# Patient Record
Sex: FEMALE | Race: WHITE | NOT HISPANIC OR LATINO | Employment: OTHER | URBAN - METROPOLITAN AREA
[De-identification: names, ages, dates, MRNs, and addresses within clinical notes are randomized per-mention and may not be internally consistent; named-entity substitution may affect disease eponyms.]

---

## 2017-01-09 ENCOUNTER — ALLSCRIPTS OFFICE VISIT (OUTPATIENT)
Dept: OTHER | Facility: OTHER | Age: 62
End: 2017-01-09

## 2017-04-13 ENCOUNTER — ALLSCRIPTS OFFICE VISIT (OUTPATIENT)
Dept: OTHER | Facility: OTHER | Age: 62
End: 2017-04-13

## 2017-05-31 ENCOUNTER — ALLSCRIPTS OFFICE VISIT (OUTPATIENT)
Dept: OTHER | Facility: OTHER | Age: 62
End: 2017-05-31

## 2017-07-31 ENCOUNTER — ALLSCRIPTS OFFICE VISIT (OUTPATIENT)
Dept: OTHER | Facility: OTHER | Age: 62
End: 2017-07-31

## 2017-07-31 ENCOUNTER — TRANSCRIBE ORDERS (OUTPATIENT)
Dept: ADMINISTRATIVE | Facility: HOSPITAL | Age: 62
End: 2017-07-31

## 2017-07-31 DIAGNOSIS — Z12.31 VISIT FOR SCREENING MAMMOGRAM: Primary | ICD-10-CM

## 2017-12-05 ENCOUNTER — HOSPITAL ENCOUNTER (OUTPATIENT)
Dept: MAMMOGRAPHY | Facility: CLINIC | Age: 62
Discharge: HOME | End: 2017-12-05
Payer: COMMERCIAL

## 2017-12-05 DIAGNOSIS — M81.0 AGE-RELATED OSTEOPOROSIS WITHOUT CURRENT PATHOLOGICAL FRACTURE: ICD-10-CM

## 2017-12-05 DIAGNOSIS — Z12.31 ENCOUNTER FOR SCREENING MAMMOGRAM FOR MALIGNANT NEOPLASM OF BREAST: ICD-10-CM

## 2017-12-05 DIAGNOSIS — C56.9 MALIGNANT NEOPLASM OF OVARY (HCC): ICD-10-CM

## 2017-12-05 PROCEDURE — G0202 SCR MAMMO BI INCL CAD: HCPCS

## 2017-12-05 PROCEDURE — 77063 BREAST TOMOSYNTHESIS BI: CPT

## 2017-12-07 ENCOUNTER — ALLSCRIPTS OFFICE VISIT (OUTPATIENT)
Dept: OTHER | Facility: OTHER | Age: 62
End: 2017-12-07

## 2017-12-14 ENCOUNTER — GENERIC CONVERSION - ENCOUNTER (OUTPATIENT)
Dept: OTHER | Facility: OTHER | Age: 62
End: 2017-12-14

## 2017-12-14 ENCOUNTER — ALLSCRIPTS OFFICE VISIT (OUTPATIENT)
Dept: OTHER | Facility: OTHER | Age: 62
End: 2017-12-14

## 2017-12-15 LAB
A/G RATIO (HISTORICAL): 1.3 (ref 1.2–2.2)
ALBUMIN SERPL BCP-MCNC: 4.1 G/DL (ref 3.6–4.8)
ALP SERPL-CCNC: 57 IU/L (ref 39–117)
ALT SERPL W P-5'-P-CCNC: 23 IU/L (ref 0–32)
AST SERPL W P-5'-P-CCNC: 27 IU/L (ref 0–40)
BILIRUB SERPL-MCNC: 0.4 MG/DL (ref 0–1.2)
BUN SERPL-MCNC: 21 MG/DL (ref 8–27)
BUN/CREA RATIO (HISTORICAL): 28 (ref 12–28)
CALCIUM SERPL-MCNC: 9.2 MG/DL (ref 8.7–10.3)
CHLORIDE SERPL-SCNC: 99 MMOL/L (ref 96–106)
CHOLEST SERPL-MCNC: 201 MG/DL (ref 100–199)
CHOLEST/HDLC SERPL: 2 RATIO UNITS (ref 0–4.4)
CO2 SERPL-SCNC: 26 MMOL/L (ref 18–29)
CREAT SERPL-MCNC: 0.76 MG/DL (ref 0.57–1)
EGFR AFRICAN AMERICAN (HISTORICAL): 99 ML/MIN/1.73
EGFR-AMERICAN CALC (HISTORICAL): 86 ML/MIN/1.73
GLUCOSE SERPL-MCNC: 90 MG/DL (ref 65–99)
HDLC SERPL-MCNC: 101 MG/DL
HEPATITIS C ANTIBODY (HISTORICAL): 0.2 S/CO RATIO (ref 0–0.9)
LDLC SERPL CALC-MCNC: 88 MG/DL (ref 0–99)
POTASSIUM SERPL-SCNC: 4.4 MMOL/L (ref 3.5–5.2)
SODIUM SERPL-SCNC: 141 MMOL/L (ref 134–144)
TOT. GLOBULIN, SERUM (HISTORICAL): 3.2 G/DL (ref 1.5–4.5)
TOTAL PROTEIN (HISTORICAL): 7.3 G/DL (ref 6–8.5)
TRIGL SERPL-MCNC: 59 MG/DL (ref 0–149)
TSH SERPL DL<=0.05 MIU/L-ACNC: 2.37 UIU/ML (ref 0.45–4.5)
VLDLC SERPL CALC-MCNC: 12 MG/DL (ref 5–40)

## 2017-12-18 ENCOUNTER — GENERIC CONVERSION - ENCOUNTER (OUTPATIENT)
Dept: OTHER | Facility: OTHER | Age: 62
End: 2017-12-18

## 2017-12-18 LAB
BASOPHILS # BLD AUTO: NORMAL 10*3/UL
EOSINOPHIL # BLD AUTO: NORMAL 10*3/UL
EOSINOPHIL # BLD AUTO: NORMAL 10*3/UL
HCT VFR BLD AUTO: NORMAL %
HGB BLD-MCNC: NORMAL G/DL
LYMPHOCYTES # BLD AUTO: NORMAL 10*3/UL
LYMPHOCYTES # BLD AUTO: NORMAL 10*3/UL
MONOCYTES (HISTORICAL): NORMAL
NEUTROPHILS # BLD AUTO: NORMAL 10*3/UL
PLATELET # BLD AUTO: NORMAL 10*3/UL
QUESTION/PROBLEM (HISTORICAL): NORMAL
RBC (HISTORICAL): NORMAL
WBC # BLD AUTO: NORMAL X10E3/UL

## 2017-12-21 ENCOUNTER — GENERIC CONVERSION - ENCOUNTER (OUTPATIENT)
Dept: OTHER | Facility: OTHER | Age: 62
End: 2017-12-21

## 2017-12-22 ENCOUNTER — GENERIC CONVERSION - ENCOUNTER (OUTPATIENT)
Dept: OTHER | Facility: OTHER | Age: 62
End: 2017-12-22

## 2017-12-22 LAB
BASOPHILS # BLD AUTO: 0 X10E3/UL (ref 0–0.2)
BASOPHILS # BLD AUTO: 1 %
DEPRECATED RDW RBC AUTO: 13.4 % (ref 12.3–15.4)
EOSINOPHIL # BLD AUTO: 0.1 X10E3/UL (ref 0–0.4)
EOSINOPHIL # BLD AUTO: 3 %
HCT VFR BLD AUTO: 41 % (ref 34–46.6)
HGB BLD-MCNC: 13.8 G/DL (ref 11.1–15.9)
IMM.GRANULOCYTES (CD4/8) (HISTORICAL): 0.1 X10E3/UL (ref 0–0.1)
IMM.GRANULOCYTES (CD4/8) (HISTORICAL): 2 %
LYMPHOCYTES # BLD AUTO: 1.9 X10E3/UL (ref 0.7–3.1)
LYMPHOCYTES # BLD AUTO: 40 %
MCH RBC QN AUTO: 28.4 PG (ref 26.6–33)
MCHC RBC AUTO-ENTMCNC: 33.7 G/DL (ref 31.5–35.7)
MCV RBC AUTO: 84 FL (ref 79–97)
MONOCYTES # BLD AUTO: 0.5 X10E3/UL (ref 0.1–0.9)
MONOCYTES (HISTORICAL): 11 %
NEUTROPHILS # BLD AUTO: 2.2 X10E3/UL (ref 1.4–7)
NEUTROPHILS # BLD AUTO: 43 %
PLATELET # BLD AUTO: 322 X10E3/UL (ref 150–379)
RBC (HISTORICAL): 4.86 X10E6/UL (ref 3.77–5.28)
WBC # BLD AUTO: 4.9 X10E3/UL (ref 3.4–10.8)

## 2017-12-26 ENCOUNTER — GENERIC CONVERSION - ENCOUNTER (OUTPATIENT)
Dept: FAMILY MEDICINE CLINIC | Facility: CLINIC | Age: 62
End: 2017-12-26

## 2018-01-12 VITALS
HEIGHT: 62 IN | SYSTOLIC BLOOD PRESSURE: 126 MMHG | WEIGHT: 124.31 LBS | BODY MASS INDEX: 22.88 KG/M2 | DIASTOLIC BLOOD PRESSURE: 80 MMHG | HEART RATE: 76 BPM | TEMPERATURE: 97.6 F | RESPIRATION RATE: 14 BRPM

## 2018-01-13 VITALS
WEIGHT: 122.31 LBS | RESPIRATION RATE: 16 BRPM | SYSTOLIC BLOOD PRESSURE: 128 MMHG | HEART RATE: 76 BPM | BODY MASS INDEX: 22.51 KG/M2 | DIASTOLIC BLOOD PRESSURE: 72 MMHG | TEMPERATURE: 97.8 F | HEIGHT: 62 IN

## 2018-01-13 VITALS
HEART RATE: 90 BPM | SYSTOLIC BLOOD PRESSURE: 128 MMHG | TEMPERATURE: 99.7 F | OXYGEN SATURATION: 98 % | DIASTOLIC BLOOD PRESSURE: 82 MMHG | WEIGHT: 123 LBS | BODY MASS INDEX: 22.63 KG/M2 | HEIGHT: 62 IN

## 2018-01-13 NOTE — PROGRESS NOTES
Chief Complaint  PATIENT WAS IN FOR A PREVNAR 13 VACCINE  ALL INFORMATION WAS DOCUMENTED  PATIENT TOLERATED WELL  Active Problems    1  Benign essential hypertension (401 1) (I10)   2  Colon, diverticulosis (562 10) (K57 30)   3  Crohn's disease (555 9) (K50 90)   4  Dense breasts (793 82) (R92 2)   5  Dermatitis (692 9) (L30 9)   6  Erythema chronica migrans, secondary (088 81) (A69 20)   7  Headache (784 0) (R51)   8  History of colon polyps (V12 72) (Z86 010)   9  Hyperlipidemia (272 4) (E78 5)   10  Lymphedema (457 1) (I89 0)   11  Murmur (785 2) (R01 1)   12  Need for influenza vaccination (V04 81) (Z23)   13  Need for pneumococcal vaccination (V03 82) (Z23)   14  Need for Tdap vaccination (V06 1) (Z23)   15  Osteopenia (733 90) (M85 80)   16  Osteoporosis (733 00) (M81 0)   17  Personal history of ovarian cancer (V10 43) (Z85 43)   18  Reactive airway disease (493 90) (J45 909)    Current Meds   1  AmLODIPine Besylate 5 MG Oral Tablet; Take 1 tablet daily; Therapy: 83XBW2638 to (Lindsay Marshh)  Requested for: 85IYY7269; Last   Rx:04Nov2016 Ordered   2  Atorvastatin Calcium 40 MG Oral Tablet; take 1 tablet by mouth daily; Therapy: 95XON0139 to (Lindsay Marshh)  Requested for: 11IBQ2912; Last   Rx:04Nov2016 Ordered   3  Caltrate 600 TABS; 1 TAB TWICE A DAY; Therapy: (Recorded:61Gpc7241) to Recorded   4  HydroCHLOROthiazide 12 5 MG Oral Capsule; TAKE 1 CAPSULE DAILY; Therapy: 41VQS2849 to (Evaluate:60Okm1601)  Requested for: 50RRE5533; Last   Rx:03Nov2016 Ordered   5  Ibandronate Sodium 150 MG Oral Tablet; TAKE 1 TABLET ONCE MONTHLY WITH 8 TO   10 OUNCES OF WATER   STAY UPRIGHT AND DO NOT EAT OR DRINK FOR 1 HOUR;   Therapy: 21AMB6192 to (Evaluate:13Oct2017)  Requested for: 42CWW2987; Last   Rx:18Oct2016 Ordered   6  Medical Compression Stockings Miscellaneous; USE AS DIRECTED; Therapy: 15CUU4268 to (Last Rx:30Dec2013) Ordered   7   Mupirocin 2 % External Ointment; APPLY 1 INCH Twice daily; Therapy: 85DQF8598 to (Evaluate:17Nov2016)  Requested for: 34FCM1624; Last   Rx:18Oct2016 Ordered   8  ProAir  (90 Base) MCG/ACT Inhalation Aerosol Solution; INHALE 2 PUFFS   EVERY 4 HOURS AS NEEDED; Therapy: 62CWW5266 to (Last Rx:18Oct2016)  Requested for: 99JLE1004 Ordered   9  Remicade 100 MG Intravenous Solution Reconstituted; every 8 weeks; Therapy: 74Zmv8429 to (Last Rx:24Jan2013)  Requested for: 19NXJ9275 Ordered   10  Triamcinolone Acetonide 0 5 % External Cream; APPLY TO AFFECTED AREA TWICE A    DAY; Therapy: 69FTI4403 to (Last Rx:74Sgv1325)  Requested for: 64Xic6040 Ordered    Allergies    1  No Known Drug Allergies    Plan  Need for pneumococcal vaccination    · Prevnar 13 Intramuscular Suspension  Need for Tdap vaccination    · Adacel 5-2-15 5 LF-MCG/0 5 Intramuscular Suspension    Discussion/Summary    Prevnar 13 given today        Future Appointments    Date/Time Provider Specialty Site   01/09/2017 08:00 AM Cherry Pérez MD Surgical Oncology [facility]   04/13/2017 09:00 AM Belen Gramajo, [address]     Signatures   Electronically signed by : Karol Shrestha DO; Nov 17 2016  1:28PM EST                       (Author)

## 2018-01-13 NOTE — PROGRESS NOTES
Assessment    1  Osteoporosis (733 00) (M81 0)   2  Allergic contact dermatitis due to other agents (692 89) (L23 89)   3  Former smoker (V15 82) (J35 431)   4  Benign essential hypertension (401 1) (I10)   5  Lymphedema (457 1) (I89 0)   6  Constipation (564 00) (K59 00)    Plan  Colon, diverticulosis, Crohn's disease, History of colon polyps    · COLONOSCOPY; Status:Active; Requested UNK:86ZMQ9612;   PMH: Encounter for screening for malignant neoplasm of colon    · COLONOSCOPY ; every 3 years; Last 16SNN5698; Status:Discontinued    Discussion/Summary    1) appt with GI this week for follow-up, ask about oral medication for osteoporosis and about Tdap update  2) osteoporosis: will fax dexa scan to Dr Tuyet Rolon about possible treatment options, continue with calcium twice a day and weightbearing activity  3) constipation: miralax as needed, stool softeners, apple juice, plum smart, plums, peaches  4) skin rash: zyrtec or claritin, dye free and perfume free soap- dove, less irritation to skin   5) blood work due after aug 10 and physical after september 2    6) hypertension: Controlled, continue amlodipine, HCTZ  Possible side effects of new medications were reviewed with the patient/guardian today  The treatment plan was reviewed with the patient/guardian  The patient/guardian understands and agrees with the treatment plan      Chief Complaint  PT IS HERE TO DISCUSS DEXA RESULTS AND RX FOR COMPRESSION STOCKINGS  PT IS HAVING LEFT LEG BURNING SENSATION X FEW MONTHS  SHE ALSO HAS ITCHY, PIMPLE LIKE BUMPS ALL OVER THAT COME AND GO  History of Present Illness  HPI: Patient here to discuss results of recent DEXA scan  The one prior was 2013 and showed a decline to osteoporosis in her lumbar spine and femoral neck area  She has been taking calcium supplement twice a day and she is very active during the day  She complains of an intermittent rash on her arms that is itchy but then goes away in less than 24 hours  Uses Dial soap  She has been using this for many years  She does moisturize her skin  She denies any new soaps lotions or detergents  She complains of an area on her left upper leg that is tingly, numb, painful intermittently  She denies any injury to the area  There is no rash over that area  It is been present for a few years  Complains of frequent issues with constipation  Patient has bilateral lymphedema from which she needs compression stockings during the day and a solaris unit  Review of Systems    Constitutional: No fever, no chills, feels well, no tiredness, no recent weight gain or loss  ENT: no ear ache, no loss of hearing, no nosebleeds or nasal discharge, no sore throat or hoarseness  Cardiovascular: no complaints of slow or fast heart rate, no chest pain, no palpitations, no leg claudication or lower extremity edema  Respiratory: no complaints of shortness of breath, no wheezing, no dyspnea on exertion, no orthopnea or PND  Breasts: no complaints of breast pain, breast lump or nipple discharge  Gastrointestinal: constipation, but as noted in HPI, no abdominal pain, no nausea, no vomiting, no diarrhea and no blood in stools  Genitourinary: no complaints of dysuria, no incontinence, no pelvic pain, no dysmenorrhea, no vaginal discharge or abnormal vaginal bleeding  Musculoskeletal: no complaints of arthralgia, no myalgia, no joint swelling or stiffness, no limb pain or swelling  Integumentary: rash, but as noted in HPI  Neurological: no complaints of headache, no confusion, no numbness or tingling, no dizziness or fainting  Other Symptoms: Lymphedema bilateral lower extremities left greater than right  Active Problems    1  Benign essential hypertension (401 1) (I10)   2  Colon, diverticulosis (562 10) (K57 30)   3  Constipation (564 00) (K59 00)   4  Crohn's disease (555 9) (K50 90)   5  Dermatitis (692 9) (L30 9)   6   Erythema chronica migrans, secondary (088 81) (A69 20)   7  Headache (784 0) (R51)   8  History of colon polyps (V12 72) (Z86 010)   9  Hyperlipidemia (272 4) (E78 5)   10  Lymphedema (457 1) (I89 0)   11  Murmur (785 2) (R01 1)   12  Osteopenia (733 90) (M85 80)   13  Personal history of ovarian cancer (V10 43) (Z85 43)    Past Medical History    1  History of Abnormal findings on diagnostic imaging of breast (793 89) (R92 8)   2  History of Accelerated essential hypertension (401 0) (I10)   3  History of Age At First Period 17 Years Old (Menarche)   4  History of Benign Polyps Of The Large Intestine (V12 72)   5  History of Cellulitis (682 9) (L03 90)   6  History of Cellulitis (682 9) (L03 90)   7  History of Encounter for screening for malignant neoplasm of colon (V76 51) (Z12 11)   8  History of abdominal pain (V13 89) (Z87 898)   9  History of acute conjunctivitis (V12 49) (Z86 69)   10  History of conjunctivitis (V12 49) (Z86 69)   11  History of intestinal obstruction (V12 79) (Z87 19)   12  History of malignant neoplasm of female genital organ (V10 40) (Z85 40)   13  History of ovarian cancer (V10 43) (Z85 43)   14  History of Need for pneumococcal vaccination (V03 82) (Z23)   15  Personal history of ovarian cancer (V10 43) (Z85 43)   16  History of Renal Insufficiency (593 9)   17  History of Screening for diabetes mellitus (V77 1) (Z13 1)   18  History of Screening for osteoporosis (V82 81) (Z13 820)   19  History of Summary Of Previous Pregnancies  0  (Total No )   20  History of Tick bite (919 4,E906 4) (W57 XXXA)   21  History of Visit for screening mammogram (V76 12) (Z12 31)   22  History of X-Ray UGI Small Bowel Obstruction  Active Problems And Past Medical History Reviewed: The active problems and past medical history were reviewed and updated today  Family History  Mother    1  Family history of Heart Disease (V17 49)   2  Family history of Hypertension (V17 49)   3  Family history of Hypothyroidism   4   Family history of Osteoporosis (V17 81)  Father    5  Family history of Arthritis (V17 7)   6  Family history of Diabetes Mellitus (V18 0)   7  Family history of Hypertension (V17 49)   8  Family history of Prostate Cancer (V16 42)  Sister    5  Family history of Ovarian Cancer (V16 41)  Maternal Grandmother    10  Family history of Heart Disease (V17 49)   11  Family history of Hypertension (V17 49)  Paternal Grandmother    15  Family history of Breast Cancer (V16 3)  Maternal Grandfather    15  Family history of Acute Myocardial Infarction (V17 3)  Family History Reviewed: The family history was reviewed and updated today  Social History    · Being A Social Drinker   · Former smoker (F27 93) (V55 479)  The social history was reviewed and updated today  The social history was reviewed and is unchanged  Surgical History    1  History of Insertion Of Intraperitoneal Catheter With Subcutaneous [facility]   2  History of Removal Of Intraperitoneal Catheter (V56 1)   3  History of Resection Of Omentum   4  History of Staging Lymphadenectomy   5  History of Total Abdominal Hysterectomy With Removal Of Both Ovaries  Surgical History Reviewed: The surgical history was reviewed and updated today  Current Meds   1  AmLODIPine Besylate 5 MG Oral Tablet; Take 1 tablet daily; Therapy: 61MGY8572 to (Evaluate:07Sep2016)  Requested for: 69QBU6749; Last   Rx:11Mar2016 Ordered   2  Atorvastatin Calcium 40 MG Oral Tablet; take 1 tablet by mouth daily; Therapy: 85VAB7761 to (Evaluate:07Sep2016)  Requested for: 13ASD1208; Last   Rx:11Mar2016 Ordered   3  Caltrate 600 TABS; 1 TAB TWICE A DAY; Therapy: (Recorded:78Ffb8854) to Recorded   4  Hydrochlorothiazide 12 5 MG Oral Capsule; TAKE 1 CAPSULE DAILY; Therapy: 82YRY1469 to (Evaluate:81Jvc8581)  Requested for: 17JCX3760; Last   Rx:37Fjl2165 Ordered   5  Medical Compression Stockings Miscellaneous; USE AS DIRECTED; Therapy: 58XYY8184 to (Last Rx:77Flo9177) Ordered   6  Remicade 100 MG Intravenous Solution Reconstituted; every 8 weeks; Therapy: 29Zvj9680 to (Last Rx:24Jan2013)  Requested for: 85AQS0397 Ordered   7  Triamcinolone Acetonide 0 5 % External Cream; APPLY TO AFFECTED AREA TWICE A   DAY; Therapy: 70KEB7066 to (Last Rx:31Hbu1681)  Requested for: 79Rhu7052 Ordered    The medication list was reviewed and updated today  Allergies    1  No Known Drug Allergies    Vitals   Recorded: 97BQQ2914 08:15AM   Temperature 98 9 F   Heart Rate 67   Systolic 124   Diastolic 84   Height 5 ft 1 5 in   Weight 125 lb    BMI Calculated 23 24   BSA Calculated 1 55   O2 Saturation 98     Physical Exam    Constitutional   General appearance: No acute distress, well appearing and well nourished  Eyes   Conjunctiva and lids: No swelling, erythema or discharge  Pupils and irises: Equal, round and reactive to light  Pulmonary   Respiratory effort: No increased work of breathing or signs of respiratory distress  Auscultation of lungs: Clear to auscultation  Cardiovascular   Auscultation of heart: Normal rate and rhythm, normal S1 and S2, without murmurs  Examination of extremities for edema and/or varicosities: Normal     Abdomen   Abdomen: Non-tender, no masses  Liver and spleen: No hepatomegaly or splenomegaly  Musculoskeletal   Gait and station: Normal     Digits and nails: Normal without clubbing or cyanosis  Inspection/palpation of joints, bones, and muscles: Abnormal   Lymphedema bilateral lower extremity left greater than right  Skin No rash currently  Psychiatric   Orientation to person, place, and time: Normal     Mood and affect: Normal          Results/Data  PHQ-2 Adult Depression Screening 29Jun2016 08:29AM User, s     Test Name Result Flag Reference   PHQ-2 Adult Depression Score 0     Over the last two weeks, how often have you been bothered by any of the following problems?   Little interest or pleasure in doing things: Not at all - 0  Feeling down, depressed, or hopeless: Not at all - 0   PHQ-2 Adult Depression Screening Negative       COLONOSCOPY 27Nov2012 12:00AM Jaylyn Morel     Test Name Result Flag Reference   Colonoscopy      CROHN'S, BENIGN NEOPLASM LG BOWEL, [facility]     Summary / No summary entered :      No summary entered  Documents attached :      sGastroenterology - Jaylyn Morel; [place]: [id number] - Image Encounter - Jaylyn Morel -      (Family Medicine) (Result Document)  Summary / No summary entered :      No summary entered  Documents attached :      sGastroenterology - Jaylyn Morel; [place]: [id number] - Image Encounter - Jaylyn Morel -      (Family Medicine) (Result Document)  * Dexa Scan Axial Skel (Hip, Pelvis, Spine) 82VRG3434 08:48AM Jaylyn Morel     Test Name Result Flag Reference   Dexa Scan (Report)     [facility];[address] [place] [address] Street;[place];[address]   12/10/2015 3415   12/10/2015 0905   NONE     CENTRAL DXA SCAN     CLINICAL HISTORY-  64year old post-menopausal  female  Family history of osteoporosis  TECHNIQUE- Bone densitometry was performed using a [employer] Harrell C   bone densitometer  Regions of interest appear properly placed  There   are no obvious fractures or other confounding variables which could   limit the study  COMPARISON- 4/29/2013     RESULTS-    LUMBAR SPINE- L1-L4-   BMD 0 762 gm/cm2   T-score -2 6   Z-score -1 3     LEFT TOTAL HIP-   BMD 0 672 gm/cm2   T-score -2 2   Z-score -1 4     LEFT FEMORAL NECK-   BMD 0 568 gm/cm2   T-score -2 5   Z-score -1 3         ASSESSMENT-   1  Based on the WHO classification, the T-score of -2 6 is consistent   with osteoporosis  2  Since the prior study, there has been 3 5% decrease in the BMD of   the hip, and no statistically significant change in the BMD of the   spine     3  According to the [facility], prescription   therapy is recommended with a T-score of -2 5 or less in the spine or   hip after appropriate evaluation to exclude secondary causes  4  A daily intake of at least 1200 mg Calcium and 800 ? 1000 IU of   Vitamin D, as well as weight bearing and muscle strengthening exercise,   fall prevention and avoidance of tobacco and excessive alcohol intake   as basic preventive measures are suggested  5  Repeat DXA scan in 18-24 months as clinically indicated           WHO CLASSIFICATION-   Normal (a T-score of -1 0 or higher)   Low bone mineral density (a T-score of less than -1 0 but higher than   -2 5)   Osteoporosis (a T-score of -2 5 or less)   Severe osteoporosis (a T-score of -2 5 or less with a fragility   fracture)               Transcribed on- [id number]     - SUGEY Prabhakar MD   Reading Radiologist- SUGEY Prabhakar MD   Electronically Signed- USGEY Prabhakar MD   Released Date Time- 12/10/15 1138   ------------------------------------------------------------------------------   [address]^LUCINDA HUERTA [address] [place] [address] Appointments    Date/Time Provider Specialty Site   07/06/2016 02:45 PM Kenny So MD Surgical [facility]   04/13/2017 09:00 AM Janey Kirby [facility] Gy[facility]     Signatures   Electronically signed by : Ignacio Ballesteros DO; Jun 29 2016  1:04PM EST                       (Author)

## 2018-01-14 VITALS
DIASTOLIC BLOOD PRESSURE: 80 MMHG | SYSTOLIC BLOOD PRESSURE: 130 MMHG | BODY MASS INDEX: 22.26 KG/M2 | HEART RATE: 78 BPM | WEIGHT: 121 LBS | RESPIRATION RATE: 16 BRPM | HEIGHT: 62 IN | TEMPERATURE: 98.1 F

## 2018-01-15 NOTE — PROGRESS NOTES
Assessment    1  Encounter for preventive health examination (V70 0) (Z00 00)   2  Former smoker (V15 82) (B68 987)   3  Benign essential hypertension (401 1) (I10)   4  Hyperlipidemia (272 4) (E78 5)   5  Dermatitis (692 9) (L30 9)   6  Reactive airway disease (493 90) (J45 909)   7  Lymphedema (457 1) (I89 0)   8  Need for influenza vaccination (V04 81) (Z23)    Plan  Health Maintenance    · Brush your teeth freq1 and floss at least once a day ; Status:Complete;   Done:  22RQG7457   · Drink plenty of fluids ; Status:Complete;   Done: 22VFZ7702   · We encourage all of our patients to exercise regularly  30 minutes of exercise or physical  activity five or more days a week is recommended for children and adults ;  Status:Complete;   Done: 44JZN8018   · We encourage you to begin to make lifestyle changes to help control your blood  pressure  These may include losing weight, increasing your activity level, limiting salt in  your diet, decreasing alcohol intake, and eating a diet low in fat and rich in fruits  and vegetables ; Status:Complete;   Done: 55TWV8162   · We recommend routine visits to a dentist ; Status:Complete;   Done: 77BSD4745   · We recommend that you follow the "Mediterranean diet "; Status:Complete;   Done:  04IVI0001   · We recommend that you follow these steps to lower your risk of osteoporosis  ;  Status:Complete;   Done: 23VNY9546   · Call [phone] if: You find a new or different kind of lump in your breast ;  Status:Complete;   Done: 62DYK6397   · Call [phone] if: You have any warning signs of skin cancer ; Status:Complete;    Done: 67VCW5484   · Call 911 if: You experience a new kind of chest pain (angina) or pressure ;  Status:Complete;   Done: 62IAJ4901  Health Maintenance, PMH: Need for prophylactic vaccination and inoculation against  influenza    · Flulaval INJ; INJECT 0 5  ML Intramuscular;  To Be Done: 51WPU6408  Need for influenza vaccination    · Fluzone Quadrivalent Intramuscular Suspension  Osteoporosis    · Ibandronate Sodium 150 MG Oral Tablet (Boniva); TAKE 1 TABLET ONCE  MONTHLY WITH 8 TO 10 OUNCES OF WATER   STAY UPRIGHT AND DO NOT EAT  OR DRINK FOR 1 HOUR  PMH: Cellulitis    · Mupirocin 2 % External Ointment (Bactroban); APPLY 1 INCH Twice daily  Reactive airway disease    · ProAir  (90 Base) MCG/ACT Inhalation Aerosol Solution; INHALE 2 PUFFS  EVERY 4 HOURS AS NEEDED    Discussion/Summary  health maintenance visit healthy adult female Currently, she eats a healthy diet and has an adequate exercise regimen  cervical cancer screening is not indicated Breast cancer screening: mammogram is current  Colorectal cancer screening: colorectal cancer screening is current  Osteoporosis screening: bone mineral density testing is current  The immunizations are needed and immunizations will be given as outlined in the orders  Patient discussion: discussed with the patient  1) screening for breast cancer: keep mammogram scheduled in November   2) routine immunizations- INFLUENZA IMMUNIZATION TODAY   3) Dr Jese Wu 12/2015  4) reactive airway: PROAIR HFA 2 PUFFS 15 MIN PRIOR TO ACTIVITY UP TO EVERY 4 HOURS, will need pulmonary function test  5) RETURN FOR PREVNAR AND Tdap   6) DO NOT GET SHINGLES VACCINATION -live virus, patient on Remicade  7) reviewed blood work with patient in office today, patient given copy  8) hypertension: Controlled, continue current medication  9) hyperlipidemia: Controlled, continue atorvastatin  10) osteoporosis: On last DEXA scan December 2015, start Boniva 1 tablet monthly, repeat DEXA December 2017, continue with calcium supplement twice a day  Chief Complaint  PT IS HERE FOR ANNUAL PHYSICAL  HER MAMMOGRAM IS SCHEDULED IN NOVEMBER  History of Present Illness  , Adult Female: The patient is being seen for a health maintenance evaluation  The last health maintenance visit was 1 year(s) ago  General Health:  The patient's health since the last visit is described as good  She has regular dental visits  She denies vision problems  She denies hearing loss  Immunizations status: not up to date The patient needs the following immunization(s): tetanus vaccine, influenza vaccine and pneumococcal vaccine  Lifestyle:  She consumes a diverse and healthy diet  She does not have any weight concerns  She exercises regularly  She does not use tobacco  She consumes alcohol  She denies drug use  Reproductive health: the patient is postmenopausal    Screening: cancer screening reviewed and updated  metabolic screening reviewed and updated  risk screening reviewed and updated  HPI: Patient is here for a physical today  She denies complaints today  She states her breathing was more of a struggle over the summer  She had smoked for 30 years  The humidity increases her shortness of breath  She is a  by trade  She gets out of breath after walking too fast  Feels like she is limited by her breathing  She has lymphedema of her left leg as a residual of ovarian cancer surgery- stable   Her bowel movements are regular  She had her last colonoscopy with Dr Maureen Kayser in [place] in December 2015   her most recent DEXA scan in December 2015 shows osteoporosis in her lumbar spine and femoral neck  She'll start on Boniva 1 tablet monthly  She has been taking calcium twice a day  She is requesting a renewal on the mupirocin ointment for nasal inflammation  Review of Systems    Constitutional: recent 3 lb weight loss, but No fever, no chills, feels well, no tiredness, no recent weight gain or weight loss  Eyes: No complaints of eye pain, no red eyes, no eyesight problems, no discharge, no dry eyes, no itching of eyes  ENT: no complaints of earache, no loss of hearing, no nose bleeds, no nasal discharge, no sore throat, no hoarseness     Cardiovascular: No complaints of slow heart rate, no fast heart rate, no chest pain, no palpitations, no leg claudication, no lower extremity edema  Respiratory: shortness of breath, but as noted in HPI  Gastrointestinal: No complaints of abdominal pain, no constipation, no nausea or vomiting, no diarrhea, no bloody stools  Genitourinary: History of ovarian cancer, but as noted in HPI  Musculoskeletal: limb swelling and Lymphedema left leg, but as noted in HPI  Integumentary: No complaints of skin rash or lesions, no itching, no skin wounds, no breast pain or lump  Neurological: No complaints of headache, no confusion, no convulsions, no numbness, no dizziness or fainting, no tingling, no limb weakness, no difficulty walking  Psychiatric: Not suicidal, no sleep disturbance, no anxiety or depression, no change in personality, no emotional problems  Endocrine: No complaints of proptosis, no hot flashes, no muscle weakness, no deepening of the voice, no feelings of weakness  Hematologic/Lymphatic: No complaints of swollen glands, no swollen glands in the neck, does not bleed easily, does not bruise easily  Active Problems    1  Benign essential hypertension (401 1) (I10)   2  Colon, diverticulosis (562 10) (K57 30)   3  Crohn's disease (555 9) (K50 90)   4  Dense breasts (793 82) (R92 2)   5  Dermatitis (692 9) (L30 9)   6  Erythema chronica migrans, secondary (088 81) (A69 20)   7  Headache (784 0) (R51)   8  History of colon polyps (V12 72) (Z86 010)   9  Hyperlipidemia (272 4) (E78 5)   10  Lymphedema (457 1) (I89 0)   11  Murmur (785 2) (R01 1)   12  Osteopenia (733 90) (M85 80)   13  Osteoporosis (733 00) (M81 0)   14   Personal history of ovarian cancer (V10 43) (Z85 43)    Past Medical History    · History of Abnormal findings on diagnostic imaging of breast (793 89) (R92 8)   · History of Accelerated essential hypertension (401 0) (I10)   · History of Age At First Period 17 Years Old (Menarche)   · History of Allergic contact dermatitis due to other agents (692 89) (L23 89)   · History of Benign Polyps Of The Large Intestine (V12 72)   · History of Cellulitis (682 9) (L03 90)   · History of Cellulitis (682 9) (L03 90)   · History of Encounter for screening for malignant neoplasm of colon (V76 51) (Z12 11)   · History of abdominal pain (V13 89) (B45 998)   · History of acute conjunctivitis (V12 49) (Z86 69)   · History of conjunctivitis (V12 49) (Z86 69)   · History of intestinal obstruction (V12 79) (Z87 19)   · History of malignant neoplasm of female genital organ (V10 40) (Z85 40)   · History of ovarian cancer (V10 43) (Z85 43)   · History of Need for pneumococcal vaccination (V03 82) (Z23)   · Personal history of ovarian cancer (V10 43) (Z85 43)   · History of Renal Insufficiency (593 9)   · History of Screening for diabetes mellitus (V77 1) (Z13 1)   · History of Screening for osteoporosis (V82 81) (Z13 820)   · History of Summary Of Previous Pregnancies  0  (Total No )   · History of Tick bite (919 4,E906 4) (W57 XXXA)   · History of Visit for screening mammogram (V76 12) (Z12 31)   · History of X-Ray UGI Small Bowel Obstruction    Surgical History    · History of Insertion Of Intraperitoneal Catheter With Subcutaneous [place]   · History of Removal Of Intraperitoneal Catheter (V56 1)   · History of Resection Of Omentum   · History of Staging Lymphadenectomy   · History of Total Abdominal Hysterectomy With Removal Of Both Ovaries    Family History  Mother    · Family history of Heart Disease (V17 49)   · Family history of Hypertension (V17 49)   · Family history of Hypothyroidism   · Family history of Osteoporosis (V17 81)  Father    · Family history of Arthritis (V17 7)   · Family history of Diabetes Mellitus (V18 0)   · Family history of Hypertension (V17 49)   · Family history of Prostate Cancer (V16 39)  Sister    · Family history of Ovarian Cancer (V16 41)  Maternal Grandmother    · Family history of Heart Disease (V17 49)   · Family history of Hypertension (V17 49)  Paternal Grandmother    · Family history of Breast Cancer (V16 3)  Maternal Grandfather    · Family history of Acute Myocardial Infarction (V17 3)    Social History    · Being A Social Drinker   · Former smoker (N61 36) (B23 179)    Current Meds   1  AmLODIPine Besylate 5 MG Oral Tablet; Take 1 tablet daily; Therapy: 65PFU4597 to (Evaluate:28Oct2016)  Requested for: 91BNP1375; Last   Rx:94Nwa4604 Ordered   2  Atorvastatin Calcium 40 MG Oral Tablet; take 1 tablet by mouth daily; Therapy: 12SCI9794 to (Evaluate:28Oct2016)  Requested for: 03MRI3660; Last   Rx:28Sep2016 Ordered   3  Caltrate 600 TABS; 1 TAB TWICE A DAY; Therapy: (Recorded:02Sep2015) to Recorded   4  HydroCHLOROthiazide 12 5 MG Oral Capsule; TAKE 1 CAPSULE DAILY; Therapy: 03FXW2034 to (Evaluate:28Oct2016)  Requested for: 38VEX3016; Last   Rx:41Qtl8574 Ordered   5  Medical Compression Stockings Miscellaneous; USE AS DIRECTED; Therapy: 50XTD7393 to (Last Rx:46Jnp5039) Ordered   6  Remicade 100 MG Intravenous Solution Reconstituted; every 8 weeks; Therapy: 37Lxg8105 to (Last Rx:24Jan2013)  Requested for: 26MHR6710 Ordered   7  Triamcinolone Acetonide 0 5 % External Cream; APPLY TO AFFECTED AREA TWICE A   DAY; Therapy: 46SSY0401 to (Last Rx:31Hwf2949)  Requested for: 19Thd7308 Ordered    Allergies    1  No Known Drug Allergies    Vitals   Recorded: 33IIN7836 01:11GS   Systolic 539   Diastolic 78   Heart Rate 79   Temperature 99 1 F   O2 Saturation 98   Height 5 ft 1 5 in   Weight 120 lb    BMI Calculated 22 31   BSA Calculated 1 53     Physical Exam    Constitutional   General appearance: No acute distress, well appearing and well nourished  Head and Face   Head and face: Normal     Palpation of the face and sinuses: No sinus tenderness  Eyes   Conjunctiva and lids: No swelling, erythema or discharge  Pupils and irises: Equal, round, reactive to light      Ears, Nose, Mouth, and Throat   External inspection of ears and nose: Normal     Otoscopic examination: Tympanic membranes translucent with normal light reflex  Canals patent without erythema  Hearing: Normal     Nasal mucosa, septum, and turbinates: Normal without edema or erythema  Lips, teeth, and gums: Normal, good dentition  Oropharynx: Normal with no erythema, edema, exudate or lesions  Neck   Neck: Supple, symmetric, trachea midline, no masses  Thyroid: Normal, no thyromegaly  Pulmonary   Respiratory effort: No increased work of breathing or signs of respiratory distress  Auscultation of lungs: Clear to auscultation  Cardiovascular   Auscultation of heart: Normal rate and rhythm, normal S1 and S2, no murmurs  Examination of extremities for edema and/or varicosities: Normal     Abdomen   Abdomen: Non-tender, no masses  Liver and spleen: No hepatomegaly or splenomegaly  Musculoskeletal   Gait and station: Normal     Digits and nails: Normal without clubbing or cyanosis  Joints, bones, and muscles: Abnormal   Lymphedema left leg  Range of motion: Normal     Stability: Normal     Muscle strength/tone: Normal     Skin   Skin and subcutaneous tissue: Normal without rashes or lesions  Neurologic   Cranial nerves: Cranial nerves II-XII intact  Cortical function: Normal mental status  Coordination: Normal finger to nose and heel to shin  Psychiatric   Judgment and insight: Normal     Orientation to person, place, and time: Normal     Recent and remote memory: Intact      Mood and affect: Normal        Future Appointments    Date/Time Provider Specialty Site   11/16/2016 08:30 AM Nurse Melvin Martin  [facility]   01/09/2017 08:00 AM Christopher Arguello MD Surgical Oncology CANCER CARE [facility] SURGICAL ONCOLOGY   04/13/2017 09:00 AM [place], [address]     Signatures   Electronically signed by : J Luis Johnson DO; Oct 18 2016 12:23PM EST                       (Author)

## 2018-01-16 NOTE — RESULT NOTES
Verified Results  (1) CBC/PLT/DIFF 06Oct2016 12:00AM [employer]     Test Name Result Flag Reference   WBC 5 8 x10E3/uL  3 4-10 8   RBC 4 92 x10E6/uL  3 77-5 28   Hemoglobin 13 9 g/dL  11 1-15 9   Hematocrit 43 4 %  34 0-46  6   MCV 88 fL  79-97   MCH 28 3 pg  26 6-33 0   MCHC 32 0 g/dL  31 5-35 7   RDW 13 4 %  12 3-15 4   Platelets 816 S37K8/AM  150-379   Neutrophils 57 %     Lymphs 31 %     Monocytes 8 %     Eos 3 %     Basos 1 %     Neutrophils (Absolute) 3 3 x10E3/uL  1 4-7 0   Lymphs (Absolute) 1 8 x10E3/uL  0 7-3 1   Monocytes(Absolute) 0 5 x10E3/uL  0 1-0 9   Eos (Absolute) 0 2 x10E3/uL  0 0-0 4   Baso (Absolute) 0 0 x10E3/uL  0 0-0 2   Immature Granulocytes 0 %     Immature Grans (Abs) 0 0 x10E3/uL  0 0-0 1     (1) COMPREHENSIVE METABOLIC PANEL 67EZT5204 99:56YP [employer]     Test Name Result Flag Reference   Glucose, Serum 94 mg/dL  65-99   BUN 16 mg/dL  6-24   Creatinine, Serum 0 72 mg/dL  0 57-1 00   eGFR If NonAfricn Am 92 mL/min/1 73  >59   eGFR If Africn Am 106 mL/min/1 73  >59   BUN/Creatinine Ratio 22  9-23   Sodium, Serum 140 mmol/L  134-144   **Effective October 17, 2016 the reference interval**                   for Sodium, Serum will be changing to:                                                             136 - 144   Potassium, Serum 3 6 mmol/L  3 5-5 2   **Effective October 17, 2016 the reference interval**                   for Potassium, Serum will be changing to:                                          0 -  7 days        3 7 - 5 2                                          8 - 30 days        3 7 - 6 4                                          1 -  6 months      3 8 - 6 0                                   7 months -  1 year        3 8 - 5 3                                              >1 year        3 5 - 5 2   Chloride, Serum 98 mmol/L     **Effective October 17, 2016 the reference interval**                   for Chloride, Serum will be changing to: 97 - 106   Carbon Dioxide, Total 28 mmol/L  18-29   Calcium, Serum 9 9 mg/dL  8 7-10 2   Protein, Total, Serum 7 5 g/dL  6 0-8 5   Albumin, Serum 4 3 g/dL  3 5-5 5   Globulin, Total 3 2 g/dL  1 5-4 5   A/G Ratio 1 3  1 1-2 5   Bilirubin, Total 0 7 mg/dL  0 0-1 2   Alkaline Phosphatase, S 113 IU/L     AST (SGOT) 24 IU/L  0-40   ALT (SGPT) 19 IU/L  0-32     (1) LIPID PANEL, FASTING 06Oct2016 12:00AM [employer]     Test Name Result Flag Reference   Cholesterol, Total 217 mg/dL H 100-199   Triglycerides 78 mg/dL  0-149   HDL Cholesterol 104 mg/dL  >39   According to ATP-III Guidelines, HDL-C >59 mg/dL is considered a  negative risk factor for CHD  VLDL Cholesterol Hussein 16 mg/dL  5-40   LDL Cholesterol Calc 97 mg/dL  0-99   T  Chol/HDL Ratio 2 1 ratio units  0 0-4 4   T  Chol/HDL Ratio                                                             Men  Women                                               1/2 Avg  Risk  3 4    3 3                                                   Avg Risk  5 0    4 4                                                2X Avg  Risk  9 6    7 1                                                3X Avg  Risk 23 4   11 0     (1) TSH 06Oct2016 12:00AM [employer]     Test Name Result Flag Reference   TSH 2 720 uIU/mL  0 450-4 500

## 2018-01-22 VITALS
SYSTOLIC BLOOD PRESSURE: 122 MMHG | HEART RATE: 76 BPM | BODY MASS INDEX: 21.81 KG/M2 | OXYGEN SATURATION: 97 % | HEIGHT: 62 IN | TEMPERATURE: 99 F | WEIGHT: 118.5 LBS | DIASTOLIC BLOOD PRESSURE: 58 MMHG

## 2018-01-23 NOTE — RESULT NOTES
Verified Results  (1) CBC/PLT/DIFF 71Fzt0237 12:00AM Lilly [facility]     Test Name Result Flag Reference   WBC 4 9 x10E3/uL  3 4-10 8   RBC 4 86 x10E6/uL  3 77-5 28   Hemoglobin 13 8 g/dL  11 1-15 9   Hematocrit 41 0 %  34 0-46  6   MCV 84 fL  79-97   MCH 28 4 pg  26 6-33 0   MCHC 33 7 g/dL  31 5-35 7   RDW 13 4 %  12 3-15 4   Platelets 334 W16X6/OT  150-379   Neutrophils 43 %  Not Estab  Lymphs 40 %  Not Estab  Monocytes 11 %  Not Estab  Eos 3 %  Not Estab  Basos 1 %  Not Estab  Neutrophils (Absolute) 2 2 x10E3/uL  1 4-7 0   Lymphs (Absolute) 1 9 x10E3/uL  0 7-3 1   Monocytes(Absolute) 0 5 x10E3/uL  0 1-0 9   Eos (Absolute) 0 1 x10E3/uL  0 0-0 4   Baso (Absolute) 0 0 x10E3/uL  0 0-0 2   Immature Granulocytes 2 %  Not Estab     Immature Grans (Abs) 0 1 x10E3/uL  0 0-0 1

## 2018-01-23 NOTE — PROGRESS NOTES
Chief Complaint  Pt is here for spirometry  She has been experiencing SOB with inclines  Active Problems    1  Benign essential hypertension (401 1) (I10)   2  BRCA1 gene mutation negative (V82 71) (Z13 71)   3  BRCA2 gene mutation negative (V82 71) (Z13 71)   4  Colon, diverticulosis (562 10) (K57 30)   5  Crohn's disease (555 9) (K50 90)   6  Dense breasts (793 82) (R92 2)   7  History of colon polyps (V12 72) (Z86 010)   8  Hyperlipidemia (272 4) (E78 5)   9  Lymphedema (457 1) (I89 0)   10  Murmur (785 2) (R01 1)   11  Need for hepatitis C screening test (V73 89) (Z11 59)   12  Need for influenza vaccination (V04 81) (Z23)   13  Osteopenia (733 90) (M85 80)   14  Osteoporosis (733 00) (M81 0)   15  Ovarian cancer (183 0) (C56 9)   16  Personal history of ovarian cancer (V10 43) (Z85 43)   17  Reactive airway disease (493 90) (J45 909)   18  Screening for diabetes mellitus (V77 1) (Z13 1)   19  Screening for iron deficiency anemia (V78 0) (Z13 0)   20  Shortness of breath on exertion (786 05) (R06 02)    Current Meds   1  Atorvastatin Calcium 40 MG Oral Tablet; take 1 tablet by mouth daily; Therapy: 53AZA5195 to (Evaluate:2017)  Requested for: 70WHC5971; Last   Rx:58Xjo0698 Ordered   2  Caltrate 600 TABS; 1 TAB TWICE A DAY; Therapy: (Recorded:86Oqo4655) to Recorded   3  HydroCHLOROthiazide 12 5 MG Oral Capsule; take 1 capsule daily; Therapy: 41XTT1700 to (Evaluate:59Sdo0730)  Requested for: 65HVD2474; Last   Rx:2017 Ordered   4  Ibandronate Sodium 150 MG Oral Tablet; 1 TABLET ONCE MONTHLY WITH 8 TO 10   OUNCES OF WATER  STAY UPRIGHT AND DO NOT EAT OR DRINK FOR 1 HOUR;   Therapy: 92JJZ6262 to (Evaluate:37Zqz2148)  Requested for: 2017; Last   Rx:2017 Ordered   5  Medical Compression Stockings Miscellaneous; USE AS DIRECTED #1  right leg   15-20mmhg, small, #2 Left le-30mm hg, medium  BEIGE;   Therapy: 24KHN6519 to (Last Rx:95Wln1664) Ordered   6   Mupirocin 2 % External Ointment; APPLY 1 INCH Twice daily; Therapy: 28AJG9415 to (Evaluate:06Jan2018)  Requested for: 57XPC1730; Last   Rx:77Ibz0140 Ordered   7  ProAir  (90 Base) MCG/ACT Inhalation Aerosol Solution; INHALE 2 PUFFS   EVERY 4 HOURS AS NEEDED; Therapy: 55LWD9602 to (Last Rx:46Cnk5535)  Requested for: 20Oxp2570 Ordered   8  Remicade 100 MG Intravenous Solution Reconstituted; every 8 weeks; Therapy: 62Dei3337 to (Last Rx:24Jan2013)  Requested for: 87NBW4196 Ordered   9  Triamcinolone Acetonide 0 5 % External Cream; APPLY TO AFFECTED AREA TWICE A   DAY; Therapy: 27QIC4835 to (Last Rx:24Jer6499)  Requested for: 33Mjg3158 Ordered    Allergies    1  No Known Drug Allergies    Results/Data  SPIROMETRY W/O BRONCHO- POC 41SUA3447 10:28AM Royce Benavides     Test Name Result Flag Reference   Spirometry (Summary)                   Summary / No summary entered :      No summary entered  Documents attached :      AUREA Bergman; Enc: [id number] - Appointment - Royce Benavides -      (Family Medicine) (Result Document)    Plan  Benign essential hypertension, Hyperlipidemia    · (1) TSH; Status: In Progress - Specimen/Data Collected;   Done: 15IEK5573  Benign essential hypertension, Hyperlipidemia, PPD screening test, Reactive airway  disease, Screening for diabetes mellitus, Screening for iron deficiency anemia    · Routine Venipuncture - POC; Status:Complete;   Done: 85HGB8130  Hyperlipidemia    · (1) LIPID PANEL, FASTING; Status: In Progress - Specimen/Data Collected;   Done:  26SEG5042  Reactive airway disease, Shortness of breath on exertion    · SPIROMETRY W/O BRONCHO- POC; Status:Resulted - Requires  Verification,Retrospective By Protocol Authorization;   Done: 10QGF8412 10:28AM  Screening for diabetes mellitus    · (1) COMPREHENSIVE METABOLIC PANEL; Status: In Progress - Specimen/Data  Collected;   Done: 51JRA8348  Screening for iron deficiency anemia    · (1) CBC/PLT/DIFF; Status: In Progress - Specimen/Data Collected;   Done: 34GBG0360    Discussion/Summary    Spirometry done        Future Appointments    Date/Time Provider Specialty Site   01/31/2018 09:30 AM Yessenia Garg, [facility] Surgical Oncology [facility] SURGICAL ONCOLOGY   04/12/2018 09:00 AM Nemo Garcia, [address]     Signatures   Electronically signed by : Ivana Ramirez DO; Dec 16 2017  8:39AM EST                       (Author)

## 2018-01-23 NOTE — PROGRESS NOTES
Assessment   1  Encounter for preventive health examination (V70 0) (Z00 00)  2  Need for influenza vaccination (V04 81) (Z23)  3  Former smoker (V15 82) (L28 093)  4  Benign essential hypertension (401 1) (I10)  5  Lymphedema (457 1) (I89 0)1   6  Hyperlipidemia (272 4) (E78 5)1   7  Shortness of breath on exertion (786 05) (R06 02)1      1 Amended By: Royce Benavides; Dec 07 2017 10:25 PM EST    Plan  Health Maintenance    · Decreasing the stress in your life may help your condition improve ; Status:Complete;    Done: 61IJL7708   · Drink plenty of fluids ; Status:Complete;   Done: 10LID0653   · Regular aerobic exercise can help reduce stress ; Status:Complete;   Done: 93HHQ2744   · We encourage you to begin to make lifestyle changes to help control your blood  pressure  These may include losing weight, increasing your activity level, limiting salt in  your diet, decreasing alcohol intake, and eating a diet low in fat and rich in fruits  and vegetables ; Status:Complete;   Done: 17PGL9558   · We recommend that you follow the "Mediterranean diet "; Status:Complete;   Done:  23KXL9133   · Call [phone] if: You find a new or different kind of lump in your breast ;  Status:Complete;   Done: 55OXY2222   · Call [phone] if: You have any bleeding from the vagina ; Status:Complete;    Done: 40IJY2990   · Call [phone] if: You have any warning signs of skin cancer ; Status:Complete;    Done: 40CJH3857  Lymphedema    · Changed: From  Medical Compression Stockings Miscellaneous USE AS DIRECTED To  Medical Compression Stockings Miscellaneous USE AS DIRECTED #1  right leg  15-20mmhg, small, #2 Left le-30mm hg, medium   BEIGE  Need for influenza vaccination    · Administered: Fluzone Quadrivalent Intramuscular Suspension  Osteoporosis, Ovarian cancer    · * DXA BONE DENSITY SPINE HIP AND PELVIS; Status:Complete;   Done: 46AUM9173  09:56AM  PMH: Cellulitis    · Renew: Mupirocin 2 % External Ointment (Bactroban); APPLY 1 INCH Twice daily  Reactive airway disease    · Renew: ProAir  (90 Base) MCG/ACT Inhalation Aerosol Solution; INHALE 2  PUFFS EVERY 4 HOURS AS NEEDED    Discussion/Summary  health maintenance visit healthy adult female Currently, she eats a healthy diet and has an adequate exercise regimen  cervical cancer screening is managed by gyn Breast cancer screening: mammogram is current  Colorectal cancer screening: colorectal cancer screening is current  Osteoporosis screening: bone mineral density testing has been ordered  The risks and benefits of immunizations were discussed  Patient discussion: discussed with the patient  Hepatitis C Screening: the patient was counseled on Hepatitis C screening  The patient agrees to Hepatitis C screening  1) dyspnea with exertion: 1  schedule pulmonary function test  2) schedule fasting blood work   3) INFLUENZA IMMUNIZATION GIVEN TODAY   4) EYE EXAM: ROUTINE SCREEN  5) hypertension: Controlled, continue current medication  6) hyperlipidemia: Continue atorvastatin daily1        1 Amended By: Alphonsus Merlin; Dec 07 2017 10:24 PM EST    Chief Complaint  PT IS HERE FOR HER YEARLY PHYSICAL  PT ADMINISTERED FLU VACCINE TODAY IN OFFICE  PT IS NOT FASTING TODAY FOR FBW  RX REFILLS SENT TO YOU  MAMMO WAS COMPLETED YESTERDAY  PT WILL TAKE DEXA ORDERS TODAY TO SCHEDULE  History of Present Illness  HM, Adult Female: The patient is being seen for a health maintenance evaluation  The last health maintenance visit was 1 year(s) ago  General Health: The patient's health since the last visit is described as good  She has regular dental visits  She denies vision problems  She denies hearing loss  Immunizations status: not up to date The patient needs the following immunization(s): influenza vaccine  Lifestyle:  She consumes a diverse and healthy diet  She does not have any weight concerns  She exercises regularly   She does not use tobacco  She denies alcohol use  She denies drug use  Reproductive health: the patient is postmenopausal    Screening: cancer screening reviewed and updated  metabolic screening reviewed and updated  risk screening reviewed and updated  HPI: Patient is here for physical today  She denies complaints today  She denies recent illnesses  She needs a renewal on her compression stockings for her lower extremities  She denies any chest pains or shortness of breath  Her bowel movements are regular  She just had and Dermatology evaluation by Kandace Bal  Patient states she occasionally forgets Lipitor, her recent blood work was good1        1 Amended By: Dain Matt; Dec 07 2017 10:26 PM EST    Review of Systems    Constitutional: No fever, no chills, feels well, no tiredness, no recent weight gain or weight loss  Eyes: No complaints of eye pain, no red eyes, no eyesight problems, no discharge, no dry eyes, no itching of eyes  ENT: no complaints of earache, no loss of hearing, no nose bleeds, no nasal discharge, no sore throat, no hoarseness  Cardiovascular: No complaints of slow heart rate, no fast heart rate, no chest pain, no palpitations, no leg claudication, no lower extremity edema  Respiratory: No complaints of shortness of breath, no wheezing, no cough, no SOB on exertion, no orthopnea, no PND  Gastrointestinal: No complaints of abdominal pain, no constipation, no nausea or vomiting, no diarrhea, no bloody stools  Genitourinary: No complaints of dysuria, no incontinence, no pelvic pain, no dysmenorrhea, no vaginal discharge or bleeding  Musculoskeletal: No complaints of arthralgias, no myalgias, no joint swelling or stiffness, no limb pain or swelling  Integumentary: No complaints of skin rash or lesions, no itching, no skin wounds, no breast pain or lump  Psychiatric: Not suicidal, no sleep disturbance, no anxiety or depression, no change in personality, no emotional problems     Endocrine: No complaints of proptosis, no hot flashes, no muscle weakness, no deepening of the voice, no feelings of weakness  Hematologic/Lymphatic: The of edema bilateral lower extremities, but as noted in HPI  Active Problems   1  Benign essential hypertension (401 1) (I10)  2  BRCA1 gene mutation negative (V82 71) (Z13 71)  3  BRCA2 gene mutation negative (V82 71) (Z13 71)  4  Colon, diverticulosis (562 10) (K57 30)  5  Crohn's disease (555 9) (K50 90)  6  Dense breasts (793 82) (R92 2)  7  History of colon polyps (V12 72) (Z86 010)  8  Hyperlipidemia (272 4) (E78 5)  9  Lymphedema (457 1) (I89 0)  10  Murmur (785 2) (R01 1)  11  Need for influenza vaccination (V04 81) (Z23)  12  Osteopenia (733 90) (M85 80)  13  Osteoporosis (733 00) (M81 0)  14  Ovarian cancer (183 0) (C56 9)  15  Personal history of ovarian cancer (V10 43) (Z85 43)  16   Reactive airway disease (493 90) (J45 909)    Past Medical History    · History of Abnormal findings on diagnostic imaging of breast (793 89) (R92 8)   · History of Accelerated essential hypertension (401 0) (I10)   · History of Age At First Period 17 Years Old (Menarche)   · History of Allergic contact dermatitis due to other agents (692 89) (L23 89)   · History of Benign Polyps Of The Large Intestine (V12 72)   · History of Cellulitis (682 9) (L03 90)   · History of Cellulitis (682 9) (L03 90)   · History of Encounter for screening for malignant neoplasm of colon (V76 51) (Z12 11)   · History of Encounter for screening mammogram for malignant neoplasm of breast  (V76 12) (Z12 31)   · History of Erythema chronica migrans, secondary (088 81) (A69 20)   · History of abdominal pain (V13 89) (U78 688)   · History of acute conjunctivitis (V12 49) (Z86 69)   · History of conjunctivitis (V12 49) (Z86 69)   · History of headache (V13 89) (W71 417)   · History of intestinal obstruction (V12 79) (Z87 19)   · History of malignant neoplasm of female genital organ (V10 40) (Z85 40)   · History of ovarian cancer (V10 43) (Z85 43)   · History of Need for influenza vaccination (V04 81) (Z23)   · History of Need for pneumococcal vaccination (V03 82) (Z23)   · History of Need for Tdap vaccination (V06 1) (Z23)   · Personal history of ovarian cancer (V10 43) (Z85 43)   · History of Renal Insufficiency (593 9)   · History of Screening for diabetes mellitus (V77 1) (Z13 1)   · History of Screening for osteoporosis (V82 81) (Z13 820)   · History of Summary Of Previous Pregnancies  0  (Total No )   · History of Tick bite (919 4,E906 4) (W57 XXXA)   · History of Visit for screening mammogram (V76 12) (Z12 31)   · History of X-Ray UGI Small Bowel Obstruction    Surgical History    · History of Insertion Of Intraperitoneal Catheter With Subcutaneous Anegam   · History of Removal Of Intraperitoneal Catheter (V56 1)   · History of Resection Of Omentum   · History of Staging Lymphadenectomy   · History of Total Abdominal Hysterectomy With Removal Of Both Ovaries    Family History  Mother    · Family history of Heart Disease (V17 49)   · Family history of Hypertension (V17 49)   · Family history of Hypothyroidism   · Family history of Osteoporosis (V17 81)  Father    · Family history of Arthritis (V17 7)   · Family history of Diabetes Mellitus (V18 0)   · Family history of Hypertension (V17 49)   · Family history of Prostate Cancer (V16 42)  Sister    · Family history of Ovarian Cancer (V16 41)  Maternal Grandmother    · Family history of Heart Disease (V17 49)   · Family history of Hypertension (V17 49)  Paternal Grandmother    · Family history of Breast Cancer (V16 3)  Maternal Grandfather    · Family history of Acute Myocardial Infarction (V17 3)    Social History    · Being A Social Drinker   · Former smoker (S62 64) (A85 439)    Current Meds  1  AmLODIPine Besylate 5 MG Oral Tablet; Take 1 tablet daily; Therapy: 09OXY3798 to (Girish Thompson)  Requested for: 77JEJ2065; Last   Rx:2017 Ordered  2   Atorvastatin Calcium 40 MG Oral Tablet; take 1 tablet by mouth daily; Therapy: 99TZX8224 to (Evaluate:06Jun2017)  Requested for: 13WUW8034; Last   Rx:08Atm4014 Ordered  3  Caltrate 600 TABS; 1 TAB TWICE A DAY; Therapy: (Recorded:09Vbt1097) to Recorded  4  HydroCHLOROthiazide 12 5 MG Oral Capsule; take 1 capsule daily; Therapy: 39OQH5829 to (Evaluate:95Hoa9253)  Requested for: 67EGL1729; Last   Rx:24Nov2017 Ordered  5  Ibandronate Sodium 150 MG Oral Tablet; 1 TABLET ONCE MONTHLY WITH 8 TO 10   OUNCES OF WATER  STAY UPRIGHT AND DO NOT EAT OR DRINK FOR 1 HOUR;   Therapy: 89XVT2023 to (Evaluate:35Gcg4491)  Requested for: 26Oct2017; Last   Rx:26Oct2017 Ordered  6  Medical Compression Stockings Miscellaneous; USE AS DIRECTED; Therapy: 18FQO3751 to (Last Rx:84Mxu9782) Ordered  7  ProAir  (90 Base) MCG/ACT Inhalation Aerosol Solution; INHALE 2 PUFFS   EVERY 4 HOURS AS NEEDED; Therapy: 82GWK1980 to (Last Rx:18Oct2016)  Requested for: 90LUJ9529 Ordered  8  Remicade 100 MG Intravenous Solution Reconstituted; every 8 weeks; Therapy: 08Lsn0929 to (Last Rx:24Jan2013)  Requested for: 28WFV5308 Ordered  9  Triamcinolone Acetonide 0 5 % External Cream; APPLY TO AFFECTED AREA TWICE A   DAY; Therapy: 02UWK1476 to (Last Rx:45Ygz5853)  Requested for: 44Tun9080 Ordered    Allergies   1  No Known Drug Allergies    Vitals   Recorded: 26IPG7440 09:38AM   Temperature 99 F   Heart Rate 76   Systolic 329   Diastolic 58   Height 5 ft 2 in   Weight 118 lb 8 oz   BMI Calculated 21 67   BSA Calculated 1 53   O2 Saturation 97     Physical Exam    Constitutional   General appearance: No acute distress, well appearing and well nourished  Head and Face   Head and face: Normal     Palpation of the face and sinuses: No sinus tenderness  Eyes   Conjunctiva and lids: No swelling, erythema or discharge  Pupils and irises: Equal, round, reactive to light      Ears, Nose, Mouth, and Throat   External inspection of ears and nose: Normal  Otoscopic examination: Tympanic membranes translucent with normal light reflex  Canals patent without erythema  Hearing: Normal     Nasal mucosa, septum, and turbinates: Normal without edema or erythema  Lips, teeth, and gums: Normal, good dentition  Oropharynx: Normal with no erythema, edema, exudate or lesions  Neck   Neck: Supple, symmetric, trachea midline, no masses  Cardiovascular   Auscultation of heart: Normal rate and rhythm, normal S1 and S2, no murmurs  Examination of extremities for edema and/or varicosities: Abnormal   Lymphedema left lower extremity  Abdomen   Abdomen: Non-tender, no masses  Liver and spleen: No hepatomegaly or splenomegaly  Lymphatic   Palpation of lymph nodes in neck: No lymphadenopathy  Musculoskeletal   Gait and station: Normal     Digits and nails: Normal without clubbing or cyanosis  Joints, bones, and muscles: Normal     Range of motion: Normal     Stability: Normal     Muscle strength/tone: Normal     Skin   Skin and subcutaneous tissue: Normal without rashes or lesions  Neurologic   Cortical function: Normal mental status  Psychiatric   Judgment and insight: Normal     Orientation to person, place, and time: Normal     Recent and remote memory: Intact      Mood and affect: Normal        Future Appointments    Date/Time Provider Specialty Site   01/31/2018 09:30 AM Wil Garg, [address]  [facility]   04/12/2018 09:00 AM [facility] [address] [facility]     Signatures   Electronically signed by : Amaury Kirkland DO; Dec  7 2017 10:28PM EST                       (Author)

## 2018-01-23 NOTE — RESULT NOTES
Verified Results  SPIROMETRY W/O BRONCHO- POC 14AAX6074 10:28AM Pedrito Crook     Test Name Result Flag Reference   Spirometry (Summary)                    Summary / No summary entered :      No summary entered   Documents attached :      [facility] - AUREA Rain; Enc: [id number] - Appointment      - Pedrito Crook - (Family Medicine) (Result Document)  (1) TSH 52UAA3413 12:00AM Pedrito Crook     Test Name Result Flag Reference   TSH 2 370 uIU/mL  0 450-4 500     (1) COMPREHENSIVE METABOLIC PANEL 82HGG7617 63:97GA Pedrito Crook     Test Name Result Flag Reference   Glucose, Serum 90 mg/dL  65-99   BUN 21 mg/dL  8-27   Creatinine, Serum 0 76 mg/dL  0 57-1 00   BUN/Creatinine Ratio 28  12-28   Sodium, Serum 141 mmol/L  134-144   Potassium, Serum 4 4 mmol/L  3 5-5 2   Chloride, Serum 99 mmol/L     Carbon Dioxide, Total 26 mmol/L  18-29   Calcium, Serum 9 2 mg/dL  8 7-10 3   Protein, Total, Serum 7 3 g/dL  6 0-8 5   Albumin, Serum 4 1 g/dL  3 6-4 8   Globulin, Total 3 2 g/dL  1 5-4 5   A/G Ratio 1 3  1 2-2 2   Bilirubin, Total 0 4 mg/dL  0 0-1 2   Alkaline Phosphatase, S 57 IU/L     AST (SGOT) 27 IU/L  0-40   ALT (SGPT) 23 IU/L  0-32   eGFR If NonAfricn Am 86 mL/min/1 73  >59   eGFR If Africn Am 99 mL/min/1 73  >59     (LC) HCV Antibody [id number] 12:00AM Pedrito Crook     Test Name Result Flag Reference   Hep C Virus Ab 0 2 s/co ratio  0 0-0 9   Negative:     < 0 8                                              Indeterminate: 0 8 - 0 9                                                   Positive:     > 0 9                  The CDC recommends that a positive HCV antibody result                  be followed up with a HCV Nucleic Acid Amplification                  test (645908)       (1) LIPID PANEL, FASTING 65GKJ4945 12:00AM Pedrito Crook     Test Name Result Flag Reference   Cholesterol, Total 201 mg/dL H 100-199   Triglycerides 59 mg/dL  0-149   HDL Cholesterol 101 mg/dL  >39   VLDL Cholesterol Hussein 12 mg/dL 5-40   LDL Cholesterol Calc 88 mg/dL  0-99   T  Chol/HDL Ratio 2 0 ratio units  0 0-4 4   T  Chol/HDL Ratio                                                             Men  Women                                               1/2 Avg  Risk  3 4    3 3                                                   Avg Risk  5 0    4 4                                                2X Avg  Risk  9 6    7 1                                                3X Avg  Risk 23 4   11 0       Plan  Benign essential hypertension, Hyperlipidemia    · (1) TSH; Status: In Progress - Specimen/Data Collected;   Done: 45DVX3543  Benign essential hypertension, Hyperlipidemia, PPD screening test, Reactive airway  disease, Screening for diabetes mellitus, Screening for iron deficiency anemia    · Routine Venipuncture - POC; Status:Complete;   Done: 63LLN2558  Hyperlipidemia    · (1) LIPID PANEL, FASTING; Status: In Progress - Specimen/Data Collected;   Done:  23PHF1041  Reactive airway disease, Shortness of breath on exertion    · SPIROMETRY W/O BRONCHO- POC; Status:Complete;   Done: 82VAC8048 10:28AM  Screening for diabetes mellitus    · (1) COMPREHENSIVE METABOLIC PANEL; Status: In Progress - Specimen/Data  Collected;   Done: 63UEX6667  Screening for iron deficiency anemia    · (1) CBC/PLT/DIFF; Status: In Progress - Specimen/Data Collected;   Done: 52TRZ4048

## 2018-01-23 NOTE — RESULT NOTES
Verified Results  [facility]) Request Problem [id number] 12:00AM Shannon Benders     Test Name Result Flag Reference   Request Problem FCLOT     Unable to perform Complete Blood Count (CBC)  Fibrin clots in tube  Clots may be the result of delayed or insufficient mixing of specimen  Clot formation may also be temperature related          TEST:  691463  CBC With Differential/Platelet

## 2018-01-26 PROBLEM — C56.9 OVARIAN CANCER (HCC): Status: ACTIVE | Noted: 2017-04-13

## 2018-01-26 PROBLEM — R06.02 SHORTNESS OF BREATH ON EXERTION: Status: ACTIVE | Noted: 2017-12-07

## 2018-02-01 ENCOUNTER — TELEPHONE (OUTPATIENT)
Dept: SURGICAL ONCOLOGY | Facility: CLINIC | Age: 63
End: 2018-02-01

## 2018-02-01 RX ORDER — INFLIXIMAB 100 MG/10ML
INJECTION, POWDER, LYOPHILIZED, FOR SOLUTION INTRAVENOUS
COMMUNITY
Start: 2011-08-12

## 2018-02-01 RX ORDER — IBANDRONATE SODIUM 150 MG/1
TABLET, FILM COATED ORAL
COMMUNITY
Start: 2016-10-18 | End: 2018-08-23

## 2018-02-01 RX ORDER — HYDROCHLOROTHIAZIDE 12.5 MG/1
CAPSULE, GELATIN COATED ORAL
Refills: 2 | COMMUNITY
Start: 2018-01-20 | End: 2018-02-19 | Stop reason: SDUPTHER

## 2018-02-01 RX ORDER — ATORVASTATIN CALCIUM 40 MG/1
40 TABLET, FILM COATED ORAL
Refills: 5 | COMMUNITY
Start: 2017-12-26 | End: 2019-01-04 | Stop reason: SDUPTHER

## 2018-02-01 RX ORDER — AMLODIPINE BESYLATE 5 MG/1
TABLET ORAL
Refills: 2 | COMMUNITY
Start: 2018-01-20 | End: 2018-02-19 | Stop reason: SDUPTHER

## 2018-02-02 ENCOUNTER — OFFICE VISIT (OUTPATIENT)
Dept: SURGICAL ONCOLOGY | Facility: CLINIC | Age: 63
End: 2018-02-02
Payer: COMMERCIAL

## 2018-02-02 VITALS
DIASTOLIC BLOOD PRESSURE: 80 MMHG | BODY MASS INDEX: 22.08 KG/M2 | TEMPERATURE: 97.7 F | WEIGHT: 120 LBS | HEIGHT: 62 IN | HEART RATE: 70 BPM | RESPIRATION RATE: 16 BRPM | SYSTOLIC BLOOD PRESSURE: 120 MMHG

## 2018-02-02 DIAGNOSIS — Z91.89 INCREASED RISK OF BREAST CANCER: ICD-10-CM

## 2018-02-02 DIAGNOSIS — R92.2 DENSE BREASTS: Primary | ICD-10-CM

## 2018-02-02 PROCEDURE — 99213 OFFICE O/P EST LOW 20 MIN: CPT | Performed by: NURSE PRACTITIONER

## 2018-02-02 NOTE — PROGRESS NOTES
Surgical Oncology Follow Up       [address]  [facility] [place]  [address]  1955  [id number]  [address]  [facility] [place]  [address] [facility] [address]    Chief Complaint   Patient presents with    Follow-up     Pt has no new complains today       Assessment/Plan:  1  Dense breasts  - 6 month follow up visit       Discussion/Summary: Patient presents today for a 6 month follow-up visit for increased risk of breast cancer secondary to personal history of ovarian cancer, diagnosed in 2009 (BRCA negative) and dense breast tissue  She has no breast complaints today  She had a bilateral 3D screening mammogram performed on December 5, 2017 which was BI-RADS 1  She has no new complaints today  She denies headaches, back pain, bone pain, cough, shortness of breath, abdominal pain  She notices no changes to her breasts  No changes in family history of cancers  The patient does not receive clinical breast exams from any other providers, therefore, we will see the patient back in 6 months for a clinical exam   She was instructed to call with any new concerns or symptoms  All of her questions were answered  History of Present Illness:       -Interval History: Patient presents today for six-month follow-up for dense breast tissue in an increased risk of breast cancer  She had a bilateral 3D screening mammogram performed on December 5, 2017 which was BI-RADS 1  She has no new complaints today  Review of Systems:  Review of Systems   Constitutional: Negative for activity change, appetite change, chills, fatigue, fever and unexpected weight change  HENT: Negative for trouble swallowing  Eyes: Negative for pain, redness and visual disturbance  Respiratory: Negative for cough, shortness of breath and wheezing      Cardiovascular: Negative for chest pain, palpitations and leg swelling  Gastrointestinal: Negative for abdominal pain, constipation, diarrhea, nausea and vomiting  Endocrine: Negative for cold intolerance and heat intolerance  Musculoskeletal: Negative for arthralgias, back pain, gait problem and myalgias  Skin: Negative for color change and rash  Neurological: Negative for dizziness, syncope, light-headedness, numbness and headaches  Hematological: Negative for adenopathy  Psychiatric/Behavioral: Negative for agitation and confusion  All other systems reviewed and are negative  Patient Active Problem List   Diagnosis    Benign essential hypertension    Colon, diverticulosis    [facility]    Dense breasts    Dermatitis    Erythema chronica migrans, secondary    Headache    Hyperlipidemia    Lymphedema    Murmur    Osteopenia    Osteoporosis    [facility]    Reactive airway disease    Shortness of breath on exertion     History reviewed  No pertinent past medical history  Past Surgical History:   Procedure Laterality Date    OMENTECTOMY      TOTAL ABDOMINAL HYSTERECTOMY W/ BILATERAL SALPINGOOPHORECTOMY       Family History   Problem Relation Age of Onset    Prostate cancer Father     Ovarian cancer Sister     Breast cancer Paternal Grandmother      Social History     Social History    Marital status: Single     Spouse name: N/A    Number of children: N/A    Years of education: N/A     Occupational History    Not on file       Social History Main Topics    Smoking status: Not on file    Smokeless tobacco: Not on file    Alcohol use Not on file    Drug use: Unknown    Sexual activity: Not on file     Other Topics Concern    Not on file     Social History Narrative    No narrative on file       Current Outpatient Prescriptions:     amLODIPine (NORVASC) 5 mg tablet, , Disp: , Rfl: 2    atorvastatin (LIPITOR) 40 mg tablet, , Disp: , Rfl: 5    Calcium Carbonate (CALTRATE 600) 1500 (600 Ca) MG TABS, Take 1 tablet by mouth 2 (two) times a day, Disp: , Rfl:     hydrochlorothiazide (MICROZIDE) 12 5 mg capsule, , Disp: , Rfl: 2    ibandronate (BONIVA) 150 MG tablet, Take by mouth, Disp: , Rfl:     inFLIXimab (REMICADE) 100 mg, Infuse into a venous catheter, Disp: , Rfl:   No Known Allergies  Vitals:    02/02/18 0952   BP: 120/80   Pulse: 70   Resp: 16   Temp: 97 7 °F (36 5 °C)       Physical Exam   Constitutional: She appears well-developed and well-nourished  No distress  HENT:   Head: Normocephalic and atraumatic  Mouth/Throat: Oropharynx is clear and moist    Eyes: Pupils are equal, round, and reactive to light  Neck: Normal range of motion  No edema and no erythema present  Cardiovascular: Normal rate, regular rhythm and normal heart sounds  Pulmonary/Chest: Effort normal and breath sounds normal    Bilateral breasts were examined in the sitting and supine position  There are no masses, skin nodules, nipple changes or nipple discharge  There is no bilateral supraclavicular or axillary lymphadenopathy noted  Abdominal: Soft  Normal appearance  She exhibits no mass  There is no hepatosplenomegaly  Musculoskeletal: Normal range of motion  Lymphadenopathy:     She has no cervical adenopathy  She has no axillary adenopathy  Neurological: She is alert  Skin: Skin is warm, dry and intact  No rash noted  She is not diaphoretic  Psychiatric: She has a normal mood and affect  Her speech is normal    Nursing note and vitals reviewed

## 2018-02-19 ENCOUNTER — TRANSCRIBE ORDERS (OUTPATIENT)
Dept: FAMILY MEDICINE CLINIC | Facility: CLINIC | Age: 63
End: 2018-02-19

## 2018-02-19 DIAGNOSIS — I10 ESSENTIAL HYPERTENSION: Primary | ICD-10-CM

## 2018-02-19 RX ORDER — AMLODIPINE BESYLATE 5 MG/1
5 TABLET ORAL DAILY
Qty: 30 TABLET | Refills: 5 | Status: SHIPPED | OUTPATIENT
Start: 2018-02-19 | End: 2018-10-18 | Stop reason: SDUPTHER

## 2018-02-19 RX ORDER — HYDROCHLOROTHIAZIDE 12.5 MG/1
12.5 CAPSULE, GELATIN COATED ORAL EVERY MORNING
Qty: 30 CAPSULE | Refills: 5 | Status: SHIPPED | OUTPATIENT
Start: 2018-02-19 | End: 2018-10-18 | Stop reason: SDUPTHER

## 2018-04-19 ENCOUNTER — OFFICE VISIT (OUTPATIENT)
Dept: GYNECOLOGIC ONCOLOGY | Facility: CLINIC | Age: 63
End: 2018-04-19
Payer: COMMERCIAL

## 2018-04-19 VITALS
SYSTOLIC BLOOD PRESSURE: 152 MMHG | RESPIRATION RATE: 16 BRPM | DIASTOLIC BLOOD PRESSURE: 78 MMHG | BODY MASS INDEX: 20.86 KG/M2 | WEIGHT: 110.5 LBS | HEART RATE: 80 BPM | HEIGHT: 61 IN

## 2018-04-19 DIAGNOSIS — C56.9 MALIGNANT NEOPLASM OF OVARY, UNSPECIFIED LATERALITY (HCC): Primary | ICD-10-CM

## 2018-04-19 DIAGNOSIS — I89.0 LYMPHEDEMA OF LEFT LOWER EXTREMITY: ICD-10-CM

## 2018-04-19 PROCEDURE — 99214 OFFICE O/P EST MOD 30 MIN: CPT | Performed by: PHYSICIAN ASSISTANT

## 2018-04-19 NOTE — ASSESSMENT & PLAN NOTE
Clinically without evidence of disease recurrence  Return to the office in 1 year for continued surveillance  Previously testing negative for BRCA 1/2 mutation (approximately 9 years ago)  The patient's sister also has a history of ovarian cancer  I discussed consultation with our genetic counselor to further discuss additional testing given concerning family history

## 2018-04-19 NOTE — PROGRESS NOTES
Assessment/Plan:    Problem List Items Addressed This Visit        Genitourinary    Ovarian cancer ([facility] [address] ) - Primary     Clinically without evidence of disease recurrence  Return to the office in 1 year for continued surveillance  Previously testing negative for BRCA 1/2 mutation (approximately 9 years ago)  The patient's sister also has a history of ovarian cancer  I discussed consultation with our genetic counselor to further discuss additional testing given concerning family history  Relevant Orders    Ambulatory referral to Genetics       Other    Lymphedema of left lower extremity     Stable  Continue to uses mechanical compression and ALEE hose daily  CHIEF COMPLAINT:   Ovarian cancer surveillance    Problem:  Ovarian cancer ([facility] [address] ), Stage IA grade 3    Staging form: Ovary, AJCC 7th Edition    - Clinical: FIGO Stage IA (T1a, N0, M0) - Signed by Keturah Villar PA-C on 4/19/2018      Previous therapy:     Ovarian cancer [facility])     Initial Diagnosis     [facility] ([facility] )         4/21/2009 Surgery     Exploratory laparotomy, total abdominal hysterectomy, bilateral salpingo-oophrectomy, lymph node dissection, omentectomy with staging          - 7/8/2009 Chemotherapy     Intraperitoneal along with intravenous chemotherapy on a early ovarian cancer protocol  Patient ID: Annalise Marie is a 63 y o  female  who has no new complaints today  No vaginal bleeding, abdominal/pelvic pain  Normal bowel and bladder function  No interval change in medical history since last visit  Quality of life is good  The following portions of the patient's history were reviewed and updated as appropriate: allergies, current medications, past family history, past medical history and problem list     Review of Systems   Constitutional: Negative  HENT: Negative  Eyes: Negative  Respiratory: Negative      Cardiovascular: Positive for leg swelling (chronic lymphedema, left lower extremity)  Gastrointestinal: Negative  Genitourinary: Negative  Musculoskeletal: Negative  Skin: Negative  Neurological: Negative  Psychiatric/Behavioral: Negative  Current Outpatient Prescriptions   Medication Sig Dispense Refill    amLODIPine (NORVASC) 5 mg tablet Take 1 tablet (5 mg total) by mouth daily 30 tablet 5    atorvastatin (LIPITOR) 40 mg tablet   5    Calcium Carbonate (CALTRATE 600) 1500 (600 Ca) MG TABS Take 1 tablet by mouth 2 (two) times a day      hydrochlorothiazide (MICROZIDE) 12 5 mg capsule Take 1 capsule (12 5 mg total) by mouth every morning 30 capsule 5    ibandronate (BONIVA) 150 MG tablet Take by mouth      inFLIXimab (REMICADE) 100 mg Infuse into a venous catheter       No current facility-administered medications for this visit  Objective:    Blood pressure 152/78, pulse 80, resp  rate 16, height 5' 1" (1 549 m), weight 50 1 kg (110 lb 8 oz)  Body mass index is 20 88 kg/m²  Body surface area is 1 47 meters squared  Physical Exam   Constitutional: She is oriented to person, place, and time  She appears well-developed and well-nourished  HENT:   Head: Normocephalic and atraumatic  Neck: Normal range of motion  Neck supple  No thyromegaly present  Pulmonary/Chest: Effort normal    Abdominal: Soft  She exhibits no distension and no mass  There is no rebound  Genitourinary:   Genitourinary Comments: The external female genitalia is normal  The bartholin's, uretheral and skenes glands are normal  The urethral meatus is normal  Speculum exam reveals a grossly normal vagina  No masses, lesions or bleeding  Manual exam notes a surgical absent cervix, uterus and adnexal structures  No masses or fullness  Musculoskeletal: Normal range of motion  She exhibits edema (left lower extremity, ALEE hose present)  Lymphadenopathy:     She has no cervical adenopathy  Neurological: She is alert and oriented to person, place, and time     Skin: Skin is warm and dry  No rash noted  Psychiatric: She has a normal mood and affect  Her behavior is normal    Vitals reviewed

## 2018-05-10 ENCOUNTER — OFFICE VISIT (OUTPATIENT)
Dept: GYNECOLOGIC ONCOLOGY | Facility: CLINIC | Age: 63
End: 2018-05-10

## 2018-05-10 DIAGNOSIS — C56.9 MALIGNANT NEOPLASM OF OVARY, UNSPECIFIED LATERALITY (HCC): ICD-10-CM

## 2018-05-10 NOTE — PROGRESS NOTES
Status:  Genetic testing pending, estimated turn around time: 3 weeks    Summary:    The patient returned to the office to discuss additional genetic testing, relative to her personal and family history of ovarian cancer  Family information was reviewed and a three generation pedigree drawn  The patient has a personal history of ovarian cancer diagnosed at the age of 48  The  family history is significant for her sister who was also diagnosed with ovarian cancer at the age of 46, her father who was diagnosed with prostate cancer at the age of [de-identified], her paternal grandmother who had breast cancer at 39, her maternal grandmother who had colon cancer in her 76s, and maternal aunt who had breast and lung cancers in her 76s  The patient had undergone BRCA 1/2 sequencing and 5 site rearrangement testing in 2011 which was negative for mutations  Please see pedigree for additional family history details  We discussed recent updates in genetic testing including BRCA comprehensive rearrangement testing and multi-gene panel testing  Genetic Testing: We reviewed the genetic testing process, insurance concerns, and possible results  The patient elected to pursue additional genetic testing for genes associated with hereditary ovarian cancer  Informed consent was obtained and a DNA sample was collected  The sample was sent to [facility] for the breast and ovarian cancer panel  Test results should be available in approximately 3 weeks  The patient is schedule for a follow up appointment on 5/31 to discuss results

## 2018-05-31 ENCOUNTER — OFFICE VISIT (OUTPATIENT)
Dept: GYNECOLOGIC ONCOLOGY | Facility: CLINIC | Age: 63
End: 2018-05-31

## 2018-05-31 DIAGNOSIS — Z15.01 GENETIC PREDISPOSITION TO BREAST CANCER: Primary | ICD-10-CM

## 2018-05-31 NOTE — PROGRESS NOTES
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    No mutations were identified in any of the additional genes tested: BARD1, BRCA1, BRCA2, BRIP1, CDH1, CHEK2, EPCAM, FANCC, MLH1, MRE11A, MSH2, MSH6, MUTYH, NBN, NF1, PALB2, PMS2, POLD, PTEN, RAD51D, RECQL, TP53 ([facility] Breast[facility] panel)        Discussion:   The patient returned to the office to discuss the results of her recent genetic testing  Genetic testing results were disclosed to the patient  A pathogenic variant was identified in the YOLANDA gene and a variant of uncertain significance was found in RAD51C  YOLANDA  c 5290delC   Individuals who carry a single mutation in the YOLANDA gene can are at elevated risk to develop certain cancers  Female carriers have a 2-4 fold increased risk for breast cancer  Recent studies suggest that pancreatic cancer and colon cancer risk may also be increased, however these potential risk are not yet well classified  We discussed breast cancer screening measuresfor women who carry a single OYLANDA mutation  The NCCN recommended screening for YOLANDA mutation carriers includes mammography and MRI performed yearly, starting at the age of 36  We also discussed that the patients relatives are at risk to have inherited the same mutation  All first degree relatives, which include parents, siblings and children, have a 50% chance to have inherited the same mutation  Siblings, children, and maternal relatives may want to consider genetic testing to determine if they are at increased risk to develop cancer  Children are eligible for genetic testing at the age of 25      The YOLANDA gene is also associated with an autosomal recessive condition (caused by two mutations) called Ataxia Telangiectasia which is characterized by progressive cerebellar ataxia with onset between ages 3 and 4, telangiectases of the conjunctivae, oculomotor apraxia, immune defects, and a predisposition to leukemia and lymphoma  RAD51C c 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>T    At this time there is not enough information available about this particular variant to determine if it is associated with an increased risk for developing cancer  Women who are carriers of a single pathogenic RAD51C variant have an increased risk of ovarian cancer, although exact risk figures have yet to be determined  An elevated risk for other cancers has been considered, but available evidence is insufficient to make a determination at this time  The laboratory will continue to gather more data on further observations of this variant in the hope of being able to reclassify it as a benign gene change or harmful mutation which would be associated with an increased risk for cancer  It is important to keep in mind that reclassification may take years  If the variant is reclassified, an updated report will be sent to the ordering provider  Until the variant is reclassified clinical management should be based on the patients personal and family history  Increased surveillance may be indicated, however prophylactic surgery is not generally recommend based on a VUS  The patients questions      The patients questions were answered and she was encouraged to call with any future questions or concerns

## 2018-06-11 ENCOUNTER — TELEPHONE (OUTPATIENT)
Dept: SURGICAL ONCOLOGY | Facility: CLINIC | Age: 63
End: 2018-06-11

## 2018-06-11 DIAGNOSIS — Z15.01 GENETIC PREDISPOSITION TO BREAST CANCER: Primary | ICD-10-CM

## 2018-06-11 DIAGNOSIS — Z91.89 INCREASED RISK OF BREAST CANCER: ICD-10-CM

## 2018-07-06 ENCOUNTER — APPOINTMENT (OUTPATIENT)
Dept: LAB | Facility: HOSPITAL | Age: 63
End: 2018-07-06
Payer: COMMERCIAL

## 2018-07-06 DIAGNOSIS — Z15.01 GENETIC PREDISPOSITION TO BREAST CANCER: ICD-10-CM

## 2018-07-06 LAB
BUN SERPL-MCNC: 22 MG/DL (ref 5–25)
CREAT SERPL-MCNC: 0.87 MG/DL (ref 0.6–1.3)
GFR SERPL CREATININE-BSD FRML MDRD: 73 ML/MIN/1.73SQ M

## 2018-07-06 PROCEDURE — 36415 COLL VENOUS BLD VENIPUNCTURE: CPT

## 2018-07-06 PROCEDURE — 82565 ASSAY OF CREATININE: CPT

## 2018-07-06 PROCEDURE — 84520 ASSAY OF UREA NITROGEN: CPT

## 2018-07-10 ENCOUNTER — HOSPITAL ENCOUNTER (OUTPATIENT)
Dept: RADIOLOGY | Facility: HOSPITAL | Age: 63
Discharge: HOME | End: 2018-07-10
Payer: COMMERCIAL

## 2018-07-10 DIAGNOSIS — Z15.01 GENETIC PREDISPOSITION TO BREAST CANCER: ICD-10-CM

## 2018-07-10 PROCEDURE — 77059 HB MRI W/O FOL W/CONT, BREAST,: CPT

## 2018-07-10 PROCEDURE — C8908 MRI W/O FOL W/CONT, BREAST,: HCPCS

## 2018-07-12 ENCOUNTER — TELEPHONE (OUTPATIENT)
Dept: SURGICAL ONCOLOGY | Facility: CLINIC | Age: 63
End: 2018-07-12

## 2018-07-12 DIAGNOSIS — R92.8 ABNORMAL MRI, BREAST: Primary | ICD-10-CM

## 2018-07-12 NOTE — TELEPHONE ENCOUNTER
Spoke with patient and reviewed MRI findings  Will order right breast US and biopsy  All of patient's questions answered  Will await results to make further recommendations

## 2018-08-01 ENCOUNTER — HOSPITAL ENCOUNTER (OUTPATIENT)
Dept: ULTRASOUND IMAGING | Facility: CLINIC | Age: 63
Discharge: HOME | End: 2018-08-01
Payer: COMMERCIAL

## 2018-08-01 ENCOUNTER — HOSPITAL ENCOUNTER (OUTPATIENT)
Dept: ULTRASOUND IMAGING | Facility: CLINIC | Age: 63
Discharge: HOME | End: 2018-08-01
Admitting: RADIOLOGY
Payer: COMMERCIAL

## 2018-08-01 ENCOUNTER — HOSPITAL ENCOUNTER (OUTPATIENT)
Dept: MAMMOGRAPHY | Facility: CLINIC | Age: 63
Discharge: HOME | End: 2018-08-01

## 2018-08-01 VITALS — DIASTOLIC BLOOD PRESSURE: 68 MMHG | SYSTOLIC BLOOD PRESSURE: 112 MMHG | HEART RATE: 64 BPM

## 2018-08-01 DIAGNOSIS — R92.8 ABNORMAL MRI, BREAST: ICD-10-CM

## 2018-08-01 PROCEDURE — 88305 TISSUE EXAM BY PATHOLOGIST: CPT | Performed by: PATHOLOGY

## 2018-08-01 PROCEDURE — 88361 TUMOR IMMUNOHISTOCHEM/COMPUT: CPT | Performed by: PATHOLOGY

## 2018-08-01 PROCEDURE — 88342 IMHCHEM/IMCYTCHM 1ST ANTB: CPT | Performed by: PATHOLOGY

## 2018-08-01 PROCEDURE — 88341 IMHCHEM/IMCYTCHM EA ADD ANTB: CPT | Performed by: PATHOLOGY

## 2018-08-01 PROCEDURE — 19083 BX BREAST 1ST LESION US IMAG: CPT

## 2018-08-01 PROCEDURE — 76642 ULTRASOUND BREAST LIMITED: CPT

## 2018-08-01 RX ORDER — LIDOCAINE HYDROCHLORIDE 10 MG/ML
5 INJECTION, SOLUTION INFILTRATION; PERINEURAL ONCE
Status: COMPLETED | OUTPATIENT
Start: 2018-08-01 | End: 2018-08-01

## 2018-08-01 RX ADMIN — LIDOCAINE HYDROCHLORIDE 5 ML: 10 INJECTION, SOLUTION INFILTRATION; PERINEURAL at 10:57

## 2018-08-01 NOTE — PROGRESS NOTES
Procedure type:    __x___ultrasound guided _____stereotactic    Breast:    _____Left __x___Right    Location: 11:00 5cm from nipple    Needle: 12g Erin    # of passes: 3    Clip: Hydromark-butterfly    Performed by: Dr Jacky Land held for 5 minutes by: Alejandra Goodrich Strips:    __x___yes _____no    Band aid:    __x___yes_____no    Tape and guaze:    __x___yes _____no    Tolerated procedure:    ___x__yes _____no

## 2018-08-01 NOTE — DISCHARGE INSTR - OTHER ORDERS
POST LARGE CORE BREAST BIOPSY PATIENT INFORMATION      1  Place an ice pack inside your bra over the top of the dressing every hour for 20 minutes (20 minutes on, 60 minutes off)  Do this until bedtime  2  Do not shower or bathe until the following morning  3  You may bathe your breast carefully with the steri-strips in place  Be careful    Not to loosen them  The steri-strips will fall off in 3-5 days  4  You may have mild discomfort, and you may have some bruising where the   Needle entered the skin  This should clear within 5-7 days  5  If you need medicine for discomfort, take acetaminophen products such as   Tylenol  You may also take Advil or Motrin products  6  Do not participate in strenuous activities such as-tennis, aerobics, skiing,  Weight lifting, etc  for 24 hours  Refrain from swimming/soaking for 72 hours  7  Wearing a bra for sleeping may be more comfortable for the first 24-48 hours  8  Watch for continued bleeding, pain or fever over 101; please call with any questions or concerns  For procedures done at the [facility]  [facility] "[address] call:  Edy Plata RN at [phone]  Gordon Tracy RN at [phone]                    *After 4 PM call the Interventional Radiology Department                    [phone] and ask to speak with the nurse on call  For procedures done at the [address] [place] [address] call:         Clement Black RN at   *After 4 PM call the Interventional Radiology Department   [phone] and ask to speak with the nurse on call  For procedures done at [address] [place] [address] call: The Radiology Nurse at [phone]  *After 4 PM call your physician, or go to the Emergency Department  9          The final results of your biopsy are usually available within one week

## 2018-08-01 NOTE — PROGRESS NOTES
Patient arrived via:    __x___ambulatory    _____wheelchair    _____stretcher      Domestic violence screen    ___x___negative______positive    Breast Implants:    _______yes _____x___no

## 2018-08-01 NOTE — PROGRESS NOTES
Ice pack given:    ___x__yes _____no    Discharge instructions signed by patient:    __x___yes _____no    Discharge instructions given to patient:    __x___yes _____no    Discharged via:    __x___amulatory    _____wheelchair    _____stretcher    Stable on discharge:    ___x__yes ____no

## 2018-08-02 NOTE — PROGRESS NOTES
Post procedure call completed on 8/2/18 at 0956    Bleeding: _____yes __x___no    Pain: _____yes ___x___no    Redness/Swelling: ______yes ___x___no    Band aid removed: _____yes __x___no    Steri-Strips intact: ___x___yes _____no  Pt states the right breast biopsy site is a little sore

## 2018-08-08 ENCOUNTER — TELEPHONE (OUTPATIENT)
Dept: SURGICAL ONCOLOGY | Facility: CLINIC | Age: 63
End: 2018-08-08

## 2018-08-08 DIAGNOSIS — D05.11 DUCTAL CARCINOMA IN SITU (DCIS) OF RIGHT BREAST: Primary | ICD-10-CM

## 2018-08-08 NOTE — TELEPHONE ENCOUNTER
Spoke with patient and reviewed results of recent breast biopsy- DCIS  I have recommended a bilateral breast mammogram and a right axillary u/s to f/u on prominent lymph node seen on MRI  She is going to schedule this testing and then call our office back so she can be scheduled for an appt with Dr Anabella Washington  All of her questions were answered

## 2018-08-10 ENCOUNTER — HOSPITAL ENCOUNTER (OUTPATIENT)
Dept: MAMMOGRAPHY | Facility: CLINIC | Age: 63
Discharge: HOME | End: 2018-08-10
Payer: COMMERCIAL

## 2018-08-10 ENCOUNTER — HOSPITAL ENCOUNTER (OUTPATIENT)
Dept: ULTRASOUND IMAGING | Facility: CLINIC | Age: 63
Discharge: HOME | End: 2018-08-10
Payer: COMMERCIAL

## 2018-08-10 DIAGNOSIS — D05.11 DUCTAL CARCINOMA IN SITU (DCIS) OF RIGHT BREAST: ICD-10-CM

## 2018-08-10 PROCEDURE — G0279 TOMOSYNTHESIS, MAMMO: HCPCS

## 2018-08-10 PROCEDURE — 76642 ULTRASOUND BREAST LIMITED: CPT

## 2018-08-10 PROCEDURE — 77066 DX MAMMO INCL CAD BI: CPT

## 2018-08-16 ENCOUNTER — OFFICE VISIT (OUTPATIENT)
Dept: SURGICAL ONCOLOGY | Facility: CLINIC | Age: 63
End: 2018-08-16
Payer: COMMERCIAL

## 2018-08-16 VITALS
RESPIRATION RATE: 16 BRPM | DIASTOLIC BLOOD PRESSURE: 72 MMHG | BODY MASS INDEX: 20.77 KG/M2 | SYSTOLIC BLOOD PRESSURE: 142 MMHG | WEIGHT: 110 LBS | HEIGHT: 61 IN | TEMPERATURE: 98.1 F | HEART RATE: 68 BPM

## 2018-08-16 DIAGNOSIS — D05.11 DUCTAL CARCINOMA IN SITU (DCIS) OF RIGHT BREAST: Primary | ICD-10-CM

## 2018-08-16 PROCEDURE — 99215 OFFICE O/P EST HI 40 MIN: CPT | Performed by: SURGERY

## 2018-08-16 RX ORDER — OXYCODONE HYDROCHLORIDE AND ACETAMINOPHEN 5; 325 MG/1; MG/1
1 TABLET ORAL EVERY 6 HOURS PRN
Qty: 6 TABLET | Refills: 0 | Status: SHIPPED | OUTPATIENT
Start: 2018-08-16 | End: 2018-10-02 | Stop reason: ALTCHOICE

## 2018-08-16 NOTE — LETTER
August 16, 2018     [address] [facility] [address] [id number]    Patient: Claudette Alejandra   YOB: 1955   Date of Visit: 8/16/2018       Dear Dr Dianna Osler: Thank you for referring Stephanie Salgado to me for evaluation  Below are my notes for this consultation  If you have questions, please do not hesitate to call me  I look forward to following your patient along with you  Sincerely,        Delfina Saenz MD        CC: No Recipients  Delfina Saenz MD  8/16/2018  2:00 PM  Sign at close encounter     Surgical Oncology Follow Up       [address] [facility]  1955  [id number]  [address] [facility] SURGICAL ONCOLOGY [address] [address]    Chief Complaint   Patient presents with    Follow-up     New diagnosis of DCIS    Breast Cancer          Assessment & Plan:     Patient has a new diagnosis of right breast ductal carcinoma in situ  The patient has a personal history of ovarian cancer  She was BRCA negative however she was recently retested and was found to have 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  She subsequently had an MRI which demonstrated a area of enhancement in the retroareolar region approximately 3-5 cm deep  This was ultimately identified under ultrasound and biopsied and shown to be grade 2 ductal carcinoma in situ ER negative [place] negative  We talked about mastectomy versus breast conservation therapy  The patient prefers breast conservation therapy at this point in time  We subsequent to the process of informed consent for right breast needle localization lumpectomy  All questions were answered the patient's satisfaction  Patient understands that we could potentially identified invasive cancer and would return to the operating room for a sentinel lymph node biopsy    We talked about radiation therapy, links and durations  Cancer History:        Ovarian cancer [facility])     Initial Diagnosis     Ovarian cancer ([facility] [address] )         4/21/2009 Surgery     Exploratory laparotomy, total abdominal hysterectomy, bilateral salpingo-oophrectomy, lymph node dissection, omentectomy with staging          - 7/8/2009 Chemotherapy     Intraperitoneal along with intravenous chemotherapy on a early ovarian cancer protocol  5/31/2018 Genetic Testing     Genetic testing results are 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      Genetic testing indicated that the patient carries a Variant of Uncertain Significance (VUS) : Rad51C c 252G>T           Ductal carcinoma in situ (DCIS) of right breast    5/31/2018 Genetic Testing     Genetic testing results are 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      Genetic testing indicated that the patient carries a Variant of Uncertain Significance (VUS) : Rad51C c 252G>T         8/1/2018 Biopsy     Right breast biopsy  11 o'clock position 5 cmfn  DCIS  Grade 2  Confirmed by Rima Tena MD  ER 0  NC 0              Interval History:   See above    Review of Systems:   Review of Systems   All other systems reviewed and are negative        Past Medical History     Patient Active Problem List   Diagnosis    Benign essential hypertension    Colon, diverticulosis    Crohn's disease ([address] )    Dense breasts    Dermatitis    Erythema chronica migrans, secondary    Headache    Hyperlipidemia    Lymphedema    Murmur    Osteopenia    Osteoporosis    Ovarian cancer ([facility] [address] )    Reactive airway disease    Shortness of breath on exertion    Lymphedema of left lower extremity    Genetic predisposition to breast cancer    Abnormal MRI, breast    Ductal carcinoma in situ (DCIS) of right breast     Past Medical History:   Diagnosis Date    Dense breast     Ovarian cancer ([facility] [address] )      Past Surgical History:   Procedure Laterality Date    EXPLORATORY LAPAROTOMY      HYSTERECTOMY      OMENTECTOMY      TOTAL ABDOMINAL HYSTERECTOMY W/ BILATERAL SALPINGOOPHORECTOMY       Family History   Problem Relation Age of Onset    Prostate cancer Father     Ovarian cancer Sister     Breast cancer Paternal Grandmother      Social History     Social History    Marital status: Single     Spouse name: N/A    Number of children: N/A    Years of education: N/A     Occupational History    Not on file  Social History Main Topics    Smoking status: Former Smoker     Quit date: 4/1/2009    Smokeless tobacco: Never Used    Alcohol use No    Drug use: No    Sexual activity: Not on file     Other Topics Concern    Not on file     Social History Narrative    No narrative on file       Current Outpatient Prescriptions:     amLODIPine (NORVASC) 5 mg tablet, Take 1 tablet (5 mg total) by mouth daily, Disp: 30 tablet, Rfl: 5    atorvastatin (LIPITOR) 40 mg tablet, , Disp: , Rfl: 5    Calcium Carbonate (CALTRATE 600) 1500 (600 Ca) MG TABS, Take 1 tablet by mouth 2 (two) times a day, Disp: , Rfl:     hydrochlorothiazide (MICROZIDE) 12 5 mg capsule, Take 1 capsule (12 5 mg total) by mouth every morning, Disp: 30 capsule, Rfl: 5    inFLIXimab (REMICADE) 100 mg, Infuse into a venous catheter, Disp: , Rfl:     ibandronate (BONIVA) 150 MG tablet, Take by mouth, Disp: , Rfl:   No Known Allergies    Physical Exam:     Vitals:    08/16/18 1241   BP: 142/72   Pulse: 68   Resp: 16   Temp: 98 1 °F (36 7 °C)     Physical Exam   Constitutional: She is oriented to person, place, and time  She appears well-developed and well-nourished  HENT:   Head: Normocephalic and atraumatic  Mouth/Throat: Oropharynx is clear and moist    Eyes: EOM are normal  Pupils are equal, round, and reactive to light  Neck: Normal range of motion  Neck supple  No JVD present  No tracheal deviation present  No thyromegaly present     Cardiovascular: Normal rate, regular rhythm, normal heart sounds and intact distal pulses  Exam reveals no gallop and no friction rub  No murmur heard  Pulmonary/Chest: Effort normal and breath sounds normal  No respiratory distress  She has no wheezes  She has no rales  Examination the right breast demonstrates minimal ecchymosis there is a lateral biopsy incision  I cannot appreciate a dominant mass in the location of the tumor  There is no axillary adenopathy  There are no worrisome skin findings and no nipple discharge  Examination of the left breast in both the sitting and supine position demonstrate no skin changes nipple discharge dominant masses or axillary adenopathy  Abdominal: Soft  She exhibits no distension and no mass  There is no hepatomegaly  There is no tenderness  There is no rebound and no guarding  Musculoskeletal: Normal range of motion  She exhibits no edema or tenderness  Lymphadenopathy:     She has no cervical adenopathy  Neurological: She is alert and oriented to person, place, and time  No cranial nerve deficit  Skin: Skin is warm and dry  No rash noted  No erythema  Psychiatric: She has a normal mood and affect  Her behavior is normal    Vitals reviewed  Results:   I reviewed her MRI as well as her ultrasounds and mammograms with Dr katlin lyons   There was some discrepancy between the location on the report from the MRI stating approximately 1 5 cm deep where is the actual enhancement is mid breast and this is consistent with her ultrasound findings and clip placement and pathology  Advance Care Planning/Advance Directives:  I discussed the disease status, treatment plans and follow-up with the patient

## 2018-08-16 NOTE — PROGRESS NOTES
Surgical Oncology Follow Up       [address]  [facility] [address]  1955  [id number]  [address] 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  She subsequently had an MRI which demonstrated a area of enhancement in the retroareolar region approximately 3-5 cm deep  This was ultimately identified under ultrasound and biopsied and shown to be grade 2 ductal carcinoma in situ ER negative SD negative  We talked about mastectomy versus breast conservation therapy  The patient prefers breast conservation therapy at this point in time  We subsequent to the process of informed consent for right breast needle localization lumpectomy  All questions were answered the patient's satisfaction  Patient understands that we could potentially identified invasive cancer and would return to the operating room for a sentinel lymph node biopsy  We talked about radiation therapy, links and durations  Cancer History:        Ovarian cancer [facility])     Initial Diagnosis     Ovarian cancer ([facility] [address] )         4/21/2009 Surgery     Exploratory laparotomy, total abdominal hysterectomy, bilateral salpingo-oophrectomy, lymph node dissection, omentectomy with staging          - 7/8/2009 Chemotherapy     Intraperitoneal along with intravenous chemotherapy on a early ovarian cancer protocol           5/31/2018 Genetic Testing     Genetic testing results are POSITIVE for a pathogenic variant: YOLANAD c 5290delC    Genetic testing indicated that the patient carries a Variant of Uncertain Significance (VUS) : Rad51C c 252G>T           Ductal carcinoma in situ (DCIS) of right breast    5/31/2018 Genetic Testing     Genetic testing results are 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      Genetic testing indicated that the patient carries a Variant of Uncertain Significance (VUS) : Rad51C c 252G>T         8/1/2018 Biopsy     Right breast biopsy  11 o'clock position 5 cmfn  DCIS  Grade 2  Confirmed by Debbie Miranda MD  ER 0  OK 0              Interval History:   See above    Review of Systems:   Review of Systems   All other systems reviewed and are negative  Past Medical History     Patient Active Problem List   Diagnosis    Benign essential hypertension    Colon, diverticulosis    Crohn's disease ([facility] [address] )    Dense breasts    Dermatitis    Erythema chronica migrans, secondary    Headache    Hyperlipidemia    Lymphedema    Murmur    Osteopenia    Osteoporosis    Ovarian cancer ([facility] [address] )    Reactive airway disease    Shortness of breath on exertion    Lymphedema of left lower extremity    Genetic predisposition to breast cancer    Abnormal MRI, breast    Ductal carcinoma in situ (DCIS) of right breast     Past Medical History:   Diagnosis Date    Dense breast     Ovarian cancer (HCC)      Past Surgical History:   Procedure Laterality Date    EXPLORATORY LAPAROTOMY      HYSTERECTOMY      OMENTECTOMY      TOTAL ABDOMINAL HYSTERECTOMY W/ BILATERAL SALPINGOOPHORECTOMY       Family History   Problem Relation Age of Onset    Prostate cancer Father     Ovarian cancer Sister     Breast cancer Paternal Grandmother      Social History     Social History    Marital status: Single     Spouse name: N/A    Number of children: N/A    Years of education: N/A     Occupational History    Not on file       Social History Main Topics    Smoking status: Former Smoker     Quit date: 4/1/2009    Smokeless tobacco: Never Used    Alcohol use No    Drug use: No    Sexual activity: Not on file     Other Topics Concern    Not on file     Social History Narrative    No narrative on file       Current Outpatient Prescriptions:     amLODIPine (NORVASC) 5 mg tablet, Take 1 tablet (5 mg total) by mouth daily, Disp: 30 tablet, Rfl: 5    atorvastatin (LIPITOR) 40 mg tablet, , Disp: , Rfl: 5    Calcium Carbonate (CALTRATE 600) 1500 (600 Ca) MG TABS, Take 1 tablet by mouth 2 (two) times a day, Disp: , Rfl:     hydrochlorothiazide (MICROZIDE) 12 5 mg capsule, Take 1 capsule (12 5 mg total) by mouth every morning, Disp: 30 capsule, Rfl: 5    inFLIXimab (REMICADE) 100 mg, Infuse into a venous catheter, Disp: , Rfl:     ibandronate (BONIVA) 150 MG tablet, Take by mouth, Disp: , Rfl:   No Known Allergies    Physical Exam:     Vitals:    08/16/18 1241   BP: 142/72   Pulse: 68   Resp: 16   Temp: 98 1 °F (36 7 °C)     Physical Exam   Constitutional: She is oriented to person, place, and time  She appears well-developed and well-nourished  HENT:   Head: Normocephalic and atraumatic  Mouth/Throat: Oropharynx is clear and moist    Eyes: EOM are normal  Pupils are equal, round, and reactive to light  Neck: Normal range of motion  Neck supple  No JVD present  No tracheal deviation present  No thyromegaly present  Cardiovascular: Normal rate, regular rhythm, normal heart sounds and intact distal pulses  Exam reveals no gallop and no friction rub  No murmur heard  Pulmonary/Chest: Effort normal and breath sounds normal  No respiratory distress  She has no wheezes  She has no rales  Examination the right breast demonstrates minimal ecchymosis there is a lateral biopsy incision  I cannot appreciate a dominant mass in the location of the tumor  There is no axillary adenopathy  There are no worrisome skin findings and no nipple discharge      Examination of the left breast in both the sitting and supine position demonstrate no skin changes nipple discharge dominant masses or axillary adenopathy  Abdominal: Soft  She exhibits no distension and no mass  There is no hepatomegaly  There is no tenderness  There is no rebound and no guarding  Musculoskeletal: Normal range of motion  She exhibits no edema or tenderness  Lymphadenopathy:     She has no cervical adenopathy  Neurological: She is alert and oriented to person, place, and time  No cranial nerve deficit  Skin: Skin is warm and dry  No rash noted  No erythema  Psychiatric: She has a normal mood and affect  Her behavior is normal    Vitals reviewed  Results:   I reviewed her MRI as well as her ultrasounds and mammograms with Dr dalal dual   There was some discrepancy between the location on the report from the MRI stating approximately 1 5 cm deep where is the actual enhancement is mid breast and this is consistent with her ultrasound findings and clip placement and pathology  Advance Care Planning/Advance Directives:  I discussed the disease status, treatment plans and follow-up with the patient

## 2018-08-16 NOTE — PATIENT INSTRUCTIONS
Pre-Surgery Instructions:   Medication Instructions    amLODIPine (NORVASC) 5 mg tablet Take morning of surgery    atorvastatin (LIPITOR) 40 mg tablet Stop taking 1 days prior to surgery    Calcium Carbonate (CALTRATE 600) 1500 (600 Ca) MG TABS Stop taking 1 days prior to surgery    hydrochlorothiazide (MICROZIDE) 12 5 mg capsule Take morning of surgery    inFLIXimab (REMICADE) 100 mg per anesthesia guidelines          Breast Lumpectomy   AMBULATORY CARE:   What you need to know about a lumpectomy:  A lumpectomy is surgery to remove a mass in your breast  Breast tissue that surrounds the mass may also be taken  A lumpectomy is also known as breast-conserving surgery, a partial mastectomy, or a segmental mastectomy  How to prepare for a lumpectomy:   · You may need a mammogram or ultrasound before surgery  These tests may be done the same day as your surgery or at an earlier time  Your healthcare provider may use pictures from these tests to benoit the location of the mass  The marker will show him where to make your incision  · Your healthcare provider will talk to you about how to prepare for surgery  He may tell you not to eat or drink anything after midnight on the day of your surgery  He will tell you what medicines to take or not take on the day of your surgery  You may need to stop taking blood thinners or aspirin several days before your surgery  Arrange for someone to drive you home and stay with you for 24 hours after surgery  This person can help care for you, and monitor for any problems  What will happen during a lumpectomy:  You will be given general anesthesia to keep you asleep and free from pain during surgery  You may be given an antibiotic through your IV to help prevent a bacterial infection  Your healthcare provider will make an incision in your breast and remove the mass  He may also remove breast tissue or lymph nodes that are close to the mass   A drain may be inserted near your incision to remove extra fluid  This will decrease swelling and help your incision heal  Your healthcare provider will close your incision with stitches or strips of medical tape and cover it with a bandage  He may also wrap a tight-fitting bandage around both of your breasts  This may decrease swelling, bleeding, and pain  What will happen after a lumpectomy:  Healthcare providers will monitor you until you are awake  You may able to go home when you are awake and your pain is controlled  Instead you may need to spend the night in the hospital    Risks of a lumpectomy:  You may bleed more than expected or get an infection  Nerves, blood vessels, and muscles may be damaged during your surgery  You may have swelling in your arm closest to the lumpectomy or where lymph nodes were removed  This swelling is called lymphedema  Lymphedema may cause tingling, numbness, stiffness, and weakness in your arm  This may be permanent  You may get a blood clot in your arm or leg  The blood clot may travel to your heart lungs, or brain  This may become life-threatening  Call 911 for any of the following:   · You feel lightheaded, short of breath, and have chest pain  · You cough up blood  · You have trouble breathing  Seek care immediately if:   · Blood soaks through your bandage  · Your stitches come apart  · Your bruise suddenly gets bigger  · Your leg or arm is larger than normal and painful  Contact your healthcare provider if:   · You have a fever or chills  · Your wound is red, swollen, or draining pus  · You have nausea or are vomiting  · Your skin is itchy, swollen, or you have a rash  · Your pain does not get better after you take pain medicine  · Your drain falls out or stops draining fluid  · Your drain has pus or foul-smelling fluid coming out of it  · You have questions or concerns about your condition or care  Medicines:   You may need any of the following:  · Antibiotics  help prevent a bacterial infection  · Prescription pain medicine  may be given  Ask your healthcare provider how to take this medicine safely  Some prescription pain medicines contain acetaminophen  Do not take other medicines that contain acetaminophen without talking to your healthcare provider  Too much acetaminophen may cause liver damage  Prescription pain medicine may cause constipation  Ask your healthcare provider how to prevent or treat constipation  · NSAIDs , such as ibuprofen, help decrease swelling, pain, and fever  NSAIDs can cause stomach bleeding or kidney problems in certain people  If you take blood thinner medicine, always ask your healthcare provider if NSAIDs are safe for you  Always read the medicine label and follow directions  · Take your medicine as directed  Contact your healthcare provider if you think your medicine is not helping or if you have side effects  Tell him or her if you are allergic to any medicine  Keep a list of the medicines, vitamins, and herbs you take  Include the amounts, and when and why you take them  Bring the list or the pill bottles to follow-up visits  Carry your medicine list with you in case of an emergency  Care for your wound as directed: If you have a tight-fitting bandage, you can remove it in 24 to 48 hours, or as directed  Ask your healthcare provider when your incision can get wet  You may need to take a sponge bath until your drain is removed  Carefully wash around the incision with soap and water  It is okay to allow the soap and water to gently run over your incision  Gently pat dry the area and put on new, clean bandages as directed  Change your bandages when they get wet or dirty  Check your incision every day for redness, pus, or swelling  Self-care:   · Apply ice  on your breast for 15 to 20 minutes every hour or as directed  Use an ice pack, or put crushed ice in a plastic bag  Cover it with a towel   Ice helps prevent tissue damage and decreases swelling and pain  · Rest  as directed  Do not lift anything heavy  Do not push or pull with your arms  Take short walks around the house  Gradually walk further as you feel better  Ask your healthcare provider when you can return to your normal activities  · Empty your drain  as directed  You may need to write down how much you empty from your drain each day  Ask your healthcare provider for more information about how to empty your drain  · Wear a supportive bra  as directed  Wait until you remove the tight-fitting bandage to wear a bra  You may be given a surgical bra or told to wear a sports bra  A supportive bra may help hold your bandages in place  It may also help with swelling and pain  Do not  wear bras with lace or underwire  They may rub against your incision and cause discomfort  Arm stretches: Your healthcare provider may show you how to do arm stretches  Arm stretches may prevent stiff arms or shoulders  You may need to wait until after your drains are removed to begin stretching  Do not do arm stretches until your healthcare provider says it is okay  Ask your healthcare provider for more information about arm stretches  Follow up with your healthcare provider as directed:  Write down your questions so you remember to ask them during your visits  © 2017 [facility] Information is for End User's use only and may not be sold, redistributed or otherwise used for commercial purposes  All illustrations and images included in CareNotes® are the copyrighted property of [employer]  or [employer]  The above information is an  only  It is not intended as medical advice for individual conditions or treatments  Talk to your doctor, nurse or pharmacist before following any medical regimen to see if it is safe and effective for you

## 2018-08-23 RX ORDER — DIPHENOXYLATE HYDROCHLORIDE AND ATROPINE SULFATE 2.5; .025 MG/1; MG/1
1 TABLET ORAL DAILY
COMMUNITY
End: 2018-10-16

## 2018-08-23 RX ORDER — ALBUTEROL SULFATE 90 UG/1
2 AEROSOL, METERED RESPIRATORY (INHALATION) AS NEEDED
COMMUNITY
End: 2018-12-24 | Stop reason: SDUPTHER

## 2018-08-23 NOTE — PRE-PROCEDURE INSTRUCTIONS
Pre-Surgery Instructions:   Medication Instructions    albuterol (PROVENTIL HFA,VENTOLIN HFA) 90 mcg/act inhaler Instructed patient per Anesthesia Guidelines   amLODIPine (NORVASC) 5 mg tablet Instructed patient per Anesthesia Guidelines   atorvastatin (LIPITOR) 40 mg tablet Instructed patient per Anesthesia Guidelines   Calcium Carbonate (CALTRATE 600) 1500 (600 Ca) MG TABS Patient was instructed by Physician and understands   hydrochlorothiazide (MICROZIDE) 12 5 mg capsule Instructed patient per Anesthesia Guidelines   inFLIXimab (REMICADE) 100 mg Instructed patient per Anesthesia Guidelines   multivitamin [facility]) TABS Patient was instructed by Physician and understands  Pre op and bathing instructions reviewed  Pt will get hibiclens

## 2018-08-28 ENCOUNTER — OFFICE VISIT (OUTPATIENT)
Dept: LAB | Facility: CLINIC | Age: 63
End: 2018-08-28
Payer: COMMERCIAL

## 2018-08-28 ENCOUNTER — APPOINTMENT (OUTPATIENT)
Dept: LAB | Facility: CLINIC | Age: 63
End: 2018-08-28
Payer: COMMERCIAL

## 2018-08-28 ENCOUNTER — APPOINTMENT (OUTPATIENT)
Dept: RADIOLOGY | Facility: CLINIC | Age: 63
End: 2018-08-28
Payer: COMMERCIAL

## 2018-08-28 ENCOUNTER — TRANSCRIBE ORDERS (OUTPATIENT)
Dept: LAB | Facility: CLINIC | Age: 63
End: 2018-08-28

## 2018-08-28 DIAGNOSIS — D05.11 DUCTAL CARCINOMA IN SITU (DCIS) OF RIGHT BREAST: ICD-10-CM

## 2018-08-28 DIAGNOSIS — C50.911 MALIGNANT NEOPLASM OF RIGHT FEMALE BREAST, UNSPECIFIED ESTROGEN RECEPTOR STATUS, UNSPECIFIED SITE OF BREAST (HCC): ICD-10-CM

## 2018-08-28 DIAGNOSIS — Z86.010 PERSONAL HISTORY OF COLONIC POLYPS: ICD-10-CM

## 2018-08-28 DIAGNOSIS — K50.819 CROHN'S DISEASE OF SMALL AND LARGE INTESTINES WITH COMPLICATION (HCC): ICD-10-CM

## 2018-08-28 DIAGNOSIS — K50.819 CROHN'S DISEASE OF SMALL AND LARGE INTESTINES WITH COMPLICATION (HCC): Primary | ICD-10-CM

## 2018-08-28 DIAGNOSIS — C57.8 MALIGNANT NEOPLASM OF TUBO-OVARIAN (HCC): ICD-10-CM

## 2018-08-28 LAB
ALBUMIN SERPL BCP-MCNC: 3.5 G/DL (ref 3.5–5)
ALP SERPL-CCNC: 77 U/L (ref 46–116)
ALT SERPL W P-5'-P-CCNC: 25 U/L (ref 12–78)
ANION GAP SERPL CALCULATED.3IONS-SCNC: 9 MMOL/L (ref 4–13)
AST SERPL W P-5'-P-CCNC: 23 U/L (ref 5–45)
BASOPHILS # BLD AUTO: 0.04 THOUSANDS/ΜL (ref 0–0.1)
BASOPHILS NFR BLD AUTO: 1 % (ref 0–1)
BILIRUB DIRECT SERPL-MCNC: 0.08 MG/DL (ref 0–0.2)
BILIRUB SERPL-MCNC: 0.4 MG/DL (ref 0.2–1)
BUN SERPL-MCNC: 23 MG/DL (ref 5–25)
CALCIUM SERPL-MCNC: 9.4 MG/DL (ref 8.3–10.1)
CHLORIDE SERPL-SCNC: 101 MMOL/L (ref 100–108)
CO2 SERPL-SCNC: 31 MMOL/L (ref 21–32)
CREAT SERPL-MCNC: 0.92 MG/DL (ref 0.6–1.3)
CRP SERPL QL: <3 MG/L
EOSINOPHIL # BLD AUTO: 0.13 THOUSAND/ΜL (ref 0–0.61)
EOSINOPHIL NFR BLD AUTO: 2 % (ref 0–6)
ERYTHROCYTE [DISTWIDTH] IN BLOOD BY AUTOMATED COUNT: 12.6 % (ref 11.6–15.1)
ERYTHROCYTE [SEDIMENTATION RATE] IN BLOOD: 30 MM/HOUR (ref 0–20)
GFR SERPL CREATININE-BSD FRML MDRD: 68 ML/MIN/1.73SQ M
GLUCOSE P FAST SERPL-MCNC: 92 MG/DL (ref 65–99)
HCT VFR BLD AUTO: 42.7 % (ref 34.8–46.1)
HGB BLD-MCNC: 13.8 G/DL (ref 11.5–15.4)
IMM GRANULOCYTES # BLD AUTO: 0.01 THOUSAND/UL (ref 0–0.2)
IMM GRANULOCYTES NFR BLD AUTO: 0 % (ref 0–2)
IRON SERPL-MCNC: 65 UG/DL (ref 50–170)
LYMPHOCYTES # BLD AUTO: 1.86 THOUSANDS/ΜL (ref 0.6–4.47)
LYMPHOCYTES NFR BLD AUTO: 30 % (ref 14–44)
MCH RBC QN AUTO: 28.2 PG (ref 26.8–34.3)
MCHC RBC AUTO-ENTMCNC: 32.3 G/DL (ref 31.4–37.4)
MCV RBC AUTO: 87 FL (ref 82–98)
MONOCYTES # BLD AUTO: 0.42 THOUSAND/ΜL (ref 0.17–1.22)
MONOCYTES NFR BLD AUTO: 7 % (ref 4–12)
NEUTROPHILS # BLD AUTO: 3.74 THOUSANDS/ΜL (ref 1.85–7.62)
NEUTS SEG NFR BLD AUTO: 60 % (ref 43–75)
NRBC BLD AUTO-RTO: 0 /100 WBCS
PLATELET # BLD AUTO: 295 THOUSANDS/UL (ref 149–390)
PMV BLD AUTO: 9.6 FL (ref 8.9–12.7)
POTASSIUM SERPL-SCNC: 3.6 MMOL/L (ref 3.5–5.3)
PROT SERPL-MCNC: 8.2 G/DL (ref 6.4–8.2)
RBC # BLD AUTO: 4.89 MILLION/UL (ref 3.81–5.12)
SODIUM SERPL-SCNC: 141 MMOL/L (ref 136–145)
TIBC SERPL-MCNC: 336 UG/DL (ref 250–450)
VIT B12 SERPL-MCNC: 335 PG/ML (ref 100–900)
WBC # BLD AUTO: 6.2 THOUSAND/UL (ref 4.31–10.16)

## 2018-08-28 PROCEDURE — 36415 COLL VENOUS BLD VENIPUNCTURE: CPT

## 2018-08-28 PROCEDURE — 86140 C-REACTIVE PROTEIN: CPT

## 2018-08-28 PROCEDURE — 71046 X-RAY EXAM CHEST 2 VIEWS: CPT

## 2018-08-28 PROCEDURE — 93005 ELECTROCARDIOGRAM TRACING: CPT

## 2018-08-28 PROCEDURE — 82248 BILIRUBIN DIRECT: CPT

## 2018-08-28 PROCEDURE — 82607 VITAMIN B-12: CPT

## 2018-08-28 PROCEDURE — 85025 COMPLETE CBC W/AUTO DIFF WBC: CPT

## 2018-08-28 PROCEDURE — 86038 ANTINUCLEAR ANTIBODIES: CPT

## 2018-08-28 PROCEDURE — 83540 ASSAY OF IRON: CPT

## 2018-08-28 PROCEDURE — 83550 IRON BINDING TEST: CPT

## 2018-08-28 PROCEDURE — 80053 COMPREHEN METABOLIC PANEL: CPT

## 2018-08-28 PROCEDURE — 85652 RBC SED RATE AUTOMATED: CPT

## 2018-08-29 LAB
ATRIAL RATE: 56 BPM
P AXIS: 64 DEGREES
PR INTERVAL: 166 MS
QRS AXIS: 42 DEGREES
QRSD INTERVAL: 80 MS
QT INTERVAL: 442 MS
QTC INTERVAL: 426 MS
RYE IGE QN: NEGATIVE
T WAVE AXIS: 39 DEGREES
VENTRICULAR RATE: 56 BPM

## 2018-08-29 PROCEDURE — 93010 ELECTROCARDIOGRAM REPORT: CPT | Performed by: INTERNAL MEDICINE

## 2018-09-10 ENCOUNTER — ANESTHESIA EVENT (OUTPATIENT)
Dept: PERIOP | Facility: HOSPITAL | Age: 63
End: 2018-09-10
Payer: COMMERCIAL

## 2018-09-10 RX ORDER — SODIUM CHLORIDE, SODIUM LACTATE, POTASSIUM CHLORIDE, CALCIUM CHLORIDE 600; 310; 30; 20 MG/100ML; MG/100ML; MG/100ML; MG/100ML
125 INJECTION, SOLUTION INTRAVENOUS CONTINUOUS
Status: CANCELLED | OUTPATIENT
Start: 2018-09-10

## 2018-09-11 ENCOUNTER — CONVERSION ENCOUNTER (OUTPATIENT)
Dept: MAMMOGRAPHY | Facility: HOSPITAL | Age: 63
End: 2018-09-11

## 2018-09-11 ENCOUNTER — ANESTHESIA (OUTPATIENT)
Dept: PERIOP | Facility: HOSPITAL | Age: 63
End: 2018-09-11
Payer: COMMERCIAL

## 2018-09-11 ENCOUNTER — HOSPITAL ENCOUNTER (OUTPATIENT)
Facility: HOSPITAL | Age: 63
Setting detail: OUTPATIENT SURGERY
Discharge: HOME | End: 2018-09-11
Attending: SURGERY | Admitting: SURGERY
Payer: COMMERCIAL

## 2018-09-11 ENCOUNTER — APPOINTMENT (OUTPATIENT)
Dept: MAMMOGRAPHY | Facility: HOSPITAL | Age: 63
End: 2018-09-11
Payer: COMMERCIAL

## 2018-09-11 ENCOUNTER — HOSPITAL ENCOUNTER (OUTPATIENT)
Dept: MAMMOGRAPHY | Facility: HOSPITAL | Age: 63
Discharge: HOME | End: 2018-09-11
Attending: SURGERY
Payer: COMMERCIAL

## 2018-09-11 VITALS
HEIGHT: 61 IN | WEIGHT: 110 LBS | TEMPERATURE: 97.4 F | BODY MASS INDEX: 20.77 KG/M2 | OXYGEN SATURATION: 98 % | HEART RATE: 65 BPM | SYSTOLIC BLOOD PRESSURE: 155 MMHG | DIASTOLIC BLOOD PRESSURE: 76 MMHG | RESPIRATION RATE: 17 BRPM

## 2018-09-11 DIAGNOSIS — D05.11 DUCTAL CARCINOMA IN SITU (DCIS) OF RIGHT BREAST: ICD-10-CM

## 2018-09-11 DIAGNOSIS — D05.11 INTRADUCTAL CARCINOMA IN SITU OF RIGHT BREAST: ICD-10-CM

## 2018-09-11 PROCEDURE — 88307 TISSUE EXAM BY PATHOLOGIST: CPT | Performed by: PATHOLOGY

## 2018-09-11 PROCEDURE — 88342 IMHCHEM/IMCYTCHM 1ST ANTB: CPT | Performed by: PATHOLOGY

## 2018-09-11 PROCEDURE — 19301 PARTIAL MASTECTOMY: CPT | Performed by: SURGERY

## 2018-09-11 PROCEDURE — 88341 IMHCHEM/IMCYTCHM EA ADD ANTB: CPT | Performed by: PATHOLOGY

## 2018-09-11 PROCEDURE — 19281 PERQ DEVICE BREAST 1ST IMAG: CPT

## 2018-09-11 RX ORDER — OFLOXACIN 3 MG/ML
2 SOLUTION/ DROPS OPHTHALMIC 4 TIMES DAILY
Status: DISCONTINUED | OUTPATIENT
Start: 2018-09-11 | End: 2018-09-11 | Stop reason: HOSPADM

## 2018-09-11 RX ORDER — SODIUM CHLORIDE 9 MG/ML
INJECTION, SOLUTION INTRAVENOUS CONTINUOUS PRN
Status: DISCONTINUED | OUTPATIENT
Start: 2018-09-11 | End: 2018-09-11 | Stop reason: SURG

## 2018-09-11 RX ORDER — BUPIVACAINE HYDROCHLORIDE AND EPINEPHRINE 2.5; 5 MG/ML; UG/ML
INJECTION, SOLUTION INFILTRATION; PERINEURAL AS NEEDED
Status: DISCONTINUED | OUTPATIENT
Start: 2018-09-11 | End: 2018-09-11 | Stop reason: HOSPADM

## 2018-09-11 RX ORDER — OXYCODONE HYDROCHLORIDE AND ACETAMINOPHEN 5; 325 MG/1; MG/1
1 TABLET ORAL EVERY 4 HOURS PRN
Status: DISCONTINUED | OUTPATIENT
Start: 2018-09-11 | End: 2018-09-11 | Stop reason: HOSPADM

## 2018-09-11 RX ORDER — LIDOCAINE WITH 8.4% SOD BICARB 0.9%(10ML)
5 SYRINGE (ML) INJECTION ONCE
Status: COMPLETED | OUTPATIENT
Start: 2018-09-11 | End: 2018-09-11

## 2018-09-11 RX ORDER — CEFAZOLIN SODIUM 1 G/3ML
INJECTION, POWDER, FOR SOLUTION INTRAMUSCULAR; INTRAVENOUS AS NEEDED
Status: DISCONTINUED | OUTPATIENT
Start: 2018-09-11 | End: 2018-09-11 | Stop reason: SURG

## 2018-09-11 RX ORDER — PROPOFOL 10 MG/ML
INJECTION, EMULSION INTRAVENOUS AS NEEDED
Status: DISCONTINUED | OUTPATIENT
Start: 2018-09-11 | End: 2018-09-11 | Stop reason: SURG

## 2018-09-11 RX ORDER — DIPHENHYDRAMINE HYDROCHLORIDE 50 MG/ML
12.5 INJECTION INTRAMUSCULAR; INTRAVENOUS ONCE AS NEEDED
Status: DISCONTINUED | OUTPATIENT
Start: 2018-09-11 | End: 2018-09-11 | Stop reason: HOSPADM

## 2018-09-11 RX ORDER — ONDANSETRON 2 MG/ML
4 INJECTION INTRAMUSCULAR; INTRAVENOUS ONCE AS NEEDED
Status: DISCONTINUED | OUTPATIENT
Start: 2018-09-11 | End: 2018-09-11 | Stop reason: HOSPADM

## 2018-09-11 RX ORDER — FENTANYL CITRATE/PF 50 MCG/ML
50 SYRINGE (ML) INJECTION
Status: DISCONTINUED | OUTPATIENT
Start: 2018-09-11 | End: 2018-09-11 | Stop reason: HOSPADM

## 2018-09-11 RX ORDER — ONDANSETRON 2 MG/ML
INJECTION INTRAMUSCULAR; INTRAVENOUS AS NEEDED
Status: DISCONTINUED | OUTPATIENT
Start: 2018-09-11 | End: 2018-09-11 | Stop reason: SURG

## 2018-09-11 RX ORDER — KETOROLAC TROMETHAMINE 30 MG/ML
INJECTION, SOLUTION INTRAMUSCULAR; INTRAVENOUS AS NEEDED
Status: DISCONTINUED | OUTPATIENT
Start: 2018-09-11 | End: 2018-09-11 | Stop reason: SURG

## 2018-09-11 RX ORDER — MIDAZOLAM HYDROCHLORIDE 1 MG/ML
INJECTION INTRAMUSCULAR; INTRAVENOUS AS NEEDED
Status: DISCONTINUED | OUTPATIENT
Start: 2018-09-11 | End: 2018-09-11 | Stop reason: SURG

## 2018-09-11 RX ORDER — FENTANYL CITRATE 50 UG/ML
INJECTION, SOLUTION INTRAMUSCULAR; INTRAVENOUS AS NEEDED
Status: DISCONTINUED | OUTPATIENT
Start: 2018-09-11 | End: 2018-09-11 | Stop reason: SURG

## 2018-09-11 RX ORDER — SODIUM CHLORIDE 9 MG/ML
75 INJECTION, SOLUTION INTRAVENOUS CONTINUOUS
Status: DISCONTINUED | OUTPATIENT
Start: 2018-09-11 | End: 2018-09-11 | Stop reason: HOSPADM

## 2018-09-11 RX ORDER — DEXMEDETOMIDINE HYDROCHLORIDE 100 UG/ML
INJECTION, SOLUTION INTRAVENOUS AS NEEDED
Status: DISCONTINUED | OUTPATIENT
Start: 2018-09-11 | End: 2018-09-11 | Stop reason: SURG

## 2018-09-11 RX ORDER — MAGNESIUM HYDROXIDE 1200 MG/15ML
LIQUID ORAL AS NEEDED
Status: DISCONTINUED | OUTPATIENT
Start: 2018-09-11 | End: 2018-09-11 | Stop reason: HOSPADM

## 2018-09-11 RX ADMIN — FENTANYL CITRATE 25 MCG: 50 INJECTION INTRAMUSCULAR; INTRAVENOUS at 13:07

## 2018-09-11 RX ADMIN — MIDAZOLAM HYDROCHLORIDE 2 MG: 1 INJECTION, SOLUTION INTRAMUSCULAR; INTRAVENOUS at 12:54

## 2018-09-11 RX ADMIN — SODIUM CHLORIDE: 0.9 INJECTION, SOLUTION INTRAVENOUS at 13:45

## 2018-09-11 RX ADMIN — CEFAZOLIN SODIUM 1000 MG: 1 INJECTION, POWDER, FOR SOLUTION INTRAMUSCULAR; INTRAVENOUS at 13:04

## 2018-09-11 RX ADMIN — DEXMEDETOMIDINE HYDROCHLORIDE 8 MCG: 100 INJECTION, SOLUTION INTRAVENOUS at 13:03

## 2018-09-11 RX ADMIN — PROPOFOL 40 MG: 10 INJECTION, EMULSION INTRAVENOUS at 13:01

## 2018-09-11 RX ADMIN — SODIUM CHLORIDE: 0.9 INJECTION, SOLUTION INTRAVENOUS at 11:30

## 2018-09-11 RX ADMIN — PROPOFOL 130 MG: 10 INJECTION, EMULSION INTRAVENOUS at 12:59

## 2018-09-11 RX ADMIN — OFLOXACIN 2 DROP: 3 SOLUTION/ DROPS OPHTHALMIC at 15:17

## 2018-09-11 RX ADMIN — KETOROLAC TROMETHAMINE 30 MG: 30 INJECTION, SOLUTION INTRAMUSCULAR at 13:36

## 2018-09-11 RX ADMIN — DEXAMETHASONE SODIUM PHOSPHATE 5 MG: 10 INJECTION INTRAMUSCULAR; INTRAVENOUS at 13:01

## 2018-09-11 RX ADMIN — LIDOCAINE HYDROCHLORIDE 80 MG: 20 INJECTION, SOLUTION INTRAVENOUS at 12:59

## 2018-09-11 RX ADMIN — LIDOCAINE HYDROCHLORIDE 2 ML: 10 INJECTION, SOLUTION INFILTRATION; PERINEURAL at 12:18

## 2018-09-11 RX ADMIN — FENTANYL CITRATE 50 MCG: 50 INJECTION INTRAMUSCULAR; INTRAVENOUS at 13:11

## 2018-09-11 RX ADMIN — FENTANYL CITRATE 25 MCG: 50 INJECTION INTRAMUSCULAR; INTRAVENOUS at 13:44

## 2018-09-11 RX ADMIN — ONDANSETRON 4 MG: 2 INJECTION INTRAMUSCULAR; INTRAVENOUS at 13:36

## 2018-09-11 NOTE — DISCHARGE INSTRUCTIONS
POST-OPERATIVE BREAST CARE INSTRUCTIONS    Wound Closure/Dressing: Your wound is closed with:   Steri strips-white pieces of tape that hold your incision together along with surgical glue           Dissolvable sutures-underneath the skin    Wound care:     If you have gauze over your wound you may remove it the day after surgery  Leave the Steri-strips on, they will fall off on their own in 1-2 weeks   You may shower using soap and water to clean your wound  Gently pat dry  Drain Care: If you do not have a drain, disregard this section   Visiting Nursing should have been arranged upon discharge from hospital   A nurse will call your home to schedule an initial visit  Please call the number below if you have not received a call within 48 hours of hospital discharge   You may shower with the MAURY drains  Wash area around the drains with soap and water and pat dry   Record drain outputs on chart given to you at pre op visit and bring that chart to office at post op appt   MAURY drains can be removed in the office by a nurse either at post op visit OR when drain output is 30 ccs or less in a 24 hour period for three days in a row   If you had plastic surgery or reconstructive surgery, the plastic surgeon will manage the drains  Other:       You can begin wearing your own bra when it feels comfortable   You may resume using deodorant the day after surgery                 Post-op visit:  your post-op visit is scheduled for:  2018 @ 8:30 AM    Call our office at [phone] if you have any  of the followin  Redness, swelling, heat, unusual drainage or heavy bleeding from wound  2  Fever greater than 101 °  3  Pain not relieved by medication

## 2018-09-11 NOTE — OP NOTE
OPERATIVE REPORT  PATIENT NAME: Sanchez Al    :  1955  MRN: [id number]  Pt Location: AN OR ROOM 02    SURGERY DATE: 2018    Surgeon(s) and Role:     * Stef Geiger MD - Primary     * William Najera MD - Assisting    Preop Diagnosis:  Ductal carcinoma in situ (DCIS) of right breast [D05 11]    Post-Op Diagnosis Codes:     * Ductal carcinoma in situ (DCIS) of right breast [D05 11]    Procedure(s) (LRB):  BREAST LUMPECTOMY; BREAST NEEDLE LOCALIZATION (NEEDLE LOC AT 1200) (Right)    Specimen(s):  ID Type Source Tests Collected by Time Destination   1 : Right breast lumpectomy Tissue Breast, Right TISSUE EXAM Stef Geiger MD 2018 1312    2 : Right breast lumpectomy-anterior margin-green Tissue Breast, Right TISSUE EXAM Stef Geiger MD 2018 1324    3 : Right breast lumpectomy-inferior margin-blue Tissue Breast, Right TISSUE EXAM Stef Geiger MD 2018 1328    4 : Right breast lumpectomy-lateral margin-red Tissue Breast, Right TISSUE EXAM Stef Geiger MD 2018 1328    5 : Right breast lumpectomy-medial margin-yellow Tissue Breast, Right TISSUE EXAM Stef Geiger MD 2018 1328    6 : Right breast lumpectomy-posterior margin-black Tissue Breast, Right TISSUE EXAM Stef Geiger MD 2018 1328    7 : Right breast lumpectomy-superior margin-orange Tissue Breast, Right TISSUE EXAM Stef Geiger MD 2018 1328        Estimated Blood Loss:   Minimal    Drains:       Anesthesia Type:   General    Operative Indications:  Ductal carcinoma in situ (DCIS) of right breast [D05 11]      Operative Findings:  Good specimen radiograph    Complications:   None    Procedure and Technique:  I previously reviewed the needle localization images  Lumpectomy  The patient was brought to the operation room and placed under general anesthesia  Attention was paid to ensure appropriate padding and correct positioning  The right breast was prepped and draped in a sterile fashion    I initiated a time-out, identifying the patient, the correct side and the above procedure  All parties agreed with the time out  The planned incision was then injected with 0 25% Marcaine and epinephrine for local anesthesia  The incision was sharply incised  The localizing wire was used to guide the dissection  Superior, inferior, medial and lateral planes were developed around the localizing wire and the specimen truncated posteriorly with electrocautery just beyond the tip of the wire  The specimen was then inked for orientation purposes  A specimen radiograph was taken in two dimensions and six additional margins were removed to optimize complete removal of the tumor  The additional margins were inked on the most distal area  All specimens were sent to pathology for permanent analysis  Final posterior margin is muscle  Superficial bleeding controlled with electrocautery and the wound was extensively irrigated  The wound was closed with two layers, an inner layer of 3-0 vicryl and a subcuticular layer of 4-0 monocryl  Histocryl and steri-strips were applied       I was present for the entire procedure    Patient Disposition:  PACU     SIGNATURE: Kim Ashraf MD  DATE: September 11, 2018  TIME: 1:34 PM

## 2018-09-11 NOTE — ANESTHESIA PREPROCEDURE EVALUATION
Review of Systems/Medical History          Cardiovascular  Hyperlipidemia, Hypertension ,    Pulmonary  Asthma , Shortness of breath,        GI/Hepatic            Endo/Other     GYN       Hematology   Musculoskeletal       Neurology    Headaches,    Psychology           Physical Exam    Airway    Mallampati score: II         Dental   No notable dental hx     Cardiovascular      Pulmonary      Other Findings        Anesthesia Plan  ASA Score- 3     Anesthesia Type- general with ASA Monitors  Additional Monitors:   Airway Plan:     Comment: I, Dr Josue Desouza, the attending physician, have personally seen and evaluated the patient prior to anesthetic care  I have reviewed the pre-anesthetic record, and other medical records if appropriate to the anesthetic care  If a CRNA is involved in the case, I have reviewed the CRNA assessment, if present, and agree  The patient is in a suitable condition to proceed with my formulated anesthetic plan        Plan Factors-    Induction- intravenous  Postoperative Plan-     Informed Consent- Anesthetic plan and risks discussed with patient  I personally reviewed this patient with the CRNA  Discussed and agreed on the Anesthesia Plan with the CRNA  Christi Nichole

## 2018-09-11 NOTE — ANESTHESIA POSTPROCEDURE EVALUATION
Post-Op Assessment Note      CV Status:  Stable    Mental Status:  Alert and awake    Hydration Status:  Euvolemic    PONV Controlled:  Controlled    Airway Patency:  Patent    Post Op Vitals Reviewed: Yes          Staff: CRNA           BP  99/54   Temp   97   Pulse  56   Resp   15   SpO2   100

## 2018-09-11 NOTE — H&P (VIEW-ONLY)
Surgical Oncology Follow Up       [address]  [facility] [address]  1955  [id number]  [address] [facility]  [facility] SURGICAL ONCOL[facility] [address] [address]    Chief Complaint   Patient presents with    Follow-up     New diagnosis of DCIS    Breast Cancer          Assessment & Plan:     Patient has a new diagnosis of right breast ductal carcinoma in situ  The patient has a personal history of ovarian cancer  She was BRCA negative however she was recently retested and was found to have 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  She subsequently had an MRI which demonstrated a area of enhancement in the retroareolar region approximately 3-5 cm deep  This was ultimately identified under ultrasound and biopsied and shown to be grade 2 ductal carcinoma in situ ER negative SC negative  We talked about mastectomy versus breast conservation therapy  The patient prefers breast conservation therapy at this point in time  We subsequent to the process of informed consent for right breast needle localization lumpectomy  All questions were answered the patient's satisfaction  Patient understands that we could potentially identified invasive cancer and would return to the operating room for a sentinel lymph node biopsy  We talked about radiation therapy, links and durations  Cancer History:        Ovarian cancer [facility])     Initial Diagnosis     Ovarian cancer ([facility] [address] )         4/21/2009 Surgery     Exploratory laparotomy, total abdominal hysterectomy, bilateral salpingo-oophrectomy, lymph node dissection, omentectomy with staging          - 7/8/2009 Chemotherapy     Intraperitoneal along with intravenous chemotherapy on a early ovarian cancer protocol           5/31/2018 Genetic Testing     Genetic testing results are 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    Genetic testing indicated that the patient carries a Variant of Uncertain Significance (VUS) : Rad51C c 252G>T           Ductal carcinoma in situ (DCIS) of right breast    5/31/2018 Genetic Testing     Genetic testing results are 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      Genetic testing indicated that the patient carries a Variant of Uncertain Significance (VUS) : Rad51C c 252G>T         8/1/2018 Biopsy     Right breast biopsy  11 o'clock position 5 cmfn  DCIS  Grade 2  Confirmed by Marilu Eddy MD  ER 0  IN 0              Interval History:   See above    Review of Systems:   Review of Systems   All other systems reviewed and are negative  Past Medical History     Patient Active Problem List   Diagnosis    Benign essential hypertension    Colon, diverticulosis    Crohn's disease ([address] )    Dense breasts    Dermatitis    Erythema chronica migrans, secondary    Headache    Hyperlipidemia    Lymphedema    Murmur    Osteopenia    Osteoporosis    Ovarian cancer ([facility] [address] )    Reactive airway disease    Shortness of breath on exertion    Lymphedema of left lower extremity    Genetic predisposition to breast cancer    Abnormal MRI, breast    Ductal carcinoma in situ (DCIS) of right breast     Past Medical History:   Diagnosis Date    Dense breast     Ovarian cancer (HCC)      Past Surgical History:   Procedure Laterality Date    EXPLORATORY LAPAROTOMY      HYSTERECTOMY      OMENTECTOMY      TOTAL ABDOMINAL HYSTERECTOMY W/ BILATERAL SALPINGOOPHORECTOMY       Family History   Problem Relation Age of Onset    Prostate cancer Father     Ovarian cancer Sister     Breast cancer Paternal Grandmother      Social History     Social History    Marital status: Single     Spouse name: N/A    Number of children: N/A    Years of education: N/A     Occupational History    Not on file       Social History Main Topics    Smoking status: Former Smoker     Quit date: 4/1/2009    Smokeless tobacco: Never Used    Alcohol use No    Drug use: No    Sexual activity: Not on file     Other Topics Concern    Not on file     Social History Narrative    No narrative on file       Current Outpatient Prescriptions:     amLODIPine (NORVASC) 5 mg tablet, Take 1 tablet (5 mg total) by mouth daily, Disp: 30 tablet, Rfl: 5    atorvastatin (LIPITOR) 40 mg tablet, , Disp: , Rfl: 5    Calcium Carbonate (CALTRATE 600) 1500 (600 Ca) MG TABS, Take 1 tablet by mouth 2 (two) times a day, Disp: , Rfl:     hydrochlorothiazide (MICROZIDE) 12 5 mg capsule, Take 1 capsule (12 5 mg total) by mouth every morning, Disp: 30 capsule, Rfl: 5    inFLIXimab (REMICADE) 100 mg, Infuse into a venous catheter, Disp: , Rfl:     ibandronate (BONIVA) 150 MG tablet, Take by mouth, Disp: , Rfl:   No Known Allergies    Physical Exam:     Vitals:    08/16/18 1241   BP: 142/72   Pulse: 68   Resp: 16   Temp: 98 1 °F (36 7 °C)     Physical Exam   Constitutional: She is oriented to person, place, and time  She appears well-developed and well-nourished  HENT:   Head: Normocephalic and atraumatic  Mouth/Throat: Oropharynx is clear and moist    Eyes: EOM are normal  Pupils are equal, round, and reactive to light  Neck: Normal range of motion  Neck supple  No JVD present  No tracheal deviation present  No thyromegaly present  Cardiovascular: Normal rate, regular rhythm, normal heart sounds and intact distal pulses  Exam reveals no gallop and no friction rub  No murmur heard  Pulmonary/Chest: Effort normal and breath sounds normal  No respiratory distress  She has no wheezes  She has no rales  Examination the right breast demonstrates minimal ecchymosis there is a lateral biopsy incision  I cannot appreciate a dominant mass in the location of the tumor  There is no axillary adenopathy  There are no worrisome skin findings and no nipple discharge      Examination of the left breast in both the sitting and supine position demonstrate no skin changes nipple discharge dominant masses or axillary adenopathy  Abdominal: Soft  She exhibits no distension and no mass  There is no hepatomegaly  There is no tenderness  There is no rebound and no guarding  Musculoskeletal: Normal range of motion  She exhibits no edema or tenderness  Lymphadenopathy:     She has no cervical adenopathy  Neurological: She is alert and oriented to person, place, and time  No cranial nerve deficit  Skin: Skin is warm and dry  No rash noted  No erythema  Psychiatric: She has a normal mood and affect  Her behavior is normal    Vitals reviewed  Results:   I reviewed her MRI as well as her ultrasounds and mammograms with Dr dalal dual   There was some discrepancy between the location on the report from the MRI stating approximately 1 5 cm deep where is the actual enhancement is mid breast and this is consistent with her ultrasound findings and clip placement and pathology  Advance Care Planning/Advance Directives:  I discussed the disease status, treatment plans and follow-up with the patient

## 2018-09-11 NOTE — PROGRESS NOTES
Patient complaining of feeling as if something is "in my left eye "  Dr Shari Mcdermott notified  Flourosine and black light obtained

## 2018-09-13 ENCOUNTER — TELEPHONE (OUTPATIENT)
Dept: SURGICAL ONCOLOGY | Facility: CLINIC | Age: 63
End: 2018-09-13

## 2018-09-21 ENCOUNTER — TELEPHONE (OUTPATIENT)
Dept: FAMILY MEDICINE CLINIC | Facility: CLINIC | Age: 63
End: 2018-09-21

## 2018-09-21 NOTE — TELEPHONE ENCOUNTER
Please write a rx for Compression stockings, 3 pair, thigh high, 22/30 compression        Fax to [facility] [address]    The phone # [phone]

## 2018-09-24 DIAGNOSIS — C56.9 MALIGNANT NEOPLASM OF OVARY, UNSPECIFIED LATERALITY (HCC): ICD-10-CM

## 2018-09-24 DIAGNOSIS — I89.0 LYMPHEDEMA OF LEFT LOWER EXTREMITY: Primary | ICD-10-CM

## 2018-09-26 ENCOUNTER — OFFICE VISIT (OUTPATIENT)
Dept: SURGICAL ONCOLOGY | Facility: CLINIC | Age: 63
End: 2018-09-26

## 2018-09-26 VITALS
HEART RATE: 62 BPM | RESPIRATION RATE: 14 BRPM | WEIGHT: 107 LBS | SYSTOLIC BLOOD PRESSURE: 132 MMHG | DIASTOLIC BLOOD PRESSURE: 74 MMHG | TEMPERATURE: 97.8 F | HEIGHT: 60 IN | BODY MASS INDEX: 21.01 KG/M2

## 2018-09-26 DIAGNOSIS — D05.11 DUCTAL CARCINOMA IN SITU (DCIS) OF RIGHT BREAST: Primary | ICD-10-CM

## 2018-09-26 PROCEDURE — 99024 POSTOP FOLLOW-UP VISIT: CPT | Performed by: SURGERY

## 2018-09-26 NOTE — LETTER
September 26, 2018     [address] [place] [address]    Patient: Jose Morris   YOB: 1955   Date of Visit: 9/26/2018       Dear Dr Singh Flair: Thank you for referring Disha Frias to me for evaluation  Below are my notes for this consultation  If you have questions, please do not hesitate to call me  I look forward to following your patient along with you  Sincerely,        Deb Colon MD        CC: No Recipients  eDb Colon MD  9/26/2018  9:56 AM  Sign at close encounter     Surgical Oncology Breast Post-Op       [address] [facility]  1955  [id number]  [address]  [facility] [address] [address]    Chief Complaint:   Landy Nina is seen for a post-operative visit of her recent right breast surgery  Oncology History:        Ovarian cancer [facility])     Initial Diagnosis     Ovarian cancer ([facility] [address] )         4/21/2009 Surgery     Exploratory laparotomy, total abdominal hysterectomy, bilateral salpingo-oophrectomy, lymph node dissection, omentectomy with staging          - 7/8/2009 Chemotherapy     Intraperitoneal along with intravenous chemotherapy on a early ovarian cancer protocol           5/31/2018 Genetic Testing     Genetic testing results are 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      Genetic testing indicated that the patient carries a Variant of Uncertain Significance (VUS) : Rad51C c 252G>T           Ductal carcinoma in situ (DCIS) of right breast    5/31/2018 Genetic Testing     Genetic testing results are 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      Genetic testing indicated that the patient carries a Variant of Uncertain Significance (VUS) : Rad51C c 252G>T         8/1/2018 Biopsy     Right breast biopsy  11 o'clock position 5 cmfn  DCIS  Grade 2  Confirmed by Amadeo Baca MD  ER 0  [place] 0         9/11/2018 Surgery     Right lumpectomy  DCIS  Grade 2  1 4 cm  Margins negative  Stage 0            Assessment & Plan:   Assessment/Plan    Patient presents for postoperative visit  She has no complaints referable to her surgery  Her pathology demonstrated ductal carcinoma in situ only  Her margins are widely negative  She is interested in radiation therapy as well as at least a conversation with Medical Oncology regarding anti hormonal therapy even though her tumor was ER [place] negative  History of Present Illness:   See above    Interval History:   See above    Review of Systems:   Review of Systems    Past Medical History:     Patient Active Problem List   Diagnosis    Benign essential hypertension    Colon, diverticulosis    Crohn's disease ([facility] [address] )    Dense breasts    Dermatitis    Erythema chronica migrans, secondary    Headache    Hyperlipidemia    Lymphedema    Murmur    Osteopenia    Osteoporosis    Ovarian cancer ([facility] [address] )    Reactive airway disease    Shortness of breath on exertion    Lymphedema of left lower extremity    Genetic predisposition to breast cancer    Abnormal MRI, breast    Ductal carcinoma in situ (DCIS) of right breast     Past Medical History:   Diagnosis Date    Crohn disease ([facility] [address] )     Dense breast     History of transfusion 2009    Hyperlipidemia     Hypertension     Ovarian cancer [facility])      Past Surgical History:   Procedure Laterality Date    COLONOSCOPY      EXPLORATORY LAPAROTOMY      HYSTERECTOMY      MAMMO NEEDLE LOCALIZATION RIGHT (ALL INC) Right 9/11/2018    OMENTECTOMY      [place] PERQ DEVICE PLACEMT BREAST LOC 1ST LES W GUIDNCE Right 9/11/2018    Procedure: BREAST LUMPECTOMY; BREAST NEEDLE LOCALIZATION (NEEDLE LOC AT 1200);   Surgeon: Carol Ferreira MD;  [facility];  Service: Surgical Oncology    TOTAL ABDOMINAL HYSTERECTOMY W/ BILATERAL SALPINGOOPHORECTOMY      WISDOM TOOTH EXTRACTION Family History   Problem Relation Age of Onset    Prostate cancer Father     Ovarian cancer Sister     Breast cancer Paternal Grandmother      Social History     Social History    Marital status: Single     Spouse name: N/A    Number of children: N/A    Years of education: N/A     Occupational History    Not on file       Social History Main Topics    Smoking status: Former Smoker     Quit date: 4/1/2009    Smokeless tobacco: Never Used    Alcohol use Yes      Comment: rarely    Drug use: No    Sexual activity: Not on file     Other Topics Concern    Not on file     Social History Narrative    No narrative on file       Current Outpatient Prescriptions:     albuterol (PROVENTIL HFA,VENTOLIN HFA) 90 mcg/act inhaler, Inhale 2 puffs as needed for wheezing, Disp: , Rfl:     amLODIPine (NORVASC) 5 mg tablet, Take 1 tablet (5 mg total) by mouth daily, Disp: 30 tablet, Rfl: 5    Calcium Carbonate (CALTRATE 600) 1500 (600 Ca) MG TABS, Take 1 tablet by mouth 2 (two) times a day, Disp: , Rfl:     Elastic Bandages & Supports (MEDICAL COMPRESSION STOCKINGS) MISC, Use daily as directed, 3 pair, Disp: 3 each, Rfl: 0    hydrochlorothiazide (MICROZIDE) 12 5 mg capsule, Take 1 capsule (12 5 mg total) by mouth every morning, Disp: 30 capsule, Rfl: 5    inFLIXimab (REMICADE) 100 mg, Infuse into a venous catheter  , Disp: , Rfl:     multivitamin (THERAGRAN) TABS, Take 1 tablet by mouth daily, Disp: , Rfl:     atorvastatin (LIPITOR) 40 mg tablet, 40 mg daily at bedtime  , Disp: , Rfl: 5    oxyCODONE-acetaminophen (PERCOCET) 5-325 mg per tablet, Take 1 tablet by mouth every 6 (six) hours as needed for moderate pain Max Daily Amount: 4 tablets (Patient not taking: Reported on 9/26/2018 ), Disp: 6 tablet, Rfl: 0  No Known Allergies    Physical Exams:     Vitals:    09/26/18 0903   BP: 132/74   Pulse: 62   Resp: 14   Temp: 97 8 °F (36 6 °C)     Physical Exam   Pulmonary/Chest:   Examination of the right breast incision demonstrates a well-healed incision  Her Steri-Strips were removed  There is no evidence of infection  Results:       Labs:       Discussion/Summary:   The patient is doing well  She is well educated regarding her ductal carcinoma in situ  We will see her back in 3 months  We will coordinate her Medical Oncology and Radiation Oncology visits  All questions were answered the patient's satisfaction  I provided her a copy of her pathology report

## 2018-09-26 NOTE — PROGRESS NOTES
Surgical Oncology Breast Post-Op       [address] [facility]  [facility] SURGICAL ONCOLOGY [address]  1955  [id number]  [address] [facility],[address] [place] [address]  [facility]ANCER [facility] SURGICAL ONCOLOGY [place] [id number]    Chief Complaint:   Luis Zamudio is seen for a post-operative visit of her recent right breast surgery  Oncology History:        Ovarian cancer [facility])     Initial Diagnosis     Ovarian cancer ([facility] [address] )         4/21/2009 Surgery     Exploratory laparotomy, total abdominal hysterectomy, bilateral salpingo-oophrectomy, lymph node dissection, omentectomy with staging          - 7/8/2009 Chemotherapy     Intraperitoneal along with intravenous chemotherapy on a early ovarian cancer protocol  5/31/2018 Genetic Testing     Genetic testing results are 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      Genetic testing indicated that the patient carries a Variant of Uncertain Significance (VUS) : Rad51C c 252G>T           Ductal carcinoma in situ (DCIS) of right breast    5/31/2018 Genetic Testing     Genetic testing results are 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      Genetic testing indicated that the patient carries a Variant of Uncertain Significance (VUS) : Rad51C c 252G>T         8/1/2018 Biopsy     Right breast biopsy  11 o'clock position 5 cmfn  DCIS  Grade 2  Confirmed by Kinsey Celis MD  ER 0  ME 0         9/11/2018 Surgery     Right lumpectomy  DCIS  Grade 2  1 4 cm  Margins negative  Stage 0            Assessment & Plan:   Assessment/Plan    Patient presents for postoperative visit  She has no complaints referable to her surgery  Her pathology demonstrated ductal carcinoma in situ only  Her margins are widely negative   She is interested in radiation therapy as well as at least a conversation with Medical Oncology regarding anti hormonal therapy even though her tumor was ER [place] negative  History of Present Illness:   See above    Interval History:   See above    Review of Systems:   Review of Systems    Past Medical History:     Patient Active Problem List   Diagnosis    Benign essential hypertension    Colon, diverticulosis    [facility]    Dense breasts    Dermatitis    Erythema chronica migrans, secondary    Headache    Hyperlipidemia    Lymphedema    Murmur    Osteopenia    Osteoporosis    [facility]    Reactive airway disease    Shortness of breath on exertion    Lymphedema of left lower extremity    Genetic predisposition to breast cancer    Abnormal MRI, breast    Ductal carcinoma in situ (DCIS) of right breast     Past Medical History:   Diagnosis Date    [facility]     Dense breast     History of transfusion 2009    Hyperlipidemia     Hypertension     Ovarian [facility])      Past Surgical History:   Procedure Laterality Date    COLONOSCOPY      EXPLORATORY LAPAROTOMY      HYSTERECTOMY      MAMMO NEEDLE LOCALIZATION RIGHT (ALL INC) Right 9/11/2018    OMENTECTOMY      [place] PERQ DEVICE PLACEMT BREAST LOC 1ST LES W GUIDNCE Right 9/11/2018    Procedure: BREAST LUMPECTOMY; BREAST NEEDLE LOCALIZATION (NEEDLE LOC AT 1200); Surgeon: Benson Capone MD;  [facility];  Service: Surgical Oncology    TOTAL ABDOMINAL HYSTERECTOMY W/ BILATERAL SALPINGOOPHORECTOMY      WISDOM TOOTH EXTRACTION       Family History   Problem Relation Age of Onset    Prostate cancer Father     Ovarian cancer Sister     Breast cancer Paternal Grandmother      Social History     Social History    Marital status: Single     Spouse name: N/A    Number of children: N/A    Years of education: N/A     Occupational History    Not on file       Social History Main Topics    Smoking status: Former Smoker     Quit date: 4/1/2009    Smokeless tobacco: Never Used    Alcohol use Yes      Comment: rarely    Drug use: No    Sexual activity: Not on file Other Topics Concern    Not on file     Social History Narrative    No narrative on file       Current Outpatient Prescriptions:     albuterol (PROVENTIL HFA,VENTOLIN HFA) 90 mcg/act inhaler, Inhale 2 puffs as needed for wheezing, Disp: , Rfl:     amLODIPine (NORVASC) 5 mg tablet, Take 1 tablet (5 mg total) by mouth daily, Disp: 30 tablet, Rfl: 5    Calcium Carbonate (CALTRATE 600) 1500 (600 Ca) MG TABS, Take 1 tablet by mouth 2 (two) times a day, Disp: , Rfl:     Elastic Bandages & Supports (MEDICAL COMPRESSION STOCKINGS) MISC, Use daily as directed, 3 pair, Disp: 3 each, Rfl: 0    hydrochlorothiazide (MICROZIDE) 12 5 mg capsule, Take 1 capsule (12 5 mg total) by mouth every morning, Disp: 30 capsule, Rfl: 5    inFLIXimab (REMICADE) 100 mg, Infuse into a venous catheter  , Disp: , Rfl:     multivitamin (THERAGRAN) TABS, Take 1 tablet by mouth daily, Disp: , Rfl:     atorvastatin (LIPITOR) 40 mg tablet, 40 mg daily at bedtime  , Disp: , Rfl: 5    oxyCODONE-acetaminophen (PERCOCET) 5-325 mg per tablet, Take 1 tablet by mouth every 6 (six) hours as needed for moderate pain Max Daily Amount: 4 tablets (Patient not taking: Reported on 9/26/2018 ), Disp: 6 tablet, Rfl: 0  No Known Allergies    Physical Exams:     Vitals:    09/26/18 0903   BP: 132/74   Pulse: 62   Resp: 14   Temp: 97 8 °F (36 6 °C)     Physical Exam   Pulmonary/Chest:   Examination of the right breast incision demonstrates a well-healed incision  Her Steri-Strips were removed  There is no evidence of infection  Results:       Labs:       Discussion/Summary:   The patient is doing well  She is well educated regarding her ductal carcinoma in situ  We will see her back in 3 months  We will coordinate her Medical Oncology and Radiation Oncology visits  All questions were answered the patient's satisfaction  I provided her a copy of her pathology report

## 2018-10-02 ENCOUNTER — RADIATION ONCOLOGY CONSULT (OUTPATIENT)
Dept: RADIATION ONCOLOGY | Facility: HOSPITAL | Age: 63
End: 2018-10-02
Attending: RADIOLOGY
Payer: COMMERCIAL

## 2018-10-02 ENCOUNTER — CLINICAL SUPPORT (OUTPATIENT)
Dept: RADIATION ONCOLOGY | Facility: HOSPITAL | Age: 63
End: 2018-10-02
Payer: COMMERCIAL

## 2018-10-02 VITALS
WEIGHT: 112.2 LBS | BODY MASS INDEX: 21.18 KG/M2 | OXYGEN SATURATION: 98 % | RESPIRATION RATE: 16 BRPM | SYSTOLIC BLOOD PRESSURE: 134 MMHG | DIASTOLIC BLOOD PRESSURE: 60 MMHG | HEIGHT: 61 IN | TEMPERATURE: 98.3 F | HEART RATE: 76 BPM

## 2018-10-02 DIAGNOSIS — D05.11 DUCTAL CARCINOMA IN SITU (DCIS) OF RIGHT BREAST: Primary | ICD-10-CM

## 2018-10-02 DIAGNOSIS — C56.9 MALIGNANT NEOPLASM OF OVARY, UNSPECIFIED LATERALITY (HCC): ICD-10-CM

## 2018-10-02 PROCEDURE — 99215 OFFICE O/P EST HI 40 MIN: CPT | Performed by: RADIOLOGY

## 2018-10-02 NOTE — PROGRESS NOTES
Kimber Meek  1955  Ms Eliza Hernandez is a 66 y o  female     7/10/18 bilateral breast MRI was done due to strong family history of breast cancer and history of ovarian cancer  MRI revealed Suspicious microlobulated mass at the 12:00 location of the right breast     8/1/18 underwent US guided right breast biopsy with pathology positive for DCIS Grade 2      9/11/18 Underwent right lumpectomy with pathology positive for DCIS ER/MO-    9/26/18 Post-op with Dr Tinajero Records:   pathology demonstrated ductal carcinoma in situ only  Her margins are widely negative  She is interested in radiation therapy as well as at least a conversation with Medical Oncology regarding anti hormonal therapy even though her tumor was ER MO negative  10/16/18 Consult with Dr Vinod Stark  Chief Complaint   Patient presents with    Consult       Cancer Staging  Ductal carcinoma in situ (DCIS) of right breast  Staging form: Breast, AJCC 8th Edition  - Pathologic: Stage 0 (pTis (DCIS), pN0, cM0, ER: Negative, MO: Negative) - Unsigned    Ovarian cancer ([facility] )  Staging form: Ovary, AJCC 7th Edition  - Clinical: FIGO Stage IA (T1a, N0, M0) - Signed by Jj Shaver PA-C on 4/19/2018      Oncology History    7/10/18 bilateral breast MRI was done due to strong family history of breast cancer and history of ovarian cancer  MRI revealed Suspicious microlobulated mass at the 12:00 location of the right breast     8/1/18 underwent US guided right breast biopsy with pathology positive for DCIS Grade 2      9/11/18 Underwent right lumpectomy with pathology positive for DCIS ER/MO-    9/26/18 Post-op with Dr Tinajero Records:   pathology demonstrated ductal carcinoma in situ only  Her margins are widely negative  She is interested in radiation therapy as well as at least a conversation with Medical Oncology regarding anti hormonal therapy even though her tumor was ER MO negative  10/16/18 Consult with Dr Vinod Stark           Ovarian cancer ([facility] ) Initial Diagnosis     Ovarian cancer ([facility] )         4/21/2009 Surgery     Exploratory laparotomy, total abdominal hysterectomy, bilateral salpingo-oophrectomy, lymph node dissection, omentectomy with staging          - 7/8/2009 Chemotherapy     Intraperitoneal along with intravenous chemotherapy on a early ovarian cancer protocol  5/31/2018 Genetic Testing     Genetic testing results are 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      Genetic testing indicated that the patient carries a Variant of Uncertain Significance (VUS) : Rad51C c 252G>T           Ductal carcinoma in situ (DCIS) of right breast    5/31/2018 Genetic Testing     Genetic testing results are 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      Genetic testing indicated that the patient carries a Variant of Uncertain Significance (VUS) : Rad51C c 252G>T         8/1/2018 Biopsy     Right breast biopsy  11 o'clock position 5 cmfn  DCIS  Grade 2  Confirmed by Paul Roth MD  ER 0  MO 0         9/11/2018 Surgery     Right lumpectomy  DCIS  Grade 2  1 4 cm  Margins negative  Stage 0            Clinical Trial: no    Screening  Tobacco  Current tobacco user: no  If yes, brief counseling provided: NA    Hypertension  Hypertension screening performed: yes  Normotensive:  no  If no, referred to PCP: yes    Depression Screening  Screened for depression using PHQ-2: yes    Screened for depression using PHQ-9:  no  Screening positive or negative:  negative  If score >4, was any of the following actions taken? Additional evaluation for depression, suicide risk assesment, referral to PCP or psychiatry, medication started:  n/a    Advanced Care Planning for Patients >65 years  Advanced Care Planning Discussed:  n/a  Patient named surrogate decision maker or care plan in chart: n/a    OB/GYN History:  The patient underwent menarche at 17 years  Menopause Status Pre, Maribell, Unknown and No Answer  No LMP recorded   Patient has had a hysterectomy  Menopause at 53} years  Menopause Reason Hysterectomy  Hormone replacement therapy: no   Years used   0   Para 0   Age at first delivery being   Nursing:   Birth control pills: no   Years used  Gyncological comment: Ovarian cancer, Hysterectomy   Health Maintenance   Topic Date Due    Depression Screening PHQ  1955    PAP SMEAR  1978    Pneumococcal PPSV23 Highest Risk Adult (2 of 3 - PPSV23) 2017    INFLUENZA VACCINE  2018    MAMMOGRAM  08/10/2019    CRC Screening: Colonoscopy  2023    DTaP,Tdap,and Td Vaccines (3 - Td) 2026       Patient Active Problem List   Diagnosis    Benign essential hypertension    Colon, diverticulosis    Cro[facility]'s disease ([facility]    Dense breasts    Dermatitis    Erythema chronica migrans, secondary    Headache    Hyperlipidemia    Lymphedema    Murmur    Osteopenia    Osteoporosis    Ovarian cancer ([facility]    Reactive airway disease    Shortness of breath on exertion    Lymphedema of left lower extremity    Genetic predisposition to breast cancer    Abnormal MRI, breast    Ductal carcinoma in situ (DCIS) of right breast     Past Medical History:   Diagnosis Date    Breast cancer ([facility]     Crohn disease ([facility]     Dense breast     History of chemotherapy     History of transfusion     Hyperlipidemia     Hypertension     Ovarian cancer [facility])      Past Surgical History:   Procedure Laterality Date    BREAST LUMPECTOMY Right 2018    COLONOSCOPY      EXPLORATORY LAPAROTOMY      HYSTERECTOMY      MAMMO NEEDLE LOCALIZATION RIGHT ([employer]) Right 2018    OMENTECTOMY      RI PERQ DEVICE PLACEMT BREAST LOC 1ST LES W GUIDNCE Right 2018    Procedure: BREAST LUMPECTOMY; BREAST NEEDLE LOCALIZATION (NEEDLE LOC AT 1200);   Surgeon: Nereida Torres MD;  [facility];  Service: Surgical Oncology    TOTAL ABDOMINAL HYSTERECTOMY W/ BILATERAL SALPINGOOPHORECTOMY      WISDOM TOOTH EXTRACTION       Family History   Problem Relation Age of Onset    Prostate cancer Father     Ovarian cancer Sister     Breast cancer Paternal Grandmother      Social History     Social History    Marital status: Single     Spouse name: N/A    Number of children: N/A    Years of education: N/A     Occupational History    Not on file  Social History Main Topics    Smoking status: Former Smoker     Quit date: 4/1/2009    Smokeless tobacco: Never Used    Alcohol use Yes      Comment: rarely    Drug use: No    Sexual activity: Not on file     Other Topics Concern    Not on file     Social History Narrative    No narrative on file       Current Outpatient Prescriptions:     albuterol (PROVENTIL HFA,VENTOLIN HFA) 90 mcg/act inhaler, Inhale 2 puffs as needed for wheezing, Disp: , Rfl:     amLODIPine (NORVASC) 5 mg tablet, Take 1 tablet (5 mg total) by mouth daily, Disp: 30 tablet, Rfl: 5    atorvastatin (LIPITOR) 40 mg tablet, 40 mg daily at bedtime  , Disp: , Rfl: 5    Calcium Carbonate (CALTRATE 600) 1500 (600 Ca) MG TABS, Take 1 tablet by mouth 2 (two) times a day, Disp: , Rfl:     Elastic Bandages & Supports (MEDICAL COMPRESSION STOCKINGS) MISC, Use daily as directed, 3 pair, Disp: 3 each, Rfl: 0    hydrochlorothiazide (MICROZIDE) 12 5 mg capsule, Take 1 capsule (12 5 mg total) by mouth every morning, Disp: 30 capsule, Rfl: 5    inFLIXimab (REMICADE) 100 mg, Infuse into a venous catheter  , Disp: , Rfl:     multivitamin (THERAGRAN) TABS, Take 1 tablet by mouth daily  , Disp: , Rfl:     No Known Allergies    Review of Systems:  Review of Systems   Constitutional: Positive for fatigue (5/10 )  HENT: Negative for mouth sores  Sores in her nose and on her scalp  Eyes: Positive for pain (Dry eyes  )  Respiratory: Negative  Cardiovascular: Positive for leg swelling (left leg lymphedema  Wears compression stocking    At night she wears a solaris  )     Gastrointestinal: Positive for constipation and diarrhea  Endocrine: Negative  Genitourinary:        Nocturia 1-2 times  Musculoskeletal:        Joint pain  Skin: Negative  Allergic/Immunologic: Negative  Neurological: Positive for headaches (occasional )  Hematological: Bruises/bleeds easily  Psychiatric/Behavioral: Negative  Vitals:    10/02/18 1341   BP: 134/60   BP Location: Left arm   Patient Position: Sitting   Cuff Size: Standard   Pulse: 76   Resp: 16   Temp: 98 3 °F (36 8 °C)   TempSrc: Temporal   SpO2: 98%   Weight: 50 9 kg (112 lb 3 2 oz)   Height: 5' 1" (1 549 m)       Pain Score: 0-No pain    Imaging:Mammo Needle Localization Right ([employer])    Result Date: 9/11/2018  Narrative: Mammo Needle Localization Right ([employer]): September 11, 2018 - Check In #: [de-identified] CC and ML view(s) were taken of the right breast  Technologist: JOSE ANTONIO Atkins (R)(M) Patient was prepped in standard sterile fashion  Under mammographic guidance, a 5 cm localization needle was advanced via a right lateral approach  Appropriate positioning was confirmed via mammography  Localization wire was subsequently deployed  Patient was sent to the operating room in good condition  Subsequent specimen radiographs demonstrate presence of target clip  Transcription Location: [address] [place] [address] [place]: [id number]      Teaching: NCI support packet, Skin, Fatigue, Eating habits

## 2018-10-02 NOTE — PROGRESS NOTES
Consultation - Radiation Oncology     GAP:[id number] : 1955  Encounter: [id number]  Patient Information: [address]  Chief Complaint   Patient presents with    Consult     Cancer Staging  Ductal carcinoma in situ (DCIS) of right breast  Staging form: Breast, AJCC 8th Edition  - Pathologic: Stage 0 (pTis (DCIS), pN0, cM0, ER: Negative, VT: Negative) - Unsigned    Ovarian cancer ([facility] )  Staging form: Ovary, AJCC 7th Edition  - Clinical: FIGO Stage IA (T1a, N0, M0) - Signed by Tamanna Ching PA-C on 2018           History of Present Illness   Ghada Ramey is a 66y o  year old female who presents with a right breast ductal carcinoma in situ  She has been going for annual mammograms that have all been negative  Her most recent negative mammogram was 2017      7/10/18 bilateral breast MRI was done due to strong family history of breast cancer and history of ovarian cancer  MRI revealed Suspicious microlobulated mass at the 12:00 location of the right breast      18 underwent US guided right breast biopsy with pathology positive for DCIS Grade 2       18 Underwent right lumpectomy with pathology positive for DCIS ER/VT-     18 Post-op with Dr Sb Marinelli:   pathology demonstrated ductal carcinoma in situ only   Her margins are widely negative  She is interested in radiation therapy as well as at least a conversation with Medical Oncology regarding anti hormonal therapy even though her tumor was ER VT negative  She has a history of a stage IA ovarian carcinoma treated in  with surgery with Dr Juan Leavitt followed by chemotherapy  She developed some chronic left lower extremity lymphedema in  and wears a compressive sleeve and also has a lymphedema pump at home  Her sister was diagnosed with ovarian carcinoma in  and developed recurrent disease in 2018 be treated with chemotherapy      She has a history of Crohn's disease since  and has been getting IV Remicade infusions every 8 weeks over the last 16 years to control her disease  She gets home infusions with a visiting nurse  Her mother  in 2018 of heart disease at the age of 80  She had no history of any breast cancer  Her paternal grandmother had breast carcinoma  Her father is alive at the age of 80 and has dementia as well as prostate carcinoma that is being observed  He lives with the patient  Patient has a younger sister with Jonetta Both syndrome and her sister as well as her father came to live with the patient in 2018  She is the oldest sibling and has 5 sisters  One sister has ovarian cancer as outlined above in the other sister has a history of a melanoma  Her other sisters are healthy and without cancer  She also has 1 brother who is healthy  She is self-employed and has her own  service and Pet/dog walking service  She is retired from previously working as an  and is now self-employed  She is single and has no children  10/16/18 Consult with Dr Salome Cruz  Historical Information      Ovarian cancer [facility])     Initial Diagnosis     Ovarian cancer ([facility] [address] )         2009 Surgery     Exploratory laparotomy, total abdominal hysterectomy, bilateral salpingo-oophrectomy, lymph node dissection, omentectomy with staging          - 2009 Chemotherapy     Intraperitoneal along with intravenous chemotherapy on a early ovarian cancer protocol           2018 Genetic Testing     Genetic testing results are 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      Genetic testing indicated that the patient carries a Variant of Uncertain Significance (VUS) : Rad51C c 252G>T           Ductal carcinoma in situ (DCIS) of right breast    2018 Genetic Testing     Genetic testing results are 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      Genetic testing indicated that the patient carries a Variant of Uncertain Significance (VUS) : Rad51C c 252G>T         2018 Biopsy     Right breast biopsy  11 o'clock position 5 cmfn  DCIS  Grade 2  Confirmed by Sekou Gardiner MD  ER 0  MA 0         2018 Surgery     Right lumpectomy  DCIS  Grade 2  1 4 cm  Margins negative  Stage 0          Clinical Trial: no     Screening  Tobacco  Current tobacco user: no  If yes, brief counseling provided: NA     Hypertension  Hypertension screening performed: yes  Normotensive:  no  If no, referred to PCP: yes     Depression Screening  Screened for depression using PHQ-2: yes     Screened for depression using PHQ-9:  no  Screening positive or negative:  negative  If score >4, was any of the following actions taken? Additional evaluation for depression, suicide risk assesment, referral to PCP or psychiatry, medication started:  n/a     Advanced Care Planning for Patients >65 years  Advanced Care Planning Discussed:  n/a  Patient named surrogate decision maker or care plan in chart: n/a     OB/GYN History:  The patient underwent menarche at 17 years  Menopause Status Pre, Maribell, Unknown and No Answer  No LMP recorded  Patient has had a hysterectomy  Menopause at 53} years  Menopause Reason Hysterectomy  Hormone replacement therapy: no   Years used   0   Para 0   Age at first delivery being   Nursing:   Birth control pills: no   Years used  Gyncological comment: Ovarian cancer, Hysterectomy       Past Medical History:   Diagnosis Date    Breast cancer [facility] )     Crohn disease ([facility] )     Dense breast     History of chemotherapy     History of transfusion     Hyperlipidemia     Hypertension     Ovarian cancer [facility])      Past Surgical History:   Procedure Laterality Date    BREAST LUMPECTOMY Right 2018    COLONOSCOPY      EXPLORATORY LAPAROTOMY      HYSTERECTOMY      MAMMO NEEDLE LOCALIZATION RIGHT (ALL INC) Right 2018    OMENTECTOMY      MA PERQ DEVICE PLACEMT BREAST LOC 1ST LES W GUIDNCE Right 2018    Procedure: BREAST LUMPECTOMY; BREAST NEEDLE LOCALIZATION (NEEDLE LOC AT 1200); Surgeon: Conner Givens MD;  [facility];  Service: Surgical Oncology    TOTAL ABDOMINAL HYSTERECTOMY W/ BILATERAL SALPINGOOPHORECTOMY      WISDOM TOOTH EXTRACTION         Family History   Problem Relation Age of Onset    Prostate cancer Father     Ovarian cancer Sister     Breast cancer Paternal Grandmother        Social History   History   Alcohol Use    Yes     Comment: rarely     History   Drug Use No     History   Smoking Status    Former Smoker    Quit date: 4/1/2009   Smokeless Tobacco    Never Used     Meds/Allergies     Current Outpatient Prescriptions:     albuterol (PROVENTIL HFA,VENTOLIN HFA) 90 mcg/act inhaler, Inhale 2 puffs as needed for wheezing, Disp: , Rfl:     amLODIPine (NORVASC) 5 mg tablet, Take 1 tablet (5 mg total) by mouth daily, Disp: 30 tablet, Rfl: 5    atorvastatin (LIPITOR) 40 mg tablet, 40 mg daily at bedtime  , Disp: , Rfl: 5    Calcium Carbonate (CALTRATE 600) 1500 (600 Ca) MG TABS, Take 1 tablet by mouth 2 (two) times a day, Disp: , Rfl:     Elastic Bandages & Supports (MEDICAL COMPRESSION STOCKINGS) MISC, Use daily as directed, 3 pair, Disp: 3 each, Rfl: 0    hydrochlorothiazide (MICROZIDE) 12 5 mg capsule, Take 1 capsule (12 5 mg total) by mouth every morning, Disp: 30 capsule, Rfl: 5    inFLIXimab (REMICADE) 100 mg, Infuse into a venous catheter  , Disp: , Rfl:     multivitamin (THERAGRAN) TABS, Take 1 tablet by mouth daily  , Disp: , Rfl:   No Known Allergies    Review of Systems  Constitutional: Positive for fatigue (5/10 )  HENT: Negative for mouth sores  Sores in her nose and on her scalp  Eyes: Positive for pain (Dry eyes  )  Respiratory: Negative  Cardiovascular: Positive for leg swelling (left leg lymphedema  Wears compression stocking    At night she wears a solaris  )  Gastrointestinal: Positive for constipation and diarrhea  Endocrine: Negative  Genitourinary:        Nocturia 1-2 times  Musculoskeletal:        Joint pain  Skin: Negative  Allergic/Immunologic: Negative  Neurological: Positive for headaches (occasional )  Hematological: Bruises/bleeds easily  Psychiatric/Behavioral: Negative  OBJECTIVE:   /60 (BP Location: Left arm, Patient Position: Sitting, Cuff Size: Standard)   Pulse 76   Temp 98 3 °F (36 8 °C) (Temporal)   Resp 16   Ht 5' 1" (1 549 m)   Wt 50 9 kg (112 lb 3 2 oz)   SpO2 98%   BMI 21 20 kg/m²   Pain Assessment:  0  Performance Status: ECOG/Zubrod/WHO: 1 - Symptomatic but completely ambulatory    Physical Exam   Constitutional: She is oriented to person, place, and time  She appears well-developed and well-nourished  No distress  HENT:   Head: Normocephalic and atraumatic  Mouth/Throat: No oropharyngeal exudate  Eyes: Pupils are equal, round, and reactive to light  Conjunctivae and EOM are normal  No scleral icterus  Neck: Normal range of motion  Neck supple  No tracheal deviation present  No thyromegaly present  Cardiovascular: Normal rate, regular rhythm and normal heart sounds  Pulmonary/Chest: Effort normal and breath sounds normal  No respiratory distress  She has no wheezes  She has no rales  She exhibits no tenderness  Right breast exhibits no inverted nipple, no mass, no nipple discharge, no skin change ( There is a well-healed left breast lumpectomy incision without any underlying masses ) and no tenderness  Left breast exhibits no inverted nipple, no mass, no nipple discharge, no skin change and no tenderness  Abdominal: Soft  Bowel sounds are normal  She exhibits no distension and no mass  There is no tenderness  Musculoskeletal: Normal range of motion  She exhibits no edema or tenderness  Left lower extremity lymphedema with her wearing a compressive sleeve  Lymphadenopathy:     She has no cervical adenopathy  She has no axillary adenopathy          Right: No supraclavicular adenopathy present  Left: No supraclavicular adenopathy present  Neurological: She is alert and oriented to person, place, and time  No cranial nerve deficit  Coordination normal    Skin: Skin is warm and dry  No rash noted  She is not diaphoretic  No erythema  No pallor  Psychiatric: She has a normal mood and affect  Her behavior is normal  Judgment and thought content normal    Nursing note and vitals reviewed  RESULTS  Lab Results    Chemistry        Component Value Date/Time     08/28/2018 1005     12/14/2017 0000    K 3 6 08/28/2018 1005    K 4 4 12/14/2017 0000     08/28/2018 1005    CL 99 12/14/2017 0000    CO2 31 08/28/2018 1005    CO2 26 12/14/2017 0000    BUN 23 08/28/2018 1005    BUN 21 12/14/2017 0000    CREATININE 0 92 08/28/2018 1005    CREATININE 0 76 12/14/2017 0000        Component Value Date/Time    CALCIUM 9 4 08/28/2018 1005    CALCIUM 9 2 12/14/2017 0000    ALKPHOS 77 08/28/2018 1005    ALKPHOS 57 12/14/2017 0000    AST 23 08/28/2018 1005    AST 27 12/14/2017 0000    ALT 25 08/28/2018 1005    ALT 23 12/14/2017 0000    BILITOT 0 4 12/14/2017 0000            Lab Results   Component Value Date    WBC 6 20 08/28/2018    HGB 13 8 08/28/2018    HCT 42 7 08/28/2018    MCV 87 08/28/2018     08/28/2018     Imaging Studies  Mammo Needle Localization Right ([employer])    Result Date: 9/11/2018  Narrative: Mammo Needle Localization Right [employer]): September 11, 2018 - Check In #: [de-identified] CC and ML view(s) were taken of the right breast  Technologist: JOSE ANTONIO Hawkins (JOSE ANTONIO)(M) Patient was prepped in standard sterile fashion  Under mammographic guidance, a 5 cm localization needle was advanced via a right lateral approach  Appropriate positioning was confirmed via mammography  Localization wire was subsequently deployed  Patient was sent to the operating room in good condition  Subsequent specimen radiographs demonstrate presence of target clip  Transcription Location: [address] [place] [address]: [id number]    Pathology: See Above    ASSESSMENT  1  Ductal carcinoma in situ (DCIS) of right breast  Radiation Simulation Treatment   2  Malignant neoplasm of ovary, unspecified laterality ([facility] [address] )       Cancer Staging  Ductal carcinoma in situ (DCIS) of right breast  Staging form: Breast, AJCC 8th Edition  - Pathologic: Stage 0 (pTis (DCIS), pN0, cM0, ER: Negative, MI: Negative) - Unsigned    Ovarian cancer ([facility] [address] [address] )  Staging form: Ovary, AJCC 7th Edition  - Clinical: FIGO Stage IA (T1a, N0, M0) - Signed by Flakito Mendes PA-C on 4/19/2018        PLAN/DISCUSSION  Orders Placed This Encounter   Procedures   [address] [place] [address] Gisell is a 66y o  year old female with a stage 0 right breast ductal carcinoma in situ status post lumpectomy with negative margins with her tumor measuring 1 4 cm, grade 2 and estrogen and progesterone receptors were both negative  She has a history of a stage I ovarian carcinoma treated in 2009 and remains EDER  Her genetic testing was positive for pathologic variant of uncertain significance  We discussed that she is at increased risk for locally recurrent disease which is lumpectomy alone  Postoperative radiation therapy will reduce her risk of recurrence which could be an invasive recurrence  Due to her prior history of ovarian carcinoma, her genetic testing of uncertain significance, as well as her being in good health for her age, she wants to pursue aggressive treatment to give her the best chance of preventing any recurrence  We discussed radiation therapy to the right breast using a conventional 6 week course of radiation therapy as well as a hypo- fractionated course of treatment over 4 weeks  We discussed the acute side effects and the potential chronic complications of treatment  She is agreeable to proceeding with the treatment    She also has an appointment with Dr Ross Bernheim on October 16, 2018 to discuss anti hormonal therapy even though her tumor is hormone receptor negative  She will be scheduled for simulation and subsequent treatment  Aicha John MD  10/2/2018,3:18 PM      Portions of the record may have been created with voice recognition software   Occasional wrong word or "sound a like" substitutions may have occurred due to the inherent limitations of voice recognition software   Read the chart carefully and recognize, using context, where substitutions have occurred

## 2018-10-09 ENCOUNTER — APPOINTMENT (OUTPATIENT)
Dept: RADIATION ONCOLOGY | Facility: HOSPITAL | Age: 63
End: 2018-10-09
Attending: RADIOLOGY
Payer: COMMERCIAL

## 2018-10-09 PROCEDURE — 77332 RADIATION TREATMENT AID(S): CPT | Performed by: RADIOLOGY

## 2018-10-09 PROCEDURE — 77290 THER RAD SIMULAJ FIELD CPLX: CPT | Performed by: RADIOLOGY

## 2018-10-16 ENCOUNTER — DOCUMENTATION (OUTPATIENT)
Dept: RADIATION ONCOLOGY | Facility: HOSPITAL | Age: 63
End: 2018-10-16

## 2018-10-16 ENCOUNTER — OFFICE VISIT (OUTPATIENT)
Dept: HEMATOLOGY ONCOLOGY | Facility: CLINIC | Age: 63
End: 2018-10-16
Payer: COMMERCIAL

## 2018-10-16 VITALS
OXYGEN SATURATION: 92 % | HEART RATE: 78 BPM | TEMPERATURE: 96.6 F | SYSTOLIC BLOOD PRESSURE: 138 MMHG | DIASTOLIC BLOOD PRESSURE: 94 MMHG | HEIGHT: 61 IN | WEIGHT: 113 LBS | BODY MASS INDEX: 21.34 KG/M2 | RESPIRATION RATE: 16 BRPM

## 2018-10-16 DIAGNOSIS — D05.11 DUCTAL CARCINOMA IN SITU (DCIS) OF RIGHT BREAST: ICD-10-CM

## 2018-10-16 PROCEDURE — 99244 OFF/OP CNSLTJ NEW/EST MOD 40: CPT | Performed by: INTERNAL MEDICINE

## 2018-10-16 NOTE — PROGRESS NOTES
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

## 2018-10-16 NOTE — PROGRESS NOTES
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   She underwent lumpectomy, resulting in EDER  She presents today to discuss possible adjuvant treatment  We had extensive discussion regarding the diagnosis, non invasive nature of disease, non life-threatening disease, excellent prognosis and treatment options  Since she has DCIS with ER negative phenotype, adjuvant hormonal therapy is not indicated  I agree with adjuvant radiation therapy which she is going to proceed  She is in agreement with my recommendation  She is going to be followed by Dr Tulio Carlisle with at least annual mammography  She may see me p r n  basis  She is in agreement  Subjective:     HPI:  A 70-year-old postmenopausal woman who has history of stage I ovarian cancer, diagnosed in 2009  She underwent total abdominal hysterectomy and bilateral salpingo-oophorectomy, resulting in EDER followed by 6 cycle of adjuvant chemotherapy with carboplatin and paclitaxel, resulting in cure  She was tested for BRCA gene mutation in 2009 which was negative for deleterious mutation  Her sister was diagnosed with ovarian cancer at the age of 46  Therefore, she was retested for extended panel of genetic mutation which showed heterozygote YOLANDA mutation  Subsequently, she underwent MRI of the breast which showed abnormality in her right breast    Biopsy showed DCIS, ER negative, CO negative    Therefore, she underwent lumpectomy by Dr Tulio Carlisle in September 11, 2018  She had 1 4 x 1 3 x 1 2 cm of ductal carcinoma in situ, grade 2  There was no evidence of invasive carcinoma  She presents today to discuss possible adjuvant therapy  She is going to start adjuvant radiation therapy soon  She feels well  She has no complaint of pain  She has Crohn disease for which she is on Remicade  Her weight has been stable  She has normal performance status  Interval History:          Objective:     Primary Diagnosis:    1  History of stage I ovarian cancer, diagnosed in 2009  2    Heterozygote YOLANDA mutation  3    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 breast, grade 2, ERPR negative disease  Diagnosed in September 2018  Cancer Staging:  Cancer Staging  Ductal carcinoma in situ (DCIS) of right breast  Staging form: Breast, AJCC 8th Edition  - Pathologic: Stage 0 (pTis (DCIS), pN0, cM0, ER: Negative, CT: Negative) - Unsigned    Ovarian cancer ([facility] [address] )  Staging form: Ovary, AJCC 7th Edition  - Clinical: FIGO Stage IA (T1a, N0, M0) - Signed by Christine Shrestha PA-C on 4/19/2018        Previous Hematologic/ Oncologic Treatment:         Current Hematologic/ Oncologic Treatment:      Observation  Disease Status:     EDER status post lumpectomy  Test Results:    Pathology:    1 4 x 1 2 x 1 3 cm of ductal carcinoma in situ, grade 2  ER negative, CT negative disease  Radiology:    Chest x-ray was negative for pulmonary disease  Laboratory:    CBC and CMP are within normal limits  Physical Exam:      General Appearance:    Alert, oriented        Eyes:    PERRL   Ears:    Normal external ear canals, both ears   Nose:   Nares normal, septum midline   Throat:   Mucosa moist  Pharynx without injection      Neck:   Supple       Lungs:     Clear to auscultation bilaterally   Chest Wall:    No tenderness or deformity    Heart:    Regular rate and rhythm       Abdomen:     Soft, non-tender, bowel sounds +, no organomegaly           Extremities:   Extremities no cyanosis, left lower extremity lymphedema  Skin:   no rash or icterus  Lymph nodes:   Cervical, supraclavicular, and axillary nodes normal   Neurologic:   CNII-XII intact, normal strength, sensation and reflexes     Throughout          Breast exam:   Lumpectomy scar at outer upper quadrant of the right breast with no palpable abnormality  Left breast exam is negative  ROS: Review of Systems   All other systems reviewed and are negative  Imaging: No results found  Labs:   Lab Results   Component Value Date    WBC 6 20 08/28/2018    HGB 13 8 08/28/2018    HCT 42 7 08/28/2018    MCV 87 08/28/2018     08/28/2018     Lab Results   Component Value Date     08/28/2018    K 3 6 08/28/2018     08/28/2018    CO2 31 08/28/2018    BUN 23 08/28/2018    CREATININE 0 92 08/28/2018    GLUCOSE 90 12/14/2017    GLUF 92 08/28/2018    CALCIUM 9 4 08/28/2018    AST 23 08/28/2018    ALT 25 08/28/2018    ALKPHOS 77 08/28/2018    PROT 7 3 12/14/2017    BILITOT 0 4 12/14/2017    EGFR 68 08/28/2018         Lab Results   Component Value Date    IRON 65 08/28/2018    TIBC 336 08/28/2018       Lab Results   Component Value Date    [id number] 335 08/28/2018         Vital Sign:    Body surface area is 1 48 meters squared      Wt Readings from Last 3 Encounters:   10/16/18 51 3 kg (113 lb)   10/02/18 50 9 kg (112 lb 3 2 oz)   09/26/18 48 5 kg (107 lb)        Temp Readings from Last 3 Encounters:   10/16/18 (!) 96 6 °F (35 9 °C) (Tympanic)   10/02/18 98 3 °F (36 8 °C) (Temporal)   09/26/18 97 8 °F (36 6 °C)        BP Readings from Last 3 Encounters:   10/16/18 138/94   10/02/18 134/60   09/26/18 132/74         Pulse Readings from Last 3 Encounters:   10/16/18 78   10/02/18 76   09/26/18 62     @LASTSAO2(3)@    Active Problems:   Patient Active Problem List   Diagnosis    Benign essential hypertension    Colon, diverticulosis    Crohn's disease ([address] )    Dense breasts    Dermatitis    Erythema chronica migrans, secondary    Headache    Hyperlipidemia    Lymphedema    Murmur    Osteopenia    Osteoporosis    Ovarian cancer ([facility] [address] )    Reactive airway disease    Shortness of breath on exertion    Lymphedema of left lower extremity    Genetic predisposition to breast cancer    Abnormal MRI, breast    Ductal carcinoma in situ (DCIS) of right breast       Past Medical History:   Past Medical History:   Diagnosis Date    Breast cancer ([facility] [address] )     Crohn disease ([facility] [address] )     Dense breast     History of chemotherapy     History of transfusion 2009    Hyperlipidemia     Hypertension     Ovarian cancer [facility])        Surgical History:   Past Surgical History:   Procedure Laterality Date    BREAST LUMPECTOMY Right 09/11/2018    COLONOSCOPY      EXPLORATORY LAPAROTOMY      HYSTERECTOMY      MAMMO NEEDLE LOCALIZATION RIGHT (ALL INC) Right 9/11/2018    OMENTECTOMY      [place] PERQ DEVICE PLACEMT BREAST LOC 1ST LES W GUIDNCE Right 9/11/2018    Procedure: BREAST LUMPECTOMY; BREAST NEEDLE LOCALIZATION (NEEDLE LOC AT 1200); Surgeon: Brian Dia MD;  [facility];  Service: Surgical Oncology    TOTAL ABDOMINAL HYSTERECTOMY W/ BILATERAL SALPINGOOPHORECTOMY      WISDOM TOOTH EXTRACTION         Family History:    Family History   Problem Relation Age of Onset    Prostate cancer Father     Ovarian cancer Sister     Breast cancer Paternal Grandmother        Cancer-related family history includes Breast cancer in her paternal grandmother; Ovarian cancer in her sister; Prostate cancer in her father  Social History:   Social History     Social History    Marital status: Single     Spouse name: N/A    Number of children: N/A    Years of education: N/A     Occupational History    Not on file       Social History Main Topics    Smoking status: Former Smoker     Quit date: 4/1/2009    Smokeless tobacco: Never Used    Alcohol use Yes      Comment: rarely    Drug use: No    Sexual activity: Not on file Other Topics Concern    Not on file     Social History Narrative    No narrative on file       Current Medications:   Current Outpatient Prescriptions   Medication Sig Dispense Refill    albuterol (PROVENTIL HFA,VENTOLIN HFA) 90 mcg/act inhaler Inhale 2 puffs as needed for wheezing      amLODIPine (NORVASC) 5 mg tablet Take 1 tablet (5 mg total) by mouth daily 30 tablet 5    atorvastatin (LIPITOR) 40 mg tablet 40 mg daily at bedtime    5    Calcium Carbonate (CALTRATE 600) 1500 (600 Ca) MG TABS Take 1 tablet by mouth 2 (two) times a day      Elastic Bandages & Supports (MEDICAL COMPRESSION STOCKINGS) MISC Use daily as directed, 3 pair 3 each 0    hydrochlorothiazide (MICROZIDE) 12 5 mg capsule Take 1 capsule (12 5 mg total) by mouth every morning 30 capsule 5    inFLIXimab (REMICADE) 100 mg Infuse into a venous catheter         No current facility-administered medications for this visit          Allergies: No Known Allergies

## 2018-10-16 NOTE — LETTER
October 16, 2018     Augie Zamudio MD  [id number] [place] [id number]    Patient: Verónica Ray   YOB: 1955   Date of Visit: 10/16/2018       Dear Dr Александр Sparks:    Thank you for referring Meg Cabrera to me for evaluation  Below are my notes for this consultation  If you have questions, please do not hesitate to call me  I look forward to following your patient along with you  Sincerely,        Jeffy Garcia MD        CC: DO Radha Salinas MD Hoy Po, MD  10/16/2018 11:38 AM  Sign at close encounter  Hematology / Oncology Outpatient Consult Note    Verónica Ray 66 y o  female [id number]         Date:  10/16/2018    Assessment / Plan:  A 26-year-old postmenopausal woman who has history of stage I ovarian cancer in 2009, status post hysterectomy and bilateral salpingo-oophorectomy resulting in DEER followed by adjuvant chemotherapy with carboplatin and paclitaxel x6  She has no evidence recurrence of ovarian cancer to date  She has newly diagnosed ductal carcinoma in situ, grade 2, 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   She underwent lumpectomy, resulting in EDER  She presents today to discuss possible adjuvant treatment  We had extensive discussion regarding the diagnosis, non invasive nature of disease, non life-threatening disease, excellent prognosis and treatment options  Since she has DCIS with ER negative phenotype, adjuvant hormonal therapy is not indicated  I agree with adjuvant radiation therapy which she is going to proceed  She is in agreement with my recommendation  She is going to be followed by Dr Александр Sparks with at least annual mammography  She may see me p r n  basis  She is in agreement  Subjective:     HPI:  A 26-year-old postmenopausal woman who has history of stage I ovarian cancer, diagnosed in 2009    She underwent total abdominal hysterectomy and bilateral salpingo-oophorectomy, resulting in EDER followed by 6 cycle of adjuvant chemotherapy with carboplatin and paclitaxel, resulting in cure  She was tested for BRCA gene mutation in 2009 which was negative for deleterious mutation  Her sister was diagnosed with ovarian cancer at the age of 46  Therefore, she was retested for extended panel of genetic mutation which showed heterozygote YOLANDA mutation  Subsequently, she underwent MRI of the breast which showed abnormality in her right breast    Biopsy showed DCIS, ER negative, NC negative  Therefore, she underwent lumpectomy by Dr [place] in September 11, 2018  She had 1 4 x 1 3 x 1 2 cm of ductal carcinoma in situ, grade 2  There was no evidence of invasive carcinoma  She presents today to discuss possible adjuvant therapy  She is going to start adjuvant radiation therapy soon  She feels well  She has no complaint of pain  She has Crohn disease for which she is on Remicade  Her weight has been stable  She has normal performance status  Interval History:          Objective:     Primary Diagnosis:    1  History of stage I ovarian cancer, diagnosed in 2009  2    Heterozygote YOLANDA mutation  3    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 breast, grade 2, ERPR negative disease  Diagnosed in September 2018  Cancer Staging:  Cancer Staging  Ductal carcinoma in situ (DCIS) of right breast  Staging form: Breast, AJCC 8th Edition  - Pathologic: Stage 0 (pTis (DCIS), pN0, cM0, ER: Negative, NC: Negative) - Unsigned    Ovarian cancer ([facility] [address] )  Staging form: Ovary, AJCC 7th Edition  - Clinical: FIGO Stage IA (T1a, N0, M0) - Signed by Jw Luz PA-C on 4/19/2018        Previous Hematologic/ Oncologic Treatment:         Current Hematologic/ Oncologic Treatment:      Observation  Disease Status:     EDER status post lumpectomy  Test Results:    Pathology:    1 4 x 1 2 x 1 3 cm of ductal carcinoma in situ, grade 2  ER negative, NC negative disease      Radiology:    Chest x-ray was negative for pulmonary disease  Laboratory:    CBC and CMP are within normal limits  Physical Exam:      General Appearance:    Alert, oriented        Eyes:    PERRL   Ears:    Normal external ear canals, both ears   Nose:   Nares normal, septum midline   Throat:   Mucosa moist  Pharynx without injection  Neck:   Supple       Lungs:     Clear to auscultation bilaterally   Chest Wall:    No tenderness or deformity    Heart:    Regular rate and rhythm       Abdomen:     Soft, non-tender, bowel sounds +, no organomegaly           Extremities:   Extremities no cyanosis, left lower extremity lymphedema  Skin:   no rash or icterus  Lymph nodes:   Cervical, supraclavicular, and axillary nodes normal   Neurologic:   CNII-XII intact, normal strength, sensation and reflexes     Throughout          Breast exam:   Lumpectomy scar at outer upper quadrant of the right breast with no palpable abnormality  Left breast exam is negative  ROS: Review of Systems   All other systems reviewed and are negative  Imaging: No results found  Labs:   Lab Results   Component Value Date    WBC 6 20 08/28/2018    HGB 13 8 08/28/2018    HCT 42 7 08/28/2018    MCV 87 08/28/2018     08/28/2018     Lab Results   Component Value Date     08/28/2018    K 3 6 08/28/2018     08/28/2018    CO2 31 08/28/2018    BUN 23 08/28/2018    CREATININE 0 92 08/28/2018    GLUCOSE 90 12/14/2017    GLUF 92 08/28/2018    CALCIUM 9 4 08/28/2018    AST 23 08/28/2018    ALT 25 08/28/2018    ALKPHOS 77 08/28/2018    PROT 7 3 12/14/2017    BILITOT 0 4 12/14/2017    EGFR 68 08/28/2018         Lab Results   Component Value Date    IRON 65 08/28/2018    TIBC 336 08/28/2018       Lab Results   Component Value Date    [id number] 335 08/28/2018         Vital Sign:    Body surface area is 1 48 meters squared      Wt Readings from Last 3 Encounters:   10/16/18 51 3 kg (113 lb)   10/02/18 50 9 kg (112 lb 3 2 oz)   09/26/18 48 5 kg (107 lb)        Temp Readings from Last 3 Encounters:   10/16/18 (!) 96 6 °F (35 9 °C) (Tympanic)   10/02/18 98 3 °F (36 8 °C) (Temporal)   09/26/18 97 8 °F (36 6 °C)        BP Readings from Last 3 Encounters:   10/16/18 138/94   10/02/18 134/60   09/26/18 132/74         Pulse Readings from Last 3 Encounters:   10/16/18 78   10/02/18 76   09/26/18 62     @LASTSAO2(3)@    Active Problems:   Patient Active Problem List   Diagnosis    Benign essential hypertension    Colon, diverticulosis    Crohn's disease ([facility] [address] )    Dense breasts    Dermatitis    Erythema chronica migrans, secondary    Headache    Hyperlipidemia    Lymphedema    Murmur    Osteopenia    Osteoporosis    Ovarian cancer ([facility] [address] )    Reactive airway disease    Shortness of breath on exertion    Lymphedema of left lower extremity    Genetic predisposition to breast cancer    Abnormal MRI, breast    Ductal carcinoma in situ (DCIS) of right breast       Past Medical History:   Past Medical History:   Diagnosis Date    Breast cancer ([facility] [address] )     Crohn disease ([facility] [address] )     Dense breast     History of chemotherapy     History of transfusion 2009    Hyperlipidemia     Hypertension     Ovarian cancer [facility])        Surgical History:   Past Surgical History:   Procedure Laterality Date    BREAST LUMPECTOMY Right 09/11/2018    COLONOSCOPY      EXPLORATORY LAPAROTOMY      HYSTERECTOMY      MAMMO NEEDLE LOCALIZATION RIGHT (ALL INC) Right 9/11/2018    OMENTECTOMY      [place] PERQ DEVICE PLACEMT BREAST LOC 1ST LES W GUIDNCE Right 9/11/2018    Procedure: BREAST LUMPECTOMY; BREAST NEEDLE LOCALIZATION (NEEDLE LOC AT 1200);   Surgeon: Linn Kimbrough MD;  [facility];  Service: Surgical Oncology    TOTAL ABDOMINAL HYSTERECTOMY W/ BILATERAL SALPINGOOPHORECTOMY      WISDOM TOOTH EXTRACTION         Family History:    Family History   Problem Relation Age of Onset    Prostate cancer Father     Ovarian cancer Sister     Breast cancer Paternal Grandmother Cancer-related family history includes Breast cancer in her paternal grandmother; Ovarian cancer in her sister; Prostate cancer in her father  Social History:   Social History     Social History    Marital status: Single     Spouse name: N/A    Number of children: N/A    Years of education: N/A     Occupational History    Not on file  Social History Main Topics    Smoking status: Former Smoker     Quit date: 4/1/2009    Smokeless tobacco: Never Used    Alcohol use Yes      Comment: rarely    Drug use: No    Sexual activity: Not on file     Other Topics Concern    Not on file     Social History Narrative    No narrative on file       Current Medications:   Current Outpatient Prescriptions   Medication Sig Dispense Refill    albuterol (PROVENTIL HFA,VENTOLIN HFA) 90 mcg/act inhaler Inhale 2 puffs as needed for wheezing      amLODIPine (NORVASC) 5 mg tablet Take 1 tablet (5 mg total) by mouth daily 30 tablet 5    atorvastatin (LIPITOR) 40 mg tablet 40 mg daily at bedtime    5    Calcium Carbonate (CALTRATE 600) 1500 (600 Ca) MG TABS Take 1 tablet by mouth 2 (two) times a day      Elastic Bandages & Supports (MEDICAL COMPRESSION STOCKINGS) MISC Use daily as directed, 3 pair 3 each 0    hydrochlorothiazide (MICROZIDE) 12 5 mg capsule Take 1 capsule (12 5 mg total) by mouth every morning 30 capsule 5    inFLIXimab (REMICADE) 100 mg Infuse into a venous catheter         No current facility-administered medications for this visit          Allergies: No Known Allergies

## 2018-10-18 DIAGNOSIS — I10 ESSENTIAL HYPERTENSION: ICD-10-CM

## 2018-10-19 PROCEDURE — 77334 RADIATION TREATMENT AID(S): CPT | Performed by: RADIOLOGY

## 2018-10-19 PROCEDURE — 77295 3-D RADIOTHERAPY PLAN: CPT | Performed by: RADIOLOGY

## 2018-10-19 PROCEDURE — 77300 RADIATION THERAPY DOSE PLAN: CPT | Performed by: RADIOLOGY

## 2018-10-19 RX ORDER — AMLODIPINE BESYLATE 5 MG/1
5 TABLET ORAL DAILY
Qty: 30 TABLET | Refills: 2 | Status: SHIPPED | OUTPATIENT
Start: 2018-10-19 | End: 2019-01-11 | Stop reason: SDUPTHER

## 2018-10-19 RX ORDER — HYDROCHLOROTHIAZIDE 12.5 MG/1
12.5 CAPSULE, GELATIN COATED ORAL EVERY MORNING
Qty: 30 CAPSULE | Refills: 2 | Status: SHIPPED | OUTPATIENT
Start: 2018-10-19 | End: 2019-01-11 | Stop reason: SDUPTHER

## 2018-10-26 PROCEDURE — 77280 THER RAD SIMULAJ FIELD SMPL: CPT | Performed by: RADIOLOGY

## 2018-10-29 DIAGNOSIS — D05.11 DUCTAL CARCINOMA IN SITU (DCIS) OF RIGHT BREAST: Primary | ICD-10-CM

## 2018-10-29 PROCEDURE — 77331 SPECIAL RADIATION DOSIMETRY: CPT | Performed by: RADIOLOGY

## 2018-10-29 PROCEDURE — 77412 RADIATION TX DELIVERY LVL 3: CPT | Performed by: RADIOLOGY

## 2018-10-30 PROCEDURE — 77412 RADIATION TX DELIVERY LVL 3: CPT | Performed by: RADIOLOGY

## 2018-10-31 PROCEDURE — 77412 RADIATION TX DELIVERY LVL 3: CPT | Performed by: RADIOLOGY

## 2018-11-01 ENCOUNTER — APPOINTMENT (OUTPATIENT)
Dept: RADIATION ONCOLOGY | Facility: HOSPITAL | Age: 63
End: 2018-11-01
Attending: RADIOLOGY
Payer: COMMERCIAL

## 2018-11-01 PROCEDURE — 77412 RADIATION TX DELIVERY LVL 3: CPT | Performed by: RADIOLOGY

## 2018-11-02 PROCEDURE — 77417 THER RADIOLOGY PORT IMAGE(S): CPT | Performed by: RADIOLOGY

## 2018-11-02 PROCEDURE — 77336 RADIATION PHYSICS CONSULT: CPT | Performed by: RADIOLOGY

## 2018-11-02 PROCEDURE — 77412 RADIATION TX DELIVERY LVL 3: CPT | Performed by: RADIOLOGY

## 2018-11-05 PROCEDURE — 77412 RADIATION TX DELIVERY LVL 3: CPT | Performed by: RADIOLOGY

## 2018-11-06 PROCEDURE — 77412 RADIATION TX DELIVERY LVL 3: CPT | Performed by: RADIOLOGY

## 2018-11-07 PROCEDURE — 77412 RADIATION TX DELIVERY LVL 3: CPT | Performed by: RADIOLOGY

## 2018-11-08 PROCEDURE — 77412 RADIATION TX DELIVERY LVL 3: CPT | Performed by: RADIOLOGY

## 2018-11-09 ENCOUNTER — APPOINTMENT (OUTPATIENT)
Dept: LAB | Facility: CLINIC | Age: 63
End: 2018-11-09
Payer: COMMERCIAL

## 2018-11-09 DIAGNOSIS — D05.11 DUCTAL CARCINOMA IN SITU (DCIS) OF RIGHT BREAST: ICD-10-CM

## 2018-11-09 LAB
BASOPHILS # BLD AUTO: 0.04 THOUSANDS/ΜL (ref 0–0.1)
BASOPHILS NFR BLD AUTO: 1 % (ref 0–1)
EOSINOPHIL # BLD AUTO: 0.13 THOUSAND/ΜL (ref 0–0.61)
EOSINOPHIL NFR BLD AUTO: 2 % (ref 0–6)
ERYTHROCYTE [DISTWIDTH] IN BLOOD BY AUTOMATED COUNT: 12.6 % (ref 11.6–15.1)
HCT VFR BLD AUTO: 41.9 % (ref 34.8–46.1)
HGB BLD-MCNC: 13.9 G/DL (ref 11.5–15.4)
IMM GRANULOCYTES # BLD AUTO: 0.01 THOUSAND/UL (ref 0–0.2)
IMM GRANULOCYTES NFR BLD AUTO: 0 % (ref 0–2)
LYMPHOCYTES # BLD AUTO: 1.22 THOUSANDS/ΜL (ref 0.6–4.47)
LYMPHOCYTES NFR BLD AUTO: 21 % (ref 14–44)
MCH RBC QN AUTO: 28.5 PG (ref 26.8–34.3)
MCHC RBC AUTO-ENTMCNC: 33.2 G/DL (ref 31.4–37.4)
MCV RBC AUTO: 86 FL (ref 82–98)
MONOCYTES # BLD AUTO: 0.48 THOUSAND/ΜL (ref 0.17–1.22)
MONOCYTES NFR BLD AUTO: 8 % (ref 4–12)
NEUTROPHILS # BLD AUTO: 3.81 THOUSANDS/ΜL (ref 1.85–7.62)
NEUTS SEG NFR BLD AUTO: 68 % (ref 43–75)
NRBC BLD AUTO-RTO: 0 /100 WBCS
PLATELET # BLD AUTO: 311 THOUSANDS/UL (ref 149–390)
PMV BLD AUTO: 9.2 FL (ref 8.9–12.7)
RBC # BLD AUTO: 4.88 MILLION/UL (ref 3.81–5.12)
WBC # BLD AUTO: 5.69 THOUSAND/UL (ref 4.31–10.16)

## 2018-11-09 PROCEDURE — 77417 THER RADIOLOGY PORT IMAGE(S): CPT | Performed by: RADIOLOGY

## 2018-11-09 PROCEDURE — 85025 COMPLETE CBC W/AUTO DIFF WBC: CPT

## 2018-11-09 PROCEDURE — 77412 RADIATION TX DELIVERY LVL 3: CPT | Performed by: RADIOLOGY

## 2018-11-09 PROCEDURE — 36415 COLL VENOUS BLD VENIPUNCTURE: CPT

## 2018-11-09 PROCEDURE — 77336 RADIATION PHYSICS CONSULT: CPT | Performed by: RADIOLOGY

## 2018-11-12 PROCEDURE — 77412 RADIATION TX DELIVERY LVL 3: CPT | Performed by: RADIOLOGY

## 2018-11-13 PROCEDURE — 77412 RADIATION TX DELIVERY LVL 3: CPT | Performed by: RADIOLOGY

## 2018-11-14 PROCEDURE — 77412 RADIATION TX DELIVERY LVL 3: CPT | Performed by: RADIOLOGY

## 2018-11-15 PROCEDURE — 77412 RADIATION TX DELIVERY LVL 3: CPT | Performed by: RADIOLOGY

## 2018-11-16 PROCEDURE — 77334 RADIATION TREATMENT AID(S): CPT | Performed by: RADIOLOGY

## 2018-11-16 PROCEDURE — 77321 SPECIAL TELETX PORT PLAN: CPT | Performed by: RADIOLOGY

## 2018-11-19 PROCEDURE — 77336 RADIATION PHYSICS CONSULT: CPT | Performed by: RADIOLOGY

## 2018-11-19 PROCEDURE — 77412 RADIATION TX DELIVERY LVL 3: CPT | Performed by: RADIOLOGY

## 2018-11-20 PROCEDURE — 77412 RADIATION TX DELIVERY LVL 3: CPT | Performed by: RADIOLOGY

## 2018-11-21 PROCEDURE — 77331 SPECIAL RADIATION DOSIMETRY: CPT | Performed by: RADIOLOGY

## 2018-11-21 PROCEDURE — 77280 THER RAD SIMULAJ FIELD SMPL: CPT | Performed by: RADIOLOGY

## 2018-11-21 PROCEDURE — 77412 RADIATION TX DELIVERY LVL 3: CPT | Performed by: RADIOLOGY

## 2018-11-23 PROCEDURE — 77412 RADIATION TX DELIVERY LVL 3: CPT | Performed by: RADIOLOGY

## 2018-11-26 PROCEDURE — 77412 RADIATION TX DELIVERY LVL 3: CPT | Performed by: RADIOLOGY

## 2018-11-27 PROCEDURE — 77336 RADIATION PHYSICS CONSULT: CPT | Performed by: RADIOLOGY

## 2018-11-27 PROCEDURE — 77412 RADIATION TX DELIVERY LVL 3: CPT | Performed by: RADIOLOGY

## 2018-11-28 PROCEDURE — 77412 RADIATION TX DELIVERY LVL 3: CPT | Performed by: RADIOLOGY

## 2018-12-10 ENCOUNTER — TELEPHONE (OUTPATIENT)
Dept: FAMILY MEDICINE CLINIC | Facility: CLINIC | Age: 63
End: 2018-12-10

## 2018-12-10 NOTE — TELEPHONE ENCOUNTER
PATIENT IS COMING IN FOR HER ANNUAL VISIT IN A COUPLE OF WEEKS  WOULD LIKE TO GET LABS DONE PRIOR  PLEASE PUT ORDERS IN FOR ANNUAL LABS FOR LAB [facility]  THANK YOU!

## 2018-12-11 DIAGNOSIS — Z13.220 SCREENING FOR LIPID DISORDERS: ICD-10-CM

## 2018-12-11 DIAGNOSIS — Z13.29 SCREENING FOR ENDOCRINE DISORDER: ICD-10-CM

## 2018-12-11 DIAGNOSIS — Z13.0 SCREENING FOR IRON DEFICIENCY ANEMIA: Primary | ICD-10-CM

## 2018-12-11 DIAGNOSIS — Z13.29 SCREENING FOR THYROID DISORDER: ICD-10-CM

## 2018-12-19 LAB
ALBUMIN SERPL-MCNC: 4.3 G/DL (ref 3.6–4.8)
ALBUMIN/GLOB SERPL: 1.2 {RATIO} (ref 1.2–2.2)
ALP SERPL-CCNC: 90 IU/L (ref 39–117)
ALT SERPL-CCNC: 16 IU/L (ref 0–32)
AST SERPL-CCNC: 22 IU/L (ref 0–40)
BASOPHILS # BLD AUTO: 0 X10E3/UL (ref 0–0.2)
BASOPHILS NFR BLD AUTO: 1 %
BILIRUB SERPL-MCNC: 0.5 MG/DL (ref 0–1.2)
BUN SERPL-MCNC: 21 MG/DL (ref 8–27)
BUN/CREAT SERPL: 24 (ref 12–28)
CALCIUM SERPL-MCNC: 10 MG/DL (ref 8.7–10.3)
CHLORIDE SERPL-SCNC: 97 MMOL/L (ref 96–106)
CHOLEST SERPL-MCNC: 293 MG/DL (ref 100–199)
CO2 SERPL-SCNC: 27 MMOL/L (ref 20–29)
CREAT SERPL-MCNC: 0.88 MG/DL (ref 0.57–1)
EOSINOPHIL # BLD AUTO: 0.1 X10E3/UL (ref 0–0.4)
EOSINOPHIL NFR BLD AUTO: 2 %
ERYTHROCYTE [DISTWIDTH] IN BLOOD BY AUTOMATED COUNT: 13.5 % (ref 12.3–15.4)
GLOBULIN SER-MCNC: 3.5 G/DL (ref 1.5–4.5)
GLUCOSE SERPL-MCNC: 88 MG/DL (ref 65–99)
HCT VFR BLD AUTO: 42.5 % (ref 34–46.6)
HDLC SERPL-MCNC: 117 MG/DL
HGB BLD-MCNC: 13.9 G/DL (ref 11.1–15.9)
IMM GRANULOCYTES # BLD: 0 X10E3/UL (ref 0–0.1)
IMM GRANULOCYTES NFR BLD: 0 %
LDLC SERPL CALC-MCNC: 164 MG/DL (ref 0–99)
LDLC/HDLC SERPL: 1.4 RATIO (ref 0–3.2)
LYMPHOCYTES # BLD AUTO: 1.5 X10E3/UL (ref 0.7–3.1)
LYMPHOCYTES NFR BLD AUTO: 26 %
MCH RBC QN AUTO: 27.7 PG (ref 26.6–33)
MCHC RBC AUTO-ENTMCNC: 32.7 G/DL (ref 31.5–35.7)
MCV RBC AUTO: 85 FL (ref 79–97)
MONOCYTES # BLD AUTO: 0.5 X10E3/UL (ref 0.1–0.9)
MONOCYTES NFR BLD AUTO: 8 %
NEUTROPHILS # BLD AUTO: 3.7 X10E3/UL (ref 1.4–7)
NEUTROPHILS NFR BLD AUTO: 63 %
PLATELET # BLD AUTO: 293 X10E3/UL (ref 150–379)
POTASSIUM SERPL-SCNC: 3.8 MMOL/L (ref 3.5–5.2)
PROT SERPL-MCNC: 7.8 G/DL (ref 6–8.5)
RBC # BLD AUTO: 5.02 X10E6/UL (ref 3.77–5.28)
SL AMB EGFR AFRICAN AMERICAN: 82 ML/MIN/1.73
SL AMB EGFR NON AFRICAN AMERICAN: 71 ML/MIN/1.73
SL AMB VLDL CHOLESTEROL CALC: 12 MG/DL (ref 5–40)
SODIUM SERPL-SCNC: 141 MMOL/L (ref 134–144)
TRIGL SERPL-MCNC: 60 MG/DL (ref 0–149)
TSH SERPL DL<=0.005 MIU/L-ACNC: 2.34 UIU/ML (ref 0.45–4.5)
WBC # BLD AUTO: 5.9 X10E3/UL (ref 3.4–10.8)

## 2018-12-24 DIAGNOSIS — L03.90 CELLULITIS, UNSPECIFIED CELLULITIS SITE: Primary | ICD-10-CM

## 2018-12-24 DIAGNOSIS — J45.20 MILD INTERMITTENT REACTIVE AIRWAY DISEASE WITHOUT COMPLICATION: Primary | ICD-10-CM

## 2018-12-24 RX ORDER — ALBUTEROL SULFATE 90 UG/1
2 AEROSOL, METERED RESPIRATORY (INHALATION) AS NEEDED
Qty: 1 INHALER | Refills: 0 | Status: SHIPPED | OUTPATIENT
Start: 2018-12-24 | End: 2019-12-30 | Stop reason: SDUPTHER

## 2019-01-03 ENCOUNTER — OFFICE VISIT (OUTPATIENT)
Dept: SURGICAL ONCOLOGY | Facility: CLINIC | Age: 64
End: 2019-01-03
Payer: COMMERCIAL

## 2019-01-03 VITALS
RESPIRATION RATE: 16 BRPM | SYSTOLIC BLOOD PRESSURE: 122 MMHG | WEIGHT: 117 LBS | HEART RATE: 86 BPM | BODY MASS INDEX: 22.09 KG/M2 | DIASTOLIC BLOOD PRESSURE: 70 MMHG | TEMPERATURE: 97.2 F | HEIGHT: 61 IN

## 2019-01-03 DIAGNOSIS — D05.11 DUCTAL CARCINOMA IN SITU (DCIS) OF RIGHT BREAST: Primary | ICD-10-CM

## 2019-01-03 PROCEDURE — 99214 OFFICE O/P EST MOD 30 MIN: CPT | Performed by: SURGERY

## 2019-01-03 NOTE — PROGRESS NOTES
Surgical Oncology Follow Up       [address]  [facility] [place]  [address] [address] [address]    Zeynep Lombardi  1955  [id number]  [address] [facility] [facility]  [facility] [place] [address] [address] [address]    Chief Complaint   Patient presents with    Breast Cancer     Pt is here for 3 month follow up           Assessment & Plan:   The patient presents for a three-month follow-up visit  She has no complaints referable to her breast   She has completed her whole breast radiation therapy  Cancer History:        Ovarian cancer [facility])     Initial Diagnosis     Ovarian cancer ([facility] [facility] )         4/21/2009 Surgery     Exploratory laparotomy, total abdominal hysterectomy, bilateral salpingo-oophrectomy, lymph node dissection, omentectomy with staging          - 7/8/2009 Chemotherapy     Intraperitoneal along with intravenous chemotherapy on a early ovarian cancer protocol           5/31/2018 Genetic Testing     Genetic testing results are 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      Genetic testing indicated that the patient carries a Variant of Uncertain Significance (VUS) : Rad51C c 252G>T          Hormone Therapy              Ductal carcinoma in situ (DCIS) of right breast    5/31/2018 Genetic Testing     Genetic testing results are 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      Genetic testing indicated that the patient carries a Variant of Uncertain Significance (VUS) : Rad51C c 252G>T         8/1/2018 Biopsy     Right breast biopsy  11 o'clock position 5 cmfn  DCIS  Grade 2  Confirmed by Yael Pinzon MD  ER 0  MN 0         9/11/2018 Surgery     Right lumpectomy  DCIS  Grade 2  1 4 cm  Margins negative  Stage 0         10/16/2018 -  Hormone Therapy     Consult with Dr Megan Hilton  No adjuvant systemic therapy recommended         11/2018 -  Radiation     Whole breast radiation therapy  Dr Alex Pat Interval History:   See above    Review of Systems:   Review of Systems   Constitutional: Negative for activity change, appetite change and fatigue  HENT: Negative  Eyes: Negative  Respiratory: Negative for cough, shortness of breath and wheezing  Cardiovascular: Negative for chest pain and leg swelling  Gastrointestinal: Negative  Endocrine: Negative  Genitourinary: Negative  Musculoskeletal:        No new changes or complaints of bone pain   Skin: Negative  Allergic/Immunologic: Negative  Neurological: Negative  Hematological: Negative  Psychiatric/Behavioral: Negative  Past Medical History     Patient Active Problem List   Diagnosis    Benign essential hypertension    Colon, diverticulosis    [facility]'s disease ([facility] )    Dense breasts    Dermatitis    Erythema chronica migrans, secondary    Headache    Hyperlipidemia    Lymphedema    Murmur    Osteopenia    Osteoporosis    [facility]    Reactive airway disease    Shortness of breath on exertion    Lymphedema of left lower extremity    Genetic predisposition to breast cancer    Abnormal MRI, breast    Ductal carcinoma in situ (DCIS) of right breast     Past Medical History:   Diagnosis Date    [facility] ([facility] )     [facility]     Dense breast     History of chemotherapy     History of transfusion 2009    Hyperlipidemia     Hypertension     Ovarian cancer [facility])      Past Surgical History:   Procedure Laterality Date    BREAST LUMPECTOMY Right 09/11/2018    COLONOSCOPY      EXPLORATORY LAPAROTOMY      HYSTERECTOMY      MAMMO NEEDLE LOCALIZATION RIGHT (ALL INC) Right 9/11/2018    OMENTECTOMY      MA PERQ DEVICE PLACEMT BREAST LOC 1ST LES W GUIDNCE Right 9/11/2018    Procedure: BREAST LUMPECTOMY; BREAST NEEDLE LOCALIZATION (NEEDLE LOC AT 1200);   Surgeon: Yuni Dave MD;  [facility];  Service: Surgical Oncology    TOTAL ABDOMINAL HYSTERECTOMY W/ BILATERAL SALPINGOOPHORECTOMY      US GUIDED BREAST BIOPSY RIGHT COMPLETE Right 8/1/2018    WISDOM TOOTH EXTRACTION       Family History   Problem Relation Age of Onset    Prostate cancer Father     Ovarian cancer Sister     Breast cancer Paternal Grandmother      Social History     Social History    Marital status: Single     Spouse name: N/A    Number of children: N/A    Years of education: N/A     Occupational History    Not on file  Social History Main Topics    Smoking status: Former Smoker     Quit date: 4/1/2009    Smokeless tobacco: Never Used    Alcohol use Yes      Comment: rarely    Drug use: No    Sexual activity: Not on file     Other Topics Concern    Not on file     Social History Narrative    No narrative on file       Current Outpatient Prescriptions:     albuterol (PROVENTIL HFA,VENTOLIN HFA) 90 mcg/act inhaler, Inhale 2 puffs as needed for wheezing, Disp: 1 Inhaler, Rfl: 0    amLODIPine (NORVASC) 5 mg tablet, TAKE 1 TABLET (5 MG TOTAL) BY MOUTH DAILY, Disp: 30 tablet, Rfl: 2    atorvastatin (LIPITOR) 40 mg tablet, 40 mg daily at bedtime  , Disp: , Rfl: 5    Calcium Carbonate (CALTRATE 600) 1500 (600 Ca) MG TABS, Take 1 tablet by mouth 2 (two) times a day, Disp: , Rfl:     Elastic Bandages & Supports (MEDICAL COMPRESSION STOCKINGS) MISC, Use daily as directed, 3 pair, Disp: 3 each, Rfl: 0    hydrochlorothiazide (MICROZIDE) 12 5 mg capsule, TAKE 1 CAPSULE (12 5 MG TOTAL) BY MOUTH EVERY MORNING, Disp: 30 capsule, Rfl: 2    inFLIXimab (REMICADE) 100 mg, Infuse into a venous catheter  , Disp: , Rfl:     mupirocin (BACTROBAN) 2 % ointment, Apply topically 3 (three) times a day, Disp: 22 g, Rfl: 0  No Known Allergies    Physical Exam:     Vitals:    01/03/19 0842   BP: 122/70   Pulse: 86   Resp: 16   Temp: (!) 97 2 °F (36 2 °C)     Physical Exam   Constitutional: She is oriented to person, place, and time  She appears well-developed and well-nourished     HENT:   Head: Normocephalic and atraumatic  Mouth/Throat: Oropharynx is clear and moist    Eyes: Pupils are equal, round, and reactive to light  EOM are normal    Neck: Normal range of motion  Neck supple  No JVD present  No tracheal deviation present  No thyromegaly present  Cardiovascular: Normal rate, regular rhythm, normal heart sounds and intact distal pulses  Exam reveals no gallop and no friction rub  No murmur heard  Pulmonary/Chest: Effort normal and breath sounds normal  No respiratory distress  She has no wheezes  She has no rales  Examination of the right breast demonstrates minimal if any residual sequela from radiation she has a very well-healed incision  There are no worrisome skin findings dominant masses or axillary adenopathy  Examination of the left breast demonstrates no worrisome skin findings, nipple discharge dominant masses axillary supraclavicular adenopathy  Abdominal: Soft  She exhibits no distension and no mass  There is no hepatomegaly  There is no tenderness  There is no rebound and no guarding  Musculoskeletal: Normal range of motion  She exhibits no edema or tenderness  Lymphadenopathy:     She has no cervical adenopathy  Neurological: She is alert and oriented to person, place, and time  No cranial nerve deficit  Skin: Skin is warm and dry  No rash noted  No erythema  Psychiatric: She has a normal mood and affect  Her behavior is normal    Vitals reviewed  Results:   No current imaging          Advance Care Planning/Advance Directives:  I discussed the disease status, treatment plans and follow-up with the patient

## 2019-01-03 NOTE — LETTER
January 3, 2019     Amaury Kirkland, [address] [facility] [address]    Patient: Sylvie Saldana   YOB: 1955   Date of Visit: 1/3/2019       Dear Dr Briscoe Homes: Thank you for referring Edgardo Celis to me for evaluation  Below are my notes for this consultation  If you have questions, please do not hesitate to call me  I look forward to following your patient along with you  Sincerely,        Martir Vazquez MD        CC: No Recipients  Martir Vazquez MD  1/3/2019  9:12 AM  Sign at close encounter     Surgical Oncology Follow Up       [address] [place] [address] [address] [facility]  1955  [id number]  [address]  [facility] [address] [address]    Chief Complaint   Patient presents with    Breast Cancer     Pt is here for 3 month follow up           Assessment & Plan:   The patient presents for a three-month follow-up visit  She has no complaints referable to her breast   She has completed her whole breast radiation therapy  Cancer History:        Ovarian cancer [facility])     Initial Diagnosis     Ovarian cancer ([facility] [address] )         4/21/2009 Surgery     Exploratory laparotomy, total abdominal hysterectomy, bilateral salpingo-oophrectomy, lymph node dissection, omentectomy with staging          - 7/8/2009 Chemotherapy     Intraperitoneal along with intravenous chemotherapy on a early ovarian cancer protocol           5/31/2018 Genetic Testing     Genetic testing results are 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      Genetic testing indicated that the patient carries a Variant of Uncertain Significance (VUS) : Rad51C c 252G>T          Hormone Therapy              Ductal carcinoma in situ (DCIS) of right breast    5/31/2018 Genetic Testing     Genetic testing results are 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    Genetic testing indicated that the patient carries a Variant of Uncertain Significance (VUS) : Rad51C c 252G>T         8/1/2018 Biopsy     Right breast biopsy  11 o'clock position 5 cmfn  DCIS  Grade 2  Confirmed by Justyna Villagran MD  ER 0  [place] 0         9/11/2018 Surgery     Right lumpectomy  DCIS  Grade 2  1 4 cm  Margins negative  Stage 0         10/16/2018 -  Hormone Therapy     Consult with Dr Alona Allison  No adjuvant systemic therapy recommended         11/2018 -  Radiation     Whole breast radiation therapy  Dr Truman Aldridge              Interval History:   See above    Review of Systems:   Review of Systems   Constitutional: Negative for activity change, appetite change and fatigue  HENT: Negative  Eyes: Negative  Respiratory: Negative for cough, shortness of breath and wheezing  Cardiovascular: Negative for chest pain and leg swelling  Gastrointestinal: Negative  Endocrine: Negative  Genitourinary: Negative  Musculoskeletal:        No new changes or complaints of bone pain   Skin: Negative  Allergic/Immunologic: Negative  Neurological: Negative  Hematological: Negative  Psychiatric/Behavioral: Negative          Past Medical History     Patient Active Problem List   Diagnosis    Benign essential hypertension    Colon, diverticulosis    [facility] ([facility] )    Dense breasts    Dermatitis    Erythema chronica migrans, secondary    Headache    Hyperlipidemia    Lymphedema    Murmur    Osteopenia    Osteoporosis    [facility]    Reactive airway disease    Shortness of breath on exertion    Lymphedema of left lower extremity    Genetic predisposition to breast cancer    Abnormal MRI, breast    Ductal carcinoma in situ (DCIS) of right breast     Past Medical History:   Diagnosis Date    [facility]     Crohn disease ([facility]     Dense breast     History of chemotherapy     History of transfusion 2009    Hyperlipidemia     Hypertension     Ovarian cancer [facility])      Past Surgical History:   Procedure Laterality Date    BREAST LUMPECTOMY Right 09/11/2018    COLONOSCOPY      EXPLORATORY LAPAROTOMY      HYSTERECTOMY      MAMMO NEEDLE LOCALIZATION RIGHT (ALL INC) Right 9/11/2018    OMENTECTOMY      AL PERQ DEVICE PLACEMT BREAST LOC 1ST LES W GUIDNCE Right 9/11/2018    Procedure: BREAST LUMPECTOMY; BREAST NEEDLE LOCALIZATION (NEEDLE LOC AT 1200); Surgeon: Brian Dia MD;  [facility];  Service: Surgical Oncology    TOTAL ABDOMINAL HYSTERECTOMY W/ BILATERAL SALPINGOOPHORECTOMY      US GUIDED BREAST BIOPSY RIGHT COMPLETE Right 8/1/2018    WISDOM TOOTH EXTRACTION       Family History   Problem Relation Age of Onset    Prostate cancer Father     Ovarian cancer Sister     Breast cancer Paternal Grandmother      Social History     Social History    Marital status: Single     Spouse name: N/A    Number of children: N/A    Years of education: N/A     Occupational History    Not on file       Social History Main Topics    Smoking status: Former Smoker     Quit date: 4/1/2009    Smokeless tobacco: Never Used    Alcohol use Yes      Comment: rarely    Drug use: No    Sexual activity: Not on file     Other Topics Concern    Not on file     Social History Narrative    No narrative on file       Current Outpatient Prescriptions:     albuterol (PROVENTIL HFA,VENTOLIN HFA) 90 mcg/act inhaler, Inhale 2 puffs as needed for wheezing, Disp: 1 Inhaler, Rfl: 0    amLODIPine (NORVASC) 5 mg tablet, TAKE 1 TABLET (5 MG TOTAL) BY MOUTH DAILY, Disp: 30 tablet, Rfl: 2    atorvastatin (LIPITOR) 40 mg tablet, 40 mg daily at bedtime  , Disp: , Rfl: 5    Calcium Carbonate (CALTRATE 600) 1500 (600 Ca) MG TABS, Take 1 tablet by mouth 2 (two) times a day, Disp: , Rfl:     Elastic Bandages & Supports (MEDICAL COMPRESSION STOCKINGS) MISC, Use daily as directed, 3 pair, Disp: 3 each, Rfl: 0    hydrochlorothiazide (MICROZIDE) 12 5 mg capsule, TAKE 1 CAPSULE (12 5 MG TOTAL) BY MOUTH EVERY MORNING, Disp: 30 capsule, Rfl: 2    inFLIXimab (REMICADE) 100 mg, Infuse into a venous catheter  , Disp: , Rfl:     mupirocin (BACTROBAN) 2 % ointment, Apply topically 3 (three) times a day, Disp: 22 g, Rfl: 0  No Known Allergies    Physical Exam:     Vitals:    01/03/19 0842   BP: 122/70   Pulse: 86   Resp: 16   Temp: (!) 97 2 °F (36 2 °C)     Physical Exam   Constitutional: She is oriented to person, place, and time  She appears well-developed and well-nourished  HENT:   Head: Normocephalic and atraumatic  Mouth/Throat: Oropharynx is clear and moist    Eyes: Pupils are equal, round, and reactive to light  EOM are normal    Neck: Normal range of motion  Neck supple  No JVD present  No tracheal deviation present  No thyromegaly present  Cardiovascular: Normal rate, regular rhythm, normal heart sounds and intact distal pulses  Exam reveals no gallop and no friction rub  No murmur heard  Pulmonary/Chest: Effort normal and breath sounds normal  No respiratory distress  She has no wheezes  She has no rales  Examination of the right breast demonstrates minimal if any residual sequela from radiation she has a very well-healed incision  There are no worrisome skin findings dominant masses or axillary adenopathy  Examination of the left breast demonstrates no worrisome skin findings, nipple discharge dominant masses axillary supraclavicular adenopathy  Abdominal: Soft  She exhibits no distension and no mass  There is no hepatomegaly  There is no tenderness  There is no rebound and no guarding  Musculoskeletal: Normal range of motion  She exhibits no edema or tenderness  Lymphadenopathy:     She has no cervical adenopathy  Neurological: She is alert and oriented to person, place, and time  No cranial nerve deficit  Skin: Skin is warm and dry  No rash noted  No erythema  Psychiatric: She has a normal mood and affect   Her behavior is normal    Vitals reviewed  Results:   No current imaging          Advance Care Planning/Advance Directives:  I discussed the disease status, treatment plans and follow-up with the patient

## 2019-01-04 ENCOUNTER — OFFICE VISIT (OUTPATIENT)
Dept: FAMILY MEDICINE CLINIC | Facility: CLINIC | Age: 64
End: 2019-01-04
Payer: COMMERCIAL

## 2019-01-04 VITALS
BODY MASS INDEX: 21.14 KG/M2 | WEIGHT: 112 LBS | OXYGEN SATURATION: 98 % | HEART RATE: 73 BPM | SYSTOLIC BLOOD PRESSURE: 160 MMHG | DIASTOLIC BLOOD PRESSURE: 82 MMHG | HEIGHT: 61 IN | TEMPERATURE: 98.6 F

## 2019-01-04 DIAGNOSIS — E78.49 OTHER HYPERLIPIDEMIA: Primary | ICD-10-CM

## 2019-01-04 DIAGNOSIS — I89.0 LYMPHEDEMA: ICD-10-CM

## 2019-01-04 DIAGNOSIS — M81.0 AGE-RELATED OSTEOPOROSIS WITHOUT CURRENT PATHOLOGICAL FRACTURE: ICD-10-CM

## 2019-01-04 DIAGNOSIS — I10 BENIGN ESSENTIAL HYPERTENSION: ICD-10-CM

## 2019-01-04 PROCEDURE — 3008F BODY MASS INDEX DOCD: CPT | Performed by: FAMILY MEDICINE

## 2019-01-04 PROCEDURE — 99213 OFFICE O/P EST LOW 20 MIN: CPT | Performed by: FAMILY MEDICINE

## 2019-01-04 RX ORDER — ATORVASTATIN CALCIUM 40 MG/1
40 TABLET, FILM COATED ORAL
Qty: 30 TABLET | Refills: 5 | Status: SHIPPED | OUTPATIENT
Start: 2019-01-04 | End: 2019-06-24 | Stop reason: SDUPTHER

## 2019-01-04 NOTE — PROGRESS NOTES
Subjective:   Chief Complaint   Patient presents with    Follow-up     pt here for f/u on blood test results,         Patient ID: Manoj Beach is a 66 y o  female  Pt here to review recent blood work  Cholesterol is very elevated  Pt states she stopped her cholesterol medicaiton about 1 year ago because she wanted to eat grapefruit then she did not restart  She denies any chest pain or shortness of breath  Pt has had increased stress recently  Her mother and father moved in with her over the summer  Her mother recently  and her father just fell and broke his hip  She also cares for her youngest sister who has Down's syndrome  She is one of 7 children, she is the oldest    She has lymphedema in her left leg  She has been wearing the pneumatic compression devices intermittently  Pt could not tolerate fosamax, was taking once a month and having trouble tolerating medicaiton, felt bad for over 24 hours after taking, flu-like symptoms  The following portions of the patient's history were reviewed and updated as appropriate: allergies, current medications, past family history, past medical history, past social history, past surgical history and problem list     Review of Systems   Constitutional: Negative  Negative for fatigue and fever  HENT: Negative  Eyes: Negative  Respiratory: Negative  Negative for cough  Cardiovascular: Negative  Gastrointestinal: Negative  Endocrine: Negative  Genitourinary: Negative  Musculoskeletal: Negative  Skin: Negative  Allergic/Immunologic: Negative  Neurological: Negative  Hematological:        Lymphedema left leg   Psychiatric/Behavioral: Positive for sleep disturbance  The patient is nervous/anxious            Objective:  Vitals:    19 1405   BP: 160/82   Pulse: 73   Temp: 98 6 °F (37 °C)   SpO2: 98%   Weight: 50 8 kg (112 lb)   Height: 5' 1" (1 549 m)      Physical Exam   Constitutional: She is oriented to person, place, and time  She appears well-developed and well-nourished  HENT:   Head: Normocephalic and atraumatic  Cardiovascular: Normal rate, regular rhythm and normal heart sounds  Pulmonary/Chest: Effort normal and breath sounds normal    Abdominal: Soft  Bowel sounds are normal    Musculoskeletal:   Left leg significantly larger than the right,    Neurological: She is alert and oriented to person, place, and time  Skin: Skin is warm and dry  Psychiatric: She has a normal mood and affect  Her behavior is normal  Judgment and thought content normal    Nursing note and vitals reviewed  Assessment/Plan:    No problem-specific Assessment & Plan notes found for this encounter  Diagnoses and all orders for this visit:    Other hyperlipidemia  Comments:  restart atorvastatin  Orders:  -     atorvastatin (LIPITOR) 40 mg tablet; Take 1 tablet (40 mg total) by mouth daily at bedtime    Age-related osteoporosis without current pathological fracture  Comments:  trial boniva  Orders:  -     DXA bone density spine hip and pelvis;  Future    Lymphedema  Comments:  continue with compression devices    Benign essential hypertension  Comments:  controlled, continue current medication

## 2019-01-04 NOTE — PATIENT INSTRUCTIONS
dexa scan  Blood work reviewed  Restart cholesterol medication, atorvastatin 40mg 1 tab daily   Trial boniva instead of fosamax

## 2019-01-05 RX ORDER — IBANDRONATE SODIUM 150 MG/1
150 TABLET, FILM COATED ORAL
Qty: 3 TABLET | Refills: 3 | Status: SHIPPED | OUTPATIENT
Start: 2019-01-05 | End: 2019-05-13 | Stop reason: ALTCHOICE

## 2019-01-08 ENCOUNTER — CLINICAL SUPPORT (OUTPATIENT)
Dept: RADIATION ONCOLOGY | Facility: HOSPITAL | Age: 64
End: 2019-01-08

## 2019-01-08 ENCOUNTER — TELEPHONE (OUTPATIENT)
Dept: FAMILY MEDICINE CLINIC | Facility: CLINIC | Age: 64
End: 2019-01-08

## 2019-01-08 ENCOUNTER — RADIATION ONCOLOGY FOLLOW-UP (OUTPATIENT)
Dept: RADIATION ONCOLOGY | Facility: HOSPITAL | Age: 64
End: 2019-01-08
Attending: RADIOLOGY
Payer: COMMERCIAL

## 2019-01-08 VITALS
OXYGEN SATURATION: 97 % | SYSTOLIC BLOOD PRESSURE: 130 MMHG | WEIGHT: 113.8 LBS | HEIGHT: 61 IN | TEMPERATURE: 97.4 F | RESPIRATION RATE: 16 BRPM | DIASTOLIC BLOOD PRESSURE: 68 MMHG | BODY MASS INDEX: 21.49 KG/M2 | HEART RATE: 86 BPM

## 2019-01-08 DIAGNOSIS — D05.11 DUCTAL CARCINOMA IN SITU (DCIS) OF RIGHT BREAST: Primary | ICD-10-CM

## 2019-01-08 PROCEDURE — 99215 OFFICE O/P EST HI 40 MIN: CPT | Performed by: RADIOLOGY

## 2019-01-08 NOTE — PROGRESS NOTES
Verónica Ray  1955   Ms Desmond Mcnair is a 66 y o  female       Chief Complaint   Patient presents with    Follow-up       Cancer Staging  Ductal carcinoma in situ (DCIS) of right breast  Staging form: Breast, AJCC 8th Edition  - Pathologic: Stage 0 (pTis (DCIS), pN0, cM0, ER: Negative, WV: Negative) - Unsigned    Ovarian cancer ([facility] [address] )  Staging form: Ovary, AJCC 7th Edition  - Clinical: FIGO Stage IA (T1a, N0, M0) - Signed by Anna Marie Avendano PA-C on 4/19/2018         Ovarian cancer [facility])     Initial Diagnosis     Ovarian cancer ([facility] [address] )         4/21/2009 Surgery     Exploratory laparotomy, total abdominal hysterectomy, bilateral salpingo-oophrectomy, lymph node dissection, omentectomy with staging          - 7/8/2009 Chemotherapy     Intraperitoneal along with intravenous chemotherapy on a early ovarian cancer protocol           5/31/2018 Genetic Testing     Genetic testing results are 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      Genetic testing indicated that the patient carries a Variant of Uncertain Significance (VUS) : Rad51C c 252G>T          Hormone Therapy              Ductal carcinoma in situ (DCIS) of right breast    5/31/2018 Genetic Testing     Genetic testing results are 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      Genetic testing indicated that the patient carries a Variant of Uncertain Significance (VUS) : Rad51C c 252G>T         8/1/2018 Biopsy     Right breast biopsy  11 o'clock position 5 cmfn  DCIS  Grade 2  Confirmed by Yanni Zee MD  ER 0  WV 0         9/11/2018 Surgery     Right lumpectomy  DCIS  Grade 2  1 4 cm  Margins negative  Stage 0         10/16/2018 -  Hormone Therapy     Consult with Dr Esau Archibald  No adjuvant systemic therapy recommended         10/29/2018 - 11/28/2018 Radiation     Course: C1    Plan ID Energy Fractions Dose per Fraction (cGy) Dose Correction (cGy) Total Dose Delivered (cGy) Elapsed Days   R Breast 6X 16 / 16 266 0 4,256 22   R Breast e 12E 5 / 5 200 0 1,000 7      Treatment dates:  C1: 10/29/2018 - 11/28/2018              Clinical Trial: no        Interval History: One month f/u EOT     1/3/19 Chris Bob: The patient presents for a three-month follow-up visit  She has no complaints referable to her breast    Mammo scheduled for June 2019        Screening  Tobacco  Current tobacco user: no  If yes, brief counseling provided: NA    Hypertension  Hypertension screening performed: yes  Normotensive:  no  If no, referred to PCP: yes    Depression Screening  Screened for depression using PHQ-2: yes    Screened for depression using PHQ-9:  no  Screening positive or negative:  negative  If score >4, was any of the following actions taken?    Additional evaluation for depression, suicide risk assesment, referral to PCP or psychiatry, medication started:  [id number] [facility] >65 years  Advanced Care Planning Discussed:  n/a  Patient named surrogate decision maker or care plan in chart: n/a      Health Maintenance   Topic Date Due    PAP SMEAR  08/30/1978    Pneumococcal PPSV23 Highest Risk Adult (2 of 3 - PPSV23) 01/11/2017    INFLUENZA VACCINE  07/01/2018    MAMMOGRAM  09/11/2019    Depression Screening PHQ  10/02/2019    CRC Screening: Colonoscopy  02/19/2023    DTaP,Tdap,and Td Vaccines (3 - Td) 11/16/2026    Hepatitis C Screening  Completed       Patient Active Problem List   Diagnosis    Benign essential hypertension    Colon, diverticulosis    Crohn's disease ([address] )    Dense breasts    Dermatitis    Erythema chronica migrans, secondary    Headache    Hyperlipidemia    Lymphedema    Murmur    Osteopenia    Osteoporosis    Ovarian cancer ([address] )    Reactive airway disease    Shortness of breath on exertion    Lymphedema of left lower extremity    Genetic predisposition to breast cancer    Abnormal MRI, breast    Ductal carcinoma in situ (DCIS) of right breast     Past Medical History:   Diagnosis Date    Breast cancer [facility] [address] )     Crohn disease ([facility] [address] )     Dense breast     History of chemotherapy     History of radiation therapy     History of transfusion 2009    Hyperlipidemia     Hypertension     Ovarian cancer [facility])      Past Surgical History:   Procedure Laterality Date    BREAST LUMPECTOMY Right 09/11/2018    COLONOSCOPY      EXPLORATORY LAPAROTOMY      HYSTERECTOMY      MAMMO NEEDLE LOCALIZATION RIGHT (ALL INC) Right 9/11/2018    OMENTECTOMY      ND PERQ DEVICE PLACEMT BREAST LOC 1ST LES W GUIDNCE Right 9/11/2018    Procedure: BREAST LUMPECTOMY; BREAST NEEDLE LOCALIZATION (NEEDLE LOC AT 1200); Surgeon: Nereida Torres MD;  [facility];  Service: Surgical Oncology    TOTAL ABDOMINAL HYSTERECTOMY W/ BILATERAL SALPINGOOPHORECTOMY      US GUIDED BREAST BIOPSY RIGHT COMPLETE Right 8/1/2018    WISDOM TOOTH EXTRACTION       Family History   Problem Relation Age of Onset    Prostate cancer Father     Ovarian cancer Sister     Breast cancer Paternal Grandmother      Social History     Social History    Marital status: Single     Spouse name: N/A    Number of children: N/A    Years of education: N/A     Occupational History    Not on file       Social History Main Topics    Smoking status: Former Smoker     Quit date: 4/1/2009    Smokeless tobacco: Never Used    Alcohol use Yes      Comment: rarely    Drug use: No    Sexual activity: Not on file     Other Topics Concern    Not on file     Social History Narrative    No narrative on file       Current Outpatient Prescriptions:     albuterol (PROVENTIL HFA,VENTOLIN HFA) 90 mcg/act inhaler, Inhale 2 puffs as needed for wheezing, Disp: 1 Inhaler, Rfl: 0    amLODIPine (NORVASC) 5 mg tablet, TAKE 1 TABLET (5 MG TOTAL) BY MOUTH DAILY, Disp: 30 tablet, Rfl: 2    atorvastatin (LIPITOR) 40 mg tablet, Take 1 tablet (40 mg total) by mouth daily at bedtime, Disp: 30 tablet, Rfl: 5    Calcium Carbonate (CALTRATE 600) 1500 (600 Ca) MG TABS, Take 1 tablet by mouth 2 (two) times a day, Disp: , Rfl:     Elastic Bandages & Supports (MEDICAL COMPRESSION STOCKINGS) MISC, Use daily as directed, 3 pair, Disp: 3 each, Rfl: 0    hydrochlorothiazide (MICROZIDE) 12 5 mg capsule, TAKE 1 CAPSULE (12 5 MG TOTAL) BY MOUTH EVERY MORNING, Disp: 30 capsule, Rfl: 2    inFLIXimab (REMICADE) 100 mg, Infuse into a venous catheter  , Disp: , Rfl:     mupirocin (BACTROBAN) 2 % ointment, Apply topically 3 (three) times a day, Disp: 22 g, Rfl: 0    ibandronate (BONIVA) 150 MG tablet, Take 1 tablet (150 mg total) by mouth every 30 (thirty) days (Patient not taking: Reported on 1/8/2019 ), Disp: 3 tablet, Rfl: 3  Allergies   Allergen Reactions    Fosamax [Alendronate] Myalgia       Review of Systems:  Review of Systems   Constitutional: Positive for fatigue  HENT:        Gets occasional sores in nose and scalp  Eyes: Positive for itching (Right )  Respiratory: Negative  Cardiovascular: Negative  Gastrointestinal: Negative  Endocrine: Negative  Genitourinary:        Occasional nocturia 0-1  Musculoskeletal: Negative  Skin:        Occasional sores on scalp  Allergic/Immunologic: Negative  Neurological: Positive for headaches (Occasional headaches relieved with Tylenol  )  Hematological: Negative  Psychiatric/Behavioral: Negative  Vitals:    01/08/19 0700   BP: 130/68   BP Location: Left arm   Patient Position: Sitting   Cuff Size: Standard   Pulse: 86   Resp: 16   Temp: (!) 97 4 °F (36 3 °C)   TempSrc: Temporal   SpO2: 97%   Weight: 51 6 kg (113 lb 12 8 oz)   Height: 5' 1" (1 549 m)       Pain Score: 0-No pain    Imaging:No results found      Teaching

## 2019-01-08 NOTE — PROGRESS NOTES
Follow-up - Radiation Oncology   Alejandro Person 1955 66 y o  female [id number]      History of Present Illness   Cancer Staging  Ductal carcinoma in situ (DCIS) of right breast  Staging form: Breast, AJCC 8th Edition  - Pathologic: Stage 0 (pTis (DCIS), pN0, cM0, ER: Negative, NE: Negative) - Unsigned    Ovarian cancer ([facility] [address] )  Staging form: Ovary, AJCC 7th Edition  - Clinical: FIGO Stage IA (T1a, N0, M0) - Signed by Shawna Nails PA-C on 4/19/2018      Alejandro Person is a 66y o  year old female who presents for follow-up with a right breast ductal carcinoma in situ s/p lumpectomy and radiation therapy ending 11/28/2018  Interval History:    1/3/19 Pal Degroot: The patient presents for a three-month follow-up visit  Barbisherri BerryJmaes has no complaints referable to her breast      Mammo scheduled for June 2019    She reports she is feeling well  She denies use breast   She applies moisturizer and is massaging the right breast daily  Her father has dementia and fell at home braking his hip on December 30, 2018  She denies any abdominal or pelvic pains or cramps  She has follow-up for her ovarian carcinoma in April  She is doing well with regards to her Crohn's disease on Remicade that she has taken since 2002  Future Appts:  Dr Alaina Garcia 4/18/19  Mammo scheduled for June 2019   Clinical Trial: no     Screening  Tobacco  Current tobacco user: no  If yes, brief counseling provided: NA     Hypertension  Hypertension screening performed: yes  Normotensive:  no  If no, referred to PCP: yes     Depression Screening  Screened for depression using PHQ-2: yes     Screened for depression using PHQ-9:  no  Screening positive or negative:  negative  If score >4, was any of the following actions taken?    Additional evaluation for depression, suicide risk assesment, referral to PCP or psychiatry, medication started:  n/a     Advanced Care Planning for Patients >65 years  Advanced Care Planning Discussed: n/a  Patient named surrogate decision maker or care plan in chart: n/a    Historical Information      Ovarian cancer [facility])     Initial Diagnosis     Ovarian cancer ([facility] [address] )         4/21/2009 Surgery     Exploratory laparotomy, total abdominal hysterectomy, bilateral salpingo-oophrectomy, lymph node dissection, omentectomy with staging          - 7/8/2009 Chemotherapy     Intraperitoneal along with intravenous chemotherapy on a early ovarian cancer protocol           5/31/2018 Genetic Testing     Genetic testing results are 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      Genetic testing indicated that the patient carries a Variant of Uncertain Significance (VUS) : Rad51C c 252G>T          Hormone Therapy              Ductal carcinoma in situ (DCIS) of right breast    5/31/2018 Genetic Testing     Genetic testing results are 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      Genetic testing indicated that the patient carries a Variant of Uncertain Significance (VUS) : Rad51C c 252G>T         8/1/2018 Biopsy     Right breast biopsy  11 o'clock position 5 cmfn  DCIS  Grade 2  Confirmed by Mills Siemens MD  ER 0  WY 0         9/11/2018 Surgery     Right lumpectomy  DCIS  Grade 2  1 4 cm  Margins negative  Stage 0         10/16/2018 -  Hormone Therapy     Consult with Dr Ross Bernheim  No adjuvant systemic therapy recommended         10/29/2018 - 11/28/2018 Radiation     Course: C1    Plan ID Energy Fractions Dose per Fraction (cGy) Dose Correction (cGy) Total Dose Delivered (cGy) Elapsed Days   R Breast 6X 16 / 16 266 0 4,256 22   R Breast e 12E 5 / 5 200 0 1,000 7      Treatment dates:  C1: 10/29/2018 - 11/28/2018              Past Medical History:   Diagnosis Date    Breast cancer ([facility] [address] )     Crohn disease ([facility] [address] )     Dense breast     History of chemotherapy     History of radiation therapy     History of transfusion 2009    Hyperlipidemia     Hypertension     Ovarian cancer [facility])      Past Surgical History: Procedure Laterality Date    BREAST LUMPECTOMY Right 09/11/2018    COLONOSCOPY      EXPLORATORY LAPAROTOMY      HYSTERECTOMY      MAMMO NEEDLE LOCALIZATION RIGHT (ALL INC) Right 9/11/2018    OMENTECTOMY      WI PERQ DEVICE PLACEMT BREAST LOC 1ST LES W GUIDNCE Right 9/11/2018    Procedure: BREAST LUMPECTOMY; BREAST NEEDLE LOCALIZATION (NEEDLE LOC AT 1200);   Surgeon: Joe Stout MD;  [facility];  Service: Surgical Oncology    TOTAL ABDOMINAL HYSTERECTOMY W/ BILATERAL SALPINGOOPHORECTOMY      US GUIDED BREAST BIOPSY RIGHT COMPLETE Right 8/1/2018    WISDOM TOOTH EXTRACTION         Social History   History   Alcohol Use    Yes     Comment: rarely     History   Drug Use No     History   Smoking Status    Former Smoker    Quit date: 4/1/2009   Smokeless Tobacco    Never Used       Meds/Allergies     Current Outpatient Prescriptions:     albuterol (PROVENTIL HFA,VENTOLIN HFA) 90 mcg/act inhaler, Inhale 2 puffs as needed for wheezing, Disp: 1 Inhaler, Rfl: 0    amLODIPine (NORVASC) 5 mg tablet, TAKE 1 TABLET (5 MG TOTAL) BY MOUTH DAILY, Disp: 30 tablet, Rfl: 2    atorvastatin (LIPITOR) 40 mg tablet, Take 1 tablet (40 mg total) by mouth daily at bedtime, Disp: 30 tablet, Rfl: 5    Calcium Carbonate (CALTRATE 600) 1500 (600 Ca) MG TABS, Take 1 tablet by mouth 2 (two) times a day, Disp: , Rfl:     Elastic Bandages & Supports (MEDICAL COMPRESSION STOCKINGS) MISC, Use daily as directed, 3 pair, Disp: 3 each, Rfl: 0    hydrochlorothiazide (MICROZIDE) 12 5 mg capsule, TAKE 1 CAPSULE (12 5 MG TOTAL) BY MOUTH EVERY MORNING, Disp: 30 capsule, Rfl: 2    inFLIXimab (REMICADE) 100 mg, Infuse into a venous catheter  , Disp: , Rfl:     mupirocin (BACTROBAN) 2 % ointment, Apply topically 3 (three) times a day, Disp: 22 g, Rfl: 0    ibandronate (BONIVA) 150 MG tablet, Take 1 tablet (150 mg total) by mouth every 30 (thirty) days (Patient not taking: Reported on 1/8/2019 ), Disp: 3 tablet, Rfl: 3  Allergies Allergen Reactions    Fosamax [Alendronate] Myalgia     Review of Systems  Constitutional: Positive for fatigue  HENT:        Gets occasional sores in nose and scalp  Eyes: Positive for itching (Right )  Respiratory: Negative  Cardiovascular: Negative  Gastrointestinal: Negative  Endocrine: Negative  Genitourinary:        Occasional nocturia 0-1  Musculoskeletal: Negative  Skin:        Occasional sores on scalp  Allergic/Immunologic: Negative  Neurological: Positive for headaches (Occasional headaches relieved with Tylenol  )  Hematological: Negative  Psychiatric/Behavioral: Negative        OBJECTIVE:   /68 (BP Location: Left arm, Patient Position: Sitting, Cuff Size: Standard)   Pulse 86   Temp (!) 97 4 °F (36 3 °C) (Temporal)   Resp 16   Ht 5' 1" (1 549 m)   Wt 51 6 kg (113 lb 12 8 oz)   SpO2 97%   BMI 21 50 kg/m²   Pain Assessment:  0  ECOG/Zubrod/WHO: 1 - Symptomatic but completely ambulatory    Physical Exam   Constitutional: She is oriented to person, place, and time  She appears well-developed and well-nourished  No distress  HENT:   Head: Normocephalic and atraumatic  Mouth/Throat: No oropharyngeal exudate  Eyes: Pupils are equal, round, and reactive to light  Conjunctivae and EOM are normal  No scleral icterus  Neck: Normal range of motion  Neck supple  No tracheal deviation present  No thyromegaly present  Cardiovascular: Normal rate, regular rhythm and normal heart sounds  Pulmonary/Chest: Effort normal and breath sounds normal  No respiratory distress  She has no wheezes  She has no rales  She exhibits no tenderness  Right breast exhibits no inverted nipple, no mass, no nipple discharge, no skin change ( There is a well-healed lumpectomy incision with underlying post treatment changes and no masses  There is minimal skin erythema from treatment and no significant edema ) and no tenderness   Left breast exhibits no inverted nipple, no mass, no nipple discharge, no skin change and no tenderness  Abdominal: Soft  Bowel sounds are normal  She exhibits no distension and no mass  There is no tenderness  There is no rebound and no guarding  Musculoskeletal: Normal range of motion  She exhibits no edema or tenderness  Lymphadenopathy:     She has no cervical adenopathy  She has no axillary adenopathy  Right: No supraclavicular adenopathy present  Left: No supraclavicular adenopathy present  Neurological: She is alert and oriented to person, place, and time  No cranial nerve deficit  Coordination normal    Skin: Skin is warm and dry  No rash noted  She is not diaphoretic  No erythema  No pallor  Psychiatric: She has a normal mood and affect  Her behavior is normal  Judgment and thought content normal    Nursing note and vitals reviewed      RESULTS    Lab Results:   Recent Results (from the past 672 hour(s))   CBC and differential    Collection Time: 12/18/18 11:41 AM   Result Value Ref Range    White Blood Cell Count 5 9 3 4 - 10 8 x10E3/uL    Red Blood Cell Count 5 02 3 77 - 5 28 x10E6/uL    Hemoglobin 13 9 11 1 - 15 9 g/dL    HCT 42 5 34 0 - 46 6 %    MCV 85 79 - 97 fL    MCH 27 7 26 6 - 33 0 pg    MCHC 32 7 31 5 - 35 7 g/dL    RDW 13 5 12 3 - 15 4 %    Platelet Count 768 394 - 379 x10E3/uL    Neutrophils 63 Not Estab  %    Lymphocytes 26 Not Estab  %    Monocytes 8 Not Estab  %    Eosinophils 2 Not Estab  %    Basophils PCT 1 Not Estab  %    Neutrophils (Absolute) 3 7 1 4 - 7 0 x10E3/uL    Lymphocytes (Absolute) 1 5 0 7 - 3 1 x10E3/uL    Monocytes (Absolute) 0 5 0 1 - 0 9 x10E3/uL    Eosinophils (Absolute) 0 1 0 0 - 0 4 x10E3/uL    Basophils ABS 0 0 0 0 - 0 2 x10E3/uL    Immature Granulocytes 0 Not Estab  %    Immature Granulocytes (Absolute) 0 0 0 0 - 0 1 x10E3/uL   Comprehensive metabolic panel    Collection Time: 12/18/18 11:41 AM   Result Value Ref Range    Glucose, Random 88 65 - 99 mg/dL    BUN 21 8 - 27 mg/dL    Creatinine 0  88 0 57 - 1 00 mg/dL    eGFR Non  71 >59 mL/min/1 73    SL AMB EGFR AFRICAN AMERICAN 82 >59 mL/min/1 73    SL AMB BUN/CREATININE RATIO 24 12 - 28    Sodium 141 134 - 144 mmol/L    Potassium 3 8 3 5 - 5 2 mmol/L    Chloride 97 96 - 106 mmol/L    CO2 27 20 - 29 mmol/L    CALCIUM 10 0 8 7 - 10 3 mg/dL    SL AMB PROTEIN, TOTAL 7 8 6 0 - 8 5 g/dL    Albumin 4 3 3 6 - 4 8 g/dL    Globulin, Total 3 5 1 5 - 4 5 g/dL    Albumin/Globulin Ratio 1 2 1 2 - 2 2    TOTAL BILIRUBIN 0 5 0 0 - 1 2 mg/dL    Alk Phos Isoenzymes 90 39 - 117 IU/L    SL AMB AST 22 0 - 40 IU/L    SL AMB ALT 16 0 - 32 IU/L   TSH, 3rd generation with Free T4 reflex    Collection Time: 12/18/18 11:41 AM   Result Value Ref Range    TSH 2 340 0 450 - 4 500 uIU/mL   Lipid Panel with Direct LDL reflex    Collection Time: 12/18/18 11:41 AM   Result Value Ref Range    Cholesterol, Total 293 (H) 100 - 199 mg/dL    Triglycerides 60 0 - 149 mg/dL     >39 mg/dL    VLDL Cholesterol Calculated 12 5 - 40 mg/dL    LDL Direct 164 (H) 0 - 99 mg/dL    LDl/HDL Ratio 1 4 0 0 - 3 2 ratio       Imaging Studies:No results found  Assessment/Plan:  No orders of the defined types were placed in this encounter  Lori Spivey is a 66y o  year old female with a stage 0 right breast ductal carcinoma in situ status post lumpectomy with negative margins with her tumor measuring 1 4 cm, grade 2 and estrogen and progesterone receptors were both negative  She has a history of a stage I ovarian carcinoma treated in 2009 and remains EDER  Her genetic testing was positive for pathologic variant of uncertain significance  We discussed postoperative radiation therapy will reduce her risk of recurrence which could be an invasive recurrence    Due to her prior history of ovarian carcinoma, her genetic testing of uncertain significance, as well as her being in good health for her age, she wanted to pursue aggressive treatment to give her the best chance of preventing any recurrence  She completed radiation therapy to the right breast on November 28, 2018  She has recovered well from treatment and will continue applying moisturizer and massaging the right breast daily  She is scheduled for mammogram in June 2019 and then will see Dr MEEKS follow-up in July  She will return here follow-up 9 months  Pati Shafer MD  1/8/2019,8:28 AM    Portions of the record may have been created with voice recognition software   Occasional wrong word or "sound a like" substitutions may have occurred due to the inherent limitations of voice recognition software   Read the chart carefully and recognize, using context, where substitutions have occurred

## 2019-01-11 DIAGNOSIS — I10 ESSENTIAL HYPERTENSION: ICD-10-CM

## 2019-01-11 RX ORDER — HYDROCHLOROTHIAZIDE 12.5 MG/1
12.5 CAPSULE, GELATIN COATED ORAL EVERY MORNING
Qty: 30 CAPSULE | Refills: 5 | Status: SHIPPED | OUTPATIENT
Start: 2019-01-11 | End: 2019-07-08 | Stop reason: SDUPTHER

## 2019-01-11 RX ORDER — AMLODIPINE BESYLATE 5 MG/1
5 TABLET ORAL DAILY
Qty: 30 TABLET | Refills: 0 | Status: SHIPPED | OUTPATIENT
Start: 2019-01-11 | End: 2019-02-14 | Stop reason: SDUPTHER

## 2019-02-07 ENCOUNTER — TELEPHONE (OUTPATIENT)
Dept: FAMILY MEDICINE CLINIC | Facility: CLINIC | Age: 64
End: 2019-02-07

## 2019-02-07 DIAGNOSIS — M81.8 OTHER OSTEOPOROSIS WITHOUT CURRENT PATHOLOGICAL FRACTURE: Primary | ICD-10-CM

## 2019-02-07 RX ORDER — ALENDRONATE SODIUM 70 MG/1
70 TABLET ORAL
Qty: 4 TABLET | Refills: 5 | Status: SHIPPED | OUTPATIENT
Start: 2019-02-07 | End: 2019-07-31 | Stop reason: SDUPTHER

## 2019-02-07 NOTE — TELEPHONE ENCOUNTER
Patient told me that shes never been on fosamax and has always been on boniva and that she wants to try fosamax to see if she can tolerate it

## 2019-02-07 NOTE — TELEPHONE ENCOUNTER
Patient called asking if you could please call in fosamax to her pharmacy instead of boniva  States that she cannot tolerate the boniva as it makes her stomach feel sick, but states that you had discussed switching her meds at her last OV  Please advise  Patient wants call back when sent

## 2019-02-07 NOTE — TELEPHONE ENCOUNTER
Does she want to try fosamax again? I sent a prescription  Or since she was intolerant to 2 medications we can try to apply for prolia through her insurance,  An injection in our office twice a year? ?

## 2019-02-14 DIAGNOSIS — I10 ESSENTIAL HYPERTENSION: ICD-10-CM

## 2019-02-14 RX ORDER — AMLODIPINE BESYLATE 5 MG/1
5 TABLET ORAL DAILY
Qty: 30 TABLET | Refills: 5 | Status: SHIPPED | OUTPATIENT
Start: 2019-02-14 | End: 2019-06-24 | Stop reason: SDUPTHER

## 2019-04-24 ENCOUNTER — TELEPHONE (OUTPATIENT)
Dept: FAMILY MEDICINE CLINIC | Facility: CLINIC | Age: 64
End: 2019-04-24

## 2019-04-24 DIAGNOSIS — C56.9 MALIGNANT NEOPLASM OF OVARY, UNSPECIFIED LATERALITY (HCC): ICD-10-CM

## 2019-04-24 DIAGNOSIS — M81.0 AGE-RELATED OSTEOPOROSIS WITHOUT CURRENT PATHOLOGICAL FRACTURE: ICD-10-CM

## 2019-04-24 DIAGNOSIS — E78.49 OTHER HYPERLIPIDEMIA: ICD-10-CM

## 2019-04-24 DIAGNOSIS — I10 BENIGN ESSENTIAL HYPERTENSION: ICD-10-CM

## 2019-04-24 DIAGNOSIS — I89.0 LYMPHEDEMA: ICD-10-CM

## 2019-04-24 DIAGNOSIS — K50.90 CROHN'S DISEASE WITHOUT COMPLICATION, UNSPECIFIED GASTROINTESTINAL TRACT LOCATION (HCC): Primary | ICD-10-CM

## 2019-05-09 ENCOUNTER — OFFICE VISIT (OUTPATIENT)
Dept: GYNECOLOGIC ONCOLOGY | Facility: CLINIC | Age: 64
End: 2019-05-09
Payer: COMMERCIAL

## 2019-05-09 VITALS
WEIGHT: 117.5 LBS | DIASTOLIC BLOOD PRESSURE: 82 MMHG | HEART RATE: 79 BPM | SYSTOLIC BLOOD PRESSURE: 138 MMHG | HEIGHT: 61 IN | RESPIRATION RATE: 16 BRPM | BODY MASS INDEX: 22.19 KG/M2 | TEMPERATURE: 97.9 F

## 2019-05-09 DIAGNOSIS — Z08 ENCOUNTER FOR FOLLOW-UP SURVEILLANCE OF OVARIAN CANCER: Primary | ICD-10-CM

## 2019-05-09 DIAGNOSIS — I89.0 LYMPHEDEMA: ICD-10-CM

## 2019-05-09 DIAGNOSIS — L90.0 LICHEN SCLEROSUS ET ATROPHICUS: ICD-10-CM

## 2019-05-09 DIAGNOSIS — Z85.43 ENCOUNTER FOR FOLLOW-UP SURVEILLANCE OF OVARIAN CANCER: Primary | ICD-10-CM

## 2019-05-09 DIAGNOSIS — Z85.43 HISTORY OF OVARIAN CANCER: ICD-10-CM

## 2019-05-09 LAB
ALBUMIN SERPL-MCNC: 4.3 G/DL (ref 3.6–4.8)
ALBUMIN/GLOB SERPL: 1.4 {RATIO} (ref 1.2–2.2)
ALP SERPL-CCNC: 84 IU/L (ref 39–117)
ALT SERPL-CCNC: 16 IU/L (ref 0–32)
AST SERPL-CCNC: 20 IU/L (ref 0–40)
BASOPHILS # BLD AUTO: 0 X10E3/UL (ref 0–0.2)
BASOPHILS NFR BLD AUTO: 1 %
BILIRUB SERPL-MCNC: 0.4 MG/DL (ref 0–1.2)
BUN SERPL-MCNC: 26 MG/DL (ref 8–27)
BUN/CREAT SERPL: 26 (ref 12–28)
CALCIUM SERPL-MCNC: 9.6 MG/DL (ref 8.7–10.3)
CHLORIDE SERPL-SCNC: 97 MMOL/L (ref 96–106)
CHOLEST SERPL-MCNC: 254 MG/DL (ref 100–199)
CO2 SERPL-SCNC: 29 MMOL/L (ref 20–29)
CREAT SERPL-MCNC: 0.99 MG/DL (ref 0.57–1)
EOSINOPHIL # BLD AUTO: 0.1 X10E3/UL (ref 0–0.4)
EOSINOPHIL NFR BLD AUTO: 3 %
ERYTHROCYTE [DISTWIDTH] IN BLOOD BY AUTOMATED COUNT: 13.3 % (ref 12.3–15.4)
GLOBULIN SER-MCNC: 3.1 G/DL (ref 1.5–4.5)
GLUCOSE SERPL-MCNC: 97 MG/DL (ref 65–99)
HCT VFR BLD AUTO: 40.7 % (ref 34–46.6)
HDLC SERPL-MCNC: 107 MG/DL
HGB BLD-MCNC: 13.9 G/DL (ref 11.1–15.9)
IMM GRANULOCYTES # BLD: 0 X10E3/UL (ref 0–0.1)
IMM GRANULOCYTES NFR BLD: 0 %
LDLC SERPL CALC-MCNC: 135 MG/DL (ref 0–99)
LDLC/HDLC SERPL: 1.3 RATIO (ref 0–3.2)
LYMPHOCYTES # BLD AUTO: 1.7 X10E3/UL (ref 0.7–3.1)
LYMPHOCYTES NFR BLD AUTO: 30 %
MCH RBC QN AUTO: 28.9 PG (ref 26.6–33)
MCHC RBC AUTO-ENTMCNC: 34.2 G/DL (ref 31.5–35.7)
MCV RBC AUTO: 85 FL (ref 79–97)
MONOCYTES # BLD AUTO: 0.5 X10E3/UL (ref 0.1–0.9)
MONOCYTES NFR BLD AUTO: 8 %
NEUTROPHILS # BLD AUTO: 3.3 X10E3/UL (ref 1.4–7)
NEUTROPHILS NFR BLD AUTO: 58 %
PLATELET # BLD AUTO: 312 X10E3/UL (ref 150–379)
POTASSIUM SERPL-SCNC: 3.7 MMOL/L (ref 3.5–5.2)
PROT SERPL-MCNC: 7.4 G/DL (ref 6–8.5)
RBC # BLD AUTO: 4.81 X10E6/UL (ref 3.77–5.28)
SL AMB EGFR AFRICAN AMERICAN: 71 ML/MIN/1.73
SL AMB EGFR NON AFRICAN AMERICAN: 62 ML/MIN/1.73
SL AMB VLDL CHOLESTEROL CALC: 12 MG/DL (ref 5–40)
SODIUM SERPL-SCNC: 140 MMOL/L (ref 134–144)
TRIGL SERPL-MCNC: 61 MG/DL (ref 0–149)
TSH SERPL DL<=0.005 MIU/L-ACNC: 2.35 UIU/ML (ref 0.45–4.5)
WBC # BLD AUTO: 5.6 X10E3/UL (ref 3.4–10.8)

## 2019-05-09 PROCEDURE — 99214 OFFICE O/P EST MOD 30 MIN: CPT | Performed by: PHYSICIAN ASSISTANT

## 2019-05-09 RX ORDER — CLOBETASOL PROPIONATE 0.5 MG/G
OINTMENT TOPICAL
Qty: 30 G | Refills: 2 | Status: SHIPPED | OUTPATIENT
Start: 2019-05-09 | End: 2020-06-19 | Stop reason: SDUPTHER

## 2019-05-13 ENCOUNTER — OFFICE VISIT (OUTPATIENT)
Dept: FAMILY MEDICINE CLINIC | Facility: CLINIC | Age: 64
End: 2019-05-13
Payer: COMMERCIAL

## 2019-05-13 VITALS
TEMPERATURE: 98 F | HEIGHT: 61 IN | BODY MASS INDEX: 22.24 KG/M2 | HEART RATE: 68 BPM | RESPIRATION RATE: 16 BRPM | OXYGEN SATURATION: 98 % | WEIGHT: 117.8 LBS | DIASTOLIC BLOOD PRESSURE: 88 MMHG | SYSTOLIC BLOOD PRESSURE: 155 MMHG

## 2019-05-13 DIAGNOSIS — Z00.00 WELL ADULT EXAM: Primary | ICD-10-CM

## 2019-05-13 DIAGNOSIS — C56.9 MALIGNANT NEOPLASM OF OVARY, UNSPECIFIED LATERALITY (HCC): ICD-10-CM

## 2019-05-13 DIAGNOSIS — I10 ESSENTIAL HYPERTENSION: ICD-10-CM

## 2019-05-13 DIAGNOSIS — I89.0 LYMPHEDEMA OF LEFT LOWER EXTREMITY: ICD-10-CM

## 2019-05-13 PROCEDURE — 99396 PREV VISIT EST AGE 40-64: CPT | Performed by: FAMILY MEDICINE

## 2019-05-13 RX ORDER — LISINOPRIL 20 MG/1
20 TABLET ORAL DAILY
Qty: 30 TABLET | Refills: 2 | Status: SHIPPED | OUTPATIENT
Start: 2019-05-13 | End: 2019-06-24 | Stop reason: DRUGHIGH

## 2019-06-10 ENCOUNTER — EVALUATION (OUTPATIENT)
Dept: PHYSICAL THERAPY | Facility: CLINIC | Age: 64
End: 2019-06-10
Payer: COMMERCIAL

## 2019-06-10 DIAGNOSIS — I89.0 LYMPHEDEMA: Primary | ICD-10-CM

## 2019-06-10 PROCEDURE — 97162 PT EVAL MOD COMPLEX 30 MIN: CPT | Performed by: PHYSICAL THERAPIST

## 2019-06-12 ENCOUNTER — OFFICE VISIT (OUTPATIENT)
Dept: PHYSICAL THERAPY | Facility: CLINIC | Age: 64
End: 2019-06-12
Payer: COMMERCIAL

## 2019-06-12 DIAGNOSIS — I89.0 LYMPHEDEMA: Primary | ICD-10-CM

## 2019-06-12 PROCEDURE — 97016 VASOPNEUMATIC DEVICE THERAPY: CPT | Performed by: PHYSICAL THERAPIST

## 2019-06-12 PROCEDURE — 97140 MANUAL THERAPY 1/> REGIONS: CPT | Performed by: PHYSICAL THERAPIST

## 2019-06-13 DIAGNOSIS — I89.0 LYMPHEDEMA OF LEFT LOWER EXTREMITY: Primary | ICD-10-CM

## 2019-06-14 ENCOUNTER — HOSPITAL ENCOUNTER (OUTPATIENT)
Dept: MAMMOGRAPHY | Facility: CLINIC | Age: 64
Discharge: HOME | End: 2019-06-14
Payer: COMMERCIAL

## 2019-06-14 ENCOUNTER — HOSPITAL ENCOUNTER (OUTPATIENT)
Dept: ULTRASOUND IMAGING | Facility: CLINIC | Age: 64
Discharge: HOME | End: 2019-06-14
Payer: COMMERCIAL

## 2019-06-14 VITALS — WEIGHT: 117 LBS | HEIGHT: 61 IN | BODY MASS INDEX: 22.09 KG/M2

## 2019-06-14 DIAGNOSIS — R92.8 ABNORMAL MAMMOGRAM: ICD-10-CM

## 2019-06-14 DIAGNOSIS — D05.11 DUCTAL CARCINOMA IN SITU (DCIS) OF RIGHT BREAST: ICD-10-CM

## 2019-06-14 PROCEDURE — 76642 ULTRASOUND BREAST LIMITED: CPT

## 2019-06-14 PROCEDURE — 77066 DX MAMMO INCL CAD BI: CPT

## 2019-06-14 PROCEDURE — G0279 TOMOSYNTHESIS, MAMMO: HCPCS

## 2019-06-17 ENCOUNTER — HOSPITAL ENCOUNTER (OUTPATIENT)
Dept: MAMMOGRAPHY | Facility: CLINIC | Age: 64
Discharge: HOME | End: 2019-06-17
Payer: COMMERCIAL

## 2019-06-17 ENCOUNTER — HOSPITAL ENCOUNTER (OUTPATIENT)
Dept: ULTRASOUND IMAGING | Facility: CLINIC | Age: 64
Discharge: HOME | End: 2019-06-17
Admitting: RADIOLOGY
Payer: COMMERCIAL

## 2019-06-17 ENCOUNTER — TRANSCRIBE ORDERS (OUTPATIENT)
Dept: MAMMOGRAPHY | Facility: CLINIC | Age: 64
End: 2019-06-17

## 2019-06-17 VITALS — SYSTOLIC BLOOD PRESSURE: 120 MMHG | HEART RATE: 72 BPM | DIASTOLIC BLOOD PRESSURE: 60 MMHG

## 2019-06-17 DIAGNOSIS — R92.8 ABNORMAL MAMMOGRAM: ICD-10-CM

## 2019-06-17 DIAGNOSIS — R92.8 ABNORMAL ULTRASOUND OF BREAST: ICD-10-CM

## 2019-06-17 PROCEDURE — 19083 BX BREAST 1ST LESION US IMAG: CPT

## 2019-06-17 PROCEDURE — 88342 IMHCHEM/IMCYTCHM 1ST ANTB: CPT | Performed by: PATHOLOGY

## 2019-06-17 PROCEDURE — 88341 IMHCHEM/IMCYTCHM EA ADD ANTB: CPT | Performed by: PATHOLOGY

## 2019-06-17 PROCEDURE — 88305 TISSUE EXAM BY PATHOLOGIST: CPT | Performed by: PATHOLOGY

## 2019-06-17 RX ORDER — LIDOCAINE HYDROCHLORIDE 10 MG/ML
4 INJECTION, SOLUTION INFILTRATION; PERINEURAL ONCE
Status: COMPLETED | OUTPATIENT
Start: 2019-06-17 | End: 2019-06-17

## 2019-06-17 RX ADMIN — LIDOCAINE HYDROCHLORIDE 4 ML: 10 INJECTION, SOLUTION INFILTRATION; PERINEURAL at 14:24

## 2019-06-20 ENCOUNTER — TELEPHONE (OUTPATIENT)
Dept: SURGICAL ONCOLOGY | Facility: CLINIC | Age: 64
End: 2019-06-20

## 2019-06-20 DIAGNOSIS — R92.8 ABNORMAL FINDING ON BREAST IMAGING: Primary | ICD-10-CM

## 2019-06-21 ENCOUNTER — TELEPHONE (OUTPATIENT)
Dept: SURGICAL ONCOLOGY | Facility: CLINIC | Age: 64
End: 2019-06-21

## 2019-06-24 ENCOUNTER — OFFICE VISIT (OUTPATIENT)
Dept: FAMILY MEDICINE CLINIC | Facility: CLINIC | Age: 64
End: 2019-06-24
Payer: COMMERCIAL

## 2019-06-24 ENCOUNTER — TELEPHONE (OUTPATIENT)
Dept: SURGICAL ONCOLOGY | Facility: CLINIC | Age: 64
End: 2019-06-24

## 2019-06-24 ENCOUNTER — HOSPITAL ENCOUNTER (OUTPATIENT)
Dept: MRI IMAGING | Facility: HOSPITAL | Age: 64
Discharge: HOME | End: 2019-06-24
Attending: SURGERY
Payer: COMMERCIAL

## 2019-06-24 VITALS
BODY MASS INDEX: 21.71 KG/M2 | TEMPERATURE: 97.9 F | DIASTOLIC BLOOD PRESSURE: 64 MMHG | HEART RATE: 78 BPM | OXYGEN SATURATION: 97 % | WEIGHT: 115 LBS | SYSTOLIC BLOOD PRESSURE: 112 MMHG | HEIGHT: 61 IN

## 2019-06-24 DIAGNOSIS — R92.8 ABNORMAL FINDING ON BREAST IMAGING: ICD-10-CM

## 2019-06-24 DIAGNOSIS — E78.49 OTHER HYPERLIPIDEMIA: ICD-10-CM

## 2019-06-24 DIAGNOSIS — I10 ESSENTIAL HYPERTENSION: Primary | ICD-10-CM

## 2019-06-24 DIAGNOSIS — I89.0 LYMPHEDEMA OF LEFT LOWER EXTREMITY: ICD-10-CM

## 2019-06-24 DIAGNOSIS — R11.0 NAUSEA: ICD-10-CM

## 2019-06-24 DIAGNOSIS — R92.8 ABNORMAL FINDING ON BREAST IMAGING: Primary | ICD-10-CM

## 2019-06-24 PROBLEM — Z00.00 WELL ADULT EXAM: Status: RESOLVED | Noted: 2019-05-13 | Resolved: 2019-06-24

## 2019-06-24 PROCEDURE — 3008F BODY MASS INDEX DOCD: CPT | Performed by: FAMILY MEDICINE

## 2019-06-24 PROCEDURE — 3078F DIAST BP <80 MM HG: CPT | Performed by: FAMILY MEDICINE

## 2019-06-24 PROCEDURE — C8908 MRI W/O FOL W/CONT, BREAST,: HCPCS

## 2019-06-24 PROCEDURE — 77049 MRI BREAST C-+ W/CAD BI: CPT

## 2019-06-24 PROCEDURE — A9585 GADOBUTROL INJECTION: HCPCS | Performed by: FAMILY MEDICINE

## 2019-06-24 PROCEDURE — 3074F SYST BP LT 130 MM HG: CPT | Performed by: FAMILY MEDICINE

## 2019-06-24 PROCEDURE — 99214 OFFICE O/P EST MOD 30 MIN: CPT | Performed by: FAMILY MEDICINE

## 2019-06-24 PROCEDURE — 1036F TOBACCO NON-USER: CPT | Performed by: FAMILY MEDICINE

## 2019-06-24 RX ORDER — ATORVASTATIN CALCIUM 40 MG/1
40 TABLET, FILM COATED ORAL
Qty: 90 TABLET | Refills: 1 | Status: SHIPPED | OUTPATIENT
Start: 2019-06-24 | End: 2019-12-23 | Stop reason: SDUPTHER

## 2019-06-24 RX ORDER — AMLODIPINE BESYLATE 5 MG/1
5 TABLET ORAL DAILY
Qty: 90 TABLET | Refills: 1 | Status: SHIPPED | OUTPATIENT
Start: 2019-06-24 | End: 2019-08-05 | Stop reason: DRUGHIGH

## 2019-06-24 RX ORDER — LISINOPRIL 10 MG/1
10 TABLET ORAL DAILY
Qty: 30 TABLET | Refills: 2 | Status: SHIPPED | OUTPATIENT
Start: 2019-06-24 | End: 2019-07-22 | Stop reason: SINTOL

## 2019-06-24 RX ADMIN — GADOBUTROL 5 ML: 604.72 INJECTION INTRAVENOUS at 11:45

## 2019-06-25 ENCOUNTER — OFFICE VISIT (OUTPATIENT)
Dept: PHYSICAL THERAPY | Facility: CLINIC | Age: 64
End: 2019-06-25
Payer: COMMERCIAL

## 2019-06-25 DIAGNOSIS — I89.0 LYMPHEDEMA: Primary | ICD-10-CM

## 2019-06-25 PROCEDURE — 97140 MANUAL THERAPY 1/> REGIONS: CPT | Performed by: PHYSICAL THERAPIST

## 2019-06-25 NOTE — PROGRESS NOTES
Spoke to pt via telephone regarding recommendation for;      _____ RIGHT ___x___LEFT      _____Ultrasound guided  ______Stereotactic  Breast biopsy ___x_____MRI biopsy      __x___Verbalized understanding        Blood thinners:  _____yes __x___no    Date stopped: ____n/a_______    Biopsy teaching sheet reviewed via telephone:  ____x___yes ______no

## 2019-06-27 ENCOUNTER — OFFICE VISIT (OUTPATIENT)
Dept: PHYSICAL THERAPY | Facility: CLINIC | Age: 64
End: 2019-06-27
Payer: COMMERCIAL

## 2019-06-27 DIAGNOSIS — I89.0 LYMPHEDEMA: Primary | ICD-10-CM

## 2019-06-27 PROCEDURE — 97140 MANUAL THERAPY 1/> REGIONS: CPT | Performed by: PHYSICAL THERAPIST

## 2019-07-01 ENCOUNTER — OFFICE VISIT (OUTPATIENT)
Dept: PHYSICAL THERAPY | Facility: CLINIC | Age: 64
End: 2019-07-01
Payer: COMMERCIAL

## 2019-07-01 DIAGNOSIS — I89.0 LYMPHEDEMA: Primary | ICD-10-CM

## 2019-07-01 PROCEDURE — 97140 MANUAL THERAPY 1/> REGIONS: CPT | Performed by: PHYSICAL THERAPIST

## 2019-07-01 NOTE — PROGRESS NOTES
Daily Note     Today's date: 2019  Patient name: Vania Whitfield  : 1955  MRN: [id number]  Referring provider: Demetrius Kwan  Dx:   Encounter Diagnosis     ICD-10-CM    1  Lymphedema I89 0                   Subjective: Pt reports her lower leg responded well to the compression wrapping, however her upper thigh got more swollen at the end of the day  Objective: See treatment diary below      Assessment: Tolerated treatment well  Patient was educated in compression wrapping  Compression wrapping performed for the entire leg      Plan: Continue per plan of care         Dx: L LE lymphedema  EPOC: 19  CO-MORBIDITIES: ovarian Ca, breast CA  PERSONAL FACTORS:   Precautions: Ca    Daily Treatment Diary     Manual           L LE MLD/ CLT  39'  25'  45'  45'                      pneumatic compression pump 15'                                          Exercise Diary                                                                                                                                                                                                                                                                                      Modalities

## 2019-07-02 ENCOUNTER — TELEPHONE (OUTPATIENT)
Dept: FAMILY MEDICINE CLINIC | Facility: CLINIC | Age: 64
End: 2019-07-02

## 2019-07-02 DIAGNOSIS — I10 BENIGN ESSENTIAL HYPERTENSION: Primary | ICD-10-CM

## 2019-07-02 DIAGNOSIS — I89.0 LYMPHEDEMA OF LEFT LOWER EXTREMITY: Primary | ICD-10-CM

## 2019-07-02 RX ORDER — LOSARTAN POTASSIUM 50 MG/1
50 TABLET ORAL DAILY
Qty: 90 TABLET | Refills: 0 | Status: SHIPPED | OUTPATIENT
Start: 2019-07-02 | End: 2019-07-22 | Stop reason: ALTCHOICE

## 2019-07-05 ENCOUNTER — OFFICE VISIT (OUTPATIENT)
Dept: PHYSICAL THERAPY | Facility: CLINIC | Age: 64
End: 2019-07-05
Payer: COMMERCIAL

## 2019-07-05 DIAGNOSIS — I89.0 LYMPHEDEMA: Primary | ICD-10-CM

## 2019-07-05 PROCEDURE — 97140 MANUAL THERAPY 1/> REGIONS: CPT | Performed by: PHYSICAL THERAPIST

## 2019-07-05 NOTE — PROGRESS NOTES
Daily Note     Today's date: 2019  Patient name: Irvin Link  : 1955  MRN: [id number]  Referring provider: Nighat Marrero  Dx:   Encounter Diagnosis     ICD-10-CM    1  Lymphedema I89 0                   Subjective: Pt reports her lower leg responded well to the compression wrapping and she saw a difference the next day  Objective: See treatment diary below      Assessment: Tolerated treatment well  Patient was 13' late to apt 2* stomach not feeling well  Pt was measured for a custom [employer] Night [employer] garment      Plan: Continue per plan of care         Dx: L LE lymphedema  EPOC: 19  CO-MORBIDITIES: ovarian Ca, breast CA  PERSONAL FACTORS:   Precautions: Ca    Daily Treatment Diary     Manual          L LE MLD/ CLT  45'  25' Th 45' Th 45'  30'                     pneumatic compression pump 15'                                          Exercise Diary                                                                                                                                                                                                                                                                                      Modalities

## 2019-07-08 ENCOUNTER — OFFICE VISIT (OUTPATIENT)
Dept: PHYSICAL THERAPY | Facility: CLINIC | Age: 64
End: 2019-07-08
Payer: COMMERCIAL

## 2019-07-08 DIAGNOSIS — I10 ESSENTIAL HYPERTENSION: ICD-10-CM

## 2019-07-08 DIAGNOSIS — I89.0 LYMPHEDEMA: Primary | ICD-10-CM

## 2019-07-08 PROCEDURE — 97140 MANUAL THERAPY 1/> REGIONS: CPT | Performed by: PHYSICAL THERAPIST

## 2019-07-08 RX ORDER — HYDROCHLOROTHIAZIDE 12.5 MG/1
12.5 CAPSULE, GELATIN COATED ORAL EVERY MORNING
Qty: 30 CAPSULE | Refills: 5 | Status: SHIPPED | OUTPATIENT
Start: 2019-07-08 | End: 2019-12-27 | Stop reason: SDUPTHER

## 2019-07-08 NOTE — PROGRESS NOTES
Daily Note     Today's date: 2019  Patient name: Kinsey Mendoza  : 1955  MRN: [id number]  Referring provider: Casper Rondon  Dx:   Encounter Diagnosis     ICD-10-CM    1  Lymphedema I89 0                   Subjective: Pt reports her LE compression wrapping stayed on for 48 hours  Pt feels the compression wrapping is helping to reduce her edema  " I actually have a knee and an ankle" after compression wrapping comes off  Objective: See treatment diary below      Assessment: Tolerated treatment well  Patient has not had time to wrap her leg at home independently, but knows how to complete the task      Plan: Continue per plan of care            Dx: L LE lymphedema  EPOC: 19  CO-MORBIDITIES: ovarian Ca, breast CA  PERSONAL FACTORS:   Precautions: Ca    Daily Treatment Diary     Manual   7 8       L LE MLD/ CLT  45' Th 25' Th 45' Th 45' Th 30' Th 45'                    pneumatic compression pump 15'                                          Exercise Diary                                                                                                                                                                                                                                                                                      Modalities

## 2019-07-16 ENCOUNTER — OFFICE VISIT (OUTPATIENT)
Dept: PHYSICAL THERAPY | Facility: CLINIC | Age: 64
End: 2019-07-16
Payer: COMMERCIAL

## 2019-07-16 DIAGNOSIS — I89.0 LYMPHEDEMA: Primary | ICD-10-CM

## 2019-07-16 PROCEDURE — 97140 MANUAL THERAPY 1/> REGIONS: CPT | Performed by: PHYSICAL THERAPIST

## 2019-07-16 NOTE — PROGRESS NOTES
Daily Note     Today's date: 2019  Patient name: Annalise Marie  : 1955  MRN: [id number]  Referring provider: Jonah Workman  Dx:   Encounter Diagnosis     ICD-10-CM    1  Lymphedema I89 0                   Subjective: Pt arrived 13' late to apt  Pt had difficulty getting to the facility 2* road work/ slow tractor on the road  Pt's cat scratched her stocking  The claws did not puncture her skin, but left tears in her new compression stocking  Objective: See treatment diary below      Assessment: Tolerated treatment well  Patient continues to have a red rash that does not change in L anterior tib region    Plan: Continue per plan of care            Dx: L LE lymphedema  EPOC: 19  CO-MORBIDITIES: ovarian Ca, breast CA  PERSONAL FACTORS:   Precautions: Ca    Daily Treatment Diary     Manual   7 8       L LE MLD/ CLT  45' Th 25' Th 45' Th 45' Th 30' Th 45'  30'                   pneumatic compression pump 15'                                          Exercise Diary                                                                                                                                                                                                                                                                                      Modalities

## 2019-07-18 ENCOUNTER — HOSPITAL ENCOUNTER (OUTPATIENT)
Dept: RADIOLOGY | Facility: HOSPITAL | Age: 64
Discharge: HOME | End: 2019-07-18
Attending: SURGERY
Payer: COMMERCIAL

## 2019-07-18 ENCOUNTER — APPOINTMENT (OUTPATIENT)
Dept: PHYSICAL THERAPY | Facility: CLINIC | Age: 64
End: 2019-07-18
Payer: COMMERCIAL

## 2019-07-18 DIAGNOSIS — R92.8 ABNORMAL FINDING ON BREAST IMAGING: ICD-10-CM

## 2019-07-18 PROCEDURE — 88342 IMHCHEM/IMCYTCHM 1ST ANTB: CPT | Performed by: PATHOLOGY

## 2019-07-18 PROCEDURE — 19085 BX BREAST 1ST LESION MR IMAG: CPT

## 2019-07-18 PROCEDURE — 88360 TUMOR IMMUNOHISTOCHEM/MANUAL: CPT | Performed by: PATHOLOGY

## 2019-07-18 PROCEDURE — 88305 TISSUE EXAM BY PATHOLOGIST: CPT | Performed by: PATHOLOGY

## 2019-07-18 PROCEDURE — 88341 IMHCHEM/IMCYTCHM EA ADD ANTB: CPT | Performed by: PATHOLOGY

## 2019-07-18 PROCEDURE — A9585 GADOBUTROL INJECTION: HCPCS | Performed by: SURGERY

## 2019-07-18 RX ORDER — LIDOCAINE HYDROCHLORIDE 10 MG/ML
5 INJECTION, SOLUTION EPIDURAL; INFILTRATION; INTRACAUDAL; PERINEURAL ONCE
Status: COMPLETED | OUTPATIENT
Start: 2019-07-18 | End: 2019-07-18

## 2019-07-18 RX ORDER — LIDOCAINE HYDROCHLORIDE AND EPINEPHRINE BITARTRATE 20; .01 MG/ML; MG/ML
20 INJECTION, SOLUTION SUBCUTANEOUS ONCE
Status: COMPLETED | OUTPATIENT
Start: 2019-07-18 | End: 2019-07-18

## 2019-07-18 RX ADMIN — GADOBUTROL 7 ML: 604.72 INJECTION INTRAVENOUS at 08:16

## 2019-07-18 RX ADMIN — LIDOCAINE HYDROCHLORIDE,EPINEPHRINE BITARTRATE 20 ML: 20; .01 INJECTION, SOLUTION INFILTRATION; PERINEURAL at 08:35

## 2019-07-18 RX ADMIN — LIDOCAINE HYDROCHLORIDE 5 ML: 10 INJECTION, SOLUTION EPIDURAL; INFILTRATION; INTRACAUDAL; PERINEURAL at 08:35

## 2019-07-19 ENCOUNTER — TELEPHONE (OUTPATIENT)
Dept: FAMILY MEDICINE CLINIC | Facility: CLINIC | Age: 64
End: 2019-07-19

## 2019-07-19 ENCOUNTER — OFFICE VISIT (OUTPATIENT)
Dept: PHYSICAL THERAPY | Facility: CLINIC | Age: 64
End: 2019-07-19
Payer: COMMERCIAL

## 2019-07-19 DIAGNOSIS — I89.0 LYMPHEDEMA: Primary | ICD-10-CM

## 2019-07-19 PROCEDURE — 97140 MANUAL THERAPY 1/> REGIONS: CPT | Performed by: PHYSICAL THERAPIST

## 2019-07-19 NOTE — TELEPHONE ENCOUNTER
Pt called stating that her right side has been feeling numb and when she takes her BP, the diastolic number has been low  Pt states she did not take her Losartan yesterday or today and she has not had these symptoms  Pt is wondering if she should come in to discuss this with you  Please advise

## 2019-07-19 NOTE — PROGRESS NOTES
Daily Note     Today's date: 2019  Patient name: Vinod Garza  : 1955  MRN: [id number]  Referring provider: Terence Gupta  Dx:   Encounter Diagnosis     ICD-10-CM    1  Lymphedema I89 0                   Subjective: Pt reports her R LE is more swollen today 2* unknown cause      Objective: See treatment diary below      Assessment: Tolerated treatment well  Patient arrived 21' late   Modified tx performed      Plan: Plan on RA NV       Dx: L LE lymphedema  EPOC: 19  CO-MORBIDITIES: ovarian Ca, breast CA  PERSONAL FACTORS:   Precautions: Ca    Daily Treatment Diary     Manual   7 8      L LE MLD/ CLT  45'  25'  45'  45'  30'  45'  30'  25'                  pneumatic compression pump 15'                                          Exercise Diary                                                                                                                                                                                                                                                                                      Modalities

## 2019-07-19 NOTE — PROGRESS NOTES
Post procedure call completed on 7/19/19 @ 1400    Bleeding: _____yes __x___no    Pain: _____yes ___x___no    Redness/Swelling: ______yes __x____no    Band aid removed: __x___yes _____no    Steri-Strips intact: __x____yes _____no    Pt reported that she had a small amount of bleeding last evening but none today  Encouraged her to restrain from strenuous activity for the next 24 hours

## 2019-07-22 ENCOUNTER — OFFICE VISIT (OUTPATIENT)
Dept: FAMILY MEDICINE CLINIC | Facility: CLINIC | Age: 64
End: 2019-07-22
Payer: COMMERCIAL

## 2019-07-22 ENCOUNTER — EVALUATION (OUTPATIENT)
Dept: PHYSICAL THERAPY | Facility: CLINIC | Age: 64
End: 2019-07-22
Payer: COMMERCIAL

## 2019-07-22 VITALS
SYSTOLIC BLOOD PRESSURE: 148 MMHG | HEART RATE: 61 BPM | TEMPERATURE: 98.9 F | BODY MASS INDEX: 22.28 KG/M2 | WEIGHT: 118 LBS | RESPIRATION RATE: 16 BRPM | DIASTOLIC BLOOD PRESSURE: 80 MMHG | HEIGHT: 61 IN | OXYGEN SATURATION: 98 %

## 2019-07-22 DIAGNOSIS — I89.0 LYMPHEDEMA OF LEFT LOWER EXTREMITY: ICD-10-CM

## 2019-07-22 DIAGNOSIS — G45.9 TIA (TRANSIENT ISCHEMIC ATTACK): Primary | ICD-10-CM

## 2019-07-22 DIAGNOSIS — I89.0 LYMPHEDEMA: Primary | ICD-10-CM

## 2019-07-22 DIAGNOSIS — I10 BENIGN ESSENTIAL HYPERTENSION: ICD-10-CM

## 2019-07-22 DIAGNOSIS — R92.8 ABNORMAL MRI, BREAST: ICD-10-CM

## 2019-07-22 PROCEDURE — 99214 OFFICE O/P EST MOD 30 MIN: CPT | Performed by: FAMILY MEDICINE

## 2019-07-22 PROCEDURE — 97140 MANUAL THERAPY 1/> REGIONS: CPT | Performed by: PHYSICAL THERAPIST

## 2019-07-22 NOTE — PROGRESS NOTES
PT Re-Evaluation     Today's date: 2019  Patient name: Kimmy Velasco  : 1955  MRN: [id number]  Referring provider: Eva Osler  Dx:   Encounter Diagnosis     ICD-10-CM    1  Lymphedema I89 0                   Assessment  Assessment details: Since starting skilled PT, L LE edema has decreased  Pt has compression pump, LE compression stocking and solaris night garment  Recommend pt continue for 2-3 more weeks, then progress to HEP  Impairments: abnormal or restricted ROM and impaired physical strength  Other impairment: increased edema  Understanding of Dx/Px/POC: good   Prognosis: good    Goals  STG's ( 3-4 weeks)  1  Pt will be independent in HEP- met  2  Pt will be independent in L Le compression wrapping-met  LTG's ( 6- 8 weeks)  1  Improve FOTO score by 8-10 points- not met  2  Reduce L LE swelling to max ability- partial met  3  Pt will be able to get down on the floor with greater ease when placing food bowls or changing bong litter  Plan  Patient would benefit from: skilled physical therapy  Planned therapy interventions: manual therapy  Other planned therapy interventions: MLD/ CLT  Frequency: 2x week  Duration in weeks: 4  Plan of Care beginning date: 2019  Plan of Care expiration date: 2019  Treatment plan discussed with: patient        Subjective Evaluation    History of Present Illness  Mechanism of injury: Pt reports her leg looks thinner after she has been using the compression wrapping technique with Comprilan bandages  Pt has a new Solaris night garment, but she has not had the chance to try it on  Pt notes her leg feels softer since starting skilled PT, and not as hard    Work: ,   Hobbies: gardening, reading  Gait: no abnormalities          Recurrent probem    Pain  No pain reported    Social Support  Steps to enter house: yes  4  Stairs in house: yes   13  Lives in: multiple-level home    Employment status: working  Treatments  Previous treatment: physical therapy  Patient Goals  Patient goals for therapy: decreased edema  Patient goal: to manage lymphedema better        Objective     Neurological Testing     Sensation     Hip   Left Hip   Diminished: light touch    Right Hip   Intact: light touch    Active Range of Motion   Left Knee   Flexion: 125 degrees   Extension: 0 degrees     Right Knee   Flexion: 145 degrees   Extension: 0 degrees   Left Ankle/Foot   Dorsiflexion (ke): 0 degrees     Right Ankle/Foot   Dorsiflexion (ke): 0 degrees     Strength/Myotome Testing     Left Hip   Planes of Motion   Flexion: 4  External rotation: 4+  Internal rotation: 4+    Right Hip   Normal muscle strength    Additional Strength Details  L knee strength: 4+/5    I E: LE lymphedema circumferential measurements:    Metatarsal: R: 20 cm   L:   20 5 cm  Lateral malleolus: R:  20 5 cm   L: 26 5 cm  Lateral malleolus + 10 cm: R:  20 cm    L: 28 5  cm  Lateral malleolus + 20 cm: R:  29 cm    L: 41 5   cm  Lateral malleolus + 30 cm: R: 32  cm   L: 43 cm  Lateral malleolus + 40 cm: R: 33 5 cm  L:42  cm  Lateral malleolus + 50 cm: R: 38  cm   L: 51 cm  Lateral malleolus + 60 cm: R: 44  cm   L: 55 5  Cm  Lateral malleolus + 70 cm: R: 50 0 cm  L: 58 0 cm    RA L LE lymphedema measurements  Metatarsal: 20 5 cm  Lateral malleolus: 25 5 cm  Lateral malleolus + 10 cm: 27 5 cm  Lateral malleolus + 20 cm: 40 5 cm  Lateral malleolus + 30 cm: 42 5 cm  Lateral malleolus + 40 cm: 43 0 cm  Lateral malleolus + 50 cm: 49 5 cm  Lateral malleolus + 60 cm: 54  0 cm  Lateral malleolus + 70 cm: 57 0 cm        L distal leg is mildly red with warmth  (-) pitting edema        Flowsheet Rows      Most Recent Value   PT/OT G-Codes   Current Score  56   Projected Score  0          Dx:  L LE lymphedema  EPOC: 8/5/19  CO-MORBIDITIES: ovarian Ca, breast CA  PERSONAL FACTORS:   Precautions: Ca    Daily Treatment Diary     Manual  6/12 6/25 6/27 7/1 7/5 7 8 7/16 7/19 7/22    L LE MLD/ CLT Th 39' Th 25' Th 45' Th 45' Th 30' Th 45' Th 30' Th 25' Th 40'                 pneumatic compression pump 15'                                          Exercise Diary                                                                                                                                                                                                                                                                                      Modalities

## 2019-07-22 NOTE — PROGRESS NOTES
Assessment/Plan:      Diagnoses and all orders for this visit:    TIA (transient ischemic attack)  Comments:  numbness and weakness of right  arm and right  leg, second episode also involving right face   Orders:  -     CT head wo contrast; Future  -     VAS carotid complete study; Future    Benign essential hypertension  Comments:  uncontrolled, increase amlodipine 10mg daily     Lymphedema of left lower extremity  Comments:  prescriptions for new compression stockings    Abnormal MRI, breast  Comments:  awaiting results of breast biopsy          Subjective:  Chief Complaint   Patient presents with    Hypertension     Patient presents for hypertension follow up, patient stopped Losartan last Wednesday  Since then she has aquired right sided numbness  Patient ID: Leo Carvajal is a 66 y o  female  Numbness in right arm and leg, lasted about 1 minute on Wednesday  Then went home Cleaned stove and microwave, right arm and right leg and 1/2 of face felt numb, dragging leg  Less than 10 min  No symptoms since   132/58 blood pressure at the time   Pt had recent mri guided biopsy of breast, awaiting results  Intolerant to lisinopril and losartan  Review of Systems   Constitutional: Negative  Negative for fatigue and fever  HENT: Negative  Eyes: Negative  Respiratory: Negative  Negative for cough  Cardiovascular: Negative  Gastrointestinal: Negative  Endocrine: Negative  Genitourinary: Negative  Musculoskeletal: Negative  Skin: Negative  Allergic/Immunologic: Negative  Neurological: Positive for weakness and numbness  Negative for facial asymmetry  Weakness left arm and left leg, numbness left arm left leg and left face  Psychiatric/Behavioral: Negative            The following portions of the patient's history were reviewed and updated as appropriate: allergies, current medications, past family history, past medical history, past social history, past surgical history and problem list     Objective:  Vitals:    07/22/19 1431 07/22/19 1438   BP: 142/80 148/80   Pulse: 61    Resp: 16    Temp: 98 9 °F (37 2 °C)    SpO2: 98%    Weight: 53 5 kg (118 lb)    Height: 5' 1" (1 549 m)       Physical Exam   Constitutional: She is oriented to person, place, and time  She appears well-developed and well-nourished  HENT:   Head: Normocephalic and atraumatic  Cardiovascular: Normal rate, regular rhythm and normal heart sounds  Pulmonary/Chest: Effort normal and breath sounds normal    Abdominal: Soft  Bowel sounds are normal    Neurological: She is alert and oriented to person, place, and time  No cranial nerve deficit  She exhibits normal muscle tone  Coordination normal    Skin: Skin is warm and dry  Psychiatric: She has a normal mood and affect  Her behavior is normal  Judgment and thought content normal    Nursing note and vitals reviewed

## 2019-07-22 NOTE — PATIENT INSTRUCTIONS
Increase amlodipine to 10mg daily and monitor blood pressure   Schedule ct scan brain and ultrasound carotid  Ct head without contrast at hospital   Ultrasound carotids schedule at hospital   Keep follow up with Dr Anderson Thurston   Low dose aspirin 81mg ?  Check with GI

## 2019-07-23 ENCOUNTER — TELEPHONE (OUTPATIENT)
Dept: FAMILY MEDICINE CLINIC | Facility: CLINIC | Age: 64
End: 2019-07-23

## 2019-07-23 PROBLEM — G45.9 TIA (TRANSIENT ISCHEMIC ATTACK): Status: ACTIVE | Noted: 2019-07-23

## 2019-07-23 NOTE — TELEPHONE ENCOUNTER
Pharmacy called, they have some questions on the compression stocking  MM compression needed is?   Size Small or large    [phone]

## 2019-07-23 NOTE — TELEPHONE ENCOUNTER
I threw the paper out with the mmhg  Please verify with patient  I think it was 20-30mmhg on both right and left legs

## 2019-07-24 NOTE — TELEPHONE ENCOUNTER
Left is size 20-30 (large) closed toe and up to the thigh  Right 20-30 (med closed toe and up to the thigh      Honey Color for both

## 2019-07-25 ENCOUNTER — TELEPHONE (OUTPATIENT)
Dept: FAMILY MEDICINE CLINIC | Facility: CLINIC | Age: 64
End: 2019-07-25

## 2019-07-25 ENCOUNTER — TELEPHONE (OUTPATIENT)
Dept: SURGICAL ONCOLOGY | Facility: CLINIC | Age: 64
End: 2019-07-25

## 2019-07-25 NOTE — TELEPHONE ENCOUNTER
Called patient to review breast biopsy results  Patient had bad reception where she was at and stated that she would call back at a later time to go over results  Call back number provided  Patient returned phone call  Explained to patient that her biopsy results did show a grade one invasive breast carcinoma  Reviewed hormone receptors, tumor size, and lymph node status with patient as well  Patient asked what her treatment options were; explained that Dr Severa Liberty would have that conversation with her when he returns to the office  Patient verbalized understanding  Patient denies any additional questions or concerns at this time  Instructed patient to call the office if any questions arise before her appointment

## 2019-07-25 NOTE — TELEPHONE ENCOUNTER
Pharmacy called stating he will need a new rx for pt's compression stockings, he will need one rx for a med   Size, pt uses 2 different sizes, pharmacy already has a rx for the large,

## 2019-07-30 PROBLEM — R92.8 ABNORMAL MRI, BREAST: Status: RESOLVED | Noted: 2018-07-12 | Resolved: 2019-07-30

## 2019-07-30 PROBLEM — C50.112 MALIGNANT NEOPLASM OF CENTRAL PORTION OF LEFT BREAST IN FEMALE, ESTROGEN RECEPTOR POSITIVE (HCC): Status: ACTIVE | Noted: 2019-07-30

## 2019-07-30 PROBLEM — C50.912 MALIGNANT NEOPLASM OF LEFT BREAST IN FEMALE, ESTROGEN RECEPTOR POSITIVE (HCC): Status: ACTIVE | Noted: 2019-07-30

## 2019-07-30 PROBLEM — Z86.000 HISTORY OF DUCTAL CARCINOMA IN SITU (DCIS) OF BREAST: Status: ACTIVE | Noted: 2018-08-08

## 2019-07-30 PROBLEM — Z17.0 MALIGNANT NEOPLASM OF LEFT BREAST IN FEMALE, ESTROGEN RECEPTOR POSITIVE (HCC): Status: ACTIVE | Noted: 2019-07-30

## 2019-07-31 ENCOUNTER — DOCUMENTATION (OUTPATIENT)
Dept: FAMILY MEDICINE CLINIC | Facility: CLINIC | Age: 64
End: 2019-07-31

## 2019-07-31 DIAGNOSIS — M81.8 OTHER OSTEOPOROSIS WITHOUT CURRENT PATHOLOGICAL FRACTURE: ICD-10-CM

## 2019-07-31 RX ORDER — ALENDRONATE SODIUM 70 MG/1
70 TABLET ORAL
Qty: 4 TABLET | Refills: 0 | Status: SHIPPED | OUTPATIENT
Start: 2019-07-31 | End: 2019-08-26 | Stop reason: SDUPTHER

## 2019-07-31 NOTE — PROGRESS NOTES
Prior authorization for CT of head approved  Tried contacting patient, her mailbox is full   '  PA number is [phone]

## 2019-08-01 ENCOUNTER — DOCUMENTATION (OUTPATIENT)
Dept: HEMATOLOGY ONCOLOGY | Facility: CLINIC | Age: 64
End: 2019-08-01

## 2019-08-01 ENCOUNTER — OFFICE VISIT (OUTPATIENT)
Dept: SURGICAL ONCOLOGY | Facility: CLINIC | Age: 64
End: 2019-08-01
Payer: COMMERCIAL

## 2019-08-01 VITALS
WEIGHT: 117 LBS | BODY MASS INDEX: 22.09 KG/M2 | DIASTOLIC BLOOD PRESSURE: 80 MMHG | HEART RATE: 74 BPM | SYSTOLIC BLOOD PRESSURE: 162 MMHG | RESPIRATION RATE: 16 BRPM | TEMPERATURE: 97.9 F | HEIGHT: 61 IN

## 2019-08-01 DIAGNOSIS — C50.112 MALIGNANT NEOPLASM OF CENTRAL PORTION OF LEFT BREAST IN FEMALE, ESTROGEN RECEPTOR POSITIVE (HCC): Primary | ICD-10-CM

## 2019-08-01 DIAGNOSIS — Z17.0 MALIGNANT NEOPLASM OF CENTRAL PORTION OF LEFT BREAST IN FEMALE, ESTROGEN RECEPTOR POSITIVE (HCC): Primary | ICD-10-CM

## 2019-08-01 DIAGNOSIS — Z86.000 HISTORY OF DUCTAL CARCINOMA IN SITU (DCIS) OF BREAST: ICD-10-CM

## 2019-08-01 PROCEDURE — 99215 OFFICE O/P EST HI 40 MIN: CPT | Performed by: SURGERY

## 2019-08-01 RX ORDER — OXYCODONE HYDROCHLORIDE AND ACETAMINOPHEN 5; 325 MG/1; MG/1
1 TABLET ORAL EVERY 6 HOURS PRN
Qty: 6 TABLET | Refills: 0 | Status: SHIPPED | OUTPATIENT
Start: 2019-08-01 | End: 2020-01-24 | Stop reason: ALTCHOICE

## 2019-08-01 NOTE — PATIENT INSTRUCTIONS
Pre-Surgery Instructions:   Medication Instructions    albuterol (PROVENTIL HFA,VENTOLIN HFA) 90 mcg/act inhaler Take morning of surgery if needed    alendronate (FOSAMAX) 70 mg tablet Stop taking 1 days prior to surgery    amLODIPine (NORVASC) 5 mg tablet Take morning of surgery    atorvastatin (LIPITOR) 40 mg tablet Take morning of surgery    Calcium Carbonate (CALTRATE 600) 1500 (600 Ca) MG TABS Stop taking 1 days prior to surgery    clobetasol (TEMOVATE) 0 05 % ointment Stop taking 1 days prior to surgery         hydrochlorothiazide (MICROZIDE) 12 5 mg capsule Take morning of surgery    inFLIXimab (REMICADE) 100 mg Stop taking 1 days prior to surgery    mupirocin (BACTROBAN) 2 % ointment Stop taking 1 days prior to surgery           Breast Lumpectomy   AMBULATORY CARE:   What you need to know about a lumpectomy:  A lumpectomy is surgery to remove a mass in your breast  Breast tissue that surrounds the mass may also be taken  A lumpectomy is also known as breast-conserving surgery, a partial mastectomy, or a segmental mastectomy  How to prepare for a lumpectomy:   · You may need a mammogram or ultrasound before surgery  These tests may be done the same day as your surgery or at an earlier time  Your healthcare provider may use pictures from these tests to benoit the location of the mass  The marker will show him where to make your incision  · Your healthcare provider will talk to you about how to prepare for surgery  He may tell you not to eat or drink anything after midnight on the day of your surgery  He will tell you what medicines to take or not take on the day of your surgery  You may need to stop taking blood thinners or aspirin several days before your surgery  Arrange for someone to drive you home and stay with you for 24 hours after surgery  This person can help care for you, and monitor for any problems    What will happen during a lumpectomy:  You will be given general anesthesia to keep you asleep and free from pain during surgery  You may be given an antibiotic through your IV to help prevent a bacterial infection  Your healthcare provider will make an incision in your breast and remove the mass  He may also remove breast tissue or lymph nodes that are close to the mass  A drain may be inserted near your incision to remove extra fluid  This will decrease swelling and help your incision heal  Your healthcare provider will close your incision with stitches or strips of medical tape and cover it with a bandage  He may also wrap a tight-fitting bandage around both of your breasts  This may decrease swelling, bleeding, and pain  What will happen after a lumpectomy:  Healthcare providers will monitor you until you are awake  You may able to go home when you are awake and your pain is controlled  Instead you may need to spend the night in the hospital    Risks of a lumpectomy:  You may bleed more than expected or get an infection  Nerves, blood vessels, and muscles may be damaged during your surgery  You may have swelling in your arm closest to the lumpectomy or where lymph nodes were removed  This swelling is called lymphedema  Lymphedema may cause tingling, numbness, stiffness, and weakness in your arm  This may be permanent  You may get a blood clot in your arm or leg  The blood clot may travel to your heart lungs, or brain  This may become life-threatening  Call 911 for any of the following:   · You feel lightheaded, short of breath, and have chest pain  · You cough up blood  · You have trouble breathing  Seek care immediately if:   · Blood soaks through your bandage  · Your stitches come apart  · Your bruise suddenly gets bigger  · Your leg or arm is larger than normal and painful  Contact your healthcare provider if:   · You have a fever or chills  · Your wound is red, swollen, or draining pus  · You have nausea or are vomiting      · Your skin is itchy, swollen, or you have a rash  · Your pain does not get better after you take pain medicine  · Your drain falls out or stops draining fluid  · Your drain has pus or foul-smelling fluid coming out of it  · You have questions or concerns about your condition or care  Medicines: You may need any of the following:  · Antibiotics  help prevent a bacterial infection  · Prescription pain medicine  may be given  Ask your healthcare provider how to take this medicine safely  Some prescription pain medicines contain acetaminophen  Do not take other medicines that contain acetaminophen without talking to your healthcare provider  Too much acetaminophen may cause liver damage  Prescription pain medicine may cause constipation  Ask your healthcare provider how to prevent or treat constipation  · NSAIDs , such as ibuprofen, help decrease swelling, pain, and fever  NSAIDs can cause stomach bleeding or kidney problems in certain people  If you take blood thinner medicine, always ask your healthcare provider if NSAIDs are safe for you  Always read the medicine label and follow directions  · Take your medicine as directed  Contact your healthcare provider if you think your medicine is not helping or if you have side effects  Tell him or her if you are allergic to any medicine  Keep a list of the medicines, vitamins, and herbs you take  Include the amounts, and when and why you take them  Bring the list or the pill bottles to follow-up visits  Carry your medicine list with you in case of an emergency  Care for your wound as directed: If you have a tight-fitting bandage, you can remove it in 24 to 48 hours, or as directed  Ask your healthcare provider when your incision can get wet  You may need to take a sponge bath until your drain is removed  Carefully wash around the incision with soap and water  It is okay to allow the soap and water to gently run over your incision   Gently pat dry the area and put on new, clean bandages as directed  Change your bandages when they get wet or dirty  Check your incision every day for redness, pus, or swelling  Self-care:   · Apply ice  on your breast for 15 to 20 minutes every hour or as directed  Use an ice pack, or put crushed ice in a plastic bag  Cover it with a towel  Ice helps prevent tissue damage and decreases swelling and pain  · Rest  as directed  Do not lift anything heavy  Do not push or pull with your arms  Take short walks around the house  Gradually walk further as you feel better  Ask your healthcare provider when you can return to your normal activities  · Empty your drain  as directed  You may need to write down how much you empty from your drain each day  Ask your healthcare provider for more information about how to empty your drain  · Wear a supportive bra  as directed  Wait until you remove the tight-fitting bandage to wear a bra  You may be given a surgical bra or told to wear a sports bra  A supportive bra may help hold your bandages in place  It may also help with swelling and pain  Do not  wear bras with lace or underwire  They may rub against your incision and cause discomfort  Arm stretches: Your healthcare provider may show you how to do arm stretches  Arm stretches may prevent stiff arms or shoulders  You may need to wait until after your drains are removed to begin stretching  Do not do arm stretches until your healthcare provider says it is okay  Ask your healthcare provider for more information about arm stretches  Follow up with your healthcare provider as directed:  Write down your questions so you remember to ask them during your visits  © 2017 [facility] Information is for End User's use only and may not be sold, redistributed or otherwise used for commercial purposes  All illustrations and images included in CareNotes® are the copyrighted property of [employer]  or [employer]    The above information is an  only  It is not intended as medical advice for individual conditions or treatments  Talk to your doctor, nurse or pharmacist before following any medical regimen to see if it is safe and effective for you  Breast Cancer [place] Lymph Node Biopsy   AMBULATORY CARE:   What you need to know about a sentinel lymph node biopsy (SLNB):  A sentinel lymph node (SLN) is usually the lymph node closest to the breast tumor  It is usually found in the armpit, or along the sternum (breastbone) or collarbone  A biopsy is a procedure used to find and remove a SLN  During the biopsy, the SLN will be tested for cancer cells  If the test is positive, it may mean that breast cancer has spread outside of your breast  This information can help your healthcare provider decide what other treatments you need  How to prepare for a SLNB:   · You may need a nuclear scan before your procedure  During a nuclear scan, healthcare providers will inject a small amount of radioactive liquid in your breast  Radioactive liquid will move to the location of your lymph nodes and help them show up better in pictures  A camera will take pictures of the lymph nodes  The pictures will help your healthcare provider plan for your procedure  · Your healthcare provider will talk to you about how to prepare for your procedure  He may tell you not to eat or drink anything after midnight on the day of your procedure  He will tell you what medicines to take or not take on the day of your procedure  You may be given contrast liquid during your biopsy  Tell your healthcare provider if you have ever had an allergic reaction to contrast liquid  Arrange for someone to drive you home and stay with you after your procedure  What will happen during a SLNB:   · You may be given an antibiotic through your IV to help prevent a bacterial infection  Tell the healthcare provider if you have ever had an allergic reaction to an antibiotic   You may be given general anesthesia to keep you asleep and free from pain during your procedure  You may instead be given local anesthesia to numb the area  With local anesthesia, you may still feel pressure or pushing during the procedure, but you should not feel any pain  · Your healthcare provider will inject blue contrast liquid, radioactive liquid, or both near the tumor  The liquid will move to the SLN  Your healthcare provider may use an instrument to help find the SLN  He will do this by gently moving an instrument over your skin  The instrument will show pictures of the SLN on a monitor  Your healthcare provider will make a small incision in the skin that covers the SLN  The incision is usually in your armpit or chest  The SLN will be removed and checked for cancer cells  If cancer is found, your healthcare provider may remove several more lymph nodes for testing  Your incision may be closed with stitches or strips of medical tape and covered with a bandage  What will happen after a SLNB:  Healthcare providers will monitor you until you are awake  You may be able to go home after you are awake and your pain is controlled  Your urine or bowel movement may be blue for 24 to 48 hours after your procedure  This is caused by the blue contrast liquid given to you during the procedure  You may have bruising or swelling at the biopsy site  This is normal and expected  The arm closest to the biopsy site may be sore  This should get better within 48 to 72 hours  Risks of a SLNB:  You may bleed more than expected or get an infection  You may develop a condition called lymphedema  Lymphedema is tissue swelling in your arm nearest to where the SLN was removed  You may have long-term pain or discomfort in your arm  Your skin in the arm may be permanently thick or hard  Your nerves may be damaged during your procedure  This may cause numbness or tingling in your arm  It may also cause difficulty moving your arm   You may have an allergic reaction to the contrast liquid  This may require medicine or other treatments  Seek care immediately if:   · Blood soaks through your bandage  · Your stitches come apart  · Your bruise suddenly gets larger and feels firm  Contact your healthcare provider if:   · You have a fever or chills  · Your wound is red, swollen, or draining pus  · You have nausea or are vomiting  · Your skin is itchy, swollen, or you have a rash  · Your pain does not get better after you take medicine for pain  · You have questions or concerns about your condition or care  Medicines: You may need any of the following:  · NSAIDs , such as ibuprofen, help decrease swelling, pain, and fever  This medicine is available with or without a doctor's order  NSAIDs can cause stomach bleeding or kidney problems in certain people  If you take blood thinner medicine, always ask your healthcare provider if NSAIDs are safe for you  Always read the medicine label and follow directions  · Acetaminophen  decreases pain and fever  It is available without a doctor's order  Ask how much to take and how often to take it  Follow directions  Read the labels of all other medicines you are using to see if they also contain acetaminophen, or ask your doctor or pharmacist  Acetaminophen can cause liver damage if not taken correctly  Do not use more than 4 grams (4,000 milligrams) total of acetaminophen in one day  · Prescription pain medicine  may be given  Ask your healthcare provider how to take this medicine safely  Some prescription pain medicines contain acetaminophen  Do not take other medicines that contain acetaminophen without talking to your healthcare provider  Too much acetaminophen may cause liver damage  Prescription pain medicine may cause constipation  Ask your healthcare provider how to prevent or treat constipation  · Take your medicine as directed    Contact your healthcare provider if you think your medicine is not helping or if you have side effects  Tell him or her if you are allergic to any medicine  Keep a list of the medicines, vitamins, and herbs you take  Include the amounts, and when and why you take them  Bring the list or the pill bottles to follow-up visits  Carry your medicine list with you in case of an emergency  Care for your incision wound as directed:  Ask your healthcare provider when your wound can get wet  Carefully wash around the wound with soap and water  It is okay to let soap and water gently run over your wound  Do not  scrub your wound  Gently pat dry the area and put on new, clean bandages as directed  Change your bandages when they get wet or dirty  If you have strips of medical tape, let them fall of on their own  It may take 10 to 14 days for them to fall off  Check your wound every day for signs of infection, such as redness, swelling, or pus  Do not put powders or lotions on your wound  If lymph nodes have been taken from your armpit, ask your healthcare provider when you can wear deodorant  Self-care:   · Apply ice  on your wound for 15 to 20 minutes every hour or as directed  Use an ice pack, or put crushed ice in a plastic bag  Cover it with a towel before you apply it to your skin  Ice helps prevent tissue damage and decreases swelling and pain  · Elevate  your arm nearest to the biopsy site as often as you can  This will help decrease swelling and pain  Prop your arm on pillows or blankets to keep it elevated above the level of your heart comfortably  · Do not do strenuous activities  for 24 to 48 hours  Strenuous activities include heavy lifting, sports, or running  If lymph nodes were taken from your armpit, do not push or pull with your arm  These activities may put too much stress on your wound  Rest and take short walks around the house  Ask your healthcare provider when you can return to your normal activities       · Drink plenty of liquids  as directed  This will help flush out contrast liquid from your body  Ask how much liquid to drink each day and which liquids are best for you  Ask your healthcare provider how to prevent lymphedema and infection:  Lymphedema is fluid buildup in fatty tissues under your skin  Lymphedema may happen in the arm closest to where lymph nodes were removed  An infection in your skin can make lymphedema worse  Ask your healthcare provider how you can decrease your risk for skin infections and lymphedema  Follow up with your healthcare provider as directed:  Write down your questions so you remember to ask them during your visits  © 2017 [facility] Information is for End User's use only and may not be sold, redistributed or otherwise used for commercial purposes  All illustrations and images included in CareNotes® are the copyrighted property of [employer]  or [employer]  The above information is an  only  It is not intended as medical advice for individual conditions or treatments  Talk to your doctor, nurse or pharmacist before following any medical regimen to see if it is safe and effective for you

## 2019-08-01 NOTE — PROGRESS NOTES
BC Nurse Navigator:  Met with patient at MD visit  Introduced myself, explained role, discussed cancer support resources, provided her with written material for support group, nutrition and treatment classes, CCC etc  Questions answered  Encouraged her to call with any questions/concerns  She stated she appreciated my visit and I  provided her with my contact card  Will follow up on an as needed basis

## 2019-08-01 NOTE — H&P (VIEW-ONLY)
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 and radiation  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 in [place] [address]  The patient prefers left breast conservation therapy which would include a needle localization lumpectomy in conjunction with a sentinel lymph node biopsy and possible axillary dissection  We went through the process of informed consent for this surgery  She understands she would need adjuvant radiation therapy and hormonal therapy and possibly chemotherapy the putting on her final  Pathologic results  All questions were answered the patient's satisfaction  We will coordinate the surgery next mutual convenience  Cancer History:        History of ovarian cancer     Initial Diagnosis     Ovarian cancer ([facility] [address] )      4/21/2009 Surgery     Exploratory laparotomy, total abdominal hysterectomy, bilateral salpingo-oophrectomy, lymph node dissection, omentectomy with staging       - 7/8/2009 Chemotherapy     Intraperitoneal along with intravenous chemotherapy on a early ovarian cancer protocol        5/31/2018 Genetic Testing     Genetic testing results are 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      Genetic testing indicated that the patient carries a Variant of Uncertain Significance (VUS) : Rad51C c 252G>T       Hormone Therapy           History of ductal carcinoma in situ (DCIS) of breast    5/31/2018 Genetic Testing     Genetic testing results are 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      Genetic testing indicated that the patient carries a Variant of Uncertain Significance (VUS) : Rad51C c 252G>T      8/1/2018 Biopsy     Right breast biopsy  11 o'clock position 5 cmfn  DCIS  Grade 2  Confirmed by Rachana De La Paz MD  ER 0  OK 0      9/11/2018 Surgery     Right lumpectomy  DCIS  Grade 2  1 4 cm  Margins negative  Stage 0      10/16/2018 -  Hormone Therapy     Consult with Dr Noemi Dave  No adjuvant systemic therapy recommended      10/29/2018 - 11/28/2018 Radiation     Course: C1    Plan ID Energy Fractions Dose per Fraction (cGy) Dose Correction (cGy) Total Dose Delivered (cGy) Elapsed Days   R Breast 6X 16 / 16 266 0 4,256 22   R Breast e 12E 5 / 5 200 0 1,000 7      Treatment dates:  C1: 10/29/2018 - 11/28/2018          Malignant neoplasm of central portion of left breast in female, estrogen receptor positive ([facility] [address] )    7/18/2019 Biopsy     Left breast MRI-guided biopsy  3 o'clock, posterior depth  Invasive breast carcinoma of no special type  Grade 1  ER 90  OK 0  HER2 1+           Interval History:     See above    Review of Systems:   Review of Systems   Constitutional: Negative for activity change, appetite change and fatigue  HENT: Negative  Eyes: Negative  Respiratory: Negative for cough, shortness of breath and wheezing  Cardiovascular: Negative for chest pain and leg swelling  Gastrointestinal: Negative  Endocrine: Negative  Genitourinary: Negative  Musculoskeletal:        No new changes or complaints of bone pain   Skin: Negative  Allergic/Immunologic: Negative  Neurological: Negative  Hematological: Negative  Psychiatric/Behavioral: Negative          Past Medical History     Patient Active Problem List   Diagnosis    Benign essential hypertension    Colon, diverticulosis    Crohn's disease ([address] )    Dense breasts    Dermatitis    Erythema chronica migrans, secondary    Hyperlipidemia    Lymphedema    Murmur    Osteopenia    Osteoporosis    History of ovarian cancer    Reactive airway disease    Shortness of breath on exertion    Lymphedema of left lower extremity    Genetic predisposition to breast cancer    History of ductal carcinoma in situ (DCIS) of breast    Encounter for follow-up surveillance of ovarian cancer    Lichen sclerosus et atrophicus    Essential hypertension    Malignant neoplasm of ovary ([facility] [address] )    Nausea    TIA (transient ischemic attack)    Malignant neoplasm of central portion of left breast in female, estrogen receptor positive ([facility] )     Past Medical History:   Diagnosis Date    Breast cancer ([facility] ) 09/11/2018    Right    Crohn disease ([facility] )     Dense breast     History of chemotherapy     History of radiation therapy     History of transfusion 2009    Hyperlipidemia     Hypertension     Ovarian cancer ([facility] ) 04/21/2009     Past Surgical History:   Procedure Laterality Date    BREAST LUMPECTOMY Right 09/11/2018    COLONOSCOPY      EXPLORATORY LAPAROTOMY      HYSTERECTOMY      MAMMO NEEDLE LOCALIZATION RIGHT (ALL INC) Right 9/11/2018    MRI BREAST BIOPSY LEFT (ALL INCLUSIVE) Left 7/18/2019    OMENTECTOMY      WI PERQ DEVICE PLACEMT BREAST LOC 1ST LES W GUIDNCE Right 9/11/2018    Procedure: BREAST LUMPECTOMY; BREAST NEEDLE LOCALIZATION (NEEDLE LOC AT 1200);   Surgeon: Elle Santana MD;  [facility];  Service: Surgical Oncology    TOTAL ABDOMINAL HYSTERECTOMY W/ BILATERAL SALPINGOOPHORECTOMY      US GUIDED BREAST BIOPSY LEFT COMPLETE Left 6/17/2019    US GUIDED BREAST BIOPSY RIGHT COMPLETE Right 8/1/2018    WISDOM TOOTH EXTRACTION       Family History   Problem Relation Age of Onset    Prostate cancer Father     Ovarian cancer Sister     Breast cancer Paternal Grandmother     Breast cancer Maternal Aunt     No Known Problems Sister     No Known Problems Sister     No Known Problems Sister     No Known Problems Sister     No Known Problems Paternal Aunt     No Known Problems Maternal Aunt      Social History     Socioeconomic History    Marital status: Single     Spouse name: Not on file    Number of children: Not on file    Years of education: Not on file    Highest education level: Not on file   Occupational History    Not on file   Social Needs    Financial resource strain: Not on file    Food insecurity:     Worry: Not on file     Inability: Not on file    Transportation needs:     Medical: Not on file     Non-medical: Not on file   Tobacco Use    Smoking status: Former Smoker     Last attempt to quit: 4/1/2009     Years since quitting: 10 3    Smokeless tobacco: Never Used   Substance and Sexual Activity    Alcohol use: Yes     Comment: rarely    Drug use: No    Sexual activity: Not on file   Lifestyle    Physical activity:     Days per week: Not on file     Minutes per session: Not on file    Stress: Not on file   Relationships    Social connections:     Talks on phone: Not on file     Gets together: Not on file     Attends Sabianist service: Not on file     Active member of club or organization: Not on file     Attends meetings of clubs or organizations: Not on file     Relationship status: Not on file    Intimate partner violence:     Fear of current or ex partner: Not on file     Emotionally abused: Not on file     Physically abused: Not on file     Forced sexual activity: Not on file   Other Topics Concern    Not on file   Social History Narrative    Not on file       Current Outpatient Medications:     albuterol (PROVENTIL HFA,VENTOLIN HFA) 90 mcg/act inhaler, Inhale 2 puffs as needed for wheezing, Disp: 1 Inhaler, Rfl: 0    alendronate (FOSAMAX) 70 mg tablet, TAKE 1 TABLET (70 MG TOTAL) BY MOUTH EVERY 7 DAYS, Disp: 4 tablet, Rfl: 0    amLODIPine (NORVASC) 5 mg tablet, Take 1 tablet (5 mg total) by mouth daily, Disp: 90 tablet, Rfl: 1    atorvastatin (LIPITOR) 40 mg tablet, Take 1 tablet (40 mg total) by mouth daily at bedtime, Disp: 90 tablet, Rfl: 1    Calcium Carbonate (CALTRATE 600) 1500 (600 Ca) MG TABS, Take 1 tablet by mouth 2 (two) times a day, Disp: , Rfl:     clobetasol (TEMOVATE) 0 05 % ointment, Apply to the affected area 1-2 times a week, Disp: 30 g, Rfl: 2    Elastic Bandages & Supports (MEDICAL COMPRESSION STOCKINGS) MISC, Use daily as directed, 3 pair, Disp: 3 each, Rfl: 0    hydrochlorothiazide (MICROZIDE) 12 5 mg capsule, Take 1 capsule (12 5 mg total) by mouth every morning, Disp: 30 capsule, Rfl: 5    inFLIXimab (REMICADE) 100 mg, Infuse into a venous catheter  , Disp: , Rfl:     mupirocin (BACTROBAN) 2 % ointment, Apply topically 3 (three) times a day, Disp: 22 g, Rfl: 0    oxyCODONE-acetaminophen (PERCOCET) 5-325 mg per tablet, Take 1 tablet by mouth every 6 (six) hours as needed for moderate painMax Daily Amount: 4 tablets, Disp: 6 tablet, Rfl: 0  Allergies   Allergen Reactions    Boniva [Ibandronic Acid] Headache       Physical Exam:     Vitals:    08/01/19 1431   BP: 162/80   Pulse: 74   Resp: 16   Temp: 97 9 °F (36 6 °C)     Physical Exam   Constitutional: She is oriented to person, place, and time  She appears well-developed and well-nourished  HENT:   Head: Normocephalic and atraumatic  Mouth/Throat: Oropharynx is clear and moist    Eyes: Pupils are equal, round, and reactive to light  EOM are normal    Neck: Normal range of motion  Neck supple  No JVD present  No tracheal deviation present  No thyromegaly present  Cardiovascular: Normal rate, regular rhythm, normal heart sounds and intact distal pulses  Exam reveals no gallop and no friction rub  No murmur heard  Pulmonary/Chest: Effort normal and breath sounds normal  No respiratory distress  She has no wheezes  She has no rales  Examination of the right breast demonstrate post radiation changes as well as a well-healed incision  Otherwise there are no skin changes, there are no dominant masses or axillary adenopathy        Examination of the left breast demonstrate minimal ecchymosis but no dominant masses  There is no axillary adenopathy no worrisome skin findings  Abdominal: Soft  She exhibits no distension and no mass  There is no hepatomegaly  There is no tenderness  There is no rebound and no guarding  Musculoskeletal: Normal range of motion  She exhibits no edema or tenderness  Lymphadenopathy:     She has no cervical adenopathy  Neurological: She is alert and oriented to person, place, and time  No cranial nerve deficit  Skin: Skin is warm and dry  No rash noted  No erythema  Psychiatric: She has a normal mood and affect  Her behavior is normal    Vitals reviewed  Results/  Discussion:     I reviewed her MRI biopsy films  We reviewed the data on the ATN mutation  Based on the NCCN guidelines that is not meet  Criteria for recommending prophylactic surgery  The patient after considerable discussion opted for breast conservation as opposed to unilateral or bilateral mastectomies  All questions were answered the patient's satisfaction as outlined above  We will coordinate the surgery next mutual convenience  I spent approximately 45 min face-to-face time with the patient reviewing all of her options  All questions were answered the patient's satisfaction  Advance Care Planning/Advance Directives:  I discussed the disease status, treatment plans and follow-up with the patient

## 2019-08-01 NOTE — PROGRESS NOTES
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 and radiation  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 in [place] [address]  The patient prefers left breast conservation therapy which would include a needle localization lumpectomy in conjunction with a sentinel lymph node biopsy and possible axillary dissection  We went through the process of informed consent for this surgery  She understands she would need adjuvant radiation therapy and hormonal therapy and possibly chemotherapy the putting on her final  Pathologic results  All questions were answered the patient's satisfaction  We will coordinate the surgery next mutual convenience  Cancer History:        History of ovarian cancer     Initial Diagnosis     Ovarian cancer ([facility] [address] )      4/21/2009 Surgery     Exploratory laparotomy, total abdominal hysterectomy, bilateral salpingo-oophrectomy, lymph node dissection, omentectomy with staging       - 7/8/2009 Chemotherapy     Intraperitoneal along with intravenous chemotherapy on a early ovarian cancer protocol        5/31/2018 Genetic Testing     Genetic testing results are 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      Genetic testing indicated that the patient carries a Variant of Uncertain Significance (VUS) : Rad51C c 252G>T       Hormone Therapy           History of ductal carcinoma in situ (DCIS) of breast    5/31/2018 Genetic Testing     Genetic testing results are 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      Genetic testing indicated that the patient carries a Variant of Uncertain Significance (VUS) : Rad51C c 252G>T      8/1/2018 Biopsy     Right breast biopsy  11 o'clock position 5 cmfn  DCIS  Grade 2  Confirmed by Megan Long MD  ER 0  MS 0      9/11/2018 Surgery     Right lumpectomy  DCIS  Grade 2  1 4 cm  Margins negative  Stage 0      10/16/2018 -  Hormone Therapy     Consult with Dr Nicolasa Meeks  No adjuvant systemic therapy recommended      10/29/2018 - 11/28/2018 Radiation     Course: C1    Plan ID Energy Fractions Dose per Fraction (cGy) Dose Correction (cGy) Total Dose Delivered (cGy) Elapsed Days   R Breast 6X 16 / 16 266 0 4,256 22   R Breast e 12E 5 / 5 200 0 1,000 7      Treatment dates:  C1: 10/29/2018 - 11/28/2018          Malignant neoplasm of central portion of left breast in female, estrogen receptor positive ([facility] [address]    7/18/2019 Biopsy     Left breast MRI-guided biopsy  3 o'clock, posterior depth  Invasive breast carcinoma of no special type  Grade 1  ER 90  MS 0  HER2 1+           Interval History:     See above    Review of Systems:   Review of Systems   Constitutional: Negative for activity change, appetite change and fatigue  HENT: Negative  Eyes: Negative  Respiratory: Negative for cough, shortness of breath and wheezing  Cardiovascular: Negative for chest pain and leg swelling  Gastrointestinal: Negative  Endocrine: Negative  Genitourinary: Negative  Musculoskeletal:        No new changes or complaints of bone pain   Skin: Negative  Allergic/Immunologic: Negative  Neurological: Negative  Hematological: Negative  Psychiatric/Behavioral: Negative          Past Medical History     Patient Active Problem List   Diagnosis    Benign essential hypertension    Colon, diverticulosis    Crohn's disease ([address] )    Dense breasts    Dermatitis    Erythema chronica migrans, secondary    Hyperlipidemia    Lymphedema    Murmur    Osteopenia    Osteoporosis    History of ovarian cancer    Reactive airway disease    Shortness of breath on exertion    Lymphedema of left lower extremity    Genetic predisposition to breast cancer    History of ductal carcinoma in situ (DCIS) of breast    Encounter for follow-up surveillance of ovarian cancer    Lichen sclerosus et atrophicus    Essential hypertension    Malignant neoplasm of ovary ([facility] [address] )    Nausea    TIA (transient ischemic attack)    Malignant neoplasm of central portion of left breast in female, estrogen receptor positive ([facility] )     Past Medical History:   Diagnosis Date    [facility] 09/11/2018    Right    [facility]     Dense breast     History of chemotherapy     History of radiation therapy     History of transfusion 2009    Hyperlipidemia     Hypertension     [facility] 04/21/2009     Past Surgical History:   Procedure Laterality Date    BREAST LUMPECTOMY Right 09/11/2018    COLONOSCOPY      EXPLORATORY LAPAROTOMY      HYSTERECTOMY      MAMMO NEEDLE LOCALIZATION RIGHT (ALL INC) Right 9/11/2018    MRI BREAST BIOPSY LEFT (ALL INCLUSIVE) Left 7/18/2019    OMENTECTOMY      GA PERQ DEVICE PLACEMT BREAST LOC 1ST LES W GUIDNCE Right 9/11/2018    Procedure: BREAST LUMPECTOMY; BREAST NEEDLE LOCALIZATION (NEEDLE LOC AT 1200);   Surgeon: Elle Santana MD;  [facility];  Service: Surgical Oncology    TOTAL ABDOMINAL HYSTERECTOMY W/ BILATERAL SALPINGOOPHORECTOMY      US GUIDED BREAST BIOPSY LEFT COMPLETE Left 6/17/2019    US GUIDED BREAST BIOPSY RIGHT COMPLETE Right 8/1/2018    WISDOM TOOTH EXTRACTION       Family History   Problem Relation Age of Onset    Prostate cancer Father     Ovarian cancer Sister     Breast cancer Paternal Grandmother     Breast cancer Maternal Aunt     No Known Problems Sister     No Known Problems Sister     No Known Problems Sister     No Known Problems Sister     No Known Problems Paternal Aunt     No Known Problems Maternal Aunt      Social History     Socioeconomic History    Marital status: Single     Spouse name: Not on file    Number of children: Not on file    Years of education: Not on file    Highest education level: Not on file   Occupational History    Not on file   Social Needs    Financial resource strain: Not on file    Food insecurity:     Worry: Not on file     Inability: Not on file    Transportation needs:     Medical: Not on file     Non-medical: Not on file   Tobacco Use    Smoking status: Former Smoker     Last attempt to quit: 4/1/2009     Years since quitting: 10 3    Smokeless tobacco: Never Used   Substance and Sexual Activity    Alcohol use: Yes     Comment: rarely    Drug use: No    Sexual activity: Not on file   Lifestyle    Physical activity:     Days per week: Not on file     Minutes per session: Not on file    Stress: Not on file   Relationships    Social connections:     Talks on phone: Not on file     Gets together: Not on file     Attends Christianity service: Not on file     Active member of club or organization: Not on file     Attends meetings of clubs or organizations: Not on file     Relationship status: Not on file    Intimate partner violence:     Fear of current or ex partner: Not on file     Emotionally abused: Not on file     Physically abused: Not on file     Forced sexual activity: Not on file   Other Topics Concern    Not on file   Social History Narrative    Not on file       Current Outpatient Medications:     albuterol (PROVENTIL HFA,VENTOLIN HFA) 90 mcg/act inhaler, Inhale 2 puffs as needed for wheezing, Disp: 1 Inhaler, Rfl: 0    alendronate (FOSAMAX) 70 mg tablet, TAKE 1 TABLET (70 MG TOTAL) BY MOUTH EVERY 7 DAYS, Disp: 4 tablet, Rfl: 0    amLODIPine (NORVASC) 5 mg tablet, Take 1 tablet (5 mg total) by mouth daily, Disp: 90 tablet, Rfl: 1    atorvastatin (LIPITOR) 40 mg tablet, Take 1 tablet (40 mg total) by mouth daily at bedtime, Disp: 90 tablet, Rfl: 1    Calcium Carbonate (CALTRATE 600) 1500 (600 Ca) MG TABS, Take 1 tablet by mouth 2 (two) times a day, Disp: , Rfl:     clobetasol (TEMOVATE) 0 05 % ointment, Apply to the affected area 1-2 times a week, Disp: 30 g, Rfl: 2    Elastic Bandages & Supports (MEDICAL COMPRESSION STOCKINGS) MISC, Use daily as directed, 3 pair, Disp: 3 each, Rfl: 0    hydrochlorothiazide (MICROZIDE) 12 5 mg capsule, Take 1 capsule (12 5 mg total) by mouth every morning, Disp: 30 capsule, Rfl: 5    inFLIXimab (REMICADE) 100 mg, Infuse into a venous catheter  , Disp: , Rfl:     mupirocin (BACTROBAN) 2 % ointment, Apply topically 3 (three) times a day, Disp: 22 g, Rfl: 0    oxyCODONE-acetaminophen (PERCOCET) 5-325 mg per tablet, Take 1 tablet by mouth every 6 (six) hours as needed for moderate painMax Daily Amount: 4 tablets, Disp: 6 tablet, Rfl: 0  Allergies   Allergen Reactions    Boniva [Ibandronic Acid] Headache       Physical Exam:     Vitals:    08/01/19 1431   BP: 162/80   Pulse: 74   Resp: 16   Temp: 97 9 °F (36 6 °C)     Physical Exam   Constitutional: She is oriented to person, place, and time  She appears well-developed and well-nourished  HENT:   Head: Normocephalic and atraumatic  Mouth/Throat: Oropharynx is clear and moist    Eyes: Pupils are equal, round, and reactive to light  EOM are normal    Neck: Normal range of motion  Neck supple  No JVD present  No tracheal deviation present  No thyromegaly present  Cardiovascular: Normal rate, regular rhythm, normal heart sounds and intact distal pulses  Exam reveals no gallop and no friction rub  No murmur heard  Pulmonary/Chest: Effort normal and breath sounds normal  No respiratory distress  She has no wheezes  She has no rales  Examination of the right breast demonstrate post radiation changes as well as a well-healed incision  Otherwise there are no skin changes, there are no dominant masses or axillary adenopathy        Examination of the left breast demonstrate minimal ecchymosis but no dominant masses  There is no axillary adenopathy no worrisome skin findings  Abdominal: Soft  She exhibits no distension and no mass  There is no hepatomegaly  There is no tenderness  There is no rebound and no guarding  Musculoskeletal: Normal range of motion  She exhibits no edema or tenderness  Lymphadenopathy:     She has no cervical adenopathy  Neurological: She is alert and oriented to person, place, and time  No cranial nerve deficit  Skin: Skin is warm and dry  No rash noted  No erythema  Psychiatric: She has a normal mood and affect  Her behavior is normal    Vitals reviewed  Results/  Discussion:     I reviewed her MRI biopsy films  We reviewed the data on the ATN mutation  Based on the NCCN guidelines that is not meet  Criteria for recommending prophylactic surgery  The patient after considerable discussion opted for breast conservation as opposed to unilateral or bilateral mastectomies  All questions were answered the patient's satisfaction as outlined above  We will coordinate the surgery next mutual convenience  I spent approximately 45 min face-to-face time with the patient reviewing all of her options  All questions were answered the patient's satisfaction  Advance Care Planning/Advance Directives:  I discussed the disease status, treatment plans and follow-up with the patient

## 2019-08-02 ENCOUNTER — APPOINTMENT (OUTPATIENT)
Dept: LAB | Facility: CLINIC | Age: 64
End: 2019-08-02
Payer: COMMERCIAL

## 2019-08-02 ENCOUNTER — APPOINTMENT (OUTPATIENT)
Dept: PHYSICAL THERAPY | Facility: CLINIC | Age: 64
End: 2019-08-02
Payer: COMMERCIAL

## 2019-08-02 ENCOUNTER — HOSPITAL ENCOUNTER (OUTPATIENT)
Dept: RADIOLOGY | Facility: HOSPITAL | Age: 64
Discharge: HOME | End: 2019-08-02
Attending: SURGERY
Payer: COMMERCIAL

## 2019-08-02 DIAGNOSIS — Z17.0 MALIGNANT NEOPLASM OF CENTRAL PORTION OF LEFT BREAST IN FEMALE, ESTROGEN RECEPTOR POSITIVE (HCC): ICD-10-CM

## 2019-08-02 DIAGNOSIS — C50.112 MALIGNANT NEOPLASM OF CENTRAL PORTION OF LEFT BREAST IN FEMALE, ESTROGEN RECEPTOR POSITIVE (HCC): ICD-10-CM

## 2019-08-02 LAB
ALBUMIN SERPL BCP-MCNC: 3.4 G/DL (ref 3.5–5)
ALP SERPL-CCNC: 62 U/L (ref 46–116)
ALT SERPL W P-5'-P-CCNC: 20 U/L (ref 12–78)
ANION GAP SERPL CALCULATED.3IONS-SCNC: 12 MMOL/L (ref 4–13)
AST SERPL W P-5'-P-CCNC: 21 U/L (ref 5–45)
BASOPHILS # BLD AUTO: 0.05 THOUSANDS/ΜL (ref 0–0.1)
BASOPHILS NFR BLD AUTO: 1 % (ref 0–1)
BILIRUB SERPL-MCNC: 0.3 MG/DL (ref 0.2–1)
BUN SERPL-MCNC: 22 MG/DL (ref 5–25)
CALCIUM SERPL-MCNC: 9.2 MG/DL (ref 8.3–10.1)
CHLORIDE SERPL-SCNC: 97 MMOL/L (ref 100–108)
CO2 SERPL-SCNC: 28 MMOL/L (ref 21–32)
CREAT SERPL-MCNC: 0.94 MG/DL (ref 0.6–1.3)
EOSINOPHIL # BLD AUTO: 0.13 THOUSAND/ΜL (ref 0–0.61)
EOSINOPHIL NFR BLD AUTO: 2 % (ref 0–6)
ERYTHROCYTE [DISTWIDTH] IN BLOOD BY AUTOMATED COUNT: 13.3 % (ref 11.6–15.1)
GFR SERPL CREATININE-BSD FRML MDRD: 66 ML/MIN/1.73SQ M
GLUCOSE SERPL-MCNC: 88 MG/DL (ref 65–140)
HCT VFR BLD AUTO: 40.2 % (ref 34.8–46.1)
HGB BLD-MCNC: 12.9 G/DL (ref 11.5–15.4)
IMM GRANULOCYTES # BLD AUTO: 0.01 THOUSAND/UL (ref 0–0.2)
IMM GRANULOCYTES NFR BLD AUTO: 0 % (ref 0–2)
LYMPHOCYTES # BLD AUTO: 1.81 THOUSANDS/ΜL (ref 0.6–4.47)
LYMPHOCYTES NFR BLD AUTO: 27 % (ref 14–44)
MCH RBC QN AUTO: 28.3 PG (ref 26.8–34.3)
MCHC RBC AUTO-ENTMCNC: 32.1 G/DL (ref 31.4–37.4)
MCV RBC AUTO: 88 FL (ref 82–98)
MONOCYTES # BLD AUTO: 0.61 THOUSAND/ΜL (ref 0.17–1.22)
MONOCYTES NFR BLD AUTO: 9 % (ref 4–12)
NEUTROPHILS # BLD AUTO: 4.16 THOUSANDS/ΜL (ref 1.85–7.62)
NEUTS SEG NFR BLD AUTO: 61 % (ref 43–75)
NRBC BLD AUTO-RTO: 0 /100 WBCS
PLATELET # BLD AUTO: 298 THOUSANDS/UL (ref 149–390)
PMV BLD AUTO: 9.6 FL (ref 8.9–12.7)
POTASSIUM SERPL-SCNC: 3.3 MMOL/L (ref 3.5–5.3)
PROT SERPL-MCNC: 7.8 G/DL (ref 6.4–8.2)
RBC # BLD AUTO: 4.56 MILLION/UL (ref 3.81–5.12)
SODIUM SERPL-SCNC: 137 MMOL/L (ref 136–145)
WBC # BLD AUTO: 6.77 THOUSAND/UL (ref 4.31–10.16)

## 2019-08-02 PROCEDURE — 93005 ELECTROCARDIOGRAM TRACING: CPT

## 2019-08-02 PROCEDURE — 36415 COLL VENOUS BLD VENIPUNCTURE: CPT

## 2019-08-02 PROCEDURE — 85025 COMPLETE CBC W/AUTO DIFF WBC: CPT

## 2019-08-02 PROCEDURE — 71046 X-RAY EXAM CHEST 2 VIEWS: CPT

## 2019-08-02 PROCEDURE — 80053 COMPREHEN METABOLIC PANEL: CPT

## 2019-08-04 LAB
ATRIAL RATE: 77 BPM
P AXIS: 79 DEGREES
PR INTERVAL: 172 MS
QRS AXIS: 42 DEGREES
QRSD INTERVAL: 90 MS
QT INTERVAL: 402 MS
QTC INTERVAL: 454 MS
T WAVE AXIS: 52 DEGREES
VENTRICULAR RATE: 77 BPM

## 2019-08-04 PROCEDURE — 93010 ELECTROCARDIOGRAM REPORT: CPT | Performed by: INTERNAL MEDICINE

## 2019-08-05 ENCOUNTER — HOSPITAL ENCOUNTER (OUTPATIENT)
Dept: CT IMAGING | Facility: HOSPITAL | Age: 64
Discharge: HOME | End: 2019-08-05
Payer: COMMERCIAL

## 2019-08-05 ENCOUNTER — TELEPHONE (OUTPATIENT)
Dept: FAMILY MEDICINE CLINIC | Facility: CLINIC | Age: 64
End: 2019-08-05

## 2019-08-05 ENCOUNTER — TELEPHONE (OUTPATIENT)
Dept: SURGICAL ONCOLOGY | Facility: CLINIC | Age: 64
End: 2019-08-05

## 2019-08-05 DIAGNOSIS — G45.9 TIA (TRANSIENT ISCHEMIC ATTACK): ICD-10-CM

## 2019-08-05 DIAGNOSIS — I10 ESSENTIAL HYPERTENSION: ICD-10-CM

## 2019-08-05 PROCEDURE — 70450 CT HEAD/BRAIN W/O DYE: CPT

## 2019-08-05 RX ORDER — AMLODIPINE BESYLATE 10 MG/1
10 TABLET ORAL DAILY
Qty: 30 TABLET | Refills: 5
Start: 2019-08-05 | End: 2020-01-13 | Stop reason: SDUPTHER

## 2019-08-05 NOTE — TELEPHONE ENCOUNTER
Yes, can have potassium containing foods, white potatoes, sweet potatoes, bananas, watermelon, spinach, beets, black beans, white beans, salmon, edamame, butternut squash, swiss chard, yogurt  Or I can send a supplement   We can check in 1-2 weeks

## 2019-08-05 NOTE — TELEPHONE ENCOUNTER
Called patient with blood work results, which showed a decreased potassium  Discussed with patient that per   [facility], she should increase her diet of potassium-rich foods  Patient verbalized understanding and denies any additional questions or concerns at this time

## 2019-08-05 NOTE — PRE-PROCEDURE INSTRUCTIONS
Pre-Surgery Instructions:   Medication Instructions    albuterol (PROVENTIL HFA,VENTOLIN HFA) 90 mcg/act inhaler Instructed patient per Anesthesia Guidelines   alendronate (FOSAMAX) 70 mg tablet Instructed patient per Anesthesia Guidelines   amLODIPine (NORVASC) 5 mg tablet Instructed patient per Anesthesia Guidelines   atorvastatin (LIPITOR) 40 mg tablet Instructed patient per Anesthesia Guidelines   Calcium Carbonate (CALTRATE 600) 1500 (600 Ca) MG TABS Patient was instructed by Physician and understands   clobetasol (TEMOVATE) 0 05 % ointment Instructed patient per Anesthesia Guidelines   hydrochlorothiazide (MICROZIDE) 12 5 mg capsule Instructed patient per Anesthesia Guidelines   inFLIXimab (REMICADE) 100 mg Instructed patient per Anesthesia Guidelines   mupirocin (BACTROBAN) 2 % ointment Instructed patient per Anesthesia Guidelines  Pre op and bathing instructions reviewed   Pt has hibiclens

## 2019-08-05 NOTE — TELEPHONE ENCOUNTER
She found out that her potasium level is low  She was wondering if that had any bearing on the blood pressure  Went for the Cat scan this morning and going for the ultra sound tomorrow morning

## 2019-08-06 ENCOUNTER — HOSPITAL ENCOUNTER (OUTPATIENT)
Dept: NON INVASIVE DIAGNOSTICS | Facility: HOSPITAL | Age: 64
Discharge: HOME | End: 2019-08-06
Payer: COMMERCIAL

## 2019-08-06 DIAGNOSIS — G45.9 TIA (TRANSIENT ISCHEMIC ATTACK): ICD-10-CM

## 2019-08-06 PROCEDURE — 93880 EXTRACRANIAL BILAT STUDY: CPT

## 2019-08-06 PROCEDURE — 93880 EXTRACRANIAL BILAT STUDY: CPT | Performed by: SURGERY

## 2019-08-07 ENCOUNTER — OFFICE VISIT (OUTPATIENT)
Dept: PHYSICAL THERAPY | Facility: CLINIC | Age: 64
End: 2019-08-07
Payer: COMMERCIAL

## 2019-08-07 DIAGNOSIS — I89.0 LYMPHEDEMA: Primary | ICD-10-CM

## 2019-08-07 PROCEDURE — 97140 MANUAL THERAPY 1/> REGIONS: CPT | Performed by: PHYSICAL THERAPIST

## 2019-08-07 NOTE — PROGRESS NOTES
Daily Note     Today's date: 2019  Patient name: Vania Whitfield  : 1955  MRN: [id number]  Referring provider: Demetrius Kwan  Dx:   Encounter Diagnosis     ICD-10-CM    1  Lymphedema I89 0                   Subjective: Pt reports she has been really busy with work, taking care of her sister, and going to medical apts  Objective: See treatment diary below      Assessment: Tolerated treatment well  Patient would benefit from continued PT  Pt received the [employer] garment, and it fits well  Plan: Continue per plan of care        Dx: L LE lymphedema  EPOC: 19  CO-MORBIDITIES: ovarian Ca, breast CA  PERSONAL FACTORS:   Precautions: Ca    Daily Treatment Diary     Manual   7 8     L LE MLD/ CLT  45' Th 25' Th 45' Th 45' Th 30' Th 45' Th 30' Th 25'  40'                 pneumatic compression pump 15'                                          Exercise Diary                                                                                                                                                                                                                                                                                      Modalities

## 2019-08-09 ENCOUNTER — OFFICE VISIT (OUTPATIENT)
Dept: PHYSICAL THERAPY | Facility: CLINIC | Age: 64
End: 2019-08-09
Payer: COMMERCIAL

## 2019-08-09 DIAGNOSIS — I89.0 LYMPHEDEMA: Primary | ICD-10-CM

## 2019-08-09 DIAGNOSIS — E87.6 HYPOKALEMIA: Primary | ICD-10-CM

## 2019-08-09 PROCEDURE — 97140 MANUAL THERAPY 1/> REGIONS: CPT | Performed by: PHYSICAL THERAPIST

## 2019-08-09 NOTE — PROGRESS NOTES
Daily Note     Today's date: 2019  Patient name: Josette Jarvis  : 1955  MRN: [id number]  Referring provider: Radha Resendiz  Dx:   Encounter Diagnosis     ICD-10-CM    1  Lymphedema I89 0                   Subjective: Pt reports her bandages stayed on well after last visit  Objective: See treatment diary below      Assessment: Tolerated treatment well  Patient arrived 15 minutes late to apt  good tolerance for compression wrapping      Plan: Continue per plan of care        Dx: L LE lymphedema  EPOC: 19  CO-MORBIDITIES: ovarian Ca, breast CA  PERSONAL FACTORS:   Precautions: Ca    Daily Treatment Diary     Manual   7    L LE MLD/ CLT  45'  25'  45'  45'  30'  45'  30'  25'  40' 30'                pneumatic compression pump 15'                                          Exercise Diary                                                                                                                                                                                                                                                                                      Modalities

## 2019-08-12 ENCOUNTER — OFFICE VISIT (OUTPATIENT)
Dept: PHYSICAL THERAPY | Facility: CLINIC | Age: 64
End: 2019-08-12
Payer: COMMERCIAL

## 2019-08-12 DIAGNOSIS — I89.0 LYMPHEDEMA: Primary | ICD-10-CM

## 2019-08-12 PROCEDURE — 97140 MANUAL THERAPY 1/> REGIONS: CPT | Performed by: PHYSICAL THERAPIST

## 2019-08-12 NOTE — PROGRESS NOTES
Daily Note     Today's date: 2019  Patient name: Karri Johansen  : 1955  MRN: [id number]  Referring provider: Nikki Ndiaye  Dx:   Encounter Diagnosis     ICD-10-CM    1  Lymphedema I89 0                   Subjective: Pt reports she is having cramps in her R LE  Pt arrived 8' late to apt  Objective: See treatment diary below      Assessment: Tolerated treatment well  Patient exhibited good technique with therapeutic exercises  Instructed pt to do ankle pumps and gastroc wall stretch  Plan: Continue per plan of care   Potential D/C NV     Dx: L LE lymphedema  EPOC: 19  CO-MORBIDITIES: ovarian Ca, breast CA  PERSONAL FACTORS:   Precautions: Ca    Daily Treatment Diary     Manual   7    L LE MLD/ CLT  35' Th 25' Th 45' Th 45' Th 30' Th 45' Th 30' Th 25' Th 40' th30'                pneumatic compression pump                                           Exercise Diary                                                                                                                                                                                                                                                                                      Modalities

## 2019-08-13 ENCOUNTER — APPOINTMENT (OUTPATIENT)
Dept: MAMMOGRAPHY | Facility: HOSPITAL | Age: 64
End: 2019-08-13
Payer: COMMERCIAL

## 2019-08-13 ENCOUNTER — HOSPITAL ENCOUNTER (OUTPATIENT)
Dept: MAMMOGRAPHY | Facility: HOSPITAL | Age: 64
Discharge: HOME | End: 2019-08-13
Attending: SURGERY
Payer: COMMERCIAL

## 2019-08-13 ENCOUNTER — ANESTHESIA (OUTPATIENT)
Dept: PERIOP | Facility: HOSPITAL | Age: 64
End: 2019-08-13
Payer: COMMERCIAL

## 2019-08-13 ENCOUNTER — HOSPITAL ENCOUNTER (OUTPATIENT)
Facility: HOSPITAL | Age: 64
Setting detail: OUTPATIENT SURGERY
Discharge: HOME | End: 2019-08-13
Attending: SURGERY | Admitting: SURGERY
Payer: COMMERCIAL

## 2019-08-13 ENCOUNTER — ANESTHESIA EVENT (OUTPATIENT)
Dept: PERIOP | Facility: HOSPITAL | Age: 64
End: 2019-08-13
Payer: COMMERCIAL

## 2019-08-13 ENCOUNTER — HOSPITAL ENCOUNTER (OUTPATIENT)
Dept: NUCLEAR MEDICINE | Facility: HOSPITAL | Age: 64
Discharge: HOME | End: 2019-08-13
Attending: SURGERY
Payer: COMMERCIAL

## 2019-08-13 VITALS
OXYGEN SATURATION: 98 % | SYSTOLIC BLOOD PRESSURE: 169 MMHG | TEMPERATURE: 97.8 F | WEIGHT: 116 LBS | HEIGHT: 61 IN | DIASTOLIC BLOOD PRESSURE: 77 MMHG | RESPIRATION RATE: 18 BRPM | BODY MASS INDEX: 21.9 KG/M2 | HEART RATE: 74 BPM

## 2019-08-13 VITALS — HEIGHT: 61 IN | WEIGHT: 117 LBS | BODY MASS INDEX: 22.09 KG/M2

## 2019-08-13 DIAGNOSIS — Z17.0 MALIGNANT NEOPLASM OF CENTRAL PORTION OF LEFT BREAST IN FEMALE, ESTROGEN RECEPTOR POSITIVE (HCC): ICD-10-CM

## 2019-08-13 DIAGNOSIS — C50.112 MALIGNANT NEOPLASM OF CENTRAL PORTION OF LEFT BREAST IN FEMALE, ESTROGEN RECEPTOR POSITIVE (HCC): ICD-10-CM

## 2019-08-13 PROCEDURE — 88341 IMHCHEM/IMCYTCHM EA ADD ANTB: CPT | Performed by: PATHOLOGY

## 2019-08-13 PROCEDURE — 19301 PARTIAL MASTECTOMY: CPT | Performed by: SURGERY

## 2019-08-13 PROCEDURE — 88307 TISSUE EXAM BY PATHOLOGIST: CPT | Performed by: PATHOLOGY

## 2019-08-13 PROCEDURE — 88342 IMHCHEM/IMCYTCHM 1ST ANTB: CPT | Performed by: PATHOLOGY

## 2019-08-13 PROCEDURE — 38792 RA TRACER ID OF SENTINL NODE: CPT | Performed by: SURGERY

## 2019-08-13 PROCEDURE — 19281 PERQ DEVICE BREAST 1ST IMAG: CPT

## 2019-08-13 PROCEDURE — 38525 BIOPSY/REMOVAL LYMPH NODES: CPT | Performed by: SURGERY

## 2019-08-13 PROCEDURE — A9541 TC99M SULFUR COLLOID: HCPCS

## 2019-08-13 PROCEDURE — 38792 RA TRACER ID OF SENTINL NODE: CPT

## 2019-08-13 PROCEDURE — 38900 IO MAP OF SENT LYMPH NODE: CPT | Performed by: SURGERY

## 2019-08-13 RX ORDER — LIDOCAINE WITH 8.4% SOD BICARB 0.9%(10ML)
2 SYRINGE (ML) INJECTION ONCE
Status: COMPLETED | OUTPATIENT
Start: 2019-08-13 | End: 2019-08-13

## 2019-08-13 RX ORDER — FENTANYL CITRATE 50 UG/ML
INJECTION, SOLUTION INTRAMUSCULAR; INTRAVENOUS AS NEEDED
Status: DISCONTINUED | OUTPATIENT
Start: 2019-08-13 | End: 2019-08-13 | Stop reason: SURG

## 2019-08-13 RX ORDER — ONDANSETRON 2 MG/ML
INJECTION INTRAMUSCULAR; INTRAVENOUS AS NEEDED
Status: DISCONTINUED | OUTPATIENT
Start: 2019-08-13 | End: 2019-08-13 | Stop reason: SURG

## 2019-08-13 RX ORDER — PROPOFOL 10 MG/ML
INJECTION, EMULSION INTRAVENOUS AS NEEDED
Status: DISCONTINUED | OUTPATIENT
Start: 2019-08-13 | End: 2019-08-13 | Stop reason: SURG

## 2019-08-13 RX ORDER — LIDOCAINE WITH 8.4% SOD BICARB 0.9%(10ML)
5 SYRINGE (ML) INJECTION ONCE
Status: CANCELLED | OUTPATIENT
Start: 2019-08-13 | End: 2019-08-13

## 2019-08-13 RX ORDER — MAGNESIUM HYDROXIDE 1200 MG/15ML
LIQUID ORAL AS NEEDED
Status: DISCONTINUED | OUTPATIENT
Start: 2019-08-13 | End: 2019-08-13 | Stop reason: HOSPADM

## 2019-08-13 RX ORDER — DEXAMETHASONE SODIUM PHOSPHATE 10 MG/ML
INJECTION, SOLUTION INTRAMUSCULAR; INTRAVENOUS AS NEEDED
Status: DISCONTINUED | OUTPATIENT
Start: 2019-08-13 | End: 2019-08-13 | Stop reason: SURG

## 2019-08-13 RX ORDER — GLYCOPYRROLATE 0.2 MG/ML
INJECTION INTRAMUSCULAR; INTRAVENOUS AS NEEDED
Status: DISCONTINUED | OUTPATIENT
Start: 2019-08-13 | End: 2019-08-13 | Stop reason: SURG

## 2019-08-13 RX ORDER — HYDROMORPHONE HCL/PF 1 MG/ML
0.2 SYRINGE (ML) INJECTION
Status: DISCONTINUED | OUTPATIENT
Start: 2019-08-13 | End: 2019-08-13 | Stop reason: HOSPADM

## 2019-08-13 RX ORDER — SODIUM CHLORIDE, SODIUM LACTATE, POTASSIUM CHLORIDE, CALCIUM CHLORIDE 600; 310; 30; 20 MG/100ML; MG/100ML; MG/100ML; MG/100ML
100 INJECTION, SOLUTION INTRAVENOUS CONTINUOUS
Status: DISCONTINUED | OUTPATIENT
Start: 2019-08-13 | End: 2019-08-13 | Stop reason: HOSPADM

## 2019-08-13 RX ORDER — MIDAZOLAM HYDROCHLORIDE 1 MG/ML
INJECTION INTRAMUSCULAR; INTRAVENOUS AS NEEDED
Status: DISCONTINUED | OUTPATIENT
Start: 2019-08-13 | End: 2019-08-13 | Stop reason: SURG

## 2019-08-13 RX ORDER — LIDOCAINE HYDROCHLORIDE 10 MG/ML
INJECTION, SOLUTION INFILTRATION; PERINEURAL AS NEEDED
Status: DISCONTINUED | OUTPATIENT
Start: 2019-08-13 | End: 2019-08-13 | Stop reason: SURG

## 2019-08-13 RX ORDER — OXYCODONE HYDROCHLORIDE AND ACETAMINOPHEN 5; 325 MG/1; MG/1
1 TABLET ORAL EVERY 4 HOURS PRN
Status: DISCONTINUED | OUTPATIENT
Start: 2019-08-13 | End: 2019-08-13 | Stop reason: HOSPADM

## 2019-08-13 RX ORDER — FENTANYL CITRATE/PF 50 MCG/ML
25 SYRINGE (ML) INJECTION
Status: DISCONTINUED | OUTPATIENT
Start: 2019-08-13 | End: 2019-08-13 | Stop reason: HOSPADM

## 2019-08-13 RX ORDER — SODIUM CHLORIDE, SODIUM LACTATE, POTASSIUM CHLORIDE, CALCIUM CHLORIDE 600; 310; 30; 20 MG/100ML; MG/100ML; MG/100ML; MG/100ML
125 INJECTION, SOLUTION INTRAVENOUS CONTINUOUS
Status: DISCONTINUED | OUTPATIENT
Start: 2019-08-13 | End: 2019-08-13 | Stop reason: HOSPADM

## 2019-08-13 RX ORDER — ONDANSETRON 2 MG/ML
4 INJECTION INTRAMUSCULAR; INTRAVENOUS ONCE AS NEEDED
Status: DISCONTINUED | OUTPATIENT
Start: 2019-08-13 | End: 2019-08-13 | Stop reason: HOSPADM

## 2019-08-13 RX ORDER — BUPIVACAINE HYDROCHLORIDE AND EPINEPHRINE 2.5; 5 MG/ML; UG/ML
INJECTION, SOLUTION INFILTRATION; PERINEURAL AS NEEDED
Status: DISCONTINUED | OUTPATIENT
Start: 2019-08-13 | End: 2019-08-13 | Stop reason: HOSPADM

## 2019-08-13 RX ORDER — CEFAZOLIN SODIUM 1 G/50ML
1000 SOLUTION INTRAVENOUS ONCE
Status: COMPLETED | OUTPATIENT
Start: 2019-08-13 | End: 2019-08-13

## 2019-08-13 RX ADMIN — DEXAMETHASONE SODIUM PHOSPHATE 4 MG: 10 INJECTION, SOLUTION INTRAMUSCULAR; INTRAVENOUS at 09:15

## 2019-08-13 RX ADMIN — OXYCODONE AND ACETAMINOPHEN 1 TABLET: 5; 325 TABLET ORAL at 11:20

## 2019-08-13 RX ADMIN — FENTANYL CITRATE 50 MCG: 50 INJECTION INTRAMUSCULAR; INTRAVENOUS at 09:22

## 2019-08-13 RX ADMIN — GLYCOPYRROLATE 0.2 MG: 0.2 INJECTION, SOLUTION INTRAMUSCULAR; INTRAVENOUS at 09:15

## 2019-08-13 RX ADMIN — MIDAZOLAM HYDROCHLORIDE 2 MG: 1 INJECTION, SOLUTION INTRAMUSCULAR; INTRAVENOUS at 09:10

## 2019-08-13 RX ADMIN — CEFAZOLIN SODIUM 1000 MG: 1 SOLUTION INTRAVENOUS at 09:10

## 2019-08-13 RX ADMIN — SODIUM CHLORIDE, SODIUM LACTATE, POTASSIUM CHLORIDE, AND CALCIUM CHLORIDE: .6; .31; .03; .02 INJECTION, SOLUTION INTRAVENOUS at 07:40

## 2019-08-13 RX ADMIN — LIDOCAINE HYDROCHLORIDE 50 MG: 10 INJECTION, SOLUTION INFILTRATION; PERINEURAL at 09:15

## 2019-08-13 RX ADMIN — ONDANSETRON 4 MG: 2 INJECTION INTRAMUSCULAR; INTRAVENOUS at 09:15

## 2019-08-13 RX ADMIN — FENTANYL CITRATE 50 MCG: 50 INJECTION INTRAMUSCULAR; INTRAVENOUS at 09:55

## 2019-08-13 RX ADMIN — Medication 2 ML: at 08:17

## 2019-08-13 RX ADMIN — PROPOFOL 200 MG: 10 INJECTION, EMULSION INTRAVENOUS at 09:15

## 2019-08-13 NOTE — ANESTHESIA PREPROCEDURE EVALUATION
Review of Systems/Medical History          Cardiovascular  EKG reviewed, Exercise tolerance (METS): >4,  No pacemaker/AICD, Hyperlipidemia, Hypertension ,    Pulmonary  Shortness of breath,        GI/Hepatic    No GERD ,             Endo/Other     GYN    Ovarian cancer (2009 ), Breast cancer left mastectomy  Comment: S/P Chemo and radiation     Hematology   Musculoskeletal    Comment: LLExt  Lymphedema S/P major Surgery for Ovarian Ca      Neurology    No TIA,   Comment: R sided Nunbness thought reaction to med  Normal Carotids  And Catscan Head - Negative Psychology           Physical Exam    Airway    Mallampati score: I  TM Distance: >3 FB  Neck ROM: full     Dental   No notable dental hx     Cardiovascular      Pulmonary      Other Findings        Anesthesia Plan  ASA Score- 3     Anesthesia Type- general with ASA Monitors  Additional Monitors:   Airway Plan: LMA  Plan Factors-Patient not instructed to abstain from smoking on day of procedure       Induction- intravenous  Postoperative Plan-     Informed Consent- Anesthetic plan and risks discussed with patient  I personally reviewed this patient with the CRNA  Discussed and agreed on the Anesthesia Plan with the CRNA             Lab Results   Component Value Date    GLUC 88 08/02/2019    GLUF 92 08/28/2018    ALT 20 08/02/2019    AST 21 08/02/2019    BUN 22 08/02/2019    CALCIUM 9 2 08/02/2019    CL 97 (L) 08/02/2019    CHOL 201 (H) 12/14/2017    CO2 28 08/02/2019    CREATININE 0 94 08/02/2019     05/08/2019    HCT 40 2 08/02/2019    HGB 12 9 08/02/2019    PROT 7 3 12/14/2017     08/02/2019    K 3 3 (L) 08/02/2019     12/14/2017    TRIG 61 05/08/2019    WBC 6 77 08/02/2019

## 2019-08-13 NOTE — ANESTHESIA POSTPROCEDURE EVALUATION
Post-Op Assessment Note    CV Status:  Stable    Pain management: adequate     Hydration Status:  Euvolemic   PONV Controlled:  Controlled   Airway Patency:  Patent   Post Op Vitals Reviewed: Yes      Staff: CRNA   Comments: vs report rn          BP      Temp      Pulse     Resp      SpO2

## 2019-08-13 NOTE — OP NOTE
OPERATIVE REPORT  PATIENT NAME: Julia Cheema    :  1955  MRN: [id number]  Pt Location: [facility] OR ROOM 02    SURGERY DATE: 2019    Surgeon(s) and Role:     * Laura Chino MD - Primary     * Milady Lindo MD - Assisting    Preop Diagnosis:  Malignant neoplasm of central portion of left breast in female, estrogen receptor positive ([facility] ) [C50 112, Z17 0]    Post-Op Diagnosis Codes:     * Malignant neoplasm of central portion of left breast in female, estrogen receptor positive ([facility] ) [C50 112, Z17 0]    Procedure(s) (LRB):  BREAST LUMPECTOMY; BREAST NEEDLE LOCALIZATION (NEEDLE LOC AT 0800) (Left)  SENTINEL LYMPH NODE BIOPSY; LYMPHATIC MAPPING WITH BLUE DYE AND RADIOACTIVE DYE (INJECT AT 0930 BY DR JENNINGS IN THE OR) (Left)    Specimen(s):  ID Type Source Tests Collected by Time Destination   1 : Left Breast Lumpectomy Tissue Breast, Left TISSUE EXAM Laura Chino MD 2019 0932    2 : Left Breast Inferior blue Tissue Breast, Left TISSUE EXAM Laura Chino MD 2019 2204    3 : Left Breast Lateral Red Tissue Breast, Left TISSUE EXAM Laura Chino MD 2019 0476    4 : Left Breast Medial Yellow Tissue Breast, Left TISSUE EXAM Laura Chino MD 2019 0880    5 : Left Breast Superior orange Tissue Breast, Left TISSUE Soo Bray MD 2019 2382    6 : SLN left breast Tissue Lymph Node, [place] TISSUE EXAM Laura Chino MD 2019 2099        Estimated Blood Loss:   Minimal    Drains:  * No LDAs found *    Anesthesia Type:   General    Operative Indications:  Malignant neoplasm of central portion of left breast in female, estrogen receptor positive ([facility] ) [C50 112, Z17 0]      Operative Findings:  Good specimen radiograph, sln small, blue and radioactive, grossly normal,     Complications:   None    Procedure and Technique:  Lumpectomy  The patient was brought to the operation room and placed under general anesthesia  Attention was paid to ensure appropriate padding and correct positioning    The left breast was injected intradermally with 0 3cc of methylene blue followed by technetium sulfur colloid  This area was vigorously massaged to facilitate identification of the sentinel lymph node (SLN)  The breast and axillary region were then prepped and draped in a sterile fashion  I initiated a time-out, identifying the patient, the correct side and the above procedure  All parties agreed with the time out  The planned incision was then injected with 0 25% Marcaine and epinephrine for local anesthesia  The incision was sharply incised  The localizing wire was used to guide the dissection  Superior, inferior, medial and lateral planes were developed around the localizing wire and the specimen truncated posteriorly with electrocautery just beyond the tip of the wire  The specimen was then inked for orientation purposes  A specimen radiograph was taken in two dimensions and additional margins were removed to optimize complete removal of the tumor  The additional margins were inked on the most distal area  Final posterior was muscle  All specimens were sent to pathology for permanent analysis  Superficial bleeding controlled with electrocautery and the wound was extensively irrigated  The wound was closed with two layers, an inner layer of 3-0 vicryl and a subcuticular layer of 4-0 monocryl  Steri-strips were applied  SLN Biopsy  The previously marked location of the sentinel lymph node was injected with 0 25% Marcaine and epinephrine  A curivilinear incision was made and dissection was taken down into the axillar proper with electrocautery  The dex counter was used to help localize the SLN  The sentinel lymph node was identified and removed with electrocautery  SLN Findings  The SLN was blue and radioactive  The lymph node was grossly normal and sent for permanent pathologic analysis       I was present for the entire procedure    Patient Disposition:  PACU     SIGNATURE: Dennis Beckham MD  DATE: August 13, 2019  TIME: 10:03 AM

## 2019-08-13 NOTE — DISCHARGE INSTRUCTIONS
POST-OPERATIVE BREAST CARE INSTRUCTIONS    Wound Closure/Dressing: Your wound is closed with:   Steri strips-white pieces of tape that hold your incision together along with surgical glue           Dissolvable sutures-underneath the skin    Wound care:     If you have gauze over your wound you may remove it the day after surgery  Leave the Steri-strips on, they will fall off on their own in 1-2 weeks   You may shower using soap and water to clean your wound  Gently pat dry  Drain Care: If you do not have a drain, disregard this section   Visiting Nursing should have been arranged upon discharge from hospital   A nurse will call your home to schedule an initial visit  Please call the number below if you have not received a call within 48 hours of hospital discharge   You may shower with the MAURY drains  Wash area around the drains with soap and water and pat dry   Record drain outputs on chart given to you at pre op visit and bring that chart to office at post op appt   MAURY drains can be removed in the office by a nurse either at post op visit OR when drain output is 30 ccs or less in a 24 hour period for three days in a row   If you had plastic surgery or reconstructive surgery, the plastic surgeon will manage the drains  Other:       You can begin wearing your own bra when it feels comfortable   You may resume using deodorant the day after surgery                 Post-op visit:  your post-op visit is scheduled for:  2019 @ 8:30 AM    Call our office at [phone] if you have any  of the followin  Redness, swelling, heat, unusual drainage or heavy bleeding from wound  2  Fever greater than 101 °  3  Pain not relieved by medication

## 2019-08-14 ENCOUNTER — OFFICE VISIT (OUTPATIENT)
Dept: PHYSICAL THERAPY | Facility: CLINIC | Age: 64
End: 2019-08-14
Payer: COMMERCIAL

## 2019-08-14 DIAGNOSIS — I89.0 LYMPHEDEMA: Primary | ICD-10-CM

## 2019-08-14 PROCEDURE — 97140 MANUAL THERAPY 1/> REGIONS: CPT | Performed by: PHYSICAL THERAPIST

## 2019-08-14 NOTE — PROGRESS NOTES
Daily Note / Discharge Note    Today's date: 2019  Patient name: Karri Johansen  : 1955  MRN: [id number]  Referring provider: Yves Chi PA-C  Dx:   Encounter Diagnosis     ICD-10-CM    1  Lymphedema I89 0                   Subjective: Pt reports her [employer] night garment is helping  " My foot looked normal"      Objective: See treatment diary below      Assessment: Tolerated treatment well  Patient has reached the max benefit from skilled PT at this time   Pt was 13' late to apt      Plan: D/c pt from skilled PT     Dx: L LE lymphedema  EPOC: 19  CO-MORBIDITIES: ovarian Ca, breast CA  PERSONAL FACTORS:   Precautions: Ca    Daily Treatment Diary     Manual   7 8    L LE MLD/ CLT  35' Th 30' Th 45' Th 45' Th 30' Th 45' Th 30' Th 25' Th 40' th30'                pneumatic compression pump                                           Exercise Diary                                                                                                                                                                                                                                                                                      Modalities

## 2019-08-26 DIAGNOSIS — M81.8 OTHER OSTEOPOROSIS WITHOUT CURRENT PATHOLOGICAL FRACTURE: ICD-10-CM

## 2019-08-26 RX ORDER — ALENDRONATE SODIUM 70 MG/1
70 TABLET ORAL
Qty: 4 TABLET | Refills: 0 | Status: SHIPPED | OUTPATIENT
Start: 2019-08-26 | End: 2019-09-21 | Stop reason: SDUPTHER

## 2019-08-27 PROBLEM — Z08 ENCOUNTER FOR FOLLOW-UP SURVEILLANCE OF OVARIAN CANCER: Status: RESOLVED | Noted: 2019-05-09 | Resolved: 2019-08-27

## 2019-08-27 PROBLEM — Z85.43 ENCOUNTER FOR FOLLOW-UP SURVEILLANCE OF OVARIAN CANCER: Status: RESOLVED | Noted: 2019-05-09 | Resolved: 2019-08-27

## 2019-08-28 ENCOUNTER — HOSPITAL ENCOUNTER (OUTPATIENT)
Dept: BONE DENSITY | Facility: IMAGING CENTER | Age: 64
Discharge: HOME | End: 2019-08-28

## 2019-08-28 ENCOUNTER — OFFICE VISIT (OUTPATIENT)
Dept: SURGICAL ONCOLOGY | Facility: CLINIC | Age: 64
End: 2019-08-28

## 2019-08-28 VITALS
SYSTOLIC BLOOD PRESSURE: 140 MMHG | WEIGHT: 117 LBS | HEIGHT: 61 IN | BODY MASS INDEX: 22.09 KG/M2 | RESPIRATION RATE: 16 BRPM | HEART RATE: 82 BPM | DIASTOLIC BLOOD PRESSURE: 80 MMHG | TEMPERATURE: 98.6 F

## 2019-08-28 DIAGNOSIS — Z17.0 MALIGNANT NEOPLASM OF CENTRAL PORTION OF LEFT BREAST IN FEMALE, ESTROGEN RECEPTOR POSITIVE (HCC): Primary | ICD-10-CM

## 2019-08-28 DIAGNOSIS — Z98.890 STATUS POST LEFT BREAST LUMPECTOMY: ICD-10-CM

## 2019-08-28 DIAGNOSIS — M81.0 AGE-RELATED OSTEOPOROSIS WITHOUT CURRENT PATHOLOGICAL FRACTURE: ICD-10-CM

## 2019-08-28 DIAGNOSIS — C50.112 MALIGNANT NEOPLASM OF CENTRAL PORTION OF LEFT BREAST IN FEMALE, ESTROGEN RECEPTOR POSITIVE (HCC): Primary | ICD-10-CM

## 2019-08-28 PROCEDURE — 99024 POSTOP FOLLOW-UP VISIT: CPT | Performed by: SURGERY

## 2019-08-28 NOTE — PROGRESS NOTES
Surgical Oncology Breast Post-Op       [facility]  [facility] [place]  [address] [facility] [address]  [place] [address]  1955  [id number]  [address]  [facility] [place]  [address] [facility] [place] [address]    Chief Complaint:   Tammie Dangelo is seen for a post-operative visit of her recent Left breast surgery  Oncology History:        History of ovarian cancer     Initial Diagnosis     Ovarian cancer ([facility] [address] )      4/21/2009 Surgery     Exploratory laparotomy, total abdominal hysterectomy, bilateral salpingo-oophrectomy, lymph node dissection, omentectomy with staging       - 7/8/2009 Chemotherapy     Intraperitoneal along with intravenous chemotherapy on a early ovarian cancer protocol        5/31/2018 Genetic Testing     Genetic testing results are 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      Genetic testing indicated that the patient carries a Variant of Uncertain Significance (VUS) : Rad51C c 252G>T       Hormone Therapy           History of ductal carcinoma in situ (DCIS) of breast    5/31/2018 Genetic Testing     Genetic testing results are 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      Genetic testing indicated that the patient carries a Variant of Uncertain Significance (VUS) : Rad51C c 252G>T      8/1/2018 Biopsy     Right breast biopsy  11 o'clock position 5 cmfn  DCIS  Grade 2  Confirmed by Amadeo Baca MD  ER 0  VT 0      9/11/2018 Surgery     Right lumpectomy  DCIS  Grade 2  1 4 cm  Margins negative  Stage 0      10/16/2018 -  Hormone Therapy     Consult with Dr Saira Dunbar  No adjuvant systemic therapy recommended      10/29/2018 - 11/28/2018 Radiation     Course: C1    Plan ID Energy Fractions Dose per Fraction (cGy) Dose Correction (cGy) Total Dose Delivered (cGy) Elapsed Days   R Breast 6X 16 / 16 266 0 4,256 22   R Breast e 12E 5 / 5 200 0 1,000 7      Treatment dates:  C1: 10/29/2018 - 2018          Malignant neoplasm of central portion of left breast in female, estrogen receptor positive ([facility] [address] )    2019 Biopsy     Left breast MRI-guided biopsy  3 o'clock, posterior depth  Invasive breast carcinoma of no special type  Grade 1  ER 90  OK 0  HER2 1+      2019 Surgery     Left breast needle localized lumpectomy with sentinel lymph node biopsy  Invasive mammary carcinoma of no special type  Grade 1  3 mm  Margins negative  0/1 Lymph node  Anatomic/Prognostic Stage IA         Assessment & Plan:   Assessment/Plan      Patient presents for a postoperative visit  She tolerated her surgery quite well  She has minimal complaints referable to her surgery  She will follow up with Dr Vannesa Rodas for radiation therapy as well as Dr Verlena Apgar for anti hormonal therapy  I provided her a copy of her pathology report  We will see her back in 3 months  She is agreeable to see our advanced practitioner at that time  History of Present Illness:     See above    Interval History:    see above    Review of Systems:   Review of Systems   Constitutional: Negative for activity change, appetite change and fatigue  HENT: Negative  Eyes: Negative  Respiratory: Negative for cough, shortness of breath and wheezing  Cardiovascular: Negative for chest pain and leg swelling  Gastrointestinal: Negative  Endocrine: Negative  Genitourinary: Negative  Musculoskeletal:        No new changes or complaints of bone pain   Skin: Negative  Allergic/Immunologic: Negative  Neurological: Negative  Hematological: Negative  Psychiatric/Behavioral: Negative          Past Medical History:     Patient Active Problem List   Diagnosis    Benign essential hypertension    Colon, diverticulosis    Crohn's disease ([facility] )    Dense breasts    Dermatitis    Erythema chronica migrans, secondary    Hyperlipidemia    Lymphedema    Murmur    Osteopenia    Osteoporosis    History of ovarian cancer  Reactive airway disease    Shortness of breath on exertion    Lymphedema of left lower extremity    Genetic predisposition to breast cancer    History of ductal carcinoma in situ (DCIS) of breast    Lichen sclerosus et atrophicus    Essential hypertension    Malignant neoplasm of ovary (HCC)    Nausea    TIA (transient ischemic attack)    Malignant neoplasm of central portion of left breast in female, estrogen receptor positive ([facility] )     Past Medical History:   Diagnosis Date    BRCA1 positive     BRCA2 positive     Breast cancer ([facility] ) 09/11/2018    Right    Breast cancer ([place] [address] ) 08/13/2019    Left    Crohn disease ([place] [address] )     Dense breast     History of chemotherapy     History of radiation therapy     History of transfusion 2009    Hyperlipidemia     Hypertension     Lymphedema     left leg    Ovarian cancer ([facility] ) 04/21/2009     Past Surgical History:   Procedure Laterality Date    BREAST LUMPECTOMY Right 09/11/2018    BREAST LUMPECTOMY Left 8/13/2019    Procedure: BREAST LUMPECTOMY; BREAST NEEDLE LOCALIZATION (NEEDLE LOC AT 0800); Surgeon: Madhuri Phillips MD;  Location: [facility] Main OR;  Service: Surgical Oncology    BREAST LUMPECTOMY W/ NEEDLE LOCALIZATION Left 08/13/2019    COLONOSCOPY      EXPLORATORY LAPAROTOMY      HYSTERECTOMY      LYMPH NODE BIOPSY Left 8/13/2019    Procedure: SENTINEL LYMPH NODE BIOPSY; LYMPHATIC MAPPING WITH BLUE DYE AND RADIOACTIVE DYE (INJECT AT 0930 BY DR JENNINGS IN THE OR); Surgeon: Madhuri Phillips MD;  Location: [facility];  Service: Surgical Oncology    MAMMO NEEDLE LOCALIZATION LEFT (ALL INC) Left 8/13/2019    MAMMO NEEDLE LOCALIZATION RIGHT (ALL INC) Right 9/11/2018    MRI BREAST BIOPSY LEFT (ALL INCLUSIVE) Left 7/18/2019    OMENTECTOMY      [place] PERQ DEVICE PLACEMT BREAST LOC 1ST LES W GUIDNCE Right 9/11/2018    Procedure: BREAST LUMPECTOMY; BREAST NEEDLE LOCALIZATION (NEEDLE LOC AT 1200);   Surgeon: Madhuri Phillips MD;  Location: [facility];  Service: Surgical Oncology    TOTAL ABDOMINAL HYSTERECTOMY W/ BILATERAL SALPINGOOPHORECTOMY      US GUIDED BREAST BIOPSY LEFT COMPLETE Left 6/17/2019    US GUIDED BREAST BIOPSY RIGHT COMPLETE Right 8/1/2018    WISDOM TOOTH EXTRACTION       Family History   Problem Relation Age of Onset    Prostate cancer Father     Ovarian cancer Sister     Breast cancer Paternal Grandmother     Breast cancer Maternal Aunt     No Known Problems Sister     No Known Problems Sister     No Known Problems Sister     No Known Problems Sister     No Known Problems Paternal Aunt     No Known Problems Maternal Aunt      Social History     Socioeconomic History    Marital status: Single     Spouse name: Not on file    Number of children: Not on file    Years of education: Not on file    Highest education level: Not on file   Occupational History    Not on file   Social Needs    Financial resource strain: Not on file    Food insecurity:     Worry: Not on file     Inability: Not on file    Transportation needs:     Medical: Not on file     Non-medical: Not on file   Tobacco Use    Smoking status: Former Smoker     Last attempt to quit: 4/1/2009     Years since quitting: 10 4    Smokeless tobacco: Never Used   Substance and Sexual Activity    Alcohol use: Yes     Frequency: Monthly or less     Drinks per session: 1 or 2     Comment: rarely    Drug use: No    Sexual activity: Not on file   Lifestyle    Physical activity:     Days per week: Not on file     Minutes per session: Not on file    Stress: Not on file   Relationships    Social connections:     Talks on phone: Not on file     Gets together: Not on file     Attends Anabaptist service: Not on file     Active member of club or organization: Not on file     Attends meetings of clubs or organizations: Not on file     Relationship status: Not on file    Intimate partner violence:     Fear of current or ex partner: Not on file     Emotionally abused: Not on file     Physically abused: Not on file     Forced sexual activity: Not on file   Other Topics Concern    Not on file   Social History Narrative    Not on file       Current Outpatient Medications:     albuterol (PROVENTIL HFA,VENTOLIN HFA) 90 mcg/act inhaler, Inhale 2 puffs as needed for wheezing, Disp: 1 Inhaler, Rfl: 0    alendronate (FOSAMAX) 70 mg tablet, TAKE 1 TABLET (70 MG TOTAL) BY MOUTH EVERY 7 DAYS, Disp: 4 tablet, Rfl: 0    amLODIPine (NORVASC) 10 mg tablet, Take 1 tablet (10 mg total) by mouth daily, Disp: 30 tablet, Rfl: 5    atorvastatin (LIPITOR) 40 mg tablet, Take 1 tablet (40 mg total) by mouth daily at bedtime, Disp: 90 tablet, Rfl: 1    Calcium Carbonate (CALTRATE 600) 1500 (600 Ca) MG TABS, Take 1 tablet by mouth 2 (two) times a day, Disp: , Rfl:     clobetasol (TEMOVATE) 0 05 % ointment, Apply to the affected area 1-2 times a week, Disp: 30 g, Rfl: 2    Elastic Bandages & Supports (MEDICAL COMPRESSION STOCKINGS) MISC, Use daily as directed, 3 pair, Disp: 3 each, Rfl: 0    hydrochlorothiazide (MICROZIDE) 12 5 mg capsule, Take 1 capsule (12 5 mg total) by mouth every morning, Disp: 30 capsule, Rfl: 5    inFLIXimab (REMICADE) 100 mg, Infuse into a venous catheter  , Disp: , Rfl:     mupirocin (BACTROBAN) 2 % ointment, Apply topically 3 (three) times a day, Disp: 22 g, Rfl: 0    oxyCODONE-acetaminophen (PERCOCET) 5-325 mg per tablet, Take 1 tablet by mouth every 6 (six) hours as needed for moderate painMax Daily Amount: 4 tablets, Disp: 6 tablet, Rfl: 0  Allergies   Allergen Reactions    Boniva [Ibandronic Acid] Headache    Lisinopril      cough    Losartan      Numbness on right side of body       Physical Exams: There were no vitals filed for this visit  Physical Exam   Pulmonary/Chest:    Examination of her left breast wound as well as her axillary incision show no evidence of infection  There is no seroma       Results:    I provided a copy of her pathology report    I have previously highlighted for the relevant portions for her

## 2019-09-17 ENCOUNTER — CLINICAL SUPPORT (OUTPATIENT)
Dept: RADIATION ONCOLOGY | Facility: HOSPITAL | Age: 64
End: 2019-09-17
Attending: RADIOLOGY
Payer: COMMERCIAL

## 2019-09-17 VITALS
HEART RATE: 78 BPM | RESPIRATION RATE: 14 BRPM | TEMPERATURE: 98 F | BODY MASS INDEX: 21.26 KG/M2 | DIASTOLIC BLOOD PRESSURE: 80 MMHG | WEIGHT: 112.5 LBS | SYSTOLIC BLOOD PRESSURE: 150 MMHG

## 2019-09-17 DIAGNOSIS — Z17.0 MALIGNANT NEOPLASM OF CENTRAL PORTION OF LEFT BREAST IN FEMALE, ESTROGEN RECEPTOR POSITIVE (HCC): Primary | ICD-10-CM

## 2019-09-17 DIAGNOSIS — Z86.000 HISTORY OF DUCTAL CARCINOMA IN SITU (DCIS) OF BREAST: ICD-10-CM

## 2019-09-17 DIAGNOSIS — C50.112 MALIGNANT NEOPLASM OF CENTRAL PORTION OF LEFT BREAST IN FEMALE, ESTROGEN RECEPTOR POSITIVE (HCC): Primary | ICD-10-CM

## 2019-09-17 DIAGNOSIS — Z85.43 HISTORY OF OVARIAN CANCER: ICD-10-CM

## 2019-09-17 PROCEDURE — 99211 OFF/OP EST MAY X REQ PHY/QHP: CPT | Performed by: RADIOLOGY

## 2019-09-17 NOTE — PROGRESS NOTES
Consultation - Radiation Oncology     VCX:[id number] : 1955  Encounter: [id number]  Patient Information: [address]  Chief Complaint   Patient presents with    Breast Cancer     Cancer Staging  History of ductal carcinoma in situ (DCIS) of breast  Staging form: Breast, AJCC 8th Edition  - Pathologic: Stage 0 (pTis (DCIS), pN0, cM0, ER: Negative, MO: Negative) - Unsigned  Neoadjuvant therapy: No  Laterality: Right  Tumor size (mm): 14  Method of lymph node assessment: Clinical  Nuclear grade: G2    History of ovarian cancer  Staging form: Ovary, AJCC 7th Edition  - Clinical: FIGO Stage IA (T1a, N0, M0) - Signed by Oren Farfan PA-C on 2018  Histologic grade (G): G3    Malignant neoplasm of central portion of left breast in female, estrogen receptor positive ([facility] [address] )  Staging form: Breast, AJCC 8th Edition  - Pathologic: Stage IA (pT1b, pN0(sn), cM0, G1, ER+, MO-, HER2-) - Unsigned  Neoadjuvant therapy: No  Laterality: Left  Tumor size (mm): 3  Method of lymph node assessment: [place] lymph node biopsy  Histologic grading system: 3 grade system           History of Present Illness   Ghada Nelson is a 60y o  year old female who presents with a h/o stage I ovarian ca treated in  and stage 0 Right breast DCIS s/p lumpectomy and radiation completing 2018  She was last seen by radiation oncology 19 and was doing well at that time      19 Mammogram- IMPRESSION:  New postoperative changes seen on the right   Otherwise right breast within normal limits  Nodule and distortion seen in the left outer posterior breast for which further workup is recommended as above      19 ultrasound-guided biopsy was negative for malignancy and revealed adenosis  19 MRI of the bilateral breasts revealed:  IMPRESSION:  Enhancing mass in the 3 o'clock position of the left breast which correlates with the mass seen on mammography    Left MRI guided core needle biopsy is recommended  7/18/19 MRI guided biopsy     8/1/19 Dr Grace Maki- new diagnosis of left invasive breast cancer  Pt prefers left breast conservation therapy which would include a needle localization lumpectomy in conjunction with a sentinel lymph node biopsy and possible axillary dissection      8/13/19 L breast lumpectomy with sentinel lymph node biopsy     8/28/19 Dr Grace Maki- tolerated surgery well  F/u with Dr Marylene Edis for radiation as well as Dr Jamarcus Fermin for anti hormonal therapy  F/u in 3 months    He she reports her left breast mass was not palpable and was only seen on imaging studies  She has recovered well from her surgery and denies any pain  She has no edema of the left upper extremity and has a good range of motion  She continues to do well with her Crohn's disease and has been on Remicade since 2002  She has been caring for her elderly father who is status post 2 falls recently with fractures that required surgery  He now requires 24 hour care and is in a dementia unit at a nursing home  She also cares for her 61-year-old sister with Downs syndrome who was also experiencing some early dementia  She does work part-time 3 days a week  Her siblings also help her care for her sister      10/16/19 Dr Jamarcus Fermin  12/24/19 Dr Donna Jarvis      History of ovarian cancer     Initial Diagnosis     Ovarian cancer ([facility] [address] )      4/21/2009 Surgery     Exploratory laparotomy, total abdominal hysterectomy, bilateral salpingo-oophrectomy, lymph node dissection, omentectomy with staging       - 7/8/2009 Chemotherapy     Intraperitoneal along with intravenous chemotherapy on a early ovarian cancer protocol        5/31/2018 Genetic Testing     Genetic testing results are 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      Genetic testing indicated that the patient carries a Variant of Uncertain Significance (VUS) : Rad51C c 252G>T       Hormone Therapy           History of ductal carcinoma in situ (DCIS) of breast    5/31/2018 Genetic Testing     Genetic testing results are 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      Genetic testing indicated that the patient carries a Variant of Uncertain Significance (VUS) : Rad51C c 252G>T      8/1/2018 Biopsy     Right breast biopsy  11 o'clock position 5 cmfn  DCIS  Grade 2  Confirmed by Martita Solomon MD  ER 0  OK 0      9/11/2018 Surgery     Right lumpectomy  DCIS  Grade 2  1 4 cm  Margins negative  Stage 0      10/16/2018 -  Hormone Therapy     Consult with Dr Deon Roger  No adjuvant systemic therapy recommended      10/29/2018 - 11/28/2018 Radiation     Course: C1    Plan ID Energy Fractions Dose per Fraction (cGy) Dose Correction (cGy) Total Dose Delivered (cGy) Elapsed Days   R Breast 6X 16 / 16 266 0 4,256 22   R Breast e 12E 5 / 5 200 0 1,000 7      Treatment dates:  C1: 10/29/2018 - 11/28/2018          Malignant neoplasm of central portion of left breast in female, estrogen receptor positive ([facility] [address] )    7/18/2019 Biopsy     Left breast MRI-guided biopsy  3 o'clock, posterior depth  Invasive breast carcinoma of no special type  Grade 1  ER 90  OK 0  HER2 1+      8/13/2019 Surgery     Left breast needle localized lumpectomy with sentinel lymph node biopsy  Invasive mammary carcinoma of no special type  Grade 1  3 mm  Margins negative  0/1 Lymph node  Anatomic/Prognostic Stage IA           Past Medical History:   Diagnosis Date    BRCA1 positive     BRCA2 positive     Breast cancer ([facility] [address] ) 09/11/2018    Right    Breast cancer ([facility] [address] ) 08/13/2019    Left    Crohn disease ([facility] [address] )     Dense breast     History of chemotherapy     History of radiation therapy     History of transfusion 2009    Hyperlipidemia     Hypertension     Lymphedema     left leg    Ovarian cancer ([address] ) 04/21/2009     Past Surgical History:   Procedure Laterality Date    BREAST LUMPECTOMY Right 09/11/2018    BREAST LUMPECTOMY Left 8/13/2019    Procedure: BREAST LUMPECTOMY; BREAST NEEDLE LOCALIZATION (NEEDLE LOC AT 0800); Surgeon: Gricel Sexton MD;  [facility];  Service: Surgical Oncology    BREAST LUMPECTOMY W/ NEEDLE LOCALIZATION Left 08/13/2019    COLONOSCOPY      EXPLORATORY LAPAROTOMY      HYSTERECTOMY      LYMPH NODE BIOPSY Left 8/13/2019    Procedure: SENTINEL LYMPH NODE BIOPSY; LYMPHATIC MAPPING WITH BLUE DYE AND RADIOACTIVE DYE (INJECT AT 0930 BY DR JENNINGS IN THE OR); Surgeon: Gricel Sexton MD;  [facility];  Service: Surgical Oncology    MAMMO NEEDLE LOCALIZATION LEFT (ALL INC) Left 8/13/2019    MAMMO NEEDLE LOCALIZATION RIGHT (ALL INC) Right 9/11/2018    MRI BREAST BIOPSY LEFT (ALL INCLUSIVE) Left 7/18/2019    OMENTECTOMY      LA PERQ DEVICE PLACEMT BREAST LOC 1ST LES W GUIDNCE Right 9/11/2018    Procedure: BREAST LUMPECTOMY; BREAST NEEDLE LOCALIZATION (NEEDLE LOC AT 1200);   Surgeon: Gricel Sexton MD;  [facility];  Service: Surgical Oncology    TOTAL ABDOMINAL HYSTERECTOMY W/ BILATERAL SALPINGOOPHORECTOMY      US GUIDED BREAST BIOPSY LEFT COMPLETE Left 6/17/2019    US GUIDED BREAST BIOPSY RIGHT COMPLETE Right 8/1/2018    WISDOM TOOTH EXTRACTION         Family History   Problem Relation Age of Onset    Prostate cancer Father     Ovarian cancer Sister     Breast cancer Paternal Grandmother     Breast cancer Maternal Aunt     No Known Problems Sister     No Known Problems Sister     No Known Problems Sister     No Known Problems Sister     No Known Problems Paternal Aunt     No Known Problems Maternal Aunt        Social History   Social History     Substance and Sexual Activity   Alcohol Use Yes    Frequency: Monthly or less    Drinks per session: 1 or 2    Comment: rarely     Social History     Substance and Sexual Activity   Drug Use No     Social History     Tobacco Use   Smoking Status Former Smoker    Last attempt to quit: 4/1/2009    Years since quitting: 10 4   Smokeless Tobacco Never Used     Meds/Allergies     Current Outpatient Medications:     albuterol (PROVENTIL HFA,VENTOLIN HFA) 90 mcg/act inhaler, Inhale 2 puffs as needed for wheezing, Disp: 1 Inhaler, Rfl: 0    alendronate (FOSAMAX) 70 mg tablet, TAKE 1 TABLET (70 MG TOTAL) BY MOUTH EVERY 7 DAYS, Disp: 4 tablet, Rfl: 0    amLODIPine (NORVASC) 10 mg tablet, Take 1 tablet (10 mg total) by mouth daily, Disp: 30 tablet, Rfl: 5    atorvastatin (LIPITOR) 40 mg tablet, Take 1 tablet (40 mg total) by mouth daily at bedtime, Disp: 90 tablet, Rfl: 1    Calcium Carbonate (CALTRATE 600) 1500 (600 Ca) MG TABS, Take 1 tablet by mouth 2 (two) times a day, Disp: , Rfl:     clobetasol (TEMOVATE) 0 05 % ointment, Apply to the affected area 1-2 times a week, Disp: 30 g, Rfl: 2    Elastic Bandages & Supports (MEDICAL COMPRESSION STOCKINGS) MISC, Use daily as directed, 3 pair, Disp: 3 each, Rfl: 0    hydrochlorothiazide (MICROZIDE) 12 5 mg capsule, Take 1 capsule (12 5 mg total) by mouth every morning, Disp: 30 capsule, Rfl: 5    inFLIXimab (REMICADE) 100 mg, Infuse into a venous catheter  , Disp: , Rfl:     mupirocin (BACTROBAN) 2 % ointment, Apply topically 3 (three) times a day, Disp: 22 g, Rfl: 0    oxyCODONE-acetaminophen (PERCOCET) 5-325 mg per tablet, Take 1 tablet by mouth every 6 (six) hours as needed for moderate painMax Daily Amount: 4 tablets (Patient not taking: Reported on 8/28/2019), Disp: 6 tablet, Rfl: 0  Allergies   Allergen Reactions    Boniva [Ibandronic Acid] Headache    Lisinopril      cough    Losartan      Numbness on right side of body     Review of Systems  Constitutional: Negative  HENT:        Gets sores in nose and on scalp  Eyes: Negative  Respiratory: Negative  Cardiovascular: Positive for leg swelling (left leg lymphedema)  Gastrointestinal:        Chrons  Endocrine: Negative  Genitourinary: Negative  Musculoskeletal: Positive for arthralgias (related to chrons)  Skin: Negative           No concerns regarding breasts Allergic/Immunologic: Negative  Neurological: Negative  Hematological: Negative  Psychiatric/Behavioral: The patient is nervous/anxious (Overwhelmed - sick family members)        OBJECTIVE:   /80   Pulse 78   Temp 98 °F (36 7 °C)   Resp 14   Wt 51 kg (112 lb 8 oz)   BMI 21 26 kg/m²   Pain Assessment:  0  Performance Status: ECOG/Zubrod/WHO: 1 - Symptomatic but completely ambulatory    Physical Exam   Constitutional: She is oriented to person, place, and time  She appears well-developed and well-nourished  No distress  HENT:   Head: Normocephalic and atraumatic  Mouth/Throat: No oropharyngeal exudate  Eyes: Pupils are equal, round, and reactive to light  Conjunctivae and EOM are normal  No scleral icterus  Neck: Normal range of motion  Neck supple  No tracheal deviation present  No thyromegaly present  Cardiovascular: Normal rate, regular rhythm and normal heart sounds  Pulmonary/Chest: Effort normal and breath sounds normal  No respiratory distress  She has no wheezes  She has no rales  She exhibits no tenderness  Right breast exhibits no inverted nipple, no mass, no nipple discharge, no skin change ( There is a well-healed right lumpectomy incision with underlying post treatment changes and no masses  There is minimal skin erythema from treatment and no significant edema   ) and no tenderness  Left breast exhibits no inverted nipple, no mass, no nipple discharge and no skin change ( There are well-healed left lumpectomy and axillary incisions with no masses  There is no erythema and no edema  )  Abdominal: Soft  Bowel sounds are normal  She exhibits no distension and no mass  There is no tenderness  Musculoskeletal: Normal range of motion  She exhibits no edema or tenderness  Lymphadenopathy:     She has no cervical adenopathy  She has no axillary adenopathy  Right: No supraclavicular adenopathy present  Left: No supraclavicular adenopathy present  Neurological: She is alert and oriented to person, place, and time  No cranial nerve deficit  Coordination normal    Skin: Skin is warm and dry  No rash noted  She is not diaphoretic  No erythema  No pallor  Psychiatric: She has a normal mood and affect  Her behavior is normal  Judgment and thought content normal    Nursing note and vitals reviewed  RESULTS  Lab Results    Chemistry        Component Value Date/Time     12/14/2017 0000    K 3 3 (L) 08/02/2019 1429    K 3 7 05/08/2019 0933    CL 97 (L) 08/02/2019 1429    CL 97 05/08/2019 0933    CO2 28 08/02/2019 1429    CO2 29 05/08/2019 0933    BUN 22 08/02/2019 1429    BUN 26 05/08/2019 0933    CREATININE 0 94 08/02/2019 1429    CREATININE 0 76 12/14/2017 0000        Component Value Date/Time    CALCIUM 9 2 08/02/2019 1429    CALCIUM 9 2 12/14/2017 0000    ALKPHOS 62 08/02/2019 1429    ALKPHOS 57 12/14/2017 0000    AST 21 08/02/2019 1429    AST 20 05/08/2019 0933    ALT 20 08/02/2019 1429    ALT 16 05/08/2019 0933    BILITOT 0 4 12/14/2017 0000            Lab Results   Component Value Date    WBC 6 77 08/02/2019    HGB 12 9 08/02/2019    HCT 40 2 08/02/2019    MCV 88 08/02/2019     08/02/2019       Imaging Studies: See Above    Pathology: See Above      ASSESSMENT  1  Malignant neoplasm of central portion of left breast in female, estrogen receptor positive ([facility] [address] )  Radiation Simulation Treatment   2  History of ductal carcinoma in situ (DCIS) of breast     3   History of ovarian cancer       Cancer Staging  History of ductal carcinoma in situ (DCIS) of breast  Staging form: Breast, AJCC 8th Edition  - Pathologic: Stage 0 (pTis (DCIS), pN0, cM0, ER: Negative, OR: Negative) - Unsigned  Neoadjuvant therapy: No  Laterality: Right  Tumor size (mm): 14  Method of lymph node assessment: Clinical  Nuclear grade: G2    History of ovarian cancer  Staging form: Ovary, AJCC 7th Edition  - Clinical: FIGO Stage IA (T1a, N0, M0) - Signed by Pamela Cisneros YUE on 4/19/2018  Histologic grade (G): G3    Malignant neoplasm of central portion of left breast in female, estrogen receptor positive ([facility] [address] )  Staging form: Breast, AJCC 8th Edition  - Pathologic: Stage IA (pT1b, pN0(sn), cM0, G1, ER+, [place]-, HER2-) - Unsigned  Neoadjuvant therapy: No  Laterality: Left  Tumor size (mm): 3  Method of lymph node assessment: [place] lymph node biopsy  Histologic grading system: 3 grade system        PLAN/DISCUSSION  Orders Placed This Encounter   Procedures    Radiation Simulation Treatment          Gabrielle Dent is a 60y o  year old female with a stage 0 right breast ductal carcinoma in situ status post lumpectomy with negative margins with her tumor measuring 1 4 cm, grade 2 and estrogen and progesterone receptors were both negative   She has a history of a stage I ovarian carcinoma treated in 2009 and remains EDER  Her genetic testing was positive for pathologic variant of uncertain significance  She completed radiation therapy to the right breast on November 28, 2018 and remains EDER  She returns today for consultation for newly diagnosed stage IA invasive left breast mammary carcinoma grade 1 measuring 3 mm in size status post lumpectomy with negative margins and negative sentinel lymph node  Her disease was estrogen receptor positive with progesterone receptors being negative  In order to complete her breast conservation management, she will require radiation therapy to the entire left breast   We discussed rationale for treatment including the acute side effects and the potential chronic complications of radiation  She agrees to proceed with the treatment  We discussed treatment with breath hold techniques to minimize dose to her heart and lungs  We also discussed a conventional 6 week course of treatment as well as a hypo-fractionated course of treatment over 4 weeks which is what she previously received for her right breast DCIS    She has consultation appointment October 16, 2019 with Dr Myke Cain to discuss systemic treatment options most likely with hormonal therapy  She will return here next week for simulation and treatment planning purposes  Oliver Taylor MD  9/17/2019,10:33 AM      Portions of the record may have been created with voice recognition software   Occasional wrong word or "sound a like" substitutions may have occurred due to the inherent limitations of voice recognition software   Read the chart carefully and recognize, using context, where substitutions have occurred

## 2019-09-17 NOTE — PROGRESS NOTES
Lashonda Ray 1955 is a 60 y o  female    Pt is a 60 y o female with a h/o stage 1 ovarian ca treated in 2009 and stage 0 R breast ca s/p lumpectomy and radiation completing 11/28/2018  She was last seen by radiation oncology 1/8/19 and was doing well at that time  6/14/19 Mammogram- IMPRESSION:  New postoperative changes seen on the right  Otherwise right breast within normal limits  Nodule and distortion seen in the left outer posterior breast for which further workup is recommended as above  7/18/19 MRI guided biopsy    8/1/19 Dr Eric Elliott- new diagnosis of left invasive breast cancer  Pt prefers left breast conservation therapy which would include a needle localization lumpectomy in conjunction with a sentinel lymph node biopsy and possible axillary dissection  8/13/19 L breast lumpectomy with sentinel lymph node biopsy    8/28/19 Dr Eric Elliott- tolerated surgery well  F/u with Dr Fabricio Rojas for radiation as well as Dr William Guaman for anti hormonal therapy  F/u in 3 months    10/16/19 Dr William Guaman  12/24/19 Dr Eric Elliott         History of ovarian cancer     Initial Diagnosis     Ovarian cancer ([facility] [address] )      4/21/2009 Surgery     Exploratory laparotomy, total abdominal hysterectomy, bilateral salpingo-oophrectomy, lymph node dissection, omentectomy with staging       - 7/8/2009 Chemotherapy     Intraperitoneal along with intravenous chemotherapy on a early ovarian cancer protocol        5/31/2018 Genetic Testing     Genetic testing results are 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      Genetic testing indicated that the patient carries a Variant of Uncertain Significance (VUS) : Rad51C c 252G>T       Hormone Therapy           History of ductal carcinoma in situ (DCIS) of breast    5/31/2018 Genetic Testing     Genetic testing results are 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      Genetic testing indicated that the patient carries a Variant of Uncertain Significance (VUS) : Rad51C c  252G>T      2018 Biopsy     Right breast biopsy  11 o'clock position 5 cmfn  DCIS  Grade 2  Confirmed by Ramin Mcgill MD  ER 0  TN 0      2018 Surgery     Right lumpectomy  DCIS  Grade 2  1 4 cm  Margins negative  Stage 0      10/16/2018 -  Hormone Therapy     Consult with Dr Dione Purvis  No adjuvant systemic therapy recommended      10/29/2018 - 2018 Radiation     Course: C1    Plan ID Energy Fractions Dose per Fraction (cGy) Dose Correction (cGy) Total Dose Delivered (cGy) Elapsed Days   R Breast 6X 16 / 16 266 0 4,256 22   R Breast e 12E  200 0 1,000 7      Treatment dates:  C1: 10/29/2018 - 2018          Malignant neoplasm of central portion of left breast in female, estrogen receptor positive ([facility] [address] )    2019 Biopsy     Left breast MRI-guided biopsy  3 o'clock, posterior depth  Invasive breast carcinoma of no special type  Grade 1  ER 90  TN 0  HER2 1+      2019 Surgery     Left breast needle localized lumpectomy with sentinel lymph node biopsy  Invasive mammary carcinoma of no special type  Grade 1  3 mm  Margins negative  0/1 Lymph node  Anatomic/Prognostic Stage IA           Clinical Trial: no      OB/GYN History:  The patient underwent menarche at 17 years  Menopause Statuspost  No LMP recorded  Patient has had a hysterectomy  Menopause at 56years  Menopause Reasonovarian cancer  Hormone replacement therapy: no   Years used   0   Para 0   Age at first delivery being  years  Nursing: not applicable     Birth control pills: no   Years used  Gyncological comment       Health Maintenance   Topic Date Due    Pneumococcal Vaccine: Pediatrics (0 to 5 Years) and At-Risk Patients (6 to 59 Years) (2 of 3 - PPSV23) 2017    PT PLAN OF CARE  2019    INFLUENZA VACCINE  2019 (Originally 2019)    Depression Screening PHQ  06/10/2020    MAMMOGRAM  2020    BMI: Adult  2020    CRC Screening: Colonoscopy  07/15/2022    Pneumococcal Vaccine: 65+ Years (2 of 2 - PPSV23) 08/30/2022    DTaP,Tdap,and Td Vaccines (3 - Td) 11/16/2026    Hepatitis C Screening  Completed    HEPATITIS B VACCINES  Aged Out       Past Medical History:   Diagnosis Date    BRCA1 positive     BRCA2 positive     [facility] 09/11/2018    Right    Breast cancer ([facility] ) 08/13/2019    Left    Crohn disease ([place] [address] )     Dense breast     History of chemotherapy     History of radiation therapy     History of transfusion 2009    Hyperlipidemia     Hypertension     Lymphedema     left leg    Ovarian cancer ([facility] ) 04/21/2009       Past Surgical History:   Procedure Laterality Date    BREAST LUMPECTOMY Right 09/11/2018    BREAST LUMPECTOMY Left 8/13/2019    Procedure: BREAST LUMPECTOMY; BREAST NEEDLE LOCALIZATION (NEEDLE LOC AT 0800); Surgeon: Morteza Nuñez MD;  [facility];  Service: Surgical Oncology    BREAST LUMPECTOMY W/ NEEDLE LOCALIZATION Left 08/13/2019    COLONOSCOPY      EXPLORATORY LAPAROTOMY      HYSTERECTOMY      LYMPH NODE BIOPSY Left 8/13/2019    Procedure: SENTINEL LYMPH NODE BIOPSY; LYMPHATIC MAPPING WITH BLUE DYE AND RADIOACTIVE DYE (INJECT AT 0930 BY DR JENNINGS IN THE OR); Surgeon: Morteza Nuñez MD;  [facility];  Service: Surgical Oncology    MAMMO NEEDLE LOCALIZATION LEFT (ALL INC) Left 8/13/2019    MAMMO NEEDLE LOCALIZATION RIGHT (ALL INC) Right 9/11/2018    MRI BREAST BIOPSY LEFT (ALL INCLUSIVE) Left 7/18/2019    OMENTECTOMY      NJ PERQ DEVICE PLACEMT BREAST LOC 1ST LES W GUIDNCE Right 9/11/2018    Procedure: BREAST LUMPECTOMY; BREAST NEEDLE LOCALIZATION (NEEDLE LOC AT 1200);   Surgeon: Morteza Nuñez MD;  [facility];  Service: Surgical Oncology    TOTAL ABDOMINAL HYSTERECTOMY W/ BILATERAL SALPINGOOPHORECTOMY      US GUIDED BREAST BIOPSY LEFT COMPLETE Left 6/17/2019    US GUIDED BREAST BIOPSY RIGHT COMPLETE Right 8/1/2018    WISDOM TOOTH EXTRACTION         Family History   Problem Relation Age of Onset    Prostate cancer Father     Ovarian cancer Sister     Breast cancer Paternal Grandmother     Breast cancer Maternal Aunt     No Known Problems Sister     No Known Problems Sister     No Known Problems Sister     No Known Problems Sister     No Known Problems Paternal Aunt     No Known Problems Maternal Aunt        Social History     Tobacco Use    Smoking status: Former Smoker     Last attempt to quit: 4/1/2009     Years since quitting: 10 4    Smokeless tobacco: Never Used   Substance Use Topics    Alcohol use: Yes     Frequency: Monthly or less     Drinks per session: 1 or 2     Comment: rarely    Drug use: No          Current Outpatient Medications:     albuterol (PROVENTIL HFA,VENTOLIN HFA) 90 mcg/act inhaler, Inhale 2 puffs as needed for wheezing, Disp: 1 Inhaler, Rfl: 0    alendronate (FOSAMAX) 70 mg tablet, TAKE 1 TABLET (70 MG TOTAL) BY MOUTH EVERY 7 DAYS, Disp: 4 tablet, Rfl: 0    amLODIPine (NORVASC) 10 mg tablet, Take 1 tablet (10 mg total) by mouth daily, Disp: 30 tablet, Rfl: 5    atorvastatin (LIPITOR) 40 mg tablet, Take 1 tablet (40 mg total) by mouth daily at bedtime, Disp: 90 tablet, Rfl: 1    Calcium Carbonate (CALTRATE 600) 1500 (600 Ca) MG TABS, Take 1 tablet by mouth 2 (two) times a day, Disp: , Rfl:     clobetasol (TEMOVATE) 0 05 % ointment, Apply to the affected area 1-2 times a week, Disp: 30 g, Rfl: 2    Elastic Bandages & Supports (MEDICAL COMPRESSION STOCKINGS) MISC, Use daily as directed, 3 pair, Disp: 3 each, Rfl: 0    hydrochlorothiazide (MICROZIDE) 12 5 mg capsule, Take 1 capsule (12 5 mg total) by mouth every morning, Disp: 30 capsule, Rfl: 5    inFLIXimab (REMICADE) 100 mg, Infuse into a venous catheter  , Disp: , Rfl:     mupirocin (BACTROBAN) 2 % ointment, Apply topically 3 (three) times a day, Disp: 22 g, Rfl: 0    oxyCODONE-acetaminophen (PERCOCET) 5-325 mg per tablet, Take 1 tablet by mouth every 6 (six) hours as needed for moderate painMax Daily Amount: 4 tablets (Patient not taking: Reported on 8/28/2019), Disp: 6 tablet, Rfl: 0    Allergies   Allergen Reactions    Boniva [Ibandronic Acid] Headache    Lisinopril      cough    Losartan      Numbness on right side of body        Review of Systems:  Review of Systems   Constitutional: Negative  HENT:        Gets sores in nose and on scalp  Eyes: Negative  Respiratory: Negative  Cardiovascular: Positive for leg swelling (left leg lymphedema)  Gastrointestinal:        Chrons  Endocrine: Negative  Genitourinary: Negative  Musculoskeletal: Positive for arthralgias (related to chrons)  Skin: Negative  No concerns regarding breasts   Allergic/Immunologic: Negative  Neurological: Negative  Hematological: Negative  Psychiatric/Behavioral: The patient is nervous/anxious (Overwhelmed - sick family members)  Vitals:    09/17/19 0947   BP: 150/80   Pulse: 78   Resp: 14   Temp: 98 °F (36 7 °C)   Weight: 51 kg (112 lb 8 oz)       Pain assessment: 0    Pain Score: 0-No pain    No results found for: PSA:    Imaging:No images are attached to the encounter  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

## 2019-09-21 DIAGNOSIS — M81.8 OTHER OSTEOPOROSIS WITHOUT CURRENT PATHOLOGICAL FRACTURE: ICD-10-CM

## 2019-09-21 RX ORDER — ALENDRONATE SODIUM 70 MG/1
70 TABLET ORAL
Qty: 4 TABLET | Refills: 11 | Status: SHIPPED | OUTPATIENT
Start: 2019-09-21 | End: 2020-10-07 | Stop reason: SDUPTHER

## 2019-09-24 ENCOUNTER — APPOINTMENT (OUTPATIENT)
Dept: RADIATION ONCOLOGY | Facility: HOSPITAL | Age: 64
End: 2019-09-24
Attending: RADIOLOGY
Payer: COMMERCIAL

## 2019-09-24 PROCEDURE — 77332 RADIATION TREATMENT AID(S): CPT | Performed by: RADIOLOGY

## 2019-09-24 PROCEDURE — 77290 THER RAD SIMULAJ FIELD CPLX: CPT | Performed by: RADIOLOGY

## 2019-09-24 PROCEDURE — 77470 SPECIAL RADIATION TREATMENT: CPT | Performed by: RADIOLOGY

## 2019-10-01 ENCOUNTER — HOSPITAL ENCOUNTER (OUTPATIENT)
Dept: BONE DENSITY | Facility: IMAGING CENTER | Age: 64
Discharge: HOME | End: 2019-10-01
Payer: COMMERCIAL

## 2019-10-01 ENCOUNTER — APPOINTMENT (OUTPATIENT)
Dept: RADIATION ONCOLOGY | Facility: HOSPITAL | Age: 64
End: 2019-10-01
Attending: RADIOLOGY
Payer: COMMERCIAL

## 2019-10-01 PROCEDURE — 77080 DXA BONE DENSITY AXIAL: CPT

## 2019-10-04 PROCEDURE — 77334 RADIATION TREATMENT AID(S): CPT | Performed by: RADIOLOGY

## 2019-10-04 PROCEDURE — 77300 RADIATION THERAPY DOSE PLAN: CPT | Performed by: RADIOLOGY

## 2019-10-04 PROCEDURE — 77370 RADIATION PHYSICS CONSULT: CPT | Performed by: RADIOLOGY

## 2019-10-04 PROCEDURE — 77295 3-D RADIOTHERAPY PLAN: CPT | Performed by: RADIOLOGY

## 2019-10-07 ENCOUNTER — APPOINTMENT (OUTPATIENT)
Dept: RADIATION ONCOLOGY | Facility: HOSPITAL | Age: 64
End: 2019-10-07
Attending: RADIOLOGY
Payer: COMMERCIAL

## 2019-10-07 DIAGNOSIS — C50.112 MALIGNANT NEOPLASM OF CENTRAL PORTION OF LEFT BREAST IN FEMALE, ESTROGEN RECEPTOR POSITIVE (HCC): Primary | ICD-10-CM

## 2019-10-07 DIAGNOSIS — Z17.0 MALIGNANT NEOPLASM OF CENTRAL PORTION OF LEFT BREAST IN FEMALE, ESTROGEN RECEPTOR POSITIVE (HCC): Primary | ICD-10-CM

## 2019-10-07 PROCEDURE — 77280 THER RAD SIMULAJ FIELD SMPL: CPT | Performed by: RADIOLOGY

## 2019-10-07 PROCEDURE — 77417 THER RADIOLOGY PORT IMAGE(S): CPT | Performed by: RADIOLOGY

## 2019-10-08 ENCOUNTER — APPOINTMENT (OUTPATIENT)
Dept: RADIATION ONCOLOGY | Facility: HOSPITAL | Age: 64
End: 2019-10-08
Attending: RADIOLOGY
Payer: COMMERCIAL

## 2019-10-08 PROCEDURE — 77387 GUIDANCE FOR RADJ TX DLVR: CPT | Performed by: RADIOLOGY

## 2019-10-08 PROCEDURE — 77331 SPECIAL RADIATION DOSIMETRY: CPT | Performed by: RADIOLOGY

## 2019-10-08 PROCEDURE — 77412 RADIATION TX DELIVERY LVL 3: CPT | Performed by: RADIOLOGY

## 2019-10-09 ENCOUNTER — APPOINTMENT (OUTPATIENT)
Dept: RADIATION ONCOLOGY | Facility: HOSPITAL | Age: 64
End: 2019-10-09
Attending: RADIOLOGY
Payer: COMMERCIAL

## 2019-10-09 PROCEDURE — 77387 GUIDANCE FOR RADJ TX DLVR: CPT | Performed by: RADIOLOGY

## 2019-10-09 PROCEDURE — 77412 RADIATION TX DELIVERY LVL 3: CPT | Performed by: RADIOLOGY

## 2019-10-10 ENCOUNTER — APPOINTMENT (OUTPATIENT)
Dept: RADIATION ONCOLOGY | Facility: HOSPITAL | Age: 64
End: 2019-10-10
Attending: RADIOLOGY
Payer: COMMERCIAL

## 2019-10-10 PROCEDURE — 77387 GUIDANCE FOR RADJ TX DLVR: CPT | Performed by: RADIOLOGY

## 2019-10-10 PROCEDURE — 77412 RADIATION TX DELIVERY LVL 3: CPT | Performed by: RADIOLOGY

## 2019-10-11 ENCOUNTER — APPOINTMENT (OUTPATIENT)
Dept: RADIATION ONCOLOGY | Facility: HOSPITAL | Age: 64
End: 2019-10-11
Attending: RADIOLOGY
Payer: COMMERCIAL

## 2019-10-11 PROCEDURE — 77336 RADIATION PHYSICS CONSULT: CPT | Performed by: RADIOLOGY

## 2019-10-11 PROCEDURE — 77387 GUIDANCE FOR RADJ TX DLVR: CPT | Performed by: RADIOLOGY

## 2019-10-11 PROCEDURE — 77412 RADIATION TX DELIVERY LVL 3: CPT | Performed by: RADIOLOGY

## 2019-10-14 ENCOUNTER — APPOINTMENT (OUTPATIENT)
Dept: RADIATION ONCOLOGY | Facility: HOSPITAL | Age: 64
End: 2019-10-14
Attending: RADIOLOGY
Payer: COMMERCIAL

## 2019-10-14 PROCEDURE — 77387 GUIDANCE FOR RADJ TX DLVR: CPT | Performed by: RADIOLOGY

## 2019-10-14 PROCEDURE — 77412 RADIATION TX DELIVERY LVL 3: CPT | Performed by: RADIOLOGY

## 2019-10-15 ENCOUNTER — APPOINTMENT (OUTPATIENT)
Dept: RADIATION ONCOLOGY | Facility: HOSPITAL | Age: 64
End: 2019-10-15
Attending: RADIOLOGY
Payer: COMMERCIAL

## 2019-10-15 PROCEDURE — 77387 GUIDANCE FOR RADJ TX DLVR: CPT | Performed by: RADIOLOGY

## 2019-10-15 PROCEDURE — 77412 RADIATION TX DELIVERY LVL 3: CPT | Performed by: RADIOLOGY

## 2019-10-16 ENCOUNTER — DOCUMENTATION (OUTPATIENT)
Dept: HEMATOLOGY ONCOLOGY | Facility: CLINIC | Age: 64
End: 2019-10-16

## 2019-10-16 ENCOUNTER — OFFICE VISIT (OUTPATIENT)
Dept: HEMATOLOGY ONCOLOGY | Facility: CLINIC | Age: 64
End: 2019-10-16
Payer: COMMERCIAL

## 2019-10-16 ENCOUNTER — APPOINTMENT (OUTPATIENT)
Dept: RADIATION ONCOLOGY | Facility: HOSPITAL | Age: 64
End: 2019-10-16
Attending: RADIOLOGY
Payer: COMMERCIAL

## 2019-10-16 VITALS
SYSTOLIC BLOOD PRESSURE: 138 MMHG | OXYGEN SATURATION: 97 % | HEART RATE: 71 BPM | BODY MASS INDEX: 21.9 KG/M2 | TEMPERATURE: 97.1 F | DIASTOLIC BLOOD PRESSURE: 72 MMHG | WEIGHT: 116 LBS | HEIGHT: 61 IN | RESPIRATION RATE: 18 BRPM

## 2019-10-16 DIAGNOSIS — Z17.0 MALIGNANT NEOPLASM OF CENTRAL PORTION OF LEFT BREAST IN FEMALE, ESTROGEN RECEPTOR POSITIVE (HCC): ICD-10-CM

## 2019-10-16 DIAGNOSIS — C50.112 MALIGNANT NEOPLASM OF CENTRAL PORTION OF LEFT BREAST IN FEMALE, ESTROGEN RECEPTOR POSITIVE (HCC): ICD-10-CM

## 2019-10-16 PROCEDURE — 99215 OFFICE O/P EST HI 40 MIN: CPT | Performed by: INTERNAL MEDICINE

## 2019-10-16 PROCEDURE — 77412 RADIATION TX DELIVERY LVL 3: CPT | Performed by: RADIOLOGY

## 2019-10-16 PROCEDURE — 77387 GUIDANCE FOR RADJ TX DLVR: CPT | Performed by: RADIOLOGY

## 2019-10-16 RX ORDER — ANASTROZOLE 1 MG/1
1 TABLET ORAL DAILY
Qty: 90 TABLET | Refills: 1 | Status: SHIPPED | OUTPATIENT
Start: 2019-10-16 | End: 2020-09-11

## 2019-10-16 NOTE — PROGRESS NOTES
Completed nursing teach on Anastrozole on 10/16/19  Reviewed mechanism of action, administration of, possible side effects, managing those side effects, and when to call the doctor/go to ER  Medication handout with side effects given to patient to take home  Patient verbalized understanding through teach back  Consents signed and scanned into chart

## 2019-10-16 NOTE — LETTER
October 16, 2019     Chantel Umaña, [address] [address]    Patient: Flora Mendenhall   YOB: 1955   Date of Visit: 10/16/2019       Dear Dr Verito Cardona: Thank you for referring Elmo Olson to me for evaluation  Below are my notes for this consultation  If you have questions, please do not hesitate to call me  I look forward to following your patient along with you  Sincerely,        Emeli Schaffer MD        CC: MD Daphney Lala MD Jeris German, MD  10/16/2019 11:03 AM  Sign at close encounter  Hematology / Oncology Outpatient Follow Up Note    Flora Mendenhall 60 y o  female SQF:[id number] OSX:[id number]         Date:  10/16/2019    Assessment / Plan:  A 53-year-old postmenopausal woman who has history of stage I ovarian cancer in 2009, status post hysterectomy and bilateral salpingo-oophorectomy resulting in EDER followed by adjuvant chemotherapy with carboplatin and paclitaxel x6  She has no evidence recurrence of ovarian cancer to date  She  also has history of ductal carcinoma in situ, grade 2, 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, diagnosed in 2018  She underwent lumpectomy, resulting in EDER  In August 2018, she was found to have stage IA left breast cancer, grade 1, ER 90 % positive, AK negative, HER2 negative disease  She underwent lumpectomy and sentinel lymph node biopsy, resulting in EDER  She presents today to discuss adjuvant treatment options  We had extensive discussion regarding the diagnosis, staging information, tumor phenotype, good prognosis and treatment options  Obviously, with her age, small tumor, low-grade histology, adjuvant chemotherapy is not indicated  I recommended her to have adjuvant hormonal therapy with anastrozole  Side effects of anastrozole was thoroughly discussed, including but not limited to hot flashes, musculoskeletal symptom and bone mineral density loss    She has pre-existing osteoporosis for which she restarted alendronate which I recommended her to continue  I recommended her to take calcium vitamin-D on a regular basis  She is in agreement with my recommendation  I will see her again in 6 months for routine follow-up              Subjective:      HPI:  A 62-year-old postmenopausal woman who has history of stage I ovarian cancer, diagnosed in 2009  She underwent total abdominal hysterectomy and bilateral salpingo-oophorectomy, resulting in EDER followed by 6 cycle of adjuvant chemotherapy with carboplatin and paclitaxel, resulting in cure  She was tested for BRCA gene mutation in 2009 which was negative for deleterious mutation  Her sister was diagnosed with ovarian cancer at the age of 46  Therefore, she was retested for extended panel of genetic mutation which showed heterozygote YOLANDA mutation  Subsequently, she underwent MRI of the breast which showed abnormality in her right breast    Biopsy showed DCIS, ER negative, CO negative  Therefore, she underwent lumpectomy by Dr Delfina Roberson in September 11, 2018  She had 1 4 x 1 3 x 1 2 cm of ductal carcinoma in situ, grade 2  There was no evidence of invasive carcinoma  She presents today to discuss possible adjuvant therapy  She is going to start adjuvant radiation therapy soon  She feels well  She has no complaint of pain  She has Crohn disease for which she is on Remicade  Her weight has been stable  She has normal performance status            Interval History:  A 62-year-old postmenopausal woman who has history of stage I ovarian cancer in 2009, status post hysterectomy and bilateral salpingo-oophorectomy resulting in EDER followed by adjuvant chemotherapy with carboplatin and paclitaxel x6  She has no evidence recurrence of ovarian cancer to date  She has heterozygote YOLANDA mutation     She  was diagnosed ductal with carcinoma in situ, grade 2, 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, in 2018  She underwent lumpectomy, resulting in EDER  She was recently found to have abnormality in her left breast which was biopsied in July 18, 2019  She had invasive ductal carcinoma, grade 1  This was ER 90% positive, DC negative, HER2 negative disease  She subsequently underwent lumpectomy and sentinel lymph node biopsy by Dr Urmila Goode in August 2019, which showed 3 mm of invasive ductal carcinoma, grade 1  There was no evidence of lymphovascular invasion  1 sentinel lymph node was negative for metastatic disease  She presents today to discuss adjuvant treatment options  She has no breast related symptomatology  She already started adjuvant radiation therapy under the care of Dr Manuel Soria  She has no respiratory symptoms  Her weight is stable  She has no complaint of pain  She has no children  Her performance status is normal              Objective:      Primary Diagnosis:     1  History of stage I ovarian cancer, diagnosed in 2009  2    Heterozygote YOLANDA mutation  3    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 breast, grade 2, ERPR negative disease  Diagnosed in September 2018  4  Left breast cancer, stage IA (pT1b, pN0, M0) grade 1, ER 90% positive, DC negative, HER2 negative disease  Diagnosed in August 2019       Cancer Staging:  Cancer Staging  Ductal carcinoma in situ (DCIS) of right breast  Staging form: Breast, AJCC 8th Edition  - Pathologic: Stage 0 (pTis (DCIS), pN0, cM0, ER: Negative, DC: Negative) - Unsigned     Ovarian cancer ([facility] [address] )  Staging form: Ovary, AJCC 7th Edition  - Clinical: FIGO Stage IA (T1a, N0, M0) - Signed by Willie Hernandez PA-C on 4/19/2018           Previous Hematologic/ Oncologic Treatment:            Current Hematologic/ Oncologic Treatment:       Adjuvant hormonal therapy with anastrozole to be started in October 2019       Disease Status:      EDER status post lumpectomy      Test Results:     Pathology:     1 4 x 1 2 x 1 3 cm of ductal carcinoma in situ, grade 2    ER negative, DC negative disease  In August 2019, 3 mm of invasive ductal carcinoma, grade 1  No evidence of lymphovascular invasion  1 sentinel lymph node was negative for metastatic disease  ER 90% positive, CO negative, HER2 negative disease  Stage IA (pT1b, pN0, M0)     Radiology:     Chest x-ray was negative for pulmonary disease  DEXA scan in October 2019 showed T-score -3 0, consistent with osteoporosis      Laboratory:     CBC and CMP are within normal limits      Physical Exam:        General Appearance:    Alert, oriented          Eyes:    PERRL   Ears:    Normal external ear canals, both ears   Nose:   Nares normal, septum midline   Throat:   Mucosa moist  Pharynx without injection  Neck:   Supple         Lungs:     Clear to auscultation bilaterally   Chest Wall:    No tenderness or deformity    Heart:    Regular rate and rhythm         Abdomen:     Soft, non-tender, bowel sounds +, no organomegaly               Extremities:   Extremities no cyanosis, left lower extremity lymphedema          Skin:   no rash or icterus  Lymph nodes:   Cervical, supraclavicular, and axillary nodes normal   Neurologic:   CNII-XII intact, normal strength, sensation and reflexes     Throughout             Breast exam:   Lumpectomy scar at outer upper quadrant of the right breast with no palpable abnormality  lumpectomy scar at upper aspect of her left breast with no palpable abnormalities  ROS: Review of Systems   All other systems reviewed and are negative  Imaging: Dxa Bone Density Spine Hip And Pelvis    Result Date: 10/1/2019  Narrative: DXA SCAN CLINICAL HISTORY:  73-year-old woman  Menopause at age 48  OTHER RISK FACTORS:  History of hip fracture in a parent  Crohn's disease  TECHNIQUE: Bone densitometry was performed using a Hologic Horizon A bone densitometer  Regions of interest appear properly placed  COMPARISON: 12/10/2015   RESULTS: LUMBAR SPINE L1-L4 : BMD  0 805  gm/cm2 / T-score -2 2 / Z score -0 6   These values are artifactually elevated due to scoliosis, degenerative sclerosis and osteophytosis  LEFT  TOTAL HIP: BMD 0 653  gm/cm2 / T-score -2 4 / Z score -1 3 LEFT  FEMORAL NECK: BMD 0 521  gm/cm2 / T score -3 0 / Z score -1 6 CHANGE: Since the last DXA: LUMBAR SPINE BMD has INCREASED  0 043 gm/cm2 or 5 6%  This change is statistically significant  However, the apparent change may be artifactual due to progression of degenerative disease since the last DXA scan  HIP BMD has DECREASED  0 019 gm/cm2 or 2 8%  This change is not statistically significant  Impression: 1  Osteoporosis  [Based on the left femoral neck] 2  Since a DXA study from 12/10/2015,  there has been a STATISTICALLY SIGNIFICANT INCREASE in bone mineral density  in the lumbar spine  3   The 10 year risk of hip fracture is 3 5% with the 10 year risk of major osteoporotic fracture being 23% as calculated by the [facility]/WHO fracture risk assessment tool (FRAX)  4   The current NOF guidelines recommend treating patients with a T-score of -2 5 or less in the lumbar spine or hips, or in post-menopausal women and men over the age of 48 with low bone mass (osteopenia) and a FRAX 10 year risk score of >3% for hip fracture and/or >20% for major osteoporotic fracture  5   The NOF recommends follow-up DXA in 1-2 years after initiating therapy for osteoporosis and every 2 years thereafter  More frequent evaluation is appropriate for patients with conditions associated with rapid bone loss, such as glucocorticoid therapy  The interval between DXA screenings may be longer for individuals without major risk factors and initial T-score in the normal or upper low bone mass range  The FRAX algorithm has certain limitations: -FRAX has not been validated in patients currently or previously treated with pharmacotherapy for osteoporosis  In such patients, clinical judgment must be exercised in interpreting FRAX scores    -Prior hip, vertebral and humeral fragility fractures appear to confer greater risk of subsequent fracture than fractures at other sites (this is especially true for individuals with severe vertebral fractures), but quantification of this incremental risk is not possible with FRAX  -FRAX underestimates fracture risk in patients with history of multiple fragility fractures  -FRAX may underestimate fracture risk in patients with history of frequent falls  -It is not appropriate to use FRAX to monitor treatment response  WHO CLASSIFICATION: Normal (a T-score of -1 0 or higher) Low bone mineral density (a T-score of less than -1 0 but higher than -2 5) Osteoporosis (a T-score of -2 5 or less) Severe osteoporosis (a T-score of -2 5 or less with a fragility fracture) LEAST SIGNIFICANT CHANGE (AT 95% C  I): Lumbar spine: 0 025 g/cm2; 2 8% Total hip: 0 025 g/cm2; 3 7% Forearm: 0 012 g/cm2; 1 9% Workstation performed: [id number]         Labs:   Lab Results   Component Value Date    WBC 6 77 08/02/2019    HGB 12 9 08/02/2019    HCT 40 2 08/02/2019    MCV 88 08/02/2019     08/02/2019     Lab Results   Component Value Date     12/14/2017    K 3 3 (L) 08/02/2019    CL 97 (L) 08/02/2019    CO2 28 08/02/2019    BUN 22 08/02/2019    CREATININE 0 94 08/02/2019    GLUCOSE 90 12/14/2017    GLUF 92 08/28/2018    CALCIUM 9 2 08/02/2019    AST 21 08/02/2019    ALT 20 08/02/2019    ALKPHOS 62 08/02/2019    PROT 7 3 12/14/2017    BILITOT 0 4 12/14/2017    EGFR 66 08/02/2019       No components found for: CA27 29        Lab Results   Component Value Date    IRON 65 08/28/2018    TIBC 336 08/28/2018       Lab Results   Component Value Date    KZTDTDCC61 335 08/28/2018         Current Medications: Reviewed  Allergies: Reviewed  PMH/FH/SH:  Reviewed      Vital Sign:    Body surface area is 1 5 meters squared      Wt Readings from Last 3 Encounters:   10/16/19 52 6 kg (116 lb)   09/17/19 51 kg (112 lb 8 oz)   08/28/19 53 1 kg (117 lb) Temp Readings from Last 3 Encounters:   10/16/19 (!) 97 1 °F (36 2 °C) (Tympanic Core)   09/17/19 98 °F (36 7 °C)   08/28/19 98 6 °F (37 °C) (Oral)        BP Readings from Last 3 Encounters:   10/16/19 138/72   09/17/19 150/80   08/28/19 140/80         Pulse Readings from Last 3 Encounters:   10/16/19 71   09/17/19 78   08/28/19 82     @LASTSAO2(3)@

## 2019-10-16 NOTE — PROGRESS NOTES
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, diagnosed in 2018  She underwent lumpectomy, resulting in EDER  In August 2018, she was found to have stage IA left breast cancer, grade 1, ER 90 % positive, AR negative, HER2 negative disease  She underwent lumpectomy and sentinel lymph node biopsy, resulting in EDER  She presents today to discuss adjuvant treatment options  We had extensive discussion regarding the diagnosis, staging information, tumor phenotype, good prognosis and treatment options  Obviously, with her age, small tumor, low-grade histology, adjuvant chemotherapy is not indicated  I recommended her to have adjuvant hormonal therapy with anastrozole  Side effects of anastrozole was thoroughly discussed, including but not limited to hot flashes, musculoskeletal symptom and bone mineral density loss  She has pre-existing osteoporosis for which she restarted alendronate which I recommended her to continue  I recommended her to take calcium vitamin-D on a regular basis  She is in agreement with my recommendation  I will see her again in 6 months for routine follow-up              Subjective:      HPI:  A 42-year-old postmenopausal woman who has history of stage I ovarian cancer, diagnosed in 2009  She underwent total abdominal hysterectomy and bilateral salpingo-oophorectomy, resulting in EDER followed by 6 cycle of adjuvant chemotherapy with carboplatin and paclitaxel, resulting in cure     She was tested for BRCA gene mutation in 2009 which was negative for deleterious mutation  Her sister was diagnosed with ovarian cancer at the age of 46  Therefore, she was retested for extended panel of genetic mutation which showed heterozygote YOLANDA mutation  Subsequently, she underwent MRI of the breast which showed abnormality in her right breast    Biopsy showed DCIS, ER negative, WA negative  Therefore, she underwent lumpectomy by Dr Lena Horne in September 11, 2018  She had 1 4 x 1 3 x 1 2 cm of ductal carcinoma in situ, grade 2  There was no evidence of invasive carcinoma  She presents today to discuss possible adjuvant therapy  She is going to start adjuvant radiation therapy soon  She feels well  She has no complaint of pain  She has Crohn disease for which she is on Remicade  Her weight has been stable  She has normal performance status            Interval History:  A 70-year-old postmenopausal woman who has history of stage I ovarian cancer in 2009, status post hysterectomy and bilateral salpingo-oophorectomy resulting in EDER followed by adjuvant chemotherapy with carboplatin and paclitaxel x6  She has no evidence recurrence of ovarian cancer to date  She has heterozygote YOLANDA mutation  She was diagnosed ductal with carcinoma in situ, grade 2, 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, in 2018  She underwent lumpectomy, resulting in EDER  She was recently found to have abnormality in her left breast which was biopsied in July 18, 2019  She had invasive ductal carcinoma, grade 1  This was ER 90% positive, WA negative, HER2 negative disease  She subsequently underwent lumpectomy and sentinel lymph node biopsy by Dr Lena Horne in August 2019, which showed 3 mm of invasive ductal carcinoma, grade 1  There was no evidence of lymphovascular invasion  1 sentinel lymph node was negative for metastatic disease  She presents today to discuss adjuvant treatment options    She has no breast related symptomatology  She already started adjuvant radiation therapy under the care of Dr Kylee Dickey  She has no respiratory symptoms  Her weight is stable  She has no complaint of pain  She has no children  Her performance status is normal              Objective:      Primary Diagnosis:     1  History of stage I ovarian cancer, diagnosed in 2009  2    Heterozygote YOLANDA mutation  3    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 breast, grade 2, ERPR negative disease  Diagnosed in September 2018  4  Left breast cancer, stage IA (pT1b, pN0, M0) grade 1, ER 90% positive, CT negative, HER2 negative disease  Diagnosed in August 2019       Cancer Staging:  Cancer Staging  Ductal carcinoma in situ (DCIS) of right breast  Staging form: Breast, AJCC 8th Edition  - Pathologic: Stage 0 (pTis (DCIS), pN0, cM0, ER: Negative, CT: Negative) - Unsigned     Ovarian cancer ([facility] [address] )  Staging form: Ovary, AJCC 7th Edition  - Clinical: FIGO Stage IA (T1a, N0, M0) - Signed by Miah Traore PA-C on 4/19/2018           Previous Hematologic/ Oncologic Treatment:            Current Hematologic/ Oncologic Treatment:       Adjuvant hormonal therapy with anastrozole to be started in October 2019       Disease Status:      EDER status post lumpectomy      Test Results:     Pathology:     1 4 x 1 2 x 1 3 cm of ductal carcinoma in situ, grade 2  ER negative, CT negative disease  In August 2019, 3 mm of invasive ductal carcinoma, grade 1  No evidence of lymphovascular invasion  1 sentinel lymph node was negative for metastatic disease  ER 90% positive, CT negative, HER2 negative disease  Stage IA (pT1b, pN0, M0)     Radiology:     Chest x-ray was negative for pulmonary disease      DEXA scan in October 2019 showed T-score -3 0, consistent with osteoporosis      Laboratory:     CBC and CMP are within normal limits      Physical Exam:        General Appearance:    Alert, oriented          Eyes:    PERRL   Ears:    Normal external ear canals, both ears   Nose:   Nares normal, septum midline   Throat:   Mucosa moist  Pharynx without injection  Neck:   Supple         Lungs:     Clear to auscultation bilaterally   Chest Wall:    No tenderness or deformity    Heart:    Regular rate and rhythm         Abdomen:     Soft, non-tender, bowel sounds +, no organomegaly               Extremities:   Extremities no cyanosis, left lower extremity lymphedema          Skin:   no rash or icterus  Lymph nodes:   Cervical, supraclavicular, and axillary nodes normal   Neurologic:   CNII-XII intact, normal strength, sensation and reflexes     Throughout             Breast exam:   Lumpectomy scar at outer upper quadrant of the right breast with no palpable abnormality  lumpectomy scar at upper aspect of her left breast with no palpable abnormalities  ROS: Review of Systems   All other systems reviewed and are negative  Imaging: Dxa Bone Density Spine Hip And Pelvis    Result Date: 10/1/2019  Narrative: DXA SCAN CLINICAL HISTORY:  28-year-old woman  Menopause at age 48  OTHER RISK FACTORS:  History of hip fracture in a parent  Crohn's disease  TECHNIQUE: Bone densitometry was performed using a Hologic Horizon A bone densitometer  Regions of interest appear properly placed  COMPARISON: 12/10/2015  RESULTS: LUMBAR SPINE L1-L4 : BMD  0 805  gm/cm2 / T-score -2 2 / Z score -0 6  These values are artifactually elevated due to scoliosis, degenerative sclerosis and osteophytosis  LEFT  TOTAL HIP: BMD 0 653  gm/cm2 / T-score -2 4 / Z score -1 3 LEFT  FEMORAL NECK: BMD 0 521  gm/cm2 / T score -3 0 / Z score -1 6 CHANGE: Since the last DXA: LUMBAR SPINE BMD has INCREASED  0 043 gm/cm2 or 5 6%  This change is statistically significant  However, the apparent change may be artifactual due to progression of degenerative disease since the last DXA scan  HIP BMD has DECREASED  0 019 gm/cm2 or 2 8%  This change is not statistically significant  Impression: 1  Osteoporosis  [Based on the left femoral neck] 2  Since a DXA study from 12/10/2015,  there has been a STATISTICALLY SIGNIFICANT INCREASE in bone mineral density  in the lumbar spine  3   The 10 year risk of hip fracture is 3 5% with the 10 year risk of major osteoporotic fracture being 23% as calculated by the [facility]/WHO fracture risk assessment tool (FRAX)  4   The current NOF guidelines recommend treating patients with a T-score of -2 5 or less in the lumbar spine or hips, or in post-menopausal women and men over the age of 48 with low bone mass (osteopenia) and a FRAX 10 year risk score of >3% for hip fracture and/or >20% for major osteoporotic fracture  5   The NOF recommends follow-up DXA in 1-2 years after initiating therapy for osteoporosis and every 2 years thereafter  More frequent evaluation is appropriate for patients with conditions associated with rapid bone loss, such as glucocorticoid therapy  The interval between DXA screenings may be longer for individuals without major risk factors and initial T-score in the normal or upper low bone mass range  The FRAX algorithm has certain limitations: -FRAX has not been validated in patients currently or previously treated with pharmacotherapy for osteoporosis  In such patients, clinical judgment must be exercised in interpreting FRAX scores  -Prior hip, vertebral and humeral fragility fractures appear to confer greater risk of subsequent fracture than fractures at other sites (this is especially true for individuals with severe vertebral fractures), but quantification of this incremental risk is not possible with FRAX  -FRAX underestimates fracture risk in patients with history of multiple fragility fractures  -FRAX may underestimate fracture risk in patients with history of frequent falls  -It is not appropriate to use FRAX to monitor treatment response   WHO CLASSIFICATION: Normal (a T-score of -1 0 or higher) Low bone mineral density (a T-score of less than -1 0 but higher than -2 5) Osteoporosis (a T-score of -2 5 or less) Severe osteoporosis (a T-score of -2 5 or less with a fragility fracture) LEAST SIGNIFICANT CHANGE (AT 95% C  I): Lumbar spine: 0 025 g/cm2; 2 8% Total hip: 0 025 g/cm2; 3 7% Forearm: 0 012 g/cm2; 1 9% Workstation performed: [id number]         Labs:   Lab Results   Component Value Date    WBC 6 77 08/02/2019    HGB 12 9 08/02/2019    HCT 40 2 08/02/2019    MCV 88 08/02/2019     08/02/2019     Lab Results   Component Value Date     12/14/2017    K 3 3 (L) 08/02/2019    CL 97 (L) 08/02/2019    CO2 28 08/02/2019    BUN 22 08/02/2019    CREATININE 0 94 08/02/2019    GLUCOSE 90 12/14/2017    GLUF 92 08/28/2018    CALCIUM 9 2 08/02/2019    AST 21 08/02/2019    ALT 20 08/02/2019    ALKPHOS 62 08/02/2019    PROT 7 3 12/14/2017    BILITOT 0 4 12/14/2017    EGFR 66 08/02/2019       No components found for: CA27 29        Lab Results   Component Value Date    IRON 65 08/28/2018    TIBC 336 08/28/2018       Lab Results   Component Value Date    TNAGCPCU91 335 08/28/2018         Current Medications: Reviewed  Allergies: Reviewed  PMH/FH/SH:  Reviewed      Vital Sign:    Body surface area is 1 5 meters squared      Wt Readings from Last 3 Encounters:   10/16/19 52 6 kg (116 lb)   09/17/19 51 kg (112 lb 8 oz)   08/28/19 53 1 kg (117 lb)        Temp Readings from Last 3 Encounters:   10/16/19 (!) 97 1 °F (36 2 °C) (Tympanic Core)   09/17/19 98 °F (36 7 °C)   08/28/19 98 6 °F (37 °C) (Oral)        BP Readings from Last 3 Encounters:   10/16/19 138/72   09/17/19 150/80   08/28/19 140/80         Pulse Readings from Last 3 Encounters:   10/16/19 71   09/17/19 78   08/28/19 82     @LASTSAO2(3)@

## 2019-10-17 ENCOUNTER — APPOINTMENT (OUTPATIENT)
Dept: RADIATION ONCOLOGY | Facility: HOSPITAL | Age: 64
End: 2019-10-17
Attending: RADIOLOGY
Payer: COMMERCIAL

## 2019-10-17 PROCEDURE — 77387 GUIDANCE FOR RADJ TX DLVR: CPT | Performed by: RADIOLOGY

## 2019-10-17 PROCEDURE — 77412 RADIATION TX DELIVERY LVL 3: CPT | Performed by: RADIOLOGY

## 2019-10-18 ENCOUNTER — APPOINTMENT (OUTPATIENT)
Dept: RADIATION ONCOLOGY | Facility: HOSPITAL | Age: 64
End: 2019-10-18
Attending: RADIOLOGY
Payer: COMMERCIAL

## 2019-10-18 PROCEDURE — 77412 RADIATION TX DELIVERY LVL 3: CPT | Performed by: RADIOLOGY

## 2019-10-18 PROCEDURE — 77336 RADIATION PHYSICS CONSULT: CPT | Performed by: RADIOLOGY

## 2019-10-18 PROCEDURE — 77387 GUIDANCE FOR RADJ TX DLVR: CPT | Performed by: RADIOLOGY

## 2019-10-21 ENCOUNTER — APPOINTMENT (OUTPATIENT)
Dept: RADIATION ONCOLOGY | Facility: HOSPITAL | Age: 64
End: 2019-10-21
Attending: RADIOLOGY
Payer: COMMERCIAL

## 2019-10-21 PROCEDURE — 77412 RADIATION TX DELIVERY LVL 3: CPT | Performed by: RADIOLOGY

## 2019-10-21 PROCEDURE — 77387 GUIDANCE FOR RADJ TX DLVR: CPT | Performed by: RADIOLOGY

## 2019-10-22 ENCOUNTER — APPOINTMENT (OUTPATIENT)
Dept: RADIATION ONCOLOGY | Facility: HOSPITAL | Age: 64
End: 2019-10-22
Attending: RADIOLOGY
Payer: COMMERCIAL

## 2019-10-22 PROCEDURE — 77387 GUIDANCE FOR RADJ TX DLVR: CPT | Performed by: RADIOLOGY

## 2019-10-22 PROCEDURE — 77412 RADIATION TX DELIVERY LVL 3: CPT | Performed by: RADIOLOGY

## 2019-10-23 ENCOUNTER — APPOINTMENT (OUTPATIENT)
Dept: RADIATION ONCOLOGY | Facility: HOSPITAL | Age: 64
End: 2019-10-23
Attending: RADIOLOGY
Payer: COMMERCIAL

## 2019-10-23 PROCEDURE — 77412 RADIATION TX DELIVERY LVL 3: CPT | Performed by: RADIOLOGY

## 2019-10-23 PROCEDURE — 77387 GUIDANCE FOR RADJ TX DLVR: CPT | Performed by: RADIOLOGY

## 2019-10-24 ENCOUNTER — APPOINTMENT (OUTPATIENT)
Dept: RADIATION ONCOLOGY | Facility: HOSPITAL | Age: 64
End: 2019-10-24
Attending: RADIOLOGY
Payer: COMMERCIAL

## 2019-10-24 PROCEDURE — 77387 GUIDANCE FOR RADJ TX DLVR: CPT | Performed by: RADIOLOGY

## 2019-10-24 PROCEDURE — 77412 RADIATION TX DELIVERY LVL 3: CPT | Performed by: RADIOLOGY

## 2019-10-25 ENCOUNTER — APPOINTMENT (OUTPATIENT)
Dept: RADIATION ONCOLOGY | Facility: HOSPITAL | Age: 64
End: 2019-10-25
Attending: RADIOLOGY
Payer: COMMERCIAL

## 2019-10-25 PROCEDURE — 77336 RADIATION PHYSICS CONSULT: CPT | Performed by: RADIOLOGY

## 2019-10-25 PROCEDURE — 77387 GUIDANCE FOR RADJ TX DLVR: CPT | Performed by: RADIOLOGY

## 2019-10-25 PROCEDURE — 77412 RADIATION TX DELIVERY LVL 3: CPT | Performed by: RADIOLOGY

## 2019-10-28 ENCOUNTER — APPOINTMENT (OUTPATIENT)
Dept: RADIATION ONCOLOGY | Facility: HOSPITAL | Age: 64
End: 2019-10-28
Attending: RADIOLOGY
Payer: COMMERCIAL

## 2019-10-28 PROCEDURE — 77387 GUIDANCE FOR RADJ TX DLVR: CPT | Performed by: RADIOLOGY

## 2019-10-28 PROCEDURE — 77321 SPECIAL TELETX PORT PLAN: CPT | Performed by: RADIOLOGY

## 2019-10-28 PROCEDURE — 77334 RADIATION TREATMENT AID(S): CPT | Performed by: RADIOLOGY

## 2019-10-28 PROCEDURE — 77412 RADIATION TX DELIVERY LVL 3: CPT | Performed by: RADIOLOGY

## 2019-10-29 ENCOUNTER — APPOINTMENT (OUTPATIENT)
Dept: RADIATION ONCOLOGY | Facility: HOSPITAL | Age: 64
End: 2019-10-29
Attending: RADIOLOGY
Payer: COMMERCIAL

## 2019-10-29 PROCEDURE — 77387 GUIDANCE FOR RADJ TX DLVR: CPT | Performed by: RADIOLOGY

## 2019-10-29 PROCEDURE — 77412 RADIATION TX DELIVERY LVL 3: CPT | Performed by: RADIOLOGY

## 2019-10-30 ENCOUNTER — APPOINTMENT (OUTPATIENT)
Dept: LAB | Facility: CLINIC | Age: 64
End: 2019-10-30
Payer: COMMERCIAL

## 2019-10-30 ENCOUNTER — APPOINTMENT (OUTPATIENT)
Dept: RADIATION ONCOLOGY | Facility: HOSPITAL | Age: 64
End: 2019-10-30
Attending: RADIOLOGY
Payer: COMMERCIAL

## 2019-10-30 DIAGNOSIS — C50.112 MALIGNANT NEOPLASM OF CENTRAL PORTION OF LEFT BREAST IN FEMALE, ESTROGEN RECEPTOR POSITIVE (HCC): Primary | ICD-10-CM

## 2019-10-30 DIAGNOSIS — Z17.0 MALIGNANT NEOPLASM OF CENTRAL PORTION OF LEFT BREAST IN FEMALE, ESTROGEN RECEPTOR POSITIVE (HCC): Primary | ICD-10-CM

## 2019-10-30 LAB
ANION GAP SERPL CALCULATED.3IONS-SCNC: 6 MMOL/L (ref 4–13)
BASOPHILS # BLD AUTO: 0.04 THOUSANDS/ΜL (ref 0–0.1)
BASOPHILS NFR BLD AUTO: 1 % (ref 0–1)
BUN SERPL-MCNC: 21 MG/DL (ref 5–25)
CALCIUM SERPL-MCNC: 9.6 MG/DL (ref 8.3–10.1)
CHLORIDE SERPL-SCNC: 101 MMOL/L (ref 100–108)
CO2 SERPL-SCNC: 33 MMOL/L (ref 21–32)
CREAT SERPL-MCNC: 0.96 MG/DL (ref 0.6–1.3)
EOSINOPHIL # BLD AUTO: 0.12 THOUSAND/ΜL (ref 0–0.61)
EOSINOPHIL NFR BLD AUTO: 2 % (ref 0–6)
ERYTHROCYTE [DISTWIDTH] IN BLOOD BY AUTOMATED COUNT: 12.4 % (ref 11.6–15.1)
GFR SERPL CREATININE-BSD FRML MDRD: 64 ML/MIN/1.73SQ M
GLUCOSE SERPL-MCNC: 62 MG/DL (ref 65–140)
HCT VFR BLD AUTO: 40.8 % (ref 34.8–46.1)
HGB BLD-MCNC: 13.1 G/DL (ref 11.5–15.4)
IMM GRANULOCYTES # BLD AUTO: 0.02 THOUSAND/UL (ref 0–0.2)
IMM GRANULOCYTES NFR BLD AUTO: 0 % (ref 0–2)
LYMPHOCYTES # BLD AUTO: 0.94 THOUSANDS/ΜL (ref 0.6–4.47)
LYMPHOCYTES NFR BLD AUTO: 15 % (ref 14–44)
MCH RBC QN AUTO: 28.5 PG (ref 26.8–34.3)
MCHC RBC AUTO-ENTMCNC: 32.1 G/DL (ref 31.4–37.4)
MCV RBC AUTO: 89 FL (ref 82–98)
MONOCYTES # BLD AUTO: 0.5 THOUSAND/ΜL (ref 0.17–1.22)
MONOCYTES NFR BLD AUTO: 8 % (ref 4–12)
NEUTROPHILS # BLD AUTO: 4.63 THOUSANDS/ΜL (ref 1.85–7.62)
NEUTS SEG NFR BLD AUTO: 74 % (ref 43–75)
NRBC BLD AUTO-RTO: 0 /100 WBCS
PLATELET # BLD AUTO: 269 THOUSANDS/UL (ref 149–390)
PMV BLD AUTO: 10 FL (ref 8.9–12.7)
POTASSIUM SERPL-SCNC: 3.3 MMOL/L (ref 3.5–5.3)
RBC # BLD AUTO: 4.6 MILLION/UL (ref 3.81–5.12)
SODIUM SERPL-SCNC: 140 MMOL/L (ref 136–145)
WBC # BLD AUTO: 6.25 THOUSAND/UL (ref 4.31–10.16)

## 2019-10-30 PROCEDURE — 36415 COLL VENOUS BLD VENIPUNCTURE: CPT

## 2019-10-30 PROCEDURE — 85025 COMPLETE CBC W/AUTO DIFF WBC: CPT

## 2019-10-30 PROCEDURE — 77280 THER RAD SIMULAJ FIELD SMPL: CPT | Performed by: RADIOLOGY

## 2019-10-30 PROCEDURE — 80048 BASIC METABOLIC PNL TOTAL CA: CPT

## 2019-10-30 PROCEDURE — 77412 RADIATION TX DELIVERY LVL 3: CPT | Performed by: RADIOLOGY

## 2019-10-31 ENCOUNTER — APPOINTMENT (OUTPATIENT)
Dept: RADIATION ONCOLOGY | Facility: HOSPITAL | Age: 64
End: 2019-10-31
Attending: RADIOLOGY
Payer: COMMERCIAL

## 2019-10-31 PROCEDURE — 77412 RADIATION TX DELIVERY LVL 3: CPT | Performed by: RADIOLOGY

## 2019-10-31 PROCEDURE — 77387 GUIDANCE FOR RADJ TX DLVR: CPT | Performed by: RADIOLOGY

## 2019-11-01 ENCOUNTER — APPOINTMENT (OUTPATIENT)
Dept: RADIATION ONCOLOGY | Facility: HOSPITAL | Age: 64
End: 2019-11-01
Attending: RADIOLOGY
Payer: COMMERCIAL

## 2019-11-01 PROCEDURE — 77412 RADIATION TX DELIVERY LVL 3: CPT | Performed by: RADIOLOGY

## 2019-11-01 PROCEDURE — 77387 GUIDANCE FOR RADJ TX DLVR: CPT | Performed by: RADIOLOGY

## 2019-11-01 PROCEDURE — 77336 RADIATION PHYSICS CONSULT: CPT | Performed by: RADIOLOGY

## 2019-11-04 ENCOUNTER — APPOINTMENT (OUTPATIENT)
Dept: RADIATION ONCOLOGY | Facility: HOSPITAL | Age: 64
End: 2019-11-04
Attending: RADIOLOGY
Payer: COMMERCIAL

## 2019-11-04 PROCEDURE — 77387 GUIDANCE FOR RADJ TX DLVR: CPT | Performed by: RADIOLOGY

## 2019-11-04 PROCEDURE — 77331 SPECIAL RADIATION DOSIMETRY: CPT | Performed by: RADIOLOGY

## 2019-11-04 PROCEDURE — 77412 RADIATION TX DELIVERY LVL 3: CPT | Performed by: RADIOLOGY

## 2019-11-05 ENCOUNTER — APPOINTMENT (OUTPATIENT)
Dept: RADIATION ONCOLOGY | Facility: HOSPITAL | Age: 64
End: 2019-11-05
Attending: RADIOLOGY
Payer: COMMERCIAL

## 2019-11-05 PROCEDURE — 77387 GUIDANCE FOR RADJ TX DLVR: CPT | Performed by: RADIOLOGY

## 2019-11-05 PROCEDURE — 77412 RADIATION TX DELIVERY LVL 3: CPT | Performed by: RADIOLOGY

## 2019-12-03 ENCOUNTER — CLINICAL SUPPORT (OUTPATIENT)
Dept: RADIATION ONCOLOGY | Facility: HOSPITAL | Age: 64
End: 2019-12-03
Attending: RADIOLOGY
Payer: COMMERCIAL

## 2019-12-03 VITALS
BODY MASS INDEX: 21.64 KG/M2 | DIASTOLIC BLOOD PRESSURE: 72 MMHG | RESPIRATION RATE: 18 BRPM | OXYGEN SATURATION: 98 % | HEIGHT: 61 IN | TEMPERATURE: 98.2 F | WEIGHT: 114.6 LBS | SYSTOLIC BLOOD PRESSURE: 156 MMHG | HEART RATE: 82 BPM

## 2019-12-03 DIAGNOSIS — Z17.0 MALIGNANT NEOPLASM OF CENTRAL PORTION OF LEFT BREAST IN FEMALE, ESTROGEN RECEPTOR POSITIVE (HCC): Primary | ICD-10-CM

## 2019-12-03 DIAGNOSIS — C50.112 MALIGNANT NEOPLASM OF CENTRAL PORTION OF LEFT BREAST IN FEMALE, ESTROGEN RECEPTOR POSITIVE (HCC): Primary | ICD-10-CM

## 2019-12-03 DIAGNOSIS — Z85.43 HISTORY OF OVARIAN CANCER: ICD-10-CM

## 2019-12-03 DIAGNOSIS — Z86.000 HISTORY OF DUCTAL CARCINOMA IN SITU (DCIS) OF BREAST: ICD-10-CM

## 2019-12-03 PROCEDURE — 99211 OFF/OP EST MAY X REQ PHY/QHP: CPT | Performed by: RADIOLOGY

## 2019-12-03 NOTE — PROGRESS NOTES
Follow-up - Radiation Oncology   Lisa Hdz 1955 60 y o  female [id number]      History of Present Illness   Cancer Staging  History of ductal carcinoma in situ (DCIS) of breast  Staging form: Breast, AJCC 8th Edition  - Pathologic: Stage 0 (pTis (DCIS), pN0, cM0, ER: Negative, AZ: Negative) - Unsigned  Neoadjuvant therapy: No  Laterality: Right  Tumor size (mm): 14  Method of lymph node assessment: Clinical  Nuclear grade: G2    History of ovarian cancer  Staging form: Ovary, AJCC 7th Edition  - Clinical: FIGO Stage IA (T1a, N0, M0) - Signed by Lissette Bhardwaj PA-C on 4/19/2018  Histologic grade (G): G3    Malignant neoplasm of central portion of left breast in female, estrogen receptor positive ([facility] [address] )  Staging form: Breast, AJCC 8th Edition  - Pathologic: Stage IA (pT1b, pN0(sn), cM0, G1, ER+, AZ-, HER2-) - Unsigned  Neoadjuvant therapy: No  Laterality: Left  Tumor size (mm): 3  Method of lymph node assessment: [place] lymph node biopsy  Histologic grading system: 3 grade system      Ghada Bravo is a 60y o  year old female who presents for one month end of treatment follow up  She completed left breast radiation on 11/5/19  She also completed right breast radiation on 11/28/18  She was last seen for consultation on 9/17/19  Interval History:  10/16/19 Talia Lozano: Start Anastrozole  EDER  She is recovering well from radiation therapy  She denies any pain or masses in the breasts bilaterally  She has been applying Remedy cream daily and performing breast massage  She did not wish to start Anastrozole until after she recovered from radiation and will start this week       12/24/19 SurgOnc  4/21/20 HemOnc  5/14/20 GynOnc     Next mammo not yet scheduled, but due after 6/14/20      Historical Information      History of ovarian cancer     Initial Diagnosis     Ovarian cancer ([facility] [address] )      4/21/2009 Surgery     Exploratory laparotomy, total abdominal hysterectomy, bilateral salpingo-oophrectomy, lymph node dissection, omentectomy with staging       - 7/8/2009 Chemotherapy     Intraperitoneal along with intravenous chemotherapy on a early ovarian cancer protocol        5/31/2018 Genetic Testing     Genetic testing results are 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      Genetic testing indicated that the patient carries a Variant of Uncertain Significance (VUS) : Rad51C c 252G>T       Hormone Therapy           History of ductal carcinoma in situ (DCIS) of breast    5/31/2018 Genetic Testing     Genetic testing results are 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      Genetic testing indicated that the patient carries a Variant of Uncertain Significance (VUS) : Rad51C c 252G>T      8/1/2018 Biopsy     Right breast biopsy  11 o'clock position 5 cmfn  DCIS  Grade 2  Confirmed by Anaya Jack MD  ER 0  WY 0      9/11/2018 Surgery     Right lumpectomy  DCIS  Grade 2  1 4 cm  Margins negative  Stage 0      10/16/2018 -  Hormone Therapy     Consult with Dr Griselda Ortega  No adjuvant systemic therapy recommended      10/29/2018 - 11/28/2018 Radiation     Course: C1    Plan ID Energy Fractions Dose per Fraction (cGy) Dose Correction (cGy) Total Dose Delivered (cGy) Elapsed Days   R Breast 6X 16 / 16 266 0 4,256 22   R Breast e 12E 5 / 5 200 0 1,000 7      Treatment dates:  C1: 10/29/2018 - 11/28/2018          Malignant neoplasm of central portion of left breast in female, estrogen receptor positive ([facility] [address] )    7/18/2019 Biopsy     Left breast MRI-guided biopsy  3 o'clock, posterior depth  Invasive breast carcinoma of no special type  Grade 1  ER 90  WY 0  HER2 1+      8/13/2019 Surgery     Left breast needle localized lumpectomy with sentinel lymph node biopsy  Invasive mammary carcinoma of no special type  Grade 1  3 mm  Margins negative  0/1 Lymph node  Anatomic/Prognostic Stage IA      10/8/2019 - 11/5/2019 Radiation       Treatment:  Course: C2  Plan ID Energy Fractions Dose per Fraction (cGy) Dose Correction (cGy) Total Dose Delivered (cGy) Elapsed Days   BH L Breast 6X 16 / 16 266 0 4,256 21   BH L Brst e 12E 5 / 5 200 0 1,000 6    C2: 10/8/2019 - 11/5/2019 11/2019 -  Hormone Therapy     Anastrozole         Past Medical History:   Diagnosis Date    BRCA1 positive     BRCA2 positive     Breast [place] ([place] [address] ) 09/11/2018    Right    Breast cancer ([place] [address] ) 08/13/2019    Left    Crohn disease ([place] [address] )     Dense breast     History of chemotherapy     History of radiation therapy     History of transfusion 2009    Hyperlipidemia     Hypertension     Lymphedema     left leg    Ovarian cancer ([place] [address] ) 04/21/2009     Past Surgical History:   Procedure Laterality Date    BREAST LUMPECTOMY Right 09/11/2018    BREAST LUMPECTOMY Left 8/13/2019    Procedure: BREAST LUMPECTOMY; BREAST NEEDLE LOCALIZATION (NEEDLE LOC AT 0800); Surgeon: Sendy Faye MD;  [facility];  Service: Surgical Oncology    BREAST LUMPECTOMY W/ NEEDLE LOCALIZATION Left 08/13/2019    COLONOSCOPY      EXPLORATORY LAPAROTOMY      HYSTERECTOMY      LYMPH NODE BIOPSY Left 8/13/2019    Procedure: SENTINEL LYMPH NODE BIOPSY; LYMPHATIC MAPPING WITH BLUE DYE AND RADIOACTIVE DYE (INJECT AT 0930 BY DR JENNINGS IN THE OR); Surgeon: Sendy Faye MD;  [facility];  Service: Surgical Oncology    MAMMO NEEDLE LOCALIZATION LEFT (ALL INC) Left 8/13/2019    MAMMO NEEDLE LOCALIZATION RIGHT (ALL INC) Right 9/11/2018    MRI BREAST BIOPSY LEFT (ALL INCLUSIVE) Left 7/18/2019    OMENTECTOMY      HI PERQ DEVICE PLACEMT BREAST LOC 1ST LES W GUIDNCE Right 9/11/2018    Procedure: BREAST LUMPECTOMY; BREAST NEEDLE LOCALIZATION (NEEDLE LOC AT 1200);   Surgeon: Sendy Faye MD;  [facility];  Service: Surgical Oncology    TOTAL ABDOMINAL HYSTERECTOMY W/ BILATERAL SALPINGOOPHORECTOMY      US GUIDED BREAST BIOPSY LEFT COMPLETE Left 6/17/2019    US GUIDED BREAST BIOPSY RIGHT COMPLETE Right 8/1/2018    WISDOM TOOTH EXTRACTION         Social History   Social History     Substance and Sexual Activity   Alcohol Use Yes    Frequency: Monthly or less    Drinks per session: 1 or 2    Comment: rarely     Social History     Substance and Sexual Activity   Drug Use No     Social History     Tobacco Use   Smoking Status Former Smoker    Last attempt to quit: 4/1/2009    Years since quitting: 10 6   Smokeless Tobacco Never Used         Meds/Allergies     Current Outpatient Medications:     albuterol (PROVENTIL HFA,VENTOLIN HFA) 90 mcg/act inhaler, Inhale 2 puffs as needed for wheezing, Disp: 1 Inhaler, Rfl: 0    alendronate (FOSAMAX) 70 mg tablet, TAKE 1 TABLET (70 MG TOTAL) BY MOUTH EVERY 7 DAYS, Disp: 4 tablet, Rfl: 11    amLODIPine (NORVASC) 10 mg tablet, Take 1 tablet (10 mg total) by mouth daily, Disp: 30 tablet, Rfl: 5    anastrozole (ARIMIDEX) 1 mg tablet, Take 1 tablet (1 mg total) by mouth daily, Disp: 90 tablet, Rfl: 1    atorvastatin (LIPITOR) 40 mg tablet, Take 1 tablet (40 mg total) by mouth daily at bedtime, Disp: 90 tablet, Rfl: 1    Calcium Carbonate (CALTRATE 600) 1500 (600 Ca) MG TABS, Take 1 tablet by mouth 2 (two) times a day, Disp: , Rfl:     clobetasol (TEMOVATE) 0 05 % ointment, Apply to the affected area 1-2 times a week, Disp: 30 g, Rfl: 2    Elastic Bandages & Supports (MEDICAL COMPRESSION STOCKINGS) MISC, Use daily as directed, 3 pair, Disp: 3 each, Rfl: 0    hydrochlorothiazide (MICROZIDE) 12 5 mg capsule, Take 1 capsule (12 5 mg total) by mouth every morning, Disp: 30 capsule, Rfl: 5    inFLIXimab (REMICADE) 100 mg, Infuse into a venous catheter  , Disp: , Rfl:     mupirocin (BACTROBAN) 2 % ointment, Apply topically 3 (three) times a day, Disp: 22 g, Rfl: 0    oxyCODONE-acetaminophen (PERCOCET) 5-325 mg per tablet, Take 1 tablet by mouth every 6 (six) hours as needed for moderate painMax Daily Amount: 4 tablets (Patient not taking: Reported on 12/3/2019), Disp: 6 tablet, Rfl: 0  Allergies   Allergen Reactions    Boniva [Ibandronic Acid] Headache    Lisinopril      cough    Losartan      Numbness on right side of body     Review of Systems   Constitutional: Negative  HENT: Negative  Gets sores in nose   Eyes: Positive for visual disturbance (R eye)  Respiratory: Negative  Cardiovascular: Positive for leg swelling (left leg lymphedema)  Gastrointestinal:        Chrons   Endocrine: Negative  Genitourinary: Negative  Musculoskeletal: Positive for arthralgias (related to chrons)  Skin: Negative  Redness from radiation subsided   Allergic/Immunologic: Negative  Neurological: Negative  Hematological: Negative  Psychiatric/Behavioral: Negative  OBJECTIVE:   /72   Pulse 82   Temp 98 2 °F (36 8 °C)   Resp 18   Ht 5' 1" (1 549 m)   Wt 52 kg (114 lb 9 6 oz)   SpO2 98%   BMI 21 65 kg/m²   Pain Assessment:  0  ECOG/[place]/WHO: 0 - Asymptomatic    Physical Exam  Constitutional: She is oriented to person, place, and time  She appears well-developed and well-nourished  No distress  HENT:   Head: Normocephalic and atraumatic  Mouth/Throat: No oropharyngeal exudate  Eyes: Pupils are equal, round, and reactive to light  Conjunctivae and EOM are normal  No scleral icterus  Neck: Normal range of motion  Neck supple  No tracheal deviation present  No thyromegaly present  Cardiovascular: Normal rate, regular rhythm and normal heart sounds  Pulmonary/Chest: Effort normal and breath sounds normal  No respiratory distress  She has no wheezes  She has no rales  She exhibits no tenderness  Right breast exhibits no inverted nipple, no mass, no nipple discharge, no skin change ( There is a well-healed right lumpectomy incision with underlying post treatment changes and no masses  There is no skin erythema and no significant edema ) and no tenderness   Left breast exhibits no inverted nipple, no mass, no nipple discharge and no skin change ( There are well-healed left lumpectomy and axillary incisions with no masses  There is minimal erythema and post treatment changes without any edema  )  Abdominal: Soft  Bowel sounds are normal  She exhibits no distension and no mass  There is no tenderness  Musculoskeletal: Normal range of motion  She exhibits no edema or tenderness  Lymphadenopathy:     She has no cervical adenopathy  She has no axillary adenopathy  Right: No supraclavicular adenopathy present  Left: No supraclavicular adenopathy present  Neurological: She is alert and oriented to person, place, and time  No cranial nerve deficit  Coordination normal    Skin: Skin is warm and dry  No rash noted  She is not diaphoretic  No erythema  No pallor  Psychiatric: She has a normal mood and affect  Her behavior is normal  Judgment and thought content normal    Nursing note and vitals reviewed  RESULTS    Lab Results: No results found for this or any previous visit (from the past 672 hour(s))  Imaging Studies:No results found  Assessment/Plan:  No orders of the defined types were placed in this encounter  Hillary Felder is a 60y o  year old female with a stage 0 right breast ductal carcinoma in situ status post lumpectomy with negative margins with her tumor measuring 1 4 cm, grade 2 and estrogen and progesterone receptors were both negative   She has a history of a stage I ovarian carcinoma treated in 2009 and remains EDER  Her genetic testing was positive for pathologic variant of uncertain significance   She completed radiation therapy to the right breast on November 28, 2018 and remains EDER       She has a newly diagnosed stage IA invasive left breast mammary carcinoma grade 1 measuring 3 mm in size status post lumpectomy with negative margins and negative sentinel lymph node  Her disease was estrogen receptor positive with progesterone receptors being negative    In order to complete her breast conservation management, we recommended radiation therapy to the entire left breast    She completed treatment on November 5, 2019 and returns today for follow-up  She is recovering well from radiation therapy  She will continue to apply Remedy cream and massage the left breast daily  She will be starting on anastrozole this week  She will return here for follow-up examination 6 months      Ryan Abraham MD  12/3/2019,8:22 AM    Portions of the record may have been created with voice recognition software   Occasional wrong word or "sound a like" substitutions may have occurred due to the inherent limitations of voice recognition software   Read the chart carefully and recognize, using context, where substitutions have occurred

## 2019-12-03 NOTE — PROGRESS NOTES
Lisa Hdz 1955 is a 60 y o  female       Follow up visit     Presents for one month end of treatment follow up  She completed left breast radiation on 11/5/19  She also completed right breast radiation on 11/28/18  Her last follow up was on 9/17/19     10/16/19 Talia Lozano: Start Anastrozole  EDER      12/24/19 SurgOnc  4/21/20 HemOnc  5/14/20 GynOnc    Next mammo not yet scheduled  History of ovarian cancer     Initial Diagnosis     Ovarian cancer ([facility] [address] )      4/21/2009 Surgery     Exploratory laparotomy, total abdominal hysterectomy, bilateral salpingo-oophrectomy, lymph node dissection, omentectomy with staging       - 7/8/2009 Chemotherapy     Intraperitoneal along with intravenous chemotherapy on a early ovarian cancer protocol        5/31/2018 Genetic Testing     Genetic testing results are 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      Genetic testing indicated that the patient carries a Variant of Uncertain Significance (VUS) : Rad51C c 252G>T       Hormone Therapy           History of ductal carcinoma in situ (DCIS) of breast    5/31/2018 Genetic Testing     Genetic testing results are 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      Genetic testing indicated that the patient carries a Variant of Uncertain Significance (VUS) : Rad51C c 252G>T      8/1/2018 Biopsy     Right breast biopsy  11 o'clock position 5 cmfn  DCIS  Grade 2  Confirmed by Alessia Kim MD  ER 0  AK 0      9/11/2018 Surgery     Right lumpectomy  DCIS  Grade 2  1 4 cm  Margins negative  Stage 0      10/16/2018 -  Hormone Therapy     Consult with Dr Talia Lozano  No adjuvant systemic therapy recommended      10/29/2018 - 11/28/2018 Radiation     Course: C1    Plan ID Energy Fractions Dose per Fraction (cGy) Dose Correction (cGy) Total Dose Delivered (cGy) Elapsed Days   R Breast 6X 16 / 16 266 0 4,256 22   R Breast e 12E 5 / 5 200 0 1,000 7      Treatment dates:  C1: 10/29/2018 - 11/28/2018          Malignant neoplasm of central portion of left breast in female, estrogen receptor positive ([facility] [address] )    7/18/2019 Biopsy     Left breast MRI-guided biopsy  3 o'clock, posterior depth  Invasive breast carcinoma of no special type  Grade 1  ER 90  NE 0  HER2 1+      8/13/2019 Surgery     Left breast needle localized lumpectomy with sentinel lymph node biopsy  Invasive mammary carcinoma of no special type  Grade 1  3 mm  Margins negative  0/1 Lymph node  Anatomic/Prognostic Stage IA      10/8/2019 - 11/5/2019 Radiation       Treatment:  Course: C2  Plan ID Energy Fractions Dose per Fraction (cGy) Dose Correction (cGy) Total Dose Delivered (cGy) Elapsed Days   BH L Breast 6X 16 / 16 266 0 4,256 21   BH L Brst e 12E 5 / 5 200 0 1,000 6    C2: 10/8/2019 - 11/5/2019 11/2019 -  Hormone Therapy     Anastrozole         Clinical Trial: no      Health Maintenance   Topic Date Due    HIV Screening  08/30/1970    Pneumococcal Vaccine: Pediatrics (0 to 5 Years) and At-Risk Patients (6 to 59 Years) (2 of 3 - PPSV23) 01/11/2017    Influenza Vaccine  07/01/2019    PT PLAN OF CARE  08/21/2019    MAMMOGRAM  06/14/2020    Depression Screening PHQ  09/17/2020    BMI: Adult  10/16/2020    CRC Screening: Colonoscopy  07/15/2022    Pneumococcal Vaccine: 65+ Years (2 of 2 - PPSV23) 08/30/2022    DTaP,Tdap,and Td Vaccines (3 - Td) 11/16/2026    Hepatitis C Screening  Completed    HIB Vaccine  Aged Out    Hepatitis B Vaccine  Aged Out    IPV Vaccine  Aged Out    Hepatitis A Vaccine  Aged Out    Meningococcal ACWY Vaccine  Aged Out    HPV Vaccine  Aged Out       Patient Active Problem List   Diagnosis    Benign essential hypertension    Colon, diverticulosis    Crohn's disease ([facility] [address] )    Dense breasts    Dermatitis    Erythema chronica migrans, secondary    Hyperlipidemia    Lymphedema    Murmur    Osteopenia    Osteoporosis    History of ovarian cancer    Reactive airway disease    Shortness of breath on exertion    Lymphedema of left lower extremity    Genetic predisposition to breast cancer    History of ductal carcinoma in situ (DCIS) of breast    Lichen sclerosus et atrophicus    Essential hypertension    Malignant neoplasm of ovary (HCC)    Nausea    TIA (transient ischemic attack)    Malignant neoplasm of central portion of left breast in female, estrogen receptor positive ([facility] )     Past Medical History:   Diagnosis Date    BRCA1 positive     BRCA2 positive     [facility]11[facility]2018    Right    [facility] 08/13/2019    Left    Crohn disease [facility]     Dense breast     History of chemotherapy     History of radiation therapy     History of transfusion 2009    Hyperlipidemia     Hypertension     Lymphedema     left leg    [facility] 04/21/2009     Past Surgical History:   Procedure Laterality Date    BREAST LUMPECTOMY Right 09/11/2018    BREAST LUMPECTOMY Left 8/13/2019    Procedure: BREAST LUMPECTOMY; BREAST NEEDLE LOCALIZATION (NEEDLE LOC AT 0800); Surgeon: Matteo Llanos MD;  [facility];  Service: Surgical Oncology    BREAST LUMPECTOMY W/ NEEDLE LOCALIZATION Left 08/13/2019    COLONOSCOPY      EXPLORATORY LAPAROTOMY      HYSTERECTOMY      LYMPH NODE BIOPSY Left 8/13/2019    Procedure: SENTINEL LYMPH NODE BIOPSY; LYMPHATIC MAPPING WITH BLUE DYE AND RADIOACTIVE DYE (INJECT AT 0930 BY DR JENNINGS IN THE OR); Surgeon: Matteo Llanos MD;  [facility];  Service: Surgical Oncology    MAMMO NEEDLE LOCALIZATION LEFT (ALL INC) Left 8/13/2019    MAMMO NEEDLE LOCALIZATION RIGHT (ALL INC) Right 9/11/2018    MRI BREAST BIOPSY LEFT (ALL INCLUSIVE) Left 7/18/2019    OMENTECTOMY      MO PERQ DEVICE PLACEMT BREAST LOC 1ST LES W GUIDNCE Right 9/11/2018    Procedure: BREAST LUMPECTOMY; BREAST NEEDLE LOCALIZATION (NEEDLE LOC AT 1200);   Surgeon: Matteo Llanos MD;  [facility];  Service: Surgical Oncology    TOTAL ABDOMINAL HYSTERECTOMY W/ BILATERAL SALPINGOOPHORECTOMY      US GUIDED BREAST BIOPSY LEFT COMPLETE Left 6/17/2019    US GUIDED BREAST BIOPSY RIGHT COMPLETE Right 8/1/2018    WISDOM TOOTH EXTRACTION       Family History   Problem Relation Age of Onset    Prostate cancer Father     Ovarian cancer Sister     Breast cancer Paternal Grandmother     Breast cancer Maternal Aunt     No Known Problems Sister     No Known Problems Sister     No Known Problems Sister     No Known Problems Sister     No Known Problems Paternal Aunt     No Known Problems Maternal Aunt      Social History     Socioeconomic History    Marital status: Single     Spouse name: Not on file    Number of children: Not on file    Years of education: Not on file    Highest education level: Not on file   Occupational History    Not on file   Social Needs    Financial resource strain: Not on file    Food insecurity:     Worry: Not on file     Inability: Not on file    Transportation needs:     Medical: Not on file     Non-medical: Not on file   Tobacco Use    Smoking status: Former Smoker     Last attempt to quit: 4/1/2009     Years since quitting: 10 6    Smokeless tobacco: Never Used   Substance and Sexual Activity    Alcohol use: Yes     Frequency: Monthly or less     Drinks per session: 1 or 2     Comment: rarely    Drug use: No    Sexual activity: Not on file   Lifestyle    Physical activity:     Days per week: Not on file     Minutes per session: Not on file    Stress: Not on file   Relationships    Social connections:     Talks on phone: Not on file     Gets together: Not on file     Attends Baptism service: Not on file     Active member of club or organization: Not on file     Attends meetings of clubs or organizations: Not on file     Relationship status: Not on file    Intimate partner violence:     Fear of current or ex partner: Not on file     Emotionally abused: Not on file     Physically abused: Not on file     Forced sexual activity: Not on file Other Topics Concern    Not on file   Social History Narrative    Not on file       Current Outpatient Medications:     albuterol (PROVENTIL HFA,VENTOLIN HFA) 90 mcg/act inhaler, Inhale 2 puffs as needed for wheezing, Disp: 1 Inhaler, Rfl: 0    alendronate (FOSAMAX) 70 mg tablet, TAKE 1 TABLET (70 MG TOTAL) BY MOUTH EVERY 7 DAYS, Disp: 4 tablet, Rfl: 11    amLODIPine (NORVASC) 10 mg tablet, Take 1 tablet (10 mg total) by mouth daily, Disp: 30 tablet, Rfl: 5    anastrozole (ARIMIDEX) 1 mg tablet, Take 1 tablet (1 mg total) by mouth daily, Disp: 90 tablet, Rfl: 1    atorvastatin (LIPITOR) 40 mg tablet, Take 1 tablet (40 mg total) by mouth daily at bedtime, Disp: 90 tablet, Rfl: 1    Calcium Carbonate (CALTRATE 600) 1500 (600 Ca) MG TABS, Take 1 tablet by mouth 2 (two) times a day, Disp: , Rfl:     clobetasol (TEMOVATE) 0 05 % ointment, Apply to the affected area 1-2 times a week, Disp: 30 g, Rfl: 2    Elastic Bandages & Supports (MEDICAL COMPRESSION STOCKINGS) MISC, Use daily as directed, 3 pair, Disp: 3 each, Rfl: 0    hydrochlorothiazide (MICROZIDE) 12 5 mg capsule, Take 1 capsule (12 5 mg total) by mouth every morning, Disp: 30 capsule, Rfl: 5    inFLIXimab (REMICADE) 100 mg, Infuse into a venous catheter  , Disp: , Rfl:     mupirocin (BACTROBAN) 2 % ointment, Apply topically 3 (three) times a day, Disp: 22 g, Rfl: 0    oxyCODONE-acetaminophen (PERCOCET) 5-325 mg per tablet, Take 1 tablet by mouth every 6 (six) hours as needed for moderate painMax Daily Amount: 4 tablets (Patient not taking: Reported on 12/3/2019), Disp: 6 tablet, Rfl: 0  Allergies   Allergen Reactions    Boniva [Ibandronic Acid] Headache    Lisinopril      cough    Losartan      Numbness on right side of body       Review of Systems:  Review of Systems   Constitutional: Negative  HENT: Negative  Gets sores in nose   Eyes: Positive for visual disturbance (R eye)  Respiratory: Negative      Cardiovascular: Positive for leg swelling (left leg lymphedema)  Gastrointestinal:        Chrons   Endocrine: Negative  Genitourinary: Negative  Musculoskeletal: Positive for arthralgias (related to chrons)  Skin: Negative  Redness from radiation subsided   Allergic/Immunologic: Negative  Neurological: Negative  Hematological: Negative  Psychiatric/Behavioral: Negative  Vitals:    12/03/19 0747   BP: 156/72   Pulse: 82   Resp: 18   Temp: 98 2 °F (36 8 °C)   SpO2: 98%   Weight: 52 kg (114 lb 9 6 oz)   Height: 5' 1" (1 549 m)        Pain assessment:0    Pain Score: 0-No pain      Imaging:No results found

## 2019-12-23 DIAGNOSIS — E87.6 HYPOKALEMIA: Primary | ICD-10-CM

## 2019-12-23 DIAGNOSIS — E78.49 OTHER HYPERLIPIDEMIA: ICD-10-CM

## 2019-12-23 RX ORDER — ATORVASTATIN CALCIUM 40 MG/1
40 TABLET, FILM COATED ORAL
Qty: 90 TABLET | Refills: 1 | Status: SHIPPED | OUTPATIENT
Start: 2019-12-23 | End: 2020-12-29 | Stop reason: SDUPTHER

## 2019-12-24 ENCOUNTER — OFFICE VISIT (OUTPATIENT)
Dept: SURGICAL ONCOLOGY | Facility: CLINIC | Age: 64
End: 2019-12-24
Payer: COMMERCIAL

## 2019-12-24 VITALS
HEIGHT: 61 IN | DIASTOLIC BLOOD PRESSURE: 90 MMHG | RESPIRATION RATE: 16 BRPM | BODY MASS INDEX: 20.77 KG/M2 | TEMPERATURE: 98 F | WEIGHT: 110 LBS | HEART RATE: 75 BPM | SYSTOLIC BLOOD PRESSURE: 160 MMHG

## 2019-12-24 DIAGNOSIS — Z86.000 HISTORY OF DUCTAL CARCINOMA IN SITU (DCIS) OF BREAST: ICD-10-CM

## 2019-12-24 DIAGNOSIS — Z79.811 USE OF ANASTROZOLE: ICD-10-CM

## 2019-12-24 DIAGNOSIS — C50.112 MALIGNANT NEOPLASM OF CENTRAL PORTION OF LEFT BREAST IN FEMALE, ESTROGEN RECEPTOR POSITIVE (HCC): Primary | ICD-10-CM

## 2019-12-24 DIAGNOSIS — Z17.0 MALIGNANT NEOPLASM OF CENTRAL PORTION OF LEFT BREAST IN FEMALE, ESTROGEN RECEPTOR POSITIVE (HCC): Primary | ICD-10-CM

## 2019-12-24 DIAGNOSIS — Z15.01 GENETIC PREDISPOSITION TO BREAST CANCER: ICD-10-CM

## 2019-12-24 PROCEDURE — 99214 OFFICE O/P EST MOD 30 MIN: CPT | Performed by: NURSE PRACTITIONER

## 2019-12-24 NOTE — PROGRESS NOTES
Surgical Oncology Follow Up       [address] [facility]  [facility]  [address] [facility] [address]  [facility] [address]    Ghada A Nelson Stanton  1955  [id number]  [address]  [facility] [place]  [address] [address]    Chief Complaint   Patient presents with    Breast Cancer     Pt is here for 3 month f/u       Assessment/Plan:  1  Malignant neoplasm of central portion of left breast in female, estrogen receptor positive ([address] )  - Mammo diagnostic bilateral w 3d & cad; Future  - 6 mo f/u visit    2  History of ductal carcinoma in situ (DCIS) of breast  - Mammo diagnostic bilateral w 3d & cad; Future    3  Genetic predisposition to breast cancer  - Will order breast MRI at next visit    4  Use of anastrozole  - Start taking and continue use per medical oncology    Discussion/Summary:  Patient is a 74-year-old female with a personal history of ovarian cancer who presents today for a three-month follow-up visit for bilateral breast cancer  She was diagnosed with DCIS, ER/MI negative of the right breast in 2018 she underwent a right lumpectomy by Dr Love Santiago and completed adjuvant radiation therapy  She had underwent genetic testing which revealed an 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  She was then diagnosed with left breast cancer in July of 2019  This was invasive carcinoma, ER 90%, MI negative, her 2-  She underwent a lumpectomy and sentinel node biopsy  She completed radiation therapy last month  She was prescribed anastrozole but has not started to take this yet as she wants wanted to wait until she completed radiation therapy  She plans to start this within the next couple weeks  Her only complaint today is that she feels a left chest wall lump and tenderness  On her exam, this is consistent with her anterior rib    Since she recently completed radiation therapy, I have recommended that she observe this for the next few weeks and call if her tenderness persists  I do not appreciate any abnormal findings on her exam and she also reported tenderness on the contralateral side in this region overlying the rib  She has no other complaints today and there are no other concerns on her exam   I will order a bilateral mammogram to be reformed in June  She should continue annual breast MRI as well given her gene mutation  We will stagger her mammogram and MRI so her next MRI will be performed in December of 2020  She is in agreement this plan  We will plan to see her back in 6 months or sooner if the need arises  She was instructed to call with any new concerns or symptoms prior to that time  All of her questions were answered today  History of Present Illness:        History of ovarian cancer     Initial Diagnosis     Ovarian cancer ([facility] [address] )      4/21/2009 Surgery     Exploratory laparotomy, total abdominal hysterectomy, bilateral salpingo-oophrectomy, lymph node dissection, omentectomy with staging       - 7/8/2009 Chemotherapy     Intraperitoneal along with intravenous chemotherapy on a early ovarian cancer protocol        5/31/2018 Genetic Testing     Genetic testing results are 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      Genetic testing indicated that the patient carries a Variant of Uncertain Significance (VUS) : Rad51C c 252G>T       Hormone Therapy           History of ductal carcinoma in situ (DCIS) of breast    5/31/2018 Genetic Testing     Genetic testing results are 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      Genetic testing indicated that the patient carries a Variant of Uncertain Significance (VUS) : Rad51C c 252G>T      8/1/2018 Biopsy     Right breast biopsy  11 o'clock position 5 cmfn  DCIS  Grade 2  Confirmed by Anaya Jack MD  ER 0  NE 0      9/11/2018 Surgery     Right lumpectomy  DCIS  Grade 2  1 4 cm  Margins negative  Stage 0      10/16/2018 -  Hormone Therapy     Consult with Dr Lizy Barker  No adjuvant systemic therapy recommended      10/29/2018 - 11/28/2018 Radiation     Course: C1    Plan ID Energy Fractions Dose per Fraction (cGy) Dose Correction (cGy) Total Dose Delivered (cGy) Elapsed Days   R Breast 6X 16 / 16 266 0 4,256 22   R Breast e 12E 5 / 5 200 0 1,000 7      Treatment dates:  C1: 10/29/2018 - 11/28/2018          Malignant neoplasm of central portion of left breast in female, estrogen receptor positive ([facility] [address] )    7/18/2019 Biopsy     Left breast MRI-guided biopsy  3 o'clock, posterior depth  Invasive breast carcinoma of no special type  Grade 1  ER 90  AZ 0  HER2 1+      8/13/2019 Surgery     Left breast needle localized lumpectomy with sentinel lymph node biopsy  Invasive mammary carcinoma of no special type  Grade 1  3 mm  Margins negative  0/1 Lymph node  Anatomic/Prognostic Stage IA      10/8/2019 - 11/5/2019 Radiation       Treatment:  Course: C2  Plan ID Energy Fractions Dose per Fraction (cGy) Dose Correction (cGy) Total Dose Delivered (cGy) Elapsed Days   BH L Breast 6X 16 / 16 266 0 4,256 21   BH L Brst e 12E 5 / 5 200 0 1,000 6    C2: 10/8/2019 - 11/5/2019 11/2019 -  Hormone Therapy     Anastrozole          -Interval History:  Patient presents today for a three-month follow-up visit for left breast cancer she completed radiation therapy in last month  She is planning to start taking anastrozole within the next few weeks  She does report a tender lump of her left chest wall that she noticed last week  Denies headaches, back pain, bone pain, cough, shortness of breath, abdominal pain  Review of Systems:  Review of Systems   Constitutional: Negative for activity change, appetite change, chills, fatigue, fever and unexpected weight change  HENT: Negative for trouble swallowing  Eyes: Negative for pain, redness and visual disturbance  Respiratory: Negative for cough, shortness of breath and wheezing      Cardiovascular: Negative for chest pain, palpitations and leg swelling  Gastrointestinal: Negative for abdominal pain, constipation, diarrhea, nausea and vomiting  Endocrine: Negative for cold intolerance and heat intolerance  Musculoskeletal: Negative for arthralgias, back pain, gait problem and myalgias  Skin: Negative for color change and rash  Neurological: Negative for dizziness, syncope, light-headedness, numbness and headaches  Hematological: Negative for adenopathy  Psychiatric/Behavioral: Negative for agitation and confusion  All other systems reviewed and are negative        Patient Active Problem List   Diagnosis    Benign essential hypertension    Colon, diverticulosis    Crohn's disease ([facility] [address] )    Dense breasts    Dermatitis    Erythema chronica migrans, secondary    Hyperlipidemia    Lymphedema    Murmur    Osteopenia    Osteoporosis    History of ovarian cancer    Reactive airway disease    Shortness of breath on exertion    Lymphedema of left lower extremity    Genetic predisposition to breast cancer    History of ductal carcinoma in situ (DCIS) of breast    Lichen sclerosus et atrophicus    Essential hypertension    Malignant neoplasm of ovary (HCC)    Nausea    TIA (transient ischemic attack)    Malignant neoplasm of central portion of left breast in female, estrogen receptor positive ([facility] )     Past Medical History:   Diagnosis Date    BRCA1 positive     BRCA2 positive     Breast cancer ([facility] ) 09/11/2018    Right    Breast cancer ([facility] ) 08/13/2019    Left    Crohn disease ([facility] )     Dense breast     History of chemotherapy     History of radiation therapy     History of transfusion 2009    Hyperlipidemia     Hypertension     Lymphedema     left leg    Ovarian cancer ([facility] ) 04/21/2009     Past Surgical History:   Procedure Laterality Date    BREAST LUMPECTOMY Right 09/11/2018    BREAST LUMPECTOMY Left 8/13/2019    Procedure: BREAST LUMPECTOMY; BREAST NEEDLE LOCALIZATION (NEEDLE LOC AT 0800); Surgeon: Cornelius Guardado MD;  [facility];  Service: Surgical Oncology    BREAST LUMPECTOMY W/ NEEDLE LOCALIZATION Left 08/13/2019    COLONOSCOPY      EXPLORATORY LAPAROTOMY      HYSTERECTOMY      LYMPH NODE BIOPSY Left 8/13/2019    Procedure: SENTINEL LYMPH NODE BIOPSY; LYMPHATIC MAPPING WITH BLUE DYE AND RADIOACTIVE DYE (INJECT AT 0930 BY DR JENNINGS IN THE OR); Surgeon: Cornelius Guardado MD;  [facility];  Service: Surgical Oncology    MAMMO NEEDLE LOCALIZATION LEFT (ALL INC) Left 8/13/2019    MAMMO NEEDLE LOCALIZATION RIGHT (ALL INC) Right 9/11/2018    MRI BREAST BIOPSY LEFT (ALL INCLUSIVE) Left 7/18/2019    OMENTECTOMY      WI PERQ DEVICE PLACEMT BREAST LOC 1ST LES W GUIDNCE Right 9/11/2018    Procedure: BREAST LUMPECTOMY; BREAST NEEDLE LOCALIZATION (NEEDLE LOC AT 1200);   Surgeon: Cornelius Guardado MD;  [facility];  Service: Surgical Oncology    TOTAL ABDOMINAL HYSTERECTOMY W/ BILATERAL SALPINGOOPHORECTOMY      US GUIDED BREAST BIOPSY LEFT COMPLETE Left 6/17/2019    US GUIDED BREAST BIOPSY RIGHT COMPLETE Right 8/1/2018    WISDOM TOOTH EXTRACTION       Family History   Problem Relation Age of Onset    Prostate cancer Father     Ovarian cancer Sister     Breast cancer Paternal Grandmother     Breast cancer Maternal Aunt     No Known Problems Sister     No Known Problems Sister     No Known Problems Sister     No Known Problems Sister     No Known Problems Paternal Aunt     No Known Problems Maternal Aunt      Social History     Socioeconomic History    Marital status: Single     Spouse name: Not on file    Number of children: Not on file    Years of education: Not on file    Highest education level: Not on file   Occupational History    Not on file   Social Needs    Financial resource strain: Not on file    Food insecurity:     Worry: Not on file     Inability: Not on file    Transportation needs:     Medical: Not on file     Non-medical: Not on file   Tobacco Use    Smoking status: Former Smoker     Last attempt to quit: 4/1/2009     Years since quitting: 10 7    Smokeless tobacco: Never Used   Substance and Sexual Activity    Alcohol use: Yes     Frequency: Monthly or less     Drinks per session: 1 or 2     Comment: rarely    Drug use: No    Sexual activity: Not on file   Lifestyle    Physical activity:     Days per week: Not on file     Minutes per session: Not on file    Stress: Not on file   Relationships    Social connections:     Talks on phone: Not on file     Gets together: Not on file     Attends Restorationist service: Not on file     Active member of club or organization: Not on file     Attends meetings of clubs or organizations: Not on file     Relationship status: Not on file    Intimate partner violence:     Fear of current or ex partner: Not on file     Emotionally abused: Not on file     Physically abused: Not on file     Forced sexual activity: Not on file   Other Topics Concern    Not on file   Social History Narrative    Not on file       Current Outpatient Medications:     albuterol (PROVENTIL HFA,VENTOLIN HFA) 90 mcg/act inhaler, Inhale 2 puffs as needed for wheezing, Disp: 1 Inhaler, Rfl: 0    alendronate (FOSAMAX) 70 mg tablet, TAKE 1 TABLET (70 MG TOTAL) BY MOUTH EVERY 7 DAYS, Disp: 4 tablet, Rfl: 11    amLODIPine (NORVASC) 10 mg tablet, Take 1 tablet (10 mg total) by mouth daily, Disp: 30 tablet, Rfl: 5    anastrozole (ARIMIDEX) 1 mg tablet, Take 1 tablet (1 mg total) by mouth daily, Disp: 90 tablet, Rfl: 1    atorvastatin (LIPITOR) 40 mg tablet, TAKE 1 TABLET (40 MG TOTAL) BY MOUTH DAILY AT BEDTIME, Disp: 90 tablet, Rfl: 1    Calcium Carbonate (CALTRATE 600) 1500 (600 Ca) MG TABS, Take 1 tablet by mouth 2 (two) times a day, Disp: , Rfl:     clobetasol (TEMOVATE) 0 05 % ointment, Apply to the affected area 1-2 times a week, Disp: 30 g, Rfl: 2    Elastic Bandages & Supports (MEDICAL COMPRESSION STOCKINGS) MISC, Use daily as directed, 3 pair, Disp: 3 each, Rfl: 0    hydrochlorothiazide (MICROZIDE) 12 5 mg capsule, Take 1 capsule (12 5 mg total) by mouth every morning, Disp: 30 capsule, Rfl: 5    inFLIXimab (REMICADE) 100 mg, Infuse into a venous catheter  , Disp: , Rfl:     mupirocin (BACTROBAN) 2 % ointment, Apply topically 3 (three) times a day, Disp: 22 g, Rfl: 0    oxyCODONE-acetaminophen (PERCOCET) 5-325 mg per tablet, Take 1 tablet by mouth every 6 (six) hours as needed for moderate painMax Daily Amount: 4 tablets (Patient not taking: Reported on 12/3/2019), Disp: 6 tablet, Rfl: 0  Allergies   Allergen Reactions    Lisinopril      cough    Losartan      Numbness on right side of body    Boniva [Ibandronic Acid] Headache     Vitals:    12/24/19 1027   BP: 160/90   Pulse: 75   Resp: 16   Temp: 98 °F (36 7 °C)       Physical Exam   Constitutional: She is oriented to person, place, and time  Vital signs are normal  She appears well-developed and well-nourished  No distress  HENT:   Head: Normocephalic and atraumatic  Neck: Normal range of motion  Cardiovascular: Normal rate, regular rhythm and normal heart sounds  Pulmonary/Chest: Effort normal and breath sounds normal    Bilateral breasts were examined in the sitting and supine position  There are bilateral breast surgical scars and a left axillary scar  There are no dominant masses, skin nodules common nipple changes or nipple discharge  There is no bilateral supraclavicular or axillary lymphadenopathy  I do not appreciate a mass at the area the patient reports a lump  This appears to be her rib on exam   She does report tenderness in the area which I suspect is related to her recent completion of radiation therapy  I instructed her to monitor and call if this does not improve within the next 3 weeks  Abdominal: Soft  Normal appearance  She exhibits no mass  There is no hepatosplenomegaly  There is no tenderness  Musculoskeletal: Normal range of motion  Lymphadenopathy:     She has no axillary adenopathy  Right: No supraclavicular adenopathy present  Left: No supraclavicular adenopathy present  Neurological: She is alert and oriented to person, place, and time  Skin: Skin is warm, dry and intact  No rash noted  She is not diaphoretic  Psychiatric: She has a normal mood and affect  Her speech is normal    Vitals reviewed  Advance Care Planning/Advance Directives:  Discussed disease status, cancer treatment plans and/or cancer treatment goals with the patient

## 2019-12-27 DIAGNOSIS — I10 ESSENTIAL HYPERTENSION: ICD-10-CM

## 2019-12-27 RX ORDER — HYDROCHLOROTHIAZIDE 12.5 MG/1
12.5 CAPSULE, GELATIN COATED ORAL EVERY MORNING
Qty: 30 CAPSULE | Refills: 5 | Status: SHIPPED | OUTPATIENT
Start: 2019-12-27 | End: 2020-06-15

## 2019-12-30 DIAGNOSIS — L03.90 CELLULITIS, UNSPECIFIED CELLULITIS SITE: ICD-10-CM

## 2019-12-30 DIAGNOSIS — L90.0 LICHEN SCLEROSUS ET ATROPHICUS: ICD-10-CM

## 2019-12-30 DIAGNOSIS — J45.20 MILD INTERMITTENT REACTIVE AIRWAY DISEASE WITHOUT COMPLICATION: ICD-10-CM

## 2019-12-30 RX ORDER — ALBUTEROL SULFATE 90 UG/1
2 AEROSOL, METERED RESPIRATORY (INHALATION) AS NEEDED
Qty: 1 INHALER | Refills: 0 | Status: SHIPPED | OUTPATIENT
Start: 2019-12-30 | End: 2020-12-22 | Stop reason: SDUPTHER

## 2019-12-30 RX ORDER — CLOBETASOL PROPIONATE 0.5 MG/G
OINTMENT TOPICAL
Qty: 30 G | Refills: 2 | Status: CANCELLED | OUTPATIENT
Start: 2019-12-30

## 2019-12-31 LAB
BUN SERPL-MCNC: 19 MG/DL (ref 8–27)
BUN/CREAT SERPL: 24 (ref 12–28)
CALCIUM SERPL-MCNC: 9.8 MG/DL (ref 8.7–10.3)
CHLORIDE SERPL-SCNC: 99 MMOL/L (ref 96–106)
CO2 SERPL-SCNC: 28 MMOL/L (ref 20–29)
CREAT SERPL-MCNC: 0.79 MG/DL (ref 0.57–1)
GLUCOSE SERPL-MCNC: 82 MG/DL (ref 65–99)
POTASSIUM SERPL-SCNC: 4.2 MMOL/L (ref 3.5–5.2)
SL AMB EGFR AFRICAN AMERICAN: 93 ML/MIN/1.73
SL AMB EGFR NON AFRICAN AMERICAN: 80 ML/MIN/1.73
SODIUM SERPL-SCNC: 139 MMOL/L (ref 134–144)

## 2020-01-12 DIAGNOSIS — I10 ESSENTIAL HYPERTENSION: ICD-10-CM

## 2020-01-13 DIAGNOSIS — I10 ESSENTIAL HYPERTENSION: ICD-10-CM

## 2020-01-13 RX ORDER — AMLODIPINE BESYLATE 10 MG/1
10 TABLET ORAL DAILY
Qty: 30 TABLET | Refills: 5
Start: 2020-01-13 | End: 2020-01-24 | Stop reason: SDUPTHER

## 2020-01-13 RX ORDER — AMLODIPINE BESYLATE 5 MG/1
5 TABLET ORAL DAILY
Qty: 90 TABLET | Refills: 0 | OUTPATIENT
Start: 2020-01-13

## 2020-01-24 DIAGNOSIS — I10 ESSENTIAL HYPERTENSION: ICD-10-CM

## 2020-01-24 RX ORDER — AMLODIPINE BESYLATE 5 MG/1
5 TABLET ORAL DAILY
Qty: 90 TABLET | Refills: 0 | OUTPATIENT
Start: 2020-01-24

## 2020-01-24 RX ORDER — AMLODIPINE BESYLATE 10 MG/1
10 TABLET ORAL DAILY
Qty: 90 TABLET | Refills: 0 | Status: SHIPPED | OUTPATIENT
Start: 2020-01-24 | End: 2020-04-20

## 2020-02-03 ENCOUNTER — TELEPHONE (OUTPATIENT)
Dept: FAMILY MEDICINE CLINIC | Facility: CLINIC | Age: 65
End: 2020-02-03

## 2020-02-03 NOTE — TELEPHONE ENCOUNTER
I have a fax for jobst stocking renewal  Pt has not been seen here since 7/2019,  Should schedule office visit  I think it is an office visit within 6 months for approval of prescription  Will fax through but think she will need ov

## 2020-02-18 ENCOUNTER — OFFICE VISIT (OUTPATIENT)
Dept: FAMILY MEDICINE CLINIC | Facility: CLINIC | Age: 65
End: 2020-02-18
Payer: COMMERCIAL

## 2020-02-18 VITALS
HEART RATE: 70 BPM | WEIGHT: 117 LBS | BODY MASS INDEX: 22.09 KG/M2 | OXYGEN SATURATION: 98 % | TEMPERATURE: 98.1 F | SYSTOLIC BLOOD PRESSURE: 140 MMHG | DIASTOLIC BLOOD PRESSURE: 75 MMHG | HEIGHT: 61 IN

## 2020-02-18 DIAGNOSIS — Z17.0 MALIGNANT NEOPLASM OF CENTRAL PORTION OF LEFT BREAST IN FEMALE, ESTROGEN RECEPTOR POSITIVE (HCC): ICD-10-CM

## 2020-02-18 DIAGNOSIS — I10 ESSENTIAL HYPERTENSION: ICD-10-CM

## 2020-02-18 DIAGNOSIS — Z13.29 SCREENING FOR ENDOCRINE DISORDER: ICD-10-CM

## 2020-02-18 DIAGNOSIS — J45.20 MILD INTERMITTENT REACTIVE AIRWAY DISEASE WITHOUT COMPLICATION: ICD-10-CM

## 2020-02-18 DIAGNOSIS — C50.112 MALIGNANT NEOPLASM OF CENTRAL PORTION OF LEFT BREAST IN FEMALE, ESTROGEN RECEPTOR POSITIVE (HCC): ICD-10-CM

## 2020-02-18 DIAGNOSIS — C56.9 MALIGNANT NEOPLASM OF OVARY, UNSPECIFIED LATERALITY (HCC): ICD-10-CM

## 2020-02-18 DIAGNOSIS — H10.31 ACUTE BACTERIAL CONJUNCTIVITIS OF RIGHT EYE: ICD-10-CM

## 2020-02-18 DIAGNOSIS — I89.0 LYMPHEDEMA OF LEFT LOWER EXTREMITY: Primary | ICD-10-CM

## 2020-02-18 DIAGNOSIS — K50.90 CROHN'S DISEASE WITHOUT COMPLICATION, UNSPECIFIED GASTROINTESTINAL TRACT LOCATION (HCC): ICD-10-CM

## 2020-02-18 DIAGNOSIS — Z13.220 SCREENING FOR LIPID DISORDERS: ICD-10-CM

## 2020-02-18 DIAGNOSIS — Z13.29 SCREENING FOR THYROID DISORDER: ICD-10-CM

## 2020-02-18 DIAGNOSIS — Z13.0 SCREENING FOR IRON DEFICIENCY ANEMIA: ICD-10-CM

## 2020-02-18 PROCEDURE — 3078F DIAST BP <80 MM HG: CPT | Performed by: FAMILY MEDICINE

## 2020-02-18 PROCEDURE — 3077F SYST BP >= 140 MM HG: CPT | Performed by: FAMILY MEDICINE

## 2020-02-18 PROCEDURE — 1036F TOBACCO NON-USER: CPT | Performed by: FAMILY MEDICINE

## 2020-02-18 PROCEDURE — 3008F BODY MASS INDEX DOCD: CPT | Performed by: FAMILY MEDICINE

## 2020-02-18 PROCEDURE — 99214 OFFICE O/P EST MOD 30 MIN: CPT | Performed by: FAMILY MEDICINE

## 2020-02-18 RX ORDER — OFLOXACIN 3 MG/ML
2 SOLUTION/ DROPS OPHTHALMIC 4 TIMES DAILY
Qty: 5 ML | Refills: 2 | Status: SHIPPED | OUTPATIENT
Start: 2020-02-18 | End: 2020-12-11 | Stop reason: ALTCHOICE

## 2020-02-18 NOTE — PROGRESS NOTES
Assessment/Plan:      Diagnoses and all orders for this visit:    Lymphedema of left lower extremity  Comments:  Continue with pneumatic devices daily  New prescription for compression stockings needed, 3 para  Orders:  -     Discontinue: Elastic Bandages & Supports ([address]) MISC; Use daily as directed, 3 pair total - LEFT side: 20-30mmhg large honey- color thigh-high; RIGHT side: 20-30mmhg medium honey- colored, thigh high  -     Elastic Bandages & Supports (MEDICAL COMPRESSION STOCKINGS) MISC; Use daily as directed, 3 pair total - LEFT side: 20-30mmhg large honey- color thigh-high; RIGHT side: 20-30mmhg medium honey- colored, thigh high    Acute bacterial conjunctivitis of right eye  Comments:  Requesting renewal on ofloxacin  Orders:  -     ofloxacin (OCUFLOX) 0 3 % ophthalmic solution; Administer 2 drops to the right eye 4 (four) times a day    Crohn's disease without complication, unspecified gastrointestinal tract location [facility])  Comments:  Stable, continue Remicade    Malignant neoplasm of ovary, unspecified laterality (HCC)  Comments:  EDER  Stable continue to follow with oncology  Orders:  -     Discontinue: Elastic Bandages & Supports ([address]) MISC; Use daily as directed, 3 pair total - LEFT side: 20-30mmhg large honey- color thigh-high; RIGHT side: 20-30mmhg medium honey- colored, thigh high  -     Elastic Bandages & Supports (MEDICAL COMPRESSION STOCKINGS) MISC; Use daily as directed, 3 pair total - LEFT side: 20-30mmhg large honey- color thigh-high; RIGHT side: 20-30mmhg medium honey- colored, thigh high    Essential hypertension  Comments:  Borderline, patient will monitor blood pressure for systolic greater than 686    She may need blood pressure medication adjustment    Mild intermittent reactive airway disease without complication  Comments:  Use inhaler prior to activity as needed    Malignant neoplasm of central portion of left breast in female, estrogen receptor positive ([facility] [address] )  Comments:  Continue follow-up with oncology    Screening for endocrine disorder  Comments:  Due for TSH  Orders:  -     Comprehensive metabolic panel; Future  -     Comprehensive metabolic panel    Screening for lipid disorders  Comments:  Due for lipid panel  Orders:  -     Lipid panel; Future  -     Lipid panel    Screening for thyroid disorder  Comments:  Due for TSH  Orders:  -     TSH, 3rd generation with Free T4 reflex; Future  -     TSH, 3rd generation with Free T4 reflex    Screening for iron deficiency anemia  Comments:  Due for CBC  Orders:  -     CBC and differential; Future  -     CBC and differential          Subjective:  Chief Complaint   Patient presents with    Follow-up     Pt here for her 6 month follow-up so she can get her stocking renewal  Pt scheduled mammo for 6/15/2020  FBW and annual exam scheuled for May  Patient ID: Agustin Vasquez is a 60 y o  female  Patient is here for follow-up on chronic medical conditions  She has ongoing lymphedema bilateral lower extremities left greater than right  She wears compression stockings and is in need of a new pair  The cat made a hole in her current ones  She denies any chest pain however she has some exertional dyspnea  She has an albuterol inhaler that she uses with good results  Her bowel movements are mostly regular she is on Remicade for Crohn's disease  She is under the care of her oncologist for breast cancer bilaterally as well as her history of ovarian cancer  She has not been monitoring her blood pressure however in some doctor's offices, her blood pressure can get up to 150  Patient complains of redness in her right eye  She had similar symptoms about a year ago in her left eye  She had some ofloxacin drops that she has been using with good results however she has run out of the medication  Review of Systems   Constitutional: Negative  Negative for fatigue and fever     HENT: Negative  Eyes: Positive for redness  Respiratory: Negative  Negative for cough  Cardiovascular: Positive for leg swelling  Left greater than right   Gastrointestinal: Negative  Endocrine: Negative  Genitourinary: Negative  Musculoskeletal: Negative  Skin: Negative  Allergic/Immunologic: Negative  Neurological: Negative  Psychiatric/Behavioral: Negative  The following portions of the patient's history were reviewed and updated as appropriate: allergies, current medications, past family history, past medical history, past social history, past surgical history and problem list     Objective:  Vitals:    02/18/20 0832 02/18/20 0853   BP: 132/84 140/75   Pulse: 70    Temp: 98 1 °F (36 7 °C)    SpO2: 98%    Weight: 53 1 kg (117 lb)    Height: 5' 1" (1 549 m)       Physical Exam   Constitutional: She is oriented to person, place, and time  She appears well-developed and well-nourished  HENT:   Head: Normocephalic and atraumatic  Eyes:   Erythema right conjunctiva   Cardiovascular: Normal rate, regular rhythm, normal heart sounds and intact distal pulses  Pulmonary/Chest: Effort normal and breath sounds normal    Abdominal: Soft  Bowel sounds are normal    Musculoskeletal: She exhibits edema  Lymphedema left leg greater than right   Neurological: She is alert and oriented to person, place, and time  Skin: Skin is warm and dry  Psychiatric: She has a normal mood and affect  Her behavior is normal  Judgment and thought content normal    Nursing note and vitals reviewed

## 2020-02-18 NOTE — PATIENT INSTRUCTIONS
Blood work and physical in may as scheduled  Pt is in need of 3 pairs of compression stockings for lymphedema   Monitor bp for less than 140 top number

## 2020-04-18 DIAGNOSIS — I10 ESSENTIAL HYPERTENSION: ICD-10-CM

## 2020-04-20 RX ORDER — AMLODIPINE BESYLATE 10 MG/1
10 TABLET ORAL DAILY
Qty: 90 TABLET | Refills: 0 | Status: SHIPPED | OUTPATIENT
Start: 2020-04-20 | End: 2020-07-14

## 2020-05-12 ENCOUNTER — CLINICAL SUPPORT (OUTPATIENT)
Dept: FAMILY MEDICINE CLINIC | Facility: CLINIC | Age: 65
End: 2020-05-12
Payer: COMMERCIAL

## 2020-05-12 DIAGNOSIS — Z13.29 SCREENING FOR THYROID DISORDER: ICD-10-CM

## 2020-05-12 DIAGNOSIS — E78.49 OTHER HYPERLIPIDEMIA: ICD-10-CM

## 2020-05-12 DIAGNOSIS — Z13.0 SCREENING FOR IRON DEFICIENCY ANEMIA: ICD-10-CM

## 2020-05-12 DIAGNOSIS — I10 ESSENTIAL HYPERTENSION: Primary | ICD-10-CM

## 2020-05-12 DIAGNOSIS — Z13.29 SCREENING FOR ENDOCRINE DISORDER: ICD-10-CM

## 2020-05-12 PROCEDURE — 36415 COLL VENOUS BLD VENIPUNCTURE: CPT

## 2020-05-13 LAB
ALBUMIN SERPL-MCNC: 3.9 G/DL (ref 3.8–4.8)
ALBUMIN/GLOB SERPL: 1.3 {RATIO} (ref 1.2–2.2)
ALP SERPL-CCNC: 58 IU/L (ref 39–117)
ALT SERPL-CCNC: 21 IU/L (ref 0–32)
AST SERPL-CCNC: 22 IU/L (ref 0–40)
BASOPHILS # BLD AUTO: 0 X10E3/UL (ref 0–0.2)
BASOPHILS NFR BLD AUTO: 1 %
BILIRUB SERPL-MCNC: 0.3 MG/DL (ref 0–1.2)
BUN SERPL-MCNC: 18 MG/DL (ref 8–27)
BUN/CREAT SERPL: 23 (ref 12–28)
CALCIUM SERPL-MCNC: 9 MG/DL (ref 8.7–10.3)
CHLORIDE SERPL-SCNC: 99 MMOL/L (ref 96–106)
CHOLEST SERPL-MCNC: 203 MG/DL (ref 100–199)
CO2 SERPL-SCNC: 27 MMOL/L (ref 20–29)
CREAT SERPL-MCNC: 0.8 MG/DL (ref 0.57–1)
EOSINOPHIL # BLD AUTO: 0.2 X10E3/UL (ref 0–0.4)
EOSINOPHIL NFR BLD AUTO: 3 %
ERYTHROCYTE [DISTWIDTH] IN BLOOD BY AUTOMATED COUNT: 13 % (ref 11.7–15.4)
GLOBULIN SER-MCNC: 3.1 G/DL (ref 1.5–4.5)
GLUCOSE SERPL-MCNC: 94 MG/DL (ref 65–99)
HCT VFR BLD AUTO: 39.5 % (ref 34–46.6)
HDLC SERPL-MCNC: 104 MG/DL
HGB BLD-MCNC: 13.1 G/DL (ref 11.1–15.9)
IMM GRANULOCYTES # BLD: 0 X10E3/UL (ref 0–0.1)
IMM GRANULOCYTES NFR BLD: 0 %
LDLC SERPL CALC-MCNC: 89 MG/DL (ref 0–99)
LDLC/HDLC SERPL: 0.9 RATIO (ref 0–3.2)
LYMPHOCYTES # BLD AUTO: 1.3 X10E3/UL (ref 0.7–3.1)
LYMPHOCYTES NFR BLD AUTO: 29 %
MCH RBC QN AUTO: 28.5 PG (ref 26.6–33)
MCHC RBC AUTO-ENTMCNC: 33.2 G/DL (ref 31.5–35.7)
MCV RBC AUTO: 86 FL (ref 79–97)
MONOCYTES # BLD AUTO: 0.5 X10E3/UL (ref 0.1–0.9)
MONOCYTES NFR BLD AUTO: 11 %
NEUTROPHILS # BLD AUTO: 2.5 X10E3/UL (ref 1.4–7)
NEUTROPHILS NFR BLD AUTO: 56 %
PLATELET # BLD AUTO: 267 X10E3/UL (ref 150–450)
POTASSIUM SERPL-SCNC: 3.9 MMOL/L (ref 3.5–5.2)
PROT SERPL-MCNC: 7 G/DL (ref 6–8.5)
RBC # BLD AUTO: 4.6 X10E6/UL (ref 3.77–5.28)
SL AMB EGFR AFRICAN AMERICAN: 91 ML/MIN/1.73
SL AMB EGFR NON AFRICAN AMERICAN: 79 ML/MIN/1.73
SL AMB VLDL CHOLESTEROL CALC: 10 MG/DL (ref 5–40)
SODIUM SERPL-SCNC: 140 MMOL/L (ref 134–144)
TRIGL SERPL-MCNC: 52 MG/DL (ref 0–149)
TSH SERPL DL<=0.005 MIU/L-ACNC: 2.91 UIU/ML (ref 0.45–4.5)
WBC # BLD AUTO: 4.5 X10E3/UL (ref 3.4–10.8)

## 2020-05-14 ENCOUNTER — OFFICE VISIT (OUTPATIENT)
Dept: GYNECOLOGIC ONCOLOGY | Facility: CLINIC | Age: 65
End: 2020-05-14
Payer: COMMERCIAL

## 2020-05-14 VITALS
TEMPERATURE: 97.9 F | DIASTOLIC BLOOD PRESSURE: 92 MMHG | WEIGHT: 121.5 LBS | HEIGHT: 61 IN | BODY MASS INDEX: 22.94 KG/M2 | SYSTOLIC BLOOD PRESSURE: 150 MMHG | RESPIRATION RATE: 16 BRPM | HEART RATE: 89 BPM

## 2020-05-14 DIAGNOSIS — Z85.3 HISTORY OF BREAST CANCER: ICD-10-CM

## 2020-05-14 DIAGNOSIS — I89.0 LYMPHEDEMA OF LEFT LOWER EXTREMITY: ICD-10-CM

## 2020-05-14 DIAGNOSIS — Z08 ENCOUNTER FOR FOLLOW-UP SURVEILLANCE OF OVARIAN CANCER: ICD-10-CM

## 2020-05-14 DIAGNOSIS — Z85.43 HISTORY OF OVARIAN CANCER: Primary | ICD-10-CM

## 2020-05-14 DIAGNOSIS — Z85.43 ENCOUNTER FOR FOLLOW-UP SURVEILLANCE OF OVARIAN CANCER: ICD-10-CM

## 2020-05-14 PROCEDURE — 3080F DIAST BP >= 90 MM HG: CPT | Performed by: PHYSICIAN ASSISTANT

## 2020-05-14 PROCEDURE — 3077F SYST BP >= 140 MM HG: CPT | Performed by: PHYSICIAN ASSISTANT

## 2020-05-14 PROCEDURE — 3008F BODY MASS INDEX DOCD: CPT | Performed by: PHYSICIAN ASSISTANT

## 2020-05-14 PROCEDURE — 1036F TOBACCO NON-USER: CPT | Performed by: PHYSICIAN ASSISTANT

## 2020-05-14 PROCEDURE — 99214 OFFICE O/P EST MOD 30 MIN: CPT | Performed by: PHYSICIAN ASSISTANT

## 2020-05-19 PROBLEM — Z13.29 SCREENING FOR THYROID DISORDER: Status: RESOLVED | Noted: 2020-02-18 | Resolved: 2020-05-19

## 2020-05-19 PROBLEM — Z13.0 SCREENING FOR IRON DEFICIENCY ANEMIA: Status: RESOLVED | Noted: 2020-02-18 | Resolved: 2020-05-19

## 2020-05-19 PROBLEM — H10.31 ACUTE BACTERIAL CONJUNCTIVITIS OF RIGHT EYE: Status: RESOLVED | Noted: 2020-02-18 | Resolved: 2020-05-19

## 2020-05-19 PROBLEM — G62.9 NEUROPATHY: Status: ACTIVE | Noted: 2020-05-19

## 2020-05-19 PROBLEM — G56.01 CARPAL TUNNEL SYNDROME OF RIGHT WRIST: Status: ACTIVE | Noted: 2020-05-19

## 2020-05-19 PROBLEM — Z13.29 SCREENING FOR ENDOCRINE DISORDER: Status: RESOLVED | Noted: 2020-02-18 | Resolved: 2020-05-19

## 2020-05-19 PROBLEM — Z13.220 SCREENING FOR LIPID DISORDERS: Status: RESOLVED | Noted: 2020-02-18 | Resolved: 2020-05-19

## 2020-05-29 ENCOUNTER — TELEPHONE (OUTPATIENT)
Dept: FAMILY MEDICINE CLINIC | Facility: CLINIC | Age: 65
End: 2020-05-29

## 2020-06-02 ENCOUNTER — APPOINTMENT (OUTPATIENT)
Dept: RADIATION ONCOLOGY | Facility: HOSPITAL | Age: 65
End: 2020-06-02
Attending: RADIOLOGY
Payer: COMMERCIAL

## 2020-06-02 VITALS
SYSTOLIC BLOOD PRESSURE: 128 MMHG | TEMPERATURE: 97.6 F | HEART RATE: 69 BPM | BODY MASS INDEX: 22.81 KG/M2 | WEIGHT: 120.8 LBS | RESPIRATION RATE: 16 BRPM | HEIGHT: 61 IN | DIASTOLIC BLOOD PRESSURE: 70 MMHG

## 2020-06-02 DIAGNOSIS — C50.112 MALIGNANT NEOPLASM OF CENTRAL PORTION OF LEFT BREAST IN FEMALE, ESTROGEN RECEPTOR POSITIVE (HCC): Primary | ICD-10-CM

## 2020-06-02 DIAGNOSIS — Z17.0 MALIGNANT NEOPLASM OF CENTRAL PORTION OF LEFT BREAST IN FEMALE, ESTROGEN RECEPTOR POSITIVE (HCC): Primary | ICD-10-CM

## 2020-06-02 DIAGNOSIS — Z86.000 HISTORY OF DUCTAL CARCINOMA IN SITU (DCIS) OF BREAST: ICD-10-CM

## 2020-06-02 DIAGNOSIS — Z85.43 HISTORY OF OVARIAN CANCER: ICD-10-CM

## 2020-06-02 PROCEDURE — 99211 OFF/OP EST MAY X REQ PHY/QHP: CPT | Performed by: RADIOLOGY

## 2020-06-02 NOTE — PROGRESS NOTES
Maria Teresa Spine 1955 is a 60 y o  female     Follow up visit   Follow up post right breast radiation completed on 11/28/18 For DCIS and left breast radiation completed on 11/5/19  She was last seen in follow up on 12/3/19     5/14/20 Wilmer Mccullough PA-C  clinically without evidence of ovarian cancer recurrence  Return to the office in 1 year for continued surveillance       6/15/20 Bilateral diagnostic mammogram scheduled  6/24/20 LUIS Oquendo  10/13/20 Rahel Naqvi MD       History of ovarian cancer     Initial Diagnosis     Ovarian cancer ([facility] [address] )      4/21/2009 Surgery     Exploratory laparotomy, total abdominal hysterectomy, bilateral salpingo-oophrectomy, lymph node dissection, omentectomy with staging       - 7/8/2009 Chemotherapy     Intraperitoneal along with intravenous chemotherapy on a early ovarian cancer protocol        5/31/2018 Genetic Testing     Genetic testing results are 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      Genetic testing indicated that the patient carries a Variant of Uncertain Significance (VUS) : Rad51C c 252G>T       Hormone Therapy           History of ductal carcinoma in situ (DCIS) of breast    5/31/2018 Genetic Testing     Genetic testing results are 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      Genetic testing indicated that the patient carries a Variant of Uncertain Significance (VUS) : Rad51C c 252G>T      8/1/2018 Biopsy     Right breast biopsy  11 o'clock position 5 cmfn  DCIS  Grade 2  Confirmed by Deon Delarosa MD  ER 0  ID 0      9/11/2018 Surgery     Right lumpectomy  DCIS  Grade 2  1 4 cm  Margins negative  Stage 0      10/16/2018 -  Hormone Therapy     Consult with Dr Kenneth Cook  No adjuvant systemic therapy recommended      10/29/2018 - 11/28/2018 Radiation     Course: C1    Plan ID Energy Fractions Dose per Fraction (cGy) Dose Correction (cGy) Total Dose Delivered (cGy) Elapsed Days   R Breast 6X 16 / 16 266 0 4,256 22   R Breast e 12E 5 / 5 200 0 1,000 7      Treatment dates:  C1: 10/29/2018 - 11/28/2018          Malignant neoplasm of central portion of left breast in female, estrogen receptor positive ([facility] [address] )    7/18/2019 Biopsy     Left breast MRI-guided biopsy  3 o'clock, posterior depth  Invasive breast carcinoma of no special type  Grade 1  ER 90  GA 0  HER2 1+      8/13/2019 Surgery     Left breast needle localized lumpectomy with sentinel lymph node biopsy  Invasive mammary carcinoma of no special type  Grade 1  3 mm  Margins negative  0/1 Lymph node  Anatomic/Prognostic Stage IA      10/8/2019 - 11/5/2019 Radiation       Treatment:  Course: C2  Plan ID Energy Fractions Dose per Fraction (cGy) Dose Correction (cGy) Total Dose Delivered (cGy) Elapsed Days   BH L Breast 6X 16 / 16 266 0 4,256 21   BH L Brst e 12E 5 / 5 200 0 1,000 6    C2: 10/8/2019 - 11/5/2019 11/2019 -  Hormone Therapy     Anastrozole     Clinical Trial: no    Health Maintenance   Topic Date Due    HIV Screening  08/30/1970    Pneumococcal Vaccine: Pediatrics (0 to 5 Years) and At-Risk Patients (6 to 59 Years) (2 of 3 - PPSV23) 01/11/2017    PT PLAN OF CARE  08/21/2019    MAMMOGRAM  06/14/2020    Influenza Vaccine  07/01/2020    Depression Screening PHQ  02/18/2021    BMI: Adult  05/19/2021    Annual Physical  05/19/2021    CRC Screening: Colonoscopy  07/15/2022    Pneumococcal Vaccine: 65+ Years (2 of 2 - PPSV23) 08/30/2022    DTaP,Tdap,and Td Vaccines (3 - Td) 11/16/2026    Hepatitis C Screening  Completed    HIB Vaccine  Aged Out    Hepatitis B Vaccine  Aged Out    IPV Vaccine  Aged Out    Hepatitis A Vaccine  Aged Out    Meningococcal ACWY Vaccine  Aged Out    HPV Vaccine  Aged Out     Patient Active Problem List   Diagnosis    Colon, diverticulosis    Crohn's disease ([facility] [address] )    Dense breasts    Dermatitis    Erythema chronica migrans, secondary    Hyperlipidemia    Lymphedema    Murmur    Osteopenia    Osteoporosis    History of ovarian cancer    Reactive airway disease    Shortness of breath on exertion    Lymphedema of left lower extremity    Genetic predisposition to breast cancer    History of ductal carcinoma in situ (DCIS) of breast    Encounter for follow-up surveillance of ovarian cancer    Lichen sclerosus et atrophicus    Well adult exam    Essential hypertension    Malignant neoplasm of ovary ([facility] [address]    Nausea    TIA (transient ischemic attack)    Malignant neoplasm of central portion of left breast in female, estrogen receptor positive ([facility] [address] )    Use of anastrozole    History of breast cancer    Carpal tunnel syndrome of right wrist    Neuropathy     Past Medical History:   Diagnosis Date    BRCA1 positive     BRCA2 positive     Breast cancer ([facility] [address] ) 09/11/2018    Right    Breast cancer ([facility] [address] ) 08/13/2019    Left    Crohn disease ([facility] [address] )     Dense breast     History of chemotherapy     History of radiation therapy     History of transfusion 2009    Hyperlipidemia     Hypertension     Lymphedema     left leg    Lymphedema     Ovarian cancer ([facility] [address] ) 04/21/2009     Past Surgical History:   Procedure Laterality Date    BREAST LUMPECTOMY Right 09/11/2018    BREAST LUMPECTOMY Left 8/13/2019    Procedure: BREAST LUMPECTOMY; BREAST NEEDLE LOCALIZATION (NEEDLE LOC AT 0800); Surgeon: Radha Moreno MD;  [facility];  Service: Surgical Oncology    BREAST LUMPECTOMY W/ NEEDLE LOCALIZATION Left 08/13/2019    COLONOSCOPY      EXPLORATORY LAPAROTOMY      HYSTERECTOMY      LYMPH NODE BIOPSY Left 8/13/2019    Procedure: SENTINEL LYMPH NODE BIOPSY; LYMPHATIC MAPPING WITH BLUE DYE AND RADIOACTIVE DYE (INJECT AT 0930 BY DR JENNINGS IN THE OR);   Surgeon: Radha Moreno MD;  [facility];  Service: Surgical Oncology    MAMMO NEEDLE LOCALIZATION LEFT (ALL INC) Left 8/13/2019    MAMMO NEEDLE LOCALIZATION RIGHT (ALL INC) Right 9/11/2018    MRI BREAST BIOPSY LEFT (ALL INCLUSIVE) Left 7/18/2019    OMENTECTOMY      IA PERQ DEVICE PLACEMT BREAST LOC 1ST LES W YARYE Right 2018    Procedure: BREAST LUMPECTOMY; BREAST NEEDLE LOCALIZATION (NEEDLE LOC AT 1200);   Surgeon: Zonia Garcia MD;  [facility];  Service: Surgical Oncology    TOTAL ABDOMINAL HYSTERECTOMY W/ BILATERAL SALPINGOOPHORECTOMY      US GUIDED BREAST BIOPSY LEFT COMPLETE Left 2019    US GUIDED BREAST BIOPSY RIGHT COMPLETE Right 2018    WISDOM TOOTH EXTRACTION       Family History   Problem Relation Age of Onset    Prostate cancer Father     Alzheimer's disease Father     Ovarian cancer Sister     Breast cancer Paternal Grandmother     Breast cancer Maternal Aunt     No Known Problems Sister     Other Sister         pacemaker    No Known Problems Sister     Down syndrome Sister     Alzheimer's disease Sister     No Known Problems Paternal Aunt     No Known Problems Maternal Aunt      Social History     Socioeconomic History    Marital status: Single     Spouse name: Not on file    Number of children: Not on file    Years of education: Not on file    Highest education level: Not on file   Occupational History    Not on file   Social Needs    Financial resource strain: Not on file    Food insecurity:     Worry: Not on file     Inability: Not on file    Transportation needs:     Medical: Not on file     Non-medical: Not on file   Tobacco Use    Smoking status: Former Smoker     Last attempt to quit: 2009     Years since quittin 1    Smokeless tobacco: Never Used   Substance and Sexual Activity    Alcohol use: Yes     Frequency: Monthly or less     Drinks per session: 1 or 2     Comment: rarely    Drug use: No    Sexual activity: Not on file   Lifestyle    Physical activity:     Days per week: Not on file     Minutes per session: Not on file    Stress: Not on file   Relationships    Social connections:     Talks on phone: Not on file     Gets together: Not on file     Attends [employer] service: Not on file Active member of club or organization: Not on file     Attends meetings of clubs or organizations: Not on file     Relationship status: Not on file    Intimate partner violence:     Fear of current or ex partner: Not on file     Emotionally abused: Not on file     Physically abused: Not on file     Forced sexual activity: Not on file   Other Topics Concern    Not on file   Social History Narrative    Not on file       Current Outpatient Medications:     albuterol (PROVENTIL HFA,VENTOLIN HFA) 90 mcg/act inhaler, Inhale 2 puffs as needed for wheezing, Disp: 1 Inhaler, Rfl: 0    alendronate (FOSAMAX) 70 mg tablet, TAKE 1 TABLET (70 MG TOTAL) BY MOUTH EVERY 7 DAYS, Disp: 4 tablet, Rfl: 11    amLODIPine (NORVASC) 10 mg tablet, TAKE 1 TABLET (10 MG TOTAL) BY MOUTH DAILY, Disp: 90 tablet, Rfl: 0    anastrozole (ARIMIDEX) 1 mg tablet, Take 1 tablet (1 mg total) by mouth daily, Disp: 90 tablet, Rfl: 1    atorvastatin (LIPITOR) 40 mg tablet, TAKE 1 TABLET (40 MG TOTAL) BY MOUTH DAILY AT BEDTIME, Disp: 90 tablet, Rfl: 1    Calcium Carbonate (CALTRATE 600) 1500 (600 Ca) MG TABS, Take 1 tablet by mouth 2 (two) times a day, Disp: , Rfl:     clobetasol (TEMOVATE) 0 05 % ointment, Apply to the affected area 1-2 times a week, Disp: 30 g, Rfl: 2    Elastic Bandages & Supports (MEDICAL COMPRESSION STOCKINGS) MISC, Use daily as directed, 3 pair total - LEFT side: 20-30mmhg large honey- color thigh-high; RIGHT side: 20-30mmhg medium honey- colored, thigh high, Disp: 3 each, Rfl: 0    hydrochlorothiazide (MICROZIDE) 12 5 mg capsule, TAKE 1 CAPSULE (12 5 MG TOTAL) BY MOUTH EVERY MORNING, Disp: 30 capsule, Rfl: 5    inFLIXimab (REMICADE) 100 mg, Infuse into a venous catheter  , Disp: , Rfl:     mupirocin (BACTROBAN) 2 % ointment, Apply topically 3 (three) times a day, Disp: 22 g, Rfl: 0    ofloxacin (OCUFLOX) 0 3 % ophthalmic solution, Administer 2 drops to the right eye 4 (four) times a day, Disp: 5 mL, Rfl: 2  Allergies Allergen Reactions    Lisinopril      cough    Losartan      Numbness on right side of body    Boniva [Ibandronic Acid] Headache     Review of Systems:  Review of Systems   Constitutional: Negative  HENT: Negative  Gets sores in nose   Eyes: Positive for discharge (Intermittent right eye) and visual disturbance (R eye)  Respiratory: Negative  Cardiovascular: Positive for leg swelling (left leg lymphedema)  Gastrointestinal:        History of Chrons   Endocrine: Negative  Intermittent  Hot flashes   Genitourinary: Negative  Musculoskeletal: Positive for arthralgias (related to chrons)  Skin: Positive for rash (Scalp)  Allergic/Immunologic: Negative  Neurological: Positive for numbness (Right hand)  Hematological: Negative  Psychiatric/Behavioral: Positive for sleep disturbance (Trouble falling asleep)  Vitals:    06/02/20 0905   BP: 128/70   BP Location: Left arm   Patient Position: Sitting   Cuff Size: Standard   Pulse: 69   Resp: 16   Temp: 97 6 °F (36 4 °C)   TempSrc: Temporal   Weight: 54 8 kg (120 lb 12 8 oz)   Height: 5' 1" (1 549 m)      Pain Score: 0-No pain    Imaging:No results found

## 2020-06-15 ENCOUNTER — HOSPITAL ENCOUNTER (OUTPATIENT)
Dept: MAMMOGRAPHY | Facility: CLINIC | Age: 65
Discharge: HOME | End: 2020-06-15
Payer: COMMERCIAL

## 2020-06-15 VITALS — BODY MASS INDEX: 22.66 KG/M2 | HEIGHT: 61 IN | TEMPERATURE: 97.1 F | WEIGHT: 120 LBS

## 2020-06-15 DIAGNOSIS — I10 ESSENTIAL HYPERTENSION: ICD-10-CM

## 2020-06-15 DIAGNOSIS — Z86.000 HISTORY OF DUCTAL CARCINOMA IN SITU (DCIS) OF BREAST: ICD-10-CM

## 2020-06-15 DIAGNOSIS — C50.112 MALIGNANT NEOPLASM OF CENTRAL PORTION OF LEFT BREAST IN FEMALE, ESTROGEN RECEPTOR POSITIVE (HCC): ICD-10-CM

## 2020-06-15 DIAGNOSIS — Z17.0 MALIGNANT NEOPLASM OF CENTRAL PORTION OF LEFT BREAST IN FEMALE, ESTROGEN RECEPTOR POSITIVE (HCC): ICD-10-CM

## 2020-06-15 PROCEDURE — 77066 DX MAMMO INCL CAD BI: CPT

## 2020-06-15 PROCEDURE — G0279 TOMOSYNTHESIS, MAMMO: HCPCS

## 2020-06-15 RX ORDER — HYDROCHLOROTHIAZIDE 12.5 MG/1
12.5 CAPSULE, GELATIN COATED ORAL EVERY MORNING
Qty: 30 CAPSULE | Refills: 5 | Status: SHIPPED | OUTPATIENT
Start: 2020-06-15 | End: 2020-12-23

## 2020-06-19 DIAGNOSIS — L90.0 LICHEN SCLEROSUS ET ATROPHICUS: ICD-10-CM

## 2020-06-19 RX ORDER — CLOBETASOL PROPIONATE 0.5 MG/G
OINTMENT TOPICAL
Qty: 30 G | Refills: 2 | Status: SHIPPED | OUTPATIENT
Start: 2020-06-19 | End: 2021-08-19 | Stop reason: SDUPTHER

## 2020-06-22 ENCOUNTER — TELEPHONE (OUTPATIENT)
Dept: SURGICAL ONCOLOGY | Facility: CLINIC | Age: 65
End: 2020-06-22

## 2020-06-24 ENCOUNTER — OFFICE VISIT (OUTPATIENT)
Dept: SURGICAL ONCOLOGY | Facility: CLINIC | Age: 65
End: 2020-06-24
Payer: COMMERCIAL

## 2020-06-24 VITALS
DIASTOLIC BLOOD PRESSURE: 80 MMHG | BODY MASS INDEX: 22.84 KG/M2 | HEART RATE: 74 BPM | WEIGHT: 121 LBS | HEIGHT: 61 IN | RESPIRATION RATE: 16 BRPM | SYSTOLIC BLOOD PRESSURE: 160 MMHG | TEMPERATURE: 98.1 F

## 2020-06-24 DIAGNOSIS — Z17.0 MALIGNANT NEOPLASM OF CENTRAL PORTION OF LEFT BREAST IN FEMALE, ESTROGEN RECEPTOR POSITIVE (HCC): Primary | ICD-10-CM

## 2020-06-24 DIAGNOSIS — Z79.811 USE OF ANASTROZOLE: ICD-10-CM

## 2020-06-24 DIAGNOSIS — Z15.89 MONOALLELIC MUTATION OF ATM GENE: ICD-10-CM

## 2020-06-24 DIAGNOSIS — Z86.000 HISTORY OF DUCTAL CARCINOMA IN SITU (DCIS) OF BREAST: ICD-10-CM

## 2020-06-24 DIAGNOSIS — C50.112 MALIGNANT NEOPLASM OF CENTRAL PORTION OF LEFT BREAST IN FEMALE, ESTROGEN RECEPTOR POSITIVE (HCC): Primary | ICD-10-CM

## 2020-06-24 DIAGNOSIS — Z15.09 MONOALLELIC MUTATION OF ATM GENE: ICD-10-CM

## 2020-06-24 DIAGNOSIS — Z15.01 MONOALLELIC MUTATION OF ATM GENE: ICD-10-CM

## 2020-06-24 PROCEDURE — 3079F DIAST BP 80-89 MM HG: CPT | Performed by: NURSE PRACTITIONER

## 2020-06-24 PROCEDURE — 3008F BODY MASS INDEX DOCD: CPT | Performed by: NURSE PRACTITIONER

## 2020-06-24 PROCEDURE — 99214 OFFICE O/P EST MOD 30 MIN: CPT | Performed by: NURSE PRACTITIONER

## 2020-06-24 PROCEDURE — 3077F SYST BP >= 140 MM HG: CPT | Performed by: NURSE PRACTITIONER

## 2020-06-24 PROCEDURE — 1036F TOBACCO NON-USER: CPT | Performed by: NURSE PRACTITIONER

## 2020-07-03 NOTE — PROGRESS NOTES
Follow-up - Radiation Oncology   Tran Gut 1955 60 y o  female [id number]      History of Present Illness   Cancer Staging  History of ductal carcinoma in situ (DCIS) of breast  Staging form: Breast, AJCC 8th Edition  - Pathologic: Stage 0 (pTis (DCIS), pN0, cM0, ER: Negative, IA: Negative) - Unsigned  Neoadjuvant therapy: No  Laterality: Right  Tumor size (mm): 14  Method of lymph node assessment: Clinical  Nuclear grade: G2    History of ovarian cancer  Staging form: Ovary, AJCC 7th Edition  - Clinical: FIGO Stage IA (T1a, N0, M0) - Signed by Love Rodriguez PA-C on 4/19/2018  Histologic grade (G): G3    Malignant neoplasm of central portion of left breast in female, estrogen receptor positive ([facility] [address] )  Staging form: Breast, AJCC 8th Edition  - Pathologic: Stage IA (pT1b, pN0(sn), cM0, G1, ER+, IA-, HER2-) - Unsigned  Neoadjuvant therapy: No  Laterality: Left  Tumor size (mm): 3  Method of lymph node assessment: [place] lymph node biopsy  Histologic grading system: 3 grade system      Ghada Trujillo is a 60y o  year old female who is here for follow up post right breast radiation completed on 11/28/18 For DCIS and left breast radiation completed on 11/5/19  She was last seen in follow up on 12/3/19  Interval History:  5/14/20 Love Rodriguez PA-C  clinically without evidence of ovarian cancer recurrence  Return to the office in 1 year for continued surveillance       She is is feeling well   She denies any pain or masses in the breasts bilaterally  She has been applying Remedy cream daily and performing breast massage  She did not wish to start Anastrozole until after she recovered from radiation  She started the Anastrozole in April of 2020  She reports mild hot flashes during the day that occur more if she is upset or anxious  She occasionally has some breast tenderness at times left side greater than right but denies any breast masses    She reports her Crohn's disease is stable on Remicade  She had a sister the  on May 22, 2020 from ovarian carcinoma and emotional support was given  Her other sister Molina remains in a group home since 2020     6/15/20 Bilateral diagnostic mammogram scheduled  20 LUIS Berry  10/13/20 Eloisa Pierre MD     Historical Information      History of ovarian cancer     Initial Diagnosis     Ovarian cancer ([facility] [address] )      2009 Surgery     Exploratory laparotomy, total abdominal hysterectomy, bilateral salpingo-oophrectomy, lymph node dissection, omentectomy with staging       - 2009 Chemotherapy     Intraperitoneal along with intravenous chemotherapy on a early ovarian cancer protocol        2018 Genetic Testing     Genetic testing results are 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      Genetic testing indicated that the patient carries a Variant of Uncertain Significance (VUS) : Rad51C c 252G>T       Hormone Therapy           History of ductal carcinoma in situ (DCIS) of breast    2018 Genetic Testing     Genetic testing results are 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      Genetic testing indicated that the patient carries a Variant of Uncertain Significance (VUS) : Rad51C c 252G>T      2018 Biopsy     Right breast biopsy  11 o'clock position 5 cmfn  DCIS  Grade 2  Confirmed by Jose Mota MD  ER 0  WA 0      2018 Surgery     Right lumpectomy  DCIS  Grade 2  1 4 cm  Margins negative  Stage 0      10/16/2018 -  Hormone Therapy     Consult with Dr Real Krause  No adjuvant systemic therapy recommended      10/29/2018 - 2018 Radiation     Course: C1    Plan ID Energy Fractions Dose per Fraction (cGy) Dose Correction (cGy) Total Dose Delivered (cGy) Elapsed Days   R Breast 6X 16 / 16 266 0 4,256 22   R Breast e 12E  200 0 1,000 7      Treatment dates:  C1: 10/29/2018 - 2018          Malignant neoplasm of central portion of left breast in female, estrogen receptor positive ([facility] )    7/18/2019 Biopsy     Left breast MRI-guided biopsy  3 o'clock, posterior depth  Invasive breast carcinoma of no special type  Grade 1  ER 90  WA 0  HER2 1+      8/13/2019 Surgery     Left breast needle localized lumpectomy with sentinel lymph node biopsy  Invasive mammary carcinoma of no special type  Grade 1  3 mm  Margins negative  0/1 Lymph node  Anatomic/Prognostic Stage IA      10/8/2019 - 11/5/2019 Radiation       Treatment:  Course: C2  Plan ID Energy Fractions Dose per Fraction (cGy) Dose Correction (cGy) Total Dose Delivered (cGy) Elapsed Days   BH L Breast 6X 16 / 16 266 0 4,256 21   BH L Brst e 12E 5 / 5 200 0 1,000 6    C2: 10/8/2019 - 11/5/2019 11/2019 -  Hormone Therapy     Anastrozole         Past Medical History:   Diagnosis Date    BRCA1 positive     BRCA2 positive     Breast cancer ([place] [address] ) 09/11/2018    Right    Breast cancer ([place] [address] ) 08/13/2019    Left    Crohn disease ([place] [address] )     Dense breast     History of chemotherapy     for ovarian cancer    History of radiation therapy     History of transfusion 2009    Hyperlipidemia     Hypertension     Lymphedema     left leg    Lymphedema     Ovarian cancer ([place] [address] ) 04/21/2009     Past Surgical History:   Procedure Laterality Date    BREAST LUMPECTOMY Right 09/11/2018    BREAST LUMPECTOMY Left 8/13/2019    Procedure: BREAST LUMPECTOMY; BREAST NEEDLE LOCALIZATION (NEEDLE LOC AT 0800); Surgeon: Rahul Vallecillo MD;  [facility];  Service: Surgical Oncology    BREAST LUMPECTOMY W/ NEEDLE LOCALIZATION Left 08/13/2019    COLONOSCOPY      EXPLORATORY LAPAROTOMY      HYSTERECTOMY      LYMPH NODE BIOPSY Left 8/13/2019    Procedure: SENTINEL LYMPH NODE BIOPSY; LYMPHATIC MAPPING WITH BLUE DYE AND RADIOACTIVE DYE (INJECT AT 0930 BY DR JENNINGS IN THE OR);   Surgeon: Rahul Vallecillo MD;  [facility];  Service: Surgical Oncology    MAMMO NEEDLE LOCALIZATION LEFT (ALL INC) Left 8/13/2019    MAMMO NEEDLE LOCALIZATION RIGHT (ALL INC) Right 2018    MRI BREAST BIOPSY LEFT (ALL INCLUSIVE) Left 2019    OMENTECTOMY      VT PERQ DEVICE PLACEMT BREAST LOC 1ST LES W YARYE Right 2018    Procedure: BREAST LUMPECTOMY; BREAST NEEDLE LOCALIZATION (NEEDLE LOC AT 1200);   Surgeon: Zonia Garcia MD;  [facility];  Service: Surgical Oncology    TOTAL ABDOMINAL HYSTERECTOMY W/ BILATERAL SALPINGOOPHORECTOMY      US GUIDED BREAST BIOPSY LEFT COMPLETE Left 2019    US GUIDED BREAST BIOPSY RIGHT COMPLETE Right 2018    WISDOM TOOTH EXTRACTION         Social History   Social History     Substance and Sexual Activity   Alcohol Use Yes    Frequency: Monthly or less    Drinks per session: 1 or 2    Comment: rarely     Social History     Substance and Sexual Activity   Drug Use No     Social History     Tobacco Use   Smoking Status Former Smoker    Last attempt to quit: 2009    Years since quittin 2   Smokeless Tobacco Never Used         Meds/Allergies     Current Outpatient Medications:     albuterol (PROVENTIL HFA,VENTOLIN HFA) 90 mcg/act inhaler, Inhale 2 puffs as needed for wheezing, Disp: 1 Inhaler, Rfl: 0    alendronate (FOSAMAX) 70 mg tablet, TAKE 1 TABLET (70 MG TOTAL) BY MOUTH EVERY 7 DAYS, Disp: 4 tablet, Rfl: 11    amLODIPine (NORVASC) 10 mg tablet, TAKE 1 TABLET (10 MG TOTAL) BY MOUTH DAILY, Disp: 90 tablet, Rfl: 0    anastrozole (ARIMIDEX) 1 mg tablet, Take 1 tablet (1 mg total) by mouth daily, Disp: 90 tablet, Rfl: 1    atorvastatin (LIPITOR) 40 mg tablet, TAKE 1 TABLET (40 MG TOTAL) BY MOUTH DAILY AT BEDTIME, Disp: 90 tablet, Rfl: 1    Calcium Carbonate (CALTRATE 600) 1500 (600 Ca) MG TABS, Take 1 tablet by mouth 2 (two) times a day, Disp: , Rfl:     Elastic Bandages & Supports (MEDICAL COMPRESSION STOCKINGS) MISC, Use daily as directed, 3 pair total - LEFT side: 20-30mmhg large honey- color thigh-high; RIGHT side: 20-30mmhg medium honey- colored, thigh high, Disp: 3 each, Rfl: 0    inFLIXimab (REMICADE) 100 mg, Infuse into a venous catheter  , Disp: , Rfl:     mupirocin (BACTROBAN) 2 % ointment, Apply topically 3 (three) times a day, Disp: 22 g, Rfl: 0    ofloxacin (OCUFLOX) 0 3 % ophthalmic solution, Administer 2 drops to the right eye 4 (four) times a day, Disp: 5 mL, Rfl: 2    clobetasol (TEMOVATE) 0 05 % ointment, Apply to the affected area 1-2 times a week, Disp: 30 g, Rfl: 2    hydrochlorothiazide (MICROZIDE) 12 5 mg capsule, TAKE 1 CAPSULE (12 5 MG TOTAL) BY MOUTH EVERY MORNING, Disp: 30 capsule, Rfl: 5  Allergies   Allergen Reactions    Lisinopril      cough    Losartan      Numbness on right side of body    Boniva [Ibandronic Acid] Headache     Review of Systems   Constitutional: Negative  HENT: Negative  Gets sores in nose   Eyes: Positive for discharge (Intermittent right eye) and visual disturbance (R eye)  Respiratory: Negative  Cardiovascular: Positive for leg swelling (left leg lymphedema)  Gastrointestinal:        History of Chrons   Endocrine: Negative  Intermittent  Hot flashes   Genitourinary: Negative  Musculoskeletal: Positive for arthralgias (related to chrons)  Skin: Positive for rash (Scalp)  Allergic/Immunologic: Negative  Neurological: Positive for numbness (Right hand)  Hematological: Negative  Psychiatric/Behavioral: Positive for sleep disturbance (Trouble falling asleep)  OBJECTIVE:   /70 (BP Location: Left arm, Patient Position: Sitting, Cuff Size: Standard)   Pulse 69   Temp 97 6 °F (36 4 °C) (Temporal)   Resp 16   Ht 5' 1" (1 549 m)   Wt 54 8 kg (120 lb 12 8 oz)   BMI 22 82 kg/m²   Pain Assessment:  0  ECOG/Zubrod/WHO: 0 - Asymptomatic    Physical Exam   Constitutional: She is oriented to person, place, and time  She appears well-developed and well-nourished  No distress  HENT:   Head: Normocephalic and atraumatic  Mouth/Throat: No oropharyngeal exudate     Eyes: Pupils are equal, round, and reactive to light  Conjunctivae and EOM are normal  No scleral icterus  Neck: Normal range of motion  Neck supple  No tracheal deviation present  No thyromegaly present  Cardiovascular: Normal rate, regular rhythm and normal heart sounds  Pulmonary/Chest: Effort normal and breath sounds normal  No respiratory distress  She has no wheezes  She has no rales  She exhibits no tenderness  Right breast exhibits no inverted nipple, no mass, no nipple discharge, no skin change ( There is a well-healed right lumpectomy incision with underlying post treatment changes and no masses  Delilah Loudon is no skin erythema and no significant edema ) and no tenderness  Left breast exhibits no inverted nipple, no mass, no nipple discharge and no skin change ( There are well-healed left lumpectomy and axillary incisions with no masses  Delilah Loudon is minimal erythema and post treatment changes without any edema   )    Abdominal: Soft  Bowel sounds are normal  She exhibits no distension and no mass  There is no tenderness  Musculoskeletal: Normal range of motion  She exhibits no edema or tenderness  Lymphadenopathy:     She has no cervical adenopathy      She has no axillary adenopathy         Right: No supraclavicular adenopathy present         Left: No supraclavicular adenopathy present  Neurological: She is alert and oriented to person, place, and time  No cranial nerve deficit  Coordination normal    Skin: Skin is warm and dry  No rash noted  She is not diaphoretic  No erythema  No pallor  Psychiatric: She has a normal mood and affect  Her behavior is normal  Judgment and thought content normal    Nursing note and vitals reviewed  RESULTS    Lab Results: No results found for this or any previous visit (from the past 672 hour(s))  Imaging Studies:Mammo Diagnostic Bilateral W 3d & Cad    Result Date: 6/15/2020  Narrative: DIAGNOSIS: Malignant neoplasm of central portion of left breast in female, estrogen receptor positive ([facility] [address] );  History of ductal carcinoma in situ (DCIS) of breast TECHNIQUE: Digital screening mammography was performed  Computer Aided Detection (CAD) analyzed all applicable images  COMPARISONS: Prior breast imaging dated: 06/14/2019, 08/10/2018, 12/05/2017, 11/29/2016, and 11/24/2014 RELEVANT HISTORY: Family Breast Cancer History: History of breast cancer in Paternal Grandmother, Maternal Aunt  Family Medical History: Family medical history includes breast cancer in maternal aunt, breast cancer in paternal grandmother, and ovarian cancer in sister  Personal History: Hormone history includes other  Surgical history includes lumpectomy, hysterectomy, and oophorectomy  Medical history includes breast cancer, ovarian cancer, BRCA 1 positive, BRCA 2 positive, and history of chemotherapy  RISK ASSESSMENT: 5 Year Tyrer-Cuzick: No Score 10 Year Tyrer-Cuzick: No Score Lifetime Tyrer-Cuzick: No Score TISSUE DENSITY: The breasts are heterogeneously dense, which may obscure small masses  INDICATION: Kayleigh Alvarez is a 60 y o  female presenting for annual  FINDINGS: Bilateral There are relatively mild bilateral postoperative changes seen at the sites of prior bilateral lumpectomies  There are no malignant microcalcification clusters identified  There are no mass lesions visible  No other areas of architectural distortion other than the surgical sites  Skin and nipple profiles are within normal limits  Impression:  Expected postoperative changes  No suspicious abnormality visible  Given patient's history and risk profile and breast density, yearly MRI is recommended  She will be due for MRI in near future if yearly MRI surveillance is pursued  ASSESSMENT/BI-RADS CATEGORY:  Overall: 2 - Benign RECOMMENDATION:      - Diagnostic mammogram in 1 year of the breast(s)  - MRI for both breasts  Workstation ID: [id number]    Assessment/Plan:  No orders of the defined types were placed in this encounter         Kayleigh Alvarez is a 60 y o  year old female with a stage 0 right breast ductal carcinoma in situ status post lumpectomy with negative margins with her tumor measuring 1 4 cm, grade 2 and estrogen and progesterone receptors were both negative   She has a history of a stage I ovarian carcinoma treated in 2009 and remains EDER  Her genetic testing was positive for pathologic variant of uncertain significance   She completed radiation therapy to the right breast on November 28, 2018 and remains EDER        She was diagnosed with stage IA invasive left breast mammary carcinoma grade 1 measuring 3 mm in size status post lumpectomy with negative margins and negative sentinel lymph node in August 2019  Matias Horns disease was estrogen receptor positive with progesterone receptors being negative   In order to complete her breast conservation management, we recommended radiation therapy to the entire left breast    She completed treatment on November 5, 2019 and returns today for follow-up  She is doing well and denies any breast masses bilaterally  She will continue to apply Remedy cream and massage the bilateral breasts daily  She will be started on anastrozole in April 2020 and has some very mild hot flashes  She is scheduled for bilateral diagnostic mammogram Christianne 15, 2020 then will follow-up with Surgical Oncology  She will return here for follow-up examination 6 months  Ruchi Bourgeois MD  6/2/2020,9:35 AM    Portions of the record may have been created with voice recognition software   Occasional wrong word or "sound a like" substitutions may have occurred due to the inherent limitations of voice recognition software   Read the chart carefully and recognize, using context, where substitutions have occurred

## 2020-07-14 DIAGNOSIS — I10 ESSENTIAL HYPERTENSION: ICD-10-CM

## 2020-07-14 RX ORDER — AMLODIPINE BESYLATE 10 MG/1
10 TABLET ORAL DAILY
Qty: 90 TABLET | Refills: 0 | Status: SHIPPED | OUTPATIENT
Start: 2020-07-14 | End: 2020-10-26

## 2020-09-11 DIAGNOSIS — C50.112 MALIGNANT NEOPLASM OF CENTRAL PORTION OF LEFT BREAST IN FEMALE, ESTROGEN RECEPTOR POSITIVE (HCC): ICD-10-CM

## 2020-09-11 DIAGNOSIS — Z17.0 MALIGNANT NEOPLASM OF CENTRAL PORTION OF LEFT BREAST IN FEMALE, ESTROGEN RECEPTOR POSITIVE (HCC): ICD-10-CM

## 2020-09-11 RX ORDER — ANASTROZOLE 1 MG/1
1 TABLET ORAL DAILY
Qty: 90 TABLET | Refills: 1 | Status: SHIPPED | OUTPATIENT
Start: 2020-09-11 | End: 2021-03-25

## 2020-10-07 ENCOUNTER — TELEPHONE (OUTPATIENT)
Dept: FAMILY MEDICINE CLINIC | Facility: CLINIC | Age: 65
End: 2020-10-07

## 2020-10-07 ENCOUNTER — TELEPHONE (OUTPATIENT)
Dept: HEMATOLOGY ONCOLOGY | Facility: CLINIC | Age: 65
End: 2020-10-07

## 2020-10-07 DIAGNOSIS — M81.0 AGE-RELATED OSTEOPOROSIS WITHOUT CURRENT PATHOLOGICAL FRACTURE: Primary | ICD-10-CM

## 2020-10-07 DIAGNOSIS — Z78.0 MENOPAUSE: ICD-10-CM

## 2020-10-07 DIAGNOSIS — M81.8 OTHER OSTEOPOROSIS WITHOUT CURRENT PATHOLOGICAL FRACTURE: ICD-10-CM

## 2020-10-07 RX ORDER — ALENDRONATE SODIUM 70 MG/1
70 TABLET ORAL
Qty: 4 TABLET | Refills: 11 | Status: SHIPPED | OUTPATIENT
Start: 2020-10-07 | End: 2021-10-13

## 2020-10-13 ENCOUNTER — OFFICE VISIT (OUTPATIENT)
Dept: HEMATOLOGY ONCOLOGY | Facility: CLINIC | Age: 65
End: 2020-10-13
Payer: COMMERCIAL

## 2020-10-13 VITALS
OXYGEN SATURATION: 98 % | WEIGHT: 120 LBS | BODY MASS INDEX: 22.66 KG/M2 | HEIGHT: 61 IN | TEMPERATURE: 97 F | DIASTOLIC BLOOD PRESSURE: 68 MMHG | HEART RATE: 71 BPM | RESPIRATION RATE: 18 BRPM | SYSTOLIC BLOOD PRESSURE: 124 MMHG

## 2020-10-13 DIAGNOSIS — C50.112 MALIGNANT NEOPLASM OF CENTRAL PORTION OF LEFT BREAST IN FEMALE, ESTROGEN RECEPTOR POSITIVE (HCC): Primary | ICD-10-CM

## 2020-10-13 DIAGNOSIS — Z17.0 MALIGNANT NEOPLASM OF CENTRAL PORTION OF LEFT BREAST IN FEMALE, ESTROGEN RECEPTOR POSITIVE (HCC): Primary | ICD-10-CM

## 2020-10-13 PROCEDURE — 99214 OFFICE O/P EST MOD 30 MIN: CPT | Performed by: INTERNAL MEDICINE

## 2020-10-13 PROCEDURE — 3078F DIAST BP <80 MM HG: CPT | Performed by: INTERNAL MEDICINE

## 2020-10-13 PROCEDURE — 3074F SYST BP LT 130 MM HG: CPT | Performed by: INTERNAL MEDICINE

## 2020-10-24 DIAGNOSIS — I10 ESSENTIAL HYPERTENSION: ICD-10-CM

## 2020-10-26 RX ORDER — AMLODIPINE BESYLATE 10 MG/1
10 TABLET ORAL DAILY
Qty: 30 TABLET | Refills: 0 | Status: SHIPPED | OUTPATIENT
Start: 2020-10-26 | End: 2021-02-19

## 2020-11-24 ENCOUNTER — IMMUNIZATIONS (OUTPATIENT)
Dept: FAMILY MEDICINE CLINIC | Facility: CLINIC | Age: 65
End: 2020-11-24
Payer: COMMERCIAL

## 2020-11-24 DIAGNOSIS — Z23 NEED FOR VACCINATION: Primary | ICD-10-CM

## 2020-11-24 PROCEDURE — 90682 RIV4 VACC RECOMBINANT DNA IM: CPT

## 2020-11-24 PROCEDURE — 90471 IMMUNIZATION ADMIN: CPT

## 2020-12-08 ENCOUNTER — TELEMEDICINE (OUTPATIENT)
Dept: RADIATION ONCOLOGY | Facility: HOSPITAL | Age: 65
End: 2020-12-08
Attending: RADIOLOGY

## 2020-12-08 DIAGNOSIS — Z79.811 USE OF ANASTROZOLE: ICD-10-CM

## 2020-12-08 DIAGNOSIS — Z17.0 MALIGNANT NEOPLASM OF CENTRAL PORTION OF LEFT BREAST IN FEMALE, ESTROGEN RECEPTOR POSITIVE (HCC): Primary | ICD-10-CM

## 2020-12-08 DIAGNOSIS — C50.112 MALIGNANT NEOPLASM OF CENTRAL PORTION OF LEFT BREAST IN FEMALE, ESTROGEN RECEPTOR POSITIVE (HCC): Primary | ICD-10-CM

## 2020-12-08 DIAGNOSIS — Z85.43 HISTORY OF OVARIAN CANCER: ICD-10-CM

## 2020-12-08 DIAGNOSIS — Z86.000 HISTORY OF DUCTAL CARCINOMA IN SITU (DCIS) OF BREAST: ICD-10-CM

## 2020-12-10 ENCOUNTER — TELEMEDICINE (OUTPATIENT)
Dept: FAMILY MEDICINE CLINIC | Facility: CLINIC | Age: 65
End: 2020-12-10
Payer: COMMERCIAL

## 2020-12-10 VITALS — WEIGHT: 120 LBS | HEIGHT: 61 IN | BODY MASS INDEX: 22.66 KG/M2

## 2020-12-10 DIAGNOSIS — B34.9 VIRAL INFECTION, UNSPECIFIED: Primary | ICD-10-CM

## 2020-12-10 DIAGNOSIS — B34.9 VIRAL INFECTION, UNSPECIFIED: ICD-10-CM

## 2020-12-10 PROCEDURE — 1036F TOBACCO NON-USER: CPT | Performed by: FAMILY MEDICINE

## 2020-12-10 PROCEDURE — U0003 INFECTIOUS AGENT DETECTION BY NUCLEIC ACID (DNA OR RNA); SEVERE ACUTE RESPIRATORY SYNDROME CORONAVIRUS 2 (SARS-COV-2) (CORONAVIRUS DISEASE [COVID-19]), AMPLIFIED PROBE TECHNIQUE, MAKING USE OF HIGH THROUGHPUT TECHNOLOGIES AS DESCRIBED BY CMS-2020-01-R: HCPCS | Performed by: FAMILY MEDICINE

## 2020-12-10 PROCEDURE — 3008F BODY MASS INDEX DOCD: CPT | Performed by: FAMILY MEDICINE

## 2020-12-10 PROCEDURE — 99213 OFFICE O/P EST LOW 20 MIN: CPT | Performed by: FAMILY MEDICINE

## 2020-12-11 ENCOUNTER — TELEPHONE (OUTPATIENT)
Dept: FAMILY MEDICINE CLINIC | Facility: CLINIC | Age: 65
End: 2020-12-11

## 2020-12-11 DIAGNOSIS — J01.00 ACUTE NON-RECURRENT MAXILLARY SINUSITIS: Primary | ICD-10-CM

## 2020-12-11 LAB — SARS-COV-2 RNA SPEC QL NAA+PROBE: NOT DETECTED

## 2020-12-11 RX ORDER — CEFUROXIME AXETIL 250 MG/1
250 TABLET ORAL EVERY 12 HOURS SCHEDULED
Qty: 20 TABLET | Refills: 0 | Status: SHIPPED | OUTPATIENT
Start: 2020-12-11 | End: 2020-12-21

## 2020-12-22 DIAGNOSIS — L03.90 CELLULITIS, UNSPECIFIED CELLULITIS SITE: ICD-10-CM

## 2020-12-22 DIAGNOSIS — J45.20 MILD INTERMITTENT REACTIVE AIRWAY DISEASE WITHOUT COMPLICATION: ICD-10-CM

## 2020-12-22 RX ORDER — ALBUTEROL SULFATE 90 UG/1
2 AEROSOL, METERED RESPIRATORY (INHALATION) AS NEEDED
Qty: 1 INHALER | Refills: 0 | Status: SHIPPED | OUTPATIENT
Start: 2020-12-22 | End: 2021-12-23 | Stop reason: SDUPTHER

## 2020-12-23 ENCOUNTER — HOSPITAL ENCOUNTER (OUTPATIENT)
Dept: MRI IMAGING | Facility: HOSPITAL | Age: 65
Discharge: HOME | End: 2020-12-23
Payer: COMMERCIAL

## 2020-12-23 DIAGNOSIS — Z15.01 MONOALLELIC MUTATION OF ATM GENE: ICD-10-CM

## 2020-12-23 DIAGNOSIS — Z15.09 MONOALLELIC MUTATION OF ATM GENE: ICD-10-CM

## 2020-12-23 DIAGNOSIS — C50.112 MALIGNANT NEOPLASM OF CENTRAL PORTION OF LEFT BREAST IN FEMALE, ESTROGEN RECEPTOR POSITIVE (HCC): ICD-10-CM

## 2020-12-23 DIAGNOSIS — I10 ESSENTIAL HYPERTENSION: ICD-10-CM

## 2020-12-23 DIAGNOSIS — Z17.0 MALIGNANT NEOPLASM OF CENTRAL PORTION OF LEFT BREAST IN FEMALE, ESTROGEN RECEPTOR POSITIVE (HCC): ICD-10-CM

## 2020-12-23 DIAGNOSIS — Z15.89 MONOALLELIC MUTATION OF ATM GENE: ICD-10-CM

## 2020-12-23 DIAGNOSIS — Z86.000 HISTORY OF DUCTAL CARCINOMA IN SITU (DCIS) OF BREAST: ICD-10-CM

## 2020-12-23 PROCEDURE — A9585 GADOBUTROL INJECTION: HCPCS | Performed by: NURSE PRACTITIONER

## 2020-12-23 PROCEDURE — C8908 MRI W/O FOL W/CONT, BREAST,: HCPCS

## 2020-12-23 PROCEDURE — 77049 MRI BREAST C-+ W/CAD BI: CPT

## 2020-12-23 RX ORDER — HYDROCHLOROTHIAZIDE 12.5 MG/1
12.5 CAPSULE, GELATIN COATED ORAL EVERY MORNING
Qty: 30 CAPSULE | Refills: 5 | Status: SHIPPED | OUTPATIENT
Start: 2020-12-23 | End: 2021-06-21

## 2020-12-23 RX ADMIN — GADOBUTROL 5 ML: 604.72 INJECTION INTRAVENOUS at 17:32

## 2020-12-29 DIAGNOSIS — E78.49 OTHER HYPERLIPIDEMIA: ICD-10-CM

## 2020-12-29 RX ORDER — ATORVASTATIN CALCIUM 40 MG/1
40 TABLET, FILM COATED ORAL
Qty: 90 TABLET | Refills: 1 | Status: SHIPPED | OUTPATIENT
Start: 2020-12-29 | End: 2021-06-21

## 2021-01-20 ENCOUNTER — TELEPHONE (OUTPATIENT)
Dept: FAMILY MEDICINE CLINIC | Facility: CLINIC | Age: 66
End: 2021-01-20

## 2021-01-20 ENCOUNTER — HOSPITAL ENCOUNTER (OUTPATIENT)
Dept: NON INVASIVE DIAGNOSTICS | Age: 66
Discharge: HOME | End: 2021-01-20
Payer: COMMERCIAL

## 2021-01-20 ENCOUNTER — OFFICE VISIT (OUTPATIENT)
Dept: FAMILY MEDICINE CLINIC | Facility: CLINIC | Age: 66
End: 2021-01-20
Payer: COMMERCIAL

## 2021-01-20 VITALS
SYSTOLIC BLOOD PRESSURE: 130 MMHG | BODY MASS INDEX: 23.98 KG/M2 | TEMPERATURE: 97.4 F | HEIGHT: 61 IN | HEART RATE: 78 BPM | WEIGHT: 127 LBS | DIASTOLIC BLOOD PRESSURE: 80 MMHG | OXYGEN SATURATION: 98 %

## 2021-01-20 DIAGNOSIS — M79.661 RIGHT CALF PAIN: ICD-10-CM

## 2021-01-20 DIAGNOSIS — I10 ESSENTIAL HYPERTENSION: ICD-10-CM

## 2021-01-20 DIAGNOSIS — M79.661 RIGHT CALF PAIN: Primary | ICD-10-CM

## 2021-01-20 PROCEDURE — 93971 EXTREMITY STUDY: CPT

## 2021-01-20 PROCEDURE — 99213 OFFICE O/P EST LOW 20 MIN: CPT | Performed by: FAMILY MEDICINE

## 2021-01-20 PROCEDURE — 93971 EXTREMITY STUDY: CPT | Performed by: INTERNAL MEDICINE

## 2021-01-20 RX ORDER — CYCLOBENZAPRINE HCL 10 MG
10 TABLET ORAL
Qty: 20 TABLET | Refills: 0 | Status: SHIPPED | OUTPATIENT
Start: 2021-01-20 | End: 2021-09-01 | Stop reason: ALTCHOICE

## 2021-01-20 NOTE — PROGRESS NOTES
Assessment/Plan:      Diagnoses and all orders for this visit:    Right calf pain  -     Cancel: VAS lower limb venous duplex study, unilateral/limited; Future  -     cyclobenzaprine (FLEXERIL) 10 mg tablet; Take 1 tablet (10 mg total) by mouth daily at bedtime  -     VAS lower limb venous duplex study, unilateral/limited; Future    Essential hypertension          Subjective:  Chief Complaint   Patient presents with    Leg Pain     pt is having R Leg pain noticed december, pt states when she goes down stairs it bother her a lot  at night time her leg and foot get very swelling, pt states since december is getting worse  pt states since 2 days ago started taking tylenol for the pain  Patient ID: Nida Suero is a 63 y o  female  Pt complains of right leg pain for a few months  Had an illness in December, 12/23  Hurt right leg to walk after walking a puppy that was pulling on his leash  Pain resolved in the week after not walking the dog and once walked dog again, right leg pain recurred  Complains of  Pain From right  knee to lower leg- in calf  Increased pain with going down stairs  Not giving out or locking up  No swelling  Taking tylenol with some relief  Review of Systems   Constitutional: Negative  Negative for fatigue and fever  HENT: Negative  Eyes: Negative  Respiratory: Negative for cough and shortness of breath  Cardiovascular: Negative  Gastrointestinal: Negative  Endocrine: Negative  Genitourinary: Negative  Musculoskeletal: Positive for myalgias  Right calf pain   Skin: Negative  Allergic/Immunologic: Negative  Neurological: Negative  Psychiatric/Behavioral: Negative            The following portions of the patient's history were reviewed and updated as appropriate: allergies, current medications, past family history, past medical history, past social history, past surgical history and problem list     Objective:  Vitals:    01/20/21 1301 BP: 130/80   Pulse: 78   Temp: (!) 97 4 °F (36 3 °C)   SpO2: 98%   Weight: 57 6 kg (127 lb)   Height: 5' 1" (1 549 m)      Physical Exam  Vitals signs and nursing note reviewed  Constitutional:       Appearance: She is well-developed  HENT:      Head: Normocephalic and atraumatic  Cardiovascular:      Rate and Rhythm: Normal rate and regular rhythm  Heart sounds: Normal heart sounds  Pulmonary:      Effort: Pulmonary effort is normal       Breath sounds: Normal breath sounds  Abdominal:      General: Bowel sounds are normal       Palpations: Abdomen is soft  Musculoskeletal:         General: Tenderness present  Comments: Right calf tenderness, positive homans, negative eileen   Mild tenderness medial collateral ligament  No effusion  Skin:     General: Skin is warm and dry  Neurological:      Mental Status: She is alert and oriented to person, place, and time  Psychiatric:         Behavior: Behavior normal          Thought Content:  Thought content normal          Judgment: Judgment normal

## 2021-01-21 PROBLEM — Z00.00 WELL ADULT EXAM: Status: RESOLVED | Noted: 2019-05-13 | Resolved: 2021-01-21

## 2021-01-21 PROBLEM — M79.661 RIGHT CALF PAIN: Status: ACTIVE | Noted: 2021-01-21

## 2021-01-21 NOTE — ASSESSMENT & PLAN NOTE
Pt at high risk for clotting due to malignancies, r/o dvt  If negative with ultrasound, then cyclobenzaprine, tylenol and rest and observe for improvement  Resending.  Please sign referral, then close encounter.     Thank you,  Elodia MENG RN,BSN

## 2021-01-22 NOTE — TELEPHONE ENCOUNTER
PT INFORMED  PT STATES THE MUSCLE RELAXER IS NOT DOING ANYTHING  EVEN THE SMALLEST AMOUNT OF WALKING IS PAINFUL

## 2021-01-22 NOTE — TELEPHONE ENCOUNTER
Francesca Chua, make sure she is aware that there is no clot  And she can take the muscle relaxer and avoid stretching and let us know if the symptoms are ongoing

## 2021-01-26 ENCOUNTER — TELEPHONE (OUTPATIENT)
Dept: FAMILY MEDICINE CLINIC | Facility: CLINIC | Age: 66
End: 2021-01-26

## 2021-01-26 NOTE — TELEPHONE ENCOUNTER
Pt states she isn't getting better with the muscle relaxer's and she says there was like a cracking sound today in the morning  Can't bend her knee  What are your suggestions?

## 2021-01-26 NOTE — TELEPHONE ENCOUNTER
If the cracking sound is more in the knee than the calf and she cannot bend her knee, would schedule an evaluation with ortho  [phone] Figures office

## 2021-01-27 ENCOUNTER — OFFICE VISIT (OUTPATIENT)
Dept: OBGYN CLINIC | Facility: CLINIC | Age: 66
End: 2021-01-27
Payer: COMMERCIAL

## 2021-01-27 ENCOUNTER — APPOINTMENT (OUTPATIENT)
Dept: RADIOLOGY | Facility: CLINIC | Age: 66
End: 2021-01-27
Payer: COMMERCIAL

## 2021-01-27 VITALS
HEIGHT: 61 IN | WEIGHT: 124 LBS | BODY MASS INDEX: 23.41 KG/M2 | SYSTOLIC BLOOD PRESSURE: 138 MMHG | TEMPERATURE: 98.6 F | DIASTOLIC BLOOD PRESSURE: 80 MMHG

## 2021-01-27 DIAGNOSIS — M17.11 PRIMARY OSTEOARTHRITIS OF RIGHT KNEE: Primary | ICD-10-CM

## 2021-01-27 DIAGNOSIS — M25.561 RIGHT KNEE PAIN, UNSPECIFIED CHRONICITY: ICD-10-CM

## 2021-01-27 DIAGNOSIS — M25.461 EFFUSION OF RIGHT KNEE: ICD-10-CM

## 2021-01-27 PROCEDURE — 3079F DIAST BP 80-89 MM HG: CPT | Performed by: ORTHOPAEDIC SURGERY

## 2021-01-27 PROCEDURE — 3008F BODY MASS INDEX DOCD: CPT | Performed by: ORTHOPAEDIC SURGERY

## 2021-01-27 PROCEDURE — 3075F SYST BP GE 130 - 139MM HG: CPT | Performed by: ORTHOPAEDIC SURGERY

## 2021-01-27 PROCEDURE — 1036F TOBACCO NON-USER: CPT | Performed by: ORTHOPAEDIC SURGERY

## 2021-01-27 PROCEDURE — 73564 X-RAY EXAM KNEE 4 OR MORE: CPT

## 2021-01-27 PROCEDURE — 99203 OFFICE O/P NEW LOW 30 MIN: CPT | Performed by: ORTHOPAEDIC SURGERY

## 2021-01-27 PROCEDURE — 20610 DRAIN/INJ JOINT/BURSA W/O US: CPT | Performed by: ORTHOPAEDIC SURGERY

## 2021-01-27 RX ORDER — BETAMETHASONE SODIUM PHOSPHATE AND BETAMETHASONE ACETATE 3; 3 MG/ML; MG/ML
6 INJECTION, SUSPENSION INTRA-ARTICULAR; INTRALESIONAL; INTRAMUSCULAR; SOFT TISSUE
Status: COMPLETED | OUTPATIENT
Start: 2021-01-27 | End: 2021-01-27

## 2021-01-27 RX ORDER — LIDOCAINE HYDROCHLORIDE 10 MG/ML
7 INJECTION, SOLUTION EPIDURAL; INFILTRATION; INTRACAUDAL; PERINEURAL
Status: COMPLETED | OUTPATIENT
Start: 2021-01-27 | End: 2021-01-27

## 2021-01-27 RX ADMIN — LIDOCAINE HYDROCHLORIDE 7 ML: 10 INJECTION, SOLUTION EPIDURAL; INFILTRATION; INTRACAUDAL; PERINEURAL at 16:03

## 2021-01-27 RX ADMIN — BETAMETHASONE SODIUM PHOSPHATE AND BETAMETHASONE ACETATE 6 MG: 3; 3 INJECTION, SUSPENSION INTRA-ARTICULAR; INTRALESIONAL; INTRAMUSCULAR; SOFT TISSUE at 14:39

## 2021-01-27 NOTE — PROGRESS NOTES
Assessment:     1  Primary osteoarthritis of right knee    2  Effusion of right knee    3  Right knee pain, unspecified chronicity          Plan:     Problem List Items Addressed This Visit        Musculoskeletal and Integument    Primary osteoarthritis of right knee - Primary    Relevant Medications    lidocaine (PF) (XYLOCAINE-MPF) 1 % injection 7 mL (Completed)    betamethasone acetate-betamethasone sodium phosphate (CELESTONE) injection 6 mg (Completed)    Other Relevant Orders    Large joint arthrocentesis: R knee (Completed)    Effusion of right knee      Other Visit Diagnoses     Right knee pain, unspecified chronicity        Relevant Medications    lidocaine (PF) (XYLOCAINE-MPF) 1 % injection 7 mL (Completed)    betamethasone acetate-betamethasone sodium phosphate (CELESTONE) injection 6 mg (Completed)    Other Relevant Orders    XR knee 4+ vw right injury    Large joint arthrocentesis: R knee (Completed)          Findings consistent with right knee osteoarthritis with effusion  Discussed findings and treatment options with the patient  I reviewed patient's right knee x-ray with her  I discussed prognosis of her knee condition  I provided patient aspiration and cortisone injection in the right knee, which patient tolerated well with good pain relief  I advised patient to use cold compress over the right knee today  Encouraged patient to do low-impact exercises with stationary bike  She should avoid high impact, repetitive squatting, kneeling, and climbing activities  Patient cannot take NSAID  I advised patient to use Voltaren gel or Aspercreme over the right knee  I will see patient back in 2-3 months or as needed  All patient's questions were answered to her satisfaction  This note is created using dictation transcription  It may contain typographical errors, grammatical errors, improperly dictated words, background noise and other errors  Patient ID: Nemo Lopez is a 63 y o  female  Chief Complaint:  Right knee pain     HPI:  This is a 79-year-old female who presents today for right knee pain  She was referred by her PCP Dr Keli Vanegas  She states 12/2020 she hurt right leg to walk after walking a puppy that was pulling on his leash  Pain resolved in the week after not walking the dog and once walked dog again down a hill , right leg pain recurred  She felt a pop and snap at that time  She was seen by her PCP and was prescribed Flexeril for right calf pain and also was ordered ultrasound to rule out DVT which as negative  She states she is having pain over medial, lateral and posterior aspect of of the right knee  She denies any instability or locking  She feels her flexion is limited  Pain is worse walking, twisting, and going down steps  Patient has been taking Tylenol, turmeric, and Arnicar for pain  She also has been taking Flexeril for muscle cramping  Patient has not tried bracing  Patient does have history of Crohn's disease and breast cancer  Patient also has history of left leg lymphedema        Allergy:  Allergies   Allergen Reactions    Lisinopril      cough    Losartan      Numbness on right side of body    Boniva [Ibandronic Acid] Headache       Medications:  all current active meds have been reviewed    Past Medical History:  Past Medical History:   Diagnosis Date    BRCA1 positive     BRCA2 positive     Breast cancer ([facility] ) 09/11/2018    Right    Breast cancer ([facility] ) 08/13/2019    Left    [facility]     Dense breast     History of chemotherapy     for ovarian cancer    History of radiation therapy     History of transfusion 2009    Hyperlipidemia     Hypertension     Lymphedema     left leg    Lymphedema     Ovarian cancer ([facility] ) 04/21/2009     Past Surgical History:  Past Surgical History:   Procedure Laterality Date    BREAST LUMPECTOMY Right 09/11/2018    BREAST LUMPECTOMY Left 8/13/2019    Procedure: BREAST LUMPECTOMY; BREAST NEEDLE LOCALIZATION (NEEDLE LOC AT 0800); Surgeon: Brittaney Collins MD;  [facility];  Service: Surgical Oncology    BREAST LUMPECTOMY W/ NEEDLE LOCALIZATION Left 08/13/2019    COLONOSCOPY      EXPLORATORY LAPAROTOMY      HYSTERECTOMY      LYMPH NODE BIOPSY Left 8/13/2019    Procedure: SENTINEL LYMPH NODE BIOPSY; LYMPHATIC MAPPING WITH BLUE DYE AND RADIOACTIVE DYE (INJECT AT 0930 BY DR JENNINGS IN THE OR); Surgeon: Brittaney Collins MD;  [facility];  Service: Surgical Oncology    MAMMO NEEDLE LOCALIZATION LEFT (ALL INC) Left 8/13/2019    MAMMO NEEDLE LOCALIZATION RIGHT (ALL INC) Right 9/11/2018    MRI BREAST BIOPSY LEFT (ALL INCLUSIVE) Left 7/18/2019    OMENTECTOMY      WA PERQ DEVICE PLACEMT BREAST LOC 1ST LES W GUIDNCE Right 9/11/2018    Procedure: BREAST LUMPECTOMY; BREAST NEEDLE LOCALIZATION (NEEDLE LOC AT 1200);   Surgeon: Brittaney Collins MD;  [facility];  Service: Surgical Oncology    TOTAL ABDOMINAL HYSTERECTOMY W/ BILATERAL SALPINGOOPHORECTOMY      US GUIDED BREAST BIOPSY LEFT COMPLETE Left 6/17/2019    US GUIDED BREAST BIOPSY RIGHT COMPLETE Right 8/1/2018    WISDOM TOOTH EXTRACTION       Family History:  Family History   Problem Relation Age of Onset    Prostate cancer Father     Alzheimer's disease Father     Hypertension Father     Ovarian cancer Sister     Breast cancer Paternal Grandmother     Hypertension Mother     Heart disease Mother     Breast cancer Maternal Aunt     No Known Problems Sister     Other Sister         pacemaker    Hypertension Sister     Heart disease Sister     No Known Problems Sister     Down syndrome Sister     Alzheimer's disease Sister     Hypertension Sister     No Known Problems Paternal Aunt     No Known Problems Maternal Aunt     Breast cancer Paternal Grandfather      Social History:  Social History     Substance and Sexual Activity   Alcohol Use Yes    Frequency: Monthly or less    Drinks per session: 1 or 2    Comment: rarely Social History     Substance and Sexual Activity   Drug Use No     Social History     Tobacco Use   Smoking Status Former Smoker    Quit date: 2009    Years since quittin 8   Smokeless Tobacco Never Used           ROS:  Review of Systems   Constitutional: Negative for chills, fever and unexpected weight change  HENT: Negative for hearing loss, nosebleeds and postnasal drip  Eyes: Negative for pain, redness and visual disturbance  Respiratory: Negative for cough, shortness of breath and wheezing  Cardiovascular: Negative for chest pain, palpitations and leg swelling  Gastrointestinal: Negative for abdominal pain, nausea and vomiting  Endocrine: Negative for polydipsia and polyuria  Genitourinary: Negative for dysuria  Musculoskeletal: Positive for arthralgias  Skin: Negative for rash and wound  Neurological: Negative for dizziness, numbness and headaches  Psychiatric/Behavioral: Negative for decreased concentration and suicidal ideas  The patient is not nervous/anxious  Objective:  BP Readings from Last 1 Encounters:   21 138/80      Wt Readings from Last 1 Encounters:   21 56 2 kg (124 lb)      BMI:   Estimated body mass index is 23 43 kg/m² as calculated from the following:    Height as of this encounter: 5' 1" (1 549 m)  Weight as of this encounter: 56 2 kg (124 lb)  EXAM:   Physical Exam  Vitals signs and nursing note reviewed  Constitutional:       Appearance: Normal appearance  She is well-developed  HENT:      Head: Normocephalic and atraumatic  Right Ear: External ear normal       Left Ear: External ear normal    Eyes:      Extraocular Movements: Extraocular movements intact  Conjunctiva/sclera: Conjunctivae normal    Neck:      Musculoskeletal: Neck supple  Pulmonary:      Effort: Pulmonary effort is normal    Musculoskeletal:      Right knee: She exhibits effusion (Grade 2)  Skin:     General: Skin is warm and dry  Neurological:      Mental Status: She is alert and oriented to person, place, and time  Deep Tendon Reflexes: Reflexes are normal and symmetric  Psychiatric:         Mood and Affect: Mood normal          Behavior: Behavior normal        Right Knee Exam     Tenderness   The patient is experiencing no tenderness  Range of Motion   Extension: normal   Flexion: normal     Tests   Megan:  Medial - negative Lateral - negative  Varus: negative Valgus: negative  Patellar apprehension: negative    Other   Erythema: absent  Scars: absent  Sensation: normal  Pulse: present  Swelling: none  Effusion: effusion (Grade 2) present    Comments:  Patellofemoral crepitus with ROM             Radiographs:  I have personally reviewed pertinent films in PACS and my interpretation is x-ray right knee demonstrates moderate osteoarthritis most pronounced in the patellofemoral joint  Subchondral cysts in the patella femoral compartment  No soft tissue calcification or marginal osteophyte  Large joint arthrocentesis: R knee  Universal Protocol:  Consent: Verbal consent obtained    Risks and benefits: risks, benefits and alternatives were discussed  Consent given by: patient  Patient understanding: patient states understanding of the procedure being performed  Site marked: the operative site was marked  Supporting Documentation  Indications: pain   Procedure Details  Location: knee - R knee  Preparation: Patient was prepped and draped in the usual sterile fashion  Needle size: 18 G  Ultrasound guidance: no  Approach: anteromedial  Medications administered: 6 mg betamethasone acetate-betamethasone sodium phosphate 6 (3-3) mg/mL; 7 mL lidocaine (PF) 1 %    Aspirate amount: 20 mL  Aspirate: yellow and clear    Patient tolerance: patient tolerated the procedure well with no immediate complications  Dressing:  Sterile dressing applied        Scribe Attestation    I,:  Ehsan Leiva am acting as a scribe while in the presence of the attending physician :       I,:  Sweta Moore MD personally performed the services described in this documentation    as scribed in my presence :

## 2021-02-01 ENCOUNTER — TELEPHONE (OUTPATIENT)
Dept: HEMATOLOGY ONCOLOGY | Facility: MEDICAL CENTER | Age: 66
End: 2021-02-01

## 2021-02-01 ENCOUNTER — TELEPHONE (OUTPATIENT)
Dept: SURGICAL ONCOLOGY | Facility: CLINIC | Age: 66
End: 2021-02-01

## 2021-02-01 NOTE — TELEPHONE ENCOUNTER
Patient called in returning - your call pertaining to 02/02/2021with  Braxton Oneal -  SHYAM good mike back [phone]

## 2021-02-04 ENCOUNTER — OFFICE VISIT (OUTPATIENT)
Dept: SURGICAL ONCOLOGY | Facility: CLINIC | Age: 66
End: 2021-02-04
Payer: COMMERCIAL

## 2021-02-04 VITALS
SYSTOLIC BLOOD PRESSURE: 140 MMHG | RESPIRATION RATE: 16 BRPM | TEMPERATURE: 97.3 F | DIASTOLIC BLOOD PRESSURE: 70 MMHG | BODY MASS INDEX: 23.98 KG/M2 | HEART RATE: 82 BPM | HEIGHT: 61 IN | WEIGHT: 127 LBS

## 2021-02-04 DIAGNOSIS — Z15.01 MONOALLELIC MUTATION OF ATM GENE: ICD-10-CM

## 2021-02-04 DIAGNOSIS — Z86.000 HISTORY OF DUCTAL CARCINOMA IN SITU (DCIS) OF BREAST: ICD-10-CM

## 2021-02-04 DIAGNOSIS — C50.112 MALIGNANT NEOPLASM OF CENTRAL PORTION OF LEFT BREAST IN FEMALE, ESTROGEN RECEPTOR POSITIVE (HCC): Primary | ICD-10-CM

## 2021-02-04 DIAGNOSIS — Z15.89 MONOALLELIC MUTATION OF ATM GENE: ICD-10-CM

## 2021-02-04 DIAGNOSIS — Z17.0 MALIGNANT NEOPLASM OF CENTRAL PORTION OF LEFT BREAST IN FEMALE, ESTROGEN RECEPTOR POSITIVE (HCC): Primary | ICD-10-CM

## 2021-02-04 DIAGNOSIS — Z79.811 USE OF ANASTROZOLE: ICD-10-CM

## 2021-02-04 DIAGNOSIS — Z15.09 MONOALLELIC MUTATION OF ATM GENE: ICD-10-CM

## 2021-02-04 PROCEDURE — 99214 OFFICE O/P EST MOD 30 MIN: CPT | Performed by: NURSE PRACTITIONER

## 2021-02-04 RX ORDER — TROLAMINE SALICYLATE 10 G/100G
CREAM TOPICAL AS NEEDED
COMMUNITY
End: 2022-05-17 | Stop reason: ALTCHOICE

## 2021-02-04 RX ORDER — PROPRANOLOL/HYDROCHLOROTHIAZID 40 MG-25MG
TABLET ORAL
COMMUNITY

## 2021-02-04 NOTE — PROGRESS NOTES
Surgical Oncology Follow Up       [address] [facility],[address] [facility]  [facility] [place]  [address] [facility] [address]    Anish Templeton  1955  [id number]  [address] [facility],[address] [place] [address]  [facility] [place]  [address] [facility] [address]    Chief Complaint   Patient presents with    Breast Cancer     Pt is here for 6 month f/u       Assessment/Plan:  1  Malignant neoplasm of central portion of left breast in female, estrogen receptor positive ([address] )  - 6 mo f/u visit  - Mammo diagnostic bilateral w 3d & cad; Future    2  History of ductal carcinoma in situ (DCIS) of breast  - Mammo diagnostic bilateral w 3d & cad; Future    3  Monoallelic mutation of YOLANDA gene  - Continue annual breast MRI, annual 3d mammography  - Will refer to oncology genetics for annual counseling visit  - Ambulatory Referral to Oncology Genetics; Future    4  Use of anastrozole  - Continue use per medical oncology       Discussion/Summary: Patient is a 62-year-old female with a personal history of ovarian cancer who presents today for a six-month follow-up visit for bilateral breast cancer  Reny Espinal was diagnosed with DCIS, ER/CA negative of the right breast in 2018  She underwent a right lumpectomy by Dr Claus Donohue and completed adjuvant radiation therapy   She had underwent genetic testing which revealed an YOLANDA genetic mutation and a VUS of the Rad51c gene   She was then diagnosed with left breast cancer in July of 2019  Pathology revealed invasive carcinoma, ER 90%, CA negative, her 2-   She underwent a lumpectomy and sentinel node biopsy   She completed radiation therapy  She had a bilateral 3D diagnostic mammogram on Christianne 15, 2020 which was BI-RADS 2, category 3 density  She had a bilateral breast MRI in December of 2020 which was BI-RADS 2  She offers no new complaints today aside from knee arthralgias    There are no concerns on today's exam   I will plan to see her back in 6 months after her mammogram   I am also referring her to oncology Genetics for routine annual surveillance given her YOLANDA mutation and VUS of Rad51c  She was instructed to call with any new concerns prior to her next visit  All of her questions were answered today  History of Present Illness:     Oncology History   History of ovarian cancer    Initial Diagnosis    Ovarian cancer ([facility] [address] )     4/21/2009 Surgery    Exploratory laparotomy, total abdominal hysterectomy, bilateral salpingo-oophrectomy, lymph node dissection, omentectomy with staging      - 7/8/2009 Chemotherapy    Intraperitoneal along with intravenous chemotherapy on a early ovarian cancer protocol       5/31/2018 Genetic Testing    Genetic testing results are 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      Genetic testing indicated that the patient carries a Variant of Uncertain Significance (VUS) : Rad51C c 252G>T      Hormone Therapy       History of ductal carcinoma in situ (DCIS) of breast   5/31/2018 Genetic Testing    Genetic testing results are 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      Genetic testing indicated that the patient carries a Variant of Uncertain Significance (VUS) : Rad51C c 252G>T     8/1/2018 Biopsy    Right breast biopsy  11 o'clock position 5 cmfn  DCIS  Grade 2  Confirmed by Karen Morris MD  ER 0  AZ 0     9/11/2018 Surgery    Right lumpectomy  DCIS  Grade 2  1 4 cm  Margins negative  Stage 0     10/16/2018 -  Hormone Therapy    Consult with Dr Augustina Martinez  No adjuvant systemic therapy recommended     10/29/2018 - 11/28/2018 Radiation    Course: C1    Plan ID Energy Fractions Dose per Fraction (cGy) Dose Correction (cGy) Total Dose Delivered (cGy) Elapsed Days   R Breast 6X 16 / 16 266 0 4,256 22   R Breast e 12E 5 / 5 200 0 1,000 7      Treatment dates:  C1: 10/29/2018 - 11/28/2018       Malignant neoplasm of central portion of left breast in female, estrogen receptor positive ([facility] [address] )   7/18/2019 Biopsy Left breast MRI-guided biopsy  3 o'clock, posterior depth  Invasive breast carcinoma of no special type  Grade 1  ER 90  ME 0  HER2 1+     8/13/2019 Surgery    Left breast needle localized lumpectomy with sentinel lymph node biopsy  Invasive mammary carcinoma of no special type  Grade 1  3 mm  Margins negative  0/1 Lymph node  Anatomic/Prognostic Stage IA     10/8/2019 - 11/5/2019 Radiation      Treatment:  Course: C2  Plan ID Energy Fractions Dose per Fraction (cGy) Dose Correction (cGy) Total Dose Delivered (cGy) Elapsed Days   BH L Breast 6X 16 / 16 266 0 4,256 21   BH L Brst e 12E 5 / 5 200 0 1,000 6    C2: 10/8/2019 - 11/5/2019 11/2019 -  Hormone Therapy    Anastrozole          -Interval History:  Patient presents today for follow-up visit for bilateral breast cancer  She notices no breast lumps, skin changes, nipple changes or discharge  She was recently diagnosed with severe arthritis in her knees  She denies any persistent headaches, back pain or cough, shortness of breath, abdominal pain  She had a breast MRI in December which was BI-RADS 2  Review of Systems:  Review of Systems   Constitutional: Negative for activity change, appetite change, chills, fatigue, fever and unexpected weight change  Respiratory: Negative for cough and shortness of breath  Cardiovascular: Positive for leg swelling (chronic left leg lymphedema)  Negative for chest pain  Gastrointestinal: Negative for abdominal pain, constipation, diarrhea, nausea and vomiting  Musculoskeletal: Positive for arthralgias (knees)  Negative for back pain, gait problem and myalgias  Skin: Negative for color change and rash  Neurological: Negative for dizziness and headaches  Hematological: Negative for adenopathy  Psychiatric/Behavioral: Negative for agitation and confusion  All other systems reviewed and are negative        Patient Active Problem List   Diagnosis    Colon, diverticulosis    Crohn's disease ([facility] [address] )    Dense breasts    Dermatitis    Erythema chronica migrans, secondary    Hyperlipidemia    Lymphedema    Murmur    Osteopenia    Osteoporosis    History of ovarian cancer    Reactive airway disease    Shortness of breath on exertion    Lymphedema of left lower extremity    Genetic predisposition to breast cancer    History of ductal carcinoma in situ (DCIS) of breast    Encounter for follow-up surveillance of ovarian cancer    Lichen sclerosus et atrophicus    Essential hypertension    Malignant neoplasm of ovary (HCC)    Nausea    TIA (transient ischemic attack)    Malignant neoplasm of central portion of left breast in female, estrogen receptor positive (HCC)    Use of anastrozole    History of breast cancer    Carpal tunnel syndrome of right wrist    Neuropathy    Monoallelic mutation of YOLANDA gene    Right calf pain    Primary osteoarthritis of right knee    Effusion of right knee     Past Medical History:   Diagnosis Date    BRCA1 positive     BRCA2 positive     Breast cancer ([facility] ) 09/11/2018    Right    Breast cancer ([facility] ) 08/13/2019    Left    [facility]     Dense breast     History of chemotherapy     for ovarian cancer    History of radiation therapy     History of transfusion 2009    Hyperlipidemia     Hypertension     Lymphedema     left leg    Lymphedema     [facility] ) 04/21/2009     Past Surgical History:   Procedure Laterality Date    BREAST LUMPECTOMY Right 09/11/2018    BREAST LUMPECTOMY Left 8/13/2019    Procedure: BREAST LUMPECTOMY; BREAST NEEDLE LOCALIZATION (NEEDLE LOC AT 0800);   Surgeon: Ysabel Broussard MD;  [facility];  Service: Surgical Oncology    BREAST LUMPECTOMY W/ NEEDLE LOCALIZATION Left 08/13/2019    COLONOSCOPY      EXPLORATORY LAPAROTOMY      HYSTERECTOMY      LYMPH NODE BIOPSY Left 8/13/2019    Procedure: SENTINEL LYMPH NODE BIOPSY; LYMPHATIC MAPPING WITH BLUE DYE AND RADIOACTIVE DYE (INJECT AT 0930 BY DR JENNINGS IN THE OR); Surgeon: Ashley Herron MD;  [facility];  Service: Surgical Oncology    MAMMO NEEDLE LOCALIZATION LEFT (ALL INC) Left 2019    MAMMO NEEDLE LOCALIZATION RIGHT (ALL INC) Right 2018    MRI BREAST BIOPSY LEFT (ALL INCLUSIVE) Left 2019    OMENTECTOMY      [place] PERQ DEVICE PLACEMT BREAST LOC 1ST LES W GUIDNCE Right 2018    Procedure: BREAST LUMPECTOMY; BREAST NEEDLE LOCALIZATION (NEEDLE LOC AT 1200);   Surgeon: Ashley Herron MD;  [facility];  Service: Surgical Oncology    TOTAL ABDOMINAL HYSTERECTOMY W/ BILATERAL SALPINGOOPHORECTOMY      US GUIDED BREAST BIOPSY LEFT COMPLETE Left 2019    US GUIDED BREAST BIOPSY RIGHT COMPLETE Right 2018    WISDOM TOOTH EXTRACTION       Family History   Problem Relation Age of Onset    Prostate cancer Father     Alzheimer's disease Father     Hypertension Father     Ovarian cancer Sister     Breast cancer Paternal Grandmother     Hypertension Mother     Heart disease Mother     Breast cancer Maternal Aunt     No Known Problems Sister     Other Sister         pacemaker    Hypertension Sister     Heart disease Sister     No Known Problems Sister     Down syndrome Sister     Alzheimer's disease Sister     Hypertension Sister     No Known Problems Paternal Aunt     No Known Problems Maternal Aunt     Breast cancer Paternal Grandfather      Social History     Socioeconomic History    Marital status: Single     Spouse name: Not on file    Number of children: Not on file    Years of education: Not on file    Highest education level: Not on file   Occupational History    Not on file   Social Needs    Financial resource strain: Not on file    Food insecurity     Worry: Not on file     Inability: Not on file    Transportation needs     Medical: Not on file     Non-medical: Not on file   Tobacco Use    Smoking status: Former Smoker     Quit date: 2009     Years since quittin 8    Smokeless tobacco: Never Used   Substance and Sexual Activity    Alcohol use: Yes     Frequency: Monthly or less     Drinks per session: 1 or 2     Comment: rarely    Drug use: No    Sexual activity: Not on file   Lifestyle    Physical activity     Days per week: Not on file     Minutes per session: Not on file    Stress: Not on file   Relationships    Social connections     Talks on phone: Not on file     Gets together: Not on file     Attends Hoahaoism service: Not on file     Active member of club or organization: Not on file     Attends meetings of clubs or organizations: Not on file     Relationship status: Not on file    Intimate partner violence     Fear of current or ex partner: Not on file     Emotionally abused: Not on file     Physically abused: Not on file     Forced sexual activity: Not on file   Other Topics Concern    Not on file   Social History Narrative    Not on file       Current Outpatient Medications:     albuterol (PROVENTIL HFA,VENTOLIN HFA) 90 mcg/act inhaler, Inhale 2 puffs as needed for wheezing, Disp: 1 Inhaler, Rfl: 0    alendronate (FOSAMAX) 70 mg tablet, Take 1 tablet (70 mg total) by mouth every 7 days, Disp: 4 tablet, Rfl: 11    amLODIPine (NORVASC) 10 mg tablet, TAKE 1 TABLET (10 MG TOTAL) BY MOUTH DAILY, Disp: 30 tablet, Rfl: 0    anastrozole (ARIMIDEX) 1 mg tablet, TAKE 1 TABLET (1 MG TOTAL) BY MOUTH DAILY, Disp: 90 tablet, Rfl: 1    atorvastatin (LIPITOR) 40 mg tablet, Take 1 tablet (40 mg total) by mouth daily at bedtime, Disp: 90 tablet, Rfl: 1    Calcium Carbonate (CALTRATE 600) 1500 (600 Ca) MG TABS, Take 1 tablet by mouth 2 (two) times a day, Disp: , Rfl:     clobetasol (TEMOVATE) 0 05 % ointment, Apply to the affected area 1-2 times a week, Disp: 30 g, Rfl: 2    cyclobenzaprine (FLEXERIL) 10 mg tablet, Take 1 tablet (10 mg total) by mouth daily at bedtime, Disp: 20 tablet, Rfl: 0    Elastic Bandages & Supports (MEDICAL COMPRESSION STOCKINGS) MISC, Use daily as directed, 3 pair total - LEFT side: 20-30mmhg large honey- color thigh-high; RIGHT side: 20-30mmhg medium honey- colored, thigh high, Disp: 3 each, Rfl: 0    Homeopathic Products (ARNICARE PAIN RELIEF SL), Place under the tongue, Disp: , Rfl:     hydrochlorothiazide (MICROZIDE) 12 5 mg capsule, TAKE 1 CAPSULE (12 5 MG TOTAL) BY MOUTH EVERY MORNING, Disp: 30 capsule, Rfl: 5    inFLIXimab (REMICADE) 100 mg, Infuse into a venous catheter  , Disp: , Rfl:     mupirocin (BACTROBAN) 2 % ointment, Apply topically 3 (three) times a day, Disp: 22 g, Rfl: 0    trolamine salicylate (ASPERCREME) 10 % cream, Apply topically as needed for muscle/joint pain, Disp: , Rfl:     Turmeric 500 MG CAPS, Take by mouth, Disp: , Rfl:   Allergies   Allergen Reactions    Lisinopril      cough    Losartan      Numbness on right side of body    Boniva [Ibandronic Acid] Headache     Vitals:    02/04/21 0846   BP: 140/70   Pulse: 82   Resp: 16   Temp: (!) 97 3 °F (36 3 °C)       Physical Exam  Vitals signs reviewed  Constitutional:       General: She is not in acute distress  Appearance: Normal appearance  She is well-developed  She is not diaphoretic  HENT:      Head: Normocephalic and atraumatic  Neck:      Musculoskeletal: Normal range of motion  Cardiovascular:      Rate and Rhythm: Normal rate and regular rhythm  Heart sounds: Normal heart sounds  Pulmonary:      Effort: Pulmonary effort is normal       Breath sounds: Normal breath sounds  Chest:      Breasts:         Right: Skin change (surgical scar, telangectasia) present  No swelling, bleeding, inverted nipple, mass, nipple discharge or tenderness  Left: Skin change (surgical scars) present  No swelling, bleeding, inverted nipple, mass, nipple discharge or tenderness  Abdominal:      Palpations: Abdomen is soft  There is no mass  Tenderness: There is no abdominal tenderness  Musculoskeletal: Normal range of motion     Lymphadenopathy:      Upper Body: Right upper body: No supraclavicular or axillary adenopathy  Left upper body: No supraclavicular or axillary adenopathy  Comments: Chronic left leg lymphedema   Skin:     General: Skin is warm and dry  Findings: No rash  Neurological:      Mental Status: She is alert and oriented to person, place, and time  Psychiatric:         Speech: Speech normal          Advance Care Planning/Advance Directives:  Discussed disease status, cancer treatment plans and/or cancer treatment goals with the patient

## 2021-02-10 ENCOUNTER — OFFICE VISIT (OUTPATIENT)
Dept: OBGYN CLINIC | Facility: CLINIC | Age: 66
End: 2021-02-10
Payer: COMMERCIAL

## 2021-02-10 ENCOUNTER — APPOINTMENT (OUTPATIENT)
Dept: RADIOLOGY | Facility: CLINIC | Age: 66
End: 2021-02-10
Payer: COMMERCIAL

## 2021-02-10 VITALS
TEMPERATURE: 97.8 F | BODY MASS INDEX: 23.6 KG/M2 | SYSTOLIC BLOOD PRESSURE: 128 MMHG | WEIGHT: 125 LBS | DIASTOLIC BLOOD PRESSURE: 80 MMHG | HEIGHT: 61 IN

## 2021-02-10 DIAGNOSIS — M25.562 ACUTE PAIN OF LEFT KNEE: ICD-10-CM

## 2021-02-10 DIAGNOSIS — M25.562 LEFT KNEE PAIN, UNSPECIFIED CHRONICITY: ICD-10-CM

## 2021-02-10 DIAGNOSIS — M17.12 PRIMARY OSTEOARTHRITIS OF LEFT KNEE: Primary | ICD-10-CM

## 2021-02-10 PROCEDURE — 73564 X-RAY EXAM KNEE 4 OR MORE: CPT

## 2021-02-10 PROCEDURE — 20610 DRAIN/INJ JOINT/BURSA W/O US: CPT | Performed by: ORTHOPAEDIC SURGERY

## 2021-02-10 PROCEDURE — 99213 OFFICE O/P EST LOW 20 MIN: CPT | Performed by: ORTHOPAEDIC SURGERY

## 2021-02-10 RX ORDER — BETAMETHASONE SODIUM PHOSPHATE AND BETAMETHASONE ACETATE 3; 3 MG/ML; MG/ML
6 INJECTION, SUSPENSION INTRA-ARTICULAR; INTRALESIONAL; INTRAMUSCULAR; SOFT TISSUE
Status: COMPLETED | OUTPATIENT
Start: 2021-02-10 | End: 2021-02-10

## 2021-02-10 RX ORDER — LIDOCAINE HYDROCHLORIDE 10 MG/ML
7 INJECTION, SOLUTION EPIDURAL; INFILTRATION; INTRACAUDAL; PERINEURAL
Status: COMPLETED | OUTPATIENT
Start: 2021-02-10 | End: 2021-02-10

## 2021-02-10 RX ADMIN — LIDOCAINE HYDROCHLORIDE 7 ML: 10 INJECTION, SOLUTION EPIDURAL; INFILTRATION; INTRACAUDAL; PERINEURAL at 10:46

## 2021-02-10 RX ADMIN — BETAMETHASONE SODIUM PHOSPHATE AND BETAMETHASONE ACETATE 6 MG: 3; 3 INJECTION, SUSPENSION INTRA-ARTICULAR; INTRALESIONAL; INTRAMUSCULAR; SOFT TISSUE at 09:49

## 2021-02-10 NOTE — ASSESSMENT & PLAN NOTE
Findings consistent with left knee osteoarthritis  I reviewed patient's left knee x-ray with her  I discussed prognosis of her knee condition  I provided patient cortisone injection in the left knee, which patient tolerated well with good pain relief  I advised patient to use cold compress over the right knee today  Encouraged patient to do low-impact exercises with stationary bike  She should avoid high impact, repetitive squatting, kneeling, and climbing activities  Patient cannot take NSAID  I advised patient to use Voltaren gel or Aspercreme over the lef knee  I will see her back as needed  She understood and had no further questions  All patient's questions were answered to her satisfaction  This note is created using dictation transcription  It may contain typographical errors, grammatical errors, improperly dictated words, background noise and other errors

## 2021-02-10 NOTE — PROGRESS NOTES
Assessment:     1  Primary osteoarthritis of left knee    2  Acute pain of left knee        Plan:     Problem List Items Addressed This Visit        Musculoskeletal and Integument    Primary osteoarthritis of left knee - Primary     Findings consistent with left knee osteoarthritis  I reviewed patient's left knee x-ray with her  I discussed prognosis of her knee condition  I provided patient cortisone injection in the left knee, which patient tolerated well with good pain relief  I advised patient to use cold compress over the right knee today  Encouraged patient to do low-impact exercises with stationary bike  She should avoid high impact, repetitive squatting, kneeling, and climbing activities  Patient cannot take NSAID  I advised patient to use Voltaren gel or Aspercreme over the lef knee  I will see her back as needed  She understood and had no further questions  All patient's questions were answered to her satisfaction  This note is created using dictation transcription  It may contain typographical errors, grammatical errors, improperly dictated words, background noise and other errors  Relevant Medications    lidocaine (PF) (XYLOCAINE-MPF) 1 % injection 7 mL (Completed)    betamethasone acetate-betamethasone sodium phosphate (CELESTONE) injection 6 mg (Completed)    Other Relevant Orders    Large joint arthrocentesis: L knee (Completed)      Other Visit Diagnoses     Acute pain of left knee        Relevant Medications    lidocaine (PF) (XYLOCAINE-MPF) 1 % injection 7 mL (Completed)    betamethasone acetate-betamethasone sodium phosphate (CELESTONE) injection 6 mg (Completed)    Other Relevant Orders    XR knee 4+ vw left injury    Large joint arthrocentesis: L knee (Completed)         Subjective:     Patient ID: Edil Martins is a 63 y o  female  Chief Complaint:   Left Knee Pain    Kristyn Agarwal is a 63year old who presents today for an initial evaluation of her left knee   She has previously treated with me for osteoarthritis of her right knee  She notes that she was over compensating for her right knee when her left knee started to bother her  She notes that she she may increase of her left knee pain about 2 weeks ago  She states that her pain is a dull achy 7/10 pain  She notes that she is limited with her activities of daily living especially walking and ambulating up and down stairs  She notes that her pain is located anteriorly over her left knee  She denies any numbness or tingling along with no radiating pain  Patient does have a history of Crohn's disease and breast cancer  Also, pateint has a history of left leg lymphedema  Allergy:  Allergies   Allergen Reactions    Lisinopril      cough    Losartan      Numbness on right side of body    Boniva [Ibandronic Acid] Headache     Medications:  all current active meds have been reviewed  Past Medical History:  Past Medical History:   Diagnosis Date    BRCA1 positive     BRCA2 positive     Breast cancer ([facility] ) 09/11/2018    Right    Breast cancer ([facility] ) 08/13/2019    Left    Crohn disease ([facility] )     Dense breast     History of chemotherapy     for ovarian cancer    History of radiation therapy     History of transfusion 2009    Hyperlipidemia     Hypertension     Lymphedema     left leg    Lymphedema     Ovarian [facility] ) 04/21/2009     Past Surgical History:  Past Surgical History:   Procedure Laterality Date    BREAST LUMPECTOMY Right 09/11/2018    BREAST LUMPECTOMY Left 8/13/2019    Procedure: BREAST LUMPECTOMY; BREAST NEEDLE LOCALIZATION (NEEDLE LOC AT 0800);   Surgeon: Bonifacio Knight MD;  [facility];  Service: Surgical Oncology    BREAST LUMPECTOMY W/ NEEDLE LOCALIZATION Left 08/13/2019    COLONOSCOPY      EXPLORATORY LAPAROTOMY      HYSTERECTOMY      LYMPH NODE BIOPSY Left 8/13/2019    Procedure: SENTINEL LYMPH NODE BIOPSY; LYMPHATIC MAPPING WITH BLUE DYE AND RADIOACTIVE DYE (INJECT AT 0930 BY  DICK IN THE OR); Surgeon: Brittaney Collins MD;  [facility];  Service: Surgical Oncology    MAMMO NEEDLE LOCALIZATION LEFT (ALL INC) Left 2019    MAMMO NEEDLE LOCALIZATION RIGHT (ALL INC) Right 2018    MRI BREAST BIOPSY LEFT (ALL INCLUSIVE) Left 2019    OMENTECTOMY      NC PERQ DEVICE PLACEMT BREAST LOC 1ST LES W GUIDNCE Right 2018    Procedure: BREAST LUMPECTOMY; BREAST NEEDLE LOCALIZATION (NEEDLE LOC AT 1200); Surgeon: Brittaney Collins MD;  [facility];  Service: Surgical Oncology    TOTAL ABDOMINAL HYSTERECTOMY W/ BILATERAL SALPINGOOPHORECTOMY      US GUIDED BREAST BIOPSY LEFT COMPLETE Left 2019    US GUIDED BREAST BIOPSY RIGHT COMPLETE Right 2018    WISDOM TOOTH EXTRACTION       Family History:  Family History   Problem Relation Age of Onset    Prostate cancer Father     Alzheimer's disease Father     Hypertension Father     Ovarian cancer Sister     Breast cancer Paternal Grandmother     Hypertension Mother     Heart disease Mother     Breast cancer Maternal Aunt     No Known Problems Sister     Other Sister         pacemaker    Hypertension Sister     Heart disease Sister     No Known Problems Sister     Down syndrome Sister     Alzheimer's disease Sister     Hypertension Sister     No Known Problems Paternal Aunt     No Known Problems Maternal Aunt     Breast cancer Paternal Grandfather      Social History:  Social History     Substance and Sexual Activity   Alcohol Use Yes    Frequency: Monthly or less    Drinks per session: 1 or 2    Comment: rarely     Social History     Substance and Sexual Activity   Drug Use No     Social History     Tobacco Use   Smoking Status Former Smoker    Quit date: 2009    Years since quittin 8   Smokeless Tobacco Never Used     Review of Systems   Constitutional: Positive for activity change  HENT: Negative  Eyes: Negative  Respiratory: Negative  Cardiovascular: Negative  Gastrointestinal: Negative  Endocrine: Negative  Genitourinary: Negative  Musculoskeletal: Positive for arthralgias (Left knee) and joint swelling (Left leg)  Skin: Negative  Allergic/Immunologic: Negative  Neurological: Negative  Hematological: Negative  Psychiatric/Behavioral: Negative  Objective:  BP Readings from Last 1 Encounters:   02/10/21 128/80      Wt Readings from Last 1 Encounters:   02/10/21 56 7 kg (125 lb)      BMI:   Estimated body mass index is 23 62 kg/m² as calculated from the following:    Height as of this encounter: 5' 1" (1 549 m)  Weight as of this encounter: 56 7 kg (125 lb)  BSA:   Estimated body surface area is 1 55 meters squared as calculated from the following:    Height as of this encounter: 5' 1" (1 549 m)  Weight as of this encounter: 56 7 kg (125 lb)  Physical Exam  Vitals signs and nursing note reviewed  Constitutional:       Appearance: Normal appearance  She is well-developed  HENT:      Head: Normocephalic and atraumatic  Right Ear: External ear normal       Left Ear: External ear normal    Eyes:      Extraocular Movements: Extraocular movements intact  Conjunctiva/sclera: Conjunctivae normal    Neck:      Musculoskeletal: Neck supple  Pulmonary:      Effort: Pulmonary effort is normal    Musculoskeletal:      Left knee: She exhibits no effusion  Skin:     General: Skin is warm and dry  Neurological:      Mental Status: She is alert and oriented to person, place, and time  Deep Tendon Reflexes: Reflexes are normal and symmetric  Psychiatric:         Mood and Affect: Mood normal          Behavior: Behavior normal        Right Knee Exam     Range of Motion   Extension: 0       Left Knee Exam     Muscle Strength   The patient has normal left knee strength  Tenderness   The patient is experiencing tenderness in the pes anserinus  Range of Motion   The patient has normal left knee ROM      Tests   Megan: Medial - negative Lateral - negative  Varus: negative Valgus: negative  Patellar apprehension: negative    Other   Erythema: absent  Sensation: normal  Swelling: moderate (entire left leg due to lymphedema)  Effusion: no effusion present    Comments:    Patellofemoral joint crepitation knee motion            Large joint arthrocentesis: L knee  Universal Protocol:  Consent: Verbal consent obtained  Risks and benefits: risks, benefits and alternatives were discussed  Consent given by: patient  Patient understanding: patient states understanding of the procedure being performed  Site marked: the operative site was marked  Supporting Documentation  Indications: pain   Procedure Details  Location: knee - L knee  Needle size: 22 G  Ultrasound guidance: no  Approach: anterolateral  Medications administered: 6 mg betamethasone acetate-betamethasone sodium phosphate 6 (3-3) mg/mL; 7 mL lidocaine (PF) 1 %    Patient tolerance: patient tolerated the procedure well with no immediate complications  Dressing:  Sterile dressing applied        I have personally reviewed pertinent films in PACS and my interpretation is x-ray left knee demonstrates moderate osteoarthritis in the patellofemoral joint  Good joint alignment  No soft tissue calcification

## 2021-02-12 ENCOUNTER — TELEPHONE (OUTPATIENT)
Dept: OBGYN CLINIC | Facility: HOSPITAL | Age: 66
End: 2021-02-12

## 2021-02-12 NOTE — TELEPHONE ENCOUNTER
Patient is calling in reference to right knee pain  Patient received a cortisone injection in the right knee about three weeks ago, but is having issues (difficult to stand/walk) already  Patient also just had the L knee injected on Wednesday  Should she stay off her feet for a couple of days?  Patient would like to speak with someone in reference to suggestions as to what she can do to ease pain, etc

## 2021-02-12 NOTE — TELEPHONE ENCOUNTER
I spoke to patient and offered appt for 2/16 with Dr Eddi Mobley for re-evaluation  In the meantime RICE method and tylenol 1000mg TID, voltaren gel 4 x daily  Verbalized understanding

## 2021-02-12 NOTE — TELEPHONE ENCOUNTER
Patient is calling again stating that she expected to hear from someone by now  Patient would like to speak to Dr Marshal Phillips nurse in regards to her rt leg pain  Patient states that she cannot walk or put pressure on that leg without having a lot of pain       [phone]

## 2021-02-16 ENCOUNTER — OFFICE VISIT (OUTPATIENT)
Dept: OBGYN CLINIC | Facility: CLINIC | Age: 66
End: 2021-02-16
Payer: COMMERCIAL

## 2021-02-16 VITALS
HEIGHT: 61 IN | WEIGHT: 122 LBS | BODY MASS INDEX: 23.03 KG/M2 | DIASTOLIC BLOOD PRESSURE: 80 MMHG | TEMPERATURE: 97.6 F | SYSTOLIC BLOOD PRESSURE: 142 MMHG

## 2021-02-16 DIAGNOSIS — M17.11 PRIMARY OSTEOARTHRITIS OF RIGHT KNEE: Primary | ICD-10-CM

## 2021-02-16 DIAGNOSIS — M70.51 PES ANSERINUS BURSITIS OF RIGHT KNEE: ICD-10-CM

## 2021-02-16 DIAGNOSIS — M17.12 PRIMARY OSTEOARTHRITIS OF LEFT KNEE: ICD-10-CM

## 2021-02-16 PROCEDURE — 1036F TOBACCO NON-USER: CPT | Performed by: PHYSICIAN ASSISTANT

## 2021-02-16 PROCEDURE — 3079F DIAST BP 80-89 MM HG: CPT | Performed by: PHYSICIAN ASSISTANT

## 2021-02-16 PROCEDURE — 99213 OFFICE O/P EST LOW 20 MIN: CPT | Performed by: PHYSICIAN ASSISTANT

## 2021-02-16 PROCEDURE — 3008F BODY MASS INDEX DOCD: CPT | Performed by: PHYSICIAN ASSISTANT

## 2021-02-16 PROCEDURE — 20610 DRAIN/INJ JOINT/BURSA W/O US: CPT | Performed by: PHYSICIAN ASSISTANT

## 2021-02-16 PROCEDURE — 3077F SYST BP >= 140 MM HG: CPT | Performed by: PHYSICIAN ASSISTANT

## 2021-02-16 RX ORDER — LIDOCAINE HYDROCHLORIDE 10 MG/ML
7 INJECTION, SOLUTION INFILTRATION; PERINEURAL
Status: COMPLETED | OUTPATIENT
Start: 2021-02-16 | End: 2021-02-16

## 2021-02-16 RX ORDER — BETAMETHASONE SODIUM PHOSPHATE AND BETAMETHASONE ACETATE 3; 3 MG/ML; MG/ML
6 INJECTION, SUSPENSION INTRA-ARTICULAR; INTRALESIONAL; INTRAMUSCULAR; SOFT TISSUE
Status: COMPLETED | OUTPATIENT
Start: 2021-02-16 | End: 2021-02-16

## 2021-02-16 RX ADMIN — BETAMETHASONE SODIUM PHOSPHATE AND BETAMETHASONE ACETATE 6 MG: 3; 3 INJECTION, SUSPENSION INTRA-ARTICULAR; INTRALESIONAL; INTRAMUSCULAR; SOFT TISSUE at 11:18

## 2021-02-16 RX ADMIN — LIDOCAINE HYDROCHLORIDE 7 ML: 10 INJECTION, SOLUTION INFILTRATION; PERINEURAL at 11:18

## 2021-02-16 NOTE — PROGRESS NOTES
Assessment:     1  Primary osteoarthritis of right knee    2  Primary osteoarthritis of left knee    3  Pes anserinus bursitis of right knee        Plan:     Problem List Items Addressed This Visit        Musculoskeletal and Integument    Primary osteoarthritis of right knee - Primary    Primary osteoarthritis of left knee    Pes anserinus bursitis of right knee    Relevant Medications    lidocaine (XYLOCAINE) 1 % injection 7 mL (Completed)    betamethasone acetate-betamethasone sodium phosphate (CELESTONE) injection 6 mg (Completed)    Other Relevant Orders    Ambulatory referral to Physical Therapy    Large joint arthrocentesis: R knee (Completed)          The patient was seen and examined by Dr Cecille Kim and myself  Findings consistent with right knee pes anserine bursitis and bilateral knee osteoarthritis  Findings and treatment options were discussed with the patient  Recommend cortisone injection to the right pes bursa today  She tolerated the procedure well  Advised to apply cold compress today  She was also prescribed formal physical therapy  Continue use of Tylenol and Voltaren gel  Follow-up in 6 weeks for re-evaluation  All questions were answered to patient's satisfaction  Subjective:     Patient ID: Fatoumata Bautista is a 63 y o  female  Chief Complaint:   Left Knee Pain    Arjun Stapleton is a 63year old white female following up for bilateral knee osteoarthritis  She recently had cortisone injections to the bilateral knee joints with aspiration on the right side as well  She felt mild relief with both the injections  The pain in her right knee has changed  She complains of pain over the medial aspect of the knee  She has an occasional catching sensation  The pain is sharp and worse with walking  She continues to have pain over the anterior aspect of both of her knees with stair climbing      Allergy:  Allergies   Allergen Reactions    Lisinopril      cough    Losartan      Numbness on right side of body    Boniva [Ibandronic Acid] Headache     Medications:  all current active meds have been reviewed  Past Medical History:  Past Medical History:   Diagnosis Date    BRCA1 positive     BRCA2 positive     Breast cancer ([facility] ) 09/11/2018    Right    Breast cancer ([facility] ) 08/13/2019    Left    Crohn disease ([facility] )     Dense breast     History of chemotherapy     for ovarian cancer    History of radiation therapy     History of transfusion 2009    Hyperlipidemia     Hypertension     Lymphedema     left leg    Lymphedema     Ovarian cancer ([facility] ) 04/21/2009     Past Surgical History:  Past Surgical History:   Procedure Laterality Date    BREAST LUMPECTOMY Right 09/11/2018    BREAST LUMPECTOMY Left 8/13/2019    Procedure: BREAST LUMPECTOMY; BREAST NEEDLE LOCALIZATION (NEEDLE LOC AT 0800); Surgeon: Shaw Lynn MD;  [facility];  Service: Surgical Oncology    BREAST LUMPECTOMY W/ NEEDLE LOCALIZATION Left 08/13/2019    COLONOSCOPY      EXPLORATORY LAPAROTOMY      HYSTERECTOMY      LYMPH NODE BIOPSY Left 8/13/2019    Procedure: SENTINEL LYMPH NODE BIOPSY; LYMPHATIC MAPPING WITH BLUE DYE AND RADIOACTIVE DYE (INJECT AT 0930 BY DR JENNINGS IN THE OR); Surgeon: Shaw Lynn MD;  [facility];  Service: Surgical Oncology    MAMMO NEEDLE LOCALIZATION LEFT (ALL INC) Left 8/13/2019    MAMMO NEEDLE LOCALIZATION RIGHT (ALL INC) Right 9/11/2018    MRI BREAST BIOPSY LEFT (ALL INCLUSIVE) Left 7/18/2019    OMENTECTOMY      AL PERQ DEVICE PLACEMT BREAST LOC 1ST LES W GUIDNCE Right 9/11/2018    Procedure: BREAST LUMPECTOMY; BREAST NEEDLE LOCALIZATION (NEEDLE LOC AT 1200);   Surgeon: Shaw Lynn MD;  [facility];  Service: Surgical Oncology    TOTAL ABDOMINAL HYSTERECTOMY W/ BILATERAL SALPINGOOPHORECTOMY      US GUIDED BREAST BIOPSY LEFT COMPLETE Left 6/17/2019    US GUIDED BREAST BIOPSY RIGHT COMPLETE Right 8/1/2018    WISDOM TOOTH EXTRACTION       Family History:  Family History Problem Relation Age of Onset    Prostate cancer Father     Alzheimer's disease Father     Hypertension Father     Ovarian cancer Sister     Breast cancer Paternal Grandmother     Hypertension Mother     Heart disease Mother     Breast cancer Maternal Aunt     No Known Problems Sister     Other Sister         pacemaker    Hypertension Sister     Heart disease Sister     No Known Problems Sister     Down syndrome Sister     Alzheimer's disease Sister     Hypertension Sister     No Known Problems Paternal Aunt     No Known Problems Maternal Aunt     Breast cancer Paternal Grandfather      Social History:  Social History     Substance and Sexual Activity   Alcohol Use Yes    Frequency: Monthly or less    Drinks per session: 1 or 2    Comment: rarely     Social History     Substance and Sexual Activity   Drug Use No     Social History     Tobacco Use   Smoking Status Former Smoker    Quit date: 2009    Years since quittin 8   Smokeless Tobacco Never Used     Review of Systems   Constitutional: Positive for activity change  HENT: Negative  Eyes: Negative  Respiratory: Negative  Cardiovascular: Negative  Gastrointestinal: Negative  Endocrine: Negative  Genitourinary: Negative  Musculoskeletal: Positive for arthralgias (Left knee) and joint swelling (Left leg Lymphedema)  Skin: Negative  Allergic/Immunologic: Negative  Neurological: Negative  Hematological: Negative  Psychiatric/Behavioral: Negative  Objective:  BP Readings from Last 1 Encounters:   21 142/80      Wt Readings from Last 1 Encounters:   21 55 3 kg (122 lb)      BMI:   Estimated body mass index is 23 05 kg/m² as calculated from the following:    Height as of this encounter: 5' 1" (1 549 m)  Weight as of this encounter: 55 3 kg (122 lb)    BSA:   Estimated body surface area is 1 53 meters squared as calculated from the following:    Height as of this encounter: 5' 1" (1 549 m)  Weight as of this encounter: 55 3 kg (122 lb)  Physical Exam  Vitals signs and nursing note reviewed  Constitutional:       Appearance: Normal appearance  She is well-developed  HENT:      Head: Normocephalic and atraumatic  Right Ear: External ear normal       Left Ear: External ear normal    Eyes:      Extraocular Movements: Extraocular movements intact  Conjunctiva/sclera: Conjunctivae normal    Neck:      Musculoskeletal: Neck supple  Pulmonary:      Effort: Pulmonary effort is normal    Musculoskeletal:      Right knee: She exhibits effusion (Trace)  Left knee: She exhibits no effusion  Skin:     General: Skin is warm and dry  Neurological:      Mental Status: She is alert and oriented to person, place, and time  Deep Tendon Reflexes: Reflexes are normal and symmetric  Psychiatric:         Mood and Affect: Mood normal          Behavior: Behavior normal        Right Knee Exam     Muscle Strength   The patient has normal right knee strength  Tenderness   The patient is experiencing tenderness in the pes anserinus  Range of Motion   The patient has normal right knee ROM  Tests   Megan:  Medial - positive Lateral - negative  Varus: negative Valgus: negative    Other   Erythema: absent  Sensation: normal  Pulse: present  Swelling: none  Effusion: effusion (Trace) present    Comments:    Patellofemoral crepitation      Left Knee Exam     Muscle Strength   The patient has normal left knee strength  Tenderness   The patient is experiencing tenderness in the medial joint line  Range of Motion   The patient has normal left knee ROM      Tests   Megan:  Medial - negative Lateral - negative  Varus: negative Valgus: negative  Patellar apprehension: negative    Other   Erythema: absent  Sensation: normal  Swelling: moderate (entire left leg due to lymphedema)  Effusion: no effusion present    Comments:    Patellofemoral joint crepitation knee motion Large joint arthrocentesis: R knee  Universal Protocol:  Consent: Verbal consent obtained    Risks and benefits: risks, benefits and alternatives were discussed  Consent given by: patient  Patient understanding: patient states understanding of the procedure being performed    Supporting Documentation  Indications: pain   Procedure Details  Location: knee - R knee  Preparation: Patient was prepped and draped in the usual sterile fashion  Needle size: 22 G  Ultrasound guidance: no  Approach: anteromedial  Medications administered: 7 mL lidocaine 1 %; 6 mg betamethasone acetate-betamethasone sodium phosphate 6 (3-3) mg/mL    Patient tolerance: patient tolerated the procedure well with no immediate complications  Dressing:  Sterile dressing applied

## 2021-02-19 DIAGNOSIS — I10 ESSENTIAL HYPERTENSION: ICD-10-CM

## 2021-02-19 RX ORDER — AMLODIPINE BESYLATE 10 MG/1
10 TABLET ORAL DAILY
Qty: 30 TABLET | Refills: 0 | Status: SHIPPED | OUTPATIENT
Start: 2021-02-19 | End: 2021-03-19

## 2021-02-24 ENCOUNTER — EVALUATION (OUTPATIENT)
Dept: PHYSICAL THERAPY | Facility: CLINIC | Age: 66
End: 2021-02-24
Payer: COMMERCIAL

## 2021-02-24 DIAGNOSIS — M70.51 PES ANSERINUS BURSITIS OF BOTH KNEES: Primary | ICD-10-CM

## 2021-02-24 DIAGNOSIS — M17.11 PRIMARY OSTEOARTHRITIS OF RIGHT KNEE: ICD-10-CM

## 2021-02-24 DIAGNOSIS — M70.52 PES ANSERINUS BURSITIS OF BOTH KNEES: Primary | ICD-10-CM

## 2021-02-24 DIAGNOSIS — M70.51 PES ANSERINUS BURSITIS OF RIGHT KNEE: ICD-10-CM

## 2021-02-24 PROCEDURE — 97110 THERAPEUTIC EXERCISES: CPT | Performed by: PHYSICAL THERAPIST

## 2021-02-24 PROCEDURE — 97162 PT EVAL MOD COMPLEX 30 MIN: CPT | Performed by: PHYSICAL THERAPIST

## 2021-02-24 NOTE — PROGRESS NOTES
PT Evaluation     Today's date: 2021  Patient name: Mickie Barnes  : [birth year]  MRN: [id number]  Referring provider: Eleni Epley, PA-C  Dx:   Encounter Diagnosis     ICD-10-CM    1  Pes anserinus bursitis of both knees  M70 51     M70 52    2  Primary osteoarthritis of right knee  M17 11               Assessment  Assessment details: Mickie Barnes is a 63 y o  female who presents with pain, decreased strength, decreased ROM, decreased joint mobility, joint effusion and ambulatory dysfunction  Due to these impairments, patient has difficulty performing ADL's, recreational activities, ambulation, stair negotiation, lifting/carrying, transfers  Patient's clinical presentation is consistent with their referring diagnosis of Pes anserinus bursitis of both knees  (primary encounter diagnosis), Primary osteoarthritis of right knee  Patient has been educated in home exercise program and plan of care  Patient would benefit from skilled physical therapy services to address their aforementioned functional limitations and progress towards prior level of function and independence with home exercise program      Impairments: abnormal gait, abnormal or restricted ROM, activity intolerance, impaired balance, impaired physical strength, lacks appropriate home exercise program, pain with function, weight-bearing intolerance, poor posture  and poor body mechanics     Prognosis: good  Prognosis details: + factors: high motivation levels to return to work   - factors: age, medical history    Goals  Short Term Goals to be accomplished in 4 weeks:  STG1: Pt will be I with HEP  STG2: Pt will demo 0-135 degrees  in knee PROM to improve gait  STG3: Pt will demo 4-/5 MMT strength in knee to improve stair negotiation  STG4: Pt will amb community distance without gait deviates due to pain  Long Term Goals to be accomplished in 15 weeks:   LTG1: Pt will demo knee strength WNL to return to pain free stair negotiation    LTG2: Pt will return to household ADLs and work duties including dog walking pain free  LTG3: Pt will return to all work related duties without pain  Plan  Plan details: HEP development, stretching, strengthening, A/AA/PROM, joint mobilizations, posture education, STM/MI as needed to reduce muscle tension, muscle reeducation, PLOC discussed and agreed upon with patient  Patient would benefit from: PT eval and skilled physical therapy  Planned modality interventions: cryotherapy and thermotherapy: hydrocollator packs  Planned therapy interventions: manual therapy, neuromuscular re-education, self care, therapeutic activities, therapeutic exercise, home exercise program, patient education, stretching, strengthening, balance, joint mobilization and flexibility  Frequency: 2x week  Duration in weeks: 15  Plan of Care beginning date: 2021  Plan of Care expiration date: 2021  Treatment plan discussed with: patient        Subjective Evaluation    History of Present Illness  Mechanism of injury: Ghada arrives to PT with reports of R knee pain  She recently had cortisone injections to the bilateral knee joints with aspiration on the right side  Noted mild relief with both the injections, however continues to complain of pain over the medial aspect of the R knee knee  Notes occasional catching sensation  The pain is sharp and worse with walking  She continues to have pain over the anterior aspect of both of her knees with stair climbing  At her most recent visit with her physician she was prescribed PT       Pain  At worst pain ratin  Location: R knee   Quality: pressure, sharp and discomfort  Relieving factors: relaxation and rest  Aggravating factors: walking, stair climbing and lifting  Progression: improved    Treatments  Current treatment: medication and physical therapy  Patient Goals  Patient goals for therapy: increased motion, improved balance, decreased pain, decreased edema, increased strength, independence with ADLs/IADLs, return to work and return to sport/leisure activities  Patient goal: /dog walking for work, walking, stairs negotiation, squatting, STS        Objective     Observations   Left Knee   Positive for edema  Right Knee   Negative for edema  Additional Observation Details  L knee lymphedema extending from her foot up to her L hip     Tenderness     Right Knee   Tenderness in the pes anserinus  Passive Range of Motion   Left Knee   Flexion: 115 degrees with pain  Extension: 0 degrees     Right Knee   Flexion: 130 degrees with pain  Extension: 0 degrees with pain    Additional Passive Range of Motion Details  L knee ROM limited due to lymphedema     Mobility   Patellar Mobility:   Left Knee   WFL: medial, lateral, superior and inferior  Right Knee   WFL: medial, lateral, superior and inferior    Strength/Myotome Testing     Left Knee   Flexion: 4-  Extension: 4-    Right Knee   Flexion: 3+  Extension: 3+    Additional Strength Details  U/l heel raise:  R: reps PAIN  L: reps    Tests     Right Knee   Positive valgus stress test at 30 degrees       Additional Tests Details  SLS  R: 20 sec  L: 20 sec           Precautions: history of breast and ovarian cancer    POC: 6/9/21      Manuals 2/24            STM, PROM stretch             Pat mobs                                        Neuro Re-Ed 2/24            SLS             Tandem                                                                              Ther Ex 2/24            SLR 10xea            Hip abd s/l 10xea            bridge 10x            3 way HS stretch seated, heel elevated on box :15x2ea                                                                Ther Activity 2/24            Step ups                                                                 Modalities 2/24            [address]             CT

## 2021-03-03 ENCOUNTER — OFFICE VISIT (OUTPATIENT)
Dept: PHYSICAL THERAPY | Facility: CLINIC | Age: 66
End: 2021-03-03
Payer: COMMERCIAL

## 2021-03-03 DIAGNOSIS — M17.11 PRIMARY OSTEOARTHRITIS OF RIGHT KNEE: ICD-10-CM

## 2021-03-03 DIAGNOSIS — M70.51 PES ANSERINUS BURSITIS OF RIGHT KNEE: ICD-10-CM

## 2021-03-03 DIAGNOSIS — M70.51 PES ANSERINUS BURSITIS OF BOTH KNEES: Primary | ICD-10-CM

## 2021-03-03 DIAGNOSIS — M70.52 PES ANSERINUS BURSITIS OF BOTH KNEES: Primary | ICD-10-CM

## 2021-03-03 PROCEDURE — 97112 NEUROMUSCULAR REEDUCATION: CPT | Performed by: PHYSICAL THERAPIST

## 2021-03-03 PROCEDURE — 97140 MANUAL THERAPY 1/> REGIONS: CPT | Performed by: PHYSICAL THERAPIST

## 2021-03-03 PROCEDURE — 97110 THERAPEUTIC EXERCISES: CPT | Performed by: PHYSICAL THERAPIST

## 2021-03-03 NOTE — PROGRESS NOTES
Daily Note     Today's date: 3/3/2021  Patient name: Mickie Barnes  : 1955  MRN: [id number]  Referring provider: Eleni Epley, PA-C  Dx:   Encounter Diagnosis     ICD-10-CM    1  Pes anserinus bursitis of both knees  M70 51     M70 52    2  Primary osteoarthritis of right knee  M17 11    3  Pes anserinus bursitis of right knee  M70 51               Subjective: Pt reported, "I am actually getting better  I am able to walk better and actually do some more work, which is encouraging "     Objective: See treatment diary below    Assessment: Tolerated treatment well  Advanced strengthening and balance training well without provocation of pain  Patient demonstrated fatigue post treatment, exhibited good technique with therapeutic exercises and would benefit from continued PT  Plan: Continue per plan of care        Precautions: history of breast and ovarian cancer    POC: 21    Manuals 2/24 3/3           STM, PROM stretch  JZ           Pat mobs   JZ                                     Neuro Re-Ed 2/24 3/3           SLS foam  :20x3ea           Tandem Foam  :20x3ea           Cone taps  3x10ea                                                               Ther Ex 2/24 3/3           SLR 10xea HEP           Hip abd s/l 10xea HEP           bridge 10x HEP           3 way HS stretch seated, heel elevated on box :15x2ea            Prone knee flexion stretch strap  :15x5ea           Standing SLR  2x15           Standing hip abd  2x15           Heel raise  3x15           U/l heel raise  3x10ea                                     Ther Activity  3/3           Step ups                                                                 Modalities 2/24 3/3                        CT

## 2021-03-04 ENCOUNTER — TELEPHONE (OUTPATIENT)
Dept: HEMATOLOGY ONCOLOGY | Facility: CLINIC | Age: 66
End: 2021-03-04

## 2021-03-05 ENCOUNTER — OFFICE VISIT (OUTPATIENT)
Dept: PHYSICAL THERAPY | Facility: CLINIC | Age: 66
End: 2021-03-05
Payer: COMMERCIAL

## 2021-03-05 DIAGNOSIS — M70.52 PES ANSERINUS BURSITIS OF BOTH KNEES: Primary | ICD-10-CM

## 2021-03-05 DIAGNOSIS — M70.51 PES ANSERINUS BURSITIS OF BOTH KNEES: Primary | ICD-10-CM

## 2021-03-05 DIAGNOSIS — M70.51 PES ANSERINUS BURSITIS OF RIGHT KNEE: ICD-10-CM

## 2021-03-05 DIAGNOSIS — M17.11 PRIMARY OSTEOARTHRITIS OF RIGHT KNEE: ICD-10-CM

## 2021-03-05 PROCEDURE — 97140 MANUAL THERAPY 1/> REGIONS: CPT | Performed by: PHYSICAL THERAPIST

## 2021-03-05 PROCEDURE — 97110 THERAPEUTIC EXERCISES: CPT | Performed by: PHYSICAL THERAPIST

## 2021-03-05 NOTE — PROGRESS NOTES
Daily Note     Today's date: 3/5/2021  Patient name: Brown Mejia  : 1955  MRN: [id number]  Referring provider: Jennifer Clayton PA-C  Dx:   Encounter Diagnosis     ICD-10-CM    1  Pes anserinus bursitis of both knees  M70 51     M70 52    2  Primary osteoarthritis of right knee  M17 11    3  Pes anserinus bursitis of right knee  M70 51               Subjective: Pt reported significant increase in pes anserine pain following the last session, that is persisting into today  Objective: See treatment diary below    Assessment: Tolerated treatment fair  Due to persisting knee pain she has difficulty with performing some exercises, however noted some improvement in pain with manual treatment  Patient would benefit from continued PT  Plan: Continue per plan of care        Precautions: history of breast and ovarian cancer    POC: 21    Manuals 2/24 3/3 3/5          STM, PROM stretch  JZ JZ          Pat mobs   JZ JZ                                    Neuro Re-Ed 2/24 3/3 3/5          SLS foam  :20x3ea held          Tandem Foam  :20x3ea held          Cone taps  3x10ea held                                                              Ther Ex 2/24 3/3 3/          SLR 10xea HEP 10x          Hip abd s/l 10xea HEP 10x          bridge 10x HEP           3 way HS stretch seated, heel elevated on box :15x2ea  :15x2ea          Prone knee flexion stretch strap  :15x5ea :15x2ea          Standing SLR  2x15           Standing hip abd  2x15           Heel raise  3x15           U/l heel raise  3x10ea                                     Ther Activity 2/24 3/3 3/5          Step ups                                                                 Modalities 2/24 3/3 3/          MH             CT

## 2021-03-09 ENCOUNTER — OFFICE VISIT (OUTPATIENT)
Dept: PHYSICAL THERAPY | Facility: CLINIC | Age: 66
End: 2021-03-09
Payer: COMMERCIAL

## 2021-03-09 DIAGNOSIS — M70.51 PES ANSERINUS BURSITIS OF BOTH KNEES: Primary | ICD-10-CM

## 2021-03-09 DIAGNOSIS — M17.11 PRIMARY OSTEOARTHRITIS OF RIGHT KNEE: ICD-10-CM

## 2021-03-09 DIAGNOSIS — M70.51 PES ANSERINUS BURSITIS OF RIGHT KNEE: ICD-10-CM

## 2021-03-09 DIAGNOSIS — M70.52 PES ANSERINUS BURSITIS OF BOTH KNEES: Primary | ICD-10-CM

## 2021-03-09 PROCEDURE — 97110 THERAPEUTIC EXERCISES: CPT | Performed by: PHYSICAL THERAPIST

## 2021-03-09 PROCEDURE — 97140 MANUAL THERAPY 1/> REGIONS: CPT | Performed by: PHYSICAL THERAPIST

## 2021-03-09 NOTE — PROGRESS NOTES
Daily Note     Today's date: 3/9/2021  Patient name: Carmen Arnett  : 1955  MRN: [id number]  Referring provider: Александр Hendrickson PA-C  Dx:   Encounter Diagnosis     ICD-10-CM    1  Pes anserinus bursitis of both knees  M70 51     M70 52    2  Primary osteoarthritis of right knee  M17 11    3  Pes anserinus bursitis of right knee  M70 51             Subjective: Pt reported that she has been performing her HEP as prescribed  Noted that "I am better in general but I am not where I want to be yet "    Objective: See treatment diary below    Assessment: Tolerated treatment well  Initiated specific STM to pes anserine and adductor group, which she noted improved pain significantly  Initiated slider lunge to address adductor tightness and weakness, which she performed well without pain  Patient demonstrated fatigue post treatment, exhibited good technique with therapeutic exercises and would benefit from continued PT   Plan: Continue per plan of care        Precautions: history of breast and ovarian cancer    POC: 21    Manuals 2/24 3/3 3/5 3/9         STM, PROM stretch  [employer]- adductor group         Pat mobs   [employer]                                    Neuro Re-Ed 2/24 3/3 3/5 3/9         SLS foam  :20x3ea held          Tandem Foam  :20x3ea held          Cone taps  3x10ea held                                                              Ther Ex 2/24 3/3 3/5 3/9         SLR 10xea HEP 10x          Hip abd s/l 10xea HEP 10x          bridge 10x HEP           3 way HS stretch seated, heel elevated on box :15x2ea  :15x2ea :15x3ea         Prone knee flexion stretch strap  :15x5ea :15x2ea :15x2         Standing SLR  2x15           Standing hip abd  2x15           Heel raise  3x15           U/l heel raise  3x10ea           Lateral slider lunge    2x10         Supine hamstring stretch strap    :15x5         Supine groin stretch w/ strap    :15x5         Ther Activity 2/24 3/3 3/5 3/9         Step ups Modalities 2/24 3/3 3/5 3/9                      CT

## 2021-03-11 ENCOUNTER — OFFICE VISIT (OUTPATIENT)
Dept: PHYSICAL THERAPY | Facility: CLINIC | Age: 66
End: 2021-03-11
Payer: COMMERCIAL

## 2021-03-11 DIAGNOSIS — M70.51 PES ANSERINUS BURSITIS OF BOTH KNEES: Primary | ICD-10-CM

## 2021-03-11 DIAGNOSIS — M70.51 PES ANSERINUS BURSITIS OF RIGHT KNEE: ICD-10-CM

## 2021-03-11 DIAGNOSIS — M70.52 PES ANSERINUS BURSITIS OF BOTH KNEES: Primary | ICD-10-CM

## 2021-03-11 DIAGNOSIS — M17.11 PRIMARY OSTEOARTHRITIS OF RIGHT KNEE: ICD-10-CM

## 2021-03-11 PROCEDURE — 97110 THERAPEUTIC EXERCISES: CPT | Performed by: PHYSICAL THERAPIST

## 2021-03-11 PROCEDURE — 97140 MANUAL THERAPY 1/> REGIONS: CPT | Performed by: PHYSICAL THERAPIST

## 2021-03-11 NOTE — PROGRESS NOTES
Daily Note     Today's date: 3/11/2021  Patient name: Hakan Arriaza  : 1955  MRN: [id number]  Referring provider: Mari Ochoa PA-C  Dx:   Encounter Diagnosis     ICD-10-CM    1  Pes anserinus bursitis of both knees  M70 51     M70 52    2  Primary osteoarthritis of right knee  M17 11    3  Pes anserinus bursitis of right knee  M70 51             Subjective: Pt reported that she has started to do self massage over tender parts as prescribed, and stated it has been improving her symptoms  Objective: See treatment diary below    Assessment: Tolerated treatment well  Due to symptoms reported in hamstring, initiated STM, which improved pain and flexibility significantly  Also initiated standing knee flexion AROM to improve hamstring strength, which she performed well  Patient demonstrated fatigue post treatment, exhibited good technique with therapeutic exercises and would benefit from continued PT  Plan: Continue per plan of care        Precautions: history of breast and ovarian cancer    POC: 21    Manuals 2/24 3/3 3/5 3/9 3/11        STM, PROM stretch  JZ JZ JZ- adductor group JZ- add and HS        Pat mobs   JZ JZ                                    Neuro Re-Ed 2/24 3/3 3/5 3/9 3/11        SLS foam  :20x3ea held          Tandem Foam  :20x3ea held          Cone taps  3x10ea held                                                              Ther Ex 2/24 3/3 3/5 3/9 3/11        SLR 10xea HEP 10x          Hip abd s/l 10xea HEP 10x          bridge 10x HEP           3 way HS stretch seated, heel elevated on box :15x2ea  :15x2ea :15x3ea :15x3ea        Prone knee flexion stretch strap  :15x5ea :15x2ea :15x2         Standing SLR  2x15           Standing hip abd  2x15           Heel raise  3x15           U/l heel raise  3x10ea           Lateral slider lunge    2x10 2x10ea        Forward slider lunge     2x10ea        Supine hamstring stretch strap    :15x5         Supine groin stretch w/ strap    :15x5 Standing knee flexion     2x10ea        Ther Activity 2/24 3/3 3/5 3/9 3/11        Step ups                                                                 Modalities 2/24 3/3 3/5 3/9 3/11                     CT

## 2021-03-15 ENCOUNTER — OFFICE VISIT (OUTPATIENT)
Dept: PHYSICAL THERAPY | Facility: CLINIC | Age: 66
End: 2021-03-15
Payer: COMMERCIAL

## 2021-03-15 DIAGNOSIS — M70.51 PES ANSERINUS BURSITIS OF RIGHT KNEE: ICD-10-CM

## 2021-03-15 DIAGNOSIS — M70.51 PES ANSERINUS BURSITIS OF BOTH KNEES: Primary | ICD-10-CM

## 2021-03-15 DIAGNOSIS — M17.11 PRIMARY OSTEOARTHRITIS OF RIGHT KNEE: ICD-10-CM

## 2021-03-15 DIAGNOSIS — M70.52 PES ANSERINUS BURSITIS OF BOTH KNEES: Primary | ICD-10-CM

## 2021-03-15 PROCEDURE — 97110 THERAPEUTIC EXERCISES: CPT

## 2021-03-15 PROCEDURE — 97140 MANUAL THERAPY 1/> REGIONS: CPT

## 2021-03-15 NOTE — PROGRESS NOTES
Daily Note     Today's date: 3/15/2021  Patient name: Sandeep Guo  : 1955  MRN: [id number]  Referring provider: Verito Aguirre PA-C  Dx:   Encounter Diagnosis     ICD-10-CM    1  Pes anserinus bursitis of both knees  M70 51     M70 52    2  Primary osteoarthritis of right knee  M17 11    3  Pes anserinus bursitis of right knee  M70 51             Subjective: Ghada reports she was very sore following last session with sx's present for 2-3 days  She states she was unable to lift her legs and had some difficulty walking d/t quad soreness and knee pain  Objective: See treatment diary below    Assessment: Ghada tolerated PT treatment well  Continued with majority of session focusing on manual work  TTP present in R knee along ITB and adductors  Some limited range observed with knee flexion secondary to pain  Modified exercise program perfomed today d/t subjective reports of soreness following last session  Added mini squats and 6" step which patient performed well without symptom provocation  Assess response NV  Pt would benefit from continued PT to further address impairments and maximize functional level  Plan: Continue per plan of care        Precautions: history of breast and ovarian cancer    POC: 21    Manuals 2/24 3/3 3/5 3/9 3/11 3/15       STM, PROM stretch  JZ JZ JZ- adductor group JZ- add and HS JZ- add and HS       Pat mobs   JZ JZ                                    Neuro Re-Ed 2/24 3/3 3/5 3/9 3/11 3/15       SLS foam  :20x3ea held          Tandem Foam  :20x3ea held          Cone taps  3x10ea held                                                              Ther Ex 2/24 3/3 3/5 3/9 3/11 3/15       SLR 10xea HEP 10x   3x10 b/l       Hip abd s/l 10xea HEP 10x          bridge 10x HEP           3 way HS stretch seated, heel elevated on box :15x2ea  :15x2ea :15x3ea :15x3ea :15x3ea       Prone knee flexion stretch strap  :15x5ea :15x2ea :15x2         Standing SLR  2x15 Standing hip abd  2x15           Heel raise  3x15           U/l heel raise  3x10ea           Lateral slider lunge    2x10 2x10ea        Forward slider lunge     2x10ea        Supine hamstring stretch strap    :15x5         Supine groin stretch w/ strap    :15x5         Standing knee flexion     2x10ea 2x10 ea       Mini squats       2x10       Step ups       6" 2x10 ea       Ther Activity 2/24 3/3 3/5 3/9 3/11 3/15       Step ups                                                                 Modalities 2/24 3/3 3/5 3/9 3/11 3/15                    CT

## 2021-03-18 ENCOUNTER — OFFICE VISIT (OUTPATIENT)
Dept: PHYSICAL THERAPY | Facility: CLINIC | Age: 66
End: 2021-03-18
Payer: COMMERCIAL

## 2021-03-18 DIAGNOSIS — M70.52 PES ANSERINUS BURSITIS OF BOTH KNEES: Primary | ICD-10-CM

## 2021-03-18 DIAGNOSIS — M70.51 PES ANSERINUS BURSITIS OF BOTH KNEES: Primary | ICD-10-CM

## 2021-03-18 DIAGNOSIS — M17.11 PRIMARY OSTEOARTHRITIS OF RIGHT KNEE: ICD-10-CM

## 2021-03-18 DIAGNOSIS — M70.51 PES ANSERINUS BURSITIS OF RIGHT KNEE: ICD-10-CM

## 2021-03-18 PROCEDURE — 97140 MANUAL THERAPY 1/> REGIONS: CPT | Performed by: PHYSICAL THERAPIST

## 2021-03-18 PROCEDURE — 97530 THERAPEUTIC ACTIVITIES: CPT | Performed by: PHYSICAL THERAPIST

## 2021-03-18 PROCEDURE — 97110 THERAPEUTIC EXERCISES: CPT | Performed by: PHYSICAL THERAPIST

## 2021-03-18 NOTE — PROGRESS NOTES
Daily Note     Today's date: 3/18/2021  Patient name: Yoon Cortez  : 1955  MRN: [id number]  Referring provider: Syed Maguire PA-C  Dx:   Encounter Diagnosis     ICD-10-CM    1  Pes anserinus bursitis of both knees  M70 51     M70 52    2  Primary osteoarthritis of right knee  M17 11    3  Pes anserinus bursitis of right knee  M70 51             Subjective: Pt reported that she has been performing her HEP as prescribed and is improving, however stated that she is still having difficulty with descending/ascending stairs  Objective: See treatment diary below    Assessment: Tolerated treatment well  Initiated step ups and lateral step downs to address quad weakness and functional difficulty, which she performed well without provocation of pain  Patient demonstrated fatigue post treatment, exhibited good technique with therapeutic exercises and would benefit from continued PT  Plan: Continue per plan of care        Precautions: history of breast and ovarian cancer  POC: 21    Manuals 2/24 3/3 3/5 3/9 3/11 3/15 3/18      STM, PROM stretch  JZ JZ JZ- adductor group JZ- add and HS JZ- add and HS JZ      Pat mobs   JZ JZ                                    Neuro Re-Ed 2/24 3/3 3/5 3/9 3/11 3/15 3/18      SLS foam  :20x3ea held          Tandem Foam  :20x3ea held          Cone taps  3x10ea held                                                              Ther Ex 2/24 3/3 3/5 3/9 3/11 3/15 3/18      Calf stretch       :15x5ea      Soleus stretch incline       :15x5ea                   3 way HS stretch seated, heel elevated on box :15x2ea  :15x2ea :15x3ea :15x3ea :15x3ea :15x3ea      Prone knee flexion stretch strap  :15x5ea :15x2ea :15x2         Standing SLR  2x15           Standing hip abd  2x15           Heel raise  3x15           U/l heel raise  3x10ea           Lateral slider lunge    2x10 2x10ea        Forward slider lunge     2x10ea        Supine hamstring stretch strap    :15x5         Supine groin stretch w/ strap    :15x5         Standing knee flexion     2x10ea 2x10 ea       Mini squats       2x10       Step ups       6" 2x10 ea       Ther Activity 2/24 3/3 3/5 3/9 3/11 3/15 3/18      Step ups       8" 3x10ea      Lateral step down       6" x10ea u/l handrail                                             Modalities 2/24 3/3 3/5 3/9 3/11 3/15 3/18                   CT

## 2021-03-19 DIAGNOSIS — I10 ESSENTIAL HYPERTENSION: ICD-10-CM

## 2021-03-19 RX ORDER — AMLODIPINE BESYLATE 10 MG/1
10 TABLET ORAL DAILY
Qty: 30 TABLET | Refills: 0 | Status: SHIPPED | OUTPATIENT
Start: 2021-03-19 | End: 2021-04-16

## 2021-03-22 ENCOUNTER — OFFICE VISIT (OUTPATIENT)
Dept: PHYSICAL THERAPY | Facility: CLINIC | Age: 66
End: 2021-03-22
Payer: COMMERCIAL

## 2021-03-22 DIAGNOSIS — M70.51 PES ANSERINUS BURSITIS OF RIGHT KNEE: ICD-10-CM

## 2021-03-22 DIAGNOSIS — M70.52 PES ANSERINUS BURSITIS OF BOTH KNEES: Primary | ICD-10-CM

## 2021-03-22 DIAGNOSIS — M17.11 PRIMARY OSTEOARTHRITIS OF RIGHT KNEE: ICD-10-CM

## 2021-03-22 DIAGNOSIS — M70.51 PES ANSERINUS BURSITIS OF BOTH KNEES: Primary | ICD-10-CM

## 2021-03-22 PROCEDURE — 97110 THERAPEUTIC EXERCISES: CPT | Performed by: PHYSICAL THERAPIST

## 2021-03-22 PROCEDURE — 97140 MANUAL THERAPY 1/> REGIONS: CPT | Performed by: PHYSICAL THERAPIST

## 2021-03-22 NOTE — PROGRESS NOTES
Daily Note     Today's date: 3/22/2021  Patient name: Billie Laughlin  :   MRN: [id number]  Referring provider: Moy Maciel PA-C  Dx:   Encounter Diagnosis     ICD-10-CM    1  Pes anserinus bursitis of both knees  M70 51     M70 52    2  Primary osteoarthritis of right knee  M17 11    3  Pes anserinus bursitis of right knee  M70 51             Subjective: Pt reported that she has been performing her HEP as prescribed and stated that her pain levels have remained steady but her functional levels are improving  Objective: See treatment diary below    Assessment: Tolerated treatment well  In order to advance strengthening initiated s/l clamshells, leg press, and abd band with bridge, which she performed well without provocation of pain  Patient demonstrated fatigue post treatment, exhibited good technique with therapeutic exercises and would benefit from continued PT  Plan: Continue per plan of care        Precautions: history of breast and ovarian cancer  POC: 21    Manuals 2/24 3/3 3/5 3/9 3/11 3/15 3/18 3/22     STM, PROM stretch  JZ JZ JZ- adductor group JZ- add and HS JZ- add and HS JZ JZ     Pat mobs   JZ JZ                                    Neuro Re-Ed 2/24 3/3 3/5 3/9 3/11 3/15 3/18 3/22     SLS foam  :20x3ea held          Tandem Foam  :20x3ea held          Cone taps  3x10ea held                                                              Ther Ex 2/24 3/3 3/5 3/9 3/11 3/15 3/18 3/22     Calf stretch       :15x5ea      Soleus stretch incline       :15x5ea      clamshell        O 3x10ea     3 way HS stretch seated, heel elevated on box :15x2ea  :15x2ea :15x3ea :15x3ea :15x3ea :15x3ea :15x2ea     Prone knee flexion stretch strap  :15x5ea :15x2ea :15x2         Standing SLR  2x15           Standing hip abd  2x15      O 3x10ea HOLD    Heel raise  3x15           U/l heel raise  3x10ea           Lateral slider lunge    2x10 2x10ea        Forward slider lunge     2x10ea        Supine hamstring stretch strap    :15x5         Supine groin stretch w/ strap    :15x5         Standing knee flexion     2x10ea 2x10 ea       Mini squats       2x10       Bridge with abd        O 3x10     Ther Activity 2/24 3/3 3/5 3/9 3/11 3/15 3/18 3/22     Step ups       8" 3x10ea      Lateral step down       6" x10ea u/l handrail      Leg press mid in plantar flex        50/ 3x10                               Modalities 2/24 3/3 3/5 3/9 3/11 3/15 3/18 3/22                  CT

## 2021-03-25 ENCOUNTER — OFFICE VISIT (OUTPATIENT)
Dept: PHYSICAL THERAPY | Facility: CLINIC | Age: 66
End: 2021-03-25
Payer: COMMERCIAL

## 2021-03-25 DIAGNOSIS — M70.51 PES ANSERINUS BURSITIS OF BOTH KNEES: Primary | ICD-10-CM

## 2021-03-25 DIAGNOSIS — C50.112 MALIGNANT NEOPLASM OF CENTRAL PORTION OF LEFT BREAST IN FEMALE, ESTROGEN RECEPTOR POSITIVE (HCC): ICD-10-CM

## 2021-03-25 DIAGNOSIS — Z17.0 MALIGNANT NEOPLASM OF CENTRAL PORTION OF LEFT BREAST IN FEMALE, ESTROGEN RECEPTOR POSITIVE (HCC): ICD-10-CM

## 2021-03-25 DIAGNOSIS — M17.11 PRIMARY OSTEOARTHRITIS OF RIGHT KNEE: ICD-10-CM

## 2021-03-25 DIAGNOSIS — M70.51 PES ANSERINUS BURSITIS OF RIGHT KNEE: ICD-10-CM

## 2021-03-25 DIAGNOSIS — M70.52 PES ANSERINUS BURSITIS OF BOTH KNEES: Primary | ICD-10-CM

## 2021-03-25 PROCEDURE — 97112 NEUROMUSCULAR REEDUCATION: CPT | Performed by: PHYSICAL THERAPIST

## 2021-03-25 PROCEDURE — 97530 THERAPEUTIC ACTIVITIES: CPT | Performed by: PHYSICAL THERAPIST

## 2021-03-25 PROCEDURE — 97140 MANUAL THERAPY 1/> REGIONS: CPT | Performed by: PHYSICAL THERAPIST

## 2021-03-25 RX ORDER — ANASTROZOLE 1 MG/1
1 TABLET ORAL DAILY
Qty: 90 TABLET | Refills: 1 | Status: SHIPPED | OUTPATIENT
Start: 2021-03-25 | End: 2021-09-15

## 2021-03-25 NOTE — PROGRESS NOTES
Daily Note     Today's date: 3/25/2021  Patient name: Nida Suero  : 1955  MRN: [id number]  Referring provider: Lincoln Jefferson PA-C  Dx:   Encounter Diagnosis     ICD-10-CM    1  Pes anserinus bursitis of both knees  M70 51     M70 52    2  Primary osteoarthritis of right knee  M17 11    3  Pes anserinus bursitis of right knee  M70 51             Subjective: Pt reported that she felt muscle soreness following the last session  Noted that "In general though I am getting better "     Objective: See treatment diary below    Assessment: Tolerated treatment well  Patient demonstrated fatigue post treatment, exhibited good technique with therapeutic exercises and would benefit from continued PT  Plan: Continue per plan of care        Precautions: history of breast and ovarian cancer  POC: 21    Manuals 2/24 3/3 3/5 3/9 3/11 3/15 3/18 3/22 3/25    STM, PROM stretch  JZ JZ JZ- adductor group JZ- add and HS JZ- add and HS JZ JZ JZ    Pat mobs   JZ JZ                                    Neuro Re-Ed 2/24 3/3 3/5 3/9 3/11 3/15 3/18 3/22 3/25    SLS   :20x3ea held      :20x3ea    Tandem Foam  :20x3ea held          Cone taps  3x10ea held      20x3ea                                                        Ther Ex 2/24 3/3 3/5 3/9 3/11 3/15 3/18 3/22 3/25    Calf stretch       :15x5ea  :15x5    Soleus stretch incline       :15x5ea  :15x5    clamshell        O 3x10ea     3 way HS stretch seated, heel elevated on box :15x2ea  :15x2ea :15x3ea :15x3ea :15x3ea :15x3ea :15x2ea :30x2ea    Prone knee flexion stretch strap  :15x5ea :15x2ea :15x2         Standing SLR  2x15           Standing hip abd  2x15      O 3x10ea HOLD    Heel raise  3x15           U/l heel raise  3x10ea           Lateral slider lunge    2x10 2x10ea        Forward slider lunge     2x10ea        Supine hamstring stretch strap    :15x5         Supine groin stretch w/ strap    :15x5         Standing knee flexion     2x10ea 2x10 ea       Mini squats 2x10       Bridge with abd        O 3x10     Ther Activity 2/24 3/3 3/5 3/9 3/11 3/15 3/18 3/22 3/25    Step ups       8" 3x10ea  8" 3x10    Lateral step down       6" x10ea u/l handrail      Leg press mid in plantar flex        50/ 3x10 U/l 50/ 3x10    Calf press         B/l 50/ 3x15                 Modalities 2/24 3/3 3/5 3/9 3/11 3/15 3/18 3/22 3/25                 CT

## 2021-03-29 ENCOUNTER — OFFICE VISIT (OUTPATIENT)
Dept: PHYSICAL THERAPY | Facility: CLINIC | Age: 66
End: 2021-03-29
Payer: COMMERCIAL

## 2021-03-29 DIAGNOSIS — M70.52 PES ANSERINUS BURSITIS OF BOTH KNEES: Primary | ICD-10-CM

## 2021-03-29 DIAGNOSIS — M70.51 PES ANSERINUS BURSITIS OF RIGHT KNEE: ICD-10-CM

## 2021-03-29 DIAGNOSIS — M17.11 PRIMARY OSTEOARTHRITIS OF RIGHT KNEE: ICD-10-CM

## 2021-03-29 DIAGNOSIS — M70.51 PES ANSERINUS BURSITIS OF BOTH KNEES: Primary | ICD-10-CM

## 2021-03-29 PROCEDURE — 97110 THERAPEUTIC EXERCISES: CPT

## 2021-03-29 PROCEDURE — 97112 NEUROMUSCULAR REEDUCATION: CPT

## 2021-03-29 PROCEDURE — 97140 MANUAL THERAPY 1/> REGIONS: CPT

## 2021-03-29 NOTE — PROGRESS NOTES
Daily Note     Today's date: 3/29/2021  Patient name: Flora Mendenhall  : 1955  MRN: [id number]  Referring provider: Emmy Reyes PA-C  Dx:   Encounter Diagnosis     ICD-10-CM    1  Pes anserinus bursitis of both knees  M70 51     M70 52    2  Primary osteoarthritis of right knee  M17 11    3  Pes anserinus bursitis of right knee  M70 51             Subjective: Ghada reports less soreness following last PT session  R knee continues to be most bothersome  She noted b/l hips and low back have felt very stiff recently  Objective: See treatment diary below    Assessment: Pt tolerated PT treatment well  Continued with current exercise program with good tolerance  Pt is challenged with LE strength training on LP currently  STM performed to anterior and posterior to patella, TTP present throughout  Added LTR to address lumbar/hip tightness  Continue to progress strengthening as able  Pt would benefit from continued PT to further address impairments and maximize functional level  Plan: Continue per plan of care  Precautions: history of breast and ovarian cancer  POC: 21    Manuals 2/24 3/3 3/5 3/9 3/11 3/15 3/18 3/22 3/25 3/29   STM, PROM stretch  JZ JZ JZ- adductor group JZ- add and HS JZ- add and HS JZ JZ JZ JW   Pat mobs   JZ JZ                                    Neuro Re-Ed 2/24 3/3 3/5 3/9 3/11 3/15 3/18 3/22 3/25 3/29   SLS   :20x3ea held      :20x3ea :20x3 ea   Tandem Foam  :20x3ea held          Cone taps  3x10ea held      20x3ea :20x3 ea                                                         Ther Ex 2/24 3/3 3/5 3/9 3/11 3/15 3/18 3/22 3/25 3/29   Calf stretch       :15x5ea  :15x5 :15x5   Soleus stretch incline       :15x5ea  :15x5 :15x5   clamshell        O 3x10ea     3 way HS stretch seated, heel elevated on box :15x2ea  :15x2ea :15x3ea :15x3ea :15x3ea :15x3ea :15x2ea :30x2ea :30x 2 ea    Prone knee flexion stretch strap  :15x5ea :15x2ea :15x2         Standing SLR  2x15 Standing hip abd  2x15      O 3x10ea HOLD    Heel raise  3x15           U/l heel raise  3x10ea           Lateral slider lunge    2x10 2x10ea        Forward slider lunge     2x10ea        Supine hamstring stretch strap    :15x5         Supine groin stretch w/ strap    :15x5         Standing knee flexion     2x10ea 2x10 ea       Mini squats       2x10       Bridge with abd        O 3x10     Ther Activity 2/24 3/3 3/5 3/9 3/11 3/15 3/18 3/22 3/25 3/29   Step ups       8" 3x10ea  8" 3x10 8" 3x10   Lateral step down       6" x10ea u/l handrail      Leg press mid in plantar flex        50/ 3x10 U/l 50/ 3x10 U/l 50/ 3x10   Calf press         B/l 50/ 3x15 B/l 50/ 3x15                Modalities 2/24 3/3 3/5 3/9 3/11 3/15 3/18 3/22 3/25 3/29                CT

## 2021-04-01 ENCOUNTER — OFFICE VISIT (OUTPATIENT)
Dept: PHYSICAL THERAPY | Facility: CLINIC | Age: 66
End: 2021-04-01
Payer: COMMERCIAL

## 2021-04-01 DIAGNOSIS — M17.11 PRIMARY OSTEOARTHRITIS OF RIGHT KNEE: ICD-10-CM

## 2021-04-01 DIAGNOSIS — M70.51 PES ANSERINUS BURSITIS OF RIGHT KNEE: ICD-10-CM

## 2021-04-01 DIAGNOSIS — M70.52 PES ANSERINUS BURSITIS OF BOTH KNEES: Primary | ICD-10-CM

## 2021-04-01 DIAGNOSIS — M70.51 PES ANSERINUS BURSITIS OF BOTH KNEES: Primary | ICD-10-CM

## 2021-04-01 PROCEDURE — 97140 MANUAL THERAPY 1/> REGIONS: CPT | Performed by: PHYSICAL THERAPIST

## 2021-04-01 PROCEDURE — 97110 THERAPEUTIC EXERCISES: CPT | Performed by: PHYSICAL THERAPIST

## 2021-04-01 PROCEDURE — 97530 THERAPEUTIC ACTIVITIES: CPT | Performed by: PHYSICAL THERAPIST

## 2021-04-05 ENCOUNTER — OFFICE VISIT (OUTPATIENT)
Dept: PHYSICAL THERAPY | Facility: CLINIC | Age: 66
End: 2021-04-05
Payer: COMMERCIAL

## 2021-04-05 DIAGNOSIS — M17.11 PRIMARY OSTEOARTHRITIS OF RIGHT KNEE: ICD-10-CM

## 2021-04-05 DIAGNOSIS — M70.52 PES ANSERINUS BURSITIS OF BOTH KNEES: Primary | ICD-10-CM

## 2021-04-05 DIAGNOSIS — M70.51 PES ANSERINUS BURSITIS OF BOTH KNEES: Primary | ICD-10-CM

## 2021-04-05 DIAGNOSIS — M70.51 PES ANSERINUS BURSITIS OF RIGHT KNEE: ICD-10-CM

## 2021-04-05 PROCEDURE — 97110 THERAPEUTIC EXERCISES: CPT | Performed by: PHYSICAL THERAPIST

## 2021-04-05 PROCEDURE — 97530 THERAPEUTIC ACTIVITIES: CPT | Performed by: PHYSICAL THERAPIST

## 2021-04-05 NOTE — PROGRESS NOTES
Daily Note     Today's date: 2021  Patient name: Alisson Blackwood  : 1955  MRN: [id number]  Referring provider: Zhane Clarke PA-C  Dx:   Encounter Diagnosis     ICD-10-CM    1  Pes anserinus bursitis of both knees  M70 51     M70 52    2  Primary osteoarthritis of right knee  M17 11    3  Pes anserinus bursitis of right knee  M70 51             Subjective: Pt reported that she is sorry she is late  Noted that "I am actually feeling pretty good today though " Notes decreased b/l knee pain  Objective: See treatment diary below    Assessment: Tolerated treatment well  Performed u/l leg press and calf press well without provocation of pain  Initiated TKE at [employer] to address quad weakness, which she performed well without provocation of pain  Held manual treatment due to time restraints  Patient demonstrated fatigue post treatment, exhibited good technique with therapeutic exercises and would benefit from continued PT    Plan: Continue per plan of care  Precautions: history of breast and ovarian cancer  POC: 21    Manuals 4/1 4/5     3/18 3/22 3/25 3/29   STM, PROM stretch JZ      JZ JZ JZ JW   Pat mobs                                        Neuro Re-Ed 4/1 4/5     3/18 3/22 3/25 3/29   SLS          :20x3ea :20x3 ea   Tandem Foam             Cone taps         20x3ea :20x3 ea                                                         Ther Ex 4/1 4/5     3/18 3/22 3/25 3/29   Calf stretch :15x3 :15x3     :15x5ea  :15x5 :15x5   Soleus stretch incline :15x3      :15x5ea  :15x5 :15x5   clamshell        O 3x10ea     3 way HS stretch seated, heel elevated on box :15x3ea :15x ea     :15x3ea :15x2ea :30x2ea :30x 2 ea    STS 3x10 3x10           U/l heel raise             TKE [place]  20/ :[id number]                                                          O 3x10     Ther Activity 4/1 4/5     3/18 3/22 3/25 3/29   Step ups 8" 3x10 8" 3x10     8" 3x10ea  8" 3x10 8" 3x10   Lateral step down 8"       6" x10ea u/l HR Leg press mid in plantar flex U/l 60/ 3x10 ea U/l 60/ 3x10      50/ 3x10 U/l 50/ 3x10 U/l 50/ 3x10   Calf press B/l 70/ 3x15 B/l 70/ 3x15       B/l 50/ 3x15 B/l 50/ 3x15   U/l partial squat  2x10           Modalities 4/1 4/5     3/18 3/22 3/25 3/29                CT

## 2021-04-06 ENCOUNTER — OFFICE VISIT (OUTPATIENT)
Dept: OBGYN CLINIC | Facility: CLINIC | Age: 66
End: 2021-04-06
Payer: COMMERCIAL

## 2021-04-06 VITALS
HEIGHT: 61 IN | BODY MASS INDEX: 22.66 KG/M2 | DIASTOLIC BLOOD PRESSURE: 70 MMHG | TEMPERATURE: 97.8 F | WEIGHT: 120 LBS | SYSTOLIC BLOOD PRESSURE: 120 MMHG

## 2021-04-06 DIAGNOSIS — M17.12 PRIMARY OSTEOARTHRITIS OF LEFT KNEE: ICD-10-CM

## 2021-04-06 DIAGNOSIS — M17.11 PRIMARY OSTEOARTHRITIS OF RIGHT KNEE: Primary | ICD-10-CM

## 2021-04-06 DIAGNOSIS — M70.51 PES ANSERINUS BURSITIS OF RIGHT KNEE: ICD-10-CM

## 2021-04-06 PROCEDURE — 3074F SYST BP LT 130 MM HG: CPT | Performed by: ORTHOPAEDIC SURGERY

## 2021-04-06 PROCEDURE — 3078F DIAST BP <80 MM HG: CPT | Performed by: ORTHOPAEDIC SURGERY

## 2021-04-06 PROCEDURE — 1036F TOBACCO NON-USER: CPT | Performed by: ORTHOPAEDIC SURGERY

## 2021-04-06 PROCEDURE — 99213 OFFICE O/P EST LOW 20 MIN: CPT | Performed by: ORTHOPAEDIC SURGERY

## 2021-04-06 PROCEDURE — 3008F BODY MASS INDEX DOCD: CPT | Performed by: ORTHOPAEDIC SURGERY

## 2021-04-06 NOTE — PROGRESS NOTES
Assessment:     1  Primary osteoarthritis of right knee    2  Pes anserinus bursitis of right knee    3  Primary osteoarthritis of left knee        Plan:     Problem List Items Addressed This Visit        Musculoskeletal and Integument    Primary osteoarthritis of right knee - Primary    Primary osteoarthritis of left knee    Pes anserinus bursitis of right knee          Findings consistent with bilateral knee osteoarthritis and pes bursitis right  Patient notes overall improvement following CSI, physical therapy, activity modification  She and her therapist can discuss when to transition out of therapy  Maintain HEP, daily stretching  OTC voltaren gel, anti inflammatories as needed for pain  Avoid high impact activities, stationary bike, elliptical for exercise  Can repeat CSI in May if needed  PRN at this time  All patient's questions were answered to her satisfaction  This note is created using dictation transcription  It may contain typographical errors, grammatical errors, improperly dictated words, background noise and other errors  Subjective:     Patient ID: Manoj Johansen is a 63 y o  female  Chief Complaint:  63 yr old female in for follow up regarding bilateral knee osteoarthritis and right pes bursitis, CSI for osteoarthritis 2/10/21 and right pes bursitis 2/16/21  She has been attending physical therapy which is going well  Bursitis calmed down last week  Still notes pain due to arthritis with certain activities but nothing like she experienced before  She is being careful with activity as not to aggravate her knee's  Using OTC oral medications if needed  She does have lymphedema in left leg  No locking, catching       Allergy:  Allergies   Allergen Reactions    Lisinopril      cough    Losartan      Numbness on right side of body    Boniva [Ibandronic Acid] Headache     Medications:  all current active meds have been reviewed  Past Medical History:  Past Medical History:   Diagnosis Date    BRCA1 positive     BRCA2 positive     [facility] 09/11/2018    Right    Breast cancer [facility] ) 08/13/2019    Left    Cro[facility] disease [facility]     Dense breast     History of chemotherapy     for ovarian cancer    History of radiation therapy     History of transfusion 2009    Hyperlipidemia     Hypertension     Lymphedema     left leg    Lymphedema     Ovarian cancer ([facility] ) 04/21/2009     Past Surgical History:  Past Surgical History:   Procedure Laterality Date    BREAST LUMPECTOMY Right 09/11/2018    BREAST LUMPECTOMY Left 8/13/2019    Procedure: BREAST LUMPECTOMY; BREAST NEEDLE LOCALIZATION (NEEDLE LOC AT 0800); Surgeon: Linn Kimbrough MD;  [facility];  Service: Surgical Oncology    BREAST LUMPECTOMY W/ NEEDLE LOCALIZATION Left 08/13/2019    COLONOSCOPY      EXPLORATORY LAPAROTOMY      HYSTERECTOMY      LYMPH NODE BIOPSY Left 8/13/2019    Procedure: SENTINEL LYMPH NODE BIOPSY; LYMPHATIC MAPPING WITH BLUE DYE AND RADIOACTIVE DYE (INJECT AT 0930 BY DR JENNINGS IN THE OR); Surgeon: Linn Kimbrough MD;  [facility];  Service: Surgical Oncology    MAMMO NEEDLE LOCALIZATION LEFT (ALL INC) Left 8/13/2019    MAMMO NEEDLE LOCALIZATION RIGHT (ALL INC) Right 9/11/2018    MRI BREAST BIOPSY LEFT (ALL INCLUSIVE) Left 7/18/2019    OMENTECTOMY      OR PERQ DEVICE PLACEMT BREAST LOC 1ST LES W GUIDNCE Right 9/11/2018    Procedure: BREAST LUMPECTOMY; BREAST NEEDLE LOCALIZATION (NEEDLE LOC AT 1200);   Surgeon: Linn Kimbrough MD;  [facility];  Service: Surgical Oncology    TOTAL ABDOMINAL HYSTERECTOMY W/ BILATERAL SALPINGOOPHORECTOMY      US GUIDED BREAST BIOPSY LEFT COMPLETE Left 6/17/2019    US GUIDED BREAST BIOPSY RIGHT COMPLETE Right 8/1/2018    WISDOM TOOTH EXTRACTION       Family History:  Family History   Problem Relation Age of Onset    Prostate cancer Father     Alzheimer's disease Father     Hypertension Father     Ovarian cancer Sister     Breast cancer Paternal Grandmother  Hypertension Mother     Heart disease Mother     Breast cancer Maternal Aunt     No Known Problems Sister     Other Sister         pacemaker    Hypertension Sister     Heart disease Sister     No Known Problems Sister     Down syndrome Sister     Alzheimer's disease Sister     Hypertension Sister     No Known Problems Paternal Aunt     No Known Problems Maternal Aunt     Breast cancer Paternal Grandfather      Social History:  Social History     Substance and Sexual Activity   Alcohol Use Yes    Frequency: Monthly or less    Drinks per session: 1 or 2    Comment: rarely     Social History     Substance and Sexual Activity   Drug Use No     Social History     Tobacco Use   Smoking Status Former Smoker    Quit date: 2009    Years since quittin 0   Smokeless Tobacco Never Used     Review of Systems   Constitutional: Negative  HENT: Negative  Eyes: Negative  Respiratory: Negative  Cardiovascular: Negative  Gastrointestinal: Negative  Endocrine: Negative  Genitourinary: Negative  Musculoskeletal: Positive for joint swelling (Left leg)  Negative for arthralgias and gait problem  Skin: Negative  Allergic/Immunologic: Negative  Neurological: Negative  Hematological: Negative  Psychiatric/Behavioral: Negative  Objective:  BP Readings from Last 1 Encounters:   21 120/70      Wt Readings from Last 1 Encounters:   21 54 4 kg (120 lb)      BMI:   Estimated body mass index is 22 67 kg/m² as calculated from the following:    Height as of this encounter: 5' 1" (1 549 m)  Weight as of this encounter: 54 4 kg (120 lb)  BSA:   Estimated body surface area is 1 52 meters squared as calculated from the following:    Height as of this encounter: 5' 1" (1 549 m)  Weight as of this encounter: 54 4 kg (120 lb)  Physical Exam  Vitals signs and nursing note reviewed  Constitutional:       Appearance: Normal appearance  She is well-developed  HENT:      Head: Normocephalic and atraumatic  Right Ear: External ear normal       Left Ear: External ear normal    Eyes:      Extraocular Movements: Extraocular movements intact  Conjunctiva/sclera: Conjunctivae normal    Neck:      Musculoskeletal: Neck supple  Pulmonary:      Effort: Pulmonary effort is normal    Musculoskeletal:      Right knee: She exhibits no effusion  Left knee: She exhibits no effusion  Skin:     General: Skin is warm and dry  Neurological:      Mental Status: She is alert and oriented to person, place, and time  Deep Tendon Reflexes: Reflexes are normal and symmetric  Psychiatric:         Mood and Affect: Mood normal          Behavior: Behavior normal        Right Knee Exam     Muscle Strength   The patient has normal right knee strength  Tenderness   The patient is experiencing no tenderness  Range of Motion   The patient has normal right knee ROM  Tests   Megan:  Medial - negative Lateral - negative  Varus: negative Valgus: negative  Patellar apprehension: negative    Other   Erythema: absent  Scars: absent  Sensation: normal  Pulse: present  Swelling: none  Effusion: no effusion present    Comments: Well tracking patella with crepitation       Left Knee Exam     Muscle Strength   The patient has normal left knee strength  Tenderness   The patient is experiencing no tenderness  Range of Motion   The patient has normal left knee ROM  Tests   Megan:  Medial - negative Lateral - negative  Varus: negative Valgus: negative  Patellar apprehension: negative    Other   Erythema: absent  Scars: absent  Sensation: normal  Pulse: present  Swelling: none  Effusion: no effusion present    Comments:   Well tracking patella with crepitation    Lymphedema left leg wears compression stocking             no new imaging to review      Scribe Attestation    I,:  Shantal Presley am acting as a scribe while in the presence of the attending physician : I,:  Andree Jarrett MD personally performed the services described in this documentation    as scribed in my presence :

## 2021-04-08 ENCOUNTER — OFFICE VISIT (OUTPATIENT)
Dept: PHYSICAL THERAPY | Facility: CLINIC | Age: 66
End: 2021-04-08
Payer: COMMERCIAL

## 2021-04-08 DIAGNOSIS — M70.51 PES ANSERINUS BURSITIS OF BOTH KNEES: Primary | ICD-10-CM

## 2021-04-08 DIAGNOSIS — Z23 ENCOUNTER FOR IMMUNIZATION: ICD-10-CM

## 2021-04-08 DIAGNOSIS — M17.11 PRIMARY OSTEOARTHRITIS OF RIGHT KNEE: ICD-10-CM

## 2021-04-08 DIAGNOSIS — M70.51 PES ANSERINUS BURSITIS OF RIGHT KNEE: ICD-10-CM

## 2021-04-08 DIAGNOSIS — M70.52 PES ANSERINUS BURSITIS OF BOTH KNEES: Primary | ICD-10-CM

## 2021-04-08 PROCEDURE — 97530 THERAPEUTIC ACTIVITIES: CPT | Performed by: PHYSICAL THERAPIST

## 2021-04-08 PROCEDURE — 97140 MANUAL THERAPY 1/> REGIONS: CPT | Performed by: PHYSICAL THERAPIST

## 2021-04-08 PROCEDURE — 97110 THERAPEUTIC EXERCISES: CPT | Performed by: PHYSICAL THERAPIST

## 2021-04-08 NOTE — PROGRESS NOTES
Daily Note     Today's date: 2021  Patient name: Mana Hilton  : 1955  MRN: [id number]  Referring provider: Arina House PA-C  Dx:   Encounter Diagnosis     ICD-10-CM    1  Pes anserinus bursitis of both knees  M70 51     M70 52    2  Primary osteoarthritis of right knee  M17 11    3  Pes anserinus bursitis of right knee  M70 51             Subjective: Pt reported that she went to the doctor, who was content with her current level of progress  Stated that her physician wants her to continue with her home exercise program      Objective: See treatment diary below    Assessment: Tolerated treatment well  In order to advance strengthening initiated STS with TB abduction and forward step down, which she performed well without pain  Continues to report anterior R knee pain  Patient demonstrated fatigue post treatment, exhibited good technique with therapeutic exercises and would benefit from continued PT  Plan: Continue per plan of care  Precautions: history of breast and ovarian cancer  POC: 21    Manuals 4/1 4/5 4/8    3/18 3/22 3/25 3/29   STM, PROM stretch JZ  JZ    JZ JZ JZ JW   Pat mobs    JZ                                    Neuro Re-Ed 4/1 4/5 4/8    3/18 3/22 3/25 3/29   SLS          :20x3ea :20x3 ea   Tandem Foam             Cone taps         20x3ea :20x3 ea                                                         Ther Ex 4/1 4/5 4/8    3/18 3/22 3/25 3/29   Calf stretch :15x3 :15x3 :15x3    :15x5ea  :15x5 :15x5   Soleus stretch incline :15x3      :15x5ea  :15x5 :15x5   clamshell        O 3x10ea     3 way HS stretch seated, heel elevated on box :15x3ea :15x ea :15x5    :15x3ea :15x2ea :30x2ea :30x 2 ea    STS 3x10 3x10 TB abd B3x10          U/l heel raise             TKE [place]  20/ :[id number]           Elliptical   2 5 min                                            O 3x10     Ther Activity 4/1 4/5 4/8    3/18 3/22 3/25 3/29   Step ups 8" 3x10 8" 3x10 8" 3x10    8" 3x10ea  8" 3x10 8" 3x10   Lateral step down 8"   8" 3x10    6" x10ea u/l HR      Forward step down   6" 3x10          Leg press mid in plantar flex U/l 60/ 3x10 ea U/l 60/ 3x10 60/ 3x10 u/l     50/ 3x10 U/l 50/ 3x10 U/l 50/ 3x10   Calf press B/l 70/ 3x15 B/l 70/ 3x15 B/l 70/ 3x15      B/l 50/ 3x15 B/l 50/ 3x15   U/l partial squat  2x10           Modalities 4/1 4/5 4/8    3/18 3/22 3/25 3/29                CT

## 2021-04-12 ENCOUNTER — OFFICE VISIT (OUTPATIENT)
Dept: PHYSICAL THERAPY | Facility: CLINIC | Age: 66
End: 2021-04-12
Payer: COMMERCIAL

## 2021-04-12 DIAGNOSIS — M17.11 PRIMARY OSTEOARTHRITIS OF RIGHT KNEE: ICD-10-CM

## 2021-04-12 DIAGNOSIS — M70.52 PES ANSERINUS BURSITIS OF BOTH KNEES: Primary | ICD-10-CM

## 2021-04-12 DIAGNOSIS — M70.51 PES ANSERINUS BURSITIS OF RIGHT KNEE: ICD-10-CM

## 2021-04-12 DIAGNOSIS — M70.51 PES ANSERINUS BURSITIS OF BOTH KNEES: Primary | ICD-10-CM

## 2021-04-12 PROCEDURE — 97110 THERAPEUTIC EXERCISES: CPT | Performed by: PHYSICAL THERAPIST

## 2021-04-12 PROCEDURE — 97140 MANUAL THERAPY 1/> REGIONS: CPT | Performed by: PHYSICAL THERAPIST

## 2021-04-12 NOTE — PROGRESS NOTES
Daily Note     Today's date: 2021  Patient name: Baltazar Mas  : 1955  MRN: [id number]  Referring provider: Beto Fierro PA-C  Dx:   Encounter Diagnosis     ICD-10-CM    1  Pes anserinus bursitis of both knees  M70 51     M70 52    2  Primary osteoarthritis of right knee  M17 11    3  Pes anserinus bursitis of right knee  M70 51             Subjective: Pt reported increased muscle soreness following the last session  Noted that "I think that we over did it because my muscles are still sore today "     Objective: See treatment diary below    Assessment: Tolerated treatment well  Modified exercises to prevent further knee pain  Modifications improved flexibility and decreased pain  Held all resistance training due to pain  Patient demonstrated fatigue post treatment, exhibited good technique with therapeutic exercises and would benefit from continued PT    Plan: Continue per plan of care  Precautions: history of breast and ovarian cancer  POC: 21    Manuals 4/1 4/5 4/8 4/12   3/18 3/22 3/25 3/29   STM, PROM stretch JZ  JZ JZ   JZ JZ JZ JW   Pat mobs    JZ JZ                                   Neuro Re-Ed 4/1 4/5 4/8 4/12   3/18 3/22 3/25 3/29   SLS          :20x3ea :20x3 ea   Tandem Foam             Cone taps         20x3ea :20x3 ea                                                         Ther Ex 4/1 4/5 4/8 4/12   3/18 3/22 3/25 3/29   Calf stretch :15x3 :15x3 :15x3 :30x3   :15x5ea  :15x5 :15x5   Soleus stretch incline :15x3      :15x5ea  :15x5 :15x5   clamshell        O 3x10ea     3 way HS stretch seated, heel elevated on box :15x3ea :15x ea :15x5 :15x3ea   :15x3ea :15x2ea :30x2ea :30x 2 ea    STS 3x10 3x10 TB abd B3x10          U/l heel raise             TKE [place]  20/ :[id number]           Elliptical   2 5 min          Prone knee flexion stretch    :30x3ea                              O 3x10     Ther Activity 4/1 4/5 4/8 4/12   3/18 3/22 3/25 3/29   Step ups 8" 3x10 8" 3x10 8" 3x10    8" 3x10ea  8" 3x10 8" 3x10   Lateral step down 8"   8" 3x10    6" x10ea u/l HR      Forward step down   6" 3x10          Leg press mid in plantar flex U/l 60/ 3x10 ea U/l 60/ 3x10 60/ 3x10 u/l     50/ 3x10 U/l 50/ 3x10 U/l 50/ 3x10   Calf press B/l 70/ 3x15 B/l 70/ 3x15 B/l 70/ 3x15      B/l 50/ 3x15 B/l 50/ 3x15   U/l partial squat  2x10           Modalities 4/1 4/5 4/8 4/12   3/18 3/22 3/25 3/29                CT

## 2021-04-15 ENCOUNTER — OFFICE VISIT (OUTPATIENT)
Dept: PHYSICAL THERAPY | Facility: CLINIC | Age: 66
End: 2021-04-15
Payer: COMMERCIAL

## 2021-04-15 DIAGNOSIS — M70.52 PES ANSERINUS BURSITIS OF BOTH KNEES: Primary | ICD-10-CM

## 2021-04-15 DIAGNOSIS — M70.51 PES ANSERINUS BURSITIS OF BOTH KNEES: Primary | ICD-10-CM

## 2021-04-15 DIAGNOSIS — M17.11 PRIMARY OSTEOARTHRITIS OF RIGHT KNEE: ICD-10-CM

## 2021-04-15 DIAGNOSIS — M70.51 PES ANSERINUS BURSITIS OF RIGHT KNEE: ICD-10-CM

## 2021-04-15 PROCEDURE — 97530 THERAPEUTIC ACTIVITIES: CPT

## 2021-04-15 PROCEDURE — 97110 THERAPEUTIC EXERCISES: CPT

## 2021-04-15 PROCEDURE — 97140 MANUAL THERAPY 1/> REGIONS: CPT

## 2021-04-15 NOTE — PROGRESS NOTES
Daily Note     Today's date: 4/15/2021  Patient name: Kayleigh Alvarez  : 1955  MRN: [id number]  Referring provider: Daisy Do PA-C  Dx:   Encounter Diagnosis     ICD-10-CM    1  Pes anserinus bursitis of both knees  M70 51     M70 52    2  Primary osteoarthritis of right knee  M17 11    3  Pes anserinus bursitis of right knee  M70 51             Subjective: Ghada reports soreness present in R knee and L hip upon arrival  She states soreness has improved compared to LV but still lingering  She states she has not been stretching her knees at home because it feels very stiff and is uncomfortable  Objective: See treatment diary below    Assessment: Ghada tolerated PT treatment well  PT session focused primarily on manual work and stretching  Gentle STM and tigertail performed along b/l quad and ITB  Knee PROM limited nearing end range flexion  Added stair navigation back into program, pt performed well w/o knee discomfort  Concluded with CP  Instructed pt to complete stretching daily to improve flexibility and stiffness  Pt would benefit from continued PT to further address impairments and maximize functional level  Plan: Continue per plan of care  Precautions: history of breast and ovarian cancer  POC: 21    Manuals 4/1 4/5 4/8 4/12 4/15  3/18 3/22 3/25 3/29   STM, PROM stretch JZ  JZ JZ JW  JZ JZ JZ JW   Pat mobs    JZ JZ                                   Neuro Re-Ed 4/1 4/5 4/8 4/12 4/15  3/18 3/22 3/25 3/29   SLS          :20x3ea :20x3 ea   Tandem Foam             Cone taps         20x3ea :20x3 ea                                                         Ther Ex 4/1 4/5 4/8 4/12 4/15  3/18 3/22 3/25 3/29   Calf stretch :15x3 :15x3 :15x3 :30x3 :30x3 wedge  :15x5ea  :15x5 :15x5   Soleus stretch incline :15x3      :15x5ea  :15x5 :15x5   clamshell        O 3x10ea     3 way HS stretch seated, heel elevated on box :15x3ea :15x ea :15x5 :15x3ea Home  :15x3ea :15x2ea :30x2ea :30x 2 ea STS 3x10 3x10 TB abd B3x10          U/l heel raise             TKE [place]  20/ :[id number]           Elliptical   2 5 min          Prone knee flexion stretch    :30x3ea :30x3 ea                              O 3x10     Ther Activity 4/1 4/5 4/8 4/12 4/15  3/18 3/22 3/25 3/29   Step ups 8" 3x10 8" 3x10 8" 3x10  8" 3x10  8" 3x10ea  8" 3x10 8" 3x10   Lateral step down 8"   8" 3x10  6" 3x10  6" x10ea u/l HR      Forward step down   6" 3x10  6" 2x10        Leg press mid in plantar flex U/l 60/ 3x10 ea U/l 60/ 3x10 60/ 3x10 u/l     50/ 3x10 U/l 50/ 3x10 U/l 50/ 3x10   Calf press B/l 70/ 3x15 B/l 70/ 3x15 B/l 70/ 3x15      B/l 50/ 3x15 B/l 50/ 3x15   U/l partial squat  2x10           Modalities 4/1 4/5 4/8 4/12 4/15  3/18 3/22 3/25 3/29                CT

## 2021-04-16 DIAGNOSIS — I10 ESSENTIAL HYPERTENSION: ICD-10-CM

## 2021-04-16 RX ORDER — AMLODIPINE BESYLATE 10 MG/1
10 TABLET ORAL DAILY
Qty: 30 TABLET | Refills: 0 | Status: SHIPPED | OUTPATIENT
Start: 2021-04-16 | End: 2021-05-14

## 2021-04-17 ENCOUNTER — IMMUNIZATIONS (OUTPATIENT)
Dept: FAMILY MEDICINE CLINIC | Facility: HOSPITAL | Age: 66
End: 2021-04-17

## 2021-04-17 DIAGNOSIS — Z23 ENCOUNTER FOR IMMUNIZATION: Primary | ICD-10-CM

## 2021-04-17 PROCEDURE — 0001A SARS-COV-2 / COVID-19 MRNA VACCINE (PFIZER-BIONTECH) 30 MCG: CPT

## 2021-04-17 PROCEDURE — 91300 SARS-COV-2 / COVID-19 MRNA VACCINE (PFIZER-BIONTECH) 30 MCG: CPT

## 2021-04-19 ENCOUNTER — OFFICE VISIT (OUTPATIENT)
Dept: PHYSICAL THERAPY | Facility: CLINIC | Age: 66
End: 2021-04-19
Payer: COMMERCIAL

## 2021-04-19 DIAGNOSIS — M70.51 PES ANSERINUS BURSITIS OF BOTH KNEES: Primary | ICD-10-CM

## 2021-04-19 DIAGNOSIS — M17.11 PRIMARY OSTEOARTHRITIS OF RIGHT KNEE: ICD-10-CM

## 2021-04-19 DIAGNOSIS — M70.51 PES ANSERINUS BURSITIS OF RIGHT KNEE: ICD-10-CM

## 2021-04-19 DIAGNOSIS — M70.52 PES ANSERINUS BURSITIS OF BOTH KNEES: Primary | ICD-10-CM

## 2021-04-19 PROCEDURE — 97140 MANUAL THERAPY 1/> REGIONS: CPT

## 2021-04-19 PROCEDURE — 97110 THERAPEUTIC EXERCISES: CPT

## 2021-04-19 PROCEDURE — 97530 THERAPEUTIC ACTIVITIES: CPT

## 2021-04-19 NOTE — PROGRESS NOTES
Daily Note     Today's date: 2021  Patient name: Manoj Beach  : 1955  MRN: [id number]  Referring provider: Jet Mora PA-C  Dx:   Encounter Diagnosis     ICD-10-CM    1  Pes anserinus bursitis of both knees  M70 51     M70 52    2  Primary osteoarthritis of right knee  M17 11    3  Pes anserinus bursitis of right knee  M70 51             Subjective: Ghada reports she felt better following last PT session  She notes L hip continues to feel sore and achy throughout thigh, knee pain has diminished slightly compared to LV  Objective: See treatment diary below    Assessment: Ghada displays good tolerance to current POC  Continued with STM and Tigertail to distal quad and ITB  Gentle TPR performed to L piriformis, TTP present  Added figure 4 piriformis stretch today, performed well w/o knee discomfort  Added to HEP  Pt performed stair navigation well  Less knee discomfort present with fwd step down today  Progress LE strength training as able  Pt would benefit from continued PT to further address impairments and maximize functional level  Plan: Continue per plan of care  Precautions: history of breast and ovarian cancer  POC: 21    Manuals 4/1 4/5 4/8 4/12 4/15 4/19  3/22 3/25 3/29   STM, PROM stretch JZ  JZ JZ JW JW  JZ JZ JW   Pat mobs    JZ JZ                                   Neuro Re-Ed 4/1 4/5 4/8 4/12 4/15 4/19  3/22 3/25 3/29   SLS          :20x3ea :20x3 ea   Tandem Foam             Cone taps         20x3ea :20x3 ea                                                         Ther Ex 4/1 4/5 4/8 4/12 4/15 4/19  3/22 3/25 3/29   Calf stretch :15x3 :15x3 :15x3 :30x3 :30x3 wedge :30x3 wedge   :15x5 :15x5   Soleus stretch incline :15x3        :15x5 :15x5   clamshell        O 3x10ea     3 way HS stretch seated, heel elevated on box :15x3ea :15x ea :15x5 :15x3ea Home Home  :15x2ea :30x2ea :30x 2 ea    STS 3x10 3x10 TB abd B3x10          U/l heel raise             TKE [place] 20/ :[id number]           Elliptical   2 5 min          Prone knee flexion stretch    :30x3ea :30x3 ea  :30x3 ea       Piriformis stretch       :30x3 ea               O 3x10     Ther Activity 4/1 4/5 4/8 4/12 4/15 4/19  3/22 3/25 3/29   Step ups 8" 3x10 8" 3x10 8" 3x10  8" 3x10 8" 3x10   8" 3x10 8" 3x10   Lateral step down 8"   8" 3x10  6" 3x10 6" 3x10       Forward step down   6" 3x10  6" 2x10 6" 3x10       Leg press mid in plantar flex U/l 60/ 3x10 ea U/l 60/ 3x10 60/ 3x10 u/l     50/ 3x10 U/l 50/ 3x10 U/l 50/ 3x10   Calf press B/l 70/ 3x15 B/l 70/ 3x15 B/l 70/ 3x15      B/l 50/ 3x15 B/l 50/ 3x15   U/l partial squat  2x10           Modalities 4/1 4/5 4/8 4/12 4/15 4/19  3/22 3/25 3/29                CT

## 2021-04-22 ENCOUNTER — OFFICE VISIT (OUTPATIENT)
Dept: PHYSICAL THERAPY | Facility: CLINIC | Age: 66
End: 2021-04-22
Payer: COMMERCIAL

## 2021-04-22 DIAGNOSIS — M70.51 PES ANSERINUS BURSITIS OF RIGHT KNEE: ICD-10-CM

## 2021-04-22 DIAGNOSIS — M17.11 PRIMARY OSTEOARTHRITIS OF RIGHT KNEE: ICD-10-CM

## 2021-04-22 DIAGNOSIS — M70.51 PES ANSERINUS BURSITIS OF BOTH KNEES: Primary | ICD-10-CM

## 2021-04-22 DIAGNOSIS — M70.52 PES ANSERINUS BURSITIS OF BOTH KNEES: Primary | ICD-10-CM

## 2021-04-22 PROCEDURE — 97530 THERAPEUTIC ACTIVITIES: CPT

## 2021-04-22 PROCEDURE — 97110 THERAPEUTIC EXERCISES: CPT

## 2021-04-22 NOTE — PROGRESS NOTES
Daily Note     Today's date: 2021  Patient name: Tran Goldstein  : 1955  MRN: [id number]  Referring provider: Nora Livingston PA-C  Dx:   Encounter Diagnosis     ICD-10-CM    1  Pes anserinus bursitis of both knees  M70 51     M70 52    2  Primary osteoarthritis of right knee  M17 11    3  Pes anserinus bursitis of right knee  M70 51             Subjective: Ghada reports improvement in L hip discomfort since LV, she states it now feels like a dull ache  Notes soreness and stiffness present in R knee which she contributes to change in weather  Objective: See treatment diary below    Assessment: Ghada tolerated PT treatment well  Initiated PT session with MH to R knee  RB added to exercise program to improve knee ROM  Added step fwd lunge to address knee flexion ROM, limited flexibility observed  Pt performes stair navigation with some challenge today  Fatigue and discomfort present with fwd/lat step downs by third set  Continue to progress exercises as able  Pt would benefit from continued PT to further address impairments and maximize functional level  Plan: Continue per plan of care  Precautions: history of breast and ovarian cancer  POC: 21    Manuals 4/1 4/5 4/8 4/12 4/15 4/19 4/22  3/25 3/29   STM, PROM stretch JZ  JZ JZ JW JW   JZ JW   Pat mobs    JZ JZ                                   Neuro Re-Ed 4/1 4/5 4/8 4/12 4/15 4/19 4/22  3/25 3/29   SLS          :20x3ea :20x3 ea   Tandem Foam             Cone taps         20x3ea :20x3 ea                                                         Ther Ex 4/1 4/5 4/8 4/12 4/15 4/19 4/22  3/25 3/29   Calf stretch :15x3 :15x3 :15x3 :30x3 :30x3 wedge :30x3 wedge :30x3 wedge  :15x5 :15x5   Step flexion stretch        :10x10 b/l       Soleus stretch incline :15x3        :15x5 :15x5   clamshell             3 way HS stretch seated, heel elevated on box :15x3ea :15x ea :15x5 :15x3ea Home Home   :30x2ea :30x 2 ea    STS 3x10 3x10 TB abd B3x10          U/l heel raise             TKE [place]  20/ :[id number]           Elliptical   2 5 min          Prone knee flexion stretch    :30x3ea :30x3 ea  :30x3 ea :30x3 ea      Piriformis stretch       :30x3 ea :30x3 ea       RB       5'                   Ther Activity 4/1 4/5 4/8 4/12 4/15 4/19 4/22  3/25 3/29   Step ups 8" 3x10 8" 3x10 8" 3x10  8" 3x10 8" 3x10 8" 3x10  8" 3x10 8" 3x10   Lateral step down 8"   8" 3x10  6" 3x10 6" 3x10 6" 3x10      Forward step down   6" 3x10  6" 2x10 6" 3x10 6" 3x10      Leg press mid in plantar flex U/l 60/ 3x10 ea U/l 60/ 3x10 60/ 3x10 u/l      U/l 50/ 3x10 U/l 50/ 3x10   Calf press B/l 70/ 3x15 B/l 70/ 3x15 B/l 70/ 3x15      B/l 50/ 3x15 B/l 50/ 3x15   U/l partial squat  2x10           Modalities 4/1 4/5 4/8 4/12 4/15 4/19   3/25 3/29                CT

## 2021-04-26 ENCOUNTER — OFFICE VISIT (OUTPATIENT)
Dept: PHYSICAL THERAPY | Facility: CLINIC | Age: 66
End: 2021-04-26
Payer: COMMERCIAL

## 2021-04-26 DIAGNOSIS — M70.52 PES ANSERINUS BURSITIS OF BOTH KNEES: Primary | ICD-10-CM

## 2021-04-26 DIAGNOSIS — M17.11 PRIMARY OSTEOARTHRITIS OF RIGHT KNEE: ICD-10-CM

## 2021-04-26 DIAGNOSIS — M70.51 PES ANSERINUS BURSITIS OF BOTH KNEES: Primary | ICD-10-CM

## 2021-04-26 DIAGNOSIS — M70.51 PES ANSERINUS BURSITIS OF RIGHT KNEE: ICD-10-CM

## 2021-04-26 PROCEDURE — 97530 THERAPEUTIC ACTIVITIES: CPT

## 2021-04-26 PROCEDURE — 97110 THERAPEUTIC EXERCISES: CPT

## 2021-04-26 NOTE — PROGRESS NOTES
Daily Note     Today's date: 2021  Patient name: Lori Spivey  : 1955  MRN: [id number]  Referring provider: Jamie Johnson PA-C  Dx:   Encounter Diagnosis     ICD-10-CM    1  Pes anserinus bursitis of both knees  M70 51     M70 52    2  Primary osteoarthritis of right knee  M17 11    3  Pes anserinus bursitis of right knee  M70 51             Subjective: Ghada reports b/l knees and L hip is a bit achy today  She states she had a very busy weekend and was on her feet a lot, reports she did not ice much  She states she has been consistent with the stretching  Objective: See treatment diary below    Assessment: Ghada displays good tolerance to current POC  Pt performed prescribed exercises well, greatest difficulty observed with fwd/last step downs secondary to weakness  Pt able to achieve heel tap, but challenged maintaining eccentric lowering  Mini squats added to exercise program  Continue to progress exercises as able  Pt would benefit from continued PT to further address impairments and maximize functional level  Plan: Continue per plan of care  Precautions: history of breast and ovarian cancer  POC: 21    Manuals 4/1 4/5 4/8 4/12 4/15 4/19 4/22 4/26  3/29   STM, PROM stretch JZ  JZ JZ JW JW    JW   Pat mobs    JZ JZ                                   Neuro Re-Ed 4/1 4/5 4/8 4/12 4/15 4/19 4/22 4/26  3/29   SLS           :20x3 ea   Tandem Foam             Cone taps          :20x3 ea                                                         Ther Ex 4/1 4/5 4/8 4/12 4/15 4/19 4/22 4/26  3/29   Calf stretch :15x3 :15x3 :15x3 :30x3 :30x3 wedge :30x3 wedge :30x3 wedge :30x3 wedge  :15x5   Step flexion stretch        :10x10 b/l  :10x10 b/l      Soleus stretch incline :15x3         :15x5   clamshell             3 way HS stretch seated, heel elevated on box :15x3ea :15x ea :15x5 :15x3ea Home Home    :30x 2 ea    STS 3x10 3x10 TB abd B3x10     Mini squat 2x10     U/l heel raise TKE [place]  20/ :[id number]           Elliptical   2 5 min          Prone knee flexion stretch    :30x3ea :30x3 ea  :30x3 ea :30x3 ea :30x3 ea      Piriformis stretch       :30x3 ea :30x3 ea  :30x3 ea     RB       5' 5'                  Ther Activity 4/1 4/5 4/8 4/12 4/15 4/19 4/22 4/26  3/29   Step ups 8" 3x10 8" 3x10 8" 3x10  8" 3x10 8" 3x10 8" 3x10 8" 3x10  8" 3x10   Lateral step down 8"   8" 3x10  6" 3x10 6" 3x10 6" 3x10 6" 3x10     Forward step down   6" 3x10  6" 2x10 6" 3x10 6" 3x10 6" 3x10     Leg press mid in plantar flex U/l 60/ 3x10 ea U/l 60/ 3x10 60/ 3x10 u/l       U/l 50/ 3x10   Calf press B/l 70/ 3x15 B/l 70/ 3x15 B/l 70/ 3x15       B/l 50/ 3x15   U/l partial squat  2x10           Modalities 4/1 4/5 4/8 4/12 4/15 4/19  4/26  3/29   MH        5' pre     CT

## 2021-04-29 ENCOUNTER — OFFICE VISIT (OUTPATIENT)
Dept: PHYSICAL THERAPY | Facility: CLINIC | Age: 66
End: 2021-04-29
Payer: COMMERCIAL

## 2021-04-29 DIAGNOSIS — M17.11 PRIMARY OSTEOARTHRITIS OF RIGHT KNEE: ICD-10-CM

## 2021-04-29 DIAGNOSIS — M70.51 PES ANSERINUS BURSITIS OF RIGHT KNEE: ICD-10-CM

## 2021-04-29 DIAGNOSIS — M70.52 PES ANSERINUS BURSITIS OF BOTH KNEES: Primary | ICD-10-CM

## 2021-04-29 DIAGNOSIS — M70.51 PES ANSERINUS BURSITIS OF BOTH KNEES: Primary | ICD-10-CM

## 2021-04-29 PROCEDURE — 97530 THERAPEUTIC ACTIVITIES: CPT

## 2021-04-29 PROCEDURE — 97110 THERAPEUTIC EXERCISES: CPT

## 2021-04-29 NOTE — PROGRESS NOTES
Daily Note     Today's date: 2021  Patient name: Christina Gonzales  : 1955  MRN: [id number]  Referring provider: Judith Beltre PA-C  Dx:   Encounter Diagnosis     ICD-10-CM    1  Pes anserinus bursitis of both knees  M70 51     M70 52    2  Primary osteoarthritis of right knee  M17 11    3  Pes anserinus bursitis of right knee  M70 51             Subjective: Ghada reports her whole body feels very stiff todau  She states b/l knees have not been sore, just feel tight  She notes she has been completing stretching daily  Objective: See treatment diary below    Assessment: Ralf aMchuca is progressing well with current POC  Increased step height with fwd/lat step downs, challenge present requiring cues for slower eccentric control  Added hip 3 way to address glut weakness, challenge present  Intermittent cueing required for proper form with exercises  Continue to progress strength training as able  Pt would benefit from continued PT to further address impairments and maximize functional level  Plan: Continue per plan of care        Precautions: history of breast and ovarian cancer  POC: 21    Manuals 4/1 4/5 4/8 4/12 4/15 4/19 4/22 4/26 4/29    STM, PROM stretch JZ  JZ JZ JW JW       Pat mobs    JZ JZ                                   Neuro Re-Ed 4/1 4/5 4/8 4/12 4/15 4/19 4/22 4/26 4/29    SLS              Tandem Foam             Cone taps                                                                 Ther Ex 4/1 4/5 4/8 4/12 4/15 4/19 4/22 4/26 4/29    Calf stretch :15x3 :15x3 :15x3 :30x3 :30x3 wedge :30x3 wedge :30x3 wedge :30x3 wedge :30x3 wedge    Step flexion stretch        :10x10 b/l  :10x10 b/l  :10x10 b/l     Soleus stretch incline :15x3            clamshell             3 way HS stretch seated, heel elevated on box :15x3ea :15x ea :15x5 :15x3ea Home Home       STS 3x10 3x10 TB abd B3x10     Mini squat 2x10 Mini squat 3x10    U/l heel raise         B/l 30x     TKE [place]  20/ :[id number] Elliptical   2 5 min          Prone knee flexion stretch    :30x3ea :30x3 ea  :30x3 ea :30x3 ea :30x3 ea  :30x3 ea     Piriformis stretch       :30x3 ea :30x3 ea  :30x3 ea     RB       5' 5' 5'    Hip 3 way          x30 ea     Ther Activity 4/1 4/5 4/8 4/12 4/15 4/19 4/22 4/26 4/29    Step ups 8" 3x10 8" 3x10 8" 3x10  8" 3x10 8" 3x10 8" 3x10 8" 3x10 8" 3x10    Lateral step down 8"   8" 3x10  6" 3x10 6" 3x10 6" 3x10 6" 3x10 8" 2x10    Forward step down   6" 3x10  6" 2x10 6" 3x10 6" 3x10 6" 3x10 8" 2x10     Leg press mid in plantar flex U/l 60/ 3x10 ea U/l 60/ 3x10 60/ 3x10 u/l          Calf press B/l 70/ 3x15 B/l 70/ 3x15 B/l 70/ 3x15          U/l partial squat  2x10           Modalities 4/1 4/5 4/8 4/12 4/15 4/19  4/26 4/29    MH        5' pre     CT                8

## 2021-05-03 ENCOUNTER — OFFICE VISIT (OUTPATIENT)
Dept: PHYSICAL THERAPY | Facility: CLINIC | Age: 66
End: 2021-05-03
Payer: COMMERCIAL

## 2021-05-03 DIAGNOSIS — M70.52 PES ANSERINUS BURSITIS OF BOTH KNEES: Primary | ICD-10-CM

## 2021-05-03 DIAGNOSIS — M70.51 PES ANSERINUS BURSITIS OF RIGHT KNEE: ICD-10-CM

## 2021-05-03 DIAGNOSIS — M17.11 PRIMARY OSTEOARTHRITIS OF RIGHT KNEE: ICD-10-CM

## 2021-05-03 DIAGNOSIS — M70.51 PES ANSERINUS BURSITIS OF BOTH KNEES: Primary | ICD-10-CM

## 2021-05-03 PROCEDURE — 97110 THERAPEUTIC EXERCISES: CPT | Performed by: PHYSICAL THERAPIST

## 2021-05-03 PROCEDURE — 97530 THERAPEUTIC ACTIVITIES: CPT | Performed by: PHYSICAL THERAPIST

## 2021-05-03 NOTE — PROGRESS NOTES
Daily Note     Today's date: 5/3/2021  Patient name: Zeynep Lombardi  : 1955  MRN: [id number]  Referring provider: Yves Patrick PA-C  Dx:   Encounter Diagnosis     ICD-10-CM    1  Pes anserinus bursitis of both knees  M70 51     M70 52    2  Primary osteoarthritis of right knee  M17 11    3  Pes anserinus bursitis of right knee  M70 51             Subjective: Pt reported that "Today is a great day  Not sure why, but I am feeling very strong and have almost no pain "     Objective: See treatment diary below    Assessment: Tolerated treatment well  Modified home exercises and ensured proper form with exercises  Patient demonstrated fatigue post treatment, exhibited good technique with therapeutic exercises and would benefit from continued PT  Plan: Continue per plan of care        Precautions: history of breast and ovarian cancer  POC: 21    Manuals 4/1 4/5 4/8 4/12 4/15 4/19 4/22 4/26 4/29 5/3   STM, PROM stretch JZ  JZ JZ JW JW       Pat mobs    JZ JZ                                   Neuro Re-Ed 4/1 4/5 4/8 4/12 4/15 4/19 4/22 4/26 4/29 5/3   SLS              Tandem Foam             Cone taps                                                                 Ther Ex 4/1 4/5 4/8 4/12 4/15 4/19 4/22 4/26 4/29 53   Calf stretch :15x3 :15x3 :15x3 :30x3 :30x3 wedge :30x3 wedge :30x3 wedge :30x3 wedge :30x3 wedge :30x3   Step flexion stretch        :10x10 b/l  :10x10 b/l  :10x10 b/l     Soleus stretch incline :15x3            clamshell             3 way HS stretch seated, heel elevated on box :15x3ea :15x ea :15x5 :15x3ea Home Home       STS 3x10 3x10 TB abd B3x10     Mini squat 2x10 Mini squat 3x10 3x10   S/l hip abd          3x10ea   Fig 4 bridge          3x10ea   Elliptical   2 5 min          Prone knee flexion stretch    :30x3ea :30x3 ea  :30x3 ea :30x3 ea :30x3 ea  :30x3 ea     Piriformis stretch       :30x3 ea :30x3 ea  :30x3 ea     RB       5' 5' 5'    Hip 3 way          x30 ea     Ther Activity  4/5 4/8 4/12 4/15 4/19 4/22 4/26 4/29 5/3   Step ups 8" 3x10 8" 3x10 8" 3x10  8" 3x10 8" 3x10 8" 3x10 8" 3x10 8" 3x10 8" 3x10   Lateral step down 8"   8" 3x10  6" 3x10 6" 3x10 6" 3x10 6" 3x10 8" 2x10 8" 3x10   Forward step down   6" 3x10  6" 2x10 6" 3x10 6" 3x10 6" 3x10 8" 2x10     Lunge at counter          3x10ea   Leg press mid in plantar flex U/l 60/ 3x10 ea U/l 60/ 3x10 60/ 3x10 u/l          Calf press B/l 70/ 3x15 B/l 70/ 3x15 B/l 70/ 3x15          partial squat  2x10        2x10   Updated HEP:          10'   Modalities 4/1 4/5 4/8 4/12 4/15 4/19  4/26 4/29 5/3   MH        5' pre     CT

## 2021-05-06 ENCOUNTER — OFFICE VISIT (OUTPATIENT)
Dept: PHYSICAL THERAPY | Facility: CLINIC | Age: 66
End: 2021-05-06
Payer: COMMERCIAL

## 2021-05-06 DIAGNOSIS — M17.11 PRIMARY OSTEOARTHRITIS OF RIGHT KNEE: ICD-10-CM

## 2021-05-06 DIAGNOSIS — M70.51 PES ANSERINUS BURSITIS OF BOTH KNEES: Primary | ICD-10-CM

## 2021-05-06 DIAGNOSIS — M70.52 PES ANSERINUS BURSITIS OF BOTH KNEES: Primary | ICD-10-CM

## 2021-05-06 DIAGNOSIS — M70.51 PES ANSERINUS BURSITIS OF RIGHT KNEE: ICD-10-CM

## 2021-05-06 PROCEDURE — 97530 THERAPEUTIC ACTIVITIES: CPT

## 2021-05-06 PROCEDURE — 97110 THERAPEUTIC EXERCISES: CPT

## 2021-05-06 NOTE — PROGRESS NOTES
Daily Note     Today's date: 2021  Patient name: Alisson Blackwood  : 1955  MRN: [id number]  Referring provider: Zhane Clarke PA-C  Dx:   Encounter Diagnosis     ICD-10-CM    1  Pes anserinus bursitis of both knees  M70 51     M70 52    2  Primary osteoarthritis of right knee  M17 11    3  Pes anserinus bursitis of right knee  M70 51             Subjective: Ghada reports she had a really good day yesterday, notes some stiffness present in R hip this am      Objective: See treatment diary below    Assessment: Ghada tolerated PT treatment well  Modified program performed today d/t pt tardiness  She performed prescribed exercises with good technique, little cueing required  Some challenge present with sit to stands from chair, with increase in fatigue pt displays poor eccentric lowering to chair  She displayed increased difficulty with lateral steps downs on RLE today, requiring rest breaks to complete full reps  Resume full strength training NV  Instructed pt to increase stretching at home to improve LE flexibility  Pt would benefit from continued PT to further address impairments and maximize functional level  Plan: Continue per plan of care        Precautions: history of breast and ovarian cancer  POC: 21    Manuals 5/6  4/8 4/12 4/15 4/19 4/22 4/26 4/29 5/3   STM, PROM stretch   JZ JZ JW JW       Pat mobs    JZ JZ                                   Neuro Re-Ed 5/6  4/8 4/12 4/15 4/19 4/22 4/26 4/29 5/3   SLS              Tandem Foam             Cone taps                                                                 Ther Ex 5/6  4/8 4/12 4/15 4/19 4/22 4/26 4/29 5/3   Calf stretch   :15x3 :30x3 :30x3 wedge :30x3 wedge :30x3 wedge :30x3 wedge :30x3 wedge :30x3   Step flexion stretch  :10x10 b/l       :10x10 b/l  :10x10 b/l  :10x10 b/l     Soleus stretch incline             clamshell             3 way HS stretch seated, heel elevated on box   :15x5 :[address]       STS 30x  TB abd B3x10 Mini squat 2x10 Mini squat 3x10 3x10   S/l hip abd          3x10ea   Fig 4 bridge          3x10ea   Elliptical   2 5 min          Prone knee flexion stretch    :30x3ea :30x3 ea  :30x3 ea :30x3 ea :30x3 ea  :30x3 ea     Piriformis stretch       :30x3 ea :30x3 ea  :30x3 ea     RB       5' 5' 5'    Hip 3 way          x30 ea     Ther Activity   4/8 4/12 4/15 4/19 4/22 4/26 4/29 5/3   Step ups 8" 3x10   8" 3x10  8" 3x10 8" 3x10 8" 3x10 8" 3x10 8" 3x10 8" 3x10   Lateral step down 8" 2x15  8" 3x10  6" 3x10 6" 3x10 6" 3x10 6" 3x10 8" 2x10 8" 3x10   Forward step down 8" 2x10  6" 3x10  6" 2x10 6" 3x10 6" 3x10 6" 3x10 8" 2x10     Lunge at counter 3x10 ea          3x10ea   Leg press mid in plantar flex   60/ 3x10 u/l          Calf press   B/l 70/ 3x15          partial squat          2x10   Updated HEP:          10'   Modalities 5/6  4/8 4/12 4/15 4/19  4/26 4/29 5/3   MH        5' pre     CT

## 2021-05-11 ENCOUNTER — OFFICE VISIT (OUTPATIENT)
Dept: PHYSICAL THERAPY | Facility: CLINIC | Age: 66
End: 2021-05-11
Payer: COMMERCIAL

## 2021-05-11 DIAGNOSIS — M70.51 PES ANSERINUS BURSITIS OF BOTH KNEES: Primary | ICD-10-CM

## 2021-05-11 DIAGNOSIS — M70.51 PES ANSERINUS BURSITIS OF RIGHT KNEE: ICD-10-CM

## 2021-05-11 DIAGNOSIS — M17.11 PRIMARY OSTEOARTHRITIS OF RIGHT KNEE: ICD-10-CM

## 2021-05-11 DIAGNOSIS — M70.52 PES ANSERINUS BURSITIS OF BOTH KNEES: Primary | ICD-10-CM

## 2021-05-11 PROCEDURE — 97530 THERAPEUTIC ACTIVITIES: CPT | Performed by: PHYSICAL THERAPIST

## 2021-05-11 PROCEDURE — 97140 MANUAL THERAPY 1/> REGIONS: CPT | Performed by: PHYSICAL THERAPIST

## 2021-05-11 PROCEDURE — 97110 THERAPEUTIC EXERCISES: CPT | Performed by: PHYSICAL THERAPIST

## 2021-05-11 NOTE — PROGRESS NOTES
Daily Note     Today's date: 2021  Patient name: Lori Spivey  : 1955  MRN: [id number]  Referring provider: Jamie Johnson PA-C  Dx:   Encounter Diagnosis     ICD-10-CM    1  Pes anserinus bursitis of both knees  M70 51     M70 52    2  Primary osteoarthritis of right knee  M17 11    3  Pes anserinus bursitis of right knee  M70 51             Subjective: Pt noted that "I am feeling okay today " Stated that "some days are better than others "     Objective: See treatment diary below    Assessment: Tolerated treatment well  Patient demonstrated fatigue post treatment, exhibited good technique with therapeutic exercises and would benefit from continued PT  Plan: Continue per plan of care        Precautions: history of breast and ovarian cancer  POC: 21    Manuals  53   STM, PROM stretch  JZ           Pat mobs   JZ                                     Neuro Re-Ed  53   SLS              Tandem Foam             Cone taps                                                                 Ther Ex  53   Calf stretch  :15x3ea      :30x3 wedge :30x3 wedge :30x3   Step flexion stretch  :10x10 b/l  ea      :10x10 b/l  :10x10 b/l     Soleus stretch incline  :15x3ea           clamshell             3 way HS stretch seated, heel elevated on box  :15x3ea           STS 30x       Mini squat 2x10 Mini squat 3x10 3x10   S/l hip abd          3x10ea   Fig 4 bridge          3x10ea   Elliptical             Prone knee flexion stretch        :30x3 ea  :30x3 ea     Piriformis stretch         :30x3 ea     RB        5' 5'    Hip 3 way          x30 ea     Standing hip ext thigh stretch on box  :15x3ea           Ther Activity   53   Step ups 8" 3x10  8" 3x10      8" 3x10 8" 3x10 8" 3x10   Lateral step down 8" 2x15 8" 3x10      6" 3x10 8" 2x10 8" 3x10   Forward step down 8" 2x10 8" 3x10      6" 3x10 8" 2x10     Lunge at counter 3x10 ea 3x10ea   Leg press mid in plantar flex  90/ 3x10           Calf press  90/ 3x10           partial squat  3x10        2x10   Updated HEP:          10'   Modalities 5/6 5/11      4/26 4/29 5/3   MH        5' pre     CT

## 2021-05-12 ENCOUNTER — IMMUNIZATIONS (OUTPATIENT)
Dept: FAMILY MEDICINE CLINIC | Facility: HOSPITAL | Age: 66
End: 2021-05-12

## 2021-05-12 DIAGNOSIS — Z23 ENCOUNTER FOR IMMUNIZATION: Primary | ICD-10-CM

## 2021-05-12 PROCEDURE — 91300 SARS-COV-2 / COVID-19 MRNA VACCINE (PFIZER-BIONTECH) 30 MCG: CPT

## 2021-05-12 PROCEDURE — 0002A SARS-COV-2 / COVID-19 MRNA VACCINE (PFIZER-BIONTECH) 30 MCG: CPT

## 2021-05-13 ENCOUNTER — LAB (OUTPATIENT)
Dept: LAB | Facility: CLINIC | Age: 66
End: 2021-05-13
Payer: COMMERCIAL

## 2021-05-13 ENCOUNTER — TRANSCRIBE ORDERS (OUTPATIENT)
Dept: LAB | Facility: CLINIC | Age: 66
End: 2021-05-13

## 2021-05-13 ENCOUNTER — OFFICE VISIT (OUTPATIENT)
Dept: GYNECOLOGIC ONCOLOGY | Facility: CLINIC | Age: 66
End: 2021-05-13
Payer: COMMERCIAL

## 2021-05-13 VITALS
DIASTOLIC BLOOD PRESSURE: 90 MMHG | WEIGHT: 124 LBS | HEIGHT: 61 IN | SYSTOLIC BLOOD PRESSURE: 130 MMHG | BODY MASS INDEX: 23.41 KG/M2 | RESPIRATION RATE: 16 BRPM | TEMPERATURE: 98.1 F | HEART RATE: 73 BPM

## 2021-05-13 DIAGNOSIS — Z08 ENCOUNTER FOR FOLLOW-UP SURVEILLANCE OF OVARIAN CANCER: ICD-10-CM

## 2021-05-13 DIAGNOSIS — R82.90 BAD ODOR OF URINE: ICD-10-CM

## 2021-05-13 DIAGNOSIS — Z85.43 HISTORY OF OVARIAN CANCER: Primary | ICD-10-CM

## 2021-05-13 DIAGNOSIS — Z86.000 HISTORY OF DUCTAL CARCINOMA IN SITU (DCIS) OF BREAST: ICD-10-CM

## 2021-05-13 DIAGNOSIS — L90.0 LICHEN SCLEROSUS ET ATROPHICUS: ICD-10-CM

## 2021-05-13 DIAGNOSIS — Z85.43 ENCOUNTER FOR FOLLOW-UP SURVEILLANCE OF OVARIAN CANCER: ICD-10-CM

## 2021-05-13 PROCEDURE — 87077 CULTURE AEROBIC IDENTIFY: CPT

## 2021-05-13 PROCEDURE — 87086 URINE CULTURE/COLONY COUNT: CPT

## 2021-05-13 PROCEDURE — 87186 SC STD MICRODIL/AGAR DIL: CPT

## 2021-05-13 PROCEDURE — 99214 OFFICE O/P EST MOD 30 MIN: CPT | Performed by: PHYSICIAN ASSISTANT

## 2021-05-13 PROCEDURE — 1036F TOBACCO NON-USER: CPT | Performed by: PHYSICIAN ASSISTANT

## 2021-05-13 PROCEDURE — 3008F BODY MASS INDEX DOCD: CPT | Performed by: PHYSICIAN ASSISTANT

## 2021-05-13 NOTE — ASSESSMENT & PLAN NOTE
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

## 2021-05-13 NOTE — PROGRESS NOTES
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 cancer  She is clinically without evidence of ovarian cancer recurrence       Return to the office in 1 year for continued surveillance           History of ductal carcinoma in situ (DCIS) of breast     Continue regular follow-up with surgical oncology  Encounter for follow-up surveillance of ovarian cancer    Lichen sclerosus et atrophicus     Exam stable, symptoms well controlled  Continue clobetasol ointment  Bad odor of urine     Plan for urine culture  Relevant Orders    Urine culture            CHIEF COMPLAINT:   Ovarian cancer surveillance  Problem:  Cancer Staging  History of ductal carcinoma in situ (DCIS) of breast  Staging form: Breast, AJCC 8th Edition  - Pathologic: Stage 0 (pTis (DCIS), pN0, cM0, ER: Negative, FL: Negative) - Unsigned    History of ovarian cancer  Staging form: Ovary, AJCC 7th Edition  - Clinical: FIGO Stage IA (T1a, N0, M0) - Signed by Sweetie Nichols PA-C on 4/19/2018    Malignant neoplasm of central portion of left breast in female, estrogen receptor positive ([facility] [address] )  Staging form: Breast, AJCC 8th Edition  - Pathologic: Stage IA (pT1b, pN0(sn), cM0, G1, ER+, FL-, HER2-) - Unsigned        Previous therapy:  Oncology History   History of ovarian cancer    Initial Diagnosis    Ovarian cancer ([facility] [address] )     4/21/2009 Surgery    Exploratory laparotomy, total abdominal hysterectomy, bilateral salpingo-oophrectomy, lymph node dissection, omentectomy with staging      - 7/8/2009 Chemotherapy    Intraperitoneal along with intravenous chemotherapy on a early ovarian cancer protocol       5/31/2018 Genetic Testing    Genetic testing results are POSITIVE for a pathogenic variant: YOLANDA c  5290delC      Genetic testing indicated that the patient carries a Variant of Uncertain Significance (VUS) : Rad51C c 252G>T      Hormone Therapy       History of ductal carcinoma in situ (DCIS) of breast   5/31/2018 Genetic Testing    Genetic testing results are 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      Genetic testing indicated that the patient carries a Variant of Uncertain Significance (VUS) : Rad51C c 252G>T     8/1/2018 Biopsy    Right breast biopsy  11 o'clock position 5 cmfn  DCIS  Grade 2  Confirmed by Manan Saucedo MD  ER 0  SC 0     9/11/2018 Surgery    Right lumpectomy  DCIS  Grade 2  1 4 cm  Margins negative  Stage 0     10/16/2018 -  Hormone Therapy    Consult with Dr Gato Suárez  No adjuvant systemic therapy recommended     10/29/2018 - 11/28/2018 Radiation    Course: C1    Plan ID Energy Fractions Dose per Fraction (cGy) Dose Correction (cGy) Total Dose Delivered (cGy) Elapsed Days   R Breast 6X 16 / 16 266 0 4,256 22   R Breast e 12E 5 / 5 200 0 1,000 7      Treatment dates:  C1: 10/29/2018 - 11/28/2018       Malignant neoplasm of central portion of left breast in female, estrogen receptor positive ([facility] [address] )   7/18/2019 Biopsy    Left breast MRI-guided biopsy  3 o'clock, posterior depth  Invasive breast carcinoma of no special type  Grade 1  ER 90  SC 0  HER2 1+     8/13/2019 Surgery    Left breast needle localized lumpectomy with sentinel lymph node biopsy  Invasive mammary carcinoma of no special type  Grade 1  3 mm  Margins negative  0/1 Lymph node  Anatomic/Prognostic Stage IA     10/8/2019 - 11/5/2019 Radiation      Treatment:  Course: C2  Plan ID Energy Fractions Dose per Fraction (cGy) Dose Correction (cGy) Total Dose Delivered (cGy) Elapsed Days   BH L Breast 6X 16 / 16 266 0 4,256 21   BH L Brst e 12E 5 / 5 200 0 1,000 6    C2: 10/8/2019 - 11/5/2019 11/2019 -  Hormone Therapy    Anastrozole           Patient ID: Mana Hilton is a 63 y o  female  who presents to the office for ovarian cancer surveillance  No vaginal bleeding, abdominal/pelvic pain  The patien notes a small amount of vaginal discharge  Normal bowel function  She also notes occasional urinary incontinence and new onset of foul smelling urine  She notes her lymphedema is stable  She continues compression stocking and pump  No interval change in medical history since last visit  Quality of life is good  The following portions of the patient's history were reviewed and updated as appropriate: allergies, current medications, past medical history, past surgical history and problem list     Review of Systems   Constitutional: Negative  HENT: Negative  Eyes: Negative  Respiratory: Negative  Cardiovascular: Negative  Gastrointestinal: Negative  Genitourinary:        As per HPI   Musculoskeletal: Negative  Skin: Negative  Neurological: Negative  Psychiatric/Behavioral: Negative          Current Outpatient Medications   Medication Sig Dispense Refill    albuterol (PROVENTIL HFA,VENTOLIN HFA) 90 mcg/act inhaler Inhale 2 puffs as needed for wheezing 1 Inhaler 0    alendronate (FOSAMAX) 70 mg tablet Take 1 tablet (70 mg total) by mouth every 7 days 4 tablet 11    amLODIPine (NORVASC) 10 mg tablet TAKE 1 TABLET (10 MG TOTAL) BY MOUTH DAILY 30 tablet 0    anastrozole (ARIMIDEX) 1 mg tablet TAKE 1 TABLET (1 MG TOTAL) BY MOUTH DAILY 90 tablet 1    atorvastatin (LIPITOR) 40 mg tablet Take 1 tablet (40 mg total) by mouth daily at bedtime 90 tablet 1    Calcium Carbonate (CALTRATE 600) 1500 (600 Ca) MG TABS Take 1 tablet by mouth 2 (two) times a day      clobetasol (TEMOVATE) 0 05 % ointment Apply to the affected area 1-2 times a week 30 g 2    Elastic Bandages & Supports (MEDICAL COMPRESSION STOCKINGS) MISC Use daily as directed, 3 pair total - LEFT side: 20-30mmhg large honey- color thigh-high; RIGHT side: 20-30mmhg medium honey- colored, thigh high 3 each 0    Homeopathic Products (ARNICARE PAIN RELIEF SL) Place under the tongue      hydrochlorothiazide (MICROZIDE) 12 5 mg capsule TAKE 1 CAPSULE (12 5 MG TOTAL) BY MOUTH EVERY MORNING 30 capsule 5    inFLIXimab (REMICADE) 100 mg Infuse into a venous catheter        mupirocin (BACTROBAN) 2 % ointment Apply topically 3 (three) times a day 22 g 0    trolamine salicylate (ASPERCREME) 10 % cream Apply topically as needed for muscle/joint pain      Turmeric 500 MG CAPS Take by mouth      cyclobenzaprine (FLEXERIL) 10 mg tablet Take 1 tablet (10 mg total) by mouth daily at bedtime (Patient not taking: Reported on 2/16/2021) 20 tablet 0     No current facility-administered medications for this visit  Objective:    Resp  rate 16, height 5' 1" (1 549 m), weight 56 2 kg (124 lb)  Body mass index is 23 43 kg/m²  Body surface area is 1 54 meters squared  Physical Exam  Vitals signs reviewed  Exam conducted with a chaperone present  Constitutional:       General: She is not in acute distress  Appearance: Normal appearance  She is not ill-appearing  HENT:      Head: Normocephalic and atraumatic  Mouth/Throat:      Mouth: Mucous membranes are moist    Eyes:      General:         Right eye: No discharge  Left eye: No discharge  Conjunctiva/sclera: Conjunctivae normal    Pulmonary:      Effort: Pulmonary effort is normal    Abdominal:      Palpations: Abdomen is soft  There is no mass  Tenderness: There is no abdominal tenderness  Hernia: No hernia is present  Genitourinary:     Comments: The external female genitalia is normal  Loss of labia minor bilaterally  The bartholin's, uretheral and skenes glands are normal  The urethral meatus is normal (midline with no lesions)  Anus without fissure or lesion  Speculum exam reveals a grossly normal vagina  No masses, lesions,discharge or bleeding  No significant cystocele or rectocele noted  Bimanual exam notes a surgical absent cervix, uterus and adnexal structures   No masses or fullness  Bladder is without fullness, mass or tenderness  Musculoskeletal:      Right lower leg: Edema present  Left lower leg: Edema present  Skin:     General: Skin is warm and dry  Coloration: Skin is not jaundiced  Findings: No rash  Neurological:      General: No focal deficit present  Mental Status: She is alert and oriented to person, place, and time  Cranial Nerves: No cranial nerve deficit  Sensory: No sensory deficit  Motor: No weakness  Gait: Gait normal    Psychiatric:         Mood and Affect: Mood normal          Behavior: Behavior normal          Thought Content:  Thought content normal          Judgment: Judgment normal

## 2021-05-14 ENCOUNTER — APPOINTMENT (OUTPATIENT)
Dept: PHYSICAL THERAPY | Facility: CLINIC | Age: 66
End: 2021-05-14
Payer: COMMERCIAL

## 2021-05-14 DIAGNOSIS — I10 ESSENTIAL HYPERTENSION: ICD-10-CM

## 2021-05-14 RX ORDER — AMLODIPINE BESYLATE 10 MG/1
10 TABLET ORAL DAILY
Qty: 30 TABLET | Refills: 0 | Status: SHIPPED | OUTPATIENT
Start: 2021-05-14 | End: 2021-06-11

## 2021-05-14 NOTE — TELEPHONE ENCOUNTER
Will need another eval and documentation- will likely need to order more studies to further evaluate

## 2021-05-14 NOTE — TELEPHONE ENCOUNTER
I phoned patient asking for for update on her condition  Pt was seen by Dr Eddi Mobley and given cortizone injections along with P T  It is helping        Pt will be calling back to schedule her yearly physical

## 2021-05-15 LAB — BACTERIA UR CULT: ABNORMAL

## 2021-05-17 ENCOUNTER — TELEPHONE (OUTPATIENT)
Dept: SURGICAL ONCOLOGY | Facility: CLINIC | Age: 66
End: 2021-05-17

## 2021-05-17 DIAGNOSIS — N39.0 URINARY TRACT INFECTION WITHOUT HEMATURIA, SITE UNSPECIFIED: Primary | ICD-10-CM

## 2021-05-17 RX ORDER — SULFAMETHOXAZOLE AND TRIMETHOPRIM 800; 160 MG/1; MG/1
1 TABLET ORAL EVERY 12 HOURS SCHEDULED
Qty: 10 TABLET | Refills: 0 | Status: SHIPPED | OUTPATIENT
Start: 2021-05-17 | End: 2021-05-22

## 2021-05-17 NOTE — PROGRESS NOTES
Post-Test Genetic Counseling Consult Note    Patient Name: John Poole   /Age: 1955/65 y o  Referring Provider: Darin Spurling, CRNP    Date of Service: 2021  Genetic Counselor: Gerardo Argueta MS, [facility] [place]  Interpretation Services: None  Location: In-person consult at [facility] of Visit: 61 minutes      Chicho Marrero was referred to the [address] [place] [address] and Genetic Assessment Program to discuss her genetic test result      Genetic Testing History:     Report Date: 18     Test: [facility] Breast/Gyn Panel (23 Genes): YOLANDA, BARD1, BRCA1, BRCA2, BRIP1, CDH1, CHEK2, EPCAM, FANCC, MLH1, MRE11A, MSH2, MSH6, MUTYH, NBN, NF1, PALB2, PMS2, POLD, PTEN, RAD51C RAD51D, RECQL, TP53     Result: Positive:  Variant of Uncertain Significance (VUS):     Variant 1: 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 p [id number], Heterozygous, Pathogenic      Variant 2: RAD51C c 252G>T (p [id number]), Heterozygous, Uncertain Significance       Cancer History and Treatment:   Oncology History   History of ovarian cancer     Initial Diagnosis     Ovarian cancer ([address] )   2009 Surgery     Exploratory laparotomy, total abdominal hysterectomy, bilateral salpingo-oophrectomy, lymph node dissection, omentectomy with staging    - 2009 Chemotherapy       Hormone Therapy   History of ductal carcinoma in situ (DCIS) of breast   2018 Biopsy     Right breast biopsy  11 o'clock position 5 cmfn  DCIS  Grade 2  ER 0  WA 0   2018 Surgery     Right lumpectomy  DCIS   10/16/2018 -  Hormone Therapy     No adjuvant systemic therapy recommended   10/29/2018 - 2018 Radiation   Malignant neoplasm of central portion of left breast in female, estrogen receptor positive ([facility] [address] )   2019 Biopsy     Left breast MRI-guided biopsy  3 o'clock, posterior depth  Invasive breast carcinoma of no special type  Grade 1  ER 90  WA 0  HER2 1+      2019 Surgery     Left breast needle localized lumpectomy with sentinel lymph node biopsy  Invasive mammary carcinoma of no special type  Grade 1  3 mm  Margins negative  0/1 Lymph node  Anatomic/Prognostic Stage IA      10/8/2019 - 11/5/2019 Radiation   11/2019 -  Hormone Therapy     Anastrozole     Screening Hx:   Breast:  Mammogram 6/15/20:   IMPRESSION:   Expected postoperative changes  No suspicious abnormality visible  Breast MRI 12/23/21:   IMPRESSION:  Postoperative changes in both breasts without suspicious mass enhancement  Colon:  Colonoscopy: 7/15/19  2 polyps reported; hx of polyps and chrons, has had frequent colonoscopies snce hte 90s due ot chrons     Gynecologic:  FRANCO/BSO    Skin:  Skin cancer screening: derm next month, goes annually for the past 4 years    Other screening: none    Medical and Surgical History  Pertinent surgical history:   Past Surgical History:   Procedure Laterality Date    BREAST LUMPECTOMY Right 09/11/2018    BREAST LUMPECTOMY Left 8/13/2019    Procedure: BREAST LUMPECTOMY; BREAST NEEDLE LOCALIZATION (NEEDLE LOC AT 0800); Surgeon: Brian Dia MD;  [facility];  Service: Surgical Oncology    BREAST LUMPECTOMY W/ NEEDLE LOCALIZATION Left 08/13/2019    COLONOSCOPY      EXPLORATORY LAPAROTOMY      HYSTERECTOMY      LYMPH NODE BIOPSY Left 8/13/2019    Procedure: SENTINEL LYMPH NODE BIOPSY; LYMPHATIC MAPPING WITH BLUE DYE AND RADIOACTIVE DYE (INJECT AT 0930 BY DR JENNINGS IN THE OR); Surgeon: Brian Dia MD;  [facility];  Service: Surgical Oncology    MAMMO NEEDLE LOCALIZATION LEFT (ALL INC) Left 8/13/2019    MAMMO NEEDLE LOCALIZATION RIGHT (ALL INC) Right 9/11/2018    MRI BREAST BIOPSY LEFT (ALL INCLUSIVE) Left 7/18/2019    OMENTECTOMY      CO PERQ DEVICE PLACEMT BREAST LOC 1ST LES W GUIDNCE Right 9/11/2018    Procedure: BREAST LUMPECTOMY; BREAST NEEDLE LOCALIZATION (NEEDLE LOC AT 1200);   Surgeon: Brian Dia MD;  [facility];  Service: Surgical Oncology    TOTAL ABDOMINAL HYSTERECTOMY W/ BILATERAL SALPINGOOPHORECTOMY      US GUIDED BREAST BIOPSY LEFT COMPLETE Left 2019    US GUIDED BREAST BIOPSY RIGHT COMPLETE Right 2018    WISDOM TOOTH EXTRACTION        Pertinent medical history:  Past Medical History:   Diagnosis Date    BRCA1 positive     BRCA2 positive     [facility] ) 2018    Right    [facility] ) 2019    Left    Crohn disease ([facility] )     Dense breast     History of chemotherapy     for ovarian cancer    History of radiation therapy     History of transfusion     Hyperlipidemia     Hypertension     Lymphedema     left leg    Lymphedema     Ovarian cancer ([facility] ) 2009         Other History:  Height:   Ht Readings from Last 1 Encounters:   21 5' 1" (1 549 m)     Weight:   Wt Readings from Last 1 Encounters:   21 56 2 kg (124 lb)       Relevant Family History     Ghada has 6 siblings  Their history includes:   Brother(s): 50, no cancer history   Sister(s): 5  o 3, no cancer history  o 48, diagnosed with melanoma (face)  o  (61), diagnosed with ovarian cancer at 46   Nieces/nephews: no cancer history    Yahaira maternal family history includes:   Mother:  (80), no cancer history   Aunt(s): 2  o [de-identified], no cancer history  o Aunt diagnosed with breast and lung cancer   Cousin(s): no cancer history   Grandmother:  (80)   Grandfather:  (64)    Ghada's paternal family history includes:   Father:  (80), diagnosed with prostate cancer at 80   Aunt(s): [de-identified], no cancer history   Uncle(s): 80, no cancer history    Cousin(s): no cancer histroy   Grandmother:  (48), diagnosed with breast cancer at 46   Grandfather:  ([de-identified])      Please refer to the scanned pedigree in the Media Tab for a complete family history     *All history is reported as provided by the patient; records are not available for review, except where indicated       Assessment for YOLANDA Mutation:   Ela Joe carries one pathogenic variant in the YOLANDA gene, specifically c 5290delC (p [id number])  The YOLANDA gene is associated with autosomal dominant predisposition to breast, pancreatic, ovarian and possibly prostate cancer  There is also preliminary evidence suggesting YOLANDA is associated with other cancer types including stomach, bladder and colon, although available evidence is insufficient to make a determination regarding these relationships  The YOLANDA gene is also associated with autosomal recessive ataxia-telangiectasia (A-T)  YOLANDA-associated Cancer Risks:   Women who carry a single pathogenic YOLANDA variant have an increased risk of breast cancer over their lifetime  The lifetime risk for breast cancer is between 15-40%  There is also evidence suggesting women have an increased risk for ovarian cancer  Men with a single pathogenic variant in YOLANDA have an increased risk of prostate cancer although the exact risk is not currently known  Both men and women have an increased risk of pancreatic cancer  The lifetime risk for pancreatic cancer is approximately 5-10%  There is also evidence to suggest an association between YOLANDA and other cancers including stomach, bladder and colon, although available evidence is insufficient to make a determination regarding these relationships  We reviewed that at present the YOLANDA mutation does not explain Ghada's personal or family history of ovarian cancer  More evidence is needed to understand if having an YOLANDA gene mutation increases an individual's risk for ovarian cancer  Reproductive Risks:  Individuals with a single pathogenic variant in the YOLANDA are also carriers of an autosomal recessive condition known as ataxia-telangiectasia (A-T)  Autosomal recessive means an individual needs 2 pathogenic variants in the YOLANDA gene to be affected with ataxia-telangiectasia  If both Ghada and her partner carry 1 YOLANDA pathogenic variant there is a risk of having a child with ataxia-telangiectasia  Ghada and I discussed the reproductive risk for ataxia-telangiectasia for her family members if they were found to also carry this YOLANDA variant  Assessment for RAD51C VUS:  A variant of uncertain significance (VUS) means that a change was identified in a specific gene but it cannot be determined whether the variant is associated with an increased risk of cancer or is a harmless genetic change  The significance of the RAD51C variant is currently not known and therefore this test result cannot be used to help determine Ghada cancer risks  It is possible that the variant was seen in only a handful of individuals, or there may be conflicting or incomplete information in the medical literature about the variant and its association with hereditary cancer  Although mutations in the RAD51C gene are associated with increased risks for ovarian cancer, we reviewed that this result is uncertain and therefore does not explain Ghada's personal or fmaily history of ovarian cancer  Genetic testing for this variant is not recommended for relatives who wish to determine their cancer risks for purposes of determining medical management  The presence or absence of this variant in a relative is not clinically meaningful unless the variant is reclassified in the future  The laboratory will continue to accumulate information on this variant and will reclassify it as either a positive or negative genetic test result when they are confident that they have adequate information  As updated information is obtained, we will notify Ghada with the updated information  It is important to note that the majority of variants of uncertain significance are reclassified as likely benign or benign as additional information about the variant becomes available  Risks for Family Members: This test result may help clarify the risk for other family members to develop cancer   All first-degree relatives (parents, siblings and children) have up to a 50% chance of having the pathogenic variant  Other relatives such as aunts, uncles and cousins may also be at risk  Since it is not known with one-hundred percent certainty which side of the family this YOLANDA pathogenic variant came from, we recommend that [facility] share this test results with extended family members from both sides of her family  We encouraged Ghada  to discuss this information with her relatives  If [facility] family members have any questions or are interested in testing they can reach out to the main Genetics number at [phone] for additional information  Management:  Management guidelines for individuals with a pathogenic or likely pathogenic YOLANDA variant have been developed by the [facility]  Please refer to the current NCCN guidelines for the most up to date guidelines  The recommendations listed below are specific to [facility] and are based on recommendations in the V2 2021 Genetic/Familial High-Risk Assessment: Breast, Ovarian and Pancreatic Guideline and the V1 2020 Genetic/Familial High-Risk Assessment: Colorectal Guideline  These recommendations are subject to change over time and the newest guidelines should be referenced for the most up to date recommendations  Plan:  Breast Cancer   Annual mammography with consideration of tomosynthesis starting at 36years of age  Kelli Butt Consider annual breast MRI with contrast starting at age 36   Prophylactic risk-reducing mastectomy: evidence insufficient; manage based on family history  Pancreatic Cancer  Current NCCN guidelines recommend pancreatic cancer screening for individuals with a single pathogenic YOLANDA variant be limited to those with a first- or second-degree relative affected with pancreatic cancer       Gynecological Cancer Screening:    Ovarian Cancer:  [facility] has had a Hysterectomy and bilateral salpingo-oophorectomy     Colon Cancer  Since the association between the YOLANDA gene and colon cancer has not yet been completely established there are no screening recommendations for colon cancer  Ionizing Radiation:   Individuals with a single pathogenic YOLANDA variant do not need to avoid radiation therapy at this time  Other Screening  No additional recommendations  Iesha Sheriff should continue to follow recommendations by her healthcare provider    Summary:    Positive Result: Iesha Sheriff was strongly encouraged to follow up on with our office on an annual basis to review the most up to date guidelines as recommendations are subject to change over time    VUS Result: Iesha Sheriff was strongly encouraged to contact us regarding any changes in her personal or family history of cancer as these changes could alter our recommendation regarding genetic testing and/or cancer screening  Iesha Sheriff was also encouraged to follow up with us on an annual basis as variant classifications are subject to change

## 2021-05-18 ENCOUNTER — CLINICAL SUPPORT (OUTPATIENT)
Dept: GENETICS | Facility: CLINIC | Age: 66
End: 2021-05-18

## 2021-05-18 DIAGNOSIS — C56.9 MALIGNANT NEOPLASM OF OVARY, UNSPECIFIED LATERALITY (HCC): ICD-10-CM

## 2021-05-18 DIAGNOSIS — Z15.01 MONOALLELIC MUTATION OF ATM GENE: Primary | ICD-10-CM

## 2021-05-18 DIAGNOSIS — C50.112 MALIGNANT NEOPLASM OF CENTRAL PORTION OF LEFT BREAST IN FEMALE, ESTROGEN RECEPTOR POSITIVE (HCC): ICD-10-CM

## 2021-05-18 DIAGNOSIS — Z17.0 MALIGNANT NEOPLASM OF CENTRAL PORTION OF LEFT BREAST IN FEMALE, ESTROGEN RECEPTOR POSITIVE (HCC): ICD-10-CM

## 2021-05-18 DIAGNOSIS — Z15.09 MONOALLELIC MUTATION OF ATM GENE: Primary | ICD-10-CM

## 2021-05-18 DIAGNOSIS — Z80.41 FAMILY HISTORY OF OVARIAN CANCER: ICD-10-CM

## 2021-05-18 DIAGNOSIS — Z15.89 MONOALLELIC MUTATION OF ATM GENE: Primary | ICD-10-CM

## 2021-05-18 NOTE — LETTER
May 18, 2021     [address] [facility] [address]    Patient: Geno Snyder  YOB: 1955  Date of Visit: 2021      Dear Dr Yakelin Segura: Thank you for referring Darryle Bond to me for evaluation  Below are my notes for this consultation  If you have questions, please do not hesitate to call me  I look forward to following your patient along with you  Sincerely,        Heath Espinoza        CC: Aide Lin DO        Post-Test Genetic Counseling Consult Note    Patient Name: Geno Snyder   /Age:  y o  Referring Provider: LUIS Stubbs    Date of Service: 2021  Genetic Counselor: Heath Espinoza MS, [facility]  Interpretation Services: None  Location: In-person consult at [facility] of Visit: 61 minutes      Ulysses Cart was referred to the [address] [place] [address] and Genetic Assessment Program to discuss her genetic test result      Genetic Testing History:     Report Date: 18     Test: Gene[facility] Breast/Gyn Panel (21 Genes): YOLANDA, BARD1, BRCA1, BRCA2, BRIP1, CDH1, CHEK2, EPCAM, FANCC, MLH1, MRE11A, MSH2, MSH6, MUTYH, NBN, NF1, PALB2, PMS2, POLD, PTEN, RAD51C RAD51D, RECQL, TP53     Result: Positive:  Variant of Uncertain Significance (VUS):     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, Heterozygous, Pathogenic      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), Heterozygous, Uncertain Significance       Cancer History and Treatment:   Oncology History   History of ovarian cancer     Initial Diagnosis     Ovarian cancer ([facility] [address] )   2009 Surgery     Exploratory laparotomy, total abdominal hysterectomy, bilateral salpingo-oophrectomy, lymph node dissection, omentectomy with staging    - 2009 Chemotherapy       Hormone Therapy   History of ductal carcinoma in situ (DCIS) of breast   2018 Biopsy     Right breast biopsy  11 o'clock position 5 cmfn  DCIS  Grade 2  ER 0  MI 0   2018 Surgery     Right lumpectomy  DCIS 10/16/2018 -  Hormone Therapy     No adjuvant systemic therapy recommended   10/29/2018 - 11/28/2018 Radiation   Malignant neoplasm of central portion of left breast in female, estrogen receptor positive ([facility] [address] )   7/18/2019 Biopsy     Left breast MRI-guided biopsy  3 o'clock, posterior depth  Invasive breast carcinoma of no special type  Grade 1  ER 90  MI 0  HER2 1+      8/13/2019 Surgery     Left breast needle localized lumpectomy with sentinel lymph node biopsy  Invasive mammary carcinoma of no special type  Grade 1  3 mm  Margins negative  0/1 Lymph node  Anatomic/Prognostic Stage IA      10/8/2019 - 11/5/2019 Radiation   11/2019 -  Hormone Therapy     Anastrozole     Screening Hx:   Breast:  Mammogram 6/15/20:   IMPRESSION:   Expected postoperative changes  No suspicious abnormality visible  Breast MRI 12/23/21:   IMPRESSION:  Postoperative changes in both breasts without suspicious mass enhancement  Colon:  Colonoscopy: 7/15/19  2 polyps reported; hx of polyps and chrons, has had frequent colonoscopies snce hte 90s due ot chrons     Gynecologic:  FRANCO/BSO    Skin:  Skin cancer screening: derm next month, goes annually for the past 4 years    Other screening: none    Medical and Surgical History  Pertinent surgical history:   Past Surgical History:   Procedure Laterality Date    BREAST LUMPECTOMY Right 09/11/2018    BREAST LUMPECTOMY Left 8/13/2019    Procedure: BREAST LUMPECTOMY; BREAST NEEDLE LOCALIZATION (NEEDLE LOC AT 0800); Surgeon: Eva Lamas MD;  [facility];  Service: Surgical Oncology    BREAST LUMPECTOMY W/ NEEDLE LOCALIZATION Left 08/13/2019    COLONOSCOPY      EXPLORATORY LAPAROTOMY      HYSTERECTOMY      LYMPH NODE BIOPSY Left 8/13/2019    Procedure: SENTINEL LYMPH NODE BIOPSY; LYMPHATIC MAPPING WITH BLUE DYE AND RADIOACTIVE DYE (INJECT AT 0930 BY DR JENNINGS IN THE OR);   Surgeon: Eva Lamas MD;  [facility];  Service: Surgical Oncology    MAMMO NEEDLE LOCALIZATION LEFT (ALL INC) Left 2019    MAMMO NEEDLE LOCALIZATION RIGHT (ALL INC) Right 2018    MRI BREAST BIOPSY LEFT (ALL INCLUSIVE) Left 2019    OMENTECTOMY      NM PERQ DEVICE PLACEMT BREAST LOC 1ST LES W GUIDNCE Right 2018    Procedure: BREAST LUMPECTOMY; BREAST NEEDLE LOCALIZATION (NEEDLE LOC AT 1200); Surgeon: Joan Wang MD;  [facility];  Service: Surgical Oncology    TOTAL ABDOMINAL HYSTERECTOMY W/ BILATERAL SALPINGOOPHORECTOMY      US GUIDED BREAST BIOPSY LEFT COMPLETE Left 2019    US GUIDED BREAST BIOPSY RIGHT COMPLETE Right 2018    WISDOM TOOTH EXTRACTION        Pertinent medical history:  Past Medical History:   Diagnosis Date    BRCA1 positive     BRCA2 positive     Breast cancer ([facility] [address] ) 2018    Right    Breast cancer ([facility] [address] ) 2019    Left    Crohn disease ([facility] [address] )     Dense breast     History of chemotherapy     for ovarian cancer    History of radiation therapy     History of transfusion     Hyperlipidemia     Hypertension     Lymphedema     left leg    Lymphedema     Ovarian cancer ([facility] [address] ) 2009         Other History:  Height:   Ht Readings from Last 1 Encounters:   21 5' 1" (1 549 m)     Weight:   Wt Readings from Last 1 Encounters:   21 56 2 kg (124 lb)       Relevant Family History     Ghada has 6 siblings   Their history includes:   Brother(s): 50, no cancer history   Sister(s): 5  o 3, no cancer history  o 48, diagnosed with melanoma (face)  o  (61), diagnosed with ovarian cancer at 46   Nieces/nephews: no cancer history    Ghada's maternal family history includes:   Mother:  (80), no cancer history   Aunt(s): 2  o [de-identified], no cancer history  o Aunt diagnosed with breast and lung cancer   Cousin(s): no cancer history   Grandmother:  (80)   Grandfather:  (64)    Ghada's paternal family history includes:   Father:  (80), diagnosed with prostate cancer at 80   Aunt(s): [de-identified], no cancer history   Uncle(s): 80, no cancer history    Cousin(s): no cancer histroy   Grandmother:  (48), diagnosed with breast cancer at 46   Grandfather:  ([de-identified])      Please refer to the scanned pedigree in the Media Tab for a complete family history     *All history is reported as provided by the patient; records are not available for review, except where indicated  Assessment for YOLANDA Mutation:   Philip Mosqueda carries one pathogenic variant in the YOLANDA gene, specifically c 5290delC (p [id number])  The YOLANDA gene is associated with autosomal dominant predisposition to breast, pancreatic, ovarian and possibly prostate cancer  There is also preliminary evidence suggesting YOLANDA is associated with other cancer types including stomach, bladder and colon, although available evidence is insufficient to make a determination regarding these relationships  The YOLANDA gene is also associated with autosomal recessive ataxia-telangiectasia (A-T)  YOLANDA-associated Cancer Risks:   Women who carry a single pathogenic YOLANDA variant have an increased risk of breast cancer over their lifetime  The lifetime risk for breast cancer is between 15-40%  There is also evidence suggesting women have an increased risk for ovarian cancer  Men with a single pathogenic variant in YOLANDA have an increased risk of prostate cancer although the exact risk is not currently known  Both men and women have an increased risk of pancreatic cancer  The lifetime risk for pancreatic cancer is approximately 5-10%  There is also evidence to suggest an association between YOLANDA and other cancers including stomach, bladder and colon, although available evidence is insufficient to make a determination regarding these relationships  We reviewed that at present the YOLANDA mutation does not explain Ghada's personal or family history of ovarian cancer   More evidence is needed to understand if having an YOLANDA gene mutation increases an individual's risk for ovarian cancer  Reproductive Risks:  Individuals with a single pathogenic variant in the YOLANDA are also carriers of an autosomal recessive condition known as ataxia-telangiectasia (A-T)  Autosomal recessive means an individual needs 2 pathogenic variants in the YOLANDA gene to be affected with ataxia-telangiectasia  If both Ghada and her partner carry 1 YOLANDA pathogenic variant there is a risk of having a child with ataxia-telangiectasia  Ghada and I discussed the reproductive risk for ataxia-telangiectasia for her family members if they were found to also carry this YOLANDA variant  Assessment for RAD51C VUS:  A variant of uncertain significance (VUS) means that a change was identified in a specific gene but it cannot be determined whether the variant is associated with an increased risk of cancer or is a harmless genetic change  The significance of the RAD51C variant is currently not known and therefore this test result cannot be used to help determine Ghada cancer risks  It is possible that the variant was seen in only a handful of individuals, or there may be conflicting or incomplete information in the medical literature about the variant and its association with hereditary cancer  Although mutations in the RAD51C gene are associated with increased risks for ovarian cancer, we reviewed that this result is uncertain and therefore does not explain Ghada's personal or fmaily history of ovarian cancer  Genetic testing for this variant is not recommended for relatives who wish to determine their cancer risks for purposes of determining medical management  The presence or absence of this variant in a relative is not clinically meaningful unless the variant is reclassified in the future       The laboratory will continue to accumulate information on this variant and will reclassify it as either a positive or negative genetic test result when they are confident that they have adequate information  As updated information is obtained, we will notify Ghada with the updated information  It is important to note that the majority of variants of uncertain significance are reclassified as likely benign or benign as additional information about the variant becomes available  Risks for Family Members: This test result may help clarify the risk for other family members to develop cancer  All first-degree relatives (parents, siblings and children) have up to a 50% chance of having the pathogenic variant  Other relatives such as aunts, uncles and cousins may also be at risk  Since it is not known with one-hundred percent certainty which side of the family this YOLANDA pathogenic variant came from, we recommend that [facility] share this test results with extended family members from both sides of her family  We encouraged Ghada  to discuss this information with her relatives  If [facility] family members have any questions or are interested in testing they can reach out to the main Genetics number at [phone] for additional information  Management:  Management guidelines for individuals with a pathogenic or likely pathogenic YOLANDA variant have been developed by the [facility]  Please refer to the current NCCN guidelines for the most up to date guidelines  The recommendations listed below are specific to [facility] and are based on recommendations in the V2 2021 Genetic/Familial High-Risk Assessment: Breast, Ovarian and Pancreatic Guideline and the V1 2020 Genetic/Familial High-Risk Assessment: Colorectal Guideline  These recommendations are subject to change over time and the newest guidelines should be referenced for the most up to date recommendations        Plan:  Breast Cancer   Annual mammography with consideration of tomosynthesis starting at 36years of age  [facility] Consider annual breast MRI with contrast starting at age 40   Prophylactic risk-reducing mastectomy: evidence insufficient; manage based on family history  Pancreatic Cancer  Current NCCN guidelines recommend pancreatic cancer screening for individuals with a single pathogenic YOLANDA variant be limited to those with a first- or second-degree relative affected with pancreatic cancer  Gynecological Cancer Screening:    Ovarian Cancer:  Chicho Marrero has had a Hysterectomy and bilateral salpingo-oophorectomy     Colon Cancer  Since the association between the YOLANDA gene and colon cancer has not yet been completely established there are no screening recommendations for colon cancer  Ionizing Radiation:   Individuals with a single pathogenic YOLANDA variant do not need to avoid radiation therapy at this time  Other Screening  No additional recommendations  Chicho Marrero should continue to follow recommendations by her healthcare provider    Summary:    Positive Result: Chicho Marrero was strongly encouraged to follow up on with our office on an annual basis to review the most up to date guidelines as recommendations are subject to change over time    VUS Result: Chicho Marrero was strongly encouraged to contact us regarding any changes in her personal or family history of cancer as these changes could alter our recommendation regarding genetic testing and/or cancer screening  Chicho Marrero was also encouraged to follow up with us on an annual basis as variant classifications are subject to change

## 2021-05-19 ENCOUNTER — OFFICE VISIT (OUTPATIENT)
Dept: PHYSICAL THERAPY | Facility: CLINIC | Age: 66
End: 2021-05-19
Payer: COMMERCIAL

## 2021-05-19 DIAGNOSIS — M70.51 PES ANSERINUS BURSITIS OF RIGHT KNEE: ICD-10-CM

## 2021-05-19 DIAGNOSIS — M70.51 PES ANSERINUS BURSITIS OF BOTH KNEES: Primary | ICD-10-CM

## 2021-05-19 DIAGNOSIS — M17.11 PRIMARY OSTEOARTHRITIS OF RIGHT KNEE: ICD-10-CM

## 2021-05-19 DIAGNOSIS — M70.52 PES ANSERINUS BURSITIS OF BOTH KNEES: Primary | ICD-10-CM

## 2021-05-19 PROCEDURE — 97110 THERAPEUTIC EXERCISES: CPT | Performed by: PHYSICAL THERAPIST

## 2021-05-19 PROCEDURE — 97530 THERAPEUTIC ACTIVITIES: CPT | Performed by: PHYSICAL THERAPIST

## 2021-05-19 NOTE — PROGRESS NOTES
Daily Note     Today's date: 2021  Patient name: Alejandro Person  : 1955  MRN: [id number]  Referring provider: Alyssa Burnham PA-C  Dx:   Encounter Diagnosis     ICD-10-CM    1  Pes anserinus bursitis of both knees  M70 51     M70 52    2  Primary osteoarthritis of right knee  M17 11    3  Pes anserinus bursitis of right knee  M70 51             Subjective: Pt reported that she has been "feeling a little more stiff than usual  Could be because I am doing more at work "     Objective: See treatment diary below    Assessment: Tolerated treatment well  Patient demonstrated fatigue post treatment, exhibited good technique with therapeutic exercises and would benefit from continued PT  Plan: Continue per plan of care        Precautions: history of breast and ovarian cancer  POC: 21    Manuals  5/3   STM, PROM stretch  JZ           Pat mobs   JZ                                     Neuro Re-Ed  5/3   SLS              Tandem Foam             Cone taps                                                                 Ther Ex  5/3   Calf stretch  :15x3ea :15x3     :30x3 wedge :30x3 wedge :30x3   Step flexion stretch  :10x10 b/l  ea      :10x10 b/l  :10x10 b/l     Soleus stretch incline  :15x3ea :15x3          clamshell             3 way HS stretch seated, heel elevated on box  :15x3ea :15x3ea          STS 30x  3x10     Mini squat 2x10 Mini squat 3x10 3x10   S/l hip abd          3x10ea   Fig 4 bridge          3x10ea   Elliptical             Prone knee flexion stretch        :30x3 ea  :30x3 ea     Piriformis stretch         :30x3 ea     RB        5' 5'    Hip 3 way          x30 ea     Standing hip ext thigh stretch on box  :15x3ea :15x3          Ther Activity   5/3   Step ups 8" 3x10  8" 3x10 8" 3x10     8" 3x10 8" 3x10 8" 3x10   Lateral step down 8" 2x15 8" 3x10 8" 3x10     6" 3x10 8" 2x10 8" 3x10   Forward step down 8" 2x10 8" 3x10 8" 3x10     6" 3x10 8" 2x10     Lunge at counter 3x10 ea          3x10ea   Leg press mid in plantar flex  90/ 3x10 90/ 3x10          Calf press  90/ 3x10 90/ 3x10          partial squat  3x10        2x10   Updated HEP:          10'   Modalities 5/6 5/11 5/19     4/26 4/29 5/3   MH        5' pre     CT

## 2021-05-20 ENCOUNTER — OFFICE VISIT (OUTPATIENT)
Dept: PHYSICAL THERAPY | Facility: CLINIC | Age: 66
End: 2021-05-20
Payer: COMMERCIAL

## 2021-05-20 DIAGNOSIS — M70.52 PES ANSERINUS BURSITIS OF BOTH KNEES: Primary | ICD-10-CM

## 2021-05-20 DIAGNOSIS — M70.51 PES ANSERINUS BURSITIS OF BOTH KNEES: Primary | ICD-10-CM

## 2021-05-20 DIAGNOSIS — M70.51 PES ANSERINUS BURSITIS OF RIGHT KNEE: ICD-10-CM

## 2021-05-20 DIAGNOSIS — M17.11 PRIMARY OSTEOARTHRITIS OF RIGHT KNEE: ICD-10-CM

## 2021-05-20 PROCEDURE — 97530 THERAPEUTIC ACTIVITIES: CPT

## 2021-05-20 PROCEDURE — 97110 THERAPEUTIC EXERCISES: CPT

## 2021-05-20 NOTE — PROGRESS NOTES
Daily Note     Today's date: 2021  Patient name: Roverto Syed  : 1955  MRN: [id number]  Referring provider: Gina Haywood PA-C  Dx:   Encounter Diagnosis     ICD-10-CM    1  Pes anserinus bursitis of both knees  M70 51     M70 52    2  Primary osteoarthritis of right knee  M17 11    3  Pes anserinus bursitis of right knee  M70 51             Subjective: Bernadette reports b/l knees were a little sore following yesterday's PT session  She states today she is feeling "pretty good"  She states overall she feels she is getting stronger  Objective: See treatment diary below    Assessment: Bernadette tolerated PT treatment well  She displays steady progress with LE strength  Stair navigation is becoming less challenging for patient, she demonstrates better eccentric control of LE's and less reliance on UE support  Adedd b/l and u/l exercises on leg press today, fatigue evident by 3rd set  Patient denied symptom provocation throughout and post treatment session  Pt would benefit from continued PT to further address impairments and maximize functional level  Plan: Continue per plan of care        Precautions: history of breast and ovarian cancer  POC: 21    Manuals  5/3   STM, PROM stretch  JZ           Pat mobs   JZ                                     Neuro Re-Ed  53   SLS              Tandem Foam             Cone taps                                                                 Ther Ex  53   Calf stretch  :15x3ea :15x3 :30x3    :30x3 wedge :30x3 wedge :30x3   Step flexion stretch  :10x10 b/l  ea      :10x10 b/l  :10x10 b/l     Soleus stretch incline  :15x3ea :15x3 :15x3         clamshell             3 way HS stretch seated, heel elevated on box  :15x3ea :15x3ea          STS 30x  3x10     Mini squat 2x10 Mini squat 3x10 3x10   S/l hip abd          3x10ea   Fig 4 bridge          3x10ea   Elliptical Prone knee flexion stretch        :30x3 ea  :30x3 ea     Piriformis stretch         :30x3 ea     RB        5' 5'    Hip 3 way          x30 ea     Standing hip ext thigh stretch on box  :15x3ea :15x3 :15x3          Ther Activity  5/11 5/19 5/20 4/26 4/29 5/3   Step ups 8" 3x10  8" 3x10 8" 3x10 8" 3x10 ea     8" 3x10 8" 3x10 8" 3x10   Lateral step down 8" 2x15 8" 3x10 8" 3x10 8" 3x10 ea     6" 3x10 8" 2x10 8" 3x10   Forward step down 8" 2x10 8" 3x10 8" 3x10 8" 3x10     6" 3x10 8" 2x10     Lunge at counter 3x10 ea          3x10ea   Leg press mid in plantar flex  90/ 3x10 90/ 3x10 90/ 3x10   B/l 50/ 3x10 u/l         Calf press  90/ 3x10 90/ 3x10 90/ 3x10         partial squat  3x10  3x10      2x10   Updated HEP:          10'   Modalities 5/6 5/11 5/19 5/20 4/26 4/29 5/3   MH        5' pre     CT

## 2021-05-21 ENCOUNTER — APPOINTMENT (OUTPATIENT)
Dept: PHYSICAL THERAPY | Facility: CLINIC | Age: 66
End: 2021-05-21
Payer: COMMERCIAL

## 2021-05-25 ENCOUNTER — OFFICE VISIT (OUTPATIENT)
Dept: PHYSICAL THERAPY | Facility: CLINIC | Age: 66
End: 2021-05-25
Payer: COMMERCIAL

## 2021-05-25 DIAGNOSIS — M70.52 PES ANSERINUS BURSITIS OF BOTH KNEES: Primary | ICD-10-CM

## 2021-05-25 DIAGNOSIS — M70.51 PES ANSERINUS BURSITIS OF RIGHT KNEE: ICD-10-CM

## 2021-05-25 DIAGNOSIS — M70.51 PES ANSERINUS BURSITIS OF BOTH KNEES: Primary | ICD-10-CM

## 2021-05-25 DIAGNOSIS — M17.11 PRIMARY OSTEOARTHRITIS OF RIGHT KNEE: ICD-10-CM

## 2021-05-25 PROCEDURE — 97140 MANUAL THERAPY 1/> REGIONS: CPT

## 2021-05-25 PROCEDURE — 97110 THERAPEUTIC EXERCISES: CPT

## 2021-05-25 NOTE — PROGRESS NOTES
Daily Note     Today's date: 2021  Patient name: Kathleen Ribeiro  : 1955  MRN: [id number]  Referring provider: Lor Mas PA-C  Dx:   Encounter Diagnosis     ICD-10-CM    1  Pes anserinus bursitis of both knees  M70 51     M70 52    2  Primary osteoarthritis of right knee  M17 11    3  Pes anserinus bursitis of right knee  M70 51             Subjective: Ally Wheat reports increased pain and stiffness in b/l knees upon arrival  She states sx's have been present the last few days, she has been trying to be less active and rest often  Objective: See treatment diary below    Assessment: Due to subjective reports, modified program performed today  Session focused on gentle stretching and manual work  STM performed to b/l quadriceps, pt noting improved sx's of tightness and discomfort following  She performed prescribed exercises well and with good technique  Encouraged pt to reach out to Lymphedema therapist to address LLE  Resume full program NV  Pt would benefit from continued PT to further address impairments and maximize functional level  Plan: Continue per plan of care        Precautions: history of breast and ovarian cancer  POC: 21    Manuals  5/3   STM, PROM stretch  JZ           Pat mobs   JZ                                     Neuro Re-Ed  5/3   SLS              Tandem Foam             Cone taps                                                                 Ther Ex  5/3   Calf stretch  :15x3ea :15x3 :30x3 :30x3   :30x3 wedge :30x3 wedge :30x3   Step flexion stretch  :10x10 b/l  ea      :10x10 b/l  :10x10 b/l     Soleus stretch incline  :15x3ea :15x3 :15x3         clamshell             3 way HS stretch seated, heel elevated on box  :15x3ea :15x3ea          STS 30x  3x10     Mini squat 2x10 Mini squat 3x10 3x10   S/l hip abd          3x10ea   Fig 4 bridge          3x10ea Elliptical             Prone knee flexion stretch        :30x3 ea  :30x3 ea     Piriformis stretch         :30x3 ea     RB     5'   5' 5'    Hip 3 way          x30 ea     Standing hip ext thigh stretch on box  :15x3ea :15x3 :15x3          Ther Activity  5/11 5/19 5/20 4/26 4/29 5/3   Step ups 8" 3x10  8" 3x10 8" 3x10 8" 3x10 ea  8" 3x10   8" 3x10 8" 3x10 8" 3x10   Lateral step down 8" 2x15 8" 3x10 8" 3x10 8" 3x10 ea     6" 3x10 8" 2x10 8" 3x10   Forward step down 8" 2x10 8" 3x10 8" 3x10 8" 3x10     6" 3x10 8" 2x10     Lunge at counter 3x10 ea          3x10ea   Leg press mid in plantar flex  90/ 3x10 90/ 3x10 90/ 3x10   B/l 50/ 3x10 u/l         Calf press  90/ 3x10 90/ 3x10 90/ 3x10         partial squat  3x10  3x10 3x10     2x10   Updated HEP:          10'   Modalities 5/6 5/11 5/19 5/20 5/25 4/26 4/29 5/3   MH        5' pre     CT

## 2021-05-27 ENCOUNTER — OFFICE VISIT (OUTPATIENT)
Dept: PHYSICAL THERAPY | Facility: CLINIC | Age: 66
End: 2021-05-27
Payer: COMMERCIAL

## 2021-05-27 DIAGNOSIS — M70.51 PES ANSERINUS BURSITIS OF RIGHT KNEE: ICD-10-CM

## 2021-05-27 DIAGNOSIS — M17.11 PRIMARY OSTEOARTHRITIS OF RIGHT KNEE: ICD-10-CM

## 2021-05-27 DIAGNOSIS — M70.51 PES ANSERINUS BURSITIS OF BOTH KNEES: Primary | ICD-10-CM

## 2021-05-27 DIAGNOSIS — M70.52 PES ANSERINUS BURSITIS OF BOTH KNEES: Primary | ICD-10-CM

## 2021-05-27 PROCEDURE — 97530 THERAPEUTIC ACTIVITIES: CPT | Performed by: PHYSICAL THERAPIST

## 2021-05-27 PROCEDURE — 97140 MANUAL THERAPY 1/> REGIONS: CPT | Performed by: PHYSICAL THERAPIST

## 2021-05-27 PROCEDURE — 97110 THERAPEUTIC EXERCISES: CPT | Performed by: PHYSICAL THERAPIST

## 2021-05-27 NOTE — PROGRESS NOTES
Daily Note     Today's date: 2021  Patient name: Tran Goldstein  : 1955  MRN: [id number]  Referring provider: Nora Livingston PA-C  Dx:   Encounter Diagnosis     ICD-10-CM    1  Pes anserinus bursitis of both knees  M70 51     M70 52    2  Primary osteoarthritis of right knee  M17 11    3  Pes anserinus bursitis of right knee  M70 51             Subjective: Pt reported, "Today is a great day  I don't have hardly any pain at all "     Objective: See treatment diary below    Assessment: Tolerated treatment well  Patient demonstrated fatigue post treatment, exhibited good technique with therapeutic exercises and would benefit from continued PT  Plan: Continue per plan of care        Precautions: history of breast and ovarian cancer  POC: 21    Manuals        STM, PROM stretch  JZ    JZ       Pat mobs   JZ    JZ                                 Neuro Re-Ed        SLS       :15x3       Tandem Foam             Cone taps                                                                 Ther Ex        Calf stretch  :15x3ea :15x3 :30x3 :30x3        Step flexion stretch  :10x10 b/l  ea           Soleus stretch incline  :15x3ea :15x3 :15x3         clamshell             3 way HS stretch seated, heel elevated on box  :15x3ea :15x3ea   :15x3       STS 30x  3x10          S/l hip abd             Fig 4 bridge             Elliptical             Prone knee flexion stretch             Piriformis stretch              RB     5' 5'        Hip 3 way              Standing hip ext thigh stretch on box  :15x3ea :15x3 :15x3   :15x3       Ther Activity         Step ups 8" 3x10  8" 3x10 8" 3x10 8" 3x10 ea  8" 3x10 8" 3x10       Lateral step down 8" 2x15 8" 3x10 8" 3x10 8" 3x10 ea   8" 3x10       Forward step down 8" 2x10 8" 3x10 8" 3x10 8" 3x10   8" 3x10       Lunge at counter 3x10 ea      3x10       Leg press mid in plantar flex  90/ 3x10 90/ 3x10 90/ 3x10   B/l 50/ 3x10 u/l  90/ 3x10       Calf press  90/ 3x10 90/ 3x10 90/ 3x10  90/ 3x10       partial squat  3x10  3x10 3x10        Updated HEP:             Modalities 5/6 5/11 5/19 5/20 5/25 5/27                    CT

## 2021-05-31 ENCOUNTER — APPOINTMENT (OUTPATIENT)
Dept: PHYSICAL THERAPY | Facility: CLINIC | Age: 66
End: 2021-05-31
Payer: COMMERCIAL

## 2021-06-01 ENCOUNTER — OFFICE VISIT (OUTPATIENT)
Dept: PHYSICAL THERAPY | Facility: CLINIC | Age: 66
End: 2021-06-01
Payer: COMMERCIAL

## 2021-06-01 DIAGNOSIS — M70.52 PES ANSERINUS BURSITIS OF BOTH KNEES: Primary | ICD-10-CM

## 2021-06-01 DIAGNOSIS — M70.51 PES ANSERINUS BURSITIS OF RIGHT KNEE: ICD-10-CM

## 2021-06-01 DIAGNOSIS — M17.11 PRIMARY OSTEOARTHRITIS OF RIGHT KNEE: ICD-10-CM

## 2021-06-01 DIAGNOSIS — M70.51 PES ANSERINUS BURSITIS OF BOTH KNEES: Primary | ICD-10-CM

## 2021-06-01 PROCEDURE — 97530 THERAPEUTIC ACTIVITIES: CPT

## 2021-06-01 PROCEDURE — 97110 THERAPEUTIC EXERCISES: CPT

## 2021-06-01 NOTE — PROGRESS NOTES
Daily Note     Today's date: 2021  Patient name: Jitendra Sheridan  : 1955  MRN: [id number]  Referring provider: Sondra Zaragoza PA-C  Dx:   Encounter Diagnosis     ICD-10-CM    1  Pes anserinus bursitis of both knees  M70 51     M70 52    2  Primary osteoarthritis of right knee  M17 11    3  Pes anserinus bursitis of right knee  M70 51             Subjective: Pt reports she had minimal knee soreness over the weekend  She states she woke up this morning with some soreness/stiffness in b/l knee, she took a hot shower and that helped significantly  Objective: See treatment diary below    Assessment: Nick Rondon displays good progress with current POC  She performed prescribed exercises with good technique, requiring little cueing for form  She displays better eccentric control with lateral step downs, forward step downs are still challenging at this time  Pt displayed more challenge and weakness on RLE compared to LLE with lunges and leg press activities  Patient denied symptom provocation throughout and post treatment session  Pt would benefit from continued PT to further address impairments and maximize functional level  Plan: Continue per plan of care        Precautions: history of breast and ovarian cancer  POC: 21    Manuals       STM, PROM stretch  JZ    JZ       Pat mobs   JZ    JZ                                 Neuro Re-Ed       SLS       :15x3       Tandem Foam             Cone taps                                                                 Ther Ex       Calf stretch  :15x3ea :15x3 :30x3 :30x3  :30x3       Step flexion stretch  :10x10 b/l  ea     :10x10 b/l      Soleus stretch incline  :15x3ea :15x3 :15x3         clamshell             3 way HS stretch seated, heel elevated on box  :15x3ea :15x3ea   :15x3 :15x3 ea      STS 30x  3x10          S/l hip abd             Fig 4 bridge Elliptical             Prone knee flexion stretch             Piriformis stretch              RB     5' 5'  5'       Hip 3 way              Standing hip ext thigh stretch on box  :15x3ea :15x3 :15x3   :15x3       Ther Activity  5/11 5/19 5/20 5/25 5/27 6/1      Step ups 8" 3x10  8" 3x10 8" 3x10 8" 3x10 ea  8" 3x10 8" 3x10 8" 3x10       Lateral step down 8" 2x15 8" 3x10 8" 3x10 8" 3x10 ea   8" 3x10 8" 3x10      Forward step down 8" 2x10 8" 3x10 8" 3x10 8" 3x10   8" 3x10 8" 3x10      Lunge at counter 3x10 ea      3x10 3x10      Leg press mid in plantar flex  90/ 3x10 90/ 3x10 90/ 3x10   B/l 50/ 3x10 u/l  90/ 3x10 U/l 80/ 3x10      Calf press  90/ 3x10 90/ 3x10 90/ 3x10  90/ 3x10 U/l 80/ 3x10      partial squat  3x10  3x10 3x10        Updated HEP:             Modalities 5/6 5/11 5/19 5/20 5/25 5/27 6/1                   CT

## 2021-06-03 ENCOUNTER — OFFICE VISIT (OUTPATIENT)
Dept: PHYSICAL THERAPY | Facility: CLINIC | Age: 66
End: 2021-06-03
Payer: COMMERCIAL

## 2021-06-03 DIAGNOSIS — M70.51 PES ANSERINUS BURSITIS OF RIGHT KNEE: ICD-10-CM

## 2021-06-03 DIAGNOSIS — M17.11 PRIMARY OSTEOARTHRITIS OF RIGHT KNEE: ICD-10-CM

## 2021-06-03 DIAGNOSIS — M70.52 PES ANSERINUS BURSITIS OF BOTH KNEES: Primary | ICD-10-CM

## 2021-06-03 DIAGNOSIS — M70.51 PES ANSERINUS BURSITIS OF BOTH KNEES: Primary | ICD-10-CM

## 2021-06-03 PROCEDURE — 97110 THERAPEUTIC EXERCISES: CPT

## 2021-06-03 PROCEDURE — 97530 THERAPEUTIC ACTIVITIES: CPT

## 2021-06-04 ENCOUNTER — APPOINTMENT (OUTPATIENT)
Dept: PHYSICAL THERAPY | Facility: CLINIC | Age: 66
End: 2021-06-04
Payer: COMMERCIAL

## 2021-06-08 ENCOUNTER — APPOINTMENT (OUTPATIENT)
Dept: PHYSICAL THERAPY | Facility: CLINIC | Age: 66
End: 2021-06-08
Payer: COMMERCIAL

## 2021-06-09 ENCOUNTER — APPOINTMENT (OUTPATIENT)
Dept: PHYSICAL THERAPY | Facility: CLINIC | Age: 66
End: 2021-06-09
Payer: COMMERCIAL

## 2021-06-10 ENCOUNTER — OFFICE VISIT (OUTPATIENT)
Dept: PHYSICAL THERAPY | Facility: CLINIC | Age: 66
End: 2021-06-10
Payer: COMMERCIAL

## 2021-06-10 DIAGNOSIS — M70.51 PES ANSERINUS BURSITIS OF RIGHT KNEE: ICD-10-CM

## 2021-06-10 DIAGNOSIS — M70.51 PES ANSERINUS BURSITIS OF BOTH KNEES: Primary | ICD-10-CM

## 2021-06-10 DIAGNOSIS — M70.52 PES ANSERINUS BURSITIS OF BOTH KNEES: Primary | ICD-10-CM

## 2021-06-10 DIAGNOSIS — M17.11 PRIMARY OSTEOARTHRITIS OF RIGHT KNEE: ICD-10-CM

## 2021-06-10 PROCEDURE — 97530 THERAPEUTIC ACTIVITIES: CPT

## 2021-06-10 PROCEDURE — 97110 THERAPEUTIC EXERCISES: CPT

## 2021-06-10 NOTE — PROGRESS NOTES
Daily Note     Today's date: 6/10/2021  Patient name: Alisson Blackwood  : 1955  MRN: [id number]  Referring provider: Zhane Clarke PA-C  Dx:   Encounter Diagnosis     ICD-10-CM    1  Pes anserinus bursitis of both knees  M70 51     M70 52    2  Primary osteoarthritis of right knee  M17 11    3  Pes anserinus bursitis of right knee  M70 51             Subjective: [address] reports she was very sore the last few days  She states she has been on her feet in the heat a lot  the last few days, contributing to increased soreness and swelling in b/l knees  She state she has held on the stretching at home in fear of aggravating more  Objective: See treatment diary below    Assessment: Bernadetet tolerated PT treatment fair  PT session focused on flexibility and some strength training  Knee flexion PROM performed, tightness and discomfort present nearing end range L>R  Instructed pt to perform knee flexion stretching at home daily to improve ROM  Pt was more challenged with leg press activities today, lower weight required on LLE to complete full reps  Progress strength training as able  Pt would benefit from continued PT to further address impairments and maximize functional level  Plan: Continue per plan of care        Precautions: history of breast and ovarian cancer  POC: 21    Manuals 5/6 5/11 5/19 5/20 5/25 5/27 6/1 6/3 6/10     STM, PROM stretch  JZ    JZ       Pat mobs   JZ    JZ                                 Neuro Re-Ed 5/6 5/11 5/19 5/20 5/25 5/27 6/1 6/3 6/10    SLS       :15x3       Tandem Foam             Cone taps                                                                 Ther Ex 5/6 5/11 5/19 5/20 5/25 5/27 6/1 6/3 6/10    Calf stretch  :15x3ea :15x3 :30x3 :30x3  :30x3  :30x3 :30x3    Step flexion stretch  :10x10 b/l  ea     :10x10 b/l :10x10 b/l :10x01    Soleus stretch incline  :15x3ea :15x3 :15x3         clamshell             3 way HS stretch seated, heel elevated on box  :15x3ea :15x3ea :15x3 :15x3 ea :30x3 ea :30x3 ea     STS 30x  3x10     3x10 3x10    S/l hip abd             Fig 4 bridge             Elliptical             Prone knee flexion stretch             Piriformis stretch              RB     5' 5'  5'  5' 5'    Hip 3 way         GTB 3x10     TB side step        GTB 3 laps     Standing hip ext thigh stretch on box  :15x3ea :15x3 :15x3   :15x3       Ther Activity  5/11 5/19 5/20 5/25 5/27 6/1 6/3 6/10    Step ups 8" 3x10  8" 3x10 8" 3x10 8" 3x10 ea  8" 3x10 8" 3x10 8" 3x10       Lateral step down 8" 2x15 8" 3x10 8" 3x10 8" 3x10 ea   8" 3x10 8" 3x10      Forward step down 8" 2x10 8" 3x10 8" 3x10 8" 3x10   8" 3x10 8" 3x10 8" 3x10     Lunge at counter 3x10 ea      3x10 3x10      Leg press mid in plantar flex  90/ 3x10 90/ 3x10 90/ 3x10   B/l 50/ 3x10 u/l  90/ 3x10 U/l 80/ 3x10      Calf press  90/ 3x10 90/ 3x10 90/ 3x10  90/ 3x10 U/l 80/ 3x10      partial squat  3x10  3x10 3x10        Updated HEP:             Modalities 5/6 5/11 5/19 5/20 5/25 5/27 6/1  6/10                 CT

## 2021-06-11 DIAGNOSIS — I10 ESSENTIAL HYPERTENSION: ICD-10-CM

## 2021-06-11 RX ORDER — AMLODIPINE BESYLATE 10 MG/1
10 TABLET ORAL DAILY
Qty: 30 TABLET | Refills: 0 | Status: SHIPPED | OUTPATIENT
Start: 2021-06-11 | End: 2021-07-19

## 2021-06-15 ENCOUNTER — OFFICE VISIT (OUTPATIENT)
Dept: PHYSICAL THERAPY | Facility: CLINIC | Age: 66
End: 2021-06-15
Payer: COMMERCIAL

## 2021-06-15 DIAGNOSIS — M70.52 PES ANSERINUS BURSITIS OF BOTH KNEES: Primary | ICD-10-CM

## 2021-06-15 DIAGNOSIS — M17.11 PRIMARY OSTEOARTHRITIS OF RIGHT KNEE: ICD-10-CM

## 2021-06-15 DIAGNOSIS — M70.51 PES ANSERINUS BURSITIS OF RIGHT KNEE: ICD-10-CM

## 2021-06-15 DIAGNOSIS — M70.51 PES ANSERINUS BURSITIS OF BOTH KNEES: Primary | ICD-10-CM

## 2021-06-15 PROCEDURE — 97530 THERAPEUTIC ACTIVITIES: CPT

## 2021-06-15 PROCEDURE — 97110 THERAPEUTIC EXERCISES: CPT

## 2021-06-15 NOTE — PROGRESS NOTES
Daily Note     Today's date: 6/15/2021  Patient name: Manoj Johansen  : 1955  MRN: [id number]  Referring provider: Hortencia Downing PA-C  Dx:   Encounter Diagnosis     ICD-10-CM    1  Pes anserinus bursitis of both knees  M70 51     M70 52    2  Primary osteoarthritis of right knee  M17 11    3  Pes anserinus bursitis of right knee  M70 51             Subjective: Rhys Self reports b/l knee soreness has subsided some since LV, but still present particuarlly in L knee  She states she has been somewhat compliant with stretching, but not completing daily  Objective: See treatment diary below    Assessment: Rhys Self displayed improved tolerance to PT treatment today  Resumed strength training program this session  Lower weight required on leg press to complete exercise  Greater weakness displayed on LLE compared to RLE with u/l exercises  Patient denied symptom provocation throughout and post treatment session  Continue to reintroduce strength training as able  Pt would benefit from continued PT to further address impairments and maximize functional level  Plan: Continue per plan of care        Precautions: history of breast and ovarian cancer  POC: 21    Manuals /3 6/10  6/15   STM, PROM stretch  JZ    JZ       Pat mobs   JZ    JZ                                 Neuro Re-Ed  6//3 6/10 6/15   SLS       :15x3       Tandem Foam             Cone taps                                                                 Ther Ex  6/3 6/10 6/15   Calf stretch  :15x3ea :15x3 :30x3 :30x3  :30x3  :30x3 :30x3 :30x3   Step flexion stretch  :10x10 b/l  ea     :10x10 b/l :10x10 b/l :10x01 :10x01   Soleus stretch incline  :15x3ea :15x3 :15x3         clamshell             3 way HS stretch seated, heel elevated on box  :15x3ea :15x3ea   :15x3 :15x3 ea :30x3 ea :30x3 ea  :30x3 ea   STS 30x  3x10     3x10 3x10 3x10   S/l hip abd Fig 4 bridge             Elliptical             Prone knee flexion stretch             Piriformis stretch              RB     5' 5'  5'  5' 5' 5'   Hip 3 way         GTB 3x10     TB side step        GTB 3 laps     Standing hip ext thigh stretch on box  :15x3ea :15x3 :15x3   :15x3       Ther Activity  5/11 5/19 5/20 5/25 5/27 6/1 6/3 6/10 6/15   Step ups 8" 3x10  8" 3x10 8" 3x10 8" 3x10 ea  8" 3x10 8" 3x10 8" 3x10       Lateral step down 8" 2x15 8" 3x10 8" 3x10 8" 3x10 ea   8" 3x10 8" 3x10      Forward step down 8" 2x10 8" 3x10 8" 3x10 8" 3x10   8" 3x10 8" 3x10 8" 3x10     Lunge at counter 3x10 ea      3x10 3x10      Leg press b/l           110/ 3x10    Leg press mid in plantar flex  90/ 3x10 90/ 3x10 90/ 3x10   B/l 50/ 3x10 u/l  90/ 3x10 U/l 80/ 3x10    U/l 70/ 3x10   Calf press  90/ 3x10 90/ 3x10 90/ 3x10  90/ 3x10 U/l 80/ 3x10   U/l 70/ 3x10   partial squat  3x10  3x10 3x10        Updated HEP:             Modalities 5/6 5/11 5/19 5/20 5/25 5/27 6/1  6/10 6/15                CT

## 2021-06-16 ENCOUNTER — HOSPITAL ENCOUNTER (OUTPATIENT)
Dept: MAMMOGRAPHY | Facility: CLINIC | Age: 66
Discharge: HOME | End: 2021-06-16
Payer: COMMERCIAL

## 2021-06-16 VITALS — HEIGHT: 61 IN | BODY MASS INDEX: 22.66 KG/M2 | WEIGHT: 120 LBS

## 2021-06-16 DIAGNOSIS — Z17.0 MALIGNANT NEOPLASM OF CENTRAL PORTION OF LEFT BREAST IN FEMALE, ESTROGEN RECEPTOR POSITIVE (HCC): ICD-10-CM

## 2021-06-16 DIAGNOSIS — C50.112 MALIGNANT NEOPLASM OF CENTRAL PORTION OF LEFT BREAST IN FEMALE, ESTROGEN RECEPTOR POSITIVE (HCC): ICD-10-CM

## 2021-06-16 DIAGNOSIS — Z86.000 HISTORY OF DUCTAL CARCINOMA IN SITU (DCIS) OF BREAST: ICD-10-CM

## 2021-06-16 PROCEDURE — G0279 TOMOSYNTHESIS, MAMMO: HCPCS

## 2021-06-16 PROCEDURE — 77066 DX MAMMO INCL CAD BI: CPT

## 2021-06-17 ENCOUNTER — APPOINTMENT (OUTPATIENT)
Dept: PHYSICAL THERAPY | Facility: CLINIC | Age: 66
End: 2021-06-17
Payer: COMMERCIAL

## 2021-06-21 ENCOUNTER — EVALUATION (OUTPATIENT)
Dept: PHYSICAL THERAPY | Facility: CLINIC | Age: 66
End: 2021-06-21
Payer: COMMERCIAL

## 2021-06-21 DIAGNOSIS — M70.52 PES ANSERINUS BURSITIS OF BOTH KNEES: Primary | ICD-10-CM

## 2021-06-21 DIAGNOSIS — M70.51 PES ANSERINUS BURSITIS OF RIGHT KNEE: ICD-10-CM

## 2021-06-21 DIAGNOSIS — M17.11 PRIMARY OSTEOARTHRITIS OF RIGHT KNEE: ICD-10-CM

## 2021-06-21 DIAGNOSIS — M70.51 PES ANSERINUS BURSITIS OF BOTH KNEES: Primary | ICD-10-CM

## 2021-06-21 PROCEDURE — 97164 PT RE-EVAL EST PLAN CARE: CPT | Performed by: PHYSICAL THERAPIST

## 2021-06-21 NOTE — PROGRESS NOTES
PT Evaluation     Today's date: 2021  Patient name: Elio Mae  :   MRN: [id number]  Referring provider: Brice Khan PA-C  Dx:   Encounter Diagnosis     ICD-10-CM    1  Pes anserinus bursitis of both knees  M70 51     M70 52    2  Primary osteoarthritis of right knee  M17 11          Assessment  Assessment details: Since beginning PT Thai Christensen has experienced significant improvement in functional ability and with impairments  MMT and goniometry demonstrated improved strength and flexibility  Balance assessment demonstrated improved balance  Functional outcome measures and subjective reports demonstrated improved functional ability  These improvements reflect that PT has been effective  Despite improvements she continues to present with functional limitations resulting from her impairments  She has maximized her PT benefits from her insurance, and now plans to confirm understanding of HEP  Pt would benefit from further skilled outpatient PT in order to confirm understanding of progressive HEP  Impairments: abnormal gait, abnormal or restricted ROM, activity intolerance, impaired balance, impaired physical strength, lacks appropriate home exercise program, pain with function, weight-bearing intolerance, poor posture  and poor body mechanics     Prognosis: good  Prognosis details: + factors: high motivation levels to return to work   - factors: age, medical history    Goals  Short Term Goals to be accomplished in 4 weeks:  STG1: Pt will be I with HEP  Achieved  STG2: Pt will demo 0-135 degrees  in knee PROM to improve gait  Achieved  STG3: Pt will demo 4-/5 MMT strength in knee to improve stair negotiation  Achieved  STG4: Pt will amb community distance without gait deviates due to pain  Achieved  Long Term Goals to be accomplished in 15 weeks:   LTG1: Pt will demo knee strength WNL to return to pain free stair negotiation    LTG2: Pt will return to household ADLs and work duties including dog walking pain free  LTG3: Pt will return to all work related duties without pain  Plan  Plan details: HEP development, stretching, strengthening, A/AA/PROM, joint mobilizations, posture education, STM/MI as needed to reduce muscle tension, muscle reeducation, PLOC discussed and agreed upon with patient  Patient would benefit from: PT eval and skilled physical therapy  Planned modality interventions: cryotherapy and thermotherapy: hydrocollator packs  Planned therapy interventions: manual therapy, neuromuscular re-education, self care, therapeutic activities, therapeutic exercise, home exercise program, patient education, stretching, strengthening, balance, joint mobilization and flexibility  Frequency: 2x week  Duration in weeks: 15  Plan of Care beginning date: 2/24/2021  Plan of Care expiration date: 7/9/2021  Treatment plan discussed with: patient    Subjective Evaluation    History of Present Illness  Mechanism of injury: Ghada arrives to PT with reports of R knee pain  She recently had cortisone injections to the bilateral knee joints with aspiration on the right side  Noted mild relief with both the injections, however continues to complain of pain over the medial aspect of the R knee knee  Notes occasional catching sensation  The pain is sharp and worse with walking  She continues to have pain over the anterior aspect of both of her knees with stair climbing  At her most recent visit with her physician she was prescribed PT      6/21/21: Pt notes GROC improvement of 70% since beginning PT and has reached her max benefit with her insurance  She has experienced an improvement in strength, ROM, balance, and pain  This has improved her walking, standing, squatting, STS  However she continues to present with functional limitations due to pain fluctuations with the busyness of her work schedule   She would like to continue a few more visits to confirm understanding of progressive HEP      Pain  At worst pain ratin  Location: R knee   Quality: pressure, sharp and discomfort  Relieving factors: relaxation and rest  Aggravating factors: walking, stair climbing and lifting  Progression: improved    Treatments  Current treatment: medication and physical therapy  Patient Goals  Patient goals for therapy: increased motion, improved balance, decreased pain, decreased edema, increased strength, independence with ADLs/IADLs, return to work and return to sport/leisure activities  Patient goal: /dog walking for work, walking, stairs negotiation, squatting, STS    Objective     Observations   Left Knee   Positive for edema  Right Knee   Negative for edema  Additional Observation Details  L knee lymphedema extending from her foot up to her L hip     Tenderness     Right Knee   Tenderness in the pes anserinus  Passive Range of Motion   Left Knee   Flexion: 125 degrees with pain  Extension: 0 degrees     Right Knee   Flexion: 130 degrees with pain  Extension: 0 degrees with pain    Additional Passive Range of Motion Details  L knee ROM limited due to lymphedema     Mobility   Patellar Mobility:   Left Knee   WFL: medial, lateral, superior and inferior  Right Knee   WFL: medial, lateral, superior and inferior    Strength/Myotome Testing     Left Knee   Flexion: 4+  Extension: 4+    Right Knee   Flexion: 4+   Extension: 4+    Additional Strength Details  U/l heel raise:  R: 10 reps   L: 10 reps    Tests     Right Knee   Positive valgus stress test at 30 degrees       Additional Tests Details  SLS  R: 30 sec  L: 30 sec    Precautions: history of breast and ovarian cancer    POC: 21  Manuals 6/21      6/1 6/3 6/10  6/15   STM, PROM stretch             Pat mobs                                        Neuro Re-Ed 6/21      6/1 6/3 6/10 6/15   SLS              Tandem Foam             Cone taps                                                                 Ther Ex 6/21      6/1 6/3 6/10 6/15   Calf stretch       :30x3  :30x3 :30x3 :30x3   Step flexion stretch        :10x10 b/l :10x10 b/l :10x01 :10x01   Soleus stretch incline             clamshell             3 way HS stretch seated, heel elevated on box       :15x3 ea :30x3 ea :30x3 ea  :30x3 ea   STS        3x10 3x10 3x10   S/l hip abd             Fig 4 bridge             Elliptical             Prone knee flexion stretch             Piriformis stretch              RB       5'  5' 5' 5'   Hip 3 way         GTB 3x10     TB side step        GTB 3 laps     Standing hip ext thigh stretch on box             Ther Activity 6/21      6/1 6/3 6/10 6/15   Step ups       8" 3x10       Lateral step down       8" 3x10      Forward step down       8" 3x10 8" 3x10     Lunge at counter       3x10      Leg press b/l           110/ 3x10    Leg press mid in plantar flex       U/l 80/ 3x10    U/l 70/ 3x10   Calf press       U/l 80/ 3x10   U/l 70/ 3x10   partial squat             Updated HEP: TRINITY            Modalities 6/21      6/1  6/10 6/15                CT

## 2021-06-22 ENCOUNTER — RADIATION ONCOLOGY FOLLOW-UP (OUTPATIENT)
Dept: RADIATION ONCOLOGY | Facility: HOSPITAL | Age: 66
End: 2021-06-22
Attending: RADIOLOGY
Payer: COMMERCIAL

## 2021-06-22 VITALS
OXYGEN SATURATION: 99 % | BODY MASS INDEX: 23.18 KG/M2 | HEART RATE: 66 BPM | DIASTOLIC BLOOD PRESSURE: 79 MMHG | SYSTOLIC BLOOD PRESSURE: 160 MMHG | RESPIRATION RATE: 16 BRPM | WEIGHT: 122.8 LBS | HEIGHT: 61 IN | TEMPERATURE: 97.4 F

## 2021-06-22 DIAGNOSIS — Z86.000 HISTORY OF DUCTAL CARCINOMA IN SITU (DCIS) OF BREAST: ICD-10-CM

## 2021-06-22 DIAGNOSIS — Z17.0 MALIGNANT NEOPLASM OF CENTRAL PORTION OF LEFT BREAST IN FEMALE, ESTROGEN RECEPTOR POSITIVE (HCC): Primary | ICD-10-CM

## 2021-06-22 DIAGNOSIS — C50.112 MALIGNANT NEOPLASM OF CENTRAL PORTION OF LEFT BREAST IN FEMALE, ESTROGEN RECEPTOR POSITIVE (HCC): Primary | ICD-10-CM

## 2021-06-22 DIAGNOSIS — Z79.811 USE OF ANASTROZOLE: ICD-10-CM

## 2021-06-22 DIAGNOSIS — Z85.43 HISTORY OF OVARIAN CANCER: ICD-10-CM

## 2021-06-22 PROCEDURE — 99211 OFF/OP EST MAY X REQ PHY/QHP: CPT | Performed by: RADIOLOGY

## 2021-06-24 ENCOUNTER — APPOINTMENT (OUTPATIENT)
Dept: PHYSICAL THERAPY | Facility: CLINIC | Age: 66
End: 2021-06-24
Payer: COMMERCIAL

## 2021-06-29 ENCOUNTER — OFFICE VISIT (OUTPATIENT)
Dept: PHYSICAL THERAPY | Facility: CLINIC | Age: 66
End: 2021-06-29
Payer: COMMERCIAL

## 2021-06-29 DIAGNOSIS — M70.51 PES ANSERINUS BURSITIS OF RIGHT KNEE: ICD-10-CM

## 2021-06-29 DIAGNOSIS — M17.11 PRIMARY OSTEOARTHRITIS OF RIGHT KNEE: ICD-10-CM

## 2021-06-29 DIAGNOSIS — M70.51 PES ANSERINUS BURSITIS OF BOTH KNEES: Primary | ICD-10-CM

## 2021-06-29 DIAGNOSIS — M70.52 PES ANSERINUS BURSITIS OF BOTH KNEES: Primary | ICD-10-CM

## 2021-06-29 PROCEDURE — 97530 THERAPEUTIC ACTIVITIES: CPT | Performed by: PHYSICAL THERAPIST

## 2021-06-29 NOTE — PROGRESS NOTES
Daily Note     Today's date: 2021  Patient name: Tigist Ibarra  : 1955  MRN: [id number]  Referring provider: Stefanie Espinoza PA-C  Dx:   Encounter Diagnosis     ICD-10-CM    1  Pes anserinus bursitis of both knees  M70 51     M70 52    2  Primary osteoarthritis of right knee  M17 11    3  Pes anserinus bursitis of right knee  M70 51             Subjective: Pt reported that she would like to focus on her understanding of her HEP due to loosing her last copy of it  Objective: See treatment diary below    Assessment: Tolerated treatment well  Updated and reviewed HEP  Patient demonstrated fatigue post treatment, exhibited good technique with therapeutic exercises and would benefit from continued PT  Plan: Continue per plan of care        Precautions: history of breast and ovarian cancer  POC: 21    Manuals 6/29   5/20 5/25 5/27 6/1 6/3 6/10  6/15   STM, PROM stretch      JZ       Pat mobs       JZ                                 Neuro Re-Ed    5/20 5/25 5/27 6/1 6/3 6/10 6/15   SLS       :15x3       Tandem Foam             Cone taps                                                                 Ther Ex    5/20 5/25 5/27 6/1 6/3 6/10 6/15   Calf stretch    :30x3 :30x3  :30x3  :30x3 :30x3 :30x3   Step flexion stretch        :10x10 b/l :10x10 b/l :10x01 :10x01   Soleus stretch incline    :15x3         clamshell             3 way HS stretch seated, heel elevated on box      :15x3 :15x3 ea :30x3 ea :30x3 ea  :30x3 ea   STS        3x10 3x10 3x10   S/l hip abd             Fig 4 bridge             Elliptical             Prone knee flexion stretch             Piriformis stretch              RB     5' 5'  5'  5' 5' 5'   Hip 3 way         GTB 3x10     TB side step        GTB 3 laps     Standing hip ext thigh stretch on box    :15x3   :15x3       Ther Activity    5/20 5/25 5/27 6/1 6/3 6/10 6/15   Step ups    8" 3x10 ea  8" 3x10 8" 3x10 8" 3x10       Lateral step down    8" 3x10 ea   8" 3x10 8" 3x10 Forward step down    8" 3x10   8" 3x10 8" 3x10 8" 3x10     Lunge at counter      3x10 3x10      Leg press b/l           110/ 3x10    Leg press mid in plantar flex    90/ 3x10   B/l 50/ 3x10 u/l  90/ 3x10 U/l 80/ 3x10    U/l 70/ 3x10   Calf press    90/ 3x10  90/ 3x10 U/l 80/ 3x10   U/l 70/ 3x10   partial squat    3x10 3x10        Updated HEP: 20'            Modalities    5/20 5/25 5/27 6/1  6/10 6/15                CT

## 2021-07-01 ENCOUNTER — APPOINTMENT (OUTPATIENT)
Dept: PHYSICAL THERAPY | Facility: CLINIC | Age: 66
End: 2021-07-01
Payer: COMMERCIAL

## 2021-07-05 ENCOUNTER — APPOINTMENT (OUTPATIENT)
Dept: PHYSICAL THERAPY | Facility: CLINIC | Age: 66
End: 2021-07-05
Payer: COMMERCIAL

## 2021-07-06 ENCOUNTER — OFFICE VISIT (OUTPATIENT)
Dept: SURGICAL ONCOLOGY | Facility: CLINIC | Age: 66
End: 2021-07-06
Payer: COMMERCIAL

## 2021-07-06 VITALS
RESPIRATION RATE: 14 BRPM | TEMPERATURE: 98.6 F | SYSTOLIC BLOOD PRESSURE: 138 MMHG | HEIGHT: 61 IN | OXYGEN SATURATION: 97 % | HEART RATE: 88 BPM | BODY MASS INDEX: 23.41 KG/M2 | WEIGHT: 124 LBS | DIASTOLIC BLOOD PRESSURE: 78 MMHG

## 2021-07-06 DIAGNOSIS — Z15.01 MONOALLELIC MUTATION OF ATM GENE: ICD-10-CM

## 2021-07-06 DIAGNOSIS — Z79.811 USE OF ANASTROZOLE: ICD-10-CM

## 2021-07-06 DIAGNOSIS — Z15.01 GENETIC PREDISPOSITION TO BREAST CANCER: ICD-10-CM

## 2021-07-06 DIAGNOSIS — Z17.0 MALIGNANT NEOPLASM OF CENTRAL PORTION OF LEFT BREAST IN FEMALE, ESTROGEN RECEPTOR POSITIVE (HCC): Primary | ICD-10-CM

## 2021-07-06 DIAGNOSIS — Z15.09 MONOALLELIC MUTATION OF ATM GENE: ICD-10-CM

## 2021-07-06 DIAGNOSIS — Z15.89 MONOALLELIC MUTATION OF ATM GENE: ICD-10-CM

## 2021-07-06 DIAGNOSIS — C50.112 MALIGNANT NEOPLASM OF CENTRAL PORTION OF LEFT BREAST IN FEMALE, ESTROGEN RECEPTOR POSITIVE (HCC): Primary | ICD-10-CM

## 2021-07-06 PROCEDURE — 1036F TOBACCO NON-USER: CPT | Performed by: NURSE PRACTITIONER

## 2021-07-06 PROCEDURE — 99214 OFFICE O/P EST MOD 30 MIN: CPT | Performed by: NURSE PRACTITIONER

## 2021-07-06 PROCEDURE — 3008F BODY MASS INDEX DOCD: CPT | Performed by: NURSE PRACTITIONER

## 2021-07-06 NOTE — PROGRESS NOTES
Surgical Oncology Follow Up       [address]  [facility]  [address] [facility] [address]    Braulio Talavera  1955  [id number]  [address] [facility]  [facility]  [address] [address]    Chief Complaint   Patient presents with    Breast Cancer     Pt is here for a six month follow up       Assessment/Plan:  1  Malignant neoplasm of central portion of left breast in female, estrogen receptor positive ([address] )  - 6 mo f/u visit  - MRI breast bilateral w and wo contrast w cad; Future    2  Monoallelic mutation of YOLANDA gene  - MRI breast bilateral w and wo contrast w cad; Future    3  Use of anastrozole  - 6 mo f/u visit    4  Genetic predisposition to breast cancer  - MRI breast bilateral w and wo contrast w cad; Future    Discussion/Summary: Patient is a 44-year-old female with a personal history of ovarian cancer who presents today for a six-month follow-up visit for bilateral breast cancer  John Sheffield was diagnosed with DCIS, 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 2018  She underwent a right lumpectomy by Dr Shmuel Tejeda and completed adjuvant radiation therapy   She had underwent genetic testing which revealed an YOLANDA genetic mutation and a VUS of the Rad51c gene   She was then diagnosed with left breast cancer in July of 2019  Pathology revealed invasive carcinoma, ER 90%, DE negative, her 2-   She underwent a lumpectomy and sentinel node biopsy   She completed radiation therapy   She had a bilateral breast MRI in December of 2020 which was BI-RADS 2  She had a bilateral 3D diagnostic mammogram on June 16, 2021 which was BI-RADS 1, category 3 density  She offers no new complaints today aside from ongoing left axillary tenderness  I believe this is multifactorial   She does report some tenderness within scar tissue however she also reports tenderness with palpation at the pectoralis edge   I encouraged her to perform stretching exercises  I provided her with written information about the [employer] program- she will contact me if she is interested in a referral after checking with her insurance coverage  I will plan to see her back in 6 months, following her MRI  She was instructed to call with any new concerns or symptoms prior to that time  All of her questions were answered today  History of Present Illness:     Oncology History   History of ovarian cancer    Initial Diagnosis    Ovarian cancer ([facility] [address] )     4/21/2009 Surgery    Exploratory laparotomy, total abdominal hysterectomy, bilateral salpingo-oophrectomy, lymph node dissection, omentectomy with staging      - 7/8/2009 Chemotherapy    Intraperitoneal along with intravenous chemotherapy on a early ovarian cancer protocol       5/31/2018 Genetic Testing    Genetic testing results are 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      Genetic testing indicated that the patient carries a Variant of Uncertain Significance (VUS) : Rad51C c 252G>T      Hormone Therapy       History of ductal carcinoma in situ (DCIS) of breast   5/31/2018 Genetic Testing    Genetic testing results are 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      Genetic testing indicated that the patient carries a Variant of Uncertain Significance (VUS) : Rad51C c 252G>T     8/1/2018 Biopsy    Right breast biopsy  11 o'clock position 5 cmfn  DCIS  Grade 2  Confirmed by Ji Longo MD  ER 0  KY 0     9/11/2018 Surgery    Right lumpectomy  DCIS  Grade 2  1 4 cm  Margins negative  Stage 0     10/16/2018 -  Hormone Therapy    Consult with Dr Myke Cain  No adjuvant systemic therapy recommended     10/29/2018 - 11/28/2018 Radiation    Course: C1    Plan ID Energy Fractions Dose per Fraction (cGy) Dose Correction (cGy) Total Dose Delivered (cGy) Elapsed Days   R Breast 6X 16 / 16 266 0 4,256 22   R Breast e 12E 5 / 5 200 0 1,000 7      Treatment dates:  C1: 10/29/2018 - 11/28/2018 Malignant neoplasm of central portion of left breast in female, estrogen receptor positive ([address] )   7/18/2019 Biopsy    Left breast MRI-guided biopsy  3 o'clock, posterior depth  Invasive breast carcinoma of no special type  Grade 1  ER 90  HI 0  HER2 1+     8/13/2019 Surgery    Left breast needle localized lumpectomy with sentinel lymph node biopsy  Invasive mammary carcinoma of no special type  Grade 1  3 mm  Margins negative  0/1 Lymph node  Anatomic/Prognostic Stage IA     10/8/2019 - 11/5/2019 Radiation      Treatment:  Course: C2  Plan ID Energy Fractions Dose per Fraction (cGy) Dose Correction (cGy) Total Dose Delivered (cGy) Elapsed Days   BH L Breast 6X 16 / 16 266 0 4,256 21   BH L Brst e 12E 5 / 5 200 0 1,000 6    C2: 10/8/2019 - 11/5/2019 11/2019 -  Hormone Therapy    Anastrozole          -Interval History:  Patient notices no changes on her self breast exam   She reports tenderness in her left axillary region  She reports chronic arthralgias but denies any new or persistent headaches, back pain, bone pain, abdominal pain  She continues on anastrozole  She had a bilateral mammogram on June 16, 2021 which was BI-RADS 2, category 3 density  She met with oncology Genetics  Review of Systems:  Review of Systems   Constitutional: Negative for activity change, appetite change, chills, fatigue, fever and unexpected weight change  Respiratory: Negative for cough and shortness of breath  Cardiovascular: Positive for leg swelling (chronic left leg lymphedema)  Negative for chest pain  Gastrointestinal: Negative for abdominal pain, constipation, diarrhea, nausea and vomiting  Musculoskeletal: Positive for arthralgias (knees)  Negative for back pain, gait problem and myalgias  Skin: Negative for color change and rash  Neurological: Negative for dizziness and headaches  Hematological: Negative for adenopathy  Psychiatric/Behavioral: Negative for agitation and confusion     All other systems reviewed and are negative  Patient Active Problem List   Diagnosis    Colon, diverticulosis    Crohn's disease ([facility] [address] )    Dense breasts    Dermatitis    Erythema chronica migrans, secondary    Hyperlipidemia    Lymphedema    Murmur    Osteopenia    Osteoporosis    History of ovarian cancer    Reactive airway disease    Shortness of breath on exertion    Lymphedema of left lower extremity    Genetic predisposition to breast cancer    History of ductal carcinoma in situ (DCIS) of breast    Encounter for follow-up surveillance of ovarian cancer    Lichen sclerosus et atrophicus    Essential hypertension    Malignant neoplasm of ovary (HCC)    Nausea    TIA (transient ischemic attack)    Malignant neoplasm of central portion of left breast in female, estrogen receptor positive ([facility] [address] )    Use of anastrozole    History of breast cancer    Carpal tunnel syndrome of right wrist    Neuropathy    Monoallelic mutation of YOLANDA gene    Right calf pain    Primary osteoarthritis of right knee    Effusion of right knee    Primary osteoarthritis of left knee    Pes anserinus bursitis of right knee    Bad odor of urine     Past Medical History:   Diagnosis Date    Breast cancer [facility] ) 09/11/2018    Right    Breast cancer ([place] [address] ) 08/13/2019    Left    Crohn disease ([facility] )     Dense breast     History of chemotherapy     for ovarian cancer    History of radiation therapy     History of transfusion 2009    Hyperlipidemia     Hypertension     Lymphedema     left leg    Lymphedema     Monoallelic mutation of YOLANDA gene     Breast Gyn Panel    [facility]varian cancer ([facility] ) 04/21/2009     Past Surgical History:   Procedure Laterality Date    BREAST LUMPECTOMY Right 09/11/2018    BREAST LUMPECTOMY Left 8/13/2019    Procedure: BREAST LUMPECTOMY; BREAST NEEDLE LOCALIZATION (NEEDLE LOC AT 0800);   Surgeon: Joseluis Ace MD;  [facility];  Service: Surgical Oncology    BREAST LUMPECTOMY W/ NEEDLE LOCALIZATION Left 2019    COLONOSCOPY      EXPLORATORY LAPAROTOMY      HYSTERECTOMY      LYMPH NODE BIOPSY Left 2019    Procedure: SENTINEL LYMPH NODE BIOPSY; LYMPHATIC MAPPING WITH BLUE DYE AND RADIOACTIVE DYE (INJECT AT 0930 BY DR JENINNGS IN THE OR); Surgeon: Teagan Hennnig MD;  [facility];  Service: Surgical Oncology    MAMMO NEEDLE LOCALIZATION LEFT (ALL INC) Left 2019    MAMMO NEEDLE LOCALIZATION RIGHT (ALL INC) Right 2018    MRI BREAST BIOPSY LEFT (ALL INCLUSIVE) Left 2019    OMENTECTOMY      MO PERQ DEVICE PLACEMT BREAST LOC 1ST LES W GUIDNCE Right 2018    Procedure: BREAST LUMPECTOMY; BREAST NEEDLE LOCALIZATION (NEEDLE LOC AT 1200);   Surgeon: Teagan Henning MD;  [facility];  Service: Surgical Oncology    TOTAL ABDOMINAL HYSTERECTOMY W/ BILATERAL SALPINGOOPHORECTOMY      US GUIDED BREAST BIOPSY LEFT COMPLETE Left 2019    US GUIDED BREAST BIOPSY RIGHT COMPLETE Right 2018    WISDOM TOOTH EXTRACTION       Family History   Problem Relation Age of Onset    Prostate cancer Father     Alzheimer's disease Father     Hypertension Father     Ovarian cancer Sister     Breast cancer Paternal Grandmother     Hypertension Mother     Heart disease Mother     Breast cancer Maternal Aunt     No Known Problems Sister     Other Sister         pacemaker    Hypertension Sister     Heart disease Sister     No Known Problems Sister     Down syndrome Sister     Alzheimer's disease Sister     Hypertension Sister     No Known Problems Paternal Aunt     No Known Problems Maternal Aunt      Social History     Socioeconomic History    Marital status: Single     Spouse name: Not on file    Number of children: Not on file    Years of education: Not on file    Highest education level: Not on file   Occupational History    Not on file   Tobacco Use    Smoking status: Former Smoker     Quit date: 2009     Years since quittin 2    Smokeless tobacco: Never Used   Vaping Use    Vaping Use: Never used   Substance and Sexual Activity    Alcohol use: Yes     Comment: rarely    Drug use: No    Sexual activity: Not on file   Other Topics Concern    Not on file   Social History Narrative    Not on file     Social Determinants of Health     Financial Resource Strain:     Difficulty of Paying Living Expenses:    Food Insecurity:     Worried About Running Out of Food in the Last Year:     [address] in the Last Year:    Transportation Needs:     Lack of Transportation (Medical):      Lack of Transportation (Non-Medical):    Physical Activity:     Days of Exercise per Week:     Minutes of Exercise per Session:    Stress:     Feeling of Stress :    Social Connections:     Frequency of Communication with Friends and Family:     Frequency of Social Gatherings with Friends and Family:     Attends Congregation Services:     Active Member of Clubs or Organizations:     Attends Club or Organization Meetings:     Marital Status:    Intimate Partner Violence:     Fear of Current or Ex-Partner:     Emotionally Abused:     Physically Abused:     Sexually Abused:        Current Outpatient Medications:     albuterol (PROVENTIL HFA,VENTOLIN HFA) 90 mcg/act inhaler, Inhale 2 puffs as needed for wheezing, Disp: 1 Inhaler, Rfl: 0    alendronate (FOSAMAX) 70 mg tablet, Take 1 tablet (70 mg total) by mouth every 7 days, Disp: 4 tablet, Rfl: 11    amLODIPine (NORVASC) 10 mg tablet, TAKE 1 TABLET (10 MG TOTAL) BY MOUTH DAILY, Disp: 30 tablet, Rfl: 0    anastrozole (ARIMIDEX) 1 mg tablet, TAKE 1 TABLET (1 MG TOTAL) BY MOUTH DAILY, Disp: 90 tablet, Rfl: 1    atorvastatin (LIPITOR) 40 mg tablet, Take 1 tablet (40 mg total) by mouth daily at bedtime, Disp: 30 tablet, Rfl: 1    Calcium Carbonate (CALTRATE 600) 1500 (600 Ca) MG TABS, Take 1 tablet by mouth 2 (two) times a day, Disp: , Rfl:     clobetasol (TEMOVATE) 0 05 % ointment, Apply to the affected area 1-2 times a week, Disp: 30 g, Rfl: 2    cyclobenzaprine (FLEXERIL) 10 mg tablet, Take 1 tablet (10 mg total) by mouth daily at bedtime, Disp: 20 tablet, Rfl: 0    Elastic Bandages & Supports (MEDICAL COMPRESSION STOCKINGS) MISC, Use daily as directed, 3 pair total - LEFT side: 20-30mmhg large honey- color thigh-high; RIGHT side: 20-30mmhg medium honey- colored, thigh high, Disp: 3 each, Rfl: 0    Homeopathic Products (ARNICARE PAIN RELIEF SL), Place under the tongue, Disp: , Rfl:     hydrochlorothiazide (MICROZIDE) 12 5 mg capsule, Take 1 capsule (12 5 mg total) by mouth every morning, Disp: 30 capsule, Rfl: 5    inFLIXimab (REMICADE) 100 mg, Infuse into a venous catheter  , Disp: , Rfl:     mupirocin (BACTROBAN) 2 % ointment, Apply topically 3 (three) times a day, Disp: 22 g, Rfl: 0    trolamine salicylate (ASPERCREME) 10 % cream, Apply topically as needed for muscle/joint pain, Disp: , Rfl:     Turmeric 500 MG CAPS, Take by mouth, Disp: , Rfl:   Allergies   Allergen Reactions    Lisinopril      cough    Losartan      Numbness on right side of body    Boniva [Ibandronic Acid] Headache     Vitals:    07/06/21 1421   BP: 138/78   Pulse: 88   Resp: 14   Temp: 98 6 °F (37 °C)   SpO2: 97%       Physical Exam  Vitals reviewed  Constitutional:       General: She is not in acute distress  Appearance: Normal appearance  She is well-developed  She is not diaphoretic  HENT:      Head: Normocephalic and atraumatic  Cardiovascular:      Rate and Rhythm: Normal rate and regular rhythm  Heart sounds: Normal heart sounds  Pulmonary:      Effort: Pulmonary effort is normal       Breath sounds: Normal breath sounds  Chest:      Chest wall: Tenderness (L>R) present  Breasts:         Right: Skin change (surgical scar, telangectasia) present  No swelling, bleeding, inverted nipple, mass, nipple discharge or tenderness  Left: Skin change (surgical scars) present   No swelling, bleeding, inverted nipple, mass, nipple discharge or tenderness  Comments: Patient reports tenderness at the pec edge but also within scar tissue in the left axillary region  Abdominal:      Palpations: Abdomen is soft  There is no mass  Tenderness: There is no abdominal tenderness  Musculoskeletal:         General: Normal range of motion  Cervical back: Normal range of motion  Lymphadenopathy:      Upper Body:      Right upper body: No supraclavicular or axillary adenopathy  Left upper body: No supraclavicular or axillary adenopathy  Comments: Chronic left leg lymphedema   Skin:     General: Skin is warm and dry  Findings: No rash  Neurological:      Mental Status: She is alert and oriented to person, place, and time  Psychiatric:         Speech: Speech normal            Results:    Imaging  Mammo diagnostic bilateral w 3d & cad    Result Date: 6/16/2021  Narrative: DIAGNOSIS: Malignant neoplasm of central portion of left breast in female, estrogen receptor positive ([facility] [address] ); History of ductal carcinoma in situ (DCIS) of breast TECHNIQUE: Digital screening mammography was performed  Computer Aided Detection (CAD) analyzed all applicable images  COMPARISONS: Prior breast imaging dated: 06/15/2020, 08/13/2019, 08/13/2019, 06/17/2019, 06/14/2019, 09/11/2018, 08/10/2018, 08/01/2018, 08/01/2018, 08/01/2018, 12/05/2017, and 11/29/2016 RELEVANT HISTORY: Family Breast Cancer History: History of breast cancer in Paternal Grandmother, Maternal Aunt  Family Medical History: Family medical history includes breast cancer in 2 relatives (maternal aunt, paternal grandmother) and ovarian cancer in sister  Personal History: Hormone history includes other  Surgical history includes lumpectomy, hysterectomy, and oophorectomy  Medical history includes breast cancer, ovarian cancer, and history of chemotherapy   RISK ASSESSMENT: [facility] was suppressed due to the patient's history and/or demographic factors  TISSUE DENSITY: The breasts are heterogeneously dense, which may obscure small masses  INDICATION: Kinsey Mendoza is a 63 y o  female presenting for annual  FINDINGS: LEFT 4) POST-SURGICAL FINDING: There are post-surgical findings from a previous lumpectomy with radiation seen in the upper outer quadrant of the left breast in the middle depth  Compared to the previous study, there are no significant changes  RIGHT 1) POST-SURGICAL FINDING: There are post-surgical findings from a previous lumpectomy seen in the upper central region of the right breast in the posterior depth  Compared to the previous study, there are no significant changes  Impression:  Stable therapeutic changes bilateral breast   No evidence for malignancy  ASSESSMENT/BI-RADS CATEGORY: Left: 2 - Benign Right: 2 - Benign Overall: 2 - Benign RECOMMENDATION:      - Diagnostic mammogram in 1 year for both breasts  Workstation ID: [id number]      I reviewed the above imaging data  Advance Care Planning/Advance Directives:  Discussed disease status, cancer treatment plans and/or cancer treatment goals with the patient

## 2021-07-07 ENCOUNTER — OFFICE VISIT (OUTPATIENT)
Dept: PHYSICAL THERAPY | Facility: CLINIC | Age: 66
End: 2021-07-07
Payer: COMMERCIAL

## 2021-07-07 DIAGNOSIS — M17.11 PRIMARY OSTEOARTHRITIS OF RIGHT KNEE: ICD-10-CM

## 2021-07-07 DIAGNOSIS — M70.52 PES ANSERINUS BURSITIS OF BOTH KNEES: Primary | ICD-10-CM

## 2021-07-07 DIAGNOSIS — M70.51 PES ANSERINUS BURSITIS OF BOTH KNEES: Primary | ICD-10-CM

## 2021-07-07 DIAGNOSIS — M70.51 PES ANSERINUS BURSITIS OF RIGHT KNEE: ICD-10-CM

## 2021-07-07 PROCEDURE — 97530 THERAPEUTIC ACTIVITIES: CPT | Performed by: PHYSICAL THERAPIST

## 2021-07-07 NOTE — PROGRESS NOTES
Daily Note     Today's date: 2021  Patient name: Garima Rojas  : 1955  MRN: [id number]  Referring provider: Jordi Lechuga PA-C  Dx:   Encounter Diagnosis     ICD-10-CM    1  Pes anserinus bursitis of both knees  M70 51     M70 52    2  Primary osteoarthritis of right knee  M17 11    3  Pes anserinus bursitis of right knee  M70 51             Subjective: Pt reported that she would like to review her HEP  Objective: See treatment diary below      Assessment: Tolerated treatment well  Patient demonstrated fatigue post treatment, exhibited good technique with therapeutic exercises and would benefit from continued PT  Plan: Continue per plan of care        Precautions: history of breast and ovarian cancer  POC: 21    Manuals 6/29 7/7    5/27 6/1 6/3 6/10  6/15   STM, PROM stretch      JZ       Pat mobs       JZ                                 Neuro Re-Ed      5/27 6/1 6/3 6/10 6/15   SLS       :15x3       Tandem Foam             Cone taps                                                                 Ther Ex      5/27 6/1 6/3 6/10 6/15   Calf stretch       :30x3  :30x3 :30x3 :30x3   Step flexion stretch        :10x10 b/l :10x10 b/l :10x01 :10x01   Soleus stretch incline             clamshell             3 way HS stretch seated, heel elevated on box      :15x3 :15x3 ea :30x3 ea :30x3 ea  :30x3 ea   STS        3x10 3x10 3x10   S/l hip abd             Fig 4 bridge             Elliptical             Prone knee flexion stretch             Piriformis stretch              RB      5'  5'  5' 5' 5'   Hip 3 way         GTB 3x10     TB side step        GTB 3 laps     Standing hip ext thigh stretch on box      :15x3       Reviewed HEP  15'                                      Ther Activity      5/27 6/1 6/3 6/10 6/15   Step ups      8" 3x10 8" 3x10       Lateral step down      8" 3x10 8" 3x10      Forward step down      8" 3x10 8" 3x10 8" 3x10     Lunge at counter      3x10 3x10      Leg press b/l 110/ 3x10    Leg press mid in plantar flex      90/ 3x10 U/l 80/ 3x10    U/l 70/ 3x10   Calf press      90/ 3x10 U/l 80/ 3x10   U/l 70/ 3x10   partial squat             Updated HEP: 20'            Modalities    5/20 5/25 5/27 6/1  6/10 6/15                CT

## 2021-07-09 ENCOUNTER — APPOINTMENT (OUTPATIENT)
Dept: PHYSICAL THERAPY | Facility: CLINIC | Age: 66
End: 2021-07-09
Payer: COMMERCIAL

## 2021-07-12 ENCOUNTER — APPOINTMENT (OUTPATIENT)
Dept: PHYSICAL THERAPY | Facility: CLINIC | Age: 66
End: 2021-07-12
Payer: COMMERCIAL

## 2021-07-14 ENCOUNTER — APPOINTMENT (OUTPATIENT)
Dept: PHYSICAL THERAPY | Facility: CLINIC | Age: 66
End: 2021-07-14
Payer: COMMERCIAL

## 2021-07-19 ENCOUNTER — APPOINTMENT (OUTPATIENT)
Dept: PHYSICAL THERAPY | Facility: CLINIC | Age: 66
End: 2021-07-19
Payer: COMMERCIAL

## 2021-07-20 ENCOUNTER — OFFICE VISIT (OUTPATIENT)
Dept: PHYSICAL THERAPY | Facility: CLINIC | Age: 66
End: 2021-07-20
Payer: COMMERCIAL

## 2021-07-20 DIAGNOSIS — M17.11 PRIMARY OSTEOARTHRITIS OF RIGHT KNEE: ICD-10-CM

## 2021-07-20 DIAGNOSIS — M70.51 PES ANSERINUS BURSITIS OF BOTH KNEES: Primary | ICD-10-CM

## 2021-07-20 DIAGNOSIS — M70.51 PES ANSERINUS BURSITIS OF RIGHT KNEE: ICD-10-CM

## 2021-07-20 DIAGNOSIS — M70.52 PES ANSERINUS BURSITIS OF BOTH KNEES: Primary | ICD-10-CM

## 2021-07-20 PROCEDURE — 97110 THERAPEUTIC EXERCISES: CPT | Performed by: PHYSICAL THERAPIST

## 2021-07-20 NOTE — PROGRESS NOTES
Daily Note     Today's date: 2021  Patient name: Marykay Lesches  : 1955  MRN: [id number]  Referring provider: Brianna Degroot PA-C  Dx:   Encounter Diagnosis     ICD-10-CM    1  Pes anserinus bursitis of both knees  M70 51     M70 52    2  Primary osteoarthritis of right knee  M17 11    3  Pes anserinus bursitis of right knee  M70 51               Subjective: Pt has achieved all of her functional goals and no longer requires skilled outpatient PT  Objective: See treatment diary below    Assessment: Tolerated treatment well  Educated on proper body mechanics and confirmed HEP  Patient demonstrated fatigue post treatment and exhibited good technique with therapeutic exercises  She has achieved her functional goals and no longer requires skilled outpatient PT  Plan: D/C       Precautions: history of breast and ovarian cancer  POC: 21    Manuals 6/29 7/7 7/20   5/27 6/1 6/3 6/10  6/15   STM, PROM stretch      JZ       Pat mobs       JZ                                 Neuro Re-Ed   7/20   5/27 6/1 6/3 6/10 6/15   SLS       :15x3       Tandem Foam             Cone taps                                                                 Ther Ex   7/20   5/27 6/1 6/3 6/10 6/15   Calf stretch       :30x3  :30x3 :30x3 :30x3   Step flexion stretch        :10x10 b/l :10x10 b/l :10x01 :10x01   Soleus stretch incline             clamshell             3 way HS stretch seated, heel elevated on box      :15x3 :15x3 ea :30x3 ea :30x3 ea  :30x3 ea   STS        3x10 3x10 3x10   S/l hip abd             Fig 4 bridge             Elliptical             Prone knee flexion stretch             Piriformis stretch              RB      5'  5'  5' 5' 5'   Hip 3 way         GTB 3x10     TB side step        GTB 3 laps     Standing hip ext thigh stretch on box      :15x3       Reviewed HEP  15'                                      Ther Activity   7/20   5/27 6/1 6/3 6/10 6/15   Step ups      8" 3x10 8" 3x10       Lateral step down 8" 3x10 8" 3x10      Forward step down      8" 3x10 8" 3x10 8" 3x10     Lunge at counter      3x10 3x10      Leg press b/l           110/ 3x10    Leg press mid in plantar flex      90/ 3x10 U/l 80/ 3x10    U/l 70/ 3x10   Calf press      90/ 3x10 U/l 80/ 3x10   U/l 70/ 3x10   partial squat             Updated HEP: 20' 20'           Modalities   7/20 5/20 5/25 5/27 6/1  6/10 6/15                CT

## 2021-07-21 ENCOUNTER — APPOINTMENT (OUTPATIENT)
Dept: PHYSICAL THERAPY | Facility: CLINIC | Age: 66
End: 2021-07-21
Payer: COMMERCIAL

## 2021-07-26 ENCOUNTER — APPOINTMENT (OUTPATIENT)
Dept: PHYSICAL THERAPY | Facility: CLINIC | Age: 66
End: 2021-07-26
Payer: COMMERCIAL

## 2021-07-28 ENCOUNTER — APPOINTMENT (OUTPATIENT)
Dept: PHYSICAL THERAPY | Facility: CLINIC | Age: 66
End: 2021-07-28
Payer: COMMERCIAL

## 2021-08-18 ENCOUNTER — TELEPHONE (OUTPATIENT)
Dept: HEMATOLOGY ONCOLOGY | Facility: CLINIC | Age: 66
End: 2021-08-18

## 2021-08-18 NOTE — TELEPHONE ENCOUNTER
Medication Refill     Who is Calling  Patient   Medication  Clobetasol (Temovate) 0 05% ointment    How many pills left  n/a -    Preferred Pharmacy / [facility]     Call back number  [phone]   Relevant Information

## 2021-08-19 DIAGNOSIS — L90.0 LICHEN SCLEROSUS ET ATROPHICUS: ICD-10-CM

## 2021-08-19 RX ORDER — CLOBETASOL PROPIONATE 0.5 MG/G
OINTMENT TOPICAL
Qty: 30 G | Refills: 2 | Status: SHIPPED | OUTPATIENT
Start: 2021-08-19 | End: 2021-12-23 | Stop reason: SDUPTHER

## 2021-09-01 ENCOUNTER — OFFICE VISIT (OUTPATIENT)
Dept: FAMILY MEDICINE CLINIC | Facility: CLINIC | Age: 66
End: 2021-09-01
Payer: COMMERCIAL

## 2021-09-01 VITALS
BODY MASS INDEX: 23.5 KG/M2 | OXYGEN SATURATION: 98 % | WEIGHT: 124.5 LBS | HEIGHT: 61 IN | SYSTOLIC BLOOD PRESSURE: 150 MMHG | HEART RATE: 71 BPM | TEMPERATURE: 98.1 F | DIASTOLIC BLOOD PRESSURE: 100 MMHG

## 2021-09-01 DIAGNOSIS — Z13.29 SCREENING FOR THYROID DISORDER: ICD-10-CM

## 2021-09-01 DIAGNOSIS — I89.0 LYMPHEDEMA OF LEFT LOWER EXTREMITY: ICD-10-CM

## 2021-09-01 DIAGNOSIS — M81.6 LOCALIZED OSTEOPOROSIS WITHOUT CURRENT PATHOLOGICAL FRACTURE: ICD-10-CM

## 2021-09-01 DIAGNOSIS — Z13.0 SCREENING FOR ENDOCRINE, NUTRITIONAL, METABOLIC AND IMMUNITY DISORDER: ICD-10-CM

## 2021-09-01 DIAGNOSIS — Z13.21 SCREENING FOR ENDOCRINE, NUTRITIONAL, METABOLIC AND IMMUNITY DISORDER: ICD-10-CM

## 2021-09-01 DIAGNOSIS — Z00.00 WELL ADULT EXAM: Primary | ICD-10-CM

## 2021-09-01 DIAGNOSIS — Z13.0 SCREENING FOR IRON DEFICIENCY ANEMIA: ICD-10-CM

## 2021-09-01 DIAGNOSIS — E78.49 OTHER HYPERLIPIDEMIA: ICD-10-CM

## 2021-09-01 DIAGNOSIS — K50.90 CROHN'S DISEASE WITHOUT COMPLICATION, UNSPECIFIED GASTROINTESTINAL TRACT LOCATION (HCC): ICD-10-CM

## 2021-09-01 DIAGNOSIS — Z13.29 SCREENING FOR ENDOCRINE, NUTRITIONAL, METABOLIC AND IMMUNITY DISORDER: ICD-10-CM

## 2021-09-01 DIAGNOSIS — C56.9 MALIGNANT NEOPLASM OF OVARY, UNSPECIFIED LATERALITY (HCC): ICD-10-CM

## 2021-09-01 DIAGNOSIS — J45.20 MILD INTERMITTENT REACTIVE AIRWAY DISEASE WITHOUT COMPLICATION: ICD-10-CM

## 2021-09-01 DIAGNOSIS — G56.03 BILATERAL CARPAL TUNNEL SYNDROME: ICD-10-CM

## 2021-09-01 DIAGNOSIS — Z13.228 SCREENING FOR ENDOCRINE, NUTRITIONAL, METABOLIC AND IMMUNITY DISORDER: ICD-10-CM

## 2021-09-01 DIAGNOSIS — I10 ESSENTIAL HYPERTENSION: ICD-10-CM

## 2021-09-01 DIAGNOSIS — R35.0 URINE FREQUENCY: ICD-10-CM

## 2021-09-01 LAB
SL AMB  POCT GLUCOSE, UA: ABNORMAL
SL AMB LEUKOCYTE ESTERASE,UA: ABNORMAL
SL AMB POCT BILIRUBIN,UA: ABNORMAL
SL AMB POCT BLOOD,UA: ABNORMAL
SL AMB POCT CLARITY,UA: CLEAR
SL AMB POCT COLOR,UA: YELLOW
SL AMB POCT KETONES,UA: ABNORMAL
SL AMB POCT NITRITE,UA: ABNORMAL
SL AMB POCT PH,UA: 6
SL AMB POCT SPECIFIC GRAVITY,UA: 1.01
SL AMB POCT URINE PROTEIN: ABNORMAL
SL AMB POCT UROBILINOGEN: 0.2

## 2021-09-01 PROCEDURE — 1036F TOBACCO NON-USER: CPT | Performed by: FAMILY MEDICINE

## 2021-09-01 PROCEDURE — 3008F BODY MASS INDEX DOCD: CPT | Performed by: NURSE PRACTITIONER

## 2021-09-01 PROCEDURE — 81002 URINALYSIS NONAUTO W/O SCOPE: CPT | Performed by: FAMILY MEDICINE

## 2021-09-01 PROCEDURE — 99396 PREV VISIT EST AGE 40-64: CPT | Performed by: FAMILY MEDICINE

## 2021-09-01 NOTE — PROGRESS NOTES
Raj Velázquez is a 61 y o   female and is here for routine health maintenance  The patient reports urinary incontinence, urinary frequency 3-4 times a night and frequently during the day  Strong smell to urine  Yola Sinks History of Present Illness     Pt is here for a physical today  Also complains of urinary frequency during the day and 3-4 at night, intermittent incontinence  Complains of tingling/numbness in hands bilaterally  Well Adult Physical   Patient here for a comprehensive physical exam       Diet and Physical Activity  Diet: well balanced diet  Weight concerns: None, patient's BMI is between 18 5-24 9  Exercise: frequently      Depression Screen  PHQ-9 Depression Screening    PHQ-9:   Frequency of the following problems over the past two weeks:              General Health  Hearing: Normal:  bilateral  Vision: no vision problems  Dental: regular dental visits     History:  LMP: No LMP recorded  Patient has had a hysterectomy  Cancer Screening  Colononoscopy up to date, next 2022  Mammogram up to date   Pap -not indicated  Abnormal pap? no  Smoker NO Annual screening with low-dose helical computed tomography (CT) for patients age 54 to 76 years with history of smoking at least 30 pack-years and, if a former smoker, had quit within the previous 15 years      The following portions of the patient's history were reviewed and updated as appropriate: allergies, current medications, past family history, past medical history, past social history, past surgical history and problem list     Review of Systems     Review of Systems   Constitutional: Negative  Negative for fatigue and fever  HENT: Negative  Eyes: Negative  Respiratory: Negative  Negative for cough  Cardiovascular: Positive for leg swelling  Gastrointestinal: Negative  Endocrine: Negative  Genitourinary: Negative  Musculoskeletal: Negative  Skin: Negative  Allergic/Immunologic: Negative  Neurological: Negative  Psychiatric/Behavioral: Negative  Past Medical History     Past Medical History:   Diagnosis Date    Arthritis     [facility] ([facility] ) 09/11/2018    Right    Breast cancer ([facility] ) 08/13/2019    Left    Crohn disease ([facility] )     Dense breast     History of chemotherapy     for ovarian cancer    History of radiation therapy     History of transfusion 2009    Hyperlipidemia     Hypertension     Lymphedema     left leg    Lymphedema     Monoallelic mutation of YOLANDA gene     Breast Gyn Panel    Ovarian cancer ([facility] ) 04/21/2009       Past Surgical History     Past Surgical History:   Procedure Laterality Date    BREAST LUMPECTOMY Right 09/11/2018    BREAST LUMPECTOMY Left 8/13/2019    Procedure: BREAST LUMPECTOMY; BREAST NEEDLE LOCALIZATION (NEEDLE LOC AT 0800); Surgeon: Joseluis Ace MD;  [facility];  Service: Surgical Oncology    BREAST LUMPECTOMY W/ NEEDLE LOCALIZATION Left 08/13/2019    COLONOSCOPY      EXPLORATORY LAPAROTOMY      HYSTERECTOMY      LYMPH NODE BIOPSY Left 8/13/2019    Procedure: SENTINEL LYMPH NODE BIOPSY; LYMPHATIC MAPPING WITH BLUE DYE AND RADIOACTIVE DYE (INJECT AT 0930 BY DR JENNINGS IN THE OR); Surgeon: Joseluis Ace MD;  [facility];  Service: Surgical Oncology    MAMMO NEEDLE LOCALIZATION LEFT (ALL INC) Left 8/13/2019    MAMMO NEEDLE LOCALIZATION RIGHT (ALL INC) Right 9/11/2018    MRI BREAST BIOPSY LEFT (ALL INCLUSIVE) Left 7/18/2019    OMENTECTOMY      NC PERQ DEVICE PLACEMT BREAST LOC 1ST LES W GUIDNCE Right 9/11/2018    Procedure: BREAST LUMPECTOMY; BREAST NEEDLE LOCALIZATION (NEEDLE LOC AT 1200);   Surgeon: Joseluis Ace MD;  [facility];  Service: Surgical Oncology    TOTAL ABDOMINAL HYSTERECTOMY W/ BILATERAL SALPINGOOPHORECTOMY      US GUIDED BREAST BIOPSY LEFT COMPLETE Left 6/17/2019    US GUIDED BREAST BIOPSY RIGHT COMPLETE Right 8/1/2018    WISDOM TOOTH EXTRACTION         Social History     Social History Socioeconomic History    Marital status: Single     Spouse name: None    Number of children: None    Years of education: None    Highest education level: None   Occupational History    None   Tobacco Use    Smoking status: Former Smoker     Quit date: 2009     Years since quittin 4    Smokeless tobacco: Never Used   Vaping Use    Vaping Use: Never used   Substance and Sexual Activity    Alcohol use: Yes     Comment: rarely    Drug use: No    Sexual activity: None   Other Topics Concern    None   Social History Narrative    None     Social Determinants of Health     Financial Resource Strain:     Difficulty of Paying Living Expenses:    Food Insecurity:     Worried About Running Out of Food in the Last Year:     Ran Out of Food in the Last Year:    Transportation Needs:     Lack of Transportation (Medical):      Lack of Transportation (Non-Medical):    Physical Activity:     Days of Exercise per Week:     Minutes of Exercise per Session:    Stress:     Feeling of Stress :    Social Connections:     Frequency of Communication with Friends and Family:     Frequency of Social Gatherings with Friends and Family:     Attends [employer] Services:     Active Member of Clubs or Organizations:     Attends Club or Organization Meetings:     Marital Status:    Intimate Partner Violence:     Fear of Current or Ex-Partner:     Emotionally Abused:     Physically Abused:     Sexually Abused:        Family History     Family History   Problem Relation Age of Onset    Prostate cancer Father     Alzheimer's disease Father     Hypertension Father     Ovarian cancer Sister     Breast cancer Paternal Grandmother     Hypertension Mother     Heart disease Mother     Breast cancer Maternal Aunt     No Known Problems Sister     Other Sister         pacemaker    Hypertension Sister     Heart disease Sister     No Known Problems Sister     Down syndrome Sister     Alzheimer's disease Sister     Hypertension Sister     No Known Problems Paternal Aunt     No Known Problems Maternal Aunt        Current Medications       Current Outpatient Medications:     albuterol (PROVENTIL HFA,VENTOLIN HFA) 90 mcg/act inhaler, Inhale 2 puffs as needed for wheezing, Disp: 1 Inhaler, Rfl: 0    alendronate (FOSAMAX) 70 mg tablet, Take 1 tablet (70 mg total) by mouth every 7 days, Disp: 4 tablet, Rfl: 11    atorvastatin (LIPITOR) 40 mg tablet, TAKE 1 TABLET (40 MG TOTAL) BY MOUTH DAILY AT BEDTIME, Disp: 30 tablet, Rfl: 1    Calcium Carbonate (CALTRATE 600) 1500 (600 Ca) MG TABS, Take 1 tablet by mouth 2 (two) times a day, Disp: , Rfl:     clobetasol (TEMOVATE) 0 05 % ointment, Apply to the affected area 1-2 times a week, Disp: 30 g, Rfl: 2    Elastic Bandages & Supports (Medical Compression Stockings) MISC, Use daily as directed, 3 pair total - LEFT side: 20-30mmhg large honey- color thigh-high; RIGHT side: 20-30mmhg medium honey- colored, thigh high, Disp: 3 each, Rfl: 0    Homeopathic Products (ARNICARE PAIN RELIEF SL), Place under the tongue, Disp: , Rfl:     hydrochlorothiazide (MICROZIDE) 12 5 mg capsule, Take 1 capsule (12 5 mg total) by mouth every morning, Disp: 30 capsule, Rfl: 5    inFLIXimab (REMICADE) 100 mg, Infuse into a venous catheter  , Disp: , Rfl:     mupirocin (BACTROBAN) 2 % ointment, Apply topically 3 (three) times a day, Disp: 22 g, Rfl: 0    trolamine salicylate (ASPERCREME) 10 % cream, Apply topically as needed for muscle/joint pain, Disp: , Rfl:     Turmeric 500 MG CAPS, Take by mouth, Disp: , Rfl:     amLODIPine (NORVASC) 10 mg tablet, TAKE 1 TABLET (10 MG TOTAL) BY MOUTH DAILY, Disp: 30 tablet, Rfl: 0    anastrozole (ARIMIDEX) 1 mg tablet, TAKE 1 TABLET (1 MG TOTAL) BY MOUTH DAILY, Disp: 30 tablet, Rfl: 1    Elastic Bandages & Supports (Wrist Splint/Left Medium) MISC, Use daily, Disp: 1 each, Rfl: 0    Elastic Bandages & Supports (Wrist Splint/Right Medium) MISC, Use daily, Disp: 1 each, Rfl: 0     Allergies     Allergies   Allergen Reactions    Lisinopril      cough    Losartan      Numbness on right side of body    Boniva [Ibandronic Acid] Headache       Objective     /100   Pulse 71   Temp 98 1 °F (36 7 °C)   Ht 5' 1" (1 549 m)   Wt 56 5 kg (124 lb 8 oz)   SpO2 98%   BMI 23 52 kg/m²      Physical Exam  Vitals and nursing note reviewed  Constitutional:       Appearance: She is well-developed  HENT:      Head: Normocephalic  Right Ear: External ear normal       Left Ear: External ear normal       Nose: Nose normal    Eyes:      Conjunctiva/sclera: Conjunctivae normal       Pupils: Pupils are equal, round, and reactive to light  Cardiovascular:      Rate and Rhythm: Normal rate and regular rhythm  Heart sounds: Normal heart sounds  Pulmonary:      Effort: Pulmonary effort is normal       Breath sounds: Normal breath sounds  Abdominal:      General: Bowel sounds are normal       Palpations: Abdomen is soft  Musculoskeletal:      Cervical back: Normal range of motion and neck supple  Left lower leg: Edema present  Skin:     General: Skin is warm and dry  Neurological:      Mental Status: She is alert and oriented to person, place, and time  Psychiatric:         Behavior: Behavior normal          Thought Content:  Thought content normal          Judgment: Judgment normal            No exam data present    Health Maintenance     Health Maintenance   Topic Date Due    HIV Screening  Never done    Pneumococcal Vaccine: Pediatrics (0 to 5 Years) and At-Risk Patients (6 to 59 Years) (2 of 4 - PPSV23) 01/11/2017    COVID-19 Vaccine (3 - Pfizer risk 3-dose series) 06/09/2021    PT PLAN OF CARE  07/21/2021    Influenza Vaccine (1) 09/01/2021    Breast Cancer Screening: Mammogram  06/16/2022    Depression Screening  06/22/2022    Colorectal Cancer Screening  07/15/2022    BMI: Adult  09/01/2022    Annual Physical  09/01/2022    DTaP,Tdap,and Td Vaccines (3 - Td or Tdap) 11/16/2026    Hepatitis C Screening  Completed    HIB Vaccine  Aged Out    Hepatitis B Vaccine  Aged Out    IPV Vaccine  Aged Out    Hepatitis A Vaccine  Aged Out    Meningococcal ACWY Vaccine  Aged Out    HPV Vaccine  Aged Out     Immunization History   Administered Date(s) Administered    Influenza Quadrivalent, 6-35 Months IM 10/18/2016, 12/07/2017    Influenza, recombinant, quadrivalent,injectable, preservative free 11/24/2020    Pneumococcal Conjugate 13-Valent 11/11/2015, 11/16/2016    SARS-CoV-2 / COVID-19 mRNA IM (Pfizer-BioNTech) 04/17/2021, 05/12/2021    Tdap 01/01/2006, 11/16/2016       Assessment/Plan         1  Healthy female exam   2  Patient Counseling:   · Nutrition: Stressed importance of a well balanced diet, moderation of sodium/saturated fat, caloric balance and sufficient intake of fiber  · Exercise: Stressed the importance of regular exercise with a goal of 150 minutes per week  · Dental Health: Discussed daily flossing and brushing and regular dental visits     · Immunizations reviewed  · Discussed benefits of screening   · Discussed the patient's BMI with her  The BMI is in the acceptable range  3  Cancer Screening   4  Labs   5    6  Follow up in one year      Lynn Sinha,

## 2021-09-01 NOTE — PATIENT INSTRUCTIONS
Wrist splints   voltaren gel for hands   Schedule dexa scan   Elastic bandages for legs faxed to luc  Urine sample today    consider urology evaluation if urine culture negative  Carpal Tunnel Syndrome   WHAT YOU SHOULD KNOW:   Carpal tunnel syndrome (CTS) is a condition where there is increased pressure on the median nerve in the wrist  The median nerve controls muscles and feeling in the hand  Pressure may come from overuse and swelling of ligaments in the wrist    INSTRUCTIONS:   Medicines:   · NSAIDs:  These medicines decrease swelling and pain  NSAIDs are available without a doctor's order  Ask your primary healthcare provider which medicine is right for you and how much to take  Take as directed  NSAIDs can cause stomach bleeding or kidney problems if not taken correctly  · Take your medicine as directed  Call your healthcare provider if you think your medicine is not helping or if you have side effects  Tell him if you are allergic to any medicine  Keep a list of the medicines, vitamins, and herbs you take  Include the amounts, and when and why you take them  Bring the list or the pill bottles to follow-up visits  Carry your medicine list with you in case of an emergency  Follow up with your healthcare provider as directed:  Write down your questions so you remember to ask them during your visits  Manage your symptoms:   · Use ice:  Ice helps decrease swelling and pain in your wrist  Ice may also help prevent tissue damage  Use an ice pack or put crushed ice in a plastic bag  Cover the ice pack with a towel and place it on your wrist for 15 to 20 minutes every hour  · Get physical and occupational therapy:  Physical therapists will show you ways to exercise and strengthen your wrist  Occupational therapists will show you safe ways to use your wrist while you do your usual activities  · Rest your hands:  Let your hands rest for a short time between repetitive motions, such as typing   If you feel pain, stop what you are doing and gently massage your wrist and hand  · Use a wrist splint: This keeps your wrist straight or in a slightly bent position  A wrist splint decreases pressure on the median nerve by letting your wrist rest  You may need to wear the splint for up to 8 weeks  You may need to wear your wrist splint at night  · Evaluate your work habits:  Ask about ways to modify your work to help decrease your symptoms  Contact your primary healthcare provider if:   · Your symptoms are worse than before  · You have questions or concerns about your condition or care  Return to the emergency department if:   · You suddenly lose feeling and cannot move your hand  · Your hand suddenly changes color  © 2014 [employer] is for End User's use only and may not be sold, redistributed or otherwise used for commercial purposes  All illustrations and images included in CareNotes® are the copyrighted property of A D A M , Inc  or [employer]qar Pozo  The above information is an  only  It is not intended as medical advice for individual conditions or treatments  Talk to your doctor, nurse or pharmacist before following any medical regimen to see if it is safe and effective for you  Wellness Visit for Adults   AMBULATORY CARE:   A wellness visit  is when you see your healthcare provider to get screened for health problems  Your healthcare provider will also give you advice on how to stay healthy  Write down your questions so you remember to ask them  Ask your healthcare provider how often you should have a wellness visit  What happens at a wellness visit:  Your healthcare provider will ask about your health, and your family history of health problems  This includes high blood pressure, heart disease, and cancer  He or she will ask if you have symptoms that concern you, if you smoke, and about your mood   You may also be asked about your intake of medicines, supplements, food, and alcohol  Any of the following may be done:  · Your weight  will be checked  Your height may also be checked so your body mass index (BMI) can be calculated  Your BMI shows if you are at a healthy weight  · Your blood pressure  and heart rate will be checked  Your temperature may also be checked  · Blood and urine tests  may be done  Blood tests may be done to check your cholesterol levels  Abnormal cholesterol levels increase your risk for heart disease and stroke  You may also need a blood or urine test to check for diabetes if you are at increased risk  Urine tests may be done to look for signs of an infection or kidney disease  · A physical exam  includes checking your heartbeat and lungs with a stethoscope  Your healthcare provider may also check your skin to look for sun damage  · Screening tests  may be recommended  A screening test is done to check for diseases that may not cause symptoms  The screening tests you may need depend on your age, gender, family history, and lifestyle habits  For example, colorectal screening may be recommended if you are 48years old or older  Screening tests you need if you are a woman:   · A Pap smear  is used to screen for cervical cancer  Pap smears are usually done every 3 to 5 years depending on your age  You may need them more often if you have had abnormal Pap smear test results in the past  Ask your healthcare provider how often you should have a Pap smear  · A mammogram  is an x-ray of your breasts to screen for breast cancer  Experts recommend mammograms every 2 years starting at age 48 years  You may need a mammogram at age 52 years or younger if you have an increased risk for breast cancer  Talk to your healthcare provider about when you should start having mammograms and how often you need them  Vaccines you may need:   · Get an influenza vaccine  every year  The influenza vaccine protects you from the flu  Several types of viruses cause the flu  The viruses change over time, so new vaccines are made each year  · Get a tetanus-diphtheria (Td) booster vaccine  every 10 years  This vaccine protects you against tetanus and diphtheria  Tetanus is a severe infection that may cause painful muscle spasms and lockjaw  Diphtheria is a severe bacterial infection that causes a thick covering in the back of your mouth and throat  · Get a human papillomavirus (HPV) vaccine  if you are female and aged 23 to 32 or male 23 to 24 and never received it  This vaccine protects you from HPV infection  HPV is the most common infection spread by sexual contact  HPV may also cause vaginal, penile, and anal cancers  · Get a pneumococcal vaccine  if you are aged 72 years or older  The pneumococcal vaccine is an injection given to protect you from pneumococcal disease  Pneumococcal disease is an infection caused by pneumococcal bacteria  The infection may cause pneumonia, meningitis, or an ear infection  · Get a shingles vaccine  if you are 60 or older, even if you have had shingles before  The shingles vaccine is an injection to protect you from the varicella-zoster virus  This is the same virus that causes chickenpox  Shingles is a painful rash that develops in people who had chickenpox or have been exposed to the virus  How to eat healthy:  My Plate is a model for planning healthy meals  It shows the types and amounts of foods that should go on your plate  Fruits and vegetables make up about half of your plate, and grains and protein make up the other half  A serving of dairy is included on the side of your plate  The amount of calories and serving sizes you need depends on your age, gender, weight, and height  Examples of healthy foods are listed below:  · Eat a variety of vegetables  such as dark green, red, and orange vegetables   You can also include canned vegetables low in sodium (salt) and frozen vegetables without added butter or sauces  · Eat a variety of fresh fruits , canned fruit in 100% juice, frozen fruit, and dried fruit  · Include whole grains  At least half of the grains you eat should be whole grains  Examples include whole-wheat bread, wheat pasta, brown rice, and whole-grain cereals such as oatmeal     · Eat a variety of protein foods such as seafood (fish and shellfish), lean meat, and poultry without skin (turkey and chicken)  Examples of lean meats include pork leg, shoulder, or tenderloin, and beef round, sirloin, tenderloin, and extra lean ground beef  Other protein foods include eggs and egg substitutes, beans, peas, soy products, nuts, and seeds  · Choose low-fat dairy products such as skim or 1% milk or low-fat yogurt, cheese, and cottage cheese  · Limit unhealthy fats  such as butter, hard margarine, and shortening  Exercise:  Exercise at least 30 minutes per day on most days of the week  Some examples of exercise include walking, biking, dancing, and swimming  You can also fit in more physical activity by taking the stairs instead of the elevator or parking farther away from stores  Include muscle strengthening activities 2 days each week  Regular exercise provides many health benefits  It helps you manage your weight, and decreases your risk for type 2 diabetes, heart disease, stroke, and high blood pressure  Exercise can also help improve your mood  Ask your healthcare provider about the best exercise plan for you  General health and safety guidelines:   · Do not smoke  Nicotine and other chemicals in cigarettes and cigars can cause lung damage  Ask your healthcare provider for information if you currently smoke and need help to quit  E-cigarettes or smokeless tobacco still contain nicotine  Talk to your healthcare provider before you use these products  · Limit alcohol  A drink of alcohol is 12 ounces of beer, 5 ounces of wine, or 1½ ounces of liquor  · Lose weight, if needed  Being overweight increases your risk of certain health conditions  These include heart disease, high blood pressure, type 2 diabetes, and certain types of cancer  · Protect your skin  Do not sunbathe or use tanning beds  Use sunscreen with a SPF 15 or higher  Apply sunscreen at least 15 minutes before you go outside  Reapply sunscreen every 2 hours  Wear protective clothing, hats, and sunglasses when you are outside  · Drive safely  Always wear your seatbelt  Make sure everyone in your car wears a seatbelt  A seatbelt can save your life if you are in an accident  Do not use your cell phone when you are driving  This could distract you and cause an accident  Pull over if you need to make a call or send a text message  · Practice safe sex  Use latex condoms if are sexually active and have more than one partner  Your healthcare provider may recommend screening tests for sexually transmitted infections (STIs)  · Wear helmets, lifejackets, and protective gear  Always wear a helmet when you ride a bike or motorcycle, go skiing, or play sports that could cause a head injury  Wear protective equipment when you play sports  Wear a lifejacket when you are on a boat or doing water sports  © Copyright [employer] 2021 Information is for End User's use only and may not be sold, redistributed or otherwise used for commercial purposes  All illustrations and images included in CareNotes® are the copyrighted property of [employer]  or Dickson Cortes  The above information is an  only  It is not intended as medical advice for individual conditions or treatments  Talk to your doctor, nurse or pharmacist before following any medical regimen to see if it is safe and effective for you

## 2021-09-04 LAB
BACTERIA UR CULT: NORMAL
Lab: NO GROWTH

## 2021-09-07 ENCOUNTER — TELEPHONE (OUTPATIENT)
Dept: FAMILY MEDICINE CLINIC | Facility: CLINIC | Age: 66
End: 2021-09-07

## 2021-09-07 NOTE — TELEPHONE ENCOUNTER
----- Message from Racheal Allred DO sent at 9/5/2021 10:50 PM EDT -----  Your urine culture is ok  Sent through [facility]

## 2021-09-15 DIAGNOSIS — I10 ESSENTIAL HYPERTENSION: ICD-10-CM

## 2021-09-15 DIAGNOSIS — Z17.0 MALIGNANT NEOPLASM OF CENTRAL PORTION OF LEFT BREAST IN FEMALE, ESTROGEN RECEPTOR POSITIVE (HCC): ICD-10-CM

## 2021-09-15 DIAGNOSIS — C50.112 MALIGNANT NEOPLASM OF CENTRAL PORTION OF LEFT BREAST IN FEMALE, ESTROGEN RECEPTOR POSITIVE (HCC): ICD-10-CM

## 2021-09-15 RX ORDER — ANASTROZOLE 1 MG/1
1 TABLET ORAL DAILY
Qty: 30 TABLET | Refills: 1 | Status: SHIPPED | OUTPATIENT
Start: 2021-09-15 | End: 2021-10-27 | Stop reason: SDUPTHER

## 2021-09-15 RX ORDER — AMLODIPINE BESYLATE 10 MG/1
10 TABLET ORAL DAILY
Qty: 30 TABLET | Refills: 0 | Status: SHIPPED | OUTPATIENT
Start: 2021-09-15 | End: 2021-10-15

## 2021-09-20 PROBLEM — R35.0 URINE FREQUENCY: Status: ACTIVE | Noted: 2021-09-20

## 2021-09-21 ENCOUNTER — TELEPHONE (OUTPATIENT)
Dept: FAMILY MEDICINE CLINIC | Facility: CLINIC | Age: 66
End: 2021-09-21

## 2021-09-21 LAB
ALBUMIN SERPL-MCNC: 4.1 G/DL (ref 3.8–4.8)
ALBUMIN/GLOB SERPL: 1.3 {RATIO} (ref 1.2–2.2)
ALP SERPL-CCNC: 75 IU/L (ref 44–121)
ALT SERPL-CCNC: 16 IU/L (ref 0–32)
AST SERPL-CCNC: 21 IU/L (ref 0–40)
BASOPHILS # BLD AUTO: 0 X10E3/UL (ref 0–0.2)
BASOPHILS NFR BLD AUTO: 1 %
BILIRUB SERPL-MCNC: 0.4 MG/DL (ref 0–1.2)
BUN SERPL-MCNC: 21 MG/DL (ref 8–27)
BUN/CREAT SERPL: 29 (ref 12–28)
CALCIUM SERPL-MCNC: 9 MG/DL (ref 8.7–10.3)
CHLORIDE SERPL-SCNC: 97 MMOL/L (ref 96–106)
CHOLEST SERPL-MCNC: 212 MG/DL (ref 100–199)
CO2 SERPL-SCNC: 30 MMOL/L (ref 20–29)
CREAT SERPL-MCNC: 0.72 MG/DL (ref 0.57–1)
EOSINOPHIL # BLD AUTO: 0.1 X10E3/UL (ref 0–0.4)
EOSINOPHIL NFR BLD AUTO: 2 %
ERYTHROCYTE [DISTWIDTH] IN BLOOD BY AUTOMATED COUNT: 12.6 % (ref 11.7–15.4)
GLOBULIN SER-MCNC: 3.1 G/DL (ref 1.5–4.5)
GLUCOSE SERPL-MCNC: 92 MG/DL (ref 65–99)
HCT VFR BLD AUTO: 39.8 % (ref 34–46.6)
HDLC SERPL-MCNC: 91 MG/DL
HGB BLD-MCNC: 13.1 G/DL (ref 11.1–15.9)
IMM GRANULOCYTES # BLD: 0 X10E3/UL (ref 0–0.1)
IMM GRANULOCYTES NFR BLD: 0 %
LDLC SERPL CALC-MCNC: 109 MG/DL (ref 0–99)
LDLC/HDLC SERPL: 1.2 RATIO (ref 0–3.2)
LYMPHOCYTES # BLD AUTO: 1.6 X10E3/UL (ref 0.7–3.1)
LYMPHOCYTES NFR BLD AUTO: 28 %
MCH RBC QN AUTO: 27.5 PG (ref 26.6–33)
MCHC RBC AUTO-ENTMCNC: 32.9 G/DL (ref 31.5–35.7)
MCV RBC AUTO: 83 FL (ref 79–97)
MONOCYTES # BLD AUTO: 0.6 X10E3/UL (ref 0.1–0.9)
MONOCYTES NFR BLD AUTO: 11 %
NEUTROPHILS # BLD AUTO: 3.4 X10E3/UL (ref 1.4–7)
NEUTROPHILS NFR BLD AUTO: 58 %
PLATELET # BLD AUTO: 320 X10E3/UL (ref 150–450)
POTASSIUM SERPL-SCNC: 3.9 MMOL/L (ref 3.5–5.2)
PROT SERPL-MCNC: 7.2 G/DL (ref 6–8.5)
RBC # BLD AUTO: 4.77 X10E6/UL (ref 3.77–5.28)
SL AMB EGFR AFRICAN AMERICAN: 102 ML/MIN/1.73
SL AMB EGFR NON AFRICAN AMERICAN: 89 ML/MIN/1.73
SL AMB VLDL CHOLESTEROL CALC: 12 MG/DL (ref 5–40)
SODIUM SERPL-SCNC: 138 MMOL/L (ref 134–144)
TRIGL SERPL-MCNC: 68 MG/DL (ref 0–149)
TSH SERPL DL<=0.005 MIU/L-ACNC: 1.98 UIU/ML (ref 0.45–4.5)
WBC # BLD AUTO: 5.8 X10E3/UL (ref 3.4–10.8)

## 2021-09-21 NOTE — TELEPHONE ENCOUNTER
----- Message from Rosina Ballard DO sent at 9/21/2021  9:34 AM EDT -----  Your labs are ok  Your kidney function is better than last year  Your cholesterol is a little worse but still ok  All other labs good     Sent through [facility]

## 2021-10-13 DIAGNOSIS — M81.8 OTHER OSTEOPOROSIS WITHOUT CURRENT PATHOLOGICAL FRACTURE: ICD-10-CM

## 2021-10-13 RX ORDER — ALENDRONATE SODIUM 70 MG/1
70 TABLET ORAL
Qty: 4 TABLET | Refills: 11 | Status: SHIPPED | OUTPATIENT
Start: 2021-10-13

## 2021-10-15 DIAGNOSIS — I10 ESSENTIAL HYPERTENSION: ICD-10-CM

## 2021-10-15 RX ORDER — AMLODIPINE BESYLATE 10 MG/1
10 TABLET ORAL DAILY
Qty: 30 TABLET | Refills: 0 | Status: SHIPPED | OUTPATIENT
Start: 2021-10-15 | End: 2021-11-12

## 2021-10-18 DIAGNOSIS — E78.49 OTHER HYPERLIPIDEMIA: ICD-10-CM

## 2021-10-18 RX ORDER — ATORVASTATIN CALCIUM 40 MG/1
40 TABLET, FILM COATED ORAL
Qty: 30 TABLET | Refills: 1 | Status: SHIPPED | OUTPATIENT
Start: 2021-10-18 | End: 2021-12-16

## 2021-10-25 ENCOUNTER — HOSPITAL ENCOUNTER (OUTPATIENT)
Dept: BONE DENSITY | Facility: IMAGING CENTER | Age: 66
Discharge: HOME | End: 2021-10-25
Payer: COMMERCIAL

## 2021-10-25 ENCOUNTER — TELEPHONE (OUTPATIENT)
Dept: FAMILY MEDICINE CLINIC | Facility: CLINIC | Age: 66
End: 2021-10-25

## 2021-10-25 DIAGNOSIS — M81.6 LOCALIZED OSTEOPOROSIS WITHOUT CURRENT PATHOLOGICAL FRACTURE: ICD-10-CM

## 2021-10-25 PROCEDURE — 77080 DXA BONE DENSITY AXIAL: CPT

## 2021-10-27 ENCOUNTER — OFFICE VISIT (OUTPATIENT)
Dept: HEMATOLOGY ONCOLOGY | Facility: CLINIC | Age: 66
End: 2021-10-27
Payer: COMMERCIAL

## 2021-10-27 VITALS
HEIGHT: 61 IN | BODY MASS INDEX: 23.6 KG/M2 | TEMPERATURE: 98.5 F | OXYGEN SATURATION: 99 % | DIASTOLIC BLOOD PRESSURE: 80 MMHG | HEART RATE: 78 BPM | RESPIRATION RATE: 16 BRPM | SYSTOLIC BLOOD PRESSURE: 128 MMHG | WEIGHT: 125 LBS

## 2021-10-27 DIAGNOSIS — Z86.000 HISTORY OF DUCTAL CARCINOMA IN SITU (DCIS) OF BREAST: Primary | ICD-10-CM

## 2021-10-27 DIAGNOSIS — C50.112 MALIGNANT NEOPLASM OF CENTRAL PORTION OF LEFT BREAST IN FEMALE, ESTROGEN RECEPTOR POSITIVE (HCC): ICD-10-CM

## 2021-10-27 DIAGNOSIS — Z17.0 MALIGNANT NEOPLASM OF CENTRAL PORTION OF LEFT BREAST IN FEMALE, ESTROGEN RECEPTOR POSITIVE (HCC): ICD-10-CM

## 2021-10-27 PROCEDURE — 99214 OFFICE O/P EST MOD 30 MIN: CPT | Performed by: INTERNAL MEDICINE

## 2021-10-27 PROCEDURE — 1036F TOBACCO NON-USER: CPT | Performed by: INTERNAL MEDICINE

## 2021-10-27 PROCEDURE — 3008F BODY MASS INDEX DOCD: CPT | Performed by: INTERNAL MEDICINE

## 2021-10-27 RX ORDER — ANASTROZOLE 1 MG/1
1 TABLET ORAL DAILY
Qty: 30 TABLET | Refills: 11 | Status: SHIPPED | OUTPATIENT
Start: 2021-10-27 | End: 2022-03-17 | Stop reason: SDUPTHER

## 2021-11-12 DIAGNOSIS — I10 ESSENTIAL HYPERTENSION: ICD-10-CM

## 2021-11-12 RX ORDER — AMLODIPINE BESYLATE 10 MG/1
10 TABLET ORAL DAILY
Qty: 30 TABLET | Refills: 0 | Status: SHIPPED | OUTPATIENT
Start: 2021-11-12 | End: 2021-12-10

## 2021-12-10 DIAGNOSIS — I10 ESSENTIAL HYPERTENSION: ICD-10-CM

## 2021-12-10 RX ORDER — AMLODIPINE BESYLATE 10 MG/1
10 TABLET ORAL DAILY
Qty: 30 TABLET | Refills: 0 | Status: SHIPPED | OUTPATIENT
Start: 2021-12-10 | End: 2022-01-07

## 2021-12-16 DIAGNOSIS — E78.49 OTHER HYPERLIPIDEMIA: ICD-10-CM

## 2021-12-16 DIAGNOSIS — I10 ESSENTIAL HYPERTENSION: ICD-10-CM

## 2021-12-16 RX ORDER — HYDROCHLOROTHIAZIDE 12.5 MG/1
12.5 CAPSULE, GELATIN COATED ORAL EVERY MORNING
Qty: 30 CAPSULE | Refills: 5 | Status: SHIPPED | OUTPATIENT
Start: 2021-12-16 | End: 2022-05-17 | Stop reason: SDUPTHER

## 2021-12-16 RX ORDER — ATORVASTATIN CALCIUM 40 MG/1
40 TABLET, FILM COATED ORAL
Qty: 30 TABLET | Refills: 1 | Status: SHIPPED | OUTPATIENT
Start: 2021-12-16 | End: 2022-02-15

## 2021-12-22 ENCOUNTER — TELEPHONE (OUTPATIENT)
Dept: HEMATOLOGY ONCOLOGY | Facility: CLINIC | Age: 66
End: 2021-12-22

## 2021-12-22 DIAGNOSIS — C50.112 MALIGNANT NEOPLASM OF CENTRAL PORTION OF LEFT BREAST IN FEMALE, ESTROGEN RECEPTOR POSITIVE (HCC): Primary | ICD-10-CM

## 2021-12-22 DIAGNOSIS — Z17.0 MALIGNANT NEOPLASM OF CENTRAL PORTION OF LEFT BREAST IN FEMALE, ESTROGEN RECEPTOR POSITIVE (HCC): Primary | ICD-10-CM

## 2021-12-23 ENCOUNTER — APPOINTMENT (OUTPATIENT)
Dept: LAB | Facility: HOSPITAL | Age: 66
End: 2021-12-23
Payer: COMMERCIAL

## 2021-12-23 DIAGNOSIS — L03.90 CELLULITIS, UNSPECIFIED CELLULITIS SITE: ICD-10-CM

## 2021-12-23 DIAGNOSIS — Z17.0 MALIGNANT NEOPLASM OF CENTRAL PORTION OF LEFT BREAST IN FEMALE, ESTROGEN RECEPTOR POSITIVE (HCC): ICD-10-CM

## 2021-12-23 DIAGNOSIS — C50.112 MALIGNANT NEOPLASM OF CENTRAL PORTION OF LEFT BREAST IN FEMALE, ESTROGEN RECEPTOR POSITIVE (HCC): ICD-10-CM

## 2021-12-23 DIAGNOSIS — J45.20 MILD INTERMITTENT REACTIVE AIRWAY DISEASE WITHOUT COMPLICATION: ICD-10-CM

## 2021-12-23 DIAGNOSIS — L90.0 LICHEN SCLEROSUS ET ATROPHICUS: ICD-10-CM

## 2021-12-23 LAB
BUN SERPL-MCNC: 30 MG/DL (ref 5–25)
CREAT SERPL-MCNC: 0.94 MG/DL (ref 0.6–1.3)
GFR SERPL CREATININE-BSD FRML MDRD: 64 ML/MIN/1.73SQ M

## 2021-12-23 PROCEDURE — 82565 ASSAY OF CREATININE: CPT

## 2021-12-23 PROCEDURE — 84520 ASSAY OF UREA NITROGEN: CPT

## 2021-12-23 PROCEDURE — 36415 COLL VENOUS BLD VENIPUNCTURE: CPT

## 2021-12-23 RX ORDER — ALBUTEROL SULFATE 90 UG/1
2 AEROSOL, METERED RESPIRATORY (INHALATION) AS NEEDED
Qty: 18 G | Refills: 1 | Status: SHIPPED | OUTPATIENT
Start: 2021-12-23 | End: 2022-05-17 | Stop reason: SDUPTHER

## 2021-12-23 RX ORDER — CLOBETASOL PROPIONATE 0.5 MG/G
OINTMENT TOPICAL
Qty: 30 G | Refills: 2 | Status: SHIPPED | OUTPATIENT
Start: 2021-12-23 | End: 2022-05-17 | Stop reason: SDUPTHER

## 2021-12-29 ENCOUNTER — HOSPITAL ENCOUNTER (OUTPATIENT)
Dept: MRI IMAGING | Facility: HOSPITAL | Age: 66
Discharge: HOME | End: 2021-12-29
Payer: COMMERCIAL

## 2021-12-29 DIAGNOSIS — Z15.89 MONOALLELIC MUTATION OF ATM GENE: ICD-10-CM

## 2021-12-29 DIAGNOSIS — Z17.0 MALIGNANT NEOPLASM OF CENTRAL PORTION OF LEFT BREAST IN FEMALE, ESTROGEN RECEPTOR POSITIVE (HCC): ICD-10-CM

## 2021-12-29 DIAGNOSIS — Z15.01 MONOALLELIC MUTATION OF ATM GENE: ICD-10-CM

## 2021-12-29 DIAGNOSIS — Z15.01 GENETIC PREDISPOSITION TO BREAST CANCER: ICD-10-CM

## 2021-12-29 DIAGNOSIS — Z15.09 MONOALLELIC MUTATION OF ATM GENE: ICD-10-CM

## 2021-12-29 DIAGNOSIS — C50.112 MALIGNANT NEOPLASM OF CENTRAL PORTION OF LEFT BREAST IN FEMALE, ESTROGEN RECEPTOR POSITIVE (HCC): ICD-10-CM

## 2021-12-29 PROCEDURE — A9585 GADOBUTROL INJECTION: HCPCS | Performed by: NURSE PRACTITIONER

## 2021-12-29 PROCEDURE — C8908 MRI W/O FOL W/CONT, BREAST,: HCPCS

## 2021-12-29 PROCEDURE — 77049 MRI BREAST C-+ W/CAD BI: CPT

## 2021-12-29 PROCEDURE — G1004 CDSM NDSC: HCPCS

## 2021-12-29 RX ADMIN — GADOBUTROL 5 ML: 604.72 INJECTION INTRAVENOUS at 18:19

## 2022-01-07 DIAGNOSIS — I10 ESSENTIAL HYPERTENSION: ICD-10-CM

## 2022-01-07 RX ORDER — AMLODIPINE BESYLATE 10 MG/1
10 TABLET ORAL DAILY
Qty: 30 TABLET | Refills: 0 | Status: SHIPPED | OUTPATIENT
Start: 2022-01-07 | End: 2022-02-04

## 2022-01-16 ENCOUNTER — IMMUNIZATIONS (OUTPATIENT)
Dept: FAMILY MEDICINE CLINIC | Facility: HOSPITAL | Age: 67
End: 2022-01-16

## 2022-01-16 DIAGNOSIS — Z23 ENCOUNTER FOR IMMUNIZATION: Primary | ICD-10-CM

## 2022-01-16 PROCEDURE — 91300 COVID-19 PFIZER VACC 0.3 ML: CPT

## 2022-01-16 PROCEDURE — 0001A COVID-19 PFIZER VACC 0.3 ML: CPT

## 2022-01-25 ENCOUNTER — OFFICE VISIT (OUTPATIENT)
Dept: SURGICAL ONCOLOGY | Facility: CLINIC | Age: 67
End: 2022-01-25
Payer: COMMERCIAL

## 2022-01-25 VITALS
OXYGEN SATURATION: 98 % | DIASTOLIC BLOOD PRESSURE: 90 MMHG | RESPIRATION RATE: 16 BRPM | TEMPERATURE: 97.4 F | BODY MASS INDEX: 24.92 KG/M2 | HEART RATE: 82 BPM | WEIGHT: 132 LBS | HEIGHT: 61 IN | SYSTOLIC BLOOD PRESSURE: 162 MMHG

## 2022-01-25 DIAGNOSIS — Z79.811 USE OF ANASTROZOLE: ICD-10-CM

## 2022-01-25 DIAGNOSIS — Z15.01 MONOALLELIC MUTATION OF ATM GENE: ICD-10-CM

## 2022-01-25 DIAGNOSIS — Z17.0 MALIGNANT NEOPLASM OF CENTRAL PORTION OF LEFT BREAST IN FEMALE, ESTROGEN RECEPTOR POSITIVE (HCC): Primary | ICD-10-CM

## 2022-01-25 DIAGNOSIS — Z15.89 MONOALLELIC MUTATION OF ATM GENE: ICD-10-CM

## 2022-01-25 DIAGNOSIS — Z15.09 MONOALLELIC MUTATION OF ATM GENE: ICD-10-CM

## 2022-01-25 DIAGNOSIS — C50.112 MALIGNANT NEOPLASM OF CENTRAL PORTION OF LEFT BREAST IN FEMALE, ESTROGEN RECEPTOR POSITIVE (HCC): Primary | ICD-10-CM

## 2022-01-25 PROCEDURE — 1036F TOBACCO NON-USER: CPT | Performed by: NURSE PRACTITIONER

## 2022-01-25 PROCEDURE — 3008F BODY MASS INDEX DOCD: CPT | Performed by: NURSE PRACTITIONER

## 2022-01-25 PROCEDURE — 99214 OFFICE O/P EST MOD 30 MIN: CPT | Performed by: NURSE PRACTITIONER

## 2022-01-25 NOTE — PROGRESS NOTES
Surgical Oncology Follow Up       [address] [place] [address],[address] [facility]  [facility] [place]  [address]  [place] [address]    Manoj Beach  1955  [id number]  [address] [place] [address],[address] [place] [address]  [facility] [place]  [address] [place] [address]    Chief Complaint   Patient presents with    Breast Cancer       Assessment/Plan:  1  Malignant neoplasm of central portion of left breast in female, estrogen receptor positive ([address] )  - Mammo diagnostic bilateral w 3d & cad; Future  - Call when ready for a referral to PT for Strength ABC  - 6 mo f/u visit    2  Use of anastrozole  - Continue use per medical oncology    3  Monoallelic mutation of YOLANDA gene      Discussion/Summary: Patient is a 82-year-old female with a personal history of ovarian cancer who presents today for a six-month follow-up visit for bilateral breast cancer  Gretel Vidal was diagnosed with DCIS, 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 2018  She underwent a right lumpectomy by Dr Juan Manuel Jarrett and completed adjuvant radiation therapy   She had underwent genetic testing which revealed an YOLANDA genetic mutation and a VUS of the Rad51c gene   She was then diagnosed with left breast cancer in July of 2019  Pathology revealed invasive carcinoma, ER 90%, WV negative, her 2-   She underwent a lumpectomy and sentinel node biopsy   She completed radiation therapy and is taking Anastrozole  I am following her with annual mammograms and annual breast MRI  She had a bilateral mammogram on June 16, 2021 which was BI-RADS 2, category 3 density  She had a breast MRI on December 29, 2021 which was BI-RADS 2  She offers no new complaints today and there are no concerns on today's exam   We discussed the strength ABC program and the patient is interested in completing this prefers to hold off until the spring    She will contact me when she is ready for a referral   Otherwise I will make arrangements for her mammogram to be performed in June and we will see her back at that time or sooner should the need arise  She was instructed to call with any new concerns or symptoms prior to that time  All her questions were answered today  History of Present Illness:     Oncology History   History of ovarian cancer    Initial Diagnosis    Ovarian cancer ([facility] [address] )     4/21/2009 Surgery    Exploratory laparotomy, total abdominal hysterectomy, bilateral salpingo-oophrectomy, lymph node dissection, omentectomy with staging      - 7/8/2009 Chemotherapy    Intraperitoneal along with intravenous chemotherapy on a early ovarian cancer protocol       5/31/2018 Genetic Testing    Genetic testing results are 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      Genetic testing indicated that the patient carries a Variant of Uncertain Significance (VUS) : Rad51C c 252G>T      Hormone Therapy       History of ductal carcinoma in situ (DCIS) of breast   5/31/2018 Genetic Testing    Genetic testing results are 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      Genetic testing indicated that the patient carries a Variant of Uncertain Significance (VUS) : Rad51C c 252G>T     8/1/2018 Biopsy    Right breast biopsy  11 o'clock position 5 cmfn  DCIS  Grade 2  Confirmed by Karey Rivera MD  ER 0  TN 0     9/11/2018 Surgery    Right lumpectomy  DCIS  Grade 2  1 4 cm  Margins negative  Stage 0     10/16/2018 -  Hormone Therapy    Consult with Dr Salome Cruz  No adjuvant systemic therapy recommended     10/29/2018 - 11/28/2018 Radiation    Course: C1    Plan ID Energy Fractions Dose per Fraction (cGy) Dose Correction (cGy) Total Dose Delivered (cGy) Elapsed Days   R Breast 6X 16 / 16 266 0 4,256 22   R Breast e 12E 5 / 5 200 0 1,000 7      Treatment dates:  C1: 10/29/2018 - 11/28/2018       Malignant neoplasm of central portion of left breast in female, estrogen receptor positive ([facility] [address] )   7/18/2019 Biopsy    Left breast MRI-guided biopsy  3 o'clock, posterior depth  Invasive breast carcinoma of no special type  Grade 1  ER 90  CO 0  HER2 1+     8/13/2019 Surgery    Left breast needle localized lumpectomy with sentinel lymph node biopsy  Invasive mammary carcinoma of no special type  Grade 1  3 mm  Margins negative  0/1 Lymph node  Anatomic/Prognostic Stage IA     10/8/2019 - 11/5/2019 Radiation      Treatment:  Course: C2  Plan ID Energy Fractions Dose per Fraction (cGy) Dose Correction (cGy) Total Dose Delivered (cGy) Elapsed Days   BH L Breast 6X 16 / 16 266 0 4,256 21   BH L Brst e 12E 5 / 5 200 0 1,000 6    C2: 10/8/2019 - 11/5/2019 11/2019 -  Hormone Therapy    Anastrozole          -Interval History:  Patient presents today for follow-up visit for bilateral breast cancer  She notices no changes on her self breast exam   She reports intermittent tenderness of her bilateral chest/breast   She reports generalized arthralgias but denies any new or persistent headaches, back pain or bone pain, cough or shortness of breath, abdominal pain  Review of Systems:  Review of Systems   Constitutional: Negative for activity change, appetite change, chills, fatigue, fever and unexpected weight change  Respiratory: Negative for cough and shortness of breath  Cardiovascular: Positive for leg swelling  Negative for chest pain  Gastrointestinal: Negative for abdominal pain, constipation, diarrhea, nausea and vomiting  Musculoskeletal: Positive for arthralgias  Negative for back pain, gait problem and myalgias  Skin: Negative for color change and rash  Neurological: Negative for dizziness and headaches  Hematological: Negative for adenopathy  Psychiatric/Behavioral: Negative for agitation and confusion  All other systems reviewed and are negative        Patient Active Problem List   Diagnosis    Colon, diverticulosis    Crohn's disease ([facility] [address] )    Dense breasts    Dermatitis    Erythema chronica migrans, secondary    Hyperlipidemia    Lymphedema    Murmur    Osteopenia    Osteoporosis    History of ovarian cancer    Reactive airway disease    Shortness of breath on exertion    Lymphedema of left lower extremity    Genetic predisposition to breast cancer    History of ductal carcinoma in situ (DCIS) of breast    Encounter for follow-up surveillance of ovarian cancer    Lichen sclerosus et atrophicus    Well adult exam    Essential hypertension    Malignant neoplasm of ovary (HCC)    Nausea    TIA (transient ischemic attack)    Malignant neoplasm of central portion of left breast in female, estrogen receptor positive (HCC)    Use of anastrozole    History of breast cancer    Carpal tunnel syndrome of right wrist    Neuropathy    Monoallelic mutation of YOLANDA gene    Right calf pain    Primary osteoarthritis of right knee    Effusion of right knee    Primary osteoarthritis of left knee    Pes anserinus bursitis of right knee    Bad odor of urine    Screening for endocrine, nutritional, metabolic and immunity disorder    Screening for iron deficiency anemia    Screening for thyroid disorder    Bilateral carpal tunnel syndrome    Urine frequency     Past Medical History:   Diagnosis Date    Arthritis     Breast cancer ([facility] [address] ) 09/11/2018    Right    [facility] 08/13/2019    Left    Crohn disease ([facility] [address] )     Dense breast     History of chemotherapy     for ovarian cancer    History of radiation therapy     History of transfusion 2009    Hyperlipidemia     Hypertension     Lymphedema     left leg    Lymphedema     Monoallelic mutation of YOLANDA gene     Breast Gyn Panel    Ovarian cancer ([facility] [address] ) 04/21/2009     Past Surgical History:   Procedure Laterality Date    BREAST LUMPECTOMY Right 09/11/2018    BREAST LUMPECTOMY Left 8/13/2019    Procedure: BREAST LUMPECTOMY; BREAST NEEDLE LOCALIZATION (NEEDLE LOC AT 0800);   Surgeon: Ashely Richmond MD;  [facility];  Service: Surgical Oncology    BREAST LUMPECTOMY W/ NEEDLE LOCALIZATION Left 2019    COLONOSCOPY      EXPLORATORY LAPAROTOMY      HYSTERECTOMY      LYMPH NODE BIOPSY Left 2019    Procedure: SENTINEL LYMPH NODE BIOPSY; LYMPHATIC MAPPING WITH BLUE DYE AND RADIOACTIVE DYE (INJECT AT 0930 BY DR JENNINGS IN THE OR); Surgeon: Lupis Pendleton MD;  [facility];  Service: Surgical Oncology    MAMMO NEEDLE LOCALIZATION LEFT (ALL INC) Left 2019    MAMMO NEEDLE LOCALIZATION RIGHT (ALL INC) Right 2018    MRI BREAST BIOPSY LEFT (ALL INCLUSIVE) Left 2019    OMENTECTOMY      ID PERQ DEVICE PLACEMT BREAST LOC 1ST LES W GUIDNCE Right 2018    Procedure: BREAST LUMPECTOMY; BREAST NEEDLE LOCALIZATION (NEEDLE LOC AT 1200);   Surgeon: Lupis Pendleton MD;  [facility];  Service: Surgical Oncology    TOTAL ABDOMINAL HYSTERECTOMY W/ BILATERAL SALPINGOOPHORECTOMY      US GUIDED BREAST BIOPSY LEFT COMPLETE Left 2019    US GUIDED BREAST BIOPSY RIGHT COMPLETE Right 2018    WISDOM TOOTH EXTRACTION       Family History   Problem Relation Age of Onset    Prostate cancer Father     Alzheimer's disease Father     Hypertension Father     Ovarian cancer Sister     Breast cancer Paternal Grandmother     Hypertension Mother     Heart disease Mother     Breast cancer Maternal Aunt     No Known Problems Sister     Other Sister         pacemaker    Hypertension Sister     Heart disease Sister     No Known Problems Sister     Down syndrome Sister     Alzheimer's disease Sister     Hypertension Sister     No Known Problems Paternal Aunt     No Known Problems Maternal Aunt      Social History     Socioeconomic History    Marital status: Single     Spouse name: Not on file    Number of children: Not on file    Years of education: Not on file    Highest education level: Not on file   Occupational History    Not on file   Tobacco Use    Smoking status: Former Smoker     Quit date: 2009     Years since quittin 8    Smokeless tobacco: Never Used   Vaping Use    Vaping Use: Never used   Substance and Sexual Activity    Alcohol use: Yes     Comment: rarely    Drug use: No    Sexual activity: Not on file   Other Topics Concern    Not on file   Social History Narrative    Not on file     Social Determinants of Health     Financial Resource Strain: Not on file   Food Insecurity: Not on file   Transportation Needs: Not on file   Physical Activity: Not on file   Stress: Not on file   Social Connections: Not on file   Intimate Partner Violence: Not on file   Housing Stability: Not on file       Current Outpatient Medications:     albuterol (PROVENTIL HFA,VENTOLIN HFA) 90 mcg/act inhaler, Inhale 2 puffs as needed for wheezing, Disp: 18 g, Rfl: 1    alendronate (FOSAMAX) 70 mg tablet, TAKE 1 TABLET (70 MG TOTAL) BY MOUTH EVERY 7 DAYS, Disp: 4 tablet, Rfl: 11    amLODIPine (NORVASC) 10 mg tablet, TAKE 1 TABLET (10 MG TOTAL) BY MOUTH DAILY, Disp: 30 tablet, Rfl: 0    anastrozole (ARIMIDEX) 1 mg tablet, Take 1 tablet (1 mg total) by mouth daily, Disp: 30 tablet, Rfl: 11    atorvastatin (LIPITOR) 40 mg tablet, TAKE 1 TABLET (40 MG TOTAL) BY MOUTH DAILY AT BEDTIME, Disp: 30 tablet, Rfl: 1    Calcium Carbonate (CALTRATE 600) 1500 (600 Ca) MG TABS, Take 1 tablet by mouth 2 (two) times a day, Disp: , Rfl:     clobetasol (TEMOVATE) 0 05 % ointment, Apply to the affected area 1-2 times a week, Disp: 30 g, Rfl: 2    Homeopathic Products (ARNICARE PAIN RELIEF SL), Place under the tongue, Disp: , Rfl:     hydrochlorothiazide (MICROZIDE) 12 5 mg capsule, TAKE 1 CAPSULE (12 5 MG TOTAL) BY MOUTH EVERY MORNING, Disp: 30 capsule, Rfl: 5    inFLIXimab (REMICADE) 100 mg, Infuse into a venous catheter  , Disp: , Rfl:     mupirocin (BACTROBAN) 2 % ointment, Apply topically 3 (three) times a day, Disp: 22 g, Rfl: 0    trolamine salicylate (ASPERCREME) 10 % cream, Apply topically as needed for muscle/joint pain, Disp: , Rfl:     Turmeric 500 MG CAPS, Take by mouth, Disp: , Rfl:     Elastic Bandages & Supports (Medical Compression Stockings) MISC, Use daily as directed, 3 pair total - LEFT side: 20-30mmhg large honey- color thigh-high; RIGHT side: 20-30mmhg medium honey- colored, thigh high (Patient not taking: Reported on 1/25/2022 ), Disp: 3 each, Rfl: 0    Elastic Bandages & Supports (Wrist Splint/Left Medium) MISC, Use daily (Patient not taking: Reported on 1/25/2022 ), Disp: 1 each, Rfl: 0    Elastic Bandages & Supports (Wrist Splint/Right Medium) MISC, Use daily (Patient not taking: Reported on 1/25/2022 ), Disp: 1 each, Rfl: 0  Allergies   Allergen Reactions    Lisinopril      cough    Losartan      Numbness on right side of body    Boniva [Ibandronic Acid] Headache     Vitals:    01/25/22 0821   BP: 162/90   Pulse: 82   Resp: 16   Temp: (!) 97 4 °F (36 3 °C)   SpO2: 98%       Physical Exam  Vitals reviewed  Constitutional:       General: She is not in acute distress  Appearance: Normal appearance  She is well-developed  She is not diaphoretic  HENT:      Head: Normocephalic and atraumatic  Cardiovascular:      Rate and Rhythm: Normal rate and regular rhythm  Heart sounds: Normal heart sounds  Pulmonary:      Effort: Pulmonary effort is normal       Breath sounds: Normal breath sounds  Chest:   Breasts:      Right: Skin change (surgical scar, telangectasia) present  No swelling, bleeding, inverted nipple, mass, nipple discharge, tenderness, axillary adenopathy or supraclavicular adenopathy  Left: Skin change (surgical scars) present  No swelling, bleeding, inverted nipple, mass, nipple discharge, tenderness, axillary adenopathy or supraclavicular adenopathy  Abdominal:      Palpations: Abdomen is soft  There is no mass  Tenderness: There is no abdominal tenderness  Musculoskeletal:         General: Normal range of motion  Cervical back: Normal range of motion     Lymphadenopathy:      Upper Body:      Right upper body: No supraclavicular or axillary adenopathy  Left upper body: No supraclavicular or axillary adenopathy  Comments: Chronic left leg lymphedema   Skin:     General: Skin is warm and dry  Findings: No rash  Neurological:      Mental Status: She is alert and oriented to person, place, and time  Psychiatric:         Speech: Speech normal            Results:    Imaging  MRI breast bilateral w and wo contrast w cad    Result Date: 12/30/2021  Narrative: DIAGNOSIS: Malignant neoplasm of central portion of left breast in female, estrogen receptor positive ([facility] [address] ); Monoallelic mutation of YOLANDA gene; Genetic predisposition to breast cancer TECHNIQUE: Bilateral breast MRI was performed with the following sequences:  3 plane localizer, axial T2 stir, axial T1 inphase, axial vibrant T1 fat saturated pre and dynamic postcontrast sequences  Axial subtraction postcontrast and sagittal T1 postcontrast sequences were generated  This exam was read in conjunction with [employer] software  Intravenous contrast was administered at 2cc/sec, without documented contrast reaction  MEDICATIONS ADMINISTERED: Gadobutrol injection (SINGLE-DOSE) SOLN 5 mL, Total Given: 5 mL (1 dose) COMPARISONS: Prior breast imaging dated: 06/16/2021, 12/23/2020, 06/15/2020, 08/13/2019, 08/13/2019, 07/18/2019, 06/24/2019, 06/17/2019, 06/14/2019, 09/11/2018, 09/11/2018, 08/10/2018, 08/01/2018, 12/05/2017, 11/29/2016, and 11/24/2014 RELEVANT HISTORY: Family Breast Cancer History: History of breast cancer in Paternal Grandmother, Maternal Aunt  Family Medical History: Family medical history includes breast cancer in 2 relatives (maternal aunt, paternal grandmother) and ovarian cancer in sister  Personal History: Hormone history includes other  Surgical history includes lumpectomy, hysterectomy, and oophorectomy  Medical history includes breast cancer, ovarian cancer, and history of chemotherapy   RISK ASSESSMENT: Tyrer-Cuzick risk assessment reporting was suppressed due to the patient's history and/or demographic factors  TISSUE DENSITY: FGT: The breasts have scattered fibroglandular tissue  BPE: The background parenchymal enhancement is minimal and symmetric  INDICATION: Alisson Blackwood is a 61 y o  female presenting for    High risk screening MRI  Patient has a personal history of stage I ovarian carcinoma diagnosed in 2009, DCIS in the right breast diagnosed in 2018 and stage IA left breast cancer diagnosed in 2019  Patient has a heterozygote YOLANDA mutation  Patient is on anastrozole  FINDINGS: Bilateral There are stable postsurgical changes in both breasts  There are no suspicious enhancing masses or suspicious areas of non-mass enhancement  There is no axillary or internal mammary adenopathy  Impression:   There are benign appearing postsurgical changes in both breasts  No MRI evidence of malignancy  ASSESSMENT/BI-RADS CATEGORY: Left: 2 - Benign Right: 2 - Benign Overall: 2 - Benign RECOMMENDATION:      - Bilateral diagnostic mammogram in 6 months (when the patient is due for her annual mammogram)  - MRI screening for both breasts in 1 year  Workstation ID: [id number]      I reviewed the above imaging data  Advance Care Planning/Advance Directives:  Discussed disease status, cancer treatment plans and/or cancer treatment goals with the patient

## 2022-02-04 DIAGNOSIS — I10 ESSENTIAL HYPERTENSION: ICD-10-CM

## 2022-02-04 RX ORDER — AMLODIPINE BESYLATE 10 MG/1
10 TABLET ORAL DAILY
Qty: 30 TABLET | Refills: 0 | Status: SHIPPED | OUTPATIENT
Start: 2022-02-04 | End: 2022-03-08

## 2022-02-14 DIAGNOSIS — E78.49 OTHER HYPERLIPIDEMIA: ICD-10-CM

## 2022-02-15 RX ORDER — ATORVASTATIN CALCIUM 40 MG/1
40 TABLET, FILM COATED ORAL
Qty: 30 TABLET | Refills: 1 | Status: SHIPPED | OUTPATIENT
Start: 2022-02-15 | End: 2022-04-14

## 2022-03-07 DIAGNOSIS — I10 ESSENTIAL HYPERTENSION: ICD-10-CM

## 2022-03-08 RX ORDER — AMLODIPINE BESYLATE 10 MG/1
10 TABLET ORAL DAILY
Qty: 30 TABLET | Refills: 0 | Status: SHIPPED | OUTPATIENT
Start: 2022-03-08 | End: 2022-04-06

## 2022-03-15 ENCOUNTER — APPOINTMENT (OUTPATIENT)
Dept: RADIATION ONCOLOGY | Facility: HOSPITAL | Age: 67
End: 2022-03-15
Payer: COMMERCIAL

## 2022-03-17 DIAGNOSIS — Z17.0 MALIGNANT NEOPLASM OF CENTRAL PORTION OF LEFT BREAST IN FEMALE, ESTROGEN RECEPTOR POSITIVE (HCC): ICD-10-CM

## 2022-03-17 DIAGNOSIS — C50.112 MALIGNANT NEOPLASM OF CENTRAL PORTION OF LEFT BREAST IN FEMALE, ESTROGEN RECEPTOR POSITIVE (HCC): ICD-10-CM

## 2022-03-17 RX ORDER — ANASTROZOLE 1 MG/1
1 TABLET ORAL DAILY
Qty: 30 TABLET | Refills: 11 | Status: SHIPPED | OUTPATIENT
Start: 2022-03-17

## 2022-03-18 ENCOUNTER — CLINICAL SUPPORT (OUTPATIENT)
Dept: RADIATION ONCOLOGY | Facility: HOSPITAL | Age: 67
End: 2022-03-18
Attending: RADIOLOGY
Payer: COMMERCIAL

## 2022-03-18 VITALS
OXYGEN SATURATION: 96 % | DIASTOLIC BLOOD PRESSURE: 82 MMHG | TEMPERATURE: 97.1 F | BODY MASS INDEX: 24.37 KG/M2 | WEIGHT: 129 LBS | SYSTOLIC BLOOD PRESSURE: 138 MMHG | RESPIRATION RATE: 18 BRPM | HEART RATE: 91 BPM

## 2022-03-18 DIAGNOSIS — C56.9 MALIGNANT NEOPLASM OF OVARY, UNSPECIFIED LATERALITY (HCC): Primary | ICD-10-CM

## 2022-03-18 DIAGNOSIS — C50.112 MALIGNANT NEOPLASM OF CENTRAL PORTION OF LEFT BREAST IN FEMALE, ESTROGEN RECEPTOR POSITIVE (HCC): Primary | ICD-10-CM

## 2022-03-18 DIAGNOSIS — Z17.0 MALIGNANT NEOPLASM OF CENTRAL PORTION OF LEFT BREAST IN FEMALE, ESTROGEN RECEPTOR POSITIVE (HCC): Primary | ICD-10-CM

## 2022-03-18 DIAGNOSIS — Z85.43 HISTORY OF OVARIAN CANCER: ICD-10-CM

## 2022-03-18 DIAGNOSIS — Z79.811 USE OF ANASTROZOLE: ICD-10-CM

## 2022-03-18 DIAGNOSIS — Z86.000 HISTORY OF DUCTAL CARCINOMA IN SITU (DCIS) OF BREAST: ICD-10-CM

## 2022-03-18 PROCEDURE — 99211 OFF/OP EST MAY X REQ PHY/QHP: CPT | Performed by: RADIOLOGY

## 2022-03-18 PROCEDURE — 99213 OFFICE O/P EST LOW 20 MIN: CPT | Performed by: RADIOLOGY

## 2022-03-18 NOTE — PROGRESS NOTES
Baltazar Mas 1955 is a 61 y o  female who is here for follow up post right breast radiation completed on 18 for DCIS and left breast radiation completed on 19  She was last seen in follow up on 2021    10/27/2021  Dr Claude Junes NED  Continue anastrozole    2021  MRI breast Bilateral  FINDINGS:   Bilateral  There are stable postsurgical changes in both breasts  There are no suspicious enhancing masses or suspicious areas of non-mass enhancement  There is no axillary or internal mammary adenopathy  IMPRESSION:  There are benign appearing postsurgical changes in both breasts  No MRI evidence of malignancy  ASSESSMENT/BI-RADS CATEGORY:  Left: 2 - Benign  Right: 2 - Benign  Overall: 2 - Benign   RECOMMENDATION:       - Bilateral diagnostic mammogram in 6 months (when the patient is due for her annual mammogram)  22 Surg/Onc  No concerns on exam  F/U in 6 months    Upcomin22  Gyn/Onc  22  Mammogram  22  Surg/Onc  22  Hem/Onc            Oncology History   History of ovarian cancer    Initial Diagnosis    Ovarian cancer ([facility] [address] )     2009 Surgery    Exploratory laparotomy, total abdominal hysterectomy, bilateral salpingo-oophrectomy, lymph node dissection, omentectomy with staging      - 2009 Chemotherapy    Intraperitoneal along with intravenous chemotherapy on a early ovarian cancer protocol       2018 Genetic Testing    Genetic testing results are 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      Genetic testing indicated that the patient carries a Variant of Uncertain Significance (VUS) : Rad51C c 252G>T      Hormone Therapy       History of ductal carcinoma in situ (DCIS) of breast   2018 Genetic Testing    Genetic testing results are 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      Genetic testing indicated that the patient carries a Variant of Uncertain Significance (VUS) : Rad51C c 252G>T     2018 Biopsy    Right breast biopsy  11 o'clock position 5 cmfn  DCIS  Grade 2  Confirmed by Leah Rowe MD  ER 0  GA 0     9/11/2018 Surgery    Right lumpectomy  DCIS  Grade 2  1 4 cm  Margins negative  Stage 0     10/16/2018 -  Hormone Therapy    Consult with Dr Tad Marques  No adjuvant systemic therapy recommended     10/29/2018 - 11/28/2018 Radiation    Course: C1    Plan ID Energy Fractions Dose per Fraction (cGy) Dose Correction (cGy) Total Dose Delivered (cGy) Elapsed Days   R Breast 6X 16 / 16 266 0 4,256 22   R Breast e 12E 5 / 5 200 0 1,000 7      Treatment dates:  C1: 10/29/2018 - 11/28/2018       Malignant neoplasm of central portion of left breast in female, estrogen receptor positive ([facility] [address] )   7/18/2019 Biopsy    Left breast MRI-guided biopsy  3 o'clock, posterior depth  Invasive breast carcinoma of no special type  Grade 1  ER 90  GA 0  HER2 1+     8/13/2019 Surgery    Left breast needle localized lumpectomy with sentinel lymph node biopsy  Invasive mammary carcinoma of no special type  Grade 1  3 mm  Margins negative  0/1 Lymph node  Anatomic/Prognostic Stage IA     10/8/2019 - 11/5/2019 Radiation      Treatment:  Course: C2  Plan ID Energy Fractions Dose per Fraction (cGy) Dose Correction (cGy) Total Dose Delivered (cGy) Elapsed Days   BH L Breast 6X 16 / 16 266 0 4,256 21   BH L Brst e 12E 5 / 5 200 0 1,000 6    C2: 10/8/2019 - 11/5/2019 11/2019 -  Hormone Therapy    Anastrozole         Review of Systems:  Review of Systems   Constitutional: Positive for fatigue  HENT: Negative  Eyes: Negative  Respiratory: Negative  Cardiovascular: Negative  Gastrointestinal: Positive for constipation and diarrhea  Hx of Crohn's, intermittent diarrhea and constipation   Endocrine: Negative  Genitourinary: Negative  Musculoskeletal: Positive for arthralgias  Skin:        Skin on left breast mild rash, stable and chronic, applying moisturizer  Denies pain or breast changes   Allergic/Immunologic: Negative  Neurological: Negative  Hematological: Negative  Psychiatric/Behavioral: Negative          Clinical Trial: no    Covid Vaccine Status: vaccinated x3      Health Maintenance   Topic Date Due    HIV Screening  Never done    Pneumococcal Vaccine: Pediatrics (0 to 5 Years) and At-Risk Patients (6 to 59 Years) (2 of 4 - PPSV23) 01/11/2017    PT PLAN OF CARE  07/21/2021    Influenza Vaccine (1) 09/01/2021    Breast Cancer Screening: Mammogram  06/16/2022    Depression Screening  06/22/2022    COVID-19 Vaccine (4 - Booster for Pfizer series) 06/16/2022    Colorectal Cancer Screening  07/15/2022    Annual Physical  09/01/2022    BMI: Adult  01/25/2023    DTaP,Tdap,and Td Vaccines (4 - Td or Tdap) 11/16/2026    Hepatitis C Screening  Completed    Osteoporosis Screening  Completed    HIB Vaccine  Aged Out    Hepatitis B Vaccine  Aged Out    IPV Vaccine  Aged Out    Hepatitis A Vaccine  Aged Out    Meningococcal ACWY Vaccine  Aged Out    HPV Vaccine  Aged Out     Patient Active Problem List   Diagnosis    Colon, diverticulosis    Crohn's disease ([address] )    Dense breasts    Dermatitis    Erythema chronica migrans, secondary    Hyperlipidemia    Lymphedema    Murmur    Osteopenia    Osteoporosis    History of ovarian cancer    Reactive airway disease    Shortness of breath on exertion    Lymphedema of left lower extremity    Genetic predisposition to breast cancer    History of ductal carcinoma in situ (DCIS) of breast    Encounter for follow-up surveillance of ovarian cancer    Lichen sclerosus et atrophicus    Well adult exam    Essential hypertension    Malignant neoplasm of ovary ([address] )    Nausea    TIA (transient ischemic attack)    Malignant neoplasm of central portion of left breast in female, estrogen receptor positive ([address] )    Use of anastrozole    History of breast cancer    Carpal tunnel syndrome of right wrist    Neuropathy    Monoallelic mutation of YOLANDA gene    Right calf pain    Primary osteoarthritis of right knee    Effusion of right knee    Primary osteoarthritis of left knee    Pes anserinus bursitis of right knee    Bad odor of urine    Screening for endocrine, nutritional, metabolic and immunity disorder    Screening for iron deficiency anemia    Screening for thyroid disorder    Bilateral carpal tunnel syndrome    Urine frequency     Past Medical History:   Diagnosis Date    Arthritis     Breast cancer ([place] [address] ) 09/11/2018    Right    Breast cancer ([place] [address] ) 08/13/2019    Left    Crohn disease ([place] [address] )     Dense breast     History of chemotherapy     for ovarian cancer    History of radiation therapy     History of transfusion 2009    Hyperlipidemia     Hypertension     Lymphedema     left leg    Lymphedema     Monoallelic mutation of YOLANDA gene     Breast Gyn Panel    Ovarian cancer ([place] [address] ) 04/21/2009     Past Surgical History:   Procedure Laterality Date    BREAST LUMPECTOMY Right 09/11/2018    BREAST LUMPECTOMY Left 8/13/2019    Procedure: BREAST LUMPECTOMY; BREAST NEEDLE LOCALIZATION (NEEDLE LOC AT 0800); Surgeon: Eva Lamas MD;  [facility];  Service: Surgical Oncology    BREAST LUMPECTOMY W/ NEEDLE LOCALIZATION Left 08/13/2019    COLONOSCOPY      EXPLORATORY LAPAROTOMY      HYSTERECTOMY      LYMPH NODE BIOPSY Left 8/13/2019    Procedure: SENTINEL LYMPH NODE BIOPSY; LYMPHATIC MAPPING WITH BLUE DYE AND RADIOACTIVE DYE (INJECT AT 0930 BY DR JENNINGS IN THE OR); Surgeon: Eva Lamas MD;  [facility];  Service: Surgical Oncology    MAMMO NEEDLE LOCALIZATION LEFT (ALL INC) Left 8/13/2019    MAMMO NEEDLE LOCALIZATION RIGHT (ALL INC) Right 9/11/2018    MRI BREAST BIOPSY LEFT (ALL INCLUSIVE) Left 7/18/2019    OMENTECTOMY      MN PERQ DEVICE PLACEMT BREAST LOC 1ST LES W GUIDNCE Right 9/11/2018    Procedure: BREAST LUMPECTOMY; BREAST NEEDLE LOCALIZATION (NEEDLE LOC AT 1200);   Surgeon: Eva Lamas MD;  [facility];  Service: Surgical Oncology    TOTAL ABDOMINAL HYSTERECTOMY W/ BILATERAL SALPINGOOPHORECTOMY      US GUIDED BREAST BIOPSY LEFT COMPLETE Left 2019    US GUIDED BREAST BIOPSY RIGHT COMPLETE Right 2018    WISDOM TOOTH EXTRACTION       Family History   Problem Relation Age of Onset    Prostate cancer Father     Alzheimer's disease Father     Hypertension Father     Ovarian cancer Sister     Breast cancer Paternal Grandmother     Hypertension Mother     Heart disease Mother     Breast cancer Maternal Aunt     No Known Problems Sister     Other Sister         pacemaker    Hypertension Sister     Heart disease Sister     No Known Problems Sister     Down syndrome Sister     Alzheimer's disease Sister     Hypertension Sister     No Known Problems Paternal Aunt     No Known Problems Maternal Aunt      Social History     Socioeconomic History    Marital status: Single     Spouse name: Not on file    Number of children: Not on file    Years of education: Not on file    Highest education level: Not on file   Occupational History    Not on file   Tobacco Use    Smoking status: Former Smoker     Quit date: 2009     Years since quittin 9    Smokeless tobacco: Never Used   Vaping Use    Vaping Use: Never used   Substance and Sexual Activity    Alcohol use: Yes     Comment: rarely    Drug use: No    Sexual activity: Not on file   Other Topics Concern    Not on file   Social History Narrative    Not on file     Social Determinants of Health     Financial Resource Strain: Not on file   Food Insecurity: Not on file   Transportation Needs: Not on file   Physical Activity: Not on file   Stress: Not on file   Social Connections: Not on file   Intimate Partner Violence: Not on file   Housing Stability: Not on file       Current Outpatient Medications:     albuterol (PROVENTIL HFA,VENTOLIN HFA) 90 mcg/act inhaler, Inhale 2 puffs as needed for wheezing, Disp: 18 g, Rfl: 1    alendronate (FOSAMAX) 70 mg tablet, TAKE 1 TABLET (70 MG TOTAL) BY MOUTH EVERY 7 DAYS, Disp: 4 tablet, Rfl: 11    amLODIPine (NORVASC) 10 mg tablet, TAKE 1 TABLET (10 MG TOTAL) BY MOUTH DAILY, Disp: 30 tablet, Rfl: 0    anastrozole (ARIMIDEX) 1 mg tablet, Take 1 tablet (1 mg total) by mouth daily, Disp: 30 tablet, Rfl: 11    atorvastatin (LIPITOR) 40 mg tablet, TAKE 1 TABLET (40 MG TOTAL) BY MOUTH DAILY AT BEDTIME, Disp: 30 tablet, Rfl: 1    Calcium Carbonate (CALTRATE 600) 1500 (600 Ca) MG TABS, Take 1 tablet by mouth 2 (two) times a day, Disp: , Rfl:     clobetasol (TEMOVATE) 0 05 % ointment, Apply to the affected area 1-2 times a week, Disp: 30 g, Rfl: 2    Homeopathic Products (ARNICARE PAIN RELIEF SL), Place under the tongue, Disp: , Rfl:     hydrochlorothiazide (MICROZIDE) 12 5 mg capsule, TAKE 1 CAPSULE (12 5 MG TOTAL) BY MOUTH EVERY MORNING, Disp: 30 capsule, Rfl: 5    inFLIXimab (REMICADE) 100 mg, Infuse into a venous catheter  , Disp: , Rfl:     mupirocin (BACTROBAN) 2 % ointment, Apply topically 3 (three) times a day, Disp: 22 g, Rfl: 0    trolamine salicylate (ASPERCREME) 10 % cream, Apply topically as needed for muscle/joint pain, Disp: , Rfl:     Turmeric 500 MG CAPS, Take by mouth, Disp: , Rfl:     Elastic Bandages & Supports (Medical Compression Stockings) MISC, Use daily as directed, 3 pair total - LEFT side: 20-30mmhg large honey- color thigh-high; RIGHT side: 20-30mmhg medium honey- colored, thigh high (Patient not taking: Reported on 1/25/2022 ), Disp: 3 each, Rfl: 0    Elastic Bandages & Supports (Wrist Splint/Left Medium) MISC, Use daily (Patient not taking: Reported on 1/25/2022 ), Disp: 1 each, Rfl: 0    Elastic Bandages & Supports (Wrist Splint/Right Medium) MISC, Use daily (Patient not taking: Reported on 1/25/2022 ), Disp: 1 each, Rfl: 0  Allergies   Allergen Reactions    Lisinopril      cough    Losartan      Numbness on right side of body    Boniva [Ibandronic Acid] Headache     Vitals: 03/18/22 1308   BP: 138/82   BP Location: Left arm   Pulse: 91   Resp: 18   Temp: (!) 97 1 °F (36 2 °C)   TempSrc: Temporal   SpO2: 96%   Weight: 58 5 kg (129 lb)

## 2022-03-18 NOTE — PROGRESS NOTES
Follow-up - Radiation Oncology   Shira Spauldingluis 1955 61 y o  female [id number]      History of Present Illness   Cancer Staging  History of ductal carcinoma in situ (DCIS) of breast  Staging form: Breast, AJCC 8th Edition  - Pathologic: Stage 0 (pTis (DCIS), pN0, cM0, ER: Negative, CA: Negative) - Unsigned  Neoadjuvant therapy: No  Method of lymph node assessment: Clinical  Nuclear grade: G2  Laterality: Right  Tumor size (mm): 14    History of ovarian cancer  Staging form: Ovary, AJCC 7th Edition  - Clinical: FIGO Stage IA (T1a, N0, M0) - Signed by Callie Bernard PA-C on 4/19/2018  Histologic grade (G): G3    Malignant neoplasm of central portion of left breast in female, estrogen receptor positive ([facility] [address] )  Staging form: Breast, AJCC 8th Edition  - Pathologic: Stage IA (pT1b, pN0(sn), cM0, G1, ER+, CA-, HER2-) - Unsigned  Neoadjuvant therapy: No  Method of lymph node assessment: [place] lymph node biopsy  Histologic grading system: 3 grade system  Laterality: Left  Tumor size (mm): 3      Ghada Fortune is a 61y o  year old female who is here for follow up post right breast radiation completed on 11/28/18 for DCIS and left breast radiation completed on 11/5/19  She was last seen in follow up on 6/22/2021      Interval History:  10/27/2021  Dr Juventino GAINES  Continue anastrozole     12/19/2021  MRI breast Bilateral  FINDINGS:   Bilateral  There are stable postsurgical changes in both breasts  Bashir Al are no suspicious enhancing masses or suspicious areas of non-mass enhancement   There is no axillary or internal mammary adenopathy     IMPRESSION:  There are benign appearing postsurgical changes in both breasts   No MRI evidence of malignancy    ASSESSMENT/BI-RADS CATEGORY:  Left: 2 - Benign  Right: 2 - Benign  Overall: 2 - Benign   RECOMMENDATION:       - Bilateral diagnostic mammogram in 6 months (when the patient is due for her annual mammogram)      1/25/22 Surg/Onc  No concerns on exam  F/U in 6 months     She is is feeling well  She denies any pain or masses in the breasts bilaterally   She has been applying cream daily and performing breast massage   She did not wish to start Anastrozole until after she recovered from radiation   She started the Anastrozole in 2020  She had mild hot flashes during the day that occured more if she was upset or anxious  These hot flashes have now resolved  She occasionally has some breast tenderness at times left side greater than right but denies any breast masses  Dallas Steward denies any upper extremity edema  She reports her Crohn's disease is stable on Remicade every other month   She occasionally has a few abdominal cramps  She had a sister the  on May 22, 2020 from ovarian carcinoma and emotional support was given  Rashel Patrick other sister Stephan Nunn remains in a group home since 2020  Her sister has Alzheimer's dementia which is becoming worse  She received her COVID booster vaccination  [id number]  Gyn/Onc  22  Mammogram  22  Surg/Onc  22  Hem/Onc    Historical Information   Oncology History   History of ovarian cancer    Initial Diagnosis    Ovarian cancer ([facility] [address] )     2009 Surgery    Exploratory laparotomy, total abdominal hysterectomy, bilateral salpingo-oophrectomy, lymph node dissection, omentectomy with staging      - 2009 Chemotherapy    Intraperitoneal along with intravenous chemotherapy on a early ovarian cancer protocol       2018 Genetic Testing    Genetic testing results are 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      Genetic testing indicated that the patient carries a Variant of Uncertain Significance (VUS) : Rad51C c 252G>T      Hormone Therapy       History of ductal carcinoma in situ (DCIS) of breast   2018 Genetic Testing    Genetic testing results are 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      Genetic testing indicated that the patient carries a Variant of Uncertain Significance (VUS) : Rad51C c 252G>T     8/1/2018 Biopsy    Right breast biopsy  11 o'clock position 5 cmfn  DCIS  Grade 2  Confirmed by Sai Kerr MD  ER 0  IN 0     9/11/2018 Surgery    Right lumpectomy  DCIS  Grade 2  1 4 cm  Margins negative  Stage 0     10/16/2018 -  Hormone Therapy    Consult with Dr Brennen Little  No adjuvant systemic therapy recommended     10/29/2018 - 11/28/2018 Radiation    Course: C1    Plan ID Energy Fractions Dose per Fraction (cGy) Dose Correction (cGy) Total Dose Delivered (cGy) Elapsed Days   R Breast 6X 16 / 16 266 0 4,256 22   R Breast e 12E 5 / 5 200 0 1,000 7      Treatment dates:  C1: 10/29/2018 - 11/28/2018       Malignant neoplasm of central portion of left breast in female, estrogen receptor positive ([facility] [address] )   7/18/2019 Biopsy    Left breast MRI-guided biopsy  3 o'clock, posterior depth  Invasive breast carcinoma of no special type  Grade 1  ER 90  IN 0  HER2 1+     8/13/2019 Surgery    Left breast needle localized lumpectomy with sentinel lymph node biopsy  Invasive mammary carcinoma of no special type  Grade 1  3 mm  Margins negative  0/1 Lymph node  Anatomic/Prognostic Stage IA     10/8/2019 - 11/5/2019 Radiation      Treatment:  Course: C2  Plan ID Energy Fractions Dose per Fraction (cGy) Dose Correction (cGy) Total Dose Delivered (cGy) Elapsed Days   BH L Breast 6X 16 / 16 266 0 4,256 21   BH L Brst e 12E 5 / 5 200 0 1,000 6    C2: 10/8/2019 - 11/5/2019 11/2019 -  Hormone Therapy    Anastrozole         Past Medical History:   Diagnosis Date    Arthritis     Breast cancer ([facility] [address] ) 09/11/2018    Right    Breast cancer ([facility] [address] ) 08/13/2019    Left    Crohn disease ([facility] [address] )     Dense breast     History of chemotherapy     for ovarian cancer    History of radiation therapy     History of transfusion 2009    Hyperlipidemia     Hypertension     Lymphedema     left leg    Lymphedema     Monoallelic mutation of YOLANDA gene     Breast Gyn Panel    Ovarian cancer ([facility] [address] ) 04/21/2009     Past Surgical History:   Procedure Laterality Date    BREAST LUMPECTOMY Right 2018    BREAST LUMPECTOMY Left 2019    Procedure: BREAST LUMPECTOMY; BREAST NEEDLE LOCALIZATION (NEEDLE LOC AT 0800); Surgeon: Cornel Gordon MD;  [facility];  Service: Surgical Oncology    BREAST LUMPECTOMY W/ NEEDLE LOCALIZATION Left 2019    COLONOSCOPY      EXPLORATORY LAPAROTOMY      HYSTERECTOMY      LYMPH NODE BIOPSY Left 2019    Procedure: SENTINEL LYMPH NODE BIOPSY; LYMPHATIC MAPPING WITH BLUE DYE AND RADIOACTIVE DYE (INJECT AT 0930 BY DR JENNINGS IN THE OR); Surgeon: Cornel Gordon MD;  [facility];  Service: Surgical Oncology    MAMMO NEEDLE LOCALIZATION LEFT (ALL INC) Left 2019    MAMMO NEEDLE LOCALIZATION RIGHT (ALL INC) Right 2018    MRI BREAST BIOPSY LEFT (ALL INCLUSIVE) Left 2019    OMENTECTOMY      DE PERQ DEVICE PLACEMT BREAST LOC 1ST LES W GUIDNCE Right 2018    Procedure: BREAST LUMPECTOMY; BREAST NEEDLE LOCALIZATION (NEEDLE LOC AT 1200);   Surgeon: Cornel Gordon MD;  [facility];  Service: Surgical Oncology    TOTAL ABDOMINAL HYSTERECTOMY W/ BILATERAL SALPINGOOPHORECTOMY      US GUIDED BREAST BIOPSY LEFT COMPLETE Left 2019    US GUIDED BREAST BIOPSY RIGHT COMPLETE Right 2018    WISDOM TOOTH EXTRACTION         Social History   Social History     Substance and Sexual Activity   Alcohol Use Yes    Comment: rarely     Social History     Substance and Sexual Activity   Drug Use No     Social History     Tobacco Use   Smoking Status Former Smoker    Quit date: 2009    Years since quittin 9   Smokeless Tobacco Never Used     Meds/Allergies     Current Outpatient Medications:     albuterol (PROVENTIL HFA,VENTOLIN HFA) 90 mcg/act inhaler, Inhale 2 puffs as needed for wheezing, Disp: 18 g, Rfl: 1    alendronate (FOSAMAX) 70 mg tablet, TAKE 1 TABLET (70 MG TOTAL) BY MOUTH EVERY 7 DAYS, Disp: 4 tablet, Rfl: 11    amLODIPine (NORVASC) 10 mg tablet, TAKE 1 TABLET (10 MG TOTAL) BY MOUTH DAILY, Disp: 30 tablet, Rfl: 0    anastrozole (ARIMIDEX) 1 mg tablet, Take 1 tablet (1 mg total) by mouth daily, Disp: 30 tablet, Rfl: 11    atorvastatin (LIPITOR) 40 mg tablet, TAKE 1 TABLET (40 MG TOTAL) BY MOUTH DAILY AT BEDTIME, Disp: 30 tablet, Rfl: 1    Calcium Carbonate (CALTRATE 600) 1500 (600 Ca) MG TABS, Take 1 tablet by mouth 2 (two) times a day, Disp: , Rfl:     clobetasol (TEMOVATE) 0 05 % ointment, Apply to the affected area 1-2 times a week, Disp: 30 g, Rfl: 2    Homeopathic Products (ARNICARE PAIN RELIEF SL), Place under the tongue, Disp: , Rfl:     hydrochlorothiazide (MICROZIDE) 12 5 mg capsule, TAKE 1 CAPSULE (12 5 MG TOTAL) BY MOUTH EVERY MORNING, Disp: 30 capsule, Rfl: 5    inFLIXimab (REMICADE) 100 mg, Infuse into a venous catheter  , Disp: , Rfl:     mupirocin (BACTROBAN) 2 % ointment, Apply topically 3 (three) times a day, Disp: 22 g, Rfl: 0    trolamine salicylate (ASPERCREME) 10 % cream, Apply topically as needed for muscle/joint pain, Disp: , Rfl:     Turmeric 500 MG CAPS, Take by mouth, Disp: , Rfl:     Elastic Bandages & Supports (Medical Compression Stockings) MISC, Use daily as directed, 3 pair total - LEFT side: 20-30mmhg large honey- color thigh-high; RIGHT side: 20-30mmhg medium honey- colored, thigh high (Patient not taking: Reported on 1/25/2022 ), Disp: 3 each, Rfl: 0    Elastic Bandages & Supports (Wrist Splint/Left Medium) MISC, Use daily (Patient not taking: Reported on 1/25/2022 ), Disp: 1 each, Rfl: 0    Elastic Bandages & Supports (Wrist Splint/Right Medium) MISC, Use daily (Patient not taking: Reported on 1/25/2022 ), Disp: 1 each, Rfl: 0  Allergies   Allergen Reactions    Lisinopril      cough    Losartan      Numbness on right side of body    Boniva [Ibandronic Acid] Headache     Review of Systems  Constitutional: Positive for fatigue  HENT: Negative  Eyes: Negative  Respiratory: Negative  Cardiovascular: Negative  Gastrointestinal: Positive for constipation and diarrhea  Hx of Crohn's, intermittent diarrhea and constipation   Endocrine: Negative  Genitourinary: Negative  Musculoskeletal: Positive for arthralgias  Skin:        Skin on left breast mild rash, stable and chronic, applying moisturizer  Denies pain or breast changes   Allergic/Immunologic: Negative  Neurological: Negative  Hematological: Negative  Psychiatric/Behavioral: Negative        Clinical Trial: no     Covid Vaccine Status: vaccinated x3    OBJECTIVE:   /82 (BP Location: Left arm)   Pulse 91   Temp (!) 97 1 °F (36 2 °C) (Temporal)   Resp 18   Wt 58 5 kg (129 lb)   SpO2 96%   BMI 24 37 kg/m²   Pain Assessment:  0  ECOG/Zubrod/WHO: 0 - Asymptomatic    Physical Exam   Constitutional: She is oriented to person, place, and time  She appears well-developed and well-nourished  No distress  HENT:   Head: Normocephalic and atraumatic  Mouth/Throat: No oropharyngeal exudate  Eyes: Pupils are equal, round, and reactive to light  Conjunctivae and EOM are normal  No scleral icterus  Neck: Normal range of motion  Neck supple  No tracheal deviation present  No thyromegaly present  Cardiovascular: Normal rate, regular rhythm and normal heart sounds  Pulmonary/Chest: Effort normal and breath sounds normal  No respiratory distress  She has no wheezes  She has no rales  She exhibits no tenderness  Right breast exhibits no inverted nipple, no mass, no nipple discharge, no skin change ( There is a well-healed right lumpectomy incision with underlying post treatment changes and no masses   There is no skin erythema and no significant edema ) and no tenderness   Left breast exhibits no inverted nipple, no mass, no nipple discharge and no skin change ( There are well-healed left lumpectomy and axillary incisions with no masses   There is minimal erythema and post treatment changes without any edema   )  Abdominal: Soft  Bowel sounds are normal  She exhibits no distension and no mass  There is no tenderness  Musculoskeletal: Normal range of motion  She exhibits no edema or tenderness  Lymphadenopathy:     She has no cervical adenopathy      She has no axillary adenopathy         Right: No supraclavicular adenopathy present         Left: No supraclavicular adenopathy present  Neurological: She is alert and oriented to person, place, and time  No cranial nerve deficit  Coordination normal    Skin: Skin is warm and dry  No rash noted  She is not diaphoretic  No erythema  No pallor  Psychiatric: She has a normal mood and affect  Her behavior is normal  Judgment and thought content normal    Nursing note and vitals reviewed  RESULTS    Lab Results: No results found for this or any previous visit (from the past 672 hour(s))  Imaging Studies: See Above    Assessment/Plan:  No orders of the defined types were placed in this encounter  Kathleen Ribeiro is a 61y o  year old female with a stage 0 right breast ductal carcinoma in situ status post lumpectomy with negative margins with her tumor measuring 1 4 cm, grade 2 and estrogen and progesterone receptors were both negative   She has a history of a stage I ovarian carcinoma treated in 2009 and remains EDER  Her genetic testing was positive for pathologic variant of uncertain significance   She completed radiation therapy to the right breast on November 28, 2018 and remains EDER        She was diagnosed with stage IA invasive left breast mammary carcinoma grade 1 measuring 3 mm in size status post lumpectomy with negative margins and negative sentinel lymph node in August 2019  Dock Daniels disease was estrogen receptor positive with progesterone receptors being negative   In order to complete her breast conservation management, we recommended radiation therapy to the entire left breast    She completed treatment on November 5, 2019 and returns today for follow-up        She is doing well and denies any breast masses bilaterally   She will continues to apply lotion and massage the bilateral breasts daily   She started on anastrozole in April 2020 and now denies any hot flashes     She had a bilateral diagnostic mammogram Christianne 15, 2020 and June 16, 2021 that was without any evidence of malignancy   There were post treatment changes noted  She had bilateral breast MRI December 19, 2021 that revealed stable postsurgical changes in both breasts with out any recurrence  She is scheduled for bilateral mammogram June 17, 2022  She has follow-up with Surgical Oncology on July 25, 2022 and medical oncology November 8, 2022   She will return for follow-up examination 12 months in the MD [facility]  0/85/4832,0:24 PM    Portions of the record may have been created with voice recognition software   Occasional wrong word or "sound a like" substitutions may have occurred due to the inherent limitations of voice recognition software   Read the chart carefully and recognize, using context, where substitutions have occurred

## 2022-04-14 DIAGNOSIS — E78.49 OTHER HYPERLIPIDEMIA: ICD-10-CM

## 2022-04-14 RX ORDER — ATORVASTATIN CALCIUM 40 MG/1
40 TABLET, FILM COATED ORAL
Qty: 30 TABLET | Refills: 1 | Status: SHIPPED | OUTPATIENT
Start: 2022-04-14 | End: 2022-05-17 | Stop reason: SDUPTHER

## 2022-05-05 DIAGNOSIS — I10 ESSENTIAL HYPERTENSION: ICD-10-CM

## 2022-05-05 RX ORDER — AMLODIPINE BESYLATE 10 MG/1
10 TABLET ORAL DAILY
Qty: 30 TABLET | Refills: 0 | Status: SHIPPED | OUTPATIENT
Start: 2022-05-05 | End: 2022-05-17 | Stop reason: SDUPTHER

## 2022-05-06 ENCOUNTER — OFFICE VISIT (OUTPATIENT)
Dept: GYNECOLOGIC ONCOLOGY | Facility: CLINIC | Age: 67
End: 2022-05-06
Payer: COMMERCIAL

## 2022-05-06 VITALS
SYSTOLIC BLOOD PRESSURE: 130 MMHG | HEART RATE: 75 BPM | WEIGHT: 133 LBS | TEMPERATURE: 97.1 F | RESPIRATION RATE: 16 BRPM | HEIGHT: 61 IN | OXYGEN SATURATION: 98 % | BODY MASS INDEX: 25.11 KG/M2 | DIASTOLIC BLOOD PRESSURE: 74 MMHG

## 2022-05-06 DIAGNOSIS — I89.0 LYMPHEDEMA OF LEFT LOWER EXTREMITY: ICD-10-CM

## 2022-05-06 DIAGNOSIS — Z08 ENCOUNTER FOR FOLLOW-UP SURVEILLANCE OF OVARIAN CANCER: ICD-10-CM

## 2022-05-06 DIAGNOSIS — L03.116 CELLULITIS OF LEFT LOWER EXTREMITY: ICD-10-CM

## 2022-05-06 DIAGNOSIS — Z85.43 HISTORY OF OVARIAN CANCER: Primary | ICD-10-CM

## 2022-05-06 DIAGNOSIS — Z86.000 HISTORY OF DUCTAL CARCINOMA IN SITU (DCIS) OF BREAST: ICD-10-CM

## 2022-05-06 DIAGNOSIS — Z85.43 ENCOUNTER FOR FOLLOW-UP SURVEILLANCE OF OVARIAN CANCER: ICD-10-CM

## 2022-05-06 DIAGNOSIS — L90.0 LICHEN SCLEROSUS ET ATROPHICUS: ICD-10-CM

## 2022-05-06 PROCEDURE — 99214 OFFICE O/P EST MOD 30 MIN: CPT | Performed by: PHYSICIAN ASSISTANT

## 2022-05-06 RX ORDER — CEPHALEXIN 500 MG/1
500 CAPSULE ORAL EVERY 8 HOURS SCHEDULED
Qty: 21 CAPSULE | Refills: 0 | Status: SHIPPED | OUTPATIENT
Start: 2022-05-06 | End: 2022-05-13

## 2022-05-06 NOTE — PROGRESS NOTES
Assessment/Plan:    Problem List Items Addressed This Visit        Other    History of ovarian cancer - Primary     History of stage IA ovarian cancer s/p completion of adjuvant chemotherapy in July 2009  She has a pathogenic YOLANDA mutation with a history of right DCIS and left breast cancer  She is clinically without evidence of ovarian cancer recurrence       Return to the office in 1 year for continued surveillance           Lymphedema of left lower extremity     Poorly controlled  Referral to lymphedema therapy  Relevant Orders    Ambulatory referral to Physical Therapy    History of ductal carcinoma in situ (DCIS) of breast     Management per med-onc/surg-onc  Encounter for follow-up surveillance of ovarian cancer    Lichen sclerosus et atrophicus     Stable with intermittent clobetasol use  Cellulitis of left lower extremity     Plan for keflex x 7 days            Relevant Medications    cephalexin (KEFLEX) 500 mg capsule            CHIEF COMPLAINT:   Ovarian cancer surveillance    Problem:  Cancer Staging  History of ductal carcinoma in situ (DCIS) of breast  Staging form: Breast, AJCC 8th Edition  - Pathologic: Stage 0 (pTis (DCIS), pN0, cM0, ER: Negative, OK: Negative) - Unsigned    History of ovarian cancer  Staging form: Ovary, AJCC 7th Edition  - Clinical: FIGO Stage IA (T1a, N0, M0) - Signed by Seven Burton PA-C on 4/19/2018    Malignant neoplasm of central portion of left breast in female, estrogen receptor positive ([facility] [address] )  Staging form: Breast, AJCC 8th Edition  - Pathologic: Stage IA (pT1b, pN0(sn), cM0, G1, ER+, OK-, HER2-) - Unsigned        Previous therapy:  Oncology History   History of ovarian cancer    Initial Diagnosis    Ovarian cancer ([facility] [address] )     4/21/2009 Surgery    Exploratory laparotomy, total abdominal hysterectomy, bilateral salpingo-oophrectomy, lymph node dissection, omentectomy with staging      - 7/8/2009 Chemotherapy    Intraperitoneal along with intravenous chemotherapy on a early ovarian cancer protocol       5/31/2018 Genetic Testing    Genetic testing results are 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      Genetic testing indicated that the patient carries a Variant of Uncertain Significance (VUS) : Rad51C c 252G>T      Hormone Therapy       History of ductal carcinoma in situ (DCIS) of breast   5/31/2018 Genetic Testing    Genetic testing results are 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      Genetic testing indicated that the patient carries a Variant of Uncertain Significance (VUS) : Rad51C c 252G>T     8/1/2018 Biopsy    Right breast biopsy  11 o'clock position 5 cmfn  DCIS  Grade 2  Confirmed by Marylu Morataya MD  ER 0  DE 0     9/11/2018 Surgery    Right lumpectomy  DCIS  Grade 2  1 4 cm  Margins negative  Stage 0     10/16/2018 -  Hormone Therapy    Consult with Dr Estrellita Melendrez  No adjuvant systemic therapy recommended     10/29/2018 - 11/28/2018 Radiation    Course: C1    Plan ID Energy Fractions Dose per Fraction (cGy) Dose Correction (cGy) Total Dose Delivered (cGy) Elapsed Days   R Breast 6X 16 / 16 266 0 4,256 22   R Breast e 12E 5 / 5 200 0 1,000 7      Treatment dates:  C1: 10/29/2018 - 11/28/2018       Malignant neoplasm of central portion of left breast in female, estrogen receptor positive ([facility] [address] )   7/18/2019 Biopsy    Left breast MRI-guided biopsy  3 o'clock, posterior depth  Invasive breast carcinoma of no special type  Grade 1  ER 90  DE 0  HER2 1+     8/13/2019 Surgery    Left breast needle localized lumpectomy with sentinel lymph node biopsy  Invasive mammary carcinoma of no special type  Grade 1  3 mm  Margins negative  0/1 Lymph node  Anatomic/Prognostic Stage IA     10/8/2019 - 11/5/2019 Radiation      Treatment:  Course: C2  Plan ID Energy Fractions Dose per Fraction (cGy) Dose Correction (cGy) Total Dose Delivered (cGy) Elapsed Days   BH L Breast 6X 16 / 16 266 0 4,256 21   BH L Brst e 12E 5 / 5 200 0 1,000 6 C2: 10/8/2019 - 11/5/2019 11/2019 -  Hormone Therapy    Anastrozole           Patient ID: Jody Maloney is a 61 y o  female  who has no new complaints today  No vaginal bleeding, abdominal/pelvic pain  Normal bowel and bladder function  The patient notes worsening of her left lower extremity lymphedema  She has not been to lymphedema therapy, and notes she has not been using her compression pumps at home  She occasionally has a pink coloration to her left lower extremity  No interval change in medical history since last visit  Quality of life is good  The following portions of the patient's history were reviewed and updated as appropriate: allergies, current medications, past medical history, past surgical history and problem list     Review of Systems   Constitutional: Negative  HENT: Negative  Eyes: Negative  Respiratory: Negative  Cardiovascular: Negative  Gastrointestinal: Negative  Genitourinary: Negative  Musculoskeletal: Positive for joint swelling (left lower extremity)  Skin: Positive for color change (left lower extremity)  Neurological: Negative  Psychiatric/Behavioral: Negative          Current Outpatient Medications   Medication Sig Dispense Refill    albuterol (PROVENTIL HFA,VENTOLIN HFA) 90 mcg/act inhaler Inhale 2 puffs as needed for wheezing 18 g 1    alendronate (FOSAMAX) 70 mg tablet TAKE 1 TABLET (70 MG TOTAL) BY MOUTH EVERY 7 DAYS 4 tablet 11    amLODIPine (NORVASC) 10 mg tablet TAKE 1 TABLET (10 MG TOTAL) BY MOUTH DAILY 30 tablet 0    anastrozole (ARIMIDEX) 1 mg tablet Take 1 tablet (1 mg total) by mouth daily 30 tablet 11    atorvastatin (LIPITOR) 40 mg tablet TAKE 1 TABLET (40 MG TOTAL) BY MOUTH DAILY AT BEDTIME 30 tablet 1    Calcium Carbonate (CALTRATE 600) 1500 (600 Ca) MG TABS Take 1 tablet by mouth 2 (two) times a day      clobetasol (TEMOVATE) 0 05 % ointment Apply to the affected area 1-2 times a week 30 g 2    Homeopathic Products (ARNICARE PAIN RELIEF ) Place under the tongue      hydrochlorothiazide (MICROZIDE) 12 5 mg capsule TAKE 1 CAPSULE (12 5 MG TOTAL) BY MOUTH EVERY MORNING 30 capsule 5    inFLIXimab (REMICADE) 100 mg Infuse into a venous catheter        mupirocin (BACTROBAN) 2 % ointment Apply topically 3 (three) times a day 22 g 0    trolamine salicylate (ASPERCREME) 10 % cream Apply topically as needed for muscle/joint pain      Turmeric 500 MG CAPS Take by mouth      cephalexin (KEFLEX) 500 mg capsule Take 1 capsule (500 mg total) by mouth every 8 (eight) hours for 7 days 21 capsule 0     No current facility-administered medications for this visit  Objective:    Blood pressure 130/74, pulse 75, temperature (!) 97 1 °F (36 2 °C), temperature source Temporal, resp  rate 16, height 5' 1" (1 549 m), weight 60 3 kg (133 lb), SpO2 98 %  Body mass index is 25 13 kg/m²  Body surface area is 1 59 meters squared  Physical Exam  Vitals reviewed  Exam conducted with a chaperone present  Constitutional:       General: She is not in acute distress  Appearance: Normal appearance  She is not ill-appearing  HENT:      Head: Normocephalic and atraumatic  Mouth/Throat:      Mouth: Mucous membranes are moist    Eyes:      General:         Right eye: No discharge  Left eye: No discharge  Conjunctiva/sclera: Conjunctivae normal    Pulmonary:      Effort: Pulmonary effort is normal    Abdominal:      Palpations: Abdomen is soft  There is no mass  Tenderness: There is no abdominal tenderness  Hernia: No hernia is present  Genitourinary:     Comments: External female genitalia with loss of labia minora bilaterally  The bartholin's, uretheral and skenes glands are normal  The urethral meatus is normal (midline with no lesions)  Anus without fissure or lesion  Introitus is stenotic  Speculum exam reveals a grossly normal vagina  Atrophic changes present  No masses, lesions,discharge or bleeding  No significant cystocele or rectocele noted  Bimanual exam notes a surgical absent cervix, uterus and adnexal structures  No masses or fullness  Bladder is without fullness, mass or tenderness  Musculoskeletal:      Right lower leg: No edema  Left lower leg: No edema  Skin:     General: Skin is warm and dry  Coloration: Skin is not jaundiced  Findings: No rash  Neurological:      General: No focal deficit present  Mental Status: She is alert and oriented to person, place, and time  Cranial Nerves: No cranial nerve deficit  Sensory: No sensory deficit  Motor: No weakness  Gait: Gait normal    Psychiatric:         Mood and Affect: Mood normal          Behavior: Behavior normal          Thought Content:  Thought content normal          Judgment: Judgment normal

## 2022-05-16 ENCOUNTER — EVALUATION (OUTPATIENT)
Dept: PHYSICAL THERAPY | Facility: CLINIC | Age: 67
End: 2022-05-16
Payer: COMMERCIAL

## 2022-05-16 DIAGNOSIS — I89.0 LYMPHEDEMA OF LEFT LOWER EXTREMITY: Primary | ICD-10-CM

## 2022-05-16 PROCEDURE — 97162 PT EVAL MOD COMPLEX 30 MIN: CPT | Performed by: PHYSICAL THERAPIST

## 2022-05-16 NOTE — PROGRESS NOTES
PT Evaluation     Today's date: 2022  Patient name: Rozina Sexton  : 1955  MRN: [id number]  Referring provider: Hema Lopes  Dx:   Encounter Diagnosis     ICD-10-CM    1  Lymphedema of left lower extremity  I89 0 Ambulatory referral to Physical Therapy                  Assessment  Assessment details: Pt is a 61y o  year old female coming to outpatient PT with a diagnosis of L LE lymphedema  Pt presents with increased B knee pain, decreased L knee flexion ROM, increased B LE edema, and overall decreased functional mobility  Pt would benefit from skilled PT services in order to address these deficits and reach maximum level of function  Thank you kindly for the referral!    Pt would benefit from a new Solaris Night garment, new pneumatic compression pump and new thigh high compression stockings  Will measure for custom Solaris Night garment NV  Impairments: activity intolerance, impaired physical strength, lacks appropriate home exercise program and pain with function  Other impairment: increased L LE edema  Understanding of Dx/Px/POC: good   Prognosis: good    Goals  STG's ( 3-4 weeks)  1  Pt will be independent in HEP  2  Pt will use her home pneumatic compression pump on a regular basis  LTG's ( 6- 8 weeks)  1  Improve FOTO score by 4-6 points  2  Pt will have L LE edema reduced to max ability  3  Pt will have improved ability to go up and down the steps  4   Pt will have improved ability to walk community distances    Plan  Patient would benefit from: lymphedema eval and skilled physical therapy  Other planned modality interventions: pneumatic compression pump  Planned therapy interventions: home exercise program  Other planned therapy interventions: MLD/ CLT  Frequency: 2x week  Duration in weeks: 8  Plan of Care beginning date: 2022  Plan of Care expiration date: 2022  Treatment plan discussed with: patient        Subjective Evaluation    History of Present Illness  Mechanism of injury: Pt notes L LE lymphedema onset   Pt has a history of stage IA ovarian CA with total abdominal hysterectomy, bilateral salphingo-oopherectomy and lymph node dissection 09 s/p chemotherapy 2009  Pt reports R knee bursitis and L knee OA and had increased pain with cortisone injections  Pt had PT for her knees with some relief  Pt reports she has been walking with work activities which flares up her lymphedema and knee pain  Pt wears a Solaris Night garment at night and a thigh high during the day  Pt has noticed her Solaris night garment is not containing her swelling as well  Pt has a pneumatic compression pump, but has not been using it as regularly  Pt notes her L LE is heavy when walking and going up and down the steps  Pt is not able to get down on the floor to put food bowls on the floor and changing bong litter  Pt has no pain from lymphedema      Work: ,   Hobbies: reading  Gait:  Pain  At best pain ratin  At worst pain ratin  Location: B knees  Quality: dull ache    Social Support  Steps to enter house: yes  4  Stairs in house: yes   3  Lives in: multiple-level home    Employment status: working  Treatments  Previous treatment: physical therapy  Patient Goals  Patient goals for therapy: decreased edema  Patient goal: to decrease L LE edema as much as possible; get  a new Solaris Night garment        Objective     Neurological Testing     Sensation     Hip   Left Hip   Intact: light touch    Right Hip   Intact: light touch    Reflexes   Left   Patellar (L4): normal (2+)  Achilles (S1): normal (2+)    Right   Patellar (L4): normal (2+)  Achilles (S1): normal (2+)    Active Range of Motion   Left Knee   Flexion: 115 degrees   Extension: 0 degrees     Right Knee   Flexion: 145 degrees   Extension: 0 degrees     Additional Active Range of Motion Details  (+) Stemmer sign L LE  (+) pitting edema    Strength/Myotome Testing     Left Hip   Planes of Motion   Flexion: 4  Abduction: 4  External rotation: 4+  Internal rotation: 4+    Right Hip   Normal muscle strength    Left Knee   Flexion: 4+  Extension: 4+    Right Knee   Normal strength    Additional Strength Details  Ankle df MMT: 5/5  L LE has mild warmth to touch  Small patch of red skin L lower tibial region            Dx:  L LE lymphedema  EPOC: 7/11/22  CO-MORBIDITIES:  PERSONAL FACTORS:  Precautions: ovarian CA      Manuals             L LE MLD/ CLT                          Custom Solaris night garment measurements                          Neuro Re-Ed                                                                                                        Ther Ex                                                                                                                     Ther Activity                                       Gait Training                                       Modalities

## 2022-05-17 ENCOUNTER — OFFICE VISIT (OUTPATIENT)
Dept: FAMILY MEDICINE CLINIC | Facility: CLINIC | Age: 67
End: 2022-05-17
Payer: COMMERCIAL

## 2022-05-17 VITALS
HEART RATE: 78 BPM | DIASTOLIC BLOOD PRESSURE: 80 MMHG | HEIGHT: 61 IN | OXYGEN SATURATION: 97 % | TEMPERATURE: 97.2 F | SYSTOLIC BLOOD PRESSURE: 130 MMHG | WEIGHT: 130 LBS | BODY MASS INDEX: 24.55 KG/M2

## 2022-05-17 DIAGNOSIS — J45.20 MILD INTERMITTENT REACTIVE AIRWAY DISEASE WITHOUT COMPLICATION: ICD-10-CM

## 2022-05-17 DIAGNOSIS — L03.90 CELLULITIS, UNSPECIFIED CELLULITIS SITE: ICD-10-CM

## 2022-05-17 DIAGNOSIS — I10 ESSENTIAL HYPERTENSION: ICD-10-CM

## 2022-05-17 DIAGNOSIS — I89.0 LYMPHEDEMA OF LEFT LOWER EXTREMITY: ICD-10-CM

## 2022-05-17 DIAGNOSIS — L90.0 LICHEN SCLEROSUS ET ATROPHICUS: ICD-10-CM

## 2022-05-17 DIAGNOSIS — E78.49 OTHER HYPERLIPIDEMIA: ICD-10-CM

## 2022-05-17 DIAGNOSIS — K50.90 CROHN'S DISEASE WITHOUT COMPLICATION, UNSPECIFIED GASTROINTESTINAL TRACT LOCATION (HCC): ICD-10-CM

## 2022-05-17 DIAGNOSIS — I10 ESSENTIAL HYPERTENSION: Primary | ICD-10-CM

## 2022-05-17 PROBLEM — Z13.0 SCREENING FOR ENDOCRINE, NUTRITIONAL, METABOLIC AND IMMUNITY DISORDER: Status: RESOLVED | Noted: 2021-09-01 | Resolved: 2022-05-17

## 2022-05-17 PROBLEM — Z00.00 WELL ADULT EXAM: Status: RESOLVED | Noted: 2019-05-13 | Resolved: 2022-05-17

## 2022-05-17 PROBLEM — Z13.0 SCREENING FOR IRON DEFICIENCY ANEMIA: Status: RESOLVED | Noted: 2021-09-01 | Resolved: 2022-05-17

## 2022-05-17 PROBLEM — Z13.29 SCREENING FOR ENDOCRINE, NUTRITIONAL, METABOLIC AND IMMUNITY DISORDER: Status: RESOLVED | Noted: 2021-09-01 | Resolved: 2022-05-17

## 2022-05-17 PROBLEM — L03.116 CELLULITIS OF LEFT LOWER EXTREMITY: Status: RESOLVED | Noted: 2022-05-06 | Resolved: 2022-05-17

## 2022-05-17 PROBLEM — Z13.228 SCREENING FOR ENDOCRINE, NUTRITIONAL, METABOLIC AND IMMUNITY DISORDER: Status: RESOLVED | Noted: 2021-09-01 | Resolved: 2022-05-17

## 2022-05-17 PROBLEM — Z13.29 SCREENING FOR THYROID DISORDER: Status: RESOLVED | Noted: 2021-09-01 | Resolved: 2022-05-17

## 2022-05-17 PROBLEM — Z13.21 SCREENING FOR ENDOCRINE, NUTRITIONAL, METABOLIC AND IMMUNITY DISORDER: Status: RESOLVED | Noted: 2021-09-01 | Resolved: 2022-05-17

## 2022-05-17 PROCEDURE — 1036F TOBACCO NON-USER: CPT | Performed by: FAMILY MEDICINE

## 2022-05-17 PROCEDURE — 3008F BODY MASS INDEX DOCD: CPT | Performed by: FAMILY MEDICINE

## 2022-05-17 PROCEDURE — 99214 OFFICE O/P EST MOD 30 MIN: CPT | Performed by: FAMILY MEDICINE

## 2022-05-17 RX ORDER — ALBUTEROL SULFATE 90 UG/1
2 AEROSOL, METERED RESPIRATORY (INHALATION) AS NEEDED
Qty: 18 G | Refills: 2 | Status: SHIPPED | OUTPATIENT
Start: 2022-05-17

## 2022-05-17 RX ORDER — ARNICA MONTANA 1 [HP_X]/G
GEL TOPICAL
COMMUNITY

## 2022-05-17 RX ORDER — ATORVASTATIN CALCIUM 40 MG/1
40 TABLET, FILM COATED ORAL
Qty: 30 TABLET | Refills: 2 | Status: SHIPPED | OUTPATIENT
Start: 2022-05-17

## 2022-05-17 RX ORDER — HYDROCHLOROTHIAZIDE 12.5 MG/1
12.5 CAPSULE, GELATIN COATED ORAL EVERY MORNING
Qty: 30 CAPSULE | Refills: 3 | Status: SHIPPED | OUTPATIENT
Start: 2022-05-17

## 2022-05-17 RX ORDER — CLOBETASOL PROPIONATE 0.5 MG/G
OINTMENT TOPICAL
Qty: 60 G | Refills: 1 | Status: SHIPPED | OUTPATIENT
Start: 2022-05-17

## 2022-05-17 RX ORDER — AMLODIPINE BESYLATE 10 MG/1
10 TABLET ORAL DAILY
Qty: 30 TABLET | Refills: 2 | Status: SHIPPED | OUTPATIENT
Start: 2022-05-17

## 2022-05-17 NOTE — PROGRESS NOTES
Assessment/Plan:      1  Essential hypertension  Assessment & Plan:  Controlled, continue current medication     Orders:  -     hydrochlorothiazide (MICROZIDE) 12 5 mg capsule; Take 1 capsule (12 5 mg total) by mouth every morning  -     amLODIPine (NORVASC) 10 mg tablet; Take 1 tablet (10 mg total) by mouth in the morning  2  Lymphedema of left lower extremity  Assessment & Plan:  Continue with physical therapy       3  Mild intermittent reactive airway disease without complication  -     albuterol (PROVENTIL HFA,VENTOLIN HFA) 90 mcg/act inhaler; Inhale 2 puffs  as needed in the morning for wheezing  4  Lichen sclerosus et atrophicus  -     clobetasol (TEMOVATE) 0 05 % ointment; Apply to the affected area 1-2 times a week    5  Cellulitis, unspecified cellulitis site  -     mupirocin (BACTROBAN) 2 % ointment; Apply topically 3 (three) times a day    6  Essential hypertension  Comments:  Decreased lisinopril to 10 mg daily to see if it helps with the cough  If no change in the cough, we will discontinue lisinopril and start ARB  Assessment & Plan:  Controlled, continue current medication     Orders:  -     hydrochlorothiazide (MICROZIDE) 12 5 mg capsule; Take 1 capsule (12 5 mg total) by mouth every morning  -     amLODIPine (NORVASC) 10 mg tablet; Take 1 tablet (10 mg total) by mouth in the morning  7  Other hyperlipidemia  Comments:  continue atorvastatin   Orders:  -     atorvastatin (LIPITOR) 40 mg tablet; Take 1 tablet (40 mg total) by mouth daily at bedtime    8  Crohn's disease without complication, unspecified gastrointestinal tract location [facility])        Subjective:  Chief Complaint   Patient presents with    Follow-up     Pt here for medication check up        Patient ID: Claudell Short is a 61 y o  female  Pt is here in follow up to chronic conditions  Pt due for colonoscopy, getting medicare in august  Will need to check into remicade coverage     Needs a new solara sleeve for left leg Complains of intermittent chest pain, quick, once while driving, walking in house   And one episode on mothers day  Brief sharp pain, no radiation, no changes in breathing, no diaphoresis or nausea  Review of Systems   Constitutional: Positive for fatigue  Negative for fever  HENT: Negative  Eyes: Negative  Respiratory: Negative  Negative for cough  Cardiovascular: Positive for leg swelling  Gastrointestinal: Negative  Endocrine: Negative  Genitourinary: Negative  Musculoskeletal: Negative  Skin: Negative  Allergic/Immunologic: Negative  Neurological: Negative  Psychiatric/Behavioral: Negative  The following portions of the patient's history were reviewed and updated as appropriate: allergies, current medications, past family history, past medical history, past social history, past surgical history and problem list     Objective:  Vitals:    05/17/22 1008 05/17/22 1033   BP: 112/80 130/80   Pulse: 78    Temp: (!) 97 2 °F (36 2 °C)    SpO2: 97%    Weight: 59 kg (130 lb)    Height: 5' 1" (1 549 m)       Physical Exam  Vitals and nursing note reviewed  Constitutional:       Appearance: She is well-developed  HENT:      Head: Normocephalic and atraumatic  Cardiovascular:      Rate and Rhythm: Normal rate and regular rhythm  Heart sounds: Normal heart sounds  Pulmonary:      Effort: Pulmonary effort is normal       Breath sounds: Normal breath sounds  Abdominal:      General: Bowel sounds are normal       Palpations: Abdomen is soft  Musculoskeletal:      Left lower leg: Edema present  Comments: Lymphedema left leg   Skin:     General: Skin is warm and dry  Neurological:      Mental Status: She is alert and oriented to person, place, and time  Psychiatric:         Behavior: Behavior normal          Thought Content:  Thought content normal          Judgment: Judgment normal

## 2022-05-26 ENCOUNTER — APPOINTMENT (OUTPATIENT)
Dept: PHYSICAL THERAPY | Facility: CLINIC | Age: 67
End: 2022-05-26
Payer: COMMERCIAL

## 2022-06-01 ENCOUNTER — OFFICE VISIT (OUTPATIENT)
Dept: PHYSICAL THERAPY | Facility: CLINIC | Age: 67
End: 2022-06-01
Payer: COMMERCIAL

## 2022-06-01 DIAGNOSIS — I89.0 LYMPHEDEMA OF LEFT LOWER EXTREMITY: Primary | ICD-10-CM

## 2022-06-01 PROCEDURE — 97140 MANUAL THERAPY 1/> REGIONS: CPT | Performed by: PHYSICAL THERAPIST

## 2022-06-01 NOTE — PROGRESS NOTES
Daily Note     Today's date: 2022  Patient name: Agustin Vasquez  : 1955  MRN: [id number]  Referring provider: Surjit Velázquez  Dx:   Encounter Diagnosis     ICD-10-CM    1  Lymphedema of left lower extremity  I89 0                   Subjective: Pt reports she has been able to use her pneumatic compression pump on a more regular basis  Objective: See treatment diary below      Assessment: Tolerated treatment well  Patient tolerated compression wrapping well  Pt is wearing thigh high compression garment upon arrival to therapy  Plan: Continue per plan of care  Focus on reducing edema in L LE        Dx: L LE lymphedema  EPOC: 22  CO-MORBIDITIES:  PERSONAL FACTORS:  Precautions: ovarian CA      Manuals             L LE MLD/ CLT                          Custom Solaris night garment measurements              45'            Neuro Re-Ed                                                                                                        Ther Ex                                                                                                                     Ther Activity                                       Gait Training                                       Modalities

## 2022-06-07 ENCOUNTER — OFFICE VISIT (OUTPATIENT)
Dept: PHYSICAL THERAPY | Facility: CLINIC | Age: 67
End: 2022-06-07
Payer: COMMERCIAL

## 2022-06-07 DIAGNOSIS — I89.0 LYMPHEDEMA OF LEFT LOWER EXTREMITY: Primary | ICD-10-CM

## 2022-06-07 PROCEDURE — 97140 MANUAL THERAPY 1/> REGIONS: CPT

## 2022-06-09 ENCOUNTER — OFFICE VISIT (OUTPATIENT)
Dept: PHYSICAL THERAPY | Facility: CLINIC | Age: 67
End: 2022-06-09
Payer: COMMERCIAL

## 2022-06-09 DIAGNOSIS — I89.0 LYMPHEDEMA OF LEFT LOWER EXTREMITY: Primary | ICD-10-CM

## 2022-06-09 PROCEDURE — 97140 MANUAL THERAPY 1/> REGIONS: CPT | Performed by: PHYSICAL THERAPIST

## 2022-06-09 NOTE — PROGRESS NOTES
Daily Note     Today's date: 2022  Patient name: Adalberto Pacheco  : 1955  MRN: [id number]  Referring provider: Mallory Esteves  Dx:   Encounter Diagnosis     ICD-10-CM    1  Lymphedema of left lower extremity  I89 0                   Subjective: Pt reports her wraps and tape was good after she was wrapped, but then the tape got too tight  Objective: See treatment diary below      Assessment: Tolerated treatment well  Patient had good technique with wrapping at home  Used foam for compression wrapping  Plan: Continue per plan of care  Focus on decreasing edema       Dx: L LE lymphedema  EPOC: 22  CO-MORBIDITIES:  PERSONAL FACTORS:  Precautions: ovarian CA      Manuals           L LE MLD/ CLT                         Custom Solaris night garment measurements             45' 45' 45'          Neuro Re-Ed                                                                                                        Ther Ex                                                                                                                     Ther Activity                                       Gait Training                                       Modalities

## 2022-06-10 ENCOUNTER — OFFICE VISIT (OUTPATIENT)
Dept: FAMILY MEDICINE CLINIC | Facility: CLINIC | Age: 67
End: 2022-06-10
Payer: COMMERCIAL

## 2022-06-10 VITALS
OXYGEN SATURATION: 96 % | WEIGHT: 130.8 LBS | DIASTOLIC BLOOD PRESSURE: 78 MMHG | BODY MASS INDEX: 24.7 KG/M2 | HEART RATE: 73 BPM | SYSTOLIC BLOOD PRESSURE: 140 MMHG | TEMPERATURE: 97.7 F | HEIGHT: 61 IN

## 2022-06-10 DIAGNOSIS — L23.7 POISON IVY: Primary | ICD-10-CM

## 2022-06-10 DIAGNOSIS — I10 ESSENTIAL HYPERTENSION: ICD-10-CM

## 2022-06-10 PROCEDURE — 3008F BODY MASS INDEX DOCD: CPT | Performed by: FAMILY MEDICINE

## 2022-06-10 PROCEDURE — 1036F TOBACCO NON-USER: CPT | Performed by: FAMILY MEDICINE

## 2022-06-10 PROCEDURE — 99213 OFFICE O/P EST LOW 20 MIN: CPT | Performed by: FAMILY MEDICINE

## 2022-06-10 RX ORDER — TRIAMCINOLONE ACETONIDE 1 MG/G
CREAM TOPICAL 2 TIMES DAILY
Qty: 30 G | Refills: 1 | Status: SHIPPED | OUTPATIENT
Start: 2022-06-10

## 2022-06-10 NOTE — PROGRESS NOTES
Assessment/Plan:      1  Poison ivy  Assessment & Plan:  pred taper, small amount triamcinolone cream to areas no longer than 1 week  claritin as needed for itching     Orders:  -     triamcinolone (KENALOG) 0 1 % cream; Apply topically 2 (two) times a day    2  Essential hypertension  Assessment & Plan:  Controlled, continue current medication          Subjective:  Chief Complaint   Patient presents with    Rash     Rash on her face that start last night, on her fore  head,         Patient ID: Luis Mueller is a 61 y o  female  Pt has an itchy rash that started on the side of her nose yesterday, now on forehead  Not on hands  No fever  Review of Systems   Constitutional: Negative  Negative for fatigue and fever  HENT: Negative  Eyes: Negative  Respiratory: Negative  Negative for cough  Cardiovascular: Negative  Gastrointestinal: Negative  Endocrine: Negative  Genitourinary: Negative  Musculoskeletal: Negative  Skin: Positive for rash  Allergic/Immunologic: Negative  Neurological: Negative  Psychiatric/Behavioral: Negative  The following portions of the patient's history were reviewed and updated as appropriate: allergies, current medications, past family history, past medical history, past social history, past surgical history and problem list     Objective:  Vitals:    06/10/22 1500   BP: 140/78   Pulse: 73   Temp: 97 7 °F (36 5 °C)   SpO2: 96%   Weight: 59 3 kg (130 lb 12 8 oz)   Height: 5' 1" (1 549 m)      Physical Exam  Vitals and nursing note reviewed  Constitutional:       Appearance: She is well-developed  HENT:      Head: Normocephalic and atraumatic  Cardiovascular:      Rate and Rhythm: Normal rate and regular rhythm  Heart sounds: Normal heart sounds  Pulmonary:      Effort: Pulmonary effort is normal       Breath sounds: Normal breath sounds  Abdominal:      General: Bowel sounds are normal       Palpations: Abdomen is soft  Skin:     General: Skin is warm and dry  Findings: Erythema and rash present  Comments: linear erythematous areas across forehead and left side of nose  Neurological:      Mental Status: She is alert and oriented to person, place, and time  Psychiatric:         Behavior: Behavior normal          Thought Content:  Thought content normal          Judgment: Judgment normal

## 2022-06-10 NOTE — PATIENT INSTRUCTIONS
Claritin or zyrtec or allegra 1 tab daily in the am  Benadryl at bedtime if needed  Triamcinolone apply 1-2 times a day no longer than 1 week   Prednisone 10mg 4 tabs for 2 days, 3 tabs for 2 days, 2 tabs for 2 days, 1 tab for 2 days

## 2022-06-11 PROBLEM — L23.7 POISON IVY: Status: ACTIVE | Noted: 2022-06-11

## 2022-06-11 NOTE — ASSESSMENT & PLAN NOTE
pred taper, small amount triamcinolone cream to areas no longer than 1 week   claritin as needed for itching

## 2022-06-13 ENCOUNTER — OFFICE VISIT (OUTPATIENT)
Dept: PHYSICAL THERAPY | Facility: CLINIC | Age: 67
End: 2022-06-13
Payer: COMMERCIAL

## 2022-06-13 DIAGNOSIS — I89.0 LYMPHEDEMA OF LEFT LOWER EXTREMITY: Primary | ICD-10-CM

## 2022-06-13 PROCEDURE — 97016 VASOPNEUMATIC DEVICE THERAPY: CPT

## 2022-06-13 PROCEDURE — 97140 MANUAL THERAPY 1/> REGIONS: CPT

## 2022-06-13 NOTE — PROGRESS NOTES
Daily Note     Today's date: 2022  Patient name: Edwin Montesinos  : 1955  MRN: [id number]  Referring provider: Eduardo Arizmenid  Dx:   Encounter Diagnosis     ICD-10-CM    1  Lymphedema of left lower extremity  I89 0                   Subjective: Pt reports not seeing a big difference in her legs  Objective: See treatment diary below      Assessment: Trial compression pump, pt responded very well to pump  Pt would benefit from a home compression pump unit  Reviewed and emphasized with pt the importance of maintaining her bandages on  for two weeks except during bathing  Plan: Continue per plan of care  Focus on decreasing edema       Dx: L LE lymphedema  EPOC: 22  CO-MORBIDITIES:  PERSONAL FACTORS:  Precautions: ovarian CA      Manuals          L LE MLD/ CLT  wdc  wdc         Compression pump    wdc         Custom Solaris night garment measurements  th            45' 45' 45'          Neuro Re-Ed                                                                                                        Ther Ex                                                                                                                     Ther Activity                                       Gait Training                                       Modalities

## 2022-06-17 ENCOUNTER — HOSPITAL ENCOUNTER (OUTPATIENT)
Dept: MAMMOGRAPHY | Facility: CLINIC | Age: 67
Discharge: HOME | End: 2022-06-17
Payer: COMMERCIAL

## 2022-06-17 VITALS — BODY MASS INDEX: 24.55 KG/M2 | HEIGHT: 61 IN | WEIGHT: 130 LBS

## 2022-06-17 DIAGNOSIS — Z17.0 MALIGNANT NEOPLASM OF CENTRAL PORTION OF LEFT BREAST IN FEMALE, ESTROGEN RECEPTOR POSITIVE (HCC): ICD-10-CM

## 2022-06-17 DIAGNOSIS — C50.112 MALIGNANT NEOPLASM OF CENTRAL PORTION OF LEFT BREAST IN FEMALE, ESTROGEN RECEPTOR POSITIVE (HCC): ICD-10-CM

## 2022-06-17 PROCEDURE — 77066 DX MAMMO INCL CAD BI: CPT

## 2022-06-17 PROCEDURE — G0279 TOMOSYNTHESIS, MAMMO: HCPCS

## 2022-06-20 ENCOUNTER — OFFICE VISIT (OUTPATIENT)
Dept: PHYSICAL THERAPY | Facility: CLINIC | Age: 67
End: 2022-06-20
Payer: COMMERCIAL

## 2022-06-20 DIAGNOSIS — I89.0 LYMPHEDEMA OF LEFT LOWER EXTREMITY: Primary | ICD-10-CM

## 2022-06-20 PROCEDURE — 97016 VASOPNEUMATIC DEVICE THERAPY: CPT

## 2022-06-20 PROCEDURE — 97140 MANUAL THERAPY 1/> REGIONS: CPT

## 2022-06-20 NOTE — PROGRESS NOTES
Daily Note     Today's date: 2022  Patient name: Fatoumata Bautista  : 1955  MRN: [id number]  Referring provider: Dylon Vyas  Dx:   Encounter Diagnosis     ICD-10-CM    1  Lymphedema of left lower extremity  I89 0                   Subjective: Pt reports seeing a difference since using the grey form but was unable to faithful wearing her bandages due to a busy weekend  Objective: See treatment diary below      Assessment: Trial compression pump, pt responded very well to pump  Pt would benefit from a home compression pump unit  Reviewed again and emphasized with pt the importance of maintaining her bandages on  for two weeks except during bathing  Plan: Continue per plan of care  Focus on decreasing edema       Dx: L LE lymphedema  EPOC: 22  CO-MORBIDITIES:  PERSONAL FACTORS:  Precautions: ovarian CA      Manuals         L LE MLD/ CLT th wdc th wdc wdc        Compression pump    wdc wdc        Custom Solaris night garment measurements  th            45' 45' 45'          Neuro Re-Ed                                                                                                        Ther Ex                                                                                                                     Ther Activity                                       Gait Training                                       Modalities

## 2022-06-23 ENCOUNTER — OFFICE VISIT (OUTPATIENT)
Dept: PHYSICAL THERAPY | Facility: CLINIC | Age: 67
End: 2022-06-23
Payer: COMMERCIAL

## 2022-06-23 DIAGNOSIS — I89.0 LYMPHEDEMA OF LEFT LOWER EXTREMITY: Primary | ICD-10-CM

## 2022-06-23 PROCEDURE — 97140 MANUAL THERAPY 1/> REGIONS: CPT

## 2022-06-23 NOTE — PROGRESS NOTES
Daily Note     Today's date: 2022  Patient name: Anish Templeton  :   MRN: [id number]  Referring provider: Patricia Martinez  Dx:   Encounter Diagnosis     ICD-10-CM    1  Lymphedema of left lower extremity  I89 0                   Subjective: Pt reports seeing a difference in her feet, they are not that swollen  Objective: See treatment diary below      Assessment:  Reviewed again and emphasized with pt the importance of maintaining her bandages on  for two weeks except during bathing  Continue to use grey foam when utilizing compression bandages  Plan: Continue per plan of care  Focus on decreasing edema       Dx: L LE lymphedema  EPOC: 22  CO-MORBIDITIES:  PERSONAL FACTORS:  Precautions: ovarian CA      Manuals        L LE MLD/ CLT  wdc  wdc wdc wdc       Compression pump    wdc wdc wdc       Custom Solaris night garment measurements th th            45' 45' 45'          Neuro Re-Ed                                                                                                        Ther Ex                                                                                                                     Ther Activity                                       Gait Training                                       Modalities

## 2022-06-24 ENCOUNTER — CLINICAL SUPPORT (OUTPATIENT)
Dept: FAMILY MEDICINE CLINIC | Facility: CLINIC | Age: 67
End: 2022-06-24

## 2022-06-24 DIAGNOSIS — B34.9 VIRAL INFECTION, UNSPECIFIED: Primary | ICD-10-CM

## 2022-06-24 PROCEDURE — U0003 INFECTIOUS AGENT DETECTION BY NUCLEIC ACID (DNA OR RNA); SEVERE ACUTE RESPIRATORY SYNDROME CORONAVIRUS 2 (SARS-COV-2) (CORONAVIRUS DISEASE [COVID-19]), AMPLIFIED PROBE TECHNIQUE, MAKING USE OF HIGH THROUGHPUT TECHNOLOGIES AS DESCRIBED BY CMS-2020-01-R: HCPCS | Performed by: FAMILY MEDICINE

## 2022-06-24 PROCEDURE — U0005 INFEC AGEN DETEC AMPLI PROBE: HCPCS | Performed by: FAMILY MEDICINE

## 2022-06-25 LAB — SARS-COV-2 RNA RESP QL NAA+PROBE: POSITIVE

## 2022-06-27 ENCOUNTER — TELEPHONE (OUTPATIENT)
Dept: FAMILY MEDICINE CLINIC | Facility: CLINIC | Age: 67
End: 2022-06-27

## 2022-06-27 ENCOUNTER — APPOINTMENT (OUTPATIENT)
Dept: PHYSICAL THERAPY | Facility: CLINIC | Age: 67
End: 2022-06-27
Payer: COMMERCIAL

## 2022-06-27 NOTE — TELEPHONE ENCOUNTER
----- Message from Gunjan Cardoso DO sent at 6/26/2022 11:29 AM EDT -----  Your covid test is positive  How are you feeling? Any shortness of breath? We can start antiviral medication if you are having worsening symptoms

## 2022-06-27 NOTE — TELEPHONE ENCOUNTER
Patient decided against paxlovid after reading about it  She feels like her symptoms are improving and she will be ok  I advised patient to contact us if she worsens

## 2022-06-27 NOTE — TELEPHONE ENCOUNTER
Talk to pt and she will like to minesh the paxlovid medication send to her local pharmacy     ebenezer

## 2022-06-29 ENCOUNTER — APPOINTMENT (OUTPATIENT)
Dept: PHYSICAL THERAPY | Facility: CLINIC | Age: 67
End: 2022-06-29
Payer: COMMERCIAL

## 2022-07-05 ENCOUNTER — OFFICE VISIT (OUTPATIENT)
Dept: PHYSICAL THERAPY | Facility: CLINIC | Age: 67
End: 2022-07-05
Payer: COMMERCIAL

## 2022-07-05 DIAGNOSIS — I89.0 LYMPHEDEMA OF LEFT LOWER EXTREMITY: Primary | ICD-10-CM

## 2022-07-05 PROCEDURE — 97140 MANUAL THERAPY 1/> REGIONS: CPT

## 2022-07-05 NOTE — PROGRESS NOTES
Daily Note     Today's date: 2022  Patient name: Sari Schirmer  : [birth year]  MRN: [id number]  Referring provider: Vren Artis  Dx:   Encounter Diagnosis     ICD-10-CM    1  Lymphedema of left lower extremity  I89 0                   Subjective: Pt reports her leg are very swollen due to getting sick and unable to keep up with her wrapping  Objective: See treatment diary below      Assessment:  Continue to emphasized with pt the importance of maintaining her bandages on  for two weeks except during bathing  Continue to use grey foam when utilizing compression bandages  Advised pt to bring her compression sock thigh-high upon nv  Plan: Continue per plan of care  Focus on decreasing edema       Dx: L LE lymphedema  EPOC: 22  CO-MORBIDITIES:  PERSONAL FACTORS:  Precautions: ovarian CA      Manuals       L LE MLD/ CLT  wdc  wdc wdc wdc wdc      Compression pump    wdc wdc wdc       Custom Solaris night garment measurements  th            45' 45' 45'          Neuro Re-Ed                                                                                                        Ther Ex                                                                                                                     Ther Activity                                       Gait Training                                       Modalities

## 2022-07-07 ENCOUNTER — OFFICE VISIT (OUTPATIENT)
Dept: PHYSICAL THERAPY | Facility: CLINIC | Age: 67
End: 2022-07-07
Payer: COMMERCIAL

## 2022-07-07 DIAGNOSIS — I89.0 LYMPHEDEMA OF LEFT LOWER EXTREMITY: Primary | ICD-10-CM

## 2022-07-07 PROCEDURE — 97140 MANUAL THERAPY 1/> REGIONS: CPT | Performed by: PHYSICAL THERAPIST

## 2022-07-07 NOTE — PROGRESS NOTES
PT Re-Evaluation     Today's date: 2022  Patient name: Rg Francis  : [birth year]  MRN: [id number]  Referring provider: Shahid Noble  Dx:   Encounter Diagnosis     ICD-10-CM    1  Lymphedema of left lower extremity  I89 0                   Assessment  Assessment details: Since starting skilled PT, pain levels in her knees are decrease, L LE edema is decreasing with good functional progress  Recommend pt continue skilled PT with progression to HEP  Pt may decrease frequency as able and continue with pump and self compression wrapping  Pt was measured for a new thigh high stocking  Recommend pt purchase a heavier style that will contain her edema better than her current thigh high opaque style  Impairments: activity intolerance, impaired physical strength and pain with function  Other impairment: increased L LE edema  Understanding of Dx/Px/POC: good   Prognosis: good    Goals  STG's ( 3-4 weeks)  1  Pt will be independent in HEP-met  2  Pt will use her home pneumatic compression pump on a regular basis-met  LTG's ( 6- 8 weeks)  1  Improve FOTO score by 4-6 points  2  Pt will have L LE edema reduced to max ability-met  3  Pt will have improved ability to go up and down the steps- met  4  Pt will have improved ability to walk community distances -partial met    Plan  Patient would benefit from: skilled physical therapy  Other planned modality interventions: pneumatic compression pump  Planned therapy interventions: home exercise program  Other planned therapy interventions: MLD/ CLT  Frequency: 2x week  Duration in weeks: 4  Plan of Care beginning date: 2022  Plan of Care expiration date: 2022  Treatment plan discussed with: patient        Subjective Evaluation    History of Present Illness  Mechanism of injury: I E: Pt notes L LE lymphedema onset    Pt has a history of stage IA ovarian CA with total abdominal hysterectomy, bilateral salphingo-oopherectomy and lymph node dissection 09 s/p chemotherapy 2009  Pt reports R knee bursitis and L knee OA and had increased pain with cortisone injections  Pt had PT for her knees with some relief  Pt reports she has been walking with work activities which flares up her lymphedema and knee pain  Pt wears a Solaris Night garment at night and a thigh high during the day  Pt has noticed her Solaris night garment is not containing her swelling as well  Pt has a pneumatic compression pump, but has not been using it as regularly  Pt notes her L LE is heavy when walking and going up and down the steps  Pt is not able to get down on the floor to put food bowls on the floor and changing bong litter  Pt has no pain from lymphedema  22: Pt reports less heaviness in her leg when walking and going up and down the steps  Pt is seeing some improvement when bending her knee to put food in bowl on the floor and changing bong litter  Pt is wearing her custom Solaris night that fits well  Pt is trying to use her pneumatic compression pump every day  Pt will be getting a new pump  Pt is wrapping her leg during the day and night with compression bandages      Work: ,   Hobbies: reading  Gait: no abnomalities  Pain  At best pain ratin  At worst pain ratin  Location: B knees  Quality: dull ache    Social Support  Steps to enter house: yes  4  Stairs in house: yes   3  Lives in: multiple-level home    Employment status: working  Treatments  Previous treatment: physical therapy  Patient Goals  Patient goals for therapy: decreased edema  Patient goal: to decrease L LE edema as much as possible; get  a new Solaris Night garment        Objective     Neurological Testing     Sensation     Hip   Left Hip   Intact: light touch    Right Hip   Intact: light touch    Reflexes   Left   Patellar (L4): normal (2+)  Achilles (S1): normal (2+)    Right   Patellar (L4): normal (2+)  Achilles (S1): normal (2+)    Active Range of Motion   Left Knee Flexion: 115 degrees   Extension: 0 degrees     Right Knee   Flexion: 145 degrees   Extension: 0 degrees     Additional Active Range of Motion Details  (+) Stemmer sign L LE  (+) pitting edema    Strength/Myotome Testing     Left Hip   Planes of Motion   Flexion: 4  Abduction: 4  External rotation: 4+  Internal rotation: 4+    Right Hip   Normal muscle strength    Left Knee   Flexion: 4+  Extension: 4+    Right Knee   Normal strength    Additional Strength Details  Ankle df MMT: 5/5  L LE has mild warmth to touch  Small patch of red skin L lower tibial region            Dx:  L LE lymphedema  EPOC: 8/4/22  CO-MORBIDITIES:  PERSONAL FACTORS:  Precautions: ovarian CA      Manuals 7/7            L LE MLD/ CLT             Progress note th            Measure for thigh high th             36'            Neuro Re-Ed                                                                                                        Ther Ex                                                                                                                     Ther Activity                                       Gait Training                                       Modalities

## 2022-07-12 ENCOUNTER — OFFICE VISIT (OUTPATIENT)
Dept: PHYSICAL THERAPY | Facility: CLINIC | Age: 67
End: 2022-07-12
Payer: COMMERCIAL

## 2022-07-12 DIAGNOSIS — I89.0 LYMPHEDEMA OF LEFT LOWER EXTREMITY: Primary | ICD-10-CM

## 2022-07-12 PROCEDURE — 97140 MANUAL THERAPY 1/> REGIONS: CPT

## 2022-07-12 NOTE — PROGRESS NOTES
Daily Note     Today's date: 2022  Patient name: Néstor Clay  :   MRN: [id number]  Referring provider: Angus Neal  Dx:   Encounter Diagnosis     ICD-10-CM    1  Lymphedema of left lower extremity  I89 0                   Subjective: Pt states forgetting to purchase the sock  Objective: See treatment diary below      Assessment: Encourage pt to purchase the RTW compression sock, pt agreed  Pt responded well to manuals, and compression         Plan: Continue plan of care     Dx: L LE lymphedema  EPOC: 22  CO-MORBIDITIES:  PERSONAL FACTORS:  Precautions: ovarian CA      Manuals            L LE MLD/ CLT  wdc           Progress note th            Measure for thigh high th             36'            Neuro Re-Ed                                                                                                        Ther Ex                                                                                                                     Ther Activity                                       Gait Training                                       Modalities

## 2022-07-14 ENCOUNTER — APPOINTMENT (OUTPATIENT)
Dept: PHYSICAL THERAPY | Facility: CLINIC | Age: 67
End: 2022-07-14
Payer: COMMERCIAL

## 2022-07-20 DIAGNOSIS — H10.33 ACUTE BACTERIAL CONJUNCTIVITIS OF BOTH EYES: Primary | ICD-10-CM

## 2022-07-20 DIAGNOSIS — L03.90 CELLULITIS, UNSPECIFIED CELLULITIS SITE: ICD-10-CM

## 2022-07-20 RX ORDER — OFLOXACIN 3 MG/ML
1 SOLUTION/ DROPS OPHTHALMIC 4 TIMES DAILY
Qty: 5 ML | Refills: 0 | Status: SHIPPED | OUTPATIENT
Start: 2022-07-20 | End: 2022-08-02

## 2022-07-20 NOTE — TELEPHONE ENCOUNTER
Ocuflox 03 eye drops pt is requesting that she use to get it and you use to fill it not on med list and she needs the refill because her ey bothering her since yesterday  it started back up yesterday

## 2022-07-25 ENCOUNTER — OFFICE VISIT (OUTPATIENT)
Dept: SURGICAL ONCOLOGY | Facility: CLINIC | Age: 67
End: 2022-07-25
Payer: COMMERCIAL

## 2022-07-25 VITALS
DIASTOLIC BLOOD PRESSURE: 64 MMHG | RESPIRATION RATE: 16 BRPM | SYSTOLIC BLOOD PRESSURE: 142 MMHG | HEART RATE: 74 BPM | TEMPERATURE: 97.4 F | OXYGEN SATURATION: 98 % | WEIGHT: 129 LBS | BODY MASS INDEX: 24.35 KG/M2 | HEIGHT: 61 IN

## 2022-07-25 DIAGNOSIS — Z79.811 USE OF ANASTROZOLE: ICD-10-CM

## 2022-07-25 DIAGNOSIS — Z15.01 MONOALLELIC MUTATION OF ATM GENE: ICD-10-CM

## 2022-07-25 DIAGNOSIS — Z15.09 MONOALLELIC MUTATION OF ATM GENE: ICD-10-CM

## 2022-07-25 DIAGNOSIS — Z15.89 MONOALLELIC MUTATION OF ATM GENE: ICD-10-CM

## 2022-07-25 DIAGNOSIS — Z86.000 HISTORY OF DUCTAL CARCINOMA IN SITU (DCIS) OF BREAST: Primary | ICD-10-CM

## 2022-07-25 DIAGNOSIS — Z17.0 MALIGNANT NEOPLASM OF CENTRAL PORTION OF LEFT BREAST IN FEMALE, ESTROGEN RECEPTOR POSITIVE (HCC): ICD-10-CM

## 2022-07-25 DIAGNOSIS — C50.112 MALIGNANT NEOPLASM OF CENTRAL PORTION OF LEFT BREAST IN FEMALE, ESTROGEN RECEPTOR POSITIVE (HCC): ICD-10-CM

## 2022-07-25 PROCEDURE — 99214 OFFICE O/P EST MOD 30 MIN: CPT

## 2022-07-25 NOTE — PROGRESS NOTES
Surgical Oncology Follow Up       [address] [facility] SURG[facility]COLOG[facility] [place]  [address]  [place] [address]    Kathleen Ribeiro  1955  [id number]  [address] [facility]COL[facility] [place]  [address] [place] [address]    Chief Complaint   Patient presents with    Breast Cancer       Assessment/Plan   1  History of ductal carcinoma in situ (DCIS) of breast  - 6 month follow up    2  Malignant neoplasm of central portion of left breast in female, estrogen receptor positive ([address] )  - MRI breast bilateral w and wo contrast w cad; Future  - Ambulatory referral to Physical Therapy; Future    3  Monoallelic mutation of YOLANDA gene    4  Use of anastrozole  - continue use per medical oncology    Discussion/Summary:  Patient is a 73-year-old female presenting today for six-month follow-up for bilateral breast cancer  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  She underwent a right breast lumpectomy with Dr Rachel Riggs and completed whole breast radiation therapy  She underwent genetic testing and was positive for YOLANDA mutation  In July of 2019 she was diagnosed with invasive breast carcinoma of the left breast   Pathology revealed ER 90%, WY negative, HER2 negative  She had a left breast needle localized lumpectomy with sentinel node biopsy with Dr Rachel Riggs and completed whole breast radiation therapy  She is currently on anastrozole  She also has a history of ovarian cancer diagnosed in 2009  We have been following her with annual MRI of the breasts and bilateral diagnostic mammograms  She she had a bilateral diagnostic mammogram on 06/17/2022 which was BI-RADS 2 category 3 density  There were no concerns on her clinical breast exam   Patient did note that she has bilateral breast tenderness and she also has decreased range of motion in both arms related to tightness of both axillas related to radiation    I recommended physical therapy at this time and referred her to the strength ABC program   I also supplied her with exercises instructed to be performed at home  I will see the patient back in 6 months or sooner should the need arise  She was instructed to call with any questions or concerns prior to this time  All questions were answered today  History of Present Illness:     Oncology History   History of ovarian cancer    Initial Diagnosis    Ovarian cancer ([facility] [address] )     4/21/2009 Surgery    Exploratory laparotomy, total abdominal hysterectomy, bilateral salpingo-oophrectomy, lymph node dissection, omentectomy with staging      - 7/8/2009 Chemotherapy    Intraperitoneal along with intravenous chemotherapy on a early ovarian cancer protocol       5/31/2018 Genetic Testing    Genetic testing results are 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      Genetic testing indicated that the patient carries a Variant of Uncertain Significance (VUS) : Rad51C c 252G>T      Hormone Therapy       History of ductal carcinoma in situ (DCIS) of breast   5/31/2018 Genetic Testing    Genetic testing results are 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      Genetic testing indicated that the patient carries a Variant of Uncertain Significance (VUS) : Rad51C c 252G>T     8/1/2018 Biopsy    Right breast biopsy  11 o'clock position 5 cmfn  DCIS  Grade 2  Confirmed by Melinda Zavala MD  ER 0  TX 0     9/11/2018 Surgery    Right lumpectomy  DCIS  Grade 2  1 4 cm  Margins negative  Stage 0     10/16/2018 -  Hormone Therapy    Consult with Dr Desiree Groves  No adjuvant systemic therapy recommended     10/29/2018 - 11/28/2018 Radiation    Course: C1    Plan ID Energy Fractions Dose per Fraction (cGy) Dose Correction (cGy) Total Dose Delivered (cGy) Elapsed Days   R Breast 6X 16 / 16 266 0 4,256 22   R Breast e 12E 5 / 5 200 0 1,000 7      Treatment dates:  C1: 10/29/2018 - 11/28/2018       Malignant neoplasm of central portion of left breast in female, estrogen receptor positive ([facility] [address] )   7/18/2019 Biopsy    Left breast MRI-guided biopsy  3 o'clock, posterior depth  Invasive breast carcinoma of no special type  Grade 1  ER 90  MN 0  HER2 1+     8/13/2019 Surgery    Left breast needle localized lumpectomy with sentinel lymph node biopsy  Invasive mammary carcinoma of no special type  Grade 1  3 mm  Margins negative  0/1 Lymph node  Anatomic/Prognostic Stage IA     10/8/2019 - 11/5/2019 Radiation      Treatment:  Course: C2  Plan ID Energy Fractions Dose per Fraction (cGy) Dose Correction (cGy) Total Dose Delivered (cGy) Elapsed Days   BH L Breast 6X 16 / 16 266 0 4,256 21   BH L Brst e 12E 5 / 5 200 0 1,000 6    C2: 10/8/2019 - 11/5/2019 11/2019 -  Hormone Therapy    Anastrozole          -Interval History: Patient is a 30-year-old female presenting today for six-month follow-up for bilateral breast cancer  She she had a bilateral diagnostic mammogram on 06/17/2022 which was BI-RADS 2 category 3 density  Patient denies changes on her breast exam  She denies persistent headaches, bone pain, back pain, shortness of breath, or abdominal pain  Review of Systems:  Review of Systems   Constitutional: Negative for activity change, appetite change, fatigue and unexpected weight change  Respiratory: Negative for cough and shortness of breath  Cardiovascular: Negative for chest pain  Gastrointestinal: Negative for abdominal pain, diarrhea, nausea and vomiting  Endocrine: Negative for heat intolerance  Musculoskeletal: Positive for arthralgias  Negative for back pain and myalgias  Skin: Negative for rash  Neurological: Negative for weakness and headaches  Hematological: Negative for adenopathy         Patient Active Problem List   Diagnosis    Colon, diverticulosis    Crohn's disease ([facility] [address] )    Dense breasts    Dermatitis    Erythema chronica migrans, secondary    Hyperlipidemia    Lymphedema    Murmur    Osteopenia    Osteoporosis    History of ovarian cancer    Reactive airway disease    Shortness of breath on exertion    Lymphedema of left lower extremity    Genetic predisposition to breast cancer    History of ductal carcinoma in situ (DCIS) of breast    Encounter for follow-up surveillance of ovarian cancer    Lichen sclerosus et atrophicus    Essential hypertension    Primary malignant neoplasm of ovary (HCC)    Nausea    TIA (transient ischemic attack)    Malignant neoplasm of central portion of left breast in female, estrogen receptor positive (HCC)    Use of anastrozole    History of breast cancer    Carpal tunnel syndrome of right wrist    Neuropathy    Monoallelic mutation of YOLANDA gene    Right calf pain    Primary osteoarthritis of right knee    Effusion of right knee    Primary osteoarthritis of left knee    Pes anserinus bursitis of right knee    Bad odor of urine    Bilateral carpal tunnel syndrome    Urine frequency    Benign neoplasm of colon    Crohn's disease of large bowel ([address] )    Mitral valve disorder    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    Poison ivy     Past Medical History:   Diagnosis Date    Arthritis     Breast cancer ([facility] [address] ) 09/11/2018    Right    Breast cancer ([facility] [address] ) 08/13/2019    Left    Crohn disease ([facility] [address] )     Dense breast     History of chemotherapy     for ovarian cancer    History of radiation therapy     History of transfusion 2009    Hyperlipidemia     Hypertension     Lymphedema     left leg    Lymphedema     Monoallelic mutation of YOLANDA gene     Breast Gyn Panel    Ovarian cancer ([facility] [address] ) 04/21/2009     Past Surgical History:   Procedure Laterality Date    BREAST LUMPECTOMY Right 09/11/2018    BREAST LUMPECTOMY Left 8/13/2019    Procedure: BREAST LUMPECTOMY; BREAST NEEDLE LOCALIZATION (NEEDLE LOC AT 0800);   Surgeon: Cindy Gonzalez MD;  [facility];  Service: Surgical Oncology    BREAST LUMPECTOMY W/ NEEDLE LOCALIZATION Left 08/13/2019    COLONOSCOPY  EXPLORATORY LAPAROTOMY      HYSTERECTOMY      LYMPH NODE BIOPSY Left 2019    Procedure: SENTINEL LYMPH NODE BIOPSY; LYMPHATIC MAPPING WITH BLUE DYE AND RADIOACTIVE DYE (INJECT AT 0930 BY DR JENNINGS IN THE OR); Surgeon: Conner Givens MD;  [facility];  Service: Surgical Oncology    MAMMO NEEDLE LOCALIZATION LEFT (ALL INC) Left 2019    MAMMO NEEDLE LOCALIZATION RIGHT (ALL INC) Right 2018    MRI BREAST BIOPSY LEFT (ALL INCLUSIVE) Left 2019    OMENTECTOMY      MO PERQ DEVICE PLACEMT BREAST LOC 1ST LES W GUIDNCE Right 2018    Procedure: BREAST LUMPECTOMY; BREAST NEEDLE LOCALIZATION (NEEDLE LOC AT 1200);   Surgeon: Conner Givens MD;  [facility];  Service: Surgical Oncology    TOTAL ABDOMINAL HYSTERECTOMY W/ BILATERAL SALPINGOOPHORECTOMY      US GUIDED BREAST BIOPSY LEFT COMPLETE Left 2019    US GUIDED BREAST BIOPSY RIGHT COMPLETE Right 2018    WISDOM TOOTH EXTRACTION       Family History   Problem Relation Age of Onset    Prostate cancer Father     Alzheimer's disease Father     Hypertension Father     Ovarian cancer Sister     Breast cancer Paternal Grandmother     Hypertension Mother     Heart disease Mother     Breast cancer Maternal Aunt     No Known Problems Sister     Other Sister         pacemaker    Hypertension Sister     Heart disease Sister     No Known Problems Sister     Down syndrome Sister     Alzheimer's disease Sister     Hypertension Sister     No Known Problems Paternal Aunt     No Known Problems Maternal Aunt      Social History     Socioeconomic History    Marital status: Single     Spouse name: Not on file    Number of children: Not on file    Years of education: Not on file    Highest education level: Not on file   Occupational History    Not on file   Tobacco Use    Smoking status: Former Smoker     Quit date: 2009     Years since quittin 3    Smokeless tobacco: Never Used   Vaping Use    Vaping Use: Never used   Substance and Sexual Activity    Alcohol use: Yes     Comment: rarely    Drug use: No    Sexual activity: Not on file   Other Topics Concern    Not on file   Social History Narrative    Not on file     Social Determinants of Health     Financial Resource Strain: Not on file   Food Insecurity: Not on file   Transportation Needs: Not on file   Physical Activity: Not on file   Stress: Not on file   Social Connections: Not on file   Intimate Partner Violence: Not on file   Housing Stability: Not on file       Current Outpatient Medications:     albuterol (PROVENTIL HFA,VENTOLIN HFA) 90 mcg/act inhaler, Inhale 2 puffs  as needed in the morning for wheezing , Disp: 18 g, Rfl: 2    alendronate (FOSAMAX) 70 mg tablet, TAKE 1 TABLET (70 MG TOTAL) BY MOUTH EVERY 7 DAYS, Disp: 4 tablet, Rfl: 11    amLODIPine (NORVASC) 10 mg tablet, Take 1 tablet (10 mg total) by mouth in the morning , Disp: 30 tablet, Rfl: 2    anastrozole (ARIMIDEX) 1 mg tablet, Take 1 tablet (1 mg total) by mouth daily, Disp: 30 tablet, Rfl: 11    atorvastatin (LIPITOR) 40 mg tablet, Take 1 tablet (40 mg total) by mouth daily at bedtime, Disp: 30 tablet, Rfl: 2    Calcium Carbonate 1500 (600 Ca) MG TABS, Take 1 tablet by mouth 2 (two) times a day, Disp: , Rfl:     clobetasol (TEMOVATE) 0 05 % ointment, Apply to the affected area 1-2 times a week, Disp: 60 g, Rfl: 1    Homeopathic Products (Arnicare Bruise) GEL, Apply topically, Disp: , Rfl:     hydrochlorothiazide (MICROZIDE) 12 5 mg capsule, Take 1 capsule (12 5 mg total) by mouth every morning, Disp: 30 capsule, Rfl: 3    inFLIXimab (REMICADE) 100 mg, Infuse into a venous catheter  , Disp: , Rfl:     mupirocin (BACTROBAN) 2 % ointment, Apply topically 3 (three) times a day, Disp: 22 g, Rfl: 0    ofloxacin (OCUFLOX) 0 3 % ophthalmic solution, Administer 1 drop to both eyes 4 (four) times a day, Disp: 5 mL, Rfl: 0    triamcinolone (KENALOG) 0 1 % cream, Apply topically 2 (two) times a day, Disp: 30 g, Rfl: 1    Turmeric 500 MG CAPS, Take by mouth, Disp: , Rfl:   Allergies   Allergen Reactions    Lisinopril      cough    Losartan      Numbness on right side of body    Boniva [Ibandronic Acid] Headache     Vitals:    07/25/22 0819   BP: 142/64   Pulse: 74   Resp: 16   Temp: (!) 97 4 °F (36 3 °C)   SpO2: 98%       Physical Exam  Constitutional:       General: She is not in acute distress  Appearance: Normal appearance  Cardiovascular:      Rate and Rhythm: Normal rate and regular rhythm  Pulses: Normal pulses  Heart sounds: Normal heart sounds  Pulmonary:      Effort: Pulmonary effort is normal       Breath sounds: Normal breath sounds  Chest:      Chest wall: No mass  Breasts:      Right: Skin change and tenderness present  No swelling, bleeding, inverted nipple, mass, nipple discharge, axillary adenopathy or supraclavicular adenopathy  Left: Skin change and tenderness present  No swelling, bleeding, inverted nipple, mass, nipple discharge, axillary adenopathy or supraclavicular adenopathy  Comments: B/L lumpectomy scars and radiation hyperpigmentation of the skin of both breasts  No masses, nodularity,  nipple changes or discharge, or adenopathy appreciated on physical exam      Abdominal:      General: Abdomen is flat  Palpations: Abdomen is soft  Lymphadenopathy:      Upper Body:      Right upper body: No supraclavicular, axillary or pectoral adenopathy  Left upper body: No supraclavicular, axillary or pectoral adenopathy  Skin:     General: Skin is warm  Neurological:      General: No focal deficit present  Mental Status: She is alert and oriented to person, place, and time  Psychiatric:         Mood and Affect: Mood normal          Behavior: Behavior normal            Results:    Imaging  No results found  I reviewed the above imaging data        Advance Care Planning/Advance Directives:  Discussed disease status, cancer treatment plans and/or cancer treatment goals with the patient

## 2022-07-26 ENCOUNTER — OFFICE VISIT (OUTPATIENT)
Dept: PHYSICAL THERAPY | Facility: CLINIC | Age: 67
End: 2022-07-26
Payer: COMMERCIAL

## 2022-07-26 DIAGNOSIS — I89.0 LYMPHEDEMA OF LEFT LOWER EXTREMITY: Primary | ICD-10-CM

## 2022-07-26 PROCEDURE — 97140 MANUAL THERAPY 1/> REGIONS: CPT | Performed by: PHYSICAL THERAPIST

## 2022-07-26 NOTE — PROGRESS NOTES
Daily Note     Today's date: 2022  Patient name: Roverto Syed  :   MRN: [id number]  Referring provider: Desiree Howe  Dx:   Encounter Diagnosis     ICD-10-CM    1  Lymphedema of left lower extremity  I89 0                   Subjective: Pt reports she has purchased and received her thigh high compression garment  Pt brought the box into therapy  Objective: See treatment diary below      Assessment: Tolerated treatment well  Patient and therapist donned her compression garment  Recommend pt wear new compression stocking during the day, use pneumatic compression pump 1 hr per day, and solaris night garment at night  Plan: Potential discharge next visit       Dx: L LE lymphedema  EPOC: 22  CO-MORBIDITIES:  PERSONAL FACTORS:  Precautions: ovarian CA      Manuals           L LE MLD/ CLT  wdc  40'          Progress note th            Measure for thigh high th             36'            Neuro Re-Ed                                                                                                        Ther Ex                                                                                                                     Ther Activity                                       Gait Training                                       Modalities

## 2022-07-29 ENCOUNTER — OFFICE VISIT (OUTPATIENT)
Dept: PHYSICAL THERAPY | Facility: CLINIC | Age: 67
End: 2022-07-29
Payer: COMMERCIAL

## 2022-07-29 DIAGNOSIS — I89.0 LYMPHEDEMA OF LEFT LOWER EXTREMITY: Primary | ICD-10-CM

## 2022-07-29 PROCEDURE — 97140 MANUAL THERAPY 1/> REGIONS: CPT

## 2022-07-29 NOTE — PROGRESS NOTES
Daily Note     Today's date: 2022  Patient name: Oralia Montenegro  : 89  MRN: [id number]  Referring provider: Nina Barrios  Dx:   Encounter Diagnosis     ICD-10-CM    1  Lymphedema of left lower extremity  I89 0                   Subjective: Pt reports feeling good  Objective: See treatment diary below      Assessment: Reviewed compression with pt answered pt questions and concerns to satification  Plan: Potential discharge next visit       Dx: L LE lymphedema  EPOC: 22  CO-MORBIDITIES:  PERSONAL FACTORS:  Precautions: ovarian CA      Manuals          L LE MLD/ CLT  wdc Th 40' wdc         Progress note th            Measure for thigh high th             36'            Neuro Re-Ed                                                                                                        Ther Ex                                                                                                                     Ther Activity                                       Gait Training                                       Modalities

## 2022-08-01 DIAGNOSIS — H10.33 ACUTE BACTERIAL CONJUNCTIVITIS OF BOTH EYES: ICD-10-CM

## 2022-08-02 RX ORDER — OFLOXACIN 3 MG/ML
1 SOLUTION/ DROPS OPHTHALMIC 4 TIMES DAILY
Qty: 5 ML | Refills: 0 | Status: SHIPPED | OUTPATIENT
Start: 2022-08-02

## 2022-09-06 DIAGNOSIS — I10 ESSENTIAL HYPERTENSION: ICD-10-CM

## 2022-09-06 DIAGNOSIS — E78.49 OTHER HYPERLIPIDEMIA: ICD-10-CM

## 2022-09-06 RX ORDER — ATORVASTATIN CALCIUM 40 MG/1
40 TABLET, FILM COATED ORAL
Qty: 30 TABLET | Refills: 2 | Status: SHIPPED | OUTPATIENT
Start: 2022-09-06 | End: 2022-10-25

## 2022-09-06 RX ORDER — AMLODIPINE BESYLATE 10 MG/1
10 TABLET ORAL DAILY
Qty: 30 TABLET | Refills: 2 | Status: SHIPPED | OUTPATIENT
Start: 2022-09-06

## 2022-10-05 NOTE — TELEPHONE ENCOUNTER
They need this sent back to them in prescription form  Subsequent Stages Histo Method Verbiage: Using a similar technique to that described above, a thin layer of tissue was removed from all areas where tumor was visible on the previous stage.  The tissue was again oriented, mapped, dyed, and processed as above.

## 2022-10-11 DIAGNOSIS — M81.8 OTHER OSTEOPOROSIS WITHOUT CURRENT PATHOLOGICAL FRACTURE: ICD-10-CM

## 2022-10-12 RX ORDER — ALENDRONATE SODIUM 70 MG/1
70 TABLET ORAL
Qty: 4 TABLET | Refills: 11 | Status: SHIPPED | OUTPATIENT
Start: 2022-10-12

## 2022-10-13 DIAGNOSIS — I10 ESSENTIAL HYPERTENSION: ICD-10-CM

## 2022-10-13 RX ORDER — HYDROCHLOROTHIAZIDE 12.5 MG/1
12.5 CAPSULE, GELATIN COATED ORAL EVERY MORNING
Qty: 30 CAPSULE | Refills: 3 | Status: SHIPPED | OUTPATIENT
Start: 2022-10-13

## 2022-10-25 DIAGNOSIS — E78.49 OTHER HYPERLIPIDEMIA: ICD-10-CM

## 2022-10-25 RX ORDER — ATORVASTATIN CALCIUM 40 MG/1
40 TABLET, FILM COATED ORAL
Qty: 30 TABLET | Refills: 1 | Status: SHIPPED | OUTPATIENT
Start: 2022-10-25

## 2022-10-31 ENCOUNTER — TELEPHONE (OUTPATIENT)
Dept: FAMILY MEDICINE CLINIC | Facility: CLINIC | Age: 67
End: 2022-10-31
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
## 2022-10-31 RX ORDER — AMOXICILLIN AND CLAVULANATE POTASSIUM 875; 125 MG/1; MG/1
1 TABLET, FILM COATED ORAL EVERY 12 HOURS SCHEDULED
Qty: 14 TABLET | Refills: 0 | Status: SHIPPED | OUTPATIENT
Start: 2022-10-31 | End: 2022-11-07

## 2022-10-31 NOTE — TELEPHONE ENCOUNTER
Pt call and is asking for antibiotics she got bite by a kitten 11 month old it  her hand she said it swelling got a red  benoit and scratches and it warm no fever no other symptoms       [facility]

## 2022-11-08 ENCOUNTER — OFFICE VISIT (OUTPATIENT)
Dept: HEMATOLOGY ONCOLOGY | Facility: CLINIC | Age: 67
End: 2022-11-08

## 2022-11-08 VITALS
TEMPERATURE: 98 F | BODY MASS INDEX: 24.92 KG/M2 | WEIGHT: 132 LBS | SYSTOLIC BLOOD PRESSURE: 162 MMHG | HEIGHT: 61 IN | HEART RATE: 81 BPM | OXYGEN SATURATION: 99 % | DIASTOLIC BLOOD PRESSURE: 98 MMHG | RESPIRATION RATE: 16 BRPM

## 2022-11-08 DIAGNOSIS — C50.112 MALIGNANT NEOPLASM OF CENTRAL PORTION OF LEFT BREAST IN FEMALE, ESTROGEN RECEPTOR POSITIVE (HCC): Primary | ICD-10-CM

## 2022-11-08 DIAGNOSIS — Z17.0 MALIGNANT NEOPLASM OF CENTRAL PORTION OF LEFT BREAST IN FEMALE, ESTROGEN RECEPTOR POSITIVE (HCC): Primary | ICD-10-CM

## 2022-11-08 NOTE — PROGRESS NOTES
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, diagnosed in 2018   She underwent lumpectomy, resulting in EDER   In August 2018, she was found to have stage IA left breast cancer, grade 1, ER 90 % positive, NH negative, HER2 negative disease   She underwent lumpectomy and sentinel lymph node biopsy, resulting in EDER    She is currently on adjuvant hormonal therapy with anastrozole since April 2020 with minimal toxicity  Clinically, she has no evidence for recurrent disease  I recommended her to continue anastrozole 1 mg once a day  She is in agreement with my recommendations    I will see her again in a year for routine follow-up         Subjective:      HPI:  A 61-year-old postmenopausal woman who has history of stage I ovarian cancer, diagnosed in 2009   She underwent total abdominal hysterectomy and bilateral salpingo-oophorectomy, resulting in EDER followed by 6 cycle of adjuvant chemotherapy with carboplatin and paclitaxel, resulting in cure    She was tested for BRCA gene mutation in 2009 which was negative for deleterious mutation  Alexia Hein sister was diagnosed with ovarian cancer at the age of 46   Therefore, she was retested for extended panel of genetic mutation which showed heterozygote YOLANDA mutation    Subsequently, she underwent MRI of the breast which showed abnormality in her right breast    Biopsy showed DCIS, ER negative, NH negative   Therefore, she underwent lumpectomy by Dr Milla Mejia in September 11, 2018   She had 1 4 x 1 3 x 1 2 cm of ductal carcinoma in situ, grade 2   There was no evidence of invasive carcinoma   She presents today to discuss possible adjuvant therapy   She is going to start adjuvant radiation therapy soon   She feels well  She has no complaint of pain  She has Crohn disease for which she is on Remicade  [employer] weight has been stable   She has normal performance status            Interval History:  A 61-year-old postmenopausal woman who has history of stage I ovarian cancer in 2009, status post hysterectomy and bilateral salpingo-oophorectomy resulting in EDER followed by adjuvant chemotherapy with carboplatin and paclitaxel x6    She has no evidence recurrence of ovarian cancer to date   She has heterozygote YOLANDA mutation    She was diagnosed ductal with carcinoma in situ, grade 2, 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, in 2018  She underwent lumpectomy, resulting in EDER    She was then diagnosed with stage I A left breast cancer, grade 1  This was ER 90% positive, DC negative, HER2 negative disease, diagnosed in August 2019  Her tumor size was 3 mm  Since April 2020, she has been on adjuvant hormonal therapy with anastrozole  She presents today for routine follow-up  She feels well  She has stable mild musculoskeletal symptoms  She denied hot flashes  Her weight is stable  She denied any pain  She has no respiratory symptoms  Her performance status is normal        Objective:      Primary Diagnosis:     1    History of stage I ovarian cancer, diagnosed in 2009  2    Heterozygote YOLANDA mutation  3    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 breast, grade 2, ERPR negative disease   Diagnosed in September 2018    4  Left breast cancer, stage IA (pT1b, pN0, M0) grade 1, ER 90% positive, DC negative, HER2 negative disease   Diagnosed in August 2019       Cancer Staging:  Cancer Staging  Ductal carcinoma in situ (DCIS) of right breast  Staging form: Breast, AJCC 8th Edition  - Pathologic: Stage 0 (pTis (DCIS), pN0, cM0, ER: Negative, CA: Negative) - Unsigned     Ovarian cancer (HCC)  Staging form: Ovary, AJCC 7th Edition  - Clinical: FIGO Stage IA (T1a, N0, M0) - Signed by Jesus Manuel Zamudio PA-C on 4/19/2018           Previous Hematologic/ Oncologic Treatment:            Current Hematologic/ Oncologic Treatment:       Adjuvant hormonal therapy with anastrozole since April 2020       Disease Status:      EDER status post lumpectomy      Test Results:     Pathology:     1 4 x 1 2 x 1 3 cm of ductal carcinoma in situ, grade 2   ER negative, CA negative disease      In August 2019, 3 mm of invasive ductal carcinoma, grade 1  No evidence of lymphovascular invasion   1 sentinel lymph node was negative for metastatic disease   ER 90% positive, CA negative, HER2 negative disease   Stage IA (pT1b, pN0, M0)     Radiology:     Mammography in June 2022 was benign   BI-RADS 2       DEXA scan in October 2021 showed T-score -2 8, consistent with osteoporosis      Laboratory:     CBC and CMP are within normal limits      Physical Exam:        General Appearance:    Alert, oriented          Eyes:    PERRL   Ears:    Normal external ear canals, both ears   Nose:   Nares normal, septum midline   Throat:   Mucosa moist  Pharynx without injection  Neck:   Supple         Lungs:     Clear to auscultation bilaterally   Chest Wall:    No tenderness or deformity    Heart:    Regular rate and rhythm         Abdomen:     Soft, non-tender, bowel sounds +, no organomegaly               Extremities:   Extremities no cyanosis, left lower extremity lymphedema          Skin:   no rash or icterus      Lymph nodes:   Cervical, supraclavicular, and axillary nodes normal   Neurologic:   CNII-XII intact, normal strength, sensation and reflexes     Throughout             Breast exam:   Lumpectomy scar at outer upper quadrant of the right breast with no palpable abnormality    lumpectomy scar at upper aspect of her left breast with no palpable abnormalities               ROS: Review of Systems   All other systems reviewed and are negative  Imaging: No results found  Labs:   Lab Results   Component Value Date    WBC 5 8 09/20/2021    HGB 13 1 09/20/2021    HCT 39 8 09/20/2021    MCV 83 09/20/2021     09/20/2021     Lab Results   Component Value Date     12/14/2017    K 3 9 09/20/2021    CL 97 09/20/2021    CO2 30 (H) 09/20/2021    BUN 30 (H) 12/23/2021    CREATININE 0 94 12/23/2021    GLUCOSE 90 12/14/2017    GLUF 92 08/28/2018    CALCIUM 9 6 10/30/2019    AST 21 09/20/2021    ALT 16 09/20/2021    ALKPHOS 62 08/02/2019    PROT 7 3 12/14/2017    BILITOT 0 4 12/14/2017    EGFR 64 12/23/2021         Lab Results   Component Value Date    IRON 65 08/28/2018    TIBC 336 08/28/2018       Lab Results   Component Value Date    QVGKSQRZ53 335 08/28/2018         Current Medications: Reviewed  Allergies: Reviewed  PMH/FH/SH:  Reviewed      Vital Sign:    Body surface area is 1 58 meters squared      Wt Readings from Last 3 Encounters:   11/08/22 59 9 kg (132 lb)   07/25/22 58 5 kg (129 lb)   06/17/22 59 kg (130 lb)        Temp Readings from Last 3 Encounters:   11/08/22 98 °F (36 7 °C) (Temporal)   07/25/22 (!) 97 4 °F (36 3 °C)   06/10/22 97 7 °F (36 5 °C)        BP Readings from Last 3 Encounters:   11/08/22 162/98   07/25/22 142/64   06/10/22 140/78         Pulse Readings from Last 3 Encounters:   11/08/22 81   07/25/22 74   06/10/22 73     @LASTSAO2(3)@

## 2022-11-30 ENCOUNTER — OFFICE VISIT (OUTPATIENT)
Dept: GASTROENTEROLOGY | Facility: CLINIC | Age: 67
End: 2022-11-30

## 2022-11-30 ENCOUNTER — TELEPHONE (OUTPATIENT)
Dept: GASTROENTEROLOGY | Facility: CLINIC | Age: 67
End: 2022-11-30

## 2022-11-30 VITALS
WEIGHT: 134 LBS | DIASTOLIC BLOOD PRESSURE: 80 MMHG | BODY MASS INDEX: 25.3 KG/M2 | HEIGHT: 61 IN | SYSTOLIC BLOOD PRESSURE: 138 MMHG

## 2022-11-30 DIAGNOSIS — K50.90 CROHN'S DISEASE WITHOUT COMPLICATION, UNSPECIFIED GASTROINTESTINAL TRACT LOCATION (HCC): Primary | ICD-10-CM

## 2022-11-30 NOTE — LETTER
November 30, 2022     [address], [address] [facility] [address]    Patient: Hakan Arriaza   YOB: 1955   Date of Visit: 11/30/2022       Dear Dr Aileen Cui: Thank you for referring Lorena Hall to me for evaluation  Below are my notes for this consultation  If you have questions, please do not hesitate to call me  I look forward to following your patient along with you  Sincerely,        Los Ma MD        CC: No Recipients  Los Ma MD  11/30/2022  3:38 PM  Sign when Signing Visit  [address] [facility] - Outpatient Consultation  Hakan Arriaza 74 y o  female MRN: [id number]  Encounter: [id number]          ASSESSMENT AND PLAN:      1  Crohn's disease without complication, unspecified gastrointestinal tract location [facility])  Crohn's disease history since 1991  Has been on Remicade for 20 years doing well  Now transitioning to gastroenterologist and infusion center closer to home  Last colonoscopy December 2021  Has not had antibodies or drug levels that she is aware of   - CBC; Future  - Sedimentation rate, automated; Future  - Infliximab Concentration and Anti-Infliximab Antibody; Future  - Comprehensive metabolic panel; Future  - continue infliximab but transition to a [facility] infusion center  - get old records from Dr Amina Mei in [place]  - follow-up office visit 6 months    ______________________________________________________________________    HPI:  The patient is seen in the office today per the request of her PCP to transition her Crohn's care to us  She reports that she was 1st diagnosed with Crohn's disease in 12 when she was living in [place]  She was having issues with diarrhea and arthritis/arthralgias  She is unsure whether the Crohn's disease was in the small intestine, large intestine, or both  At that time she was started on sulfasalazine and several courses of prednisone  When she moved down to this area she began seeing Dr Rhonda Phipps in [place], [place] who transition her to Remicade  She had been doing home infusions since that time and doing extremely well  She had been moving her bowels regularly  She has occasional bout of diarrhea  She denies any arthritis or arthralgias  She denies any abdominal pain  Her weight has been increasing which she attributes to her decreased activity related to the pandemic  Unfortunately when she transitioned to Medicare she no longer qualify for home Remicade and began having to go to [place] for her infusions which is inconvenient  Discussions with her PCP Dr Jessica Cooper, it was felt that she could probably transition to a gastroenterologist and an infusion center closer to home  She reports her last colonoscopy was December of 2021  She reports that she has been getting colonoscopy about every 2-3 years and she has had polyps (pseudo polyps?)      REVIEW OF SYSTEMS:    CONSTITUTIONAL: Denies any fever, chills, rigors, and weight loss  HEENT: No earache or tinnitus  Denies hearing loss or visual disturbances  CARDIOVASCULAR: No chest pain or palpitations  RESPIRATORY: Denies any cough, hemoptysis, shortness of breath or dyspnea on exertion  GASTROINTESTINAL: As noted in the History of Present Illness  GENITOURINARY: No problems with urination  Denies any hematuria or dysuria  NEUROLOGIC: No dizziness or vertigo, denies headaches  MUSCULOSKELETAL: Denies any muscle or joint pain  SKIN: Denies skin rashes or itching  ENDOCRINE: Denies excessive thirst  Denies intolerance to heat or cold  PSYCHOSOCIAL: Denies depression or anxiety  Denies any recent memory loss         Historical Information   Past Medical History:   Diagnosis Date   • Arthritis    • Breast cancer ([place] [address] ) 09/11/2018    Right   • Breast cancer ([place] [address] ) 08/13/2019    Left   • Colon polyp    • Crohn disease ([place] [address] )    • Dense breast    • History of chemotherapy     for ovarian cancer   • History of radiation therapy    • History of transfusion    • Hyperlipidemia    • Hypertension    • Lymphedema     left leg   • Lymphedema    • Monoallelic mutation of YOLANDA gene     Breast Gyn Panel   • Ovarian cancer ([facility] [address] ) 2009     Past Surgical History:   Procedure Laterality Date   • BREAST LUMPECTOMY Right 2018   • BREAST LUMPECTOMY Left 2019    Procedure: BREAST LUMPECTOMY; BREAST NEEDLE LOCALIZATION (NEEDLE LOC AT 0800); Surgeon: Violetta Brown MD;  [facility];  Service: Surgical Oncology   • BREAST LUMPECTOMY W/ NEEDLE LOCALIZATION Left 2019   • COLONOSCOPY     • EXPLORATORY LAPAROTOMY     • HYSTERECTOMY     • LYMPH NODE BIOPSY Left 2019    Procedure: SENTINEL LYMPH NODE BIOPSY; LYMPHATIC MAPPING WITH BLUE DYE AND RADIOACTIVE DYE (INJECT AT 0930 BY DR JENNINGS IN THE OR); Surgeon: Violetta Brown MD;  [facility];  Service: Surgical Oncology   • MAMMO NEEDLE LOCALIZATION LEFT (ALL INC) Left 2019   • MAMMO NEEDLE LOCALIZATION RIGHT (ALL INC) Right 2018   • MRI BREAST BIOPSY LEFT (ALL INCLUSIVE) Left 2019   • OMENTECTOMY     • MO PERQ DEVICE PLACEMT BREAST LOC 1ST LES W GUIDNCE Right 2018    Procedure: BREAST LUMPECTOMY; BREAST NEEDLE LOCALIZATION (NEEDLE LOC AT 1200);   Surgeon: Violetta Brown MD;  [facility];  Service: Surgical Oncology   • TOTAL ABDOMINAL HYSTERECTOMY W/ BILATERAL SALPINGOOPHORECTOMY     • US GUIDED BREAST BIOPSY LEFT COMPLETE Left 2019   • US GUIDED BREAST BIOPSY RIGHT COMPLETE Right 2018   • WISDOM TOOTH EXTRACTION       Social History   Social History     Substance and Sexual Activity   Alcohol Use Yes    Comment: rarely     Social History     Substance and Sexual Activity   Drug Use No     Social History     Tobacco Use   Smoking Status Former   • Types: Cigarettes   • Quit date: 2009   • Years since quittin 6   Smokeless Tobacco Never     Family History   Problem Relation Age of Onset   • Prostate cancer Father    • Alzheimer's disease Father    • Hypertension Father    • Ovarian cancer Sister    • Breast cancer Paternal Grandmother    • Hypertension Mother    • Heart disease Mother    • Breast cancer Maternal Aunt    • No Known Problems Sister    • Other Sister         pacemaker   • Hypertension Sister    • Heart disease Sister    • No Known Problems Sister    • Down syndrome Sister    • Alzheimer's disease Sister    • Hypertension Sister    • No Known Problems Paternal Aunt    • No Known Problems Maternal Aunt        Meds/Allergies       Current Outpatient Medications:   •  albuterol (PROVENTIL HFA,VENTOLIN HFA) 90 mcg/act inhaler  •  alendronate (FOSAMAX) 70 mg tablet  •  amLODIPine (NORVASC) 10 mg tablet  •  anastrozole (ARIMIDEX) 1 mg tablet  •  atorvastatin (LIPITOR) 40 mg tablet  •  Calcium Carbonate 1500 (600 Ca) MG TABS  •  clobetasol (TEMOVATE) 0 05 % ointment  •  Homeopathic Products (Arnicare Bruise) GEL  •  hydrochlorothiazide (MICROZIDE) 12 5 mg capsule  •  inFLIXimab (REMICADE) 100 mg  •  mupirocin (BACTROBAN) 2 % ointment  •  ofloxacin (OCUFLOX) 0 3 % ophthalmic solution  •  triamcinolone (KENALOG) 0 1 % cream  •  Turmeric 500 MG CAPS    Allergies   Allergen Reactions   • Lisinopril      cough   • Losartan      Numbness on right side of body   • Boniva [Ibandronic Acid] Headache           Objective     Blood pressure 138/80, height 5' 1" (1 549 m), weight 60 8 kg (134 lb)  Body mass index is 25 32 kg/m²  PHYSICAL EXAM:      General Appearance:   Alert, cooperative, no distress   HEENT:   Normocephalic, atraumatic, anicteric      Neck:  Supple, symmetrical, trachea midline   Lungs:   Clear to auscultation bilaterally; no rales, rhonchi or wheezing; respirations unlabored    Heart[de-identified]   Regular rate and rhythm; no murmur, rub, or gallop     Abdomen:   Soft, non-tender, non-distended; normal bowel sounds; no masses, no organomegaly    Genitalia:   Deferred  Rectal:   Deferred    Extremities:  No cyanosis, clubbing or edema    Pulses:  2+ and symmetric    Skin:  No jaundice, rashes, or lesions    Lymph nodes:  No palpable cervical lymphadenopathy        Lab Results:   CMP normal  (9/221/22)      Radiology Results:   No results found

## 2022-11-30 NOTE — PATIENT INSTRUCTIONS
Keep scheduled next infusion of Remicade  Blood work to be done a day or 2 before your infusion  Kylha more year of [address] [place] coordinated will reach out and we will transition you to get your infusion closer to home    Office visit 6 months as long as everything is going well but if problems either physically or coordinating the transition let me know

## 2022-11-30 NOTE — PROGRESS NOTES
[address] [facility] - Outpatient Consultation  Hakan Arriaza 74 y o  female MRN: [id number]  Encounter: [id number]          ASSESSMENT AND PLAN:      1  Crohn's disease without complication, unspecified gastrointestinal tract location [facility])  Crohn's disease history since 1991  Has been on Remicade for 20 years doing well  Now transitioning to gastroenterologist and infusion center closer to home  Last colonoscopy December 2021  Has not had antibodies or drug levels that she is aware of   - CBC; Future  - Sedimentation rate, automated; Future  - Infliximab Concentration and Anti-Infliximab Antibody; Future  - Comprehensive metabolic panel; Future  - continue infliximab but transition to a [facility] infusion center  - get old records from Dr Amina Mei in [place]  - follow-up office visit 6 months    ______________________________________________________________________    HPI:  The patient is seen in the office today per the request of her PCP to transition her Crohn's care to us  She reports that she was 1st diagnosed with Crohn's disease in 12 when she was living in [place]  She was having issues with diarrhea and arthritis/arthralgias  She is unsure whether the Crohn's disease was in the small intestine, large intestine, or both  At that time she was started on sulfasalazine and several courses of prednisone  When she moved down to this area she began seeing Dr Yamila Mcnally in [place], [place] who transition her to Remicade  She had been doing home infusions since that time and doing extremely well  She had been moving her bowels regularly  She has occasional bout of diarrhea  She denies any arthritis or arthralgias  She denies any abdominal pain  Her weight has been increasing which she attributes to her decreased activity related to the pandemic    Unfortunately when she transitioned to Medicare she no longer qualify for home Remicade and began having to go to [place] for her infusions which is inconvenient  Discussions with her PCP Dr India Conley, it was felt that she could probably transition to a gastroenterologist and an infusion center closer to home  She reports her last colonoscopy was December of 2021  She reports that she has been getting colonoscopy about every 2-3 years and she has had polyps (pseudo polyps?)      REVIEW OF SYSTEMS:    CONSTITUTIONAL: Denies any fever, chills, rigors, and weight loss  HEENT: No earache or tinnitus  Denies hearing loss or visual disturbances  CARDIOVASCULAR: No chest pain or palpitations  RESPIRATORY: Denies any cough, hemoptysis, shortness of breath or dyspnea on exertion  GASTROINTESTINAL: As noted in the History of Present Illness  GENITOURINARY: No problems with urination  Denies any hematuria or dysuria  NEUROLOGIC: No dizziness or vertigo, denies headaches  MUSCULOSKELETAL: Denies any muscle or joint pain  SKIN: Denies skin rashes or itching  ENDOCRINE: Denies excessive thirst  Denies intolerance to heat or cold  PSYCHOSOCIAL: Denies depression or anxiety  Denies any recent memory loss  Historical Information   Past Medical History:   Diagnosis Date   • Arthritis    • Breast cancer ([facility] ) 09/11/2018    Right   • Breast cancer ([facility] ) 08/13/2019    Left   • Colon polyp    • Crohn disease ([facility] [address] )    • Dense breast    • History of chemotherapy     for ovarian cancer   • History of radiation therapy    • History of transfusion 2009   • Hyperlipidemia    • Hypertension    • Lymphedema     left leg   • Lymphedema    • Monoallelic mutation of YOLANDA gene     Breast Gyn Panel   • Ovarian cancer ([facility] ) 04/21/2009     Past Surgical History:   Procedure Laterality Date   • BREAST LUMPECTOMY Right 09/11/2018   • BREAST LUMPECTOMY Left 8/13/2019    Procedure: BREAST LUMPECTOMY; BREAST NEEDLE LOCALIZATION (NEEDLE LOC AT 0800);   Surgeon: Benita Savage MD;  [facility];  Service: Surgical Oncology • BREAST LUMPECTOMY W/ NEEDLE LOCALIZATION Left 2019   • COLONOSCOPY     • EXPLORATORY LAPAROTOMY     • HYSTERECTOMY     • LYMPH NODE BIOPSY Left 2019    Procedure: SENTINEL LYMPH NODE BIOPSY; LYMPHATIC MAPPING WITH BLUE DYE AND RADIOACTIVE DYE (INJECT AT 0930 BY DR JENNINGS IN THE OR); Surgeon: Leonardo Mahoney MD;  [facility];  Service: Surgical Oncology   • MAMMO NEEDLE LOCALIZATION LEFT (ALL INC) Left 2019   • MAMMO NEEDLE LOCALIZATION RIGHT (ALL INC) Right 2018   • MRI BREAST BIOPSY LEFT (ALL INCLUSIVE) Left 2019   • OMENTECTOMY     • GA PERQ DEVICE PLACEMT BREAST LOC 1ST LES W GUIDNCE Right 2018    Procedure: BREAST LUMPECTOMY; BREAST NEEDLE LOCALIZATION (NEEDLE LOC AT 1200);   Surgeon: Leonardo Mahoney MD;  [facility];  Service: Surgical Oncology   • TOTAL ABDOMINAL HYSTERECTOMY W/ BILATERAL SALPINGOOPHORECTOMY     • US GUIDED BREAST BIOPSY LEFT COMPLETE Left 2019   • US GUIDED BREAST BIOPSY RIGHT COMPLETE Right 2018   • WISDOM TOOTH EXTRACTION       Social History   Social History     Substance and Sexual Activity   Alcohol Use Yes    Comment: rarely     Social History     Substance and Sexual Activity   Drug Use No     Social History     Tobacco Use   Smoking Status Former   • Types: Cigarettes   • Quit date: 2009   • Years since quittin 6   Smokeless Tobacco Never     Family History   Problem Relation Age of Onset   • Prostate cancer Father    • Alzheimer's disease Father    • Hypertension Father    • Ovarian cancer Sister    • Breast cancer Paternal Grandmother    • Hypertension Mother    • Heart disease Mother    • Breast cancer Maternal Aunt    • No Known Problems Sister    • Other Sister         pacemaker   • Hypertension Sister    • Heart disease Sister    • No Known Problems Sister    • Down syndrome Sister    • Alzheimer's disease Sister    • Hypertension Sister    • No Known Problems Paternal Aunt    • No Known Problems Maternal Aunt Meds/Allergies       Current Outpatient Medications:   •  albuterol (PROVENTIL HFA,VENTOLIN HFA) 90 mcg/act inhaler  •  alendronate (FOSAMAX) 70 mg tablet  •  amLODIPine (NORVASC) 10 mg tablet  •  anastrozole (ARIMIDEX) 1 mg tablet  •  atorvastatin (LIPITOR) 40 mg tablet  •  Calcium Carbonate 1500 (600 Ca) MG TABS  •  clobetasol (TEMOVATE) 0 05 % ointment  •  Homeopathic Products (Arnicare Bruise) GEL  •  hydrochlorothiazide (MICROZIDE) 12 5 mg capsule  •  inFLIXimab (REMICADE) 100 mg  •  mupirocin (BACTROBAN) 2 % ointment  •  ofloxacin (OCUFLOX) 0 3 % ophthalmic solution  •  triamcinolone (KENALOG) 0 1 % cream  •  Turmeric 500 MG CAPS    Allergies   Allergen Reactions   • Lisinopril      cough   • Losartan      Numbness on right side of body   • Boniva [Ibandronic Acid] Headache           Objective     Blood pressure 138/80, height 5' 1" (1 549 m), weight 60 8 kg (134 lb)  Body mass index is 25 32 kg/m²  PHYSICAL EXAM:      General Appearance:   Alert, cooperative, no distress   HEENT:   Normocephalic, atraumatic, anicteric      Neck:  Supple, symmetrical, trachea midline   Lungs:   Clear to auscultation bilaterally; no rales, rhonchi or wheezing; respirations unlabored    Heart[de-identified]   Regular rate and rhythm; no murmur, rub, or gallop  Abdomen:   Soft, non-tender, non-distended; normal bowel sounds; no masses, no organomegaly    Genitalia:   Deferred    Rectal:   Deferred    Extremities:  No cyanosis, clubbing or edema    Pulses:  2+ and symmetric    Skin:  No jaundice, rashes, or lesions    Lymph nodes:  No palpable cervical lymphadenopathy        Lab Results:   CMP normal  (9/221/22)      Radiology Results:   No results found

## 2022-12-01 ENCOUNTER — TELEPHONE (OUTPATIENT)
Dept: GASTROENTEROLOGY | Facility: CLINIC | Age: 67
End: 2022-12-01

## 2022-12-01 DIAGNOSIS — K50.80 CROHN'S DISEASE OF BOTH SMALL AND LARGE INTESTINE WITHOUT COMPLICATION (HCC): Primary | ICD-10-CM

## 2022-12-01 NOTE — TELEPHONE ENCOUNTER
----- Message from El Apgar, MD sent at 2022  5:00 PM EST -----  Regardin Doctor Rigo Rios [facility]  Please see my note  Patient has been on longstanding Remicade  She is a patient of Dr Errol Navas  She is switching her gastroenterology care to [facility]  She has been getting her Remicade in [facility] and we need to get her set up to get infusion closer to her home  She has a next infusion already set up in [place] so we're not under the gun but please reach out to her and coordinate the following infusion to be done in [place]    Thanks

## 2022-12-02 NOTE — TELEPHONE ENCOUNTER
I called and spoke with patient  She is not due for next Remicade dose until around January 13th  She would prefer to ry to get approved and scheduled with us for that dose so she does not have to drive back to [place]  I advised her we would submit authorization  Patient would prefer [facility]  ( For scheduling she would like Jan 13th around 9am or Jan 18th around 9am)        Dr Carey - can you please place therapy plan for [facility]? I will work on transitioning her to our facilities

## 2022-12-05 DIAGNOSIS — K50.10 CROHN'S DISEASE OF LARGE INTESTINE WITHOUT COMPLICATION (HCC): Primary | ICD-10-CM

## 2022-12-05 RX ORDER — ACETAMINOPHEN 500 MG
1000 TABLET ORAL AS NEEDED
COMMUNITY

## 2022-12-05 RX ORDER — DIPHENHYDRAMINE HCL 25 MG
25 TABLET ORAL AS NEEDED
COMMUNITY

## 2022-12-05 RX ORDER — SODIUM CHLORIDE 9 MG/ML
20 INJECTION, SOLUTION INTRAVENOUS ONCE
OUTPATIENT
Start: 2023-01-13

## 2022-12-08 ENCOUNTER — APPOINTMENT (OUTPATIENT)
Dept: LAB | Facility: HOSPITAL | Age: 67
End: 2022-12-08
Attending: INTERNAL MEDICINE

## 2022-12-08 DIAGNOSIS — K50.80 CROHN'S DISEASE OF BOTH SMALL AND LARGE INTESTINE WITHOUT COMPLICATION (HCC): ICD-10-CM

## 2022-12-08 LAB — HBV SURFACE AG SER QL: NORMAL

## 2022-12-10 LAB
GAMMA INTERFERON BACKGROUND BLD IA-ACNC: 0.03 IU/ML
M TB IFN-G BLD-IMP: NEGATIVE
M TB IFN-G CD4+ BCKGRND COR BLD-ACNC: 0 IU/ML
M TB IFN-G CD4+ BCKGRND COR BLD-ACNC: 0 IU/ML
MITOGEN IGNF BCKGRD COR BLD-ACNC: 5.46 IU/ML

## 2022-12-29 ENCOUNTER — APPOINTMENT (OUTPATIENT)
Dept: LAB | Facility: HOSPITAL | Age: 67
End: 2022-12-29

## 2022-12-29 DIAGNOSIS — Z86.000 HISTORY OF DUCTAL CARCINOMA IN SITU (DCIS) OF BREAST: ICD-10-CM

## 2022-12-29 DIAGNOSIS — K50.90 CROHN'S DISEASE WITHOUT COMPLICATION, UNSPECIFIED GASTROINTESTINAL TRACT LOCATION (HCC): ICD-10-CM

## 2022-12-29 LAB
ALBUMIN SERPL BCP-MCNC: 3.6 G/DL (ref 3.5–5)
ALP SERPL-CCNC: 77 U/L (ref 46–116)
ALT SERPL W P-5'-P-CCNC: 30 U/L (ref 12–78)
ANION GAP SERPL CALCULATED.3IONS-SCNC: 5 MMOL/L (ref 4–13)
AST SERPL W P-5'-P-CCNC: 22 U/L (ref 5–45)
BILIRUB SERPL-MCNC: 0.67 MG/DL (ref 0.2–1)
BUN SERPL-MCNC: 24 MG/DL (ref 5–25)
CALCIUM SERPL-MCNC: 10.2 MG/DL (ref 8.3–10.1)
CHLORIDE SERPL-SCNC: 100 MMOL/L (ref 96–108)
CO2 SERPL-SCNC: 32 MMOL/L (ref 21–32)
CREAT SERPL-MCNC: 0.93 MG/DL (ref 0.6–1.3)
ERYTHROCYTE [DISTWIDTH] IN BLOOD BY AUTOMATED COUNT: 13.5 % (ref 11.6–15.1)
ERYTHROCYTE [SEDIMENTATION RATE] IN BLOOD: 40 MM/HOUR (ref 0–29)
GFR SERPL CREATININE-BSD FRML MDRD: 64 ML/MIN/1.73SQ M
GLUCOSE P FAST SERPL-MCNC: 95 MG/DL (ref 65–99)
HCT VFR BLD AUTO: 42.3 % (ref 34.8–46.1)
HGB BLD-MCNC: 13.5 G/DL (ref 11.5–15.4)
MCH RBC QN AUTO: 27.6 PG (ref 26.8–34.3)
MCHC RBC AUTO-ENTMCNC: 31.9 G/DL (ref 31.4–37.4)
MCV RBC AUTO: 86 FL (ref 82–98)
PLATELET # BLD AUTO: 324 THOUSANDS/UL (ref 149–390)
PMV BLD AUTO: 10.6 FL (ref 8.9–12.7)
POTASSIUM SERPL-SCNC: 3.3 MMOL/L (ref 3.5–5.3)
PROT SERPL-MCNC: 8.4 G/DL (ref 6.4–8.4)
RBC # BLD AUTO: 4.9 MILLION/UL (ref 3.81–5.12)
SODIUM SERPL-SCNC: 137 MMOL/L (ref 135–147)
WBC # BLD AUTO: 7.55 THOUSAND/UL (ref 4.31–10.16)

## 2023-01-05 ENCOUNTER — HOSPITAL ENCOUNTER (OUTPATIENT)
Dept: MRI IMAGING | Facility: HOSPITAL | Age: 68
End: 2023-01-05

## 2023-01-05 DIAGNOSIS — Z17.0 MALIGNANT NEOPLASM OF CENTRAL PORTION OF LEFT BREAST IN FEMALE, ESTROGEN RECEPTOR POSITIVE (HCC): ICD-10-CM

## 2023-01-05 DIAGNOSIS — C50.112 MALIGNANT NEOPLASM OF CENTRAL PORTION OF LEFT BREAST IN FEMALE, ESTROGEN RECEPTOR POSITIVE (HCC): ICD-10-CM

## 2023-01-05 RX ADMIN — GADOBUTROL 6 ML: 604.72 INJECTION INTRAVENOUS at 09:49

## 2023-01-09 ENCOUNTER — OFFICE VISIT (OUTPATIENT)
Dept: FAMILY MEDICINE CLINIC | Facility: CLINIC | Age: 68
End: 2023-01-09

## 2023-01-09 VITALS
HEART RATE: 75 BPM | BODY MASS INDEX: 25.07 KG/M2 | SYSTOLIC BLOOD PRESSURE: 140 MMHG | DIASTOLIC BLOOD PRESSURE: 82 MMHG | OXYGEN SATURATION: 97 % | TEMPERATURE: 96.3 F | WEIGHT: 132.8 LBS | HEIGHT: 61 IN

## 2023-01-09 DIAGNOSIS — K50.10 CROHN'S DISEASE OF LARGE INTESTINE WITHOUT COMPLICATION (HCC): ICD-10-CM

## 2023-01-09 DIAGNOSIS — C56.9 MALIGNANT NEOPLASM OF OVARY, UNSPECIFIED LATERALITY (HCC): ICD-10-CM

## 2023-01-09 DIAGNOSIS — E78.49 OTHER HYPERLIPIDEMIA: ICD-10-CM

## 2023-01-09 DIAGNOSIS — N39.3 FEMALE STRESS INCONTINENCE: ICD-10-CM

## 2023-01-09 DIAGNOSIS — Z00.00 MEDICARE ANNUAL WELLNESS VISIT, INITIAL: Primary | ICD-10-CM

## 2023-01-09 DIAGNOSIS — E07.9 THYROID DISORDER: ICD-10-CM

## 2023-01-09 DIAGNOSIS — K50.90 CROHN'S DISEASE WITHOUT COMPLICATION, UNSPECIFIED GASTROINTESTINAL TRACT LOCATION (HCC): ICD-10-CM

## 2023-01-09 DIAGNOSIS — R35.0 URINE FREQUENCY: ICD-10-CM

## 2023-01-09 PROBLEM — L23.7 POISON IVY: Status: RESOLVED | Noted: 2022-06-11 | Resolved: 2023-01-09

## 2023-01-09 LAB
INFLIXIMAB AB SERPL-MCNC: <22 NG/ML
INFLIXIMAB SERPL-MCNC: 12 UG/ML

## 2023-01-09 NOTE — PATIENT INSTRUCTIONS
Medicare Preventive Visit Patient Instructions  Thank you for completing your Welcome to Medicare Visit or Medicare Annual Wellness Visit today  Your next wellness visit will be due in one year (1/10/2024)  The screening/preventive services that you may require over the next 5-10 years are detailed below  Some tests may not apply to you based off risk factors and/or age  Screening tests ordered at today's visit but not completed yet may show as past due  Also, please note that scanned in results may not display below  Preventive Screenings:  Service Recommendations Previous Testing/Comments   Colorectal Cancer Screening  * Colonoscopy    * Fecal Occult Blood Test (FOBT)/Fecal Immunochemical Test (FIT)  * Fecal DNA/Cologuard Test  * Flexible Sigmoidoscopy Age: 39-70 years old   Colonoscopy: every 10 years (may be performed more frequently if at higher risk)  OR  FOBT/FIT: every 1 year  OR  Cologuard: every 3 years  OR  Sigmoidoscopy: every 5 years  Screening may be recommended earlier than age 39 if at higher risk for colorectal cancer  Also, an individualized decision between you and your healthcare provider will decide whether screening between the ages of 74-80 would be appropriate  Colonoscopy: 10/08/2021  FOBT/FIT: Not on file  Cologuard: Not on file  Sigmoidoscopy: Not on file    Screening Current     Breast Cancer Screening Age: 36 years old  Frequency: every 1-2 years  Not required if history of left and right mastectomy Mammogram: 06/17/2022    History Breast Cancer   Cervical Cancer Screening Between the ages of 21-29, pap smear recommended once every 3 years  Between the ages of 33-67, can perform pap smear with HPV co-testing every 5 years     Recommendations may differ for women with a history of total hysterectomy, cervical cancer, or abnormal pap smears in past  Pap Smear: Not on file    Screening Not Indicated   Hepatitis C Screening Once for adults born between 1945 and 1965  More frequently in patients at high risk for Hepatitis C Hep C Antibody: 12/14/2017    Screening Current   Diabetes Screening 1-2 times per year if you're at risk for diabetes or have pre-diabetes Fasting glucose: 95 mg/dL (12/29/2022)  A1C: No results in last 5 years (No results in last 5 years)  Screening Current   Cholesterol Screening Once every 5 years if you don't have a lipid disorder  May order more often based on risk factors  Lipid panel: 09/20/2021    Screening Not Indicated  History Lipid Disorder     Other Preventive Screenings Covered by Medicare:  1  Abdominal Aortic Aneurysm (AAA) Screening: covered once if your at risk  You're considered to be at risk if you have a family history of AAA  2  Lung Cancer Screening: covers low dose CT scan once per year if you meet all of the following conditions: (1) Age 50-69; (2) No signs or symptoms of lung cancer; (3) Current smoker or have quit smoking within the last 15 years; (4) You have a tobacco smoking history of at least 20 pack years (packs per day multiplied by number of years you smoked); (5) You get a written order from a healthcare provider  3  Glaucoma Screening: covered annually if you're considered high risk: (1) You have diabetes OR (2) Family history of glaucoma OR (3)  aged 48 and older OR (3)  American aged 72 and older  3  Osteoporosis Screening: covered every 2 years if you meet one of the following conditions: (1) You're estrogen deficient and at risk for osteoporosis based off medical history and other findings; (2) Have a vertebral abnormality; (3) On glucocorticoid therapy for more than 3 months; (4) Have primary hyperparathyroidism; (5) On osteoporosis medications and need to assess response to drug therapy  · Last bone density test (DXA Scan): 10/25/2021   5  HIV Screening: covered annually if you're between the age of 15-65  Also covered annually if you are younger than 13 and older than 72 with risk factors for HIV infection  For pregnant patients, it is covered up to 3 times per pregnancy  Immunizations:  Immunization Recommendations   Influenza Vaccine Annual influenza vaccination during flu season is recommended for all persons aged >= 6 months who do not have contraindications   Pneumococcal Vaccine   * Pneumococcal conjugate vaccine = PCV13 (Prevnar 13), PCV15 (Vaxneuvance), PCV20 (Prevnar 20)  * Pneumococcal polysaccharide vaccine = PPSV23 (Pneumovax) Adults 25-60 years old: 1-3 doses may be recommended based on certain risk factors  Adults 72 years old: 1-2 doses may be recommended based off what pneumonia vaccine you previously received   Hepatitis B Vaccine 3 dose series if at intermediate or high risk (ex: diabetes, end stage renal disease, liver disease)   Tetanus (Td) Vaccine - COST NOT COVERED BY MEDICARE PART B Following completion of primary series, a booster dose should be given every 10 years to maintain immunity against tetanus  Td may also be given as tetanus wound prophylaxis  Tdap Vaccine - COST NOT COVERED BY MEDICARE PART B Recommended at least once for all adults  For pregnant patients, recommended with each pregnancy  Shingles Vaccine (Shingrix) - COST NOT COVERED BY MEDICARE PART B  2 shot series recommended in those aged 48 and above     Health Maintenance Due:      Topic Date Due   • Cervical Cancer Screening  Never done   • HIV Screening  Never done   • Breast Cancer Screening: Mammogram  06/17/2023   • Colorectal Cancer Screening  10/08/2024   • Hepatitis C Screening  Completed     Immunizations Due:      Topic Date Due   • Hepatitis B Vaccine (1 of 3 - 3-dose series) Never done   • Pneumococcal Vaccine: 65+ Years (2 - PPSV23 if available, else PCV20) 11/16/2017   • COVID-19 Vaccine (4 - Booster for [place] series) 03/13/2022     Advance Directives   What are advance directives? Advance directives are legal documents that state your wishes and plans for medical care   These plans are made ahead of time in case you lose your ability to make decisions for yourself  Advance directives can apply to any medical decision, such as the treatments you want, and if you want to donate organs  What are the types of advance directives? There are many types of advance directives, and each state has rules about how to use them  You may choose a combination of any of the following:  · Living will: This is a written record of the treatment you want  You can also choose which treatments you do not want, which to limit, and which to stop at a certain time  This includes surgery, medicine, IV fluid, and tube feedings  · Durable power of  for healthcare [facility]): This is a written record that states who you want to make healthcare choices for you when you are unable to make them for yourself  This person, called a proxy, is usually a family member or a friend  You may choose more than 1 proxy  · Do not resuscitate (DNR) order:  A DNR order is used in case your heart stops beating or you stop breathing  It is a request not to have certain forms of treatment, such as CPR  A DNR order may be included in other types of advance directives  · Medical directive: This covers the care that you want if you are in a coma, near death, or unable to make decisions for yourself  You can list the treatments you want for each condition  Treatment may include pain medicine, surgery, blood transfusions, dialysis, IV or tube feedings, and a ventilator (breathing machine)  · Values history: This document has questions about your views, beliefs, and how you feel and think about life  This information can help others choose the care that you would choose  Why are advance directives important? An advance directive helps you control your care  Although spoken wishes may be used, it is better to have your wishes written down  Spoken wishes can be misunderstood, or not followed  Treatments may be given even if you do not want them   An advance directive may make it easier for your family to make difficult choices about your care  Weight Management   Why it is important to manage your weight:  Being overweight increases your risk of health conditions such as heart disease, high blood pressure, type 2 diabetes, and certain types of cancer  It can also increase your risk for osteoarthritis, sleep apnea, and other respiratory problems  Aim for a slow, steady weight loss  Even a small amount of weight loss can lower your risk of health problems  How to lose weight safely:  A safe and healthy way to lose weight is to eat fewer calories and get regular exercise  You can lose up about 1 pound a week by decreasing the number of calories you eat by 500 calories each day  Healthy meal plan for weight management:  A healthy meal plan includes a variety of foods, contains fewer calories, and helps you stay healthy  A healthy meal plan includes the following:  · Eat whole-grain foods more often  A healthy meal plan should contain fiber  Fiber is the part of grains, fruits, and vegetables that is not broken down by your body  Whole-grain foods are healthy and provide extra fiber in your diet  Some examples of whole-grain foods are whole-wheat breads and pastas, oatmeal, brown rice, and bulgur  · Eat a variety of vegetables every day  Include dark, leafy greens such as spinach, kale, claritza greens, and mustard greens  Eat yellow and orange vegetables such as carrots, sweet potatoes, and winter squash  · Eat a variety of fruits every day  Choose fresh or canned fruit (canned in its own juice or light syrup) instead of juice  Fruit juice has very little or no fiber  · Eat low-fat dairy foods  Drink fat-free (skim) milk or 1% milk  Eat fat-free yogurt and low-fat cottage cheese  Try low-fat cheeses such as mozzarella and other reduced-fat cheeses  · Choose meat and other protein foods that are low in fat    Choose beans or other legumes such as split peas or lentils  Choose fish, skinless poultry (chicken or turkey), or lean cuts of red meat (beef or pork)  Before you cook meat or poultry, cut off any visible fat  · Use less fat and oil  Try baking foods instead of frying them  Add less fat, such as margarine, sour cream, regular salad dressing and mayonnaise to foods  Eat fewer high-fat foods  Some examples of high-fat foods include french fries, doughnuts, ice cream, and cakes  · Eat fewer sweets  Limit foods and drinks that are high in sugar  This includes candy, cookies, regular soda, and sweetened drinks  Exercise:  Exercise at least 30 minutes per day on most days of the week  Some examples of exercise include walking, biking, dancing, and swimming  You can also fit in more physical activity by taking the stairs instead of the elevator or parking farther away from stores  Ask your healthcare provider about the best exercise plan for you  © Copyright [employer] 2018 Information is for End User's use only and may not be sold, redistributed or otherwise used for commercial purposes  All illustrations and images included in CareNotes® are the copyrighted property of A D A M , Inc  or [facility] [employer]   Potassium Content of Foods List   WHAT YOU NEED TO KNOW:   Potassium is a mineral that is found in most foods  Potassium helps to balance fluids and minerals in your body  It also helps your body maintain a normal blood pressure  Potassium helps your muscles contract and your nerves function normally  DISCHARGE INSTRUCTIONS:   Why you may need to change the amount of potassium you eat:   · You may need more potassium  if you have hypokalemia (low potassium levels) or high blood pressure  You may also need more potassium if you are taking diuretics  Diuretics and certain medicines cause your body to lose potassium  · You may need less potassium  in your diet if you have hyperkalemia (high potassium levels) or kidney disease      Potassium content of fruit:  The amount of potassium in milligrams (mg) contained in each fruit or serving of fruit is listed beside the item  1  High-potassium foods (more than 200 mg per serving):      ? 1 medium banana (425)    ? ½ of a papaya (390)    ? ½ cup of prune juice (370)    ? ¼ cup of raisins (270)    ? 1 medium bonnie (325) or kiwi (240)    ? 1 small orange (240) or ½ cup of orange juice (235)    ? ½ cup of cubed cantaloupe (215) or diced honeydew melon (200)    ? 1 medium pear (200)    2  Medium-potassium foods (50 to 200 mg per serving):      ? 1 medium peach (185)    ? 1 small apple or ½ cup of apple juice (150)    ? ½ cup of peaches canned in juice (120)    ? ½ cup of canned pineapple (100)    ? ½ cup of fresh, sliced strawberries (232)    ? ½ cup of watermelon (85)    3  Low-potassium foods (less than 50 mg per serving):      ? ½ cup of cranberries (45) or cranberry juice cocktail (20)    ? ½ cup of nectar of papaya, bonnie, or pear (35)    Potassium content of vegetables:   1  High-potassium foods (more than 200 mg per serving):      ? 1 medium baked potato, with skin (925)    ? 1 baked medium sweet potato, with skin (450)    ? ½ cup of tomato or vegetable juice (275), or 1 medium raw tomato (290)    ? ½ cup of mushrooms (280)    ? ½ cup of fresh brussels sprouts (250)    ? ½ cup of cooked zucchini (220) or winter squash (250)    ? ¼ of a medium avocado (245)    ? ½ cup of broccoli (230)    2  Medium-potassium foods (50 to 200 mg per serving):      ? ½ cup of corn (195)    ? ½ cup of fresh or cooked carrots (180)    ? ½ cup of fresh cauliflower (150)    ? ½ cup of asparagus (155)    ? ½ cup of canned peas (90)     ? 1 cup of lettuce, all types (100)    ? ½ cup of fresh green beans (90)    ? ½ cup of frozen green beans (85)    ? ½ cup of cucumber (80)    Potassium content of protein foods:   1   High-potassium foods (more than 200 mg per serving):      ? ½ cup of cooked burris beans (400) or lentils (365)    ? 1 cup of soy milk (300)    ? 3 ounces of baked or broiled salmon (319)    ? 3 ounces of roasted turkey, dark meat (250)    ? ¼ cup of sunflower seeds (241)    ? 3 ounces of cooked lean beef (224)    ? 2 tablespoons of smooth peanut butter (210)    2  Medium-potassium foods (50 to 200 mg per serving):      ? 1 ounce of salted peanuts, almonds, or cashews (200)    ? 1 large egg (60 mg)    Potassium content of dairy foods:   1  High-potassium foods (more than 200 mg per serving):      ? 6 ounces of yogurt (260 to 435)    ? 1 cup of nonfat, low-fat, or whole milk (350 to 380)    2  Medium-potassium foods (50 to 200 mg per serving):      ? ½ cup of ricotta cheese (154)    ? ½ cup of vanilla ice cream (131)    ? ½ cup of low-fat (2%) cottage cheese (110)    3  Low-potassium foods (less than 50 mg per serving):      ? 1 ounce of cheese (20 to 30)      Potassium content of grains:   · 1 slice of white bread (30)    · ½ cup of white or brown rice (50)    · ½ cup of spaghetti or macaroni (30)    · 1 flour or corn tortilla (50)    · 1 four-inch waffle (50)    Potassium content of other foods:   · 1 tablespoon of molasses (295)    · 1½ ounces of chocolate (165)    · Some salt substitutes may contain a high amount of potassium  Check the food label to find the amount of potassium it contains  © Copyright [employer] 2022 Information is for End User's use only and may not be sold, redistributed or otherwise used for commercial purposes  All illustrations and images included in CareNotes® are the copyrighted property of A D A M , Inc  or [employer]  The above information is an  only  It is not intended as medical advice for individual conditions or treatments  Talk to your doctor, nurse or pharmacist before following any medical regimen to see if it is safe and effective for you  Urinary Incontinence   AMBULATORY CARE:   Urinary incontinence (UI)  is when you lose control of your bladder  UI develops because your bladder cannot store or empty urine properly  The 3 most common types of UI are stress incontinence, urge incontinence, or both  Common symptoms include the following:   · You feel like your bladder does not empty completely when you urinate  · You urinate often and need to urinate immediately  · You leak urine when you sleep, or you wake up with the urge to urinate  · You leak urine when you cough, sneeze, exercise, or laugh  Call your doctor if:   · You have severe pain  · You are confused or cannot think clearly  · You have a fever  · You see blood in your urine  · You have pain when you urinate  · You have new or worse pain, even after treatment  · Your mouth feels dry or you have vision changes  · Your urine is cloudy or smells bad  · You have questions or concerns about your condition or care  Medicines:   · Medicines  may be given to help strengthen your bladder control  · Take your medicine as directed  Contact your healthcare provider if you think your medicine is not helping or if you have side effects  Tell him or her if you are allergic to any medicine  Keep a list of the medicines, vitamins, and herbs you take  Include the amounts, and when and why you take them  Bring the list or the pill bottles to follow-up visits  Carry your medicine list with you in case of an emergency  Do pelvic muscle exercises often:  Your pelvic muscles help you stop urinating  Squeeze these muscles tight for 5 seconds, then relax for 5 seconds  Gradually work up to squeezing for 10 seconds  Do 3 sets of 15 repetitions a day, or as directed  This will help strengthen your pelvic muscles and improve bladder control  Train your bladder:  Go to the bathroom at set times, such as every 2 hours, even if you do not feel the urge to go  You can also try to hold your urine when you feel the urge to go   For example, hold your urine for 5 minutes when you feel the urge to go  As that becomes easier, hold your urine for 10 minutes  Self-care:   · Keep a UI record  Write down how often you leak urine and how much you leak  Make a note of what you were doing when you leaked urine  · Drink liquids as directed  Ask your healthcare provider how much liquid to drink each day and which liquids are best for you  You may need to limit the amount of liquid you drink to help control your urine leakage  Do not drink any liquid right before you go to bed  Limit or do not have drinks that contain caffeine or alcohol  · Prevent constipation  Eat a variety of high-fiber foods  Good examples are high-fiber cereals, beans, vegetables, and whole-grain breads  Prune juice may help make your bowel movement softer  Walking is the best way to trigger your intestines to have a bowel movement  · Exercise regularly and maintain a healthy weight  Ask your healthcare provider how much you should weigh and about the best exercise plan for you  Weight loss and exercise will decrease pressure on your bladder and help you control your leakage  Ask him or her to help you create a weight loss plan if you are overweight  · Use a catheter as directed  to help empty your bladder  A catheter is a tiny, plastic tube that is put into your bladder to drain your urine  Your healthcare provider may tell you to use a catheter to prevent your bladder from getting too full and leaking urine  · Go to behavior therapy as directed  Behavior therapy may be used to help you learn to control your urge to urinate  Follow up with your doctor as directed:  Write down your questions so you remember to ask them during your visits  © Copyright [employer] 2022 Information is for End User's use only and may not be sold, redistributed or otherwise used for commercial purposes   All illustrations and images included in CareNotes® are the copyrighted property of A D A [employer]  or [employer]  The above information is an  only  It is not intended as medical advice for individual conditions or treatments  Talk to your doctor, nurse or pharmacist before following any medical regimen to see if it is safe and effective for you

## 2023-01-09 NOTE — PROGRESS NOTES
Assessment and Plan:     Problem List Items Addressed This Visit        Digestive    Crohn's disease of large bowel ([facility] [address] )     Continue with remicade and follow up with GI            Endocrine    Malignant neoplasm of ovary ([facility] [address] )       Other    [facility]'s disease ([facility] )    Hyperlipidemia    Relevant Orders    Lipid Panel with Direct LDL reflex    Urine frequency    Relevant Orders    TSH, 3rd generation with Free T4 reflex    Lipid Panel with Direct LDL reflex   Other Visit Diagnoses     Medicare annual wellness visit, initial    -  Primary    Relevant Orders    POCT ECG (Completed)    Thyroid disorder        Relevant Orders    TSH, 3rd generation with Free T4 reflex    Female stress incontinence            BMI Counseling: Body mass index is 25 09 kg/m²  The BMI is above normal  Nutrition recommendations include decreasing portion sizes  Exercise recommendations include exercising 3-5 times per week  No pharmacotherapy was ordered  Rationale for BMI follow-up plan is due to patient being overweight or obese  Depression Screening and Follow-up Plan: Patient was screened for depression during today's encounter  They screened negative with a PHQ-2 score of 0  Preventive health issues were discussed with patient, and age appropriate screening tests were ordered as noted in patient's After Visit Summary  Personalized health advice and appropriate referrals for health education or preventive services given if needed, as noted in patient's After Visit Summary  History of Present Illness:     Patient presents for a Medicare Wellness Visit    Pt here for welcome to medicare  ekg reviewed and is normal  Complains of low back pain, sharp pain when moving in certain directions  Started on thursday  No specific injury but fell asleep in a chair  Since Sunday gradually getting better  Radiates to right buttock, right sided pain  Better when laying down but worse when sitting        Patient Care Team:  Vibha Elias, DO as PCP - General (Family Medicine)  Julissa Mott MD (Radiation Oncology)  Eloisa Pierre MD (Hematology and Oncology)  MD Nereida Valenzuela MD as Surgeon (Surgical Oncology)  Kristina Cox MD (Gynecologic Oncology)  Alberto Izaguirre RN as Nurse Navigator (Oncology)     Review of Systems:     Review of Systems   Constitutional: Negative  Negative for fatigue and fever  HENT: Negative  Eyes: Negative  Respiratory: Negative  Negative for cough  Cardiovascular: Negative  Gastrointestinal: Negative  Endocrine: Negative  Genitourinary: Negative  Musculoskeletal: Positive for back pain  Skin: Negative  Allergic/Immunologic: Negative  Neurological: Negative  Psychiatric/Behavioral: Negative           Problem List:     Patient Active Problem List   Diagnosis   • Colon, diverticulosis   • Crohn's disease ([address] )   • Dense breasts   • Dermatitis   • Erythema chronica migrans, secondary   • Hyperlipidemia   • Lymphedema   • Murmur   • Osteopenia   • Osteoporosis   • History of ovarian cancer   • Reactive airway disease   • Shortness of breath on exertion   • Lymphedema of left lower extremity   • Genetic predisposition to breast cancer   • History of ductal carcinoma in situ (DCIS) of breast   • Encounter for follow-up surveillance of ovarian cancer   • Lichen sclerosus et atrophicus   • Essential hypertension   • Malignant neoplasm of ovary (HCC)   • Nausea   • TIA (transient ischemic attack)   • Malignant neoplasm of central portion of left breast in female, estrogen receptor positive ([address] )   • Use of anastrozole   • History of breast cancer   • Carpal tunnel syndrome of right wrist   • Neuropathy   • Monoallelic mutation of YOLANDA gene   • Right calf pain   • Primary osteoarthritis of right knee   • Effusion of right knee   • Primary osteoarthritis of left knee   • Pes anserinus bursitis of right knee   • Bad odor of urine   • Bilateral carpal tunnel syndrome   • Urine frequency   • Benign neoplasm of colon   • Crohn's disease of large bowel ([facility] )   • Mitral valve disorder   • 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      Past Medical and Surgical History:     Past Medical History:   Diagnosis Date   • Arthritis    • Breast cancer ([facility] ) 09/11/2018    Right   • Breast cancer ([place] [address] ) 08/13/2019    Left   • Colon polyp    • Crohn disease ([place] [address] )    • Dense breast    • History of chemotherapy     for ovarian cancer   • History of radiation therapy    • History of transfusion 2009   • Hyperlipidemia    • Hypertension    • Lymphedema     left leg   • Lymphedema    • Monoallelic mutation of YOLANDA gene     Breast Gyn Panel   • Ovarian cancer ([place] [address] ) 04/21/2009     Past Surgical History:   Procedure Laterality Date   • BREAST LUMPECTOMY Right 09/11/2018   • BREAST LUMPECTOMY Left 8/13/2019    Procedure: BREAST LUMPECTOMY; BREAST NEEDLE LOCALIZATION (NEEDLE LOC AT 0800); Surgeon: Yuni Dave MD;  [facility];  Service: Surgical Oncology   • BREAST LUMPECTOMY W/ NEEDLE LOCALIZATION Left 08/13/2019   • COLONOSCOPY     • EXPLORATORY LAPAROTOMY     • HYSTERECTOMY     • LYMPH NODE BIOPSY Left 8/13/2019    Procedure: SENTINEL LYMPH NODE BIOPSY; LYMPHATIC MAPPING WITH BLUE DYE AND RADIOACTIVE DYE (INJECT AT 0930 BY DR JENNINGS IN THE OR); Surgeon: Yuni Dave MD;  [facility];  Service: Surgical Oncology   • MAMMO NEEDLE LOCALIZATION LEFT (ALL INC) Left 8/13/2019   • MAMMO NEEDLE LOCALIZATION RIGHT (ALL INC) Right 9/11/2018   • MRI BREAST BIOPSY LEFT (ALL INCLUSIVE) Left 7/18/2019   • OMENTECTOMY     • CT PERQ DEVICE PLACEMENT BREAST LOC 1ST LES W/GDNCE Right 9/11/2018    Procedure: BREAST LUMPECTOMY; BREAST NEEDLE LOCALIZATION (NEEDLE LOC AT 1200);   Surgeon: Yuni Dave MD;  [facility];  Service: Surgical Oncology   • TOTAL ABDOMINAL HYSTERECTOMY W/ BILATERAL SALPINGOOPHORECTOMY     • US GUIDED BREAST BIOPSY LEFT COMPLETE Left 6/17/2019   • US GUIDED BREAST BIOPSY RIGHT COMPLETE Right 2018   • WISDOM TOOTH EXTRACTION        Family History:     Family History   Problem Relation Age of Onset   • Prostate cancer Father    • Alzheimer's disease Father    • Hypertension Father    • Ovarian cancer Sister    • Breast cancer Paternal Grandmother    • Hypertension Mother    • Heart disease Mother    • Breast cancer Maternal Aunt    • No Known Problems Sister    • Other Sister         pacemaker   • Hypertension Sister    • Heart disease Sister    • No Known Problems Sister    • Down syndrome Sister    • Alzheimer's disease Sister    • Hypertension Sister    • No Known Problems Paternal Aunt    • No Known Problems Maternal Aunt       Social History:     Social History     Socioeconomic History   • Marital status: Single     Spouse name: None   • Number of children: None   • Years of education: None   • Highest education level: None   Occupational History   • None   Tobacco Use   • Smoking status: Former     Types: Cigarettes     Quit date: 2009     Years since quittin 7   • Smokeless tobacco: Never   Vaping Use   • Vaping Use: Never used   Substance and Sexual Activity   • Alcohol use: Yes     Comment: rarely   • Drug use: No   • Sexual activity: None   Other Topics Concern   • None   Social History Narrative   • None     Social Determinants of Health     Financial Resource Strain: Low Risk    • Difficulty of Paying Living Expenses: Not very hard   Food Insecurity: Not on file   Transportation Needs: No Transportation Needs   • Lack of Transportation (Medical): No   • Lack of Transportation (Non-Medical):  No   Physical Activity: Not on file   Stress: Not on file   Social Connections: Not on file   Intimate Partner Violence: Not on file   Housing Stability: Not on file      Medications and Allergies:     Current Outpatient Medications   Medication Sig Dispense Refill   • acetaminophen (TYLENOL) 500 mg tablet Take 1,000 mg by mouth as needed Pre-Medication prior to Remicade Infusion     • albuterol (PROVENTIL HFA,VENTOLIN HFA) 90 mcg/act inhaler Inhale 2 puffs  as needed in the morning for wheezing  18 g 2   • alendronate (FOSAMAX) 70 mg tablet TAKE 1 TABLET (70 MG TOTAL) BY MOUTH EVERY 7 DAYS 4 tablet 11   • amLODIPine (NORVASC) 10 mg tablet TAKE 1 TABLET (10 MG TOTAL) BY MOUTH IN THE MORNING  30 tablet 0   • anastrozole (ARIMIDEX) 1 mg tablet Take 1 tablet (1 mg total) by mouth daily 30 tablet 11   • atorvastatin (LIPITOR) 40 mg tablet TAKE 1 TABLET (40 MG TOTAL) BY MOUTH DAILY AT BEDTIME 30 tablet 0   • Calcium Carbonate 1500 (600 Ca) MG TABS Take 1 tablet by mouth 2 (two) times a day     • clobetasol (TEMOVATE) 0 05 % ointment Apply to the affected area 1-2 times a week 60 g 1   • diphenhydrAMINE (BENADRYL) 25 mg tablet Take 25 mg by mouth as needed Pre-Medication prior to Remicade Infusion     • Homeopathic Products ([facility] [facility]) GEL Apply topically     • hydrochlorothiazide (MICROZIDE) 12 5 mg capsule TAKE 1 CAPSULE (12 5 MG TOTAL) BY MOUTH EVERY MORNING 30 capsule 3   • inFLIXimab (REMICADE) 100 mg Infuse into a venous catheter       • mupirocin (BACTROBAN) 2 % ointment Apply topically 3 (three) times a day 22 g 0   • ofloxacin (OCUFLOX) 0 3 % ophthalmic solution ADMINISTER 1 DROP TO BOTH EYES 4 (FOUR) TIMES A DAY 5 mL 0   • Turmeric 500 MG CAPS Take by mouth       No current facility-administered medications for this visit       Allergies   Allergen Reactions   • Lisinopril      cough   • Losartan      Numbness on right side of body   • Boniva [Ibandronic Acid] Headache      Immunizations:     Immunization History   Administered Date(s) Administered   • COVID-19 PFIZER VACCINE 0 3 ML IM 04/17/2021, 05/12/2021, 01/16/2022   • Influenza Quadrivalent, 6-35 Months IM 10/18/2016, 12/07/2017   • Influenza, recombinant, quadrivalent,injectable, preservative free 11/24/2020   • Pneumococcal Conjugate 13-Valent 11/11/2015, 11/16/2016   • Tdap 01/01/2006, 11/16/2016      Health Maintenance:         Topic Date Due   • Cervical Cancer Screening  Never done   • HIV Screening  Never done   • Breast Cancer Screening: Mammogram  06/17/2023   • Colorectal Cancer Screening  10/08/2024   • Hepatitis C Screening  Completed         Topic Date Due   • Hepatitis B Vaccine (1 of 3 - 3-dose series) Never done   • Pneumococcal Vaccine: 65+ Years (2 - PPSV23 if available, else PCV20) 11/16/2017   • COVID-19 Vaccine (4 - Booster for Pfizer series) 03/13/2022      Medicare Screening Tests and Risk Assessments:     Dalton Morel is here for her Welcome to Medicare visit  Health Risk Assessment:   Patient rates overall health as good  Patient feels that their physical health rating is same  Patient is satisfied with their life  Eyesight was rated as same  Hearing was rated as slightly worse  Patient feels that their emotional and mental health rating is same  Patients states they are sometimes angry  Patient states they are often unusually tired/fatigued  Pain experienced in the last 7 days has been a lot  Patient's pain rating has been 8/10  Patient states that she has experienced weight loss or gain in last 6 months  Depression Screening:   PHQ-2 Score: 0      Fall Risk Screening: In the past year, patient has experienced: no history of falling in past year      Urinary Incontinence Screening:   Patient has leaked urine accidently in the last six months  Home Safety:  Patient does not have trouble with stairs inside or outside of their home  Patient has working smoke alarms and has no working carbon monoxide detector  Home safety hazards include: none  Nutrition:   Current diet is Regular  Medications:   Patient is currently taking over-the-counter supplements  OTC medications include: see medication list  Patient is able to manage medications       Activities of Daily Living (ADLs)/Instrumental Activities of Daily Living (IADLs):   Walk and transfer into and out of bed and chair?: Yes  Dress and groom yourself?: Yes    Bathe or shower yourself?: Yes    Feed yourself? Yes  Do your laundry/housekeeping?: Yes  Manage your money, pay your bills and track your expenses?: Yes  Make your own meals?: Yes    Do your own shopping?: Yes    Previous Hospitalizations:   Any hospitalizations or ED visits within the last 12 months?: No      PREVENTIVE SCREENINGS      Cardiovascular Screening:    General: Screening Not Indicated and History Lipid Disorder      Diabetes Screening:     General: Screening Current      Colorectal Cancer Screening:     General: Screening Current      Breast Cancer Screening:     General: History Breast Cancer      Cervical Cancer Screening:    General: Screening Not Indicated      Osteoporosis Screening:    General: Screening Not Indicated and History Osteoporosis      Lung Cancer Screening:     General: Screening Not Indicated      Hepatitis C Screening:    General: Screening Current    Screening, Brief Intervention, and Referral to Treatment (SBIRT)    Screening  Typical number of drinks in a day: 0  Typical number of drinks in a week: 0  Interpretation: Low risk drinking behavior  AUDIT-C Screenin) How often did you have a drink containing alcohol in the past year? monthly or less  2) How many drinks did you have on a typical day when you were drinking in the past year?  1 to 2  3) How often did you have 6 or more drinks on one occasion in the past year? never    AUDIT-C Score: 1  Interpretation: Score 0-2 (female): Negative screen for alcohol misuse    Single Item Drug Screening:  How often have you used an illegal drug (including marijuana) or a prescription medication for non-medical reasons in the past year? never    Single Item Drug Screen Score: 0  Interpretation: Negative screen for possible drug use disorder    Vision Screening    Right eye Left eye Both eyes   Without correction      With correction 20/13 20/13 20/10        Physical Exam:     /82   Pulse 75   Temp (!) 96 3 °F (35 7 °C)   Ht 5' 1" (1 549 m)   Wt 60 2 kg (132 lb 12 8 oz)   SpO2 97%   BMI 25 09 kg/m²     Physical Exam  Vitals and nursing note reviewed  Constitutional:       Appearance: She is well-developed  HENT:      Head: Normocephalic and atraumatic  Right Ear: External ear normal       Left Ear: External ear normal       Nose: Nose normal    Eyes:      Conjunctiva/sclera: Conjunctivae normal       Pupils: Pupils are equal, round, and reactive to light  Cardiovascular:      Rate and Rhythm: Normal rate and regular rhythm  Heart sounds: Normal heart sounds  Pulmonary:      Effort: Pulmonary effort is normal       Breath sounds: Normal breath sounds  Abdominal:      General: Bowel sounds are normal       Palpations: Abdomen is soft  Musculoskeletal:         General: Normal range of motion  Cervical back: Normal range of motion and neck supple  Right lower leg: Edema present  Left lower leg: Edema present  Skin:     General: Skin is warm and dry  Neurological:      Mental Status: She is alert and oriented to person, place, and time  Psychiatric:         Behavior: Behavior normal          Thought Content:  Thought content normal          Judgment: Judgment normal           Jolayne Single, DO

## 2023-01-13 ENCOUNTER — HOSPITAL ENCOUNTER (OUTPATIENT)
Dept: INFUSION CENTER | Facility: HOSPITAL | Age: 68
End: 2023-01-13
Attending: INTERNAL MEDICINE

## 2023-01-13 VITALS
OXYGEN SATURATION: 96 % | DIASTOLIC BLOOD PRESSURE: 77 MMHG | SYSTOLIC BLOOD PRESSURE: 151 MMHG | TEMPERATURE: 98 F | HEIGHT: 62 IN | WEIGHT: 132.28 LBS | BODY MASS INDEX: 24.34 KG/M2 | HEART RATE: 85 BPM | RESPIRATION RATE: 16 BRPM

## 2023-01-13 DIAGNOSIS — K50.10 CROHN'S DISEASE OF LARGE INTESTINE WITHOUT COMPLICATION (HCC): Primary | ICD-10-CM

## 2023-01-13 RX ORDER — SODIUM CHLORIDE 9 MG/ML
20 INJECTION, SOLUTION INTRAVENOUS ONCE
OUTPATIENT
Start: 2023-03-10

## 2023-01-13 RX ORDER — SODIUM CHLORIDE 9 MG/ML
20 INJECTION, SOLUTION INTRAVENOUS ONCE
Status: COMPLETED | OUTPATIENT
Start: 2023-01-13 | End: 2023-01-13

## 2023-01-13 RX ADMIN — INFLIXIMAB 300 MG: 100 INJECTION, POWDER, LYOPHILIZED, FOR SOLUTION INTRAVENOUS at 10:31

## 2023-01-13 RX ADMIN — SODIUM CHLORIDE 20 ML/HR: 9 INJECTION, SOLUTION INTRAVENOUS at 10:31

## 2023-01-13 NOTE — PROGRESS NOTES
Pt tolerated Remicade infusion with no adverse reaction  Pt left unit ambulatory with steady gait  AVS printed and given to pt

## 2023-01-17 ENCOUNTER — OFFICE VISIT (OUTPATIENT)
Dept: SURGICAL ONCOLOGY | Facility: CLINIC | Age: 68
End: 2023-01-17

## 2023-01-17 VITALS
RESPIRATION RATE: 16 BRPM | SYSTOLIC BLOOD PRESSURE: 138 MMHG | TEMPERATURE: 97.2 F | BODY MASS INDEX: 24.83 KG/M2 | DIASTOLIC BLOOD PRESSURE: 86 MMHG | WEIGHT: 131.5 LBS | HEIGHT: 61 IN | HEART RATE: 85 BPM | OXYGEN SATURATION: 99 %

## 2023-01-17 DIAGNOSIS — Z15.09 MONOALLELIC MUTATION OF ATM GENE: ICD-10-CM

## 2023-01-17 DIAGNOSIS — Z15.89 MONOALLELIC MUTATION OF ATM GENE: ICD-10-CM

## 2023-01-17 DIAGNOSIS — I10 ESSENTIAL HYPERTENSION: ICD-10-CM

## 2023-01-17 DIAGNOSIS — Z17.0 MALIGNANT NEOPLASM OF CENTRAL PORTION OF LEFT BREAST IN FEMALE, ESTROGEN RECEPTOR POSITIVE (HCC): Primary | ICD-10-CM

## 2023-01-17 DIAGNOSIS — Z15.01 MONOALLELIC MUTATION OF ATM GENE: ICD-10-CM

## 2023-01-17 DIAGNOSIS — Z79.811 USE OF ANASTROZOLE: ICD-10-CM

## 2023-01-17 DIAGNOSIS — C50.112 MALIGNANT NEOPLASM OF CENTRAL PORTION OF LEFT BREAST IN FEMALE, ESTROGEN RECEPTOR POSITIVE (HCC): Primary | ICD-10-CM

## 2023-01-17 RX ORDER — AMLODIPINE BESYLATE 10 MG/1
10 TABLET ORAL DAILY
Qty: 30 TABLET | Refills: 5 | Status: SHIPPED | OUTPATIENT
Start: 2023-01-17

## 2023-01-17 NOTE — PROGRESS NOTES
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, estrogen receptor positive ([facility] [address] )  - 6 month follow up  - Mammo diagnostic bilateral w 3d & cad; Future    2  Use of anastrozole  - continue use per medical oncology    3  Monoallelic mutation of YOLANDA gene  - Annual mammogram and MRI       Discussion/Summary: Patient is a 62-year-old female presenting today for six-month follow-up for bilateral breast cancer  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  She underwent a right breast lumpectomy with Dr All Lizarraga and completed WBRT  Genetic testing was positive for an YOLANDA mutation  In July of 2019 she was diagnosed with invasive breast carcinoma of the left breast  Pathology revealed ER 90%, ME/HER2 negative  She had a left breast needle with sentinel node biopsy with Dr All Lizarraga and completed WBRT  She is currently on anastrozole  She also has a history of ovarian cancer diagnosed in 2009  We have been following her with annual MRI of the breasts and bilateral diagnostic mammograms   She had a bilateral breast MRI on 1/5/2023 which was BI-RADS 2 category 2 density  There were no concerns on her cbe  I will see the patient back in 6 months or sooner should the need arise  She was instructed to call with any questions or concerns prior to this time  All questions were answered today        History of Present Illness:     Oncology History   History of ovarian cancer    Initial Diagnosis    Ovarian cancer ([facility] [address] )     4/21/2009 Surgery    Exploratory laparotomy, total abdominal hysterectomy, bilateral salpingo-oophrectomy, lymph node dissection, omentectomy with staging      - 7/8/2009 Chemotherapy    Intraperitoneal along with intravenous chemotherapy on a early ovarian cancer protocol       5/31/2018 Genetic Testing    Genetic testing results are 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      Genetic testing indicated that the patient carries a Variant of Uncertain Significance (VUS) : Rad51C c 252G>T      Hormone Therapy       History of ductal carcinoma in situ (DCIS) of breast   5/31/2018 Genetic Testing    Genetic testing results are 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      Genetic testing indicated that the patient carries a Variant of Uncertain Significance (VUS) : Rad51C c 252G>T     8/1/2018 Biopsy    Right breast biopsy  11 o'clock position 5 cmfn  DCIS  Grade 2  Confirmed by Benita Menjivar MD  ER 0  IL 0     9/11/2018 Surgery    Right lumpectomy  DCIS  Grade 2  1 4 cm  Margins negative  Stage 0     10/16/2018 -  Hormone Therapy    Consult with Dr Ricarda Mccloud  No adjuvant systemic therapy recommended     10/29/2018 - 11/28/2018 Radiation    Course: C1    Plan ID Energy Fractions Dose per Fraction (cGy) Dose Correction (cGy) Total Dose Delivered (cGy) Elapsed Days   R Breast 6X 16 / 16 266 0 4,256 22   R Breast e 12E 5 / 5 200 0 1,000 7      Treatment dates:  C1: 10/29/2018 - 11/28/2018       Malignant neoplasm of central portion of left breast in female, estrogen receptor positive ([facility] [address] )   7/18/2019 Biopsy    Left breast MRI-guided biopsy  3 o'clock, posterior depth  Invasive breast carcinoma of no special type  Grade 1  ER 90  IL 0  HER2 1+     8/13/2019 Surgery    Left breast needle localized lumpectomy with sentinel lymph node biopsy  Invasive mammary carcinoma of no special type  Grade 1  3 mm  Margins negative  0/1 Lymph node  Anatomic/Prognostic Stage IA     10/8/2019 - 11/5/2019 Radiation      Treatment:  Course: C2  Plan ID Energy Fractions Dose per Fraction (cGy) Dose Correction (cGy) Total Dose Delivered (cGy) Elapsed Days   ISMA SOUZA Breast 6X 16 / 16 266 0 4,256 21   ISMA SOUZA Brst e 12E 5 / 5 200 0 1,000 6    C2: 10/8/2019 - 11/5/2019 11/2019 -  Hormone Therapy    Anastrozole          -Interval History: Patient is a 42-year-old female presenting today for six-month follow-up for bilateral breast cancer  She is currently on anastrozole  She had a bilateral breast MRI on 1/5/2023 which was BI-RADS 2 category 2 density  Patient denies changes on her breast exam  She denies persistent headaches, bone pain, back pain, shortness of breath, or abdominal pain  Review of Systems:  Review of Systems   Constitutional: Negative for activity change, appetite change, fatigue and unexpected weight change  Respiratory: Negative for cough and shortness of breath  Cardiovascular: Negative for chest pain  Gastrointestinal: Negative for abdominal pain, diarrhea, nausea and vomiting  Endocrine: Negative for heat intolerance  Musculoskeletal: Negative for arthralgias, back pain and myalgias  Skin: Negative for rash  Neurological: Negative for weakness and headaches  Hematological: Negative for adenopathy         Patient Active Problem List   Diagnosis   • Colon, diverticulosis   • Crohn's disease ([address] )   • Dense breasts   • Dermatitis   • Erythema chronica migrans, secondary   • Hyperlipidemia   • Lymphedema   • Murmur   • Osteopenia   • Osteoporosis   • History of ovarian cancer   • Reactive airway disease   • Shortness of breath on exertion   • Lymphedema of left lower extremity   • Genetic predisposition to breast cancer   • History of ductal carcinoma in situ (DCIS) of breast   • Encounter for follow-up surveillance of ovarian cancer   • Lichen sclerosus et atrophicus   • Essential hypertension   • Malignant neoplasm of ovary (HCC)   • Nausea   • TIA (transient ischemic attack)   • Malignant neoplasm of central portion of left breast in female, estrogen receptor positive ([address] )   • Use of anastrozole   • History of breast cancer   • Carpal tunnel syndrome of right wrist   • Neuropathy   • Monoallelic mutation of YOLANDA gene   • Right calf pain   • Primary osteoarthritis of right knee   • Effusion of right knee   • Primary osteoarthritis of left knee   • Pes anserinus bursitis of right knee   • Bad odor of urine   • Bilateral carpal tunnel syndrome   • Urine frequency   • Benign neoplasm of colon   • Crohn's disease of large bowel ([facility] [address] )   • Mitral valve disorder   • 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     Past Medical History:   Diagnosis Date   • Arthritis    • Breast cancer ([address] ) 09/11/2018    Right   • Breast cancer ([address] ) 08/13/2019    Left   • Colon polyp    • Crohn disease ([place] [address] )    • Dense breast    • History of chemotherapy     for ovarian cancer   • History of radiation therapy    • History of transfusion 2009   • Hyperlipidemia    • Hypertension    • Lymphedema     left leg   • Lymphedema    • Monoallelic mutation of YOLANDA gene     Breast Gyn Panel   • Ovarian cancer ([place] [address] ) 04/21/2009     Past Surgical History:   Procedure Laterality Date   • BREAST LUMPECTOMY Right 09/11/2018   • BREAST LUMPECTOMY Left 8/13/2019    Procedure: BREAST LUMPECTOMY; BREAST NEEDLE LOCALIZATION (NEEDLE LOC AT 0800); Surgeon: Newton Khan MD;  [facility];  Service: Surgical Oncology   • BREAST LUMPECTOMY W/ NEEDLE LOCALIZATION Left 08/13/2019   • COLONOSCOPY     • EXPLORATORY LAPAROTOMY     • HYSTERECTOMY     • LYMPH NODE BIOPSY Left 8/13/2019    Procedure: SENTINEL LYMPH NODE BIOPSY; LYMPHATIC MAPPING WITH BLUE DYE AND RADIOACTIVE DYE (INJECT AT 0930 BY DR JENNINGS IN THE OR);   Surgeon: Newton Khan MD;  [facility];  Service: Surgical Oncology   • MAMMO NEEDLE LOCALIZATION LEFT (ALL INC) Left 8/13/2019   • MAMMO NEEDLE LOCALIZATION RIGHT (ALL INC) Right 9/11/2018   • MRI BREAST BIOPSY LEFT (ALL INCLUSIVE) Left 7/18/2019   • OMENTECTOMY     • RI PERQ DEVICE PLACEMENT BREAST LOC 1ST LES W/GDNCE Right 2018    Procedure: BREAST LUMPECTOMY; BREAST NEEDLE LOCALIZATION (NEEDLE LOC AT 1200); Surgeon: Yves Yen MD;  [facility];  Service: Surgical Oncology   • TOTAL ABDOMINAL HYSTERECTOMY W/ BILATERAL SALPINGOOPHORECTOMY     • US GUIDED BREAST BIOPSY LEFT COMPLETE Left 2019   • US GUIDED BREAST BIOPSY RIGHT COMPLETE Right 2018   • WISDOM TOOTH EXTRACTION       Family History   Problem Relation Age of Onset   • Prostate cancer Father    • Alzheimer's disease Father    • Hypertension Father    • Ovarian cancer Sister    • Breast cancer Paternal Grandmother    • Hypertension Mother    • Heart disease Mother    • Breast cancer Maternal Aunt    • No Known Problems Sister    • Other Sister         pacemaker   • Hypertension Sister    • Heart disease Sister    • No Known Problems Sister    • Down syndrome Sister    • Alzheimer's disease Sister    • Hypertension Sister    • No Known Problems Paternal Aunt    • No Known Problems Maternal Aunt      Social History     Socioeconomic History   • Marital status: Single     Spouse name: Not on file   • Number of children: Not on file   • Years of education: Not on file   • Highest education level: Not on file   Occupational History   • Not on file   Tobacco Use   • Smoking status: Former     Types: Cigarettes     Quit date: 2009     Years since quittin 8   • Smokeless tobacco: Never   Vaping Use   • Vaping Use: Never used   Substance and Sexual Activity   • Alcohol use: Yes     Comment: rarely   • Drug use: No   • Sexual activity: Not on file   Other Topics Concern   • Not on file   Social History Narrative   • Not on file     Social Determinants of Health     Financial Resource Strain: Low Risk    • Difficulty of Paying Living Expenses: Not very hard   Food Insecurity: Not on file   Transportation Needs: No Transportation Needs   • Lack of Transportation (Medical): No   • Lack of Transportation (Non-Medical):  No   Physical Activity: Not on file Stress: Not on file   Social Connections: Not on file   Intimate Partner Violence: Not on file   Housing Stability: Not on file       Current Outpatient Medications:   •  acetaminophen (TYLENOL) 500 mg tablet, Take 1,000 mg by mouth as needed Pre-Medication prior to Remicade Infusion, Disp: , Rfl:   •  albuterol (PROVENTIL HFA,VENTOLIN HFA) 90 mcg/act inhaler, Inhale 2 puffs  as needed in the morning for wheezing , Disp: 18 g, Rfl: 2  •  alendronate (FOSAMAX) 70 mg tablet, TAKE 1 TABLET (70 MG TOTAL) BY MOUTH EVERY 7 DAYS, Disp: 4 tablet, Rfl: 11  •  amLODIPine (NORVASC) 10 mg tablet, TAKE 1 TABLET (10 MG TOTAL) BY MOUTH IN THE MORNING , Disp: 30 tablet, Rfl: 0  •  anastrozole (ARIMIDEX) 1 mg tablet, Take 1 tablet (1 mg total) by mouth daily, Disp: 30 tablet, Rfl: 11  •  atorvastatin (LIPITOR) 40 mg tablet, TAKE 1 TABLET (40 MG TOTAL) BY MOUTH DAILY AT BEDTIME, Disp: 30 tablet, Rfl: 0  •  Calcium Carbonate 1500 (600 Ca) MG TABS, Take 1 tablet by mouth 2 (two) times a day, Disp: , Rfl:   •  clobetasol (TEMOVATE) 0 05 % ointment, Apply to the affected area 1-2 times a week, Disp: 60 g, Rfl: 1  •  diphenhydrAMINE (BENADRYL) 25 mg tablet, Take 25 mg by mouth as needed Pre-Medication prior to Remicade Infusion, Disp: , Rfl:   •  Homeopathic Products (Arnicare Bruise) GEL, Apply topically, Disp: , Rfl:   •  hydrochlorothiazide (MICROZIDE) 12 5 mg capsule, TAKE 1 CAPSULE (12 5 MG TOTAL) BY MOUTH EVERY MORNING, Disp: 30 capsule, Rfl: 3  •  inFLIXimab (REMICADE) 100 mg, Infuse into a venous catheter  , Disp: , Rfl:   •  mupirocin (BACTROBAN) 2 % ointment, Apply topically 3 (three) times a day, Disp: 22 g, Rfl: 0  •  ofloxacin (OCUFLOX) 0 3 % ophthalmic solution, ADMINISTER 1 DROP TO BOTH EYES 4 (FOUR) TIMES A DAY, Disp: 5 mL, Rfl: 0  •  Turmeric 500 MG CAPS, Take by mouth, Disp: , Rfl:   Allergies   Allergen Reactions   • Lisinopril      cough   • Losartan      Numbness on right side of body   • Boniva [Ibandronic Acid] Headache     Vitals:    01/17/23 0836   Resp: 18       Physical Exam  Constitutional:       General: She is not in acute distress  Appearance: Normal appearance  Cardiovascular:      Rate and Rhythm: Normal rate and regular rhythm  Pulses: Normal pulses  Heart sounds: Normal heart sounds  Pulmonary:      Effort: Pulmonary effort is normal       Breath sounds: Normal breath sounds  Chest:      Chest wall: No mass  Breasts:     Right: No swelling, bleeding, inverted nipple, mass, nipple discharge, skin change (telangectasia) or tenderness  Left: No swelling, bleeding, inverted nipple, mass, nipple discharge, skin change or tenderness  Comments: B/l lumpectomy scars  No masses, nodularity, skin changes, nipple changes or discharge, or adenopathy appreciated on physical exam      Abdominal:      General: Abdomen is flat  Palpations: Abdomen is soft  Lymphadenopathy:      Upper Body:      Right upper body: No supraclavicular, axillary or pectoral adenopathy  Left upper body: No supraclavicular, axillary or pectoral adenopathy  Skin:     General: Skin is warm  Neurological:      General: No focal deficit present  Mental Status: She is alert and oriented to person, place, and time  Psychiatric:         Mood and Affect: Mood normal          Behavior: Behavior normal            Results:    Imaging  MRI breast bilateral w and wo contrast w cad    Result Date: 1/6/2023  Narrative: DIAGNOSIS: Malignant neoplasm of central portion of left breast in female, estrogen receptor positive (HCC) TECHNIQUE: MRI of both breasts was performed in axial and sagittal planes utilizing a combination of localizer, axial T2 stir and axial dynamic contrast enhanced at multiple time points  Subtraction images were generated  This exam was read in conjunction with [employer] software   MEDICATIONS ADMINISTERED: Gadobutrol injection (SINGLE-DOSE) SOLN 6 mL, Total Given: 6 mL (1 dose) Intravenous contrast was administered at 2cc/sec, without documented contrast reaction  COMPARISONS: Multiple prior breast MRIs, most recently 12/29/2021  Prior mammograms were reviewed  RELEVANT HISTORY: Family Breast Cancer History: History of breast cancer in Paternal Grandmother, Maternal Aunt  Family Medical History: Family medical history includes breast cancer in 2 relatives (maternal aunt, paternal grandmother) and ovarian cancer in sister  Personal History: Hormone history includes other  Surgical history includes lumpectomy, hysterectomy, and oophorectomy  Medical history includes breast cancer, ovarian cancer, and history of chemotherapy  RISK ASSESSMENT: Tyrer-zick risk assessment reporting was suppressed due to the patient's history and/or demographic factors  TISSUE DENSITY: FGT: The breasts have heterogeneous fibroglandular tissue  BPE: The background parenchymal enhancement is mild  INDICATION: Marco Burciaga is a 74 y o  female presenting for high risk screening breast MRI  History of left breast conservation therapy  Known YOLANDA mutation  FINDINGS: Bilateral There are no suspicious enhancing masses or suspicious areas of non-mass enhancement  Left breast postsurgical scarring and distortion is noted  There is no axillary or internal mammary adenopathy  Impression: 1  No MR evidence of malignancy in either breast  2   Patient will be due for annual mammography after 6/17/2023  Annual high rescreening breast MRI is recommended given known YOLANDA mutation  ASSESSMENT/BI-RADS CATEGORY: Left: 2 - Benign Right: 1 - Negative Overall: 2 - Benign RECOMMENDATION:      - Diagnostic mammogram in 6 months for both breasts  - MRI screening for both breasts in 1 year  Workstation ID: [id number]      I reviewed the above imaging data  Advance Care Planning/Advance Directives:  Discussed disease status, cancer treatment plans and/or cancer treatment goals with the patient

## 2023-02-15 ENCOUNTER — TELEPHONE (OUTPATIENT)
Dept: FAMILY MEDICINE CLINIC | Facility: CLINIC | Age: 68
End: 2023-02-15

## 2023-02-15 DIAGNOSIS — R39.9 SYMPTOMS OF URINARY TRACT INFECTION: Primary | ICD-10-CM

## 2023-02-15 RX ORDER — NITROFURANTOIN 25; 75 MG/1; MG/1
100 CAPSULE ORAL 2 TIMES DAILY
Qty: 14 CAPSULE | Refills: 0 | Status: SHIPPED | OUTPATIENT
Start: 2023-02-15 | End: 2023-02-22

## 2023-02-15 NOTE — TELEPHONE ENCOUNTER
Pt states that she has increased frequency in urination and a burning sensation since last night  Believes that she has a UTI and would like something called in  [facility] [phone]

## 2023-02-15 NOTE — TELEPHONE ENCOUNTER
Spoke with patient and advised Anna Chi was sent to the pharmacy  She states she is not going to leave a urine prior but may leave one when she is finished the abx

## 2023-03-10 ENCOUNTER — HOSPITAL ENCOUNTER (OUTPATIENT)
Dept: INFUSION CENTER | Facility: HOSPITAL | Age: 68
End: 2023-03-10
Attending: INTERNAL MEDICINE

## 2023-03-10 VITALS
RESPIRATION RATE: 16 BRPM | SYSTOLIC BLOOD PRESSURE: 152 MMHG | WEIGHT: 131.39 LBS | HEART RATE: 75 BPM | BODY MASS INDEX: 24.83 KG/M2 | TEMPERATURE: 97.9 F | DIASTOLIC BLOOD PRESSURE: 88 MMHG

## 2023-03-10 DIAGNOSIS — K50.10 CROHN'S DISEASE OF LARGE INTESTINE WITHOUT COMPLICATION (HCC): Primary | ICD-10-CM

## 2023-03-10 RX ORDER — SODIUM CHLORIDE 9 MG/ML
20 INJECTION, SOLUTION INTRAVENOUS ONCE
Status: COMPLETED | OUTPATIENT
Start: 2023-03-10 | End: 2023-03-10

## 2023-03-10 RX ORDER — SODIUM CHLORIDE 9 MG/ML
20 INJECTION, SOLUTION INTRAVENOUS ONCE
OUTPATIENT
Start: 2023-05-05

## 2023-03-10 RX ADMIN — SODIUM CHLORIDE 20 ML/HR: 9 INJECTION, SOLUTION INTRAVENOUS at 10:08

## 2023-03-10 RX ADMIN — INFLIXIMAB 300 MG: 100 INJECTION, POWDER, LYOPHILIZED, FOR SOLUTION INTRAVENOUS at 10:11

## 2023-04-07 DIAGNOSIS — Z17.0 MALIGNANT NEOPLASM OF CENTRAL PORTION OF LEFT BREAST IN FEMALE, ESTROGEN RECEPTOR POSITIVE (HCC): ICD-10-CM

## 2023-04-07 DIAGNOSIS — C50.112 MALIGNANT NEOPLASM OF CENTRAL PORTION OF LEFT BREAST IN FEMALE, ESTROGEN RECEPTOR POSITIVE (HCC): ICD-10-CM

## 2023-04-07 RX ORDER — ANASTROZOLE 1 MG/1
1 TABLET ORAL DAILY
Qty: 30 TABLET | Refills: 5 | Status: SHIPPED | OUTPATIENT
Start: 2023-04-07

## 2023-04-25 ENCOUNTER — CLINICAL SUPPORT (OUTPATIENT)
Dept: RADIATION ONCOLOGY | Facility: HOSPITAL | Age: 68
End: 2023-04-25
Attending: RADIOLOGY

## 2023-04-25 ENCOUNTER — RADIATION ONCOLOGY FOLLOW-UP (OUTPATIENT)
Dept: RADIATION ONCOLOGY | Facility: HOSPITAL | Age: 68
End: 2023-04-25
Attending: RADIOLOGY
Payer: MEDICARE

## 2023-04-25 VITALS
TEMPERATURE: 97.2 F | HEART RATE: 73 BPM | BODY MASS INDEX: 25.6 KG/M2 | DIASTOLIC BLOOD PRESSURE: 82 MMHG | OXYGEN SATURATION: 98 % | WEIGHT: 135.6 LBS | HEIGHT: 61 IN | SYSTOLIC BLOOD PRESSURE: 136 MMHG | RESPIRATION RATE: 18 BRPM

## 2023-04-25 DIAGNOSIS — Z86.000 HISTORY OF DUCTAL CARCINOMA IN SITU (DCIS) OF BREAST: ICD-10-CM

## 2023-04-25 DIAGNOSIS — Z79.811 USE OF ANASTROZOLE: ICD-10-CM

## 2023-04-25 DIAGNOSIS — C50.112 MALIGNANT NEOPLASM OF CENTRAL PORTION OF LEFT BREAST IN FEMALE, ESTROGEN RECEPTOR POSITIVE (HCC): Primary | ICD-10-CM

## 2023-04-25 DIAGNOSIS — Z86.000 HISTORY OF DUCTAL CARCINOMA IN SITU (DCIS) OF BREAST: Primary | ICD-10-CM

## 2023-04-25 DIAGNOSIS — Z17.0 MALIGNANT NEOPLASM OF CENTRAL PORTION OF LEFT BREAST IN FEMALE, ESTROGEN RECEPTOR POSITIVE (HCC): Primary | ICD-10-CM

## 2023-04-25 PROCEDURE — 99213 OFFICE O/P EST LOW 20 MIN: CPT | Performed by: RADIOLOGY

## 2023-04-25 PROCEDURE — 99211 OFF/OP EST MAY X REQ PHY/QHP: CPT | Performed by: RADIOLOGY

## 2023-04-25 NOTE — PROGRESS NOTES
Follow-up - Radiation Oncology   Arnetha Cowden 1955 74 y o  female [id number]      History of Present Illness   Cancer Staging   History of ductal carcinoma in situ (DCIS) of breast  Staging form: Breast, AJCC 8th Edition  - Pathologic: Stage 0 (pTis (DCIS), pN0, cM0, ER-, VA-) - Unsigned  Neoadjuvant therapy: No  Method of lymph node assessment: Clinical  Nuclear grade: G2  Laterality: Right  Tumor size (mm): 14    History of ovarian cancer  Staging form: Ovary, AJCC 7th Edition  - Clinical: FIGO Stage IA (T1a, N0, M0) - Signed by Emily Webb PA-C on 4/19/2018  Histologic grade (G): G3    Malignant neoplasm of central portion of left breast in female, estrogen receptor positive ([facility] [address] )  Staging form: Breast, AJCC 8th Edition  - Pathologic: Stage IA (pT1b, pN0(sn), cM0, G1, ER+, VA-, HER2-) - Unsigned  Neoadjuvant therapy: No  Method of lymph node assessment: [place] lymph node biopsy  Histologic grading system: 3 grade system  Laterality: Left  Tumor size (mm): 3      Ghada Newton is a 74y o  year old female with a history of a stage 0 right breast DCIS s/p lumpectomy with negative margins with her tumor measuring 1 4 cm, grade 2 and estrogen and progesterone receptors were both negative   She has a history of a stage I ovarian carcinoma treated in 2009 and remains EDER  She completed radiation therapy to the right breast on 11/18/2018    `   She was diagnosed with stage IA invasive left breast mammary carcinoma grade 1 measuring 3 mm in size status post lumpectomy with negative margins and negative sentinel lymph node in August 2019  Archana Tuttle disease was estrogen receptor positive with progesterone receptors being negative  She completed left breast radiation on 11/5/2019   Last follow-up on 3/18/22       Interval History:  5/6/22 [facility]  Surveillance visit for history of stage IA ovarian cancer s/p completion of adjuvant chemotherapy in July 2009  She is clinically without evidence of ovarian cancer recurrence  Refer to PT for poorly controlled lymphedema of left lower extremity  Return to the office in 1 year for continued surveillance     22 Med Onc, Dr Lizabeth Mora   Pt is currently on adjuvant hormonal therapy with anastrozole since 2020 with minimal toxicity   Clinically, she has no evidence for recurrent disease   Recommend continuing anastrozole 1 mg once a day     Follow-up in one year       23 MRI bilateral breast  1   No MR evidence of malignancy in either breast   2   Patient will be due for annual mammography after 2023   Annual high rescreening breast MRI is recommended given known YOLANDA mutation      ASSESSMENT/BI-RADS CATEGORY:  Left: 2 - Benign  Right: 1 - Negative  Overall: 2 - Benign     RECOMMENDATION:       - Diagnostic mammogram in 6 months for both breasts       - MRI screening for both breasts in 1 year     23 Surg Onc  We have been following her with annual MRI of the breasts and bilateral diagnostic mammograms   She had a bilateral breast MRI on 2023 which was BI-RADS 2 category 2 density  There were no concerns on her cbe    Follow-up in 6 months       She is is feeling well  She denies any pain or masses in the breasts bilaterally   She has been applying cream daily and performing breast massage   She did not wish to start Anastrozole until after she recovered from radiation   She started the Anastrozole in 2020  She had mild hot flashes during the day that occured more if she was upset or anxious  These hot flashes have now resolved   She occasionally has some breast tenderness at times left side greater than right but denies any breast masses   She denies any upper extremity edema    She reports her Crohn's disease is stable on Remicade every other month with Dr Gaston River occasionally has a few abdominal cramps   She had a sister the  on May 22, 2020 from ovarian carcinoma and emotional support was given  Lisa Blackwood other sister Favio Upton was in a group home since 2020   Her sister has Alzheimer's dementia which became worse and she  last week  Upcomin23 Gyn Sarah Files Synnamon  23 Diagnostic bilateral mammogram  23 Surg Onc  23 Dr Stuart Yousif   Oncology History   History of ovarian cancer    Initial Diagnosis    Ovarian cancer ([facility] [address] )     2009 Surgery    Exploratory laparotomy, total abdominal hysterectomy, bilateral salpingo-oophrectomy, lymph node dissection, omentectomy with staging      - 2009 Chemotherapy    Intraperitoneal along with intravenous chemotherapy on a early ovarian cancer protocol       2018 Genetic Testing    Genetic testing results are 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      Genetic testing indicated that the patient carries a Variant of Uncertain Significance (VUS) : Rad51C c 252G>T      Hormone Therapy       History of ductal carcinoma in situ (DCIS) of breast   2018 Genetic Testing    Genetic testing results are 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      Genetic testing indicated that the patient carries a Variant of Uncertain Significance (VUS) : Rad51C c 252G>T     2018 Biopsy    Right breast biopsy  11 o'clock position 5 cmfn  DCIS  Grade 2  Confirmed by Sandra Snyder MD  ER 0  GA 0     2018 Surgery    Right lumpectomy  DCIS  Grade 2  1 4 cm  Margins negative  Stage 0     10/16/2018 -  Hormone Therapy    Consult with Dr Shital Reyes  No adjuvant systemic therapy recommended     10/29/2018 - 2018 Radiation    Course: C1    Plan ID Energy Fractions Dose per Fraction (cGy) Dose Correction (cGy) Total Dose Delivered (cGy) Elapsed Days   R Breast 6X 16 / 16 266 0 4,256 22   R Breast e 12E  200 0 1,000 7      Treatment dates:  C1: 10/29/2018 - 2018       Malignant neoplasm of central portion of left breast in female, estrogen receptor positive ([facility] [address] )   2019 Biopsy    Left breast MRI-guided biopsy  3 o'clock, posterior depth  Invasive breast carcinoma of no special type  Grade 1  ER 90  CA 0  HER2 1+     8/13/2019 Surgery    Left breast needle localized lumpectomy with sentinel lymph node biopsy  Invasive mammary carcinoma of no special type  Grade 1  3 mm  Margins negative  0/1 Lymph node  Anatomic/Prognostic Stage IA     10/8/2019 - 11/5/2019 Radiation      Treatment:  Course: C2  Plan ID Energy Fractions Dose per Fraction (cGy) Dose Correction (cGy) Total Dose Delivered (cGy) Elapsed Days   BH L Breast 6X 16 / 16 266 0 4,256 21   BH L Brst e 12E 5 / 5 200 0 1,000 6    C2: 10/8/2019 - 11/5/2019 11/2019 -  Hormone Therapy    Anastrozole         Past Medical History:   Diagnosis Date   • Arthritis    • Breast cancer ([facility] ) 09/11/2018    Right   • Breast cancer ([facility] ) 08/13/2019    Left   • Colon polyp    • Crohn disease ([facility] [address] )    • Dense breast    • History of chemotherapy     for ovarian cancer   • History of radiation therapy    • History of transfusion 2009   • Hyperlipidemia    • Hypertension    • Lymphedema     left leg   • Lymphedema    • Monoallelic mutation of YOLANDA gene     Breast Gyn Panel   • Ovarian cancer ([facility] [address] ) 04/21/2009     Past Surgical History:   Procedure Laterality Date   • BREAST LUMPECTOMY Right 09/11/2018   • BREAST LUMPECTOMY Left 8/13/2019    Procedure: BREAST LUMPECTOMY; BREAST NEEDLE LOCALIZATION (NEEDLE LOC AT 0800); Surgeon: Princess Mark MD;  [facility];  Service: Surgical Oncology   • BREAST LUMPECTOMY W/ NEEDLE LOCALIZATION Left 08/13/2019   • COLONOSCOPY     • EXPLORATORY LAPAROTOMY     • HYSTERECTOMY     • LYMPH NODE BIOPSY Left 8/13/2019    Procedure: SENTINEL LYMPH NODE BIOPSY; LYMPHATIC MAPPING WITH BLUE DYE AND RADIOACTIVE DYE (INJECT AT 0930 BY DR JENNINGS IN THE OR);   Surgeon: Princess Mark MD;  [facility];  Service: Surgical Oncology   • MAMMO NEEDLE LOCALIZATION LEFT (ALL INC) Left 8/13/2019   • MAMMO NEEDLE LOCALIZATION RIGHT (ALL INC) Right 2018   • MRI BREAST BIOPSY LEFT (ALL INCLUSIVE) Left 2019   • OMENTECTOMY     • NE PERQ DEVICE PLACEMENT BREAST LOC 1ST LES W/GDNCE Right 2018    Procedure: BREAST LUMPECTOMY; BREAST NEEDLE LOCALIZATION (NEEDLE LOC AT 1200);   Surgeon: Zenon Yun MD;  [facility];  Service: Surgical Oncology   • TOTAL ABDOMINAL HYSTERECTOMY W/ BILATERAL SALPINGOOPHORECTOMY     • US GUIDED BREAST BIOPSY LEFT COMPLETE Left 2019   • US GUIDED BREAST BIOPSY RIGHT COMPLETE Right 2018   • WISDOM TOOTH EXTRACTION         Social History   Social History     Substance and Sexual Activity   Alcohol Use Yes    Comment: rarely     Social History     Substance and Sexual Activity   Drug Use No     Social History     Tobacco Use   Smoking Status Former   • Types: Cigarettes   • Quit date: 2009   • Years since quittin 0   Smokeless Tobacco Never     Meds/Allergies     Current Outpatient Medications:   •  acetaminophen (TYLENOL) 500 mg tablet, Take 1,000 mg by mouth as needed Pre-Medication prior to Remicade Infusion, Disp: , Rfl:   •  albuterol (PROVENTIL HFA,VENTOLIN HFA) 90 mcg/act inhaler, Inhale 2 puffs  as needed in the morning for wheezing , Disp: 18 g, Rfl: 2  •  alendronate (FOSAMAX) 70 mg tablet, TAKE 1 TABLET (70 MG TOTAL) BY MOUTH EVERY 7 DAYS, Disp: 4 tablet, Rfl: 11  •  amLODIPine (NORVASC) 10 mg tablet, TAKE 1 TABLET (10 MG TOTAL) BY MOUTH IN THE MORNING , Disp: 30 tablet, Rfl: 5  •  anastrozole (ARIMIDEX) 1 mg tablet, TAKE 1 TABLET (1 MG TOTAL) BY MOUTH DAILY, Disp: 30 tablet, Rfl: 5  •  atorvastatin (LIPITOR) 40 mg tablet, TAKE 1 TABLET (40 MG TOTAL) BY MOUTH DAILY AT BEDTIME, Disp: 30 tablet, Rfl: 0  •  Calcium Carbonate 1500 (600 Ca) MG TABS, Take 1 tablet by mouth 2 (two) times a day, Disp: , Rfl:   •  clobetasol (TEMOVATE) 0 05 % ointment, Apply to the affected area 1-2 times a week, Disp: 60 g, Rfl: 1  •  Diclofenac Sodium (VOLTAREN) 1 %, Apply 2 g topically 4 (four) times a day PRN, "Disp: , Rfl:   •  diphenhydrAMINE (BENADRYL) 25 mg tablet, Take 25 mg by mouth as needed Pre-Medication prior to Remicade Infusion, Disp: , Rfl:   •  Homeopathic Products (Arnicare Bruise) GEL, Apply topically, Disp: , Rfl:   •  hydrochlorothiazide (MICROZIDE) 12 5 mg capsule, TAKE 1 CAPSULE (12 5 MG TOTAL) BY MOUTH EVERY MORNING, Disp: 90 capsule, Rfl: 3  •  inFLIXimab (REMICADE) 100 mg, Infuse into a venous catheter  , Disp: , Rfl:   •  mupirocin (BACTROBAN) 2 % ointment, Apply topically 3 (three) times a day, Disp: 22 g, Rfl: 0  •  ofloxacin (OCUFLOX) 0 3 % ophthalmic solution, ADMINISTER 1 DROP TO BOTH EYES 4 (FOUR) TIMES A DAY, Disp: 5 mL, Rfl: 0  •  Turmeric 500 MG CAPS, Take by mouth, Disp: , Rfl:   Allergies   Allergen Reactions   • Lisinopril      cough   • Losartan      Numbness on right side of body   • Boniva [Ibandronic Acid] Headache     Review of Systems  Constitutional: Negative  HENT: Negative  Eyes: Negative  Wears glasses   Respiratory: Positive for shortness of breath (on exertion)  Gastrointestinal: Negative  Endocrine: Negative  Genitourinary: Positive for frequency and urgency  Musculoskeletal: Positive for arthralgias  Skin: Negative  Allergic/Immunologic: Negative  Neurological: Negative  Hematological: Negative  Psychiatric/Behavioral: Negative        OBJECTIVE:   /82 (BP Location: Right arm, Patient Position: Sitting, Cuff Size: Standard)   Pulse 73   Temp (!) 97 2 °F (36 2 °C) (Temporal)   Resp 18   Ht 5' 1\" (1 549 m)   Wt 61 5 kg (135 lb 9 6 oz)   SpO2 98%   BMI 25 62 kg/m²   Pain Assessment:  0  ECOG/Zubrod/WHO: 0 - Asymptomatic    Physical Exam  Constitutional: She is oriented to person, place, and time  She appears well-developed and well-nourished  No distress  HENT:   Head: Normocephalic and atraumatic  Mouth/Throat: No oropharyngeal exudate     Eyes: Pupils are equal, round, and reactive to light  Conjunctivae and EOM are " normal  No scleral icterus  Neck: Normal range of motion  Neck supple  No tracheal deviation present  No thyromegaly present  Cardiovascular: Normal rate, regular rhythm and normal heart sounds  Pulmonary/Chest: Effort normal and breath sounds normal  No respiratory distress  She has no wheezes  She has no rales  She exhibits no tenderness  Right breast exhibits no inverted nipple, no mass, no nipple discharge, no skin change ( There is a well-healed right lumpectomy incision with underlying post treatment changes and no masses   There is no skin erythema and no significant edema ) and no tenderness  Left breast exhibits no inverted nipple, no mass, no nipple discharge and no skin change ( There are well-healed left lumpectomy and axillary incisions with no masses   There is minimal erythema and post treatment changes without any edema   )    Abdominal: Soft  Bowel sounds are normal  She exhibits no distension and no mass  There is no tenderness  Musculoskeletal: Normal range of motion  She exhibits no edema or tenderness  Lymphadenopathy:     She has no cervical adenopathy      She has no axillary adenopathy         Right: No supraclavicular adenopathy present         Left: No supraclavicular adenopathy present  Neurological: She is alert and oriented to person, place, and time  No cranial nerve deficit  Coordination normal    Skin: Skin is warm and dry  No rash noted  She is not diaphoretic  No erythema  No pallor  Psychiatric: She has a normal mood and affect  Her behavior is normal  Judgment and thought content normal    Nursing note and vitals reviewed  RESULTS    Lab Results: No results found for this or any previous visit (from the past 672 hour(s))  Imaging Studies:No results found  Assessment/Plan:  No orders of the defined types were placed in this encounter       Ambrosio Graff is a 74y o  year old female with a stage 0 right breast ductal carcinoma in situ status post lumpectomy with negative margins with her tumor measuring 1 4 cm, grade 2 and estrogen and progesterone receptors were both negative   She has a history of a stage I ovarian carcinoma treated in 2009 and remains EDER  Her genetic testing was positive for pathologic variant of uncertain significance   She completed radiation therapy to the right breast on November 28, 2018 and remains EDER        She was diagnosed with stage IA invasive left breast mammary carcinoma grade 1 measuring 3 mm in size status post lumpectomy with negative margins and negative sentinel lymph node in August 2019  Nelsy Felt disease was estrogen receptor positive with progesterone receptors being negative   In order to complete her breast conservation management, we recommended radiation therapy to the entire left breast    She completed treatment on November 5, 2019 and returns today for follow-up        She is doing well and denies any breast masses bilaterally  Brandon Andersen will continues to apply lotion and massage the bilateral breasts daily   She started on anastrozole in April 2020 and now denies any hot flashes     She had a bilateral diagnostic mammogram Christianne 15, 2020, June 16, 2021, and June 17, 2022 that was without any evidence of malignancy   There were post treatment changes noted  She had bilateral breast MRI December 19, 2021 and January 5, 2023 that revealed stable postsurgical changes in both breasts with out any recurrence  She is scheduled for bilateral mammogram June 19, 2023  She has follow-up with GYN oncology on May 11, 2023 and surgical Oncology on July 26, 2023   She will see Dr Matthew Morel from medical oncology on November 8,2023  We discussed that the [facility] radiation oncology office will be closing on June 23, 2023  She is following closely with GYN oncology, surgical oncology, and medical oncology  She has a much further drive to come see me in the [facility] office  Therefore, she will be discharged from follow-up in this office  "If she has questions in the future regarding radiation she will call  Gerarda Bernheim, MD  2/96/6058,2:51 PM    Portions of the record may have been created with voice recognition software   Occasional wrong word or \"sound a like\" substitutions may have occurred due to the inherent limitations of voice recognition software   Read the chart carefully and recognize, using context, where substitutions have occurred        "

## 2023-04-25 NOTE — PROGRESS NOTES
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 with negative margins with her tumor measuring 1 4 cm, grade 2 and estrogen and progesterone receptors were both negative  She has a history of a stage I ovarian carcinoma treated in 2009 and remains EDER  She completed radiation therapy to the right breast on 11/18/2018  She was diagnosed with stage IA invasive left breast mammary carcinoma grade 1 measuring 3 mm in size status post lumpectomy with negative margins and negative sentinel lymph node in August 2019  Her disease was estrogen receptor positive with progesterone receptors being negative  She completed left breast radiation on 11/5/2019  Last follow-up on 3/18/22        5/6/22 [facility]  Surveillance visit for history of stage IA ovarian cancer s/p completion of adjuvant chemotherapy in July 2009  She is clinically without evidence of ovarian cancer recurrence  Refer to PT for poorly controlled lymphedema of left lower extremity  Return to the office in 1 year for continued surveillance    11/8/22 Med Onc, Dr aTvo Mckeon   Pt is currently on adjuvant hormonal therapy with anastrozole since April 2020 with minimal toxicity  Clinically, she has no evidence for recurrent disease  Recommend continuing anastrozole 1 mg once a day  Follow-up in one year  1/5/23 MRI bilateral breast  1  No MR evidence of malignancy in either breast   2   Patient will be due for annual mammography after 6/17/2023  Annual high rescreening breast MRI is recommended given known YOLANDA mutation  ASSESSMENT/BI-RADS CATEGORY:  Left: 2 - Benign  Right: 1 - Negative  Overall: 2 - Benign     RECOMMENDATION:       - Diagnostic mammogram in 6 months for both breasts  - MRI screening for both breasts in 1 year      1/17/23 Surg Onc  We have been following her with annual MRI of the breasts and bilateral diagnostic mammograms    She had a bilateral breast MRI on 2023 which was BI-RADS 2 category 2 density  There were no concerns on her cbe  Follow-up in 6 months  Upcomin23 Gyn Stefanie Veroow Synnamon  23 Diagnostic bilateral mammogram  23 Surg Onc  23 Dr Ad Odom          Follow up visit     Oncology History   History of ovarian cancer    Initial Diagnosis    Ovarian cancer ([facility] [address] )     2009 Surgery    Exploratory laparotomy, total abdominal hysterectomy, bilateral salpingo-oophrectomy, lymph node dissection, omentectomy with staging      - 2009 Chemotherapy    Intraperitoneal along with intravenous chemotherapy on a early ovarian cancer protocol       2018 Genetic Testing    Genetic testing results are 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      Genetic testing indicated that the patient carries a Variant of Uncertain Significance (VUS) : Rad51C c 252G>T      Hormone Therapy       History of ductal carcinoma in situ (DCIS) of breast   2018 Genetic Testing    Genetic testing results are 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      Genetic testing indicated that the patient carries a Variant of Uncertain Significance (VUS) : Rad51C c 252G>T     2018 Biopsy    Right breast biopsy  11 o'clock position 5 cmfn  DCIS  Grade 2  Confirmed by Jazmin Rowe MD  ER 0  IN 0     2018 Surgery    Right lumpectomy  DCIS  Grade 2  1 4 cm  Margins negative  Stage 0     10/16/2018 -  Hormone Therapy    Consult with Dr Ad Odom  No adjuvant systemic therapy recommended     10/29/2018 - 2018 Radiation    Course: C1    Plan ID Energy Fractions Dose per Fraction (cGy) Dose Correction (cGy) Total Dose Delivered (cGy) Elapsed Days   R Breast 6X 16 / 16 266 0 4,256 22   R Breast e 12E  200 0 1,000 7      Treatment dates:  C1: 10/29/2018 - 2018       Malignant neoplasm of central portion of left breast in female, estrogen receptor positive ([facility] [address] )   2019 Biopsy    Left breast MRI-guided biopsy  3 o'clock, posterior depth  Invasive breast carcinoma of no special type  Grade 1  ER 90  ND 0  HER2 1+     8/13/2019 Surgery    Left breast needle localized lumpectomy with sentinel lymph node biopsy  Invasive mammary carcinoma of no special type  Grade 1  3 mm  Margins negative  0/1 Lymph node  Anatomic/Prognostic Stage IA     10/8/2019 - 11/5/2019 Radiation      Treatment:  Course: C2  Plan ID Energy Fractions Dose per Fraction (cGy) Dose Correction (cGy) Total Dose Delivered (cGy) Elapsed Days   BH L Breast 6X 16 / 16 266 0 4,256 21   BH L Brst e 12E 5 / 5 200 0 1,000 6    C2: 10/8/2019 - 11/5/2019 11/2019 -  Hormone Therapy    Anastrozole         Review of Systems:  Review of Systems   Constitutional: Negative  HENT: Negative  Eyes: Negative  Wears glasses   Respiratory: Positive for shortness of breath (on exertion)  Gastrointestinal: Negative  Endocrine: Negative  Genitourinary: Positive for frequency and urgency  Musculoskeletal: Positive for arthralgias  Skin: Negative  Allergic/Immunologic: Negative  Neurological: Negative  Hematological: Negative  Psychiatric/Behavioral: Negative          Clinical Trial: no    Teaching no     Health Maintenance   Topic Date Due   • Cervical Cancer Screening  Never done   • HIV Screening  Never done   • Pneumococcal Vaccine: 65+ Years (2 - PPSV23 if available, else PCV20) 11/16/2017   • COVID-19 Vaccine (4 - Booster for Pfizer series) 03/13/2022   • PT PLAN OF CARE  08/06/2022   • Influenza Vaccine (1) 09/01/2022   • Breast Cancer Survivorship  Never done   • Colorectal Surgery Screening  Never done   • Onc DXA Scan  12/22/2022   • ONC Physical Therapy Referral  Never done   • Breast Cancer Screening: Mammogram  06/17/2023   • Fall Risk  01/09/2024   • Depression Screening  01/09/2024   • Urinary Incontinence Screening  01/09/2024   • Medicare Annual Wellness Visit (AWV)  01/09/2024   • BMI: Adult  03/10/2024   • Colorectal Cancer Screening  10/08/2024   • Hepatitis C Screening  Completed   • Osteoporosis Screening  Completed   • HIB Vaccine  Aged Out   • IPV Vaccine  Aged Out   • Hepatitis A Vaccine  Aged Out   • Meningococcal ACWY Vaccine  Aged Out   • HPV Vaccine  Aged Out     Patient Active Problem List   Diagnosis   • Colon, diverticulosis   • Crohn's disease ([place] [address] )   • Dense breasts   • Dermatitis   • Erythema chronica migrans, secondary   • Hyperlipidemia   • Lymphedema   • Murmur   • Osteopenia   • Osteoporosis   • History of ovarian cancer   • Reactive airway disease   • Shortness of breath on exertion   • Lymphedema of left lower extremity   • Genetic predisposition to breast cancer   • History of ductal carcinoma in situ (DCIS) of breast   • Encounter for follow-up surveillance of ovarian cancer   • Lichen sclerosus et atrophicus   • Essential hypertension   • Malignant neoplasm of ovary ([place] [address] )   • Nausea   • TIA (transient ischemic attack)   • Malignant neoplasm of central portion of left breast in female, estrogen receptor positive ([place] [address] )   • Use of anastrozole   • History of breast cancer   • Carpal tunnel syndrome of right wrist   • Neuropathy   • Monoallelic mutation of YOLANDA gene   • Right calf pain   • Primary osteoarthritis of right knee   • Effusion of right knee   • Primary osteoarthritis of left knee   • Pes anserinus bursitis of right knee   • Bad odor of urine   • Bilateral carpal tunnel syndrome   • Urine frequency   • Benign neoplasm of colon   • Crohn's disease of large bowel ([place] [address] )   • Mitral valve disorder   • 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     Past Medical History:   Diagnosis Date   • Arthritis    • Breast cancer ([place] [address] ) 09/11/2018    Right   • Breast cancer ([place] [address] ) 08/13/2019    Left   • Colon polyp    • Crohn disease ([place] [address] )    • Dense breast    • History of chemotherapy     for ovarian cancer   • History of radiation therapy    • History of transfusion 2009   • Hyperlipidemia    • Hypertension • Lymphedema     left leg   • Lymphedema    • Monoallelic mutation of YOLANDA gene     Breast Gyn Panel   • Ovarian cancer ([facility] [address] ) 04/21/2009     Past Surgical History:   Procedure Laterality Date   • BREAST LUMPECTOMY Right 09/11/2018   • BREAST LUMPECTOMY Left 8/13/2019    Procedure: BREAST LUMPECTOMY; BREAST NEEDLE LOCALIZATION (NEEDLE LOC AT 0800); Surgeon: Ritchie Lieberman MD;  [facility];  Service: Surgical Oncology   • BREAST LUMPECTOMY W/ NEEDLE LOCALIZATION Left 08/13/2019   • COLONOSCOPY     • EXPLORATORY LAPAROTOMY     • HYSTERECTOMY     • LYMPH NODE BIOPSY Left 8/13/2019    Procedure: SENTINEL LYMPH NODE BIOPSY; LYMPHATIC MAPPING WITH BLUE DYE AND RADIOACTIVE DYE (INJECT AT 0930 BY DR JENNINGS IN THE OR); Surgeon: Ritchie Lieberman MD;  Location: [facility];  Service: Surgical Oncology   • MAMMO NEEDLE LOCALIZATION LEFT (ALL INC) Left 8/13/2019   • MAMMO NEEDLE LOCALIZATION RIGHT (ALL INC) Right 9/11/2018   • MRI BREAST BIOPSY LEFT (ALL INCLUSIVE) Left 7/18/2019   • OMENTECTOMY     • WA PERQ DEVICE PLACEMENT BREAST LOC 1ST LES W/GDNCE Right 9/11/2018    Procedure: BREAST LUMPECTOMY; BREAST NEEDLE LOCALIZATION (NEEDLE LOC AT 1200);   Surgeon: Ritchie Lieberman MD;  [facility];  Service: Surgical Oncology   • TOTAL ABDOMINAL HYSTERECTOMY W/ BILATERAL SALPINGOOPHORECTOMY     • US GUIDED BREAST BIOPSY LEFT COMPLETE Left 6/17/2019   • US GUIDED BREAST BIOPSY RIGHT COMPLETE Right 8/1/2018   • WISDOM TOOTH EXTRACTION       Family History   Problem Relation Age of Onset   • Prostate cancer Father    • Alzheimer's disease Father    • Hypertension Father    • Ovarian cancer Sister    • Breast cancer Paternal Grandmother    • Hypertension Mother    • Heart disease Mother    • Breast cancer Maternal Aunt    • No Known Problems Sister    • Other Sister         pacemaker   • Hypertension Sister    • Heart disease Sister    • No Known Problems Sister    • Down syndrome Sister    • Alzheimer's disease Sister    • Hypertension Sister    • No Known Problems Paternal Aunt    • No Known Problems Maternal Aunt      Social History     Socioeconomic History   • Marital status: Single     Spouse name: Not on file   • Number of children: Not on file   • Years of education: Not on file   • Highest education level: Not on file   Occupational History   • Not on file   Tobacco Use   • Smoking status: Former     Types: Cigarettes     Quit date: 2009     Years since quittin 0   • Smokeless tobacco: Never   Vaping Use   • Vaping Use: Never used   Substance and Sexual Activity   • Alcohol use: Yes     Comment: rarely   • Drug use: No   • Sexual activity: Not on file   Other Topics Concern   • Not on file   Social History Narrative   • Not on file     Social Determinants of Health     Financial Resource Strain: Low Risk    • Difficulty of Paying Living Expenses: Not very hard   Food Insecurity: Not on file   Transportation Needs: No Transportation Needs   • Lack of Transportation (Medical): No   • Lack of Transportation (Non-Medical):  No   Physical Activity: Not on file   Stress: Not on file   Social Connections: Not on file   Intimate Partner Violence: Not on file   Housing Stability: Not on file       Current Outpatient Medications:   •  acetaminophen (TYLENOL) 500 mg tablet, Take 1,000 mg by mouth as needed Pre-Medication prior to Remicade Infusion, Disp: , Rfl:   •  albuterol (PROVENTIL HFA,VENTOLIN HFA) 90 mcg/act inhaler, Inhale 2 puffs  as needed in the morning for wheezing , Disp: 18 g, Rfl: 2  •  alendronate (FOSAMAX) 70 mg tablet, TAKE 1 TABLET (70 MG TOTAL) BY MOUTH EVERY 7 DAYS, Disp: 4 tablet, Rfl: 11  •  amLODIPine (NORVASC) 10 mg tablet, TAKE 1 TABLET (10 MG TOTAL) BY MOUTH IN THE MORNING , Disp: 30 tablet, Rfl: 5  •  anastrozole (ARIMIDEX) 1 mg tablet, TAKE 1 TABLET (1 MG TOTAL) BY MOUTH DAILY, Disp: 30 tablet, Rfl: 5  •  atorvastatin (LIPITOR) 40 mg tablet, TAKE 1 TABLET (40 MG TOTAL) BY MOUTH DAILY AT BEDTIME, Disp: 30 tablet, Rfl: 0  •  Calcium Carbonate 1500 (600 Ca) MG TABS, Take 1 tablet by mouth 2 (two) times a day, Disp: , Rfl:   •  clobetasol (TEMOVATE) 0 05 % ointment, Apply to the affected area 1-2 times a week, Disp: 60 g, Rfl: 1  •  diphenhydrAMINE (BENADRYL) 25 mg tablet, Take 25 mg by mouth as needed Pre-Medication prior to Remicade Infusion, Disp: , Rfl:   •  Homeopathic Products (Arnicare Bruise) GEL, Apply topically, Disp: , Rfl:   •  hydrochlorothiazide (MICROZIDE) 12 5 mg capsule, TAKE 1 CAPSULE (12 5 MG TOTAL) BY MOUTH EVERY MORNING, Disp: 90 capsule, Rfl: 3  •  inFLIXimab (REMICADE) 100 mg, Infuse into a venous catheter  , Disp: , Rfl:   •  mupirocin (BACTROBAN) 2 % ointment, Apply topically 3 (three) times a day, Disp: 22 g, Rfl: 0  •  ofloxacin (OCUFLOX) 0 3 % ophthalmic solution, ADMINISTER 1 DROP TO BOTH EYES 4 (FOUR) TIMES A DAY, Disp: 5 mL, Rfl: 0  •  Turmeric 500 MG CAPS, Take by mouth, Disp: , Rfl:   Allergies   Allergen Reactions   • Lisinopril      cough   • Losartan      Numbness on right side of body   • Boniva [Ibandronic Acid] Headache     There were no vitals filed for this visit

## 2023-05-05 ENCOUNTER — HOSPITAL ENCOUNTER (OUTPATIENT)
Dept: INFUSION CENTER | Facility: HOSPITAL | Age: 68
End: 2023-05-05
Attending: INTERNAL MEDICINE

## 2023-05-05 VITALS
WEIGHT: 133.6 LBS | BODY MASS INDEX: 25.24 KG/M2 | RESPIRATION RATE: 18 BRPM | DIASTOLIC BLOOD PRESSURE: 81 MMHG | HEART RATE: 80 BPM | TEMPERATURE: 98 F | SYSTOLIC BLOOD PRESSURE: 156 MMHG

## 2023-05-05 DIAGNOSIS — K50.10 CROHN'S DISEASE OF LARGE INTESTINE WITHOUT COMPLICATION (HCC): Primary | ICD-10-CM

## 2023-05-05 RX ORDER — SODIUM CHLORIDE 9 MG/ML
20 INJECTION, SOLUTION INTRAVENOUS ONCE
OUTPATIENT
Start: 2023-06-30

## 2023-05-05 RX ORDER — SODIUM CHLORIDE 9 MG/ML
20 INJECTION, SOLUTION INTRAVENOUS ONCE
Status: COMPLETED | OUTPATIENT
Start: 2023-05-05 | End: 2023-05-05

## 2023-05-05 RX ADMIN — SODIUM CHLORIDE 20 ML/HR: 9 INJECTION, SOLUTION INTRAVENOUS at 09:28

## 2023-05-05 RX ADMIN — INFLIXIMAB 300 MG: 100 INJECTION, POWDER, LYOPHILIZED, FOR SOLUTION INTRAVENOUS at 09:57

## 2023-05-05 NOTE — PROGRESS NOTES
Pt here for remicade tolerated well no adverse reactions declined AVS and left ambulatory with steady gait

## 2023-05-11 ENCOUNTER — OFFICE VISIT (OUTPATIENT)
Age: 68
End: 2023-05-11

## 2023-05-11 VITALS
WEIGHT: 132 LBS | BODY MASS INDEX: 24.92 KG/M2 | SYSTOLIC BLOOD PRESSURE: 134 MMHG | HEIGHT: 61 IN | HEART RATE: 82 BPM | DIASTOLIC BLOOD PRESSURE: 68 MMHG | OXYGEN SATURATION: 98 %

## 2023-05-11 DIAGNOSIS — Z15.01 MONOALLELIC MUTATION OF ATM GENE: ICD-10-CM

## 2023-05-11 DIAGNOSIS — L90.0 LICHEN SCLEROSUS ET ATROPHICUS: ICD-10-CM

## 2023-05-11 DIAGNOSIS — Z85.43 HISTORY OF OVARIAN CANCER: Primary | ICD-10-CM

## 2023-05-11 DIAGNOSIS — Z85.43 ENCOUNTER FOR FOLLOW-UP SURVEILLANCE OF OVARIAN CANCER: ICD-10-CM

## 2023-05-11 DIAGNOSIS — I89.0 LYMPHEDEMA OF LEFT LOWER EXTREMITY: ICD-10-CM

## 2023-05-11 DIAGNOSIS — Z15.09 MONOALLELIC MUTATION OF ATM GENE: ICD-10-CM

## 2023-05-11 DIAGNOSIS — Z15.89 MONOALLELIC MUTATION OF ATM GENE: ICD-10-CM

## 2023-05-11 DIAGNOSIS — Z85.3 HISTORY OF BREAST CANCER: ICD-10-CM

## 2023-05-11 DIAGNOSIS — Z08 ENCOUNTER FOR FOLLOW-UP SURVEILLANCE OF OVARIAN CANCER: ICD-10-CM

## 2023-05-11 RX ORDER — CLOBETASOL PROPIONATE 0.5 MG/G
OINTMENT TOPICAL
Qty: 60 G | Refills: 1 | Status: SHIPPED | OUTPATIENT
Start: 2023-05-11

## 2023-05-11 NOTE — ASSESSMENT & PLAN NOTE
Recent worsening itching/irritation  Will increase to nightly x 4-6 weeks, then 1-2x week thereafter  Clinical exam is stable

## 2023-05-11 NOTE — PROGRESS NOTES
Assessment/Plan:    Problem List Items Addressed This Visit        Other    History of ovarian cancer - Primary     History of stage IA ovarian cancer s/p completion of adjuvant chemotherapy in July 2009  She has a pathogenic YOLANDA mutation with a history of right DCIS and left breast cancer  She is clinically without evidence of ovarian cancer recurrence       Return to the office in 1 year for continued surveillance           Lymphedema of left lower extremity     Chronic  Patient uses compression stockings and home pump  Will re-establish care with lymphedema therapy to gain better management  Referral placed  Relevant Orders    Ambulatory referral to Physical Therapy    Encounter for follow-up surveillance of ovarian cancer    Lichen sclerosus et atrophicus     Recent worsening itching/irritation  Will increase to nightly x 4-6 weeks, then 1-2x week thereafter  Clinical exam is stable           Relevant Medications    clobetasol (TEMOVATE) 0 05 % ointment    History of breast cancer    Monoallelic mutation of YOLANDA gene         CHIEF COMPLAINT:   Ovarian cancer surveillance    Problem:  Cancer Staging   History of ductal carcinoma in situ (DCIS) of breast  Staging form: Breast, AJCC 8th Edition  - Pathologic: Stage 0 (pTis (DCIS), pN0, cM0, ER-, IN-) - Unsigned    History of ovarian cancer  Staging form: Ovary, AJCC 7th Edition  - Clinical: FIGO Stage IA (T1a, N0, M0) - Signed by Valentino Iles, PA-C on 4/19/2018    Malignant neoplasm of central portion of left breast in female, estrogen receptor positive ([facility] [address] )  Staging form: Breast, AJCC 8th Edition  - Pathologic: Stage IA (pT1b, pN0(sn), cM0, G1, ER+, IN-, HER2-) - Unsigned        Previous therapy:  Oncology History   History of ovarian cancer    Initial Diagnosis    Ovarian cancer ([facility] [address] )     4/21/2009 Surgery    Exploratory laparotomy, total abdominal hysterectomy, bilateral salpingo-oophrectomy, lymph node dissection, omentectomy with staging - 7/8/2009 Chemotherapy    Intraperitoneal along with intravenous chemotherapy on a early ovarian cancer protocol       5/31/2018 Genetic Testing    Genetic testing results are 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      Genetic testing indicated that the patient carries a Variant of Uncertain Significance (VUS) : Rad51C c 252G>T      Hormone Therapy       History of ductal carcinoma in situ (DCIS) of breast   5/31/2018 Genetic Testing    Genetic testing results are 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      Genetic testing indicated that the patient carries a Variant of Uncertain Significance (VUS) : Rad51C c 252G>T     8/1/2018 Biopsy    Right breast biopsy  11 o'clock position 5 cmfn  DCIS  Grade 2  Confirmed by Alcira Downing MD  ER 0  [place] 0     9/11/2018 Surgery    Right lumpectomy  DCIS  Grade 2  1 4 cm  Margins negative  Stage 0     10/16/2018 -  Hormone Therapy    Consult with Dr Luis Felipe Wu  No adjuvant systemic therapy recommended     10/29/2018 - 11/28/2018 Radiation    Course: C1    Plan ID Energy Fractions Dose per Fraction (cGy) Dose Correction (cGy) Total Dose Delivered (cGy) Elapsed Days   R Breast 6X 16 / 16 266 0 4,256 22   R Breast e 12E 5 / 5 200 0 1,000 7      Treatment dates:  C1: 10/29/2018 - 11/28/2018       Malignant neoplasm of central portion of left breast in female, estrogen receptor positive ([facility] [address] )   7/18/2019 Biopsy    Left breast MRI-guided biopsy  3 o'clock, posterior depth  Invasive breast carcinoma of no special type  Grade 1  ER 90  [place] 0  HER2 1+     8/13/2019 Surgery    Left breast needle localized lumpectomy with sentinel lymph node biopsy  Invasive mammary carcinoma of no special type  Grade 1  3 mm  Margins negative  0/1 Lymph node  Anatomic/Prognostic Stage IA     10/8/2019 - 11/5/2019 Radiation      Treatment:  Course: C2  Plan ID Energy Fractions Dose per Fraction (cGy) Dose Correction (cGy) Total Dose Delivered (cGy) Elapsed Days   BH L Breast 6X 16 / 16 266 0 4,256 21    L Brst e 12E 5 / 5 200 0 1,000 6    C2: 10/8/2019 - 11/5/2019 11/2019 -  Hormone Therapy    Anastrozole           Patient ID: Rogelio Reyna is a 74 y o  female  who presents to the office for ovarian cancer surveillance  No vaginal bleeding, abdominal/pelvic pain  Normal bowel and bladder function  She recently had follow-up with surg-onc for breast cancer surveillance  She notes recently worsening of her left lower extremity lymphedema  She continues her home management remedies, but has not had recent intervention by lymphedema therapy  She notes intermittent vulvar irritation/itching, and has been occasionally using clobetasol ointment  Quality of life is good  The following portions of the patient's history were reviewed and updated as appropriate: allergies, current medications, past medical history, past surgical history and problem list     Review of Systems   Constitutional: Negative  HENT: Negative  Eyes: Negative  Respiratory: Negative  Cardiovascular: Positive for leg swelling (left, chronic)  Gastrointestinal: Negative  Genitourinary: Negative  Musculoskeletal: Negative  Skin: Negative  Neurological: Negative  Psychiatric/Behavioral: Negative  Current Outpatient Medications   Medication Sig Dispense Refill   • acetaminophen (TYLENOL) 500 mg tablet Take 1,000 mg by mouth as needed Pre-Medication prior to Remicade Infusion     • albuterol (PROVENTIL HFA,VENTOLIN HFA) 90 mcg/act inhaler Inhale 2 puffs  as needed in the morning for wheezing  18 g 2   • alendronate (FOSAMAX) 70 mg tablet TAKE 1 TABLET (70 MG TOTAL) BY MOUTH EVERY 7 DAYS 4 tablet 11   • amLODIPine (NORVASC) 10 mg tablet TAKE 1 TABLET (10 MG TOTAL) BY MOUTH IN THE MORNING   30 tablet 5   • anastrozole (ARIMIDEX) 1 mg tablet TAKE 1 TABLET (1 MG TOTAL) BY MOUTH DAILY 30 tablet 5   • atorvastatin (LIPITOR) 40 mg tablet TAKE 1 TABLET (40 MG TOTAL) BY MOUTH DAILY AT BEDTIME 30 "tablet 0   • Calcium Carbonate 1500 (600 Ca) MG TABS Take 1 tablet by mouth 2 (two) times a day     • clobetasol (TEMOVATE) 0 05 % ointment Apply to the affected area nightly x 4-6 weeks, then 1-2x week thereafter 60 g 1   • Diclofenac Sodium (VOLTAREN) 1 % Apply 2 g topically 4 (four) times a day PRN     • diphenhydrAMINE (BENADRYL) 25 mg tablet Take 25 mg by mouth as needed Pre-Medication prior to Remicade Infusion     • Homeopathic Products (Arnicare Bruise) GEL Apply topically     • hydrochlorothiazide (MICROZIDE) 12 5 mg capsule TAKE 1 CAPSULE (12 5 MG TOTAL) BY MOUTH EVERY MORNING 90 capsule 3   • inFLIXimab (REMICADE) 100 mg Infuse into a venous catheter       • ofloxacin (OCUFLOX) 0 3 % ophthalmic solution ADMINISTER 1 DROP TO BOTH EYES 4 (FOUR) TIMES A DAY 5 mL 0   • Turmeric 500 MG CAPS Take by mouth     • mupirocin (BACTROBAN) 2 % ointment Apply topically 3 (three) times a day 22 g 0     No current facility-administered medications for this visit  Objective:    Blood pressure 134/68, pulse 82, height 5' 1\" (1 549 m), weight 59 9 kg (132 lb), SpO2 98 %  Body mass index is 24 94 kg/m²  Body surface area is 1 58 meters squared  Physical Exam  Vitals reviewed  Exam conducted with a chaperone present  Constitutional:       General: She is not in acute distress  Appearance: Normal appearance  She is not ill-appearing  HENT:      Head: Normocephalic and atraumatic  Mouth/Throat:      Mouth: Mucous membranes are moist    Eyes:      General:         Right eye: No discharge  Left eye: No discharge  Conjunctiva/sclera: Conjunctivae normal    Pulmonary:      Effort: Pulmonary effort is normal    Abdominal:      Palpations: Abdomen is soft  There is no mass  Tenderness: There is no abdominal tenderness  Hernia: No hernia is present  Genitourinary:     Comments: Loss of labia minora bilaterally with associated hypopigmentation   The bartholin's, uretheral and skenes " glands are normal  The urethral meatus is normal (midline with no lesions)  Anus without fissure or lesion  Speculum exam reveals a grossly normal vagina  No masses, lesions,discharge or bleeding  No significant cystocele or rectocele noted  Bimanual exam notes a surgical absent cervix, uterus and adnexal structures  No masses or fullness  Bladder is without fullness, mass or tenderness  Musculoskeletal:      Right lower leg: No edema  Left lower leg: Edema present  Skin:     General: Skin is warm and dry  Coloration: Skin is not jaundiced  Findings: No rash  Neurological:      General: No focal deficit present  Mental Status: She is alert and oriented to person, place, and time  Cranial Nerves: No cranial nerve deficit  Sensory: No sensory deficit  Motor: No weakness  Gait: Gait normal    Psychiatric:         Mood and Affect: Mood normal          Behavior: Behavior normal          Thought Content:  Thought content normal          Judgment: Judgment normal

## 2023-05-11 NOTE — ASSESSMENT & PLAN NOTE
Chronic  Patient uses compression stockings and home pump  Will re-establish care with lymphedema therapy to gain better management  Referral placed

## 2023-05-29 DIAGNOSIS — K50.10 CROHN'S DISEASE OF LARGE INTESTINE WITHOUT COMPLICATION (HCC): Primary | ICD-10-CM

## 2023-05-30 DIAGNOSIS — K50.10 CROHN'S DISEASE OF LARGE INTESTINE WITHOUT COMPLICATION (HCC): Primary | ICD-10-CM

## 2023-06-07 ENCOUNTER — OFFICE VISIT (OUTPATIENT)
Dept: GASTROENTEROLOGY | Facility: CLINIC | Age: 68
End: 2023-06-07
Payer: MEDICARE

## 2023-06-07 VITALS
SYSTOLIC BLOOD PRESSURE: 188 MMHG | DIASTOLIC BLOOD PRESSURE: 92 MMHG | HEIGHT: 61 IN | WEIGHT: 131.8 LBS | BODY MASS INDEX: 24.88 KG/M2

## 2023-06-07 DIAGNOSIS — Z11.59 ENCOUNTER FOR SCREENING FOR OTHER VIRAL DISEASES: ICD-10-CM

## 2023-06-07 DIAGNOSIS — K50.10 CROHN'S DISEASE OF LARGE INTESTINE WITHOUT COMPLICATION (HCC): Primary | ICD-10-CM

## 2023-06-07 PROCEDURE — 99213 OFFICE O/P EST LOW 20 MIN: CPT | Performed by: INTERNAL MEDICINE

## 2023-06-07 NOTE — LETTER
June 7, 2023     Todd Bellamy, [address] [facility] [address]    Patient: Watson Thomas   YOB: 1955   Date of Visit: 6/7/2023       Dear Dr Wilkins Nurse: Thank you for referring Rj Garg to me for evaluation  Below are my notes for this consultation  If you have questions, please do not hesitate to call me  I look forward to following your patient along with you  Sincerely,        Krystin Sharma MD        CC: No Recipients    Krystin Sharma MD  6/7/2023  5:23 PM  Sign when Signing Visit  [id number] - Outpatient Follow-up Note  Watson Thomas 74 y o  female MRN: [id number]  Encounter: [id number]    ASSESSMENT AND PLAN:      1  Crohn's disease of large intestine without complication ([facility] [address] )  Crohn's disease history since 14  Has been on Remicade for 20 years doing well  Now using [facility] infusion center which is closer to home  Last colonoscopy December 2021  No evidence of infliximab antibodies on recent blood work  - Infliximab Concentration and Anti-Infliximab Antibody; Future  - CBC; Future  - Comprehensive metabolic panel; Future  - Sedimentation rate, automated; Future  - Hepatitis B surface antigen; Future  - Quantiferon TB Gold Plus; Future  - Hepatitis B surface antigen; Future  - Colonoscopy December 2024  -The patient will call me if her symptoms change      Followup Appointment: 6 months  ______________________________________________________________________    Chief Complaint   Patient presents with   • Crohn's Disease     Follow up       HPI: The patient is seen in the office today in follow-up  From a Crohn's standpoint she is doing well  She continues to take Remicade every 8 weeks  She has been moving her bowels regularly without abdominal pain or rectal bleeding  She reports some pain in her knees which she attributes to her arthritis    This is different from her Crohn's related arthralgias which is usually in her hips  She reports over the last month that she has had some more frequent bowel movements but they have not been looser  She reports this is similar to her seasonal allergies she gets in the fall  She does report that she is under a lot of stress following the death of her sister and the fact she is cleaning out her house of her parents and sisters belongings    Historical Information   Past Medical History:   Diagnosis Date   • Arthritis    • Breast cancer ([place] [address] ) 09/11/2018    Right   • Breast cancer ([place] [address] ) 08/13/2019    Left   • Colon polyp    • Crohn disease ([place] [address] )    • Dense breast    • History of chemotherapy     for ovarian cancer   • History of radiation therapy    • History of transfusion 2009   • Hyperlipidemia    • Hypertension    • Lymphedema     left leg   • Lymphedema    • Monoallelic mutation of YOLANDA gene     Breast Gyn Panel   • Ovarian cancer ([place] [address] ) 04/21/2009     Past Surgical History:   Procedure Laterality Date   • BREAST LUMPECTOMY Right 09/11/2018   • BREAST LUMPECTOMY Left 8/13/2019    Procedure: BREAST LUMPECTOMY; BREAST NEEDLE LOCALIZATION (NEEDLE LOC AT 0800); Surgeon: Corina Kelly MD;  [facility];  Service: Surgical Oncology   • BREAST LUMPECTOMY W/ NEEDLE LOCALIZATION Left 08/13/2019   • COLONOSCOPY     • EXPLORATORY LAPAROTOMY     • HYSTERECTOMY     • LYMPH NODE BIOPSY Left 8/13/2019    Procedure: SENTINEL LYMPH NODE BIOPSY; LYMPHATIC MAPPING WITH BLUE DYE AND RADIOACTIVE DYE (INJECT AT 0930 BY DR JENNINGS IN THE OR); Surgeon: Corina Kelly MD;  [facility];  Service: Surgical Oncology   • MAMMO NEEDLE LOCALIZATION LEFT (ALL INC) Left 8/13/2019   • MAMMO NEEDLE LOCALIZATION RIGHT (ALL INC) Right 9/11/2018   • MRI BREAST BIOPSY LEFT (ALL INCLUSIVE) Left 7/18/2019   • OMENTECTOMY     • NV PERQ DEVICE PLACEMENT BREAST LOC 1ST LES W/GDNCE Right 9/11/2018    Procedure: BREAST LUMPECTOMY; BREAST NEEDLE LOCALIZATION (NEEDLE LOC AT 1200);   Surgeon: Jeremy Spicer MD;  [facility];  Service: Surgical Oncology   • TOTAL ABDOMINAL HYSTERECTOMY W/ BILATERAL SALPINGOOPHORECTOMY     • US GUIDED BREAST BIOPSY LEFT COMPLETE Left 2019   • US GUIDED BREAST BIOPSY RIGHT COMPLETE Right 2018   • WISDOM TOOTH EXTRACTION       Social History     Substance and Sexual Activity   Alcohol Use Yes    Comment: rarely     Social History     Substance and Sexual Activity   Drug Use No     Social History     Tobacco Use   Smoking Status Former   • Types: Cigarettes   • Quit date: 2009   • Years since quittin 1   Smokeless Tobacco Never     Family History   Problem Relation Age of Onset   • Hypertension Mother    • Heart disease Mother    • Prostate cancer Father    • Alzheimer's disease Father    • Hypertension Father    • Ovarian cancer Sister    • No Known Problems Sister    • Other Sister         pacemaker   • Hypertension Sister    • Heart disease Sister    • No Known Problems Sister    • Down syndrome Sister    • Alzheimer's disease Sister    • Hypertension Sister    • Breast cancer Paternal Grandmother    • Breast cancer Maternal Aunt    • No Known Problems Maternal Aunt    • No Known Problems Paternal Aunt    • Colon cancer Neg Hx          Current Outpatient Medications:   •  acetaminophen (TYLENOL) 500 mg tablet  •  albuterol (PROVENTIL HFA,VENTOLIN HFA) 90 mcg/act inhaler  •  alendronate (FOSAMAX) 70 mg tablet  •  amLODIPine (NORVASC) 10 mg tablet  •  anastrozole (ARIMIDEX) 1 mg tablet  •  atorvastatin (LIPITOR) 40 mg tablet  •  Calcium Carbonate 1500 (600 Ca) MG TABS  •  clobetasol (TEMOVATE) 0 05 % ointment  •  Diclofenac Sodium (VOLTAREN) 1 %  •  diphenhydrAMINE (BENADRYL) 25 mg tablet  •  Homeopathic Products (Arnicare Bruise) GEL  •  hydrochlorothiazide (MICROZIDE) 12 5 mg capsule  •  inFLIXimab (REMICADE) 100 mg  •  mupirocin (BACTROBAN) 2 % ointment  •  ofloxacin (OCUFLOX) 0 3 % ophthalmic solution  •  Turmeric 500 MG CAPS  Allergies   Allergen "Reactions   • Lisinopril      cough   • Losartan      Numbness on right side of body   • Boniva [Ibandronic Acid] Headache     Reviewed medications and allergies and updated as indicated    PHYSICAL EXAM:    Blood pressure (!) 188/92, height 5' 1\" (1 549 m), weight 59 8 kg (131 lb 12 8 oz)  Body mass index is 24 9 kg/m²  General Appearance: NAD, cooperative, alert  Eyes: Anicteric, PERRLA, EOMI  ENT:  Normocephalic, atraumatic, normal mucosa  Neck:  Supple, symmetrical, trachea midline  Resp:  Clear to auscultation bilaterally; no rales, rhonchi or wheezing; respirations unlabored   CV:  S1 S2, Regular rate and rhythm; no murmur, rub, or gallop  GI:  Soft, non-tender, non-distended; normal bowel sounds; no masses, no organomegaly   Rectal: Deferred  Musculoskeletal: No cyanosis, clubbing or edema  Normal ROM  Skin:  No jaundice, rashes, or lesions   Heme/Lymph: No palpable cervical lymphadenopathy  Psych: Normal affect, good eye contact  Neuro: No gross deficits, AAOx3    Lab Results:   Lab Results   Component Value Date    HCT 42 3 12/29/2022    HGB 13 5 12/29/2022    MCV 86 12/29/2022     12/29/2022    WBC 7 55 12/29/2022     Lab Results   Component Value Date    ALKPHOS 77 12/29/2022    ALT 30 12/29/2022    AST 22 12/29/2022    BILITOT 0 4 12/14/2017    BUN 24 12/29/2022    CALCIUM 10 2 (H) 12/29/2022     12/29/2022    CO2 32 12/29/2022    CREATININE 0 93 12/29/2022    EGFR 64 12/29/2022    GLUCOSE 90 12/14/2017    GLUF 95 12/29/2022    K 3 3 (L) 12/29/2022     12/14/2017    PROT 7 3 12/14/2017     Lab Results   Component Value Date    IRON 65 08/28/2018    TIBC 336 08/28/2018       Radiology Results:   No results found      "

## 2023-06-07 NOTE — PROGRESS NOTES
[id number] - Outpatient Follow-up Note  Dorys Salazar 74 y o  female MRN: [id number]  Encounter: [id number]    ASSESSMENT AND PLAN:      1  Crohn's disease of large intestine without complication ([place] [address] )  Crohn's disease history since 14  Has been on Remicade for 20 years doing well  Now using [facility] infusion center which is closer to home  Last colonoscopy December 2021  No evidence of infliximab antibodies on recent blood work  - Infliximab Concentration and Anti-Infliximab Antibody; Future  - CBC; Future  - Comprehensive metabolic panel; Future  - Sedimentation rate, automated; Future  - Hepatitis B surface antigen; Future  - Quantiferon TB Gold Plus; Future  - Hepatitis B surface antigen; Future  - Colonoscopy December 2024  -The patient will call me if her symptoms change      Followup Appointment: 6 months  ______________________________________________________________________    Chief Complaint   Patient presents with   • Crohn's Disease     Follow up       HPI: The patient is seen in the office today in follow-up  From a Crohn's standpoint she is doing well  She continues to take Remicade every 8 weeks  She has been moving her bowels regularly without abdominal pain or rectal bleeding  She reports some pain in her knees which she attributes to her arthritis  This is different from her Crohn's related arthralgias which is usually in her hips  She reports over the last month that she has had some more frequent bowel movements but they have not been looser  She reports this is similar to her seasonal allergies she gets in the fall    She does report that she is under a lot of stress following the death of her sister and the fact she is cleaning out her house of her parents and sisters belongings    Historical Information   Past Medical History:   Diagnosis Date   • Arthritis    • Breast cancer ([place] [address] ) 09/11/2018    Right   • Breast cancer ([place] [address] ) 08/13/2019 Left   • Colon polyp    • Crohn disease ([facility] [address] )    • Dense breast    • History of chemotherapy     for ovarian cancer   • History of radiation therapy    • History of transfusion 2009   • Hyperlipidemia    • Hypertension    • Lymphedema     left leg   • Lymphedema    • Monoallelic mutation of YOLANDA gene     Breast Gyn Panel   • [facility] 04/21/2009     Past Surgical History:   Procedure Laterality Date   • BREAST LUMPECTOMY Right 09/11/2018   • BREAST LUMPECTOMY Left 8/13/2019    Procedure: BREAST LUMPECTOMY; BREAST NEEDLE LOCALIZATION (NEEDLE LOC AT 0800); Surgeon: Morteza Che MD;  [facility];  Service: Surgical Oncology   • BREAST LUMPECTOMY W/ NEEDLE LOCALIZATION Left 08/13/2019   • COLONOSCOPY     • EXPLORATORY LAPAROTOMY     • HYSTERECTOMY     • LYMPH NODE BIOPSY Left 8/13/2019    Procedure: SENTINEL LYMPH NODE BIOPSY; LYMPHATIC MAPPING WITH BLUE DYE AND RADIOACTIVE DYE (INJECT AT 0930 BY DR JENNINGS IN THE OR); Surgeon: Morteza Che MD;  [facility];  Service: Surgical Oncology   • MAMMO NEEDLE LOCALIZATION LEFT (ALL INC) Left 8/13/2019   • MAMMO NEEDLE LOCALIZATION RIGHT (ALL INC) Right 9/11/2018   • MRI BREAST BIOPSY LEFT (ALL INCLUSIVE) Left 7/18/2019   • OMENTECTOMY     • IN PERQ DEVICE PLACEMENT BREAST LOC 1ST LES W/GDNCE Right 9/11/2018    Procedure: BREAST LUMPECTOMY; BREAST NEEDLE LOCALIZATION (NEEDLE LOC AT 1200);   Surgeon: Morteza Che MD;  [facility];  Service: Surgical Oncology   • TOTAL ABDOMINAL HYSTERECTOMY W/ BILATERAL SALPINGOOPHORECTOMY     • US GUIDED BREAST BIOPSY LEFT COMPLETE Left 6/17/2019   • US GUIDED BREAST BIOPSY RIGHT COMPLETE Right 8/1/2018   • WISDOM TOOTH EXTRACTION       Social History     Substance and Sexual Activity   Alcohol Use Yes    Comment: rarely     Social History     Substance and Sexual Activity   Drug Use No     Social History     Tobacco Use   Smoking Status Former   • Types: Cigarettes   • Quit date: 4/1/2009   • Years since quitting: "14 1   Smokeless Tobacco Never     Family History   Problem Relation Age of Onset   • Hypertension Mother    • Heart disease Mother    • Prostate cancer Father    • Alzheimer's disease Father    • Hypertension Father    • Ovarian cancer Sister    • No Known Problems Sister    • Other Sister         pacemaker   • Hypertension Sister    • Heart disease Sister    • No Known Problems Sister    • Down syndrome Sister    • Alzheimer's disease Sister    • Hypertension Sister    • Breast cancer Paternal Grandmother    • Breast cancer Maternal Aunt    • No Known Problems Maternal Aunt    • No Known Problems Paternal Aunt    • Colon cancer Neg Hx          Current Outpatient Medications:   •  acetaminophen (TYLENOL) 500 mg tablet  •  albuterol (PROVENTIL HFA,VENTOLIN HFA) 90 mcg/act inhaler  •  alendronate (FOSAMAX) 70 mg tablet  •  amLODIPine (NORVASC) 10 mg tablet  •  anastrozole (ARIMIDEX) 1 mg tablet  •  atorvastatin (LIPITOR) 40 mg tablet  •  Calcium Carbonate 1500 (600 Ca) MG TABS  •  clobetasol (TEMOVATE) 0 05 % ointment  •  Diclofenac Sodium (VOLTAREN) 1 %  •  diphenhydrAMINE (BENADRYL) 25 mg tablet  •  Homeopathic Products (Arnicare Bruise) GEL  •  hydrochlorothiazide (MICROZIDE) 12 5 mg capsule  •  inFLIXimab (REMICADE) 100 mg  •  mupirocin (BACTROBAN) 2 % ointment  •  ofloxacin (OCUFLOX) 0 3 % ophthalmic solution  •  Turmeric 500 MG CAPS  Allergies   Allergen Reactions   • Lisinopril      cough   • Losartan      Numbness on right side of body   • Boniva [Ibandronic Acid] Headache     Reviewed medications and allergies and updated as indicated    PHYSICAL EXAM:    Blood pressure (!) 188/92, height 5' 1\" (1 549 m), weight 59 8 kg (131 lb 12 8 oz)  Body mass index is 24 9 kg/m²  General Appearance: NAD, cooperative, alert  Eyes: Anicteric, PERRLA, EOMI  ENT:  Normocephalic, atraumatic, normal mucosa      Neck:  Supple, symmetrical, trachea midline  Resp:  Clear to auscultation bilaterally; no rales, rhonchi or " wheezing; respirations unlabored   CV:  S1 S2, Regular rate and rhythm; no murmur, rub, or gallop  GI:  Soft, non-tender, non-distended; normal bowel sounds; no masses, no organomegaly   Rectal: Deferred  Musculoskeletal: No cyanosis, clubbing or edema  Normal ROM  Skin:  No jaundice, rashes, or lesions   Heme/Lymph: No palpable cervical lymphadenopathy  Psych: Normal affect, good eye contact  Neuro: No gross deficits, AAOx3    Lab Results:   Lab Results   Component Value Date    HCT 42 3 12/29/2022    HGB 13 5 12/29/2022    MCV 86 12/29/2022     12/29/2022    WBC 7 55 12/29/2022     Lab Results   Component Value Date    ALKPHOS 77 12/29/2022    ALT 30 12/29/2022    AST 22 12/29/2022    BILITOT 0 4 12/14/2017    BUN 24 12/29/2022    CALCIUM 10 2 (H) 12/29/2022     12/29/2022    CO2 32 12/29/2022    CREATININE 0 93 12/29/2022    EGFR 64 12/29/2022    GLUCOSE 90 12/14/2017    GLUF 95 12/29/2022    K 3 3 (L) 12/29/2022     12/14/2017    PROT 7 3 12/14/2017     Lab Results   Component Value Date    IRON 65 08/28/2018    TIBC 336 08/28/2018       Radiology Results:   No results found

## 2023-06-19 ENCOUNTER — HOSPITAL ENCOUNTER (OUTPATIENT)
Dept: MAMMOGRAPHY | Facility: CLINIC | Age: 68
Discharge: HOME | End: 2023-06-19
Payer: MEDICARE

## 2023-06-19 VITALS — WEIGHT: 131 LBS | HEIGHT: 61 IN | BODY MASS INDEX: 24.73 KG/M2

## 2023-06-19 DIAGNOSIS — Z17.0 MALIGNANT NEOPLASM OF CENTRAL PORTION OF LEFT BREAST IN FEMALE, ESTROGEN RECEPTOR POSITIVE (HCC): ICD-10-CM

## 2023-06-19 DIAGNOSIS — C50.112 MALIGNANT NEOPLASM OF CENTRAL PORTION OF LEFT BREAST IN FEMALE, ESTROGEN RECEPTOR POSITIVE (HCC): ICD-10-CM

## 2023-06-19 PROCEDURE — 77066 DX MAMMO INCL CAD BI: CPT

## 2023-06-19 PROCEDURE — G0279 TOMOSYNTHESIS, MAMMO: HCPCS

## 2023-06-30 ENCOUNTER — HOSPITAL ENCOUNTER (OUTPATIENT)
Dept: INFUSION CENTER | Facility: HOSPITAL | Age: 68
End: 2023-06-30
Attending: INTERNAL MEDICINE
Payer: MEDICARE

## 2023-06-30 VITALS
RESPIRATION RATE: 18 BRPM | HEIGHT: 61 IN | BODY MASS INDEX: 24.68 KG/M2 | HEART RATE: 82 BPM | SYSTOLIC BLOOD PRESSURE: 172 MMHG | WEIGHT: 130.73 LBS | DIASTOLIC BLOOD PRESSURE: 80 MMHG | TEMPERATURE: 96.6 F

## 2023-06-30 DIAGNOSIS — K50.10 CROHN'S DISEASE OF LARGE INTESTINE WITHOUT COMPLICATION (HCC): Primary | ICD-10-CM

## 2023-06-30 PROCEDURE — 96415 CHEMO IV INFUSION ADDL HR: CPT

## 2023-06-30 PROCEDURE — 96413 CHEMO IV INFUSION 1 HR: CPT

## 2023-06-30 RX ORDER — SODIUM CHLORIDE 9 MG/ML
20 INJECTION, SOLUTION INTRAVENOUS ONCE
OUTPATIENT
Start: 2023-08-25

## 2023-06-30 RX ORDER — SODIUM CHLORIDE 9 MG/ML
20 INJECTION, SOLUTION INTRAVENOUS ONCE
Status: COMPLETED | OUTPATIENT
Start: 2023-06-30 | End: 2023-06-30

## 2023-06-30 RX ADMIN — INFLIXIMAB 300 MG: 100 INJECTION, POWDER, LYOPHILIZED, FOR SOLUTION INTRAVENOUS at 10:24

## 2023-06-30 RX ADMIN — SODIUM CHLORIDE 20 ML/HR: 0.9 INJECTION, SOLUTION INTRAVENOUS at 10:24

## 2023-06-30 NOTE — PROGRESS NOTES
Pt here for remicade tolerated well no adverse reactions  Next appointment made, schedule provided  Left unit ambulatory with steady gait

## 2023-07-24 DIAGNOSIS — I10 ESSENTIAL HYPERTENSION: ICD-10-CM

## 2023-07-24 RX ORDER — AMLODIPINE BESYLATE 10 MG/1
10 TABLET ORAL DAILY
Qty: 30 TABLET | Refills: 0 | Status: SHIPPED | OUTPATIENT
Start: 2023-07-24

## 2023-07-26 ENCOUNTER — OFFICE VISIT (OUTPATIENT)
Dept: SURGICAL ONCOLOGY | Facility: CLINIC | Age: 68
End: 2023-07-26
Payer: MEDICARE

## 2023-07-26 VITALS
WEIGHT: 129.5 LBS | OXYGEN SATURATION: 99 % | HEART RATE: 72 BPM | HEIGHT: 61 IN | BODY MASS INDEX: 24.45 KG/M2 | DIASTOLIC BLOOD PRESSURE: 72 MMHG | TEMPERATURE: 98.4 F | SYSTOLIC BLOOD PRESSURE: 170 MMHG

## 2023-07-26 DIAGNOSIS — Z79.811 USE OF ANASTROZOLE: ICD-10-CM

## 2023-07-26 DIAGNOSIS — Z86.000 HISTORY OF DUCTAL CARCINOMA IN SITU (DCIS) OF BREAST: Primary | ICD-10-CM

## 2023-07-26 DIAGNOSIS — Z17.0 MALIGNANT NEOPLASM OF CENTRAL PORTION OF LEFT BREAST IN FEMALE, ESTROGEN RECEPTOR POSITIVE (HCC): ICD-10-CM

## 2023-07-26 DIAGNOSIS — Z15.89 MONOALLELIC MUTATION OF ATM GENE: ICD-10-CM

## 2023-07-26 DIAGNOSIS — Z15.01 MONOALLELIC MUTATION OF ATM GENE: ICD-10-CM

## 2023-07-26 DIAGNOSIS — Z15.09 MONOALLELIC MUTATION OF ATM GENE: ICD-10-CM

## 2023-07-26 DIAGNOSIS — C50.112 MALIGNANT NEOPLASM OF CENTRAL PORTION OF LEFT BREAST IN FEMALE, ESTROGEN RECEPTOR POSITIVE (HCC): ICD-10-CM

## 2023-07-26 PROCEDURE — 99214 OFFICE O/P EST MOD 30 MIN: CPT

## 2023-07-26 NOTE — TELEPHONE ENCOUNTER
Patient advised on medication fill at [facility] and that they are due for ov appt, advised to call back to schedule

## 2023-07-26 NOTE — PROGRESS NOTES
Surgical Oncology Follow Up       [address] [facility] SURGICAL ONCOLOGY [place] [address] Arley Jameson  [facility] [address]    Bernadette Lexington  1955  [id number]  [address] [facility] SURGICAL ONCOLOGY [facility] [address]    Chief Complaint   Patient presents with   • Follow-up       Assessment/Plan:  1. History of ductal carcinoma in situ (DCIS) of breast  - 6 month follow up  - Mammo diagnostic bilateral w 3d & cad; Future  - MRI breast bilateral w and wo contrast w cad; Future    2. Monoallelic mutation of YOLANDA gene  - MRI breast bilateral w and wo contrast w cad; Future    3. Malignant neoplasm of central portion of left breast in female, estrogen receptor positive ([address])    4. Use of anastrozole  - continue use per medical oncology      Discussion/Summary: Patient is a 82-year-old female presenting today for six-month follow-up for bilateral breast cancer and a positive YOLANDA mutation. She was diagnosed with DCIS in May 2018. She underwent a right breast lumpectomy with Dr. Cayden Rivas and completed WBRT. In July 2019 she was diagnosed with invasive breast carcinoma of the left breast, ER 90%, SC/HER2 negative. She had a left breast lumpectomy with sentinel node biopsy with Dr. Cayden Rivas and completed WBRT. She is currently on anastrozole. She also has a history of ovarian cancer diagnosed in 2009.  We have been following her with annual MRI of the breasts and bilateral diagnostic mammograms. She had a bilateral dx mammogram on 6/19/23 which was BIRADS 2 category 3 density. There were no concerns on her cbe. I will see the patient back in 6 months or sooner should the need arise. She was instructed to call with any questions or concerns prior to this time.  All questions were answered today.          History of Present Illness:     Oncology History   History of ovarian cancer    Initial Diagnosis    Ovarian cancer ([address])     4/21/2009 Surgery    Exploratory laparotomy, total abdominal hysterectomy, bilateral salpingo-oophrectomy, lymph node dissection, omentectomy with staging      - 7/8/2009 Chemotherapy    Intraperitoneal along with intravenous chemotherapy on a early ovarian cancer protocol.      5/31/2018 Genetic Testing    Genetic testing results are POSITIVE for a pathogenic variant: YOLANDA c.5290delC      Genetic testing indicated that the patient carries a Variant of Uncertain Significance (VUS) : Rad51C c.252G>T      Hormone Therapy       History of ductal carcinoma in situ (DCIS) of breast   5/31/2018 Genetic Testing    Genetic testing results are POSITIVE for a pathogenic variant: YOLANDA c.5290delC      Genetic testing indicated that the patient carries a Variant of Uncertain Significance (VUS) : Rad51C c.252G>T     8/1/2018 Biopsy    Right breast biopsy  11 o'clock position 5 cmfn  DCIS  Grade 2  Confirmed by Beverly Quiroga MD  ER 0  RI 0     9/11/2018 Surgery    Right lumpectomy  DCIS  Grade 2  1.4 cm  Margins negative  Stage 0     10/16/2018 -  Hormone Therapy    Consult with Dr. Gloria Smith  No adjuvant systemic therapy recommended     10/29/2018 - 11/28/2018 Radiation    Course: C1    Plan ID Energy Fractions Dose per Fraction (cGy) Dose Correction (cGy) Total Dose Delivered (cGy) Elapsed Days   R Breast 6X 16 / 16 266 0 4,256 22   R Breast e 12E 5 / 5 200 0 1,000 7      Treatment dates:  C1: 10/29/2018 - 11/28/2018       Malignant neoplasm of central portion of left breast in female, estrogen receptor positive ([address])   7/18/2019 Biopsy    Left breast MRI-guided biopsy  3 o'clock, posterior depth  Invasive breast carcinoma of no special type  Grade 1  ER 90  RI 0  HER2 1+     8/13/2019 Surgery    Left breast needle localized lumpectomy with sentinel lymph node biopsy  Invasive mammary carcinoma of no special type  Grade 1  3 mm  Margins negative  0/1 Lymph node  Anatomic/Prognostic Stage IA     10/8/2019 - 11/5/2019 Radiation      Treatment: Course: C2  Plan ID Energy Fractions Dose per Fraction (cGy) Dose Correction (cGy) Total Dose Delivered (cGy) Elapsed Days   BH L Breast 6X 16 / 16 266 0 4,256 21   BH L Brst e 12E 5 / 5 200 0 1,000 6    C2: 10/8/2019 - 11/5/2019 11/2019 -  Hormone Therapy    Anastrozole          -Interval History: Patient is a 72-year-old female presenting today for six-month follow-up for bilateral breast cancer and a positive YOLANDA mutation. She is currently on anastrozole. We have been following her with annual MRI of the breasts and bilateral diagnostic mammograms. She had a bilateral dx mammogram on 6/19/23 which was BIRADS 2 category 3 density. Patient denies changes on her breast exam. She notes arthralgias. She denies persistent headaches, bone pain, back pain, shortness of breath, or abdominal pain. Review of Systems:  Review of Systems   Constitutional: Negative for activity change, appetite change, fatigue and unexpected weight change. Respiratory: Negative for cough and shortness of breath. Cardiovascular: Negative for chest pain. Gastrointestinal: Negative for abdominal pain, diarrhea, nausea and vomiting. Endocrine: Negative for heat intolerance. Musculoskeletal: Positive for arthralgias. Negative for back pain and myalgias. Skin: Negative for rash. Neurological: Negative for weakness and headaches. Hematological: Negative for adenopathy.        Patient Active Problem List   Diagnosis   • Colon, diverticulosis   • Crohn's disease ([address])   • Dense breasts   • Dermatitis   • Erythema chronica migrans, secondary   • Hyperlipidemia   • Lymphedema   • Murmur   • Osteopenia   • Osteoporosis   • History of ovarian cancer   • Reactive airway disease   • Shortness of breath on exertion   • Lymphedema of left lower extremity   • Genetic predisposition to breast cancer   • History of ductal carcinoma in situ (DCIS) of breast   • Encounter for follow-up surveillance of ovarian cancer   • Lichen sclerosus et atrophicus   • Essential hypertension   • Malignant neoplasm of ovary (HCC)   • Nausea   • TIA (transient ischemic attack)   • Malignant neoplasm of central portion of left breast in female, estrogen receptor positive ([address])   • Use of anastrozole   • History of breast cancer   • Carpal tunnel syndrome of right wrist   • Neuropathy   • Monoallelic mutation of YOLANDA gene   • Right calf pain   • Primary osteoarthritis of right knee   • Effusion of right knee   • Primary osteoarthritis of left knee   • Pes anserinus bursitis of right knee   • Bad odor of urine   • Bilateral carpal tunnel syndrome   • Urine frequency   • Benign neoplasm of colon   • Crohn's disease of large bowel ([address])   • Mitral valve disorder   • 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     Past Medical History:   Diagnosis Date   • Arthritis    • BRCA1 negative    • BRCA2 negative    • Breast cancer ([address]) 09/11/2018    Right   • Breast cancer ([address]) 08/13/2019    Left   • Colon polyp    • Crohn disease ([address])    • Dense breast    • History of chemotherapy     for ovarian cancer   • History of radiation therapy    • History of transfusion 2009   • Hyperlipidemia    • Hypertension    • Lymphedema     left leg   • Lymphedema    • Monoallelic mutation of YOLANDA gene     Breast Gyn Panel   • Ovarian cancer ([address]) 04/21/2009     Past Surgical History:   Procedure Laterality Date   • BREAST LUMPECTOMY Right 09/11/2018   • BREAST LUMPECTOMY Left 8/13/2019    Procedure: BREAST LUMPECTOMY; BREAST NEEDLE LOCALIZATION (NEEDLE LOC AT 0800); Surgeon: Sergey Cook MD;  [facility];  Service: Surgical Oncology   • BREAST LUMPECTOMY W/ NEEDLE LOCALIZATION Left 08/13/2019   • COLONOSCOPY     • EXPLORATORY LAPAROTOMY     • HYSTERECTOMY     • LYMPH NODE BIOPSY Left 8/13/2019    Procedure: SENTINEL LYMPH NODE BIOPSY; LYMPHATIC MAPPING WITH BLUE DYE AND RADIOACTIVE DYE (INJECT AT 0930 BY DR JENNINGS IN THE OR);   Surgeon: Sergey Cook MD;  [facility]; Service: Surgical Oncology   • MAMMO NEEDLE LOCALIZATION LEFT (ALL INC) Left 2019   • MAMMO NEEDLE LOCALIZATION RIGHT (ALL INC) Right 2018   • MRI BREAST BIOPSY LEFT (ALL INCLUSIVE) Left 2019   • OMENTECTOMY     • VT PERQ DEVICE PLACEMENT BREAST LOC 1ST LES W/GDNCE Right 2018    Procedure: BREAST LUMPECTOMY; BREAST NEEDLE LOCALIZATION (NEEDLE LOC AT 1200);   Surgeon: Spencer Munguia MD;  [facility];  Service: Surgical Oncology   • TOTAL ABDOMINAL HYSTERECTOMY W/ BILATERAL SALPINGOOPHORECTOMY     • US GUIDED BREAST BIOPSY LEFT COMPLETE Left 2019   • US GUIDED BREAST BIOPSY RIGHT COMPLETE Right 2018   • WISDOM TOOTH EXTRACTION       Family History   Problem Relation Age of Onset   • Hypertension Mother    • Heart disease Mother    • Prostate cancer Father    • Alzheimer's disease Father    • Hypertension Father    • Ovarian cancer Sister    • No Known Problems Sister    • Other Sister         pacemaker   • Hypertension Sister    • Heart disease Sister    • No Known Problems Sister    • Down syndrome Sister    • Alzheimer's disease Sister    • Hypertension Sister    • Breast cancer Paternal Grandmother    • Breast cancer Maternal Aunt    • No Known Problems Maternal Aunt    • No Known Problems Paternal Aunt    • Colon cancer Neg Hx      Social History     Socioeconomic History   • Marital status: Single     Spouse name: Not on file   • Number of children: Not on file   • Years of education: Not on file   • Highest education level: Not on file   Occupational History   • Not on file   Tobacco Use   • Smoking status: Former     Types: Cigarettes     Quit date: 2009     Years since quittin.3   • Smokeless tobacco: Never   Vaping Use   • Vaping Use: Never used   Substance and Sexual Activity   • Alcohol use: Yes     Comment: rarely   • Drug use: No   • Sexual activity: Not on file   Other Topics Concern   • Not on file   Social History Narrative   • Not on file     Social Determinants of Health     Financial Resource Strain: Low Risk  (1/9/2023)    Overall Financial Resource Strain (CARDIA)    • Difficulty of Paying Living Expenses: Not very hard   Food Insecurity: Not on file   Transportation Needs: No Transportation Needs (1/9/2023)    PRAPARE - Transportation    • Lack of Transportation (Medical): No    • Lack of Transportation (Non-Medical):  No   Physical Activity: Not on file   Stress: Not on file   Social Connections: Not on file   Intimate Partner Violence: Not on file   Housing Stability: Not on file       Current Outpatient Medications:   •  acetaminophen (TYLENOL) 500 mg tablet, Take 1,000 mg by mouth as needed Pre-Medication prior to Remicade Infusion, Disp: , Rfl:   •  albuterol (PROVENTIL HFA,VENTOLIN HFA) 90 mcg/act inhaler, Inhale 2 puffs  as needed in the morning for wheezing., Disp: 18 g, Rfl: 2  •  alendronate (FOSAMAX) 70 mg tablet, TAKE 1 TABLET (70 MG TOTAL) BY MOUTH EVERY 7 DAYS, Disp: 4 tablet, Rfl: 11  •  amLODIPine (NORVASC) 10 mg tablet, TAKE 1 TABLET (10 MG TOTAL) BY MOUTH IN THE MORNING., Disp: 30 tablet, Rfl: 0  •  anastrozole (ARIMIDEX) 1 mg tablet, TAKE 1 TABLET (1 MG TOTAL) BY MOUTH DAILY, Disp: 30 tablet, Rfl: 5  •  atorvastatin (LIPITOR) 40 mg tablet, TAKE 1 TABLET (40 MG TOTAL) BY MOUTH DAILY AT BEDTIME, Disp: 30 tablet, Rfl: 0  •  Calcium Carbonate 1500 (600 Ca) MG TABS, Take 1 tablet by mouth 2 (two) times a day, Disp: , Rfl:   •  clobetasol (TEMOVATE) 0.05 % ointment, Apply to the affected area nightly x 4-6 weeks, then 1-2x week thereafter, Disp: 60 g, Rfl: 1  •  Diclofenac Sodium (VOLTAREN) 1 %, Apply 2 g topically 4 (four) times a day PRN, Disp: , Rfl:   •  diphenhydrAMINE (BENADRYL) 25 mg tablet, Take 25 mg by mouth as needed Pre-Medication prior to Remicade Infusion, Disp: , Rfl:   •  Homeopathic Products (Arnicare Bruise) GEL, Apply topically, Disp: , Rfl:   •  hydrochlorothiazide (MICROZIDE) 12.5 mg capsule, TAKE 1 CAPSULE (12.5 MG TOTAL) BY MOUTH EVERY MORNING, Disp: 90 capsule, Rfl: 3  •  inFLIXimab (REMICADE) 100 mg, Infuse into a venous catheter  , Disp: , Rfl:   •  mupirocin (BACTROBAN) 2 % ointment, Apply topically 3 (three) times a day, Disp: 22 g, Rfl: 0  •  ofloxacin (OCUFLOX) 0.3 % ophthalmic solution, ADMINISTER 1 DROP TO BOTH EYES 4 (FOUR) TIMES A DAY, Disp: 5 mL, Rfl: 0  •  Turmeric 500 MG CAPS, Take by mouth, Disp: , Rfl:   Allergies   Allergen Reactions   • Lisinopril      cough   • Losartan      Numbness on right side of body   • Boniva [Ibandronic Acid] Headache     Vitals:    07/26/23 0856   BP: 170/72   Pulse: 72   Temp: 98.4 °F (36.9 °C)   SpO2: 99%       Physical Exam  Constitutional:       General: She is not in acute distress. Appearance: Normal appearance. Cardiovascular:      Rate and Rhythm: Normal rate and regular rhythm. Pulses: Normal pulses. Heart sounds: Normal heart sounds. Pulmonary:      Effort: Pulmonary effort is normal.      Breath sounds: Normal breath sounds. Chest:      Chest wall: No mass. Breasts:     Right: No swelling, bleeding, inverted nipple, mass, nipple discharge, skin change or tenderness. Left: No swelling, bleeding, inverted nipple, mass, nipple discharge, skin change or tenderness. Comments: Scar tissue present in b/l breasts  Abdominal:      General: Abdomen is flat. Palpations: Abdomen is soft. Lymphadenopathy:      Upper Body:      Right upper body: No supraclavicular, axillary or pectoral adenopathy. Left upper body: No supraclavicular, axillary or pectoral adenopathy. Skin:     General: Skin is warm. Neurological:      General: No focal deficit present. Mental Status: She is alert and oriented to person, place, and time. Psychiatric:         Mood and Affect: Mood normal.         Behavior: Behavior normal.           Results:    Imaging  No results found. I reviewed the above imaging data.       Advance Care Planning/Advance Directives:  Discussed disease status, cancer treatment plans and/or cancer treatment goals with the patient.

## 2023-08-11 ENCOUNTER — OFFICE VISIT (OUTPATIENT)
Dept: FAMILY MEDICINE CLINIC | Facility: CLINIC | Age: 68
End: 2023-08-11
Payer: MEDICARE

## 2023-08-11 VITALS
BODY MASS INDEX: 24.17 KG/M2 | TEMPERATURE: 98.8 F | OXYGEN SATURATION: 96 % | HEIGHT: 61 IN | SYSTOLIC BLOOD PRESSURE: 150 MMHG | WEIGHT: 128 LBS | HEART RATE: 89 BPM | DIASTOLIC BLOOD PRESSURE: 78 MMHG

## 2023-08-11 DIAGNOSIS — I10 ESSENTIAL HYPERTENSION: Primary | ICD-10-CM

## 2023-08-11 DIAGNOSIS — L72.3 SEBACEOUS CYST: ICD-10-CM

## 2023-08-11 DIAGNOSIS — I10 ESSENTIAL HYPERTENSION: ICD-10-CM

## 2023-08-11 PROCEDURE — 99213 OFFICE O/P EST LOW 20 MIN: CPT | Performed by: FAMILY MEDICINE

## 2023-08-11 RX ORDER — AMLODIPINE BESYLATE 10 MG/1
10 TABLET ORAL DAILY
Qty: 90 TABLET | Refills: 3 | Status: SHIPPED | OUTPATIENT
Start: 2023-08-11

## 2023-08-11 RX ORDER — METOPROLOL SUCCINATE 25 MG/1
25 TABLET, EXTENDED RELEASE ORAL DAILY
Qty: 30 TABLET | Refills: 5 | Status: SHIPPED | OUTPATIENT
Start: 2023-08-11

## 2023-08-11 NOTE — PATIENT INSTRUCTIONS
Metoprolol 25mg 1/2 tab daily for 1 week then 1 tab daily  Add lozol 1.25mg daily  Mnoitor blood pressure

## 2023-08-21 ENCOUNTER — TELEPHONE (OUTPATIENT)
Dept: FAMILY MEDICINE CLINIC | Facility: CLINIC | Age: 68
End: 2023-08-21

## 2023-08-21 DIAGNOSIS — I10 PRIMARY HYPERTENSION: Primary | ICD-10-CM

## 2023-08-21 RX ORDER — INDAPAMIDE 1.25 MG/1
1.25 TABLET ORAL EVERY MORNING
Qty: 30 TABLET | Refills: 1 | Status: SHIPPED | OUTPATIENT
Start: 2023-08-21

## 2023-08-21 NOTE — TELEPHONE ENCOUNTER
----- Message from Erwin Chavez sent at 8/21/2023  1:58 PM EDT -----  Regarding: Blood Pressure  Contact: [phone]  New med: Metoprolol succ er 25 mg tablets  I started this medication on Thursday, August 17. I was dizzy at one point on Friday and Saturday. Then bad episode on Sunday. Very dizzy and bad bad headache. Stayed very still and dizziness got better in a little bit but bad headache. I could not bend down or it would be worse. It felt like a lot of pressure in my head. I took Tylenol and after a while, my head felt better. I did not take Metoprolol today, Monday, August 21. Blood Pressure Readings below:  8/18 11:48 am 151/64            10:55 pm 168/75  8/19 9:20 am    155/76             1:00 pm    150/70              4:15 pm    159/81 (dizzy)  8/20  9:28 am    168/75             4:10 pm    154/66 (very dizzy with bad headache)             4:40 pm    151/69 (still headache)             9:20 pm    138/68 (headache gone)  8/21  9:15 am     143/69              12:04 pm. 142/66              1:03 pm    131/66  Also had diarrhea last night and today. Can be reached at [phone].

## 2023-08-25 ENCOUNTER — HOSPITAL ENCOUNTER (OUTPATIENT)
Dept: INFUSION CENTER | Facility: HOSPITAL | Age: 68
End: 2023-08-25
Attending: INTERNAL MEDICINE
Payer: MEDICARE

## 2023-08-25 VITALS
DIASTOLIC BLOOD PRESSURE: 85 MMHG | RESPIRATION RATE: 16 BRPM | SYSTOLIC BLOOD PRESSURE: 178 MMHG | WEIGHT: 131.61 LBS | BODY MASS INDEX: 24.87 KG/M2 | HEART RATE: 94 BPM | TEMPERATURE: 97.7 F | OXYGEN SATURATION: 98 %

## 2023-08-25 DIAGNOSIS — K50.10 CROHN'S DISEASE OF LARGE INTESTINE WITHOUT COMPLICATION (HCC): Primary | ICD-10-CM

## 2023-08-25 PROCEDURE — 96415 CHEMO IV INFUSION ADDL HR: CPT

## 2023-08-25 PROCEDURE — 96413 CHEMO IV INFUSION 1 HR: CPT

## 2023-08-25 RX ORDER — SODIUM CHLORIDE 9 MG/ML
20 INJECTION, SOLUTION INTRAVENOUS ONCE
Status: COMPLETED | OUTPATIENT
Start: 2023-08-25 | End: 2023-08-25

## 2023-08-25 RX ORDER — SODIUM CHLORIDE 9 MG/ML
20 INJECTION, SOLUTION INTRAVENOUS ONCE
OUTPATIENT
Start: 2023-10-20

## 2023-08-25 RX ADMIN — SODIUM CHLORIDE 20 ML/HR: 9 INJECTION, SOLUTION INTRAVENOUS at 10:02

## 2023-08-25 RX ADMIN — INFLIXIMAB 300 MG: 100 INJECTION, POWDER, LYOPHILIZED, FOR SOLUTION INTRAVENOUS at 10:03

## 2023-09-04 PROBLEM — L72.3 SEBACEOUS CYST: Status: ACTIVE | Noted: 2023-09-04

## 2023-09-04 NOTE — ASSESSMENT & PLAN NOTE
Continue amloidipine and increase metoprolol to 25mg 1 tab daily.  Add lozol 1.25mg daily and monitor blood pressure

## 2023-10-03 ENCOUNTER — TELEPHONE (OUTPATIENT)
Dept: HEMATOLOGY ONCOLOGY | Facility: CLINIC | Age: 68
End: 2023-10-03

## 2023-10-05 DIAGNOSIS — M81.8 OTHER OSTEOPOROSIS WITHOUT CURRENT PATHOLOGICAL FRACTURE: ICD-10-CM

## 2023-10-06 RX ORDER — ALENDRONATE SODIUM 70 MG/1
70 TABLET ORAL
Qty: 4 TABLET | Refills: 5 | Status: SHIPPED | OUTPATIENT
Start: 2023-10-06

## 2023-10-10 DIAGNOSIS — C50.112 MALIGNANT NEOPLASM OF CENTRAL PORTION OF LEFT BREAST IN FEMALE, ESTROGEN RECEPTOR POSITIVE: ICD-10-CM

## 2023-10-10 DIAGNOSIS — Z17.0 MALIGNANT NEOPLASM OF CENTRAL PORTION OF LEFT BREAST IN FEMALE, ESTROGEN RECEPTOR POSITIVE: ICD-10-CM

## 2023-10-10 RX ORDER — ANASTROZOLE 1 MG/1
1 TABLET ORAL DAILY
Refills: 5 | OUTPATIENT
Start: 2023-10-10

## 2023-10-12 DIAGNOSIS — C50.112 MALIGNANT NEOPLASM OF CENTRAL PORTION OF LEFT BREAST IN FEMALE, ESTROGEN RECEPTOR POSITIVE: ICD-10-CM

## 2023-10-12 DIAGNOSIS — Z17.0 MALIGNANT NEOPLASM OF CENTRAL PORTION OF LEFT BREAST IN FEMALE, ESTROGEN RECEPTOR POSITIVE: ICD-10-CM

## 2023-10-12 RX ORDER — ANASTROZOLE 1 MG/1
1 TABLET ORAL DAILY
Qty: 30 TABLET | Refills: 5 | Status: SHIPPED | OUTPATIENT
Start: 2023-10-12

## 2023-10-20 ENCOUNTER — HOSPITAL ENCOUNTER (OUTPATIENT)
Dept: INFUSION CENTER | Facility: HOSPITAL | Age: 68
End: 2023-10-20
Attending: INTERNAL MEDICINE
Payer: MEDICARE

## 2023-10-20 VITALS
BODY MASS INDEX: 24.66 KG/M2 | SYSTOLIC BLOOD PRESSURE: 173 MMHG | HEART RATE: 69 BPM | TEMPERATURE: 59.2 F | OXYGEN SATURATION: 100 % | DIASTOLIC BLOOD PRESSURE: 79 MMHG | RESPIRATION RATE: 16 BRPM | WEIGHT: 130.51 LBS

## 2023-10-20 DIAGNOSIS — K50.10 CROHN'S DISEASE OF LARGE INTESTINE WITHOUT COMPLICATION (HCC): Primary | ICD-10-CM

## 2023-10-20 PROCEDURE — 96415 CHEMO IV INFUSION ADDL HR: CPT

## 2023-10-20 PROCEDURE — 96413 CHEMO IV INFUSION 1 HR: CPT

## 2023-10-20 RX ORDER — SODIUM CHLORIDE 9 MG/ML
20 INJECTION, SOLUTION INTRAVENOUS ONCE
OUTPATIENT
Start: 2023-12-15

## 2023-10-20 RX ORDER — SODIUM CHLORIDE 9 MG/ML
20 INJECTION, SOLUTION INTRAVENOUS ONCE
Status: COMPLETED | OUTPATIENT
Start: 2023-10-20 | End: 2023-10-20

## 2023-10-20 RX ADMIN — SODIUM CHLORIDE 20 ML/HR: 0.9 INJECTION, SOLUTION INTRAVENOUS at 09:20

## 2023-10-20 RX ADMIN — INFLIXIMAB 300 MG: 100 INJECTION, POWDER, LYOPHILIZED, FOR SOLUTION INTRAVENOUS at 09:47

## 2023-10-20 NOTE — PROGRESS NOTES
Pt tolerated treatment with no adv reactions; next appt made and list printed for pt who left unit ambulatory with steady gait.

## 2023-10-21 DIAGNOSIS — I10 PRIMARY HYPERTENSION: ICD-10-CM

## 2023-10-22 RX ORDER — INDAPAMIDE 1.25 MG/1
1.25 TABLET ORAL EVERY MORNING
Qty: 30 TABLET | Refills: 5 | Status: SHIPPED | OUTPATIENT
Start: 2023-10-22

## 2023-11-06 ENCOUNTER — TELEPHONE (OUTPATIENT)
Dept: FAMILY MEDICINE CLINIC | Facility: CLINIC | Age: 68
End: 2023-11-06

## 2023-11-06 NOTE — TELEPHONE ENCOUNTER
Pt calling so since she switch indapamide 1.25mg and she was told to stop hydrochlorothiazide (MICROZIDE) 12.5 mg capsule [[id number]]  DISCONTINUED   but she is not sure continue the ampolidine 10mg and she just want to double check and give her a call back

## 2023-11-13 ENCOUNTER — OFFICE VISIT (OUTPATIENT)
Age: 68
End: 2023-11-13
Payer: MEDICARE

## 2023-11-13 VITALS
OXYGEN SATURATION: 99 % | SYSTOLIC BLOOD PRESSURE: 172 MMHG | RESPIRATION RATE: 16 BRPM | HEART RATE: 74 BPM | DIASTOLIC BLOOD PRESSURE: 90 MMHG | HEIGHT: 61 IN | WEIGHT: 132.4 LBS | BODY MASS INDEX: 25 KG/M2

## 2023-11-13 DIAGNOSIS — C56.9 MALIGNANT NEOPLASM OF OVARY, UNSPECIFIED LATERALITY (HCC): ICD-10-CM

## 2023-11-13 DIAGNOSIS — C50.112 MALIGNANT NEOPLASM OF CENTRAL PORTION OF LEFT BREAST IN FEMALE, ESTROGEN RECEPTOR POSITIVE: Primary | ICD-10-CM

## 2023-11-13 DIAGNOSIS — Z17.0 MALIGNANT NEOPLASM OF CENTRAL PORTION OF LEFT BREAST IN FEMALE, ESTROGEN RECEPTOR POSITIVE: Primary | ICD-10-CM

## 2023-11-13 PROCEDURE — 99214 OFFICE O/P EST MOD 30 MIN: CPT | Performed by: INTERNAL MEDICINE

## 2023-11-13 NOTE — PATIENT INSTRUCTIONS
No evidence of cancer recurrence. We will see you back in one year. Please come at least 10 minutes prior to your appointment time.

## 2023-11-13 NOTE — PROGRESS NOTES
Hematology / Oncology Outpatient Follow Up Note    Marco Antonio Lorenzo 79 y.o. female MCZ:[birth year] WNB:[id number]         Date:  11/13/2023    Assessment / Plan:  A 21-year-old postmenopausal woman who has the heterozygote YOLANDA mutation, who has history of stage I ovarian cancer in 2009, status post hysterectomy and bilateral salpingo-oophorectomy resulting in EDER followed by adjuvant chemotherapy with carboplatin and paclitaxel x6. She has no evidence recurrence of ovarian cancer on history today. She continues to follow with GYN Onc. She also has history of ductal carcinoma in situ, grade 2, ER/MS negative disease in her right breast, diagnosed in 2018. She underwent lumpectomy, resulting in EDER. In August 2019, she was found to have stage IA left breast cancer, grade 1, ER 90 % positive, MS negative, HER2 negative disease. She underwent lumpectomy and sentinel lymph node biopsy, resulting in EDER. She is currently on adjuvant hormonal therapy with anastrozole since April 2020 which she is tolerating well. She also continues to follow with Dr. Mehreen Wells. Clinically, she has no evidence for recurrent disease. Most recent mammogram from June 19, 2023 shows stable posttreatment changes in bilateral breast without any evidence of cancer recurrence. I recommended her to continue anastrozole 1 mg once a day. She is in agreement with my recommendations. I will see her again in a year for routine follow-up. Subjective:      HPI:  A 21-year-old postmenopausal woman who has history of stage I ovarian cancer, diagnosed in 2009. She underwent total abdominal hysterectomy and bilateral salpingo-oophorectomy, resulting in EDER followed by 6 cycle of adjuvant chemotherapy with carboplatin and paclitaxel, resulting in cure. She was tested for BRCA gene mutation in 2009 which was negative for deleterious mutation. Her sister was diagnosed with ovarian cancer at the age of 46.   Therefore, she was retested for extended panel of genetic mutation which showed heterozygote YOLANDA mutation. Subsequently, she underwent MRI of the breast which showed abnormality in her right breast.   Biopsy showed DCIS, ER negative, AR negative. Therefore, she underwent lumpectomy by Dr. Grzegorz Gillette in September 11, 2018. She had 1.4 x 1.3 x 1.2 cm of ductal carcinoma in situ, grade 2. There was no evidence of invasive carcinoma. In August 2018, she was found to have stage IA left breast cancer, grade 1, ER 90 % positive, AR negative, HER2 negative disease. She underwent lumpectomy and sentinel lymph node biopsy, resulting in EDER. She completed radiation of the right breast in November 28, 2018 for DCIS and left breast radiation on November 5, 2019. Currently on adjuvant anastrazole since April 2020. Interval History:  A 53-year-old postmenopausal woman who has history of stage I ovarian cancer in 2009, status post hysterectomy and bilateral salpingo-oophorectomy resulting in EDER followed by adjuvant chemotherapy with carboplatin and paclitaxel x6. She has no evidence recurrence of ovarian cancer to date. She has heterozygote YOLANDA mutation. She was diagnosed ductal with carcinoma in situ, grade 2, 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, in 2018  She underwent lumpectomy, resulting in EDER. she received adjuvant radiation. She was then diagnosed with stage I A left breast cancer, grade 1. This was ER 90% positive, AR negative, HER2 negative disease, diagnosed in August 2019. Her tumor size was 3 mm. she received adjuvant radiation. Since April 2020, she has been on adjuvant hormonal therapy with anastrozole. She presents today for routine follow-up. She feels well. She has stable mild musculoskeletal symptoms. She denied hot flashes. Her weight is stable. She denied any pain. She has no respiratory symptoms. Her performance status is normal.       Objective:      Primary Diagnosis:     1.    History of stage I ovarian cancer, diagnosed in 2009. 2.   Heterozygote YOLANDA mutation. 3.   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 Diagnosed in September 2018. 4. Left breast cancer, stage IA (pT1b, pN0, M0) grade 1, ER 90% positive, WY negative, HER2 negative disease. Diagnosed in August 2019. Cancer Staging:  Cancer Staging  Ductal carcinoma in situ (DCIS) of right breast  Staging form: Breast, AJCC 8th Edition  - Pathologic: Stage 0 (pTis (DCIS), pN0, cM0, ER: Negative, WY: Negative) - Unsigned     Ovarian cancer ([address])  Staging form: Ovary, AJCC 7th Edition  - Clinical: FIGO Stage IA (T1a, N0, M0) - Signed by Catrachita Dunn PA-C on 4/19/2018            Current Hematologic/ Oncologic Treatment:       Adjuvant hormonal therapy with anastrozole since April 2020. Disease Status:      EDER status post lumpectomy. Test Results:     Pathology:     Sept 2018: 1.4 x 1.2 x 1.3 cm of ductal carcinoma in situ, grade 2. ER negative, WY negative disease. In August 2019, 3 mm of invasive ductal carcinoma, grade 1. No evidence of lymphovascular invasion. 1 sentinel lymph node was negative for metastatic disease. ER 90% positive, WY negative, HER2 negative disease. Stage IA (pT1b, pN0, M0)     Laboratory:     CBC and CMP are within normal limits. Physical Exam:  General: Alert, oriented. Not in any distress  Chest: Clear to auscultation without any additional sounds   Heart: RRR, No murmurs/gallops  Abdomen: soft, non tender, normal BS  No palpable cervical, axillary nodes palpated  Breast: Lumpectomy scar at outer upper quadrant of the right breast with no palpable abnormality. lumpectomy scar at upper aspect of her left breast with no palpable abnormalities. ROS: Review of Systems   All other systems reviewed and are negative. No vaginal bleeding  C/o joint pains      Imaging: No results found.       Labs:   Lab Results   Component Value Date    WBC 7.55 12/29/2022 HGB 13.5 12/29/2022    HCT 42.3 12/29/2022    MCV 86 12/29/2022     12/29/2022     Lab Results   Component Value Date     12/14/2017    K 3.3 (L) 12/29/2022     12/29/2022    CO2 32 12/29/2022    BUN 24 12/29/2022    CREATININE 0.93 12/29/2022    GLUCOSE 90 12/14/2017    GLUF 95 12/29/2022    CALCIUM 10.2 (H) 12/29/2022    AST 22 12/29/2022    ALT 30 12/29/2022    ALKPHOS 77 12/29/2022    PROT 7.3 12/14/2017    BILITOT 0.4 12/14/2017    EGFR 64 12/29/2022         Lab Results   Component Value Date    IRON 65 08/28/2018    TIBC 336 08/28/2018       Lab Results   Component Value Date    OTDNTBZQ60 335 08/28/2018         Current Medications: Reviewed  Allergies: Reviewed  PMH/FH/SH:  Reviewed      Vital Sign:    There is no height or weight on file to calculate BSA.     Wt Readings from Last 3 Encounters:   10/20/23 59.2 kg (130 lb 8.2 oz)   08/25/23 59.7 kg (131 lb 9.8 oz)   08/11/23 58.1 kg (128 lb)        Temp Readings from Last 3 Encounters:   10/20/23 (!) 59.2 °F (15.1 °C) (Tympanic)   08/25/23 97.7 °F (36.5 °C) (Tympanic)   08/11/23 98.8 °F (37.1 °C) (Tympanic)        BP Readings from Last 3 Encounters:   10/20/23 (!) 173/79   08/25/23 (!) 178/85   08/11/23 150/78         Pulse Readings from Last 3 Encounters:   10/20/23 69   08/25/23 94   08/11/23 89     @LASTSAO2(3)@

## 2023-11-14 DIAGNOSIS — K50.10 CROHN'S DISEASE OF LARGE INTESTINE WITHOUT COMPLICATION (HCC): Primary | ICD-10-CM

## 2023-12-06 ENCOUNTER — APPOINTMENT (OUTPATIENT)
Dept: LAB | Facility: HOSPITAL | Age: 68
End: 2023-12-06
Payer: MEDICARE

## 2023-12-06 DIAGNOSIS — Z11.59 ENCOUNTER FOR SCREENING FOR OTHER VIRAL DISEASES: ICD-10-CM

## 2023-12-06 DIAGNOSIS — K50.10 CROHN'S DISEASE OF LARGE INTESTINE WITHOUT COMPLICATION (HCC): ICD-10-CM

## 2023-12-06 LAB
ALBUMIN SERPL BCP-MCNC: 4.2 G/DL (ref 3.5–5)
ALP SERPL-CCNC: 67 U/L (ref 34–104)
ALT SERPL W P-5'-P-CCNC: 23 U/L (ref 7–52)
ANION GAP SERPL CALCULATED.3IONS-SCNC: 10 MMOL/L
AST SERPL W P-5'-P-CCNC: 25 U/L (ref 13–39)
BILIRUB SERPL-MCNC: 0.72 MG/DL (ref 0.2–1)
BUN SERPL-MCNC: 26 MG/DL (ref 5–25)
CALCIUM SERPL-MCNC: 9.9 MG/DL (ref 8.4–10.2)
CHLORIDE SERPL-SCNC: 96 MMOL/L (ref 96–108)
CHOLEST SERPL-MCNC: 210 MG/DL
CO2 SERPL-SCNC: 34 MMOL/L (ref 21–32)
CREAT SERPL-MCNC: 0.95 MG/DL (ref 0.6–1.3)
ERYTHROCYTE [DISTWIDTH] IN BLOOD BY AUTOMATED COUNT: 13.1 % (ref 11.6–15.1)
ERYTHROCYTE [SEDIMENTATION RATE] IN BLOOD: 64 MM/HOUR (ref 0–29)
GFR SERPL CREATININE-BSD FRML MDRD: 62 ML/MIN/1.73SQ M
GLUCOSE P FAST SERPL-MCNC: 92 MG/DL (ref 65–99)
HBV SURFACE AG SER QL: NORMAL
HCT VFR BLD AUTO: 43.4 % (ref 34.8–46.1)
HDLC SERPL-MCNC: 90 MG/DL
HGB BLD-MCNC: 14.2 G/DL (ref 11.5–15.4)
LDLC SERPL CALC-MCNC: 104 MG/DL (ref 0–100)
MCH RBC QN AUTO: 27.8 PG (ref 26.8–34.3)
MCHC RBC AUTO-ENTMCNC: 32.7 G/DL (ref 31.4–37.4)
MCV RBC AUTO: 85 FL (ref 82–98)
PLATELET # BLD AUTO: 363 THOUSANDS/UL (ref 149–390)
PMV BLD AUTO: 10.5 FL (ref 8.9–12.7)
POTASSIUM SERPL-SCNC: 3.3 MMOL/L (ref 3.5–5.3)
PROT SERPL-MCNC: 8 G/DL (ref 6.4–8.4)
RBC # BLD AUTO: 5.11 MILLION/UL (ref 3.81–5.12)
SODIUM SERPL-SCNC: 140 MMOL/L (ref 135–147)
TRIGL SERPL-MCNC: 79 MG/DL
TSH SERPL DL<=0.05 MIU/L-ACNC: 3.4 UIU/ML (ref 0.45–4.5)
WBC # BLD AUTO: 7.2 THOUSAND/UL (ref 4.31–10.16)

## 2023-12-06 PROCEDURE — 80230 DRUG ASSAY INFLIXIMAB: CPT

## 2023-12-06 PROCEDURE — 86480 TB TEST CELL IMMUN MEASURE: CPT | Performed by: INTERNAL MEDICINE

## 2023-12-06 PROCEDURE — 87340 HEPATITIS B SURFACE AG IA: CPT

## 2023-12-06 PROCEDURE — 85027 COMPLETE CBC AUTOMATED: CPT

## 2023-12-06 PROCEDURE — 85652 RBC SED RATE AUTOMATED: CPT

## 2023-12-06 PROCEDURE — 36415 COLL VENOUS BLD VENIPUNCTURE: CPT

## 2023-12-06 PROCEDURE — 80053 COMPREHEN METABOLIC PANEL: CPT

## 2023-12-06 PROCEDURE — 82397 CHEMILUMINESCENT ASSAY: CPT

## 2023-12-07 ENCOUNTER — LAB REQUISITION (OUTPATIENT)
Dept: LAB | Facility: HOSPITAL | Age: 68
End: 2023-12-07
Payer: MEDICARE

## 2023-12-07 ENCOUNTER — TELEPHONE (OUTPATIENT)
Dept: FAMILY MEDICINE CLINIC | Facility: CLINIC | Age: 68
End: 2023-12-07

## 2023-12-07 DIAGNOSIS — K50.10 CROHN'S DISEASE OF LARGE INTESTINE WITHOUT COMPLICATIONS (HCC): ICD-10-CM

## 2023-12-07 LAB
GAMMA INTERFERON BACKGROUND BLD IA-ACNC: <0 IU/ML
M TB IFN-G BLD-IMP: NEGATIVE
M TB IFN-G CD4+ BCKGRND COR BLD-ACNC: 0 IU/ML
M TB IFN-G CD4+ BCKGRND COR BLD-ACNC: 0 IU/ML
MITOGEN IGNF BCKGRD COR BLD-ACNC: 4.22 IU/ML

## 2023-12-07 NOTE — TELEPHONE ENCOUNTER
----- Message from Seamus Pollack DO sent at 12/6/2023 11:34 PM EST -----  Your cholesterol level is good  Your thyroid level is normal

## 2023-12-12 LAB
INFLIXIMAB AB SERPL-MCNC: <22 NG/ML
INFLIXIMAB SERPL-MCNC: 12 UG/ML

## 2023-12-15 ENCOUNTER — HOSPITAL ENCOUNTER (OUTPATIENT)
Dept: INFUSION CENTER | Facility: HOSPITAL | Age: 68
End: 2023-12-15
Attending: INTERNAL MEDICINE
Payer: MEDICARE

## 2023-12-15 VITALS
HEIGHT: 61 IN | RESPIRATION RATE: 16 BRPM | HEART RATE: 67 BPM | SYSTOLIC BLOOD PRESSURE: 169 MMHG | BODY MASS INDEX: 25.27 KG/M2 | TEMPERATURE: 96.8 F | DIASTOLIC BLOOD PRESSURE: 83 MMHG | WEIGHT: 133.82 LBS | OXYGEN SATURATION: 99 %

## 2023-12-15 DIAGNOSIS — K50.10 CROHN'S DISEASE OF LARGE INTESTINE WITHOUT COMPLICATION (HCC): Primary | ICD-10-CM

## 2023-12-15 PROCEDURE — 96415 CHEMO IV INFUSION ADDL HR: CPT

## 2023-12-15 PROCEDURE — 96413 CHEMO IV INFUSION 1 HR: CPT

## 2023-12-15 RX ORDER — SODIUM CHLORIDE 9 MG/ML
20 INJECTION, SOLUTION INTRAVENOUS ONCE
Status: COMPLETED | OUTPATIENT
Start: 2023-12-15 | End: 2023-12-15

## 2023-12-15 RX ORDER — SODIUM CHLORIDE 9 MG/ML
20 INJECTION, SOLUTION INTRAVENOUS ONCE
OUTPATIENT
Start: 2024-02-09

## 2023-12-15 RX ADMIN — INFLIXIMAB 300 MG: 100 INJECTION, POWDER, LYOPHILIZED, FOR SOLUTION INTRAVENOUS at 09:50

## 2023-12-15 RX ADMIN — SODIUM CHLORIDE 20 ML/HR: 0.9 INJECTION, SOLUTION INTRAVENOUS at 09:45

## 2023-12-15 NOTE — PROGRESS NOTES
Ghada Ferreira  tolerated treatment well with no complications. Anastasia Canas is aware of future appt on 02/09/2024 at 0900. AVS printed and given to Anastasia Canas:  Yes printed and received. Left ambulatory for d/c.

## 2023-12-15 NOTE — PLAN OF CARE
Problem: DISCHARGE PLANNING  Goal: Discharge to home or other facility with appropriate resources  Description: INTERVENTIONS:  - Identify barriers to discharge w/patient and caregiver  - Arrange for needed discharge resources and transportation as appropriate  - Identify discharge learning needs (meds, wound care, etc.)  - Arrange for interpretive services to assist at discharge as needed  - Refer to Case Management Department for coordinating discharge planning if the patient needs post-hospital services based on physician/advanced practitioner order or complex needs related to functional status, cognitive ability, or social support system  12/15/2023 0905 by Janet Kathleen RN  Outcome: Progressing  12/15/2023 0855 by Janet Kathleen RN  Outcome: Progressing     Problem: Knowledge Deficit  Goal: Patient/family/caregiver demonstrates understanding of disease process, treatment plan, medications, and discharge instructions  Description: Complete learning assessment and assess knowledge base.   Interventions:  - Provide teaching at level of understanding  - Provide teaching via preferred learning methods  12/15/2023 0905 by Janet Kathleen RN  Outcome: Progressing  12/15/2023 0855 by Janet Kathleen RN  Outcome: Progressing

## 2023-12-18 ENCOUNTER — OFFICE VISIT (OUTPATIENT)
Dept: GASTROENTEROLOGY | Facility: CLINIC | Age: 68
End: 2023-12-18
Payer: MEDICARE

## 2023-12-18 ENCOUNTER — TELEPHONE (OUTPATIENT)
Dept: GASTROENTEROLOGY | Facility: CLINIC | Age: 68
End: 2023-12-18

## 2023-12-18 VITALS
SYSTOLIC BLOOD PRESSURE: 134 MMHG | DIASTOLIC BLOOD PRESSURE: 86 MMHG | BODY MASS INDEX: 24.73 KG/M2 | WEIGHT: 131 LBS | HEIGHT: 61 IN

## 2023-12-18 DIAGNOSIS — Z11.59 ENCOUNTER FOR SCREENING FOR OTHER VIRAL DISEASES: ICD-10-CM

## 2023-12-18 DIAGNOSIS — K50.10 CROHN'S DISEASE OF LARGE INTESTINE WITHOUT COMPLICATION (HCC): Primary | ICD-10-CM

## 2023-12-18 PROCEDURE — 99213 OFFICE O/P EST LOW 20 MIN: CPT | Performed by: INTERNAL MEDICINE

## 2023-12-18 NOTE — TELEPHONE ENCOUNTER
Scheduled date of colonoscopy (as of today): 2/7/24  Physician performing colonoscopy: Dr. Carey  Location of colonoscopy: [facility]  Bowel prep reviewed with patient:Clenpiq  Instructions reviewed with patient by: Jessica Armenta  Clearances: none

## 2023-12-18 NOTE — PATIENT INSTRUCTIONS
Continue Remicade.  Colonoscopy.  Follow-up office visit 1 year.  Blood work prior to that office visit.  Reach out to me if anything changes

## 2023-12-18 NOTE — PROGRESS NOTES
[facility] - Outpatient Follow-up Note  Ghada Ferreira 68 y.o. female MRN: [id number]  Encounter: [id number]    ASSESSMENT AND PLAN:      1. Crohn's disease of large intestine without complication (HCC)  Crohn's disease history since 1991.  Has been on Remicade for 20 years doing well.  Blood work normal except for mildly increased sed rate.  Infliximab drug level therapeutic.  Infliximab drug antibodies negative.  Last colonoscopy December 2021 .    - Colonoscopy; Future  - sodium picosulfate, magnesium oxide, citric acid (Clenpiq) oral solution; Take 175 mL by mouth once for 1 dose Take 175 mL by mouth once for 1 dose  Dispense: 175 mL; Refill: 0  - CBC; Future  - Quantiferon TB Gold Plus Assay; Future  - Hepatitis B surface antigen; Future  - Comprehensive metabolic panel; Future  - Sedimentation rate, automated; Future  - Hepatitis B surface antigen; Future      Followup Appointment: 1 year  ______________________________________________________________________    Chief Complaint   Patient presents with    Routine follow up-crohn's     HPI: The patient is seen back in the office today.  She has a history of Crohn's disease and was first diagnosed in 1991.  She has been on Remicade for 20 years now.  She reports that she is moving her bowels regularly.  She will occasionally have some loose stools that she usually attributes when she eats the wrong things.  Denies any rectal bleeding.  She denies abdominal pain.  She reports some arthritis in her thumb and her right knee.  She does not recall whether flares of her IBD have been associated with arthritis/arthralgias.  She denies any upper GI symptoms.  Her weight is stable.  She has been tolerating her infusions at [facility] in the [facility]    Historical Information   Past Medical History:   Diagnosis Date    Arthritis     BRCA1 negative     BRCA2 negative     Breast cancer (HCC) 09/11/2018    Right    Breast  cancer (HCC) 08/13/2019    Left    Colon polyp     Crohn disease (HCC)     Dense breast     Genetic predisposition to breast cancer 05/31/2018    History of chemotherapy     for ovarian cancer    History of radiation therapy     History of transfusion 2009    Hyperlipidemia     Hypertension     Lymphedema     left leg    Lymphedema     Monoallelic mutation of YOLANDA gene     Breast Gyn Panel    Ovarian cancer (HCC) 04/21/2009     Past Surgical History:   Procedure Laterality Date    BREAST LUMPECTOMY Right 09/11/2018    BREAST LUMPECTOMY Left 8/13/2019    Procedure: BREAST LUMPECTOMY; BREAST NEEDLE LOCALIZATION (NEEDLE LOC AT 0800);  Surgeon: Lawrence Valdez MD;  Location: [facility];  Service: Surgical Oncology    BREAST LUMPECTOMY W/ NEEDLE LOCALIZATION Left 08/13/2019    COLONOSCOPY      EXPLORATORY LAPAROTOMY      HYSTERECTOMY      LYMPH NODE BIOPSY Left 8/13/2019    Procedure: SENTINEL LYMPH NODE BIOPSY; LYMPHATIC MAPPING WITH BLUE DYE AND RADIOACTIVE DYE (INJECT AT 0930 BY DR VALDEZ IN THE OR);  Surgeon: Lawrence Valdez MD;  Location[facility];  Service: Surgical Oncology    MAMMO NEEDLE LOCALIZATION LEFT (ALL INC) Left 8/13/2019    MAMMO NEEDLE LOCALIZATION RIGHT (ALL INC) Right 9/11/2018    MRI BREAST BIOPSY LEFT (ALL INCLUSIVE) Left 7/18/2019    OMENTECTOMY      CA PERQ DEVICE PLACEMENT BREAST LOC 1ST LES W/GDNCE Right 9/11/2018    Procedure: BREAST LUMPECTOMY; BREAST NEEDLE LOCALIZATION (NEEDLE LOC AT 1200);  Surgeon: Lawrence Valdez MD;  [facility];  Service: Surgical Oncology    TOTAL ABDOMINAL HYSTERECTOMY W/ BILATERAL SALPINGOOPHORECTOMY      US GUIDED BREAST BIOPSY LEFT COMPLETE Left 6/17/2019    US GUIDED BREAST BIOPSY RIGHT COMPLETE Right 8/1/2018    WISDOM TOOTH EXTRACTION       Social History     Substance and Sexual Activity   Alcohol Use Yes    Comment: rarely     Social History     Substance and Sexual Activity   Drug Use No     Social History     Tobacco Use   Smoking Status Former    Current  "packs/day: 0.00    Types: Cigarettes    Quit date: 2009    Years since quittin.7   Smokeless Tobacco Never     Family History   Problem Relation Age of Onset    Hypertension Mother     Heart disease Mother     Prostate cancer Father     Alzheimer's disease Father     Hypertension Father     Ovarian cancer Sister     No Known Problems Sister     Other Sister         pacemaker    Hypertension Sister     Heart disease Sister     No Known Problems Sister     Down syndrome Sister     Alzheimer's disease Sister     Hypertension Sister     Breast cancer Paternal Grandmother     Breast cancer Maternal Aunt     No Known Problems Maternal Aunt     No Known Problems Paternal Aunt     Colon cancer Neg Hx          Current Outpatient Medications:     acetaminophen (TYLENOL) 500 mg tablet    albuterol (PROVENTIL HFA,VENTOLIN HFA) 90 mcg/act inhaler    alendronate (FOSAMAX) 70 mg tablet    amLODIPine (NORVASC) 10 mg tablet    anastrozole (ARIMIDEX) 1 mg tablet    atorvastatin (LIPITOR) 40 mg tablet    Calcium Carbonate 1500 (600 Ca) MG TABS    clobetasol (TEMOVATE) 0.05 % ointment    Diclofenac Sodium (VOLTAREN) 1 %    diphenhydrAMINE (BENADRYL) 25 mg tablet    Homeopathic Products (Arnicare Bruise) GEL    indapamide (LOZOL) 1.25 mg tablet    inFLIXimab (REMICADE) 100 mg    mupirocin (BACTROBAN) 2 % ointment    ofloxacin (OCUFLOX) 0.3 % ophthalmic solution    sodium picosulfate, magnesium oxide, citric acid (Clenpiq) oral solution    Turmeric 500 MG CAPS  No current facility-administered medications for this visit.  Allergies   Allergen Reactions    Lisinopril      cough    Losartan      Numbness on right side of body    Boniva [Ibandronic Acid] Headache     Reviewed medications and allergies and updated as indicated    PHYSICAL EXAM:    Blood pressure 134/86, height 5' 1\" (1.549 m), weight 59.4 kg (131 lb). Body mass index is 24.75 kg/m².  General Appearance: NAD, cooperative, alert  Eyes: Anicteric, PERRLA, EOMI  ENT:  " Normocephalic, atraumatic, normal mucosa.    Neck:  Supple, symmetrical, trachea midline  Resp:  Clear to auscultation bilaterally; no rales, rhonchi or wheezing; respirations unlabored   CV:  S1 S2, Regular rate and rhythm; no murmur, rub, or gallop.  GI:  Soft, non-tender, non-distended; normal bowel sounds; no masses, no organomegaly   Rectal: Deferred  Musculoskeletal: No cyanosis, clubbing or edema. Normal ROM.  Skin:  No jaundice, rashes, or lesions   Heme/Lymph: No palpable cervical lymphadenopathy  Psych: Normal affect, good eye contact  Neuro: No gross deficits, AAOx3    Lab Results:   Lab Results   Component Value Date    WBC 7.20 12/06/2023    HGB 14.2 12/06/2023    HCT 43.4 12/06/2023    MCV 85 12/06/2023     12/06/2023     Lab Results   Component Value Date     12/14/2017    K 3.3 (L) 12/06/2023    CL 96 12/06/2023    CO2 34 (H) 12/06/2023    BUN 26 (H) 12/06/2023    CREATININE 0.95 12/06/2023    GLUCOSE 90 12/14/2017    GLUF 92 12/06/2023    CALCIUM 9.9 12/06/2023    AST 25 12/06/2023    ALT 23 12/06/2023    ALKPHOS 67 12/06/2023    PROT 7.3 12/14/2017    BILITOT 0.4 12/14/2017    EGFR 62 12/06/2023     Lab Results   Component Value Date    IRON 65 08/28/2018    TIBC 336 08/28/2018     Sed rate 64  Infliximab blood level:  12  Infliximab antibodies:  < 22    Radiology Results:   No results found.

## 2024-01-05 DIAGNOSIS — E78.49 OTHER HYPERLIPIDEMIA: ICD-10-CM

## 2024-01-05 RX ORDER — ATORVASTATIN CALCIUM 40 MG/1
40 TABLET, FILM COATED ORAL
Qty: 90 TABLET | Refills: 0 | Status: SHIPPED | OUTPATIENT
Start: 2024-01-05

## 2024-01-05 NOTE — TELEPHONE ENCOUNTER
Hi, this is Pura Ferreira. It's TIANA. My YOB: 1955 and I need a new prescription sent over to the pharmacy. The pharmacy is [facility] and the phone number there is [phone] and what I need, there's a prescription filled for atorvastatin 40 milligram tablet that I take once daily at bedtime and it has in the past been 30 pills. I'd like to change that now to 90 pills and I think that's all that you requested. So if you have any questions, you can always give me a call. My number is [phone]. Thanks very much. sree Pathak.

## 2024-01-09 ENCOUNTER — HOSPITAL ENCOUNTER (OUTPATIENT)
Dept: MRI IMAGING | Facility: HOSPITAL | Age: 69
Discharge: HOME | End: 2024-01-09
Payer: MEDICARE

## 2024-01-09 DIAGNOSIS — Z15.09 MONOALLELIC MUTATION OF ATM GENE: ICD-10-CM

## 2024-01-09 DIAGNOSIS — Z15.89 MONOALLELIC MUTATION OF ATM GENE: ICD-10-CM

## 2024-01-09 DIAGNOSIS — Z15.01 MONOALLELIC MUTATION OF ATM GENE: ICD-10-CM

## 2024-01-09 DIAGNOSIS — Z86.000 HISTORY OF DUCTAL CARCINOMA IN SITU (DCIS) OF BREAST: ICD-10-CM

## 2024-01-09 PROCEDURE — A9585 GADOBUTROL INJECTION: HCPCS

## 2024-01-09 PROCEDURE — G1004 CDSM NDSC: HCPCS

## 2024-01-09 PROCEDURE — C8937 CAD BREAST MRI: HCPCS

## 2024-01-09 PROCEDURE — C8908 MRI W/O FOL W/CONT, BREAST,: HCPCS

## 2024-01-09 RX ORDER — GADOBUTROL 604.72 MG/ML
6 INJECTION INTRAVENOUS
Status: COMPLETED | OUTPATIENT
Start: 2024-01-09 | End: 2024-01-09

## 2024-01-09 RX ADMIN — GADOBUTROL 6 ML: 604.72 INJECTION INTRAVENOUS at 18:50

## 2024-01-10 ENCOUNTER — OFFICE VISIT (OUTPATIENT)
Dept: FAMILY MEDICINE CLINIC | Facility: CLINIC | Age: 69
End: 2024-01-10
Payer: MEDICARE

## 2024-01-10 VITALS
TEMPERATURE: 95.9 F | HEART RATE: 75 BPM | HEIGHT: 61 IN | WEIGHT: 130 LBS | SYSTOLIC BLOOD PRESSURE: 140 MMHG | OXYGEN SATURATION: 100 % | DIASTOLIC BLOOD PRESSURE: 82 MMHG | BODY MASS INDEX: 24.55 KG/M2

## 2024-01-10 DIAGNOSIS — K50.10 CROHN'S DISEASE OF LARGE INTESTINE WITHOUT COMPLICATION (HCC): ICD-10-CM

## 2024-01-10 DIAGNOSIS — Z00.00 MEDICARE ANNUAL WELLNESS VISIT, SUBSEQUENT: Primary | ICD-10-CM

## 2024-01-10 DIAGNOSIS — M25.552 BILATERAL HIP PAIN: ICD-10-CM

## 2024-01-10 DIAGNOSIS — E87.6 HYPOKALEMIA: ICD-10-CM

## 2024-01-10 DIAGNOSIS — M25.551 BILATERAL HIP PAIN: ICD-10-CM

## 2024-01-10 DIAGNOSIS — Z23 ENCOUNTER FOR IMMUNIZATION: ICD-10-CM

## 2024-01-10 DIAGNOSIS — M79.645 PAIN OF LEFT THUMB: ICD-10-CM

## 2024-01-10 DIAGNOSIS — C50.112 MALIGNANT NEOPLASM OF CENTRAL PORTION OF LEFT BREAST IN FEMALE, ESTROGEN RECEPTOR POSITIVE: ICD-10-CM

## 2024-01-10 DIAGNOSIS — L03.90 CELLULITIS, UNSPECIFIED CELLULITIS SITE: ICD-10-CM

## 2024-01-10 DIAGNOSIS — Z17.0 MALIGNANT NEOPLASM OF CENTRAL PORTION OF LEFT BREAST IN FEMALE, ESTROGEN RECEPTOR POSITIVE: ICD-10-CM

## 2024-01-10 DIAGNOSIS — I10 ESSENTIAL HYPERTENSION: ICD-10-CM

## 2024-01-10 DIAGNOSIS — E78.49 OTHER HYPERLIPIDEMIA: ICD-10-CM

## 2024-01-10 DIAGNOSIS — M81.8 OTHER OSTEOPOROSIS WITHOUT CURRENT PATHOLOGICAL FRACTURE: ICD-10-CM

## 2024-01-10 DIAGNOSIS — I10 PRIMARY HYPERTENSION: ICD-10-CM

## 2024-01-10 DIAGNOSIS — Z78.0 POSTMENOPAUSE: ICD-10-CM

## 2024-01-10 DIAGNOSIS — C56.9 MALIGNANT NEOPLASM OF OVARY, UNSPECIFIED LATERALITY (HCC): ICD-10-CM

## 2024-01-10 PROBLEM — R82.90 BAD ODOR OF URINE: Status: RESOLVED | Noted: 2021-05-13 | Resolved: 2024-01-10

## 2024-01-10 PROCEDURE — 90662 IIV NO PRSV INCREASED AG IM: CPT

## 2024-01-10 PROCEDURE — G0438 PPPS, INITIAL VISIT: HCPCS | Performed by: FAMILY MEDICINE

## 2024-01-10 PROCEDURE — 99214 OFFICE O/P EST MOD 30 MIN: CPT | Performed by: FAMILY MEDICINE

## 2024-01-10 PROCEDURE — G0008 ADMIN INFLUENZA VIRUS VAC: HCPCS

## 2024-01-10 RX ORDER — POTASSIUM CHLORIDE 750 MG/1
10 CAPSULE, EXTENDED RELEASE ORAL DAILY
Qty: 90 CAPSULE | Refills: 3 | Status: SHIPPED | OUTPATIENT
Start: 2024-01-10

## 2024-01-10 RX ORDER — INDAPAMIDE 2.5 MG/1
2.5 TABLET ORAL EVERY MORNING
Qty: 90 TABLET | Refills: 3 | Status: SHIPPED | OUTPATIENT
Start: 2024-01-10

## 2024-01-10 RX ORDER — ATORVASTATIN CALCIUM 40 MG/1
40 TABLET, FILM COATED ORAL
Qty: 90 TABLET | Refills: 3 | Status: SHIPPED | OUTPATIENT
Start: 2024-01-10

## 2024-01-10 RX ORDER — ALENDRONATE SODIUM 70 MG/1
70 TABLET ORAL
Qty: 12 TABLET | Refills: 3 | Status: SHIPPED | OUTPATIENT
Start: 2024-01-10

## 2024-01-10 NOTE — ASSESSMENT & PLAN NOTE
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 Pain. Limited rom/strength. Xray left hand. Consider pt/vs ortho hand evaluation

## 2024-01-10 NOTE — PATIENT INSTRUCTIONS
Schedule dexa   Add potassium 10meq 1 tab daily   Recheck blood work in 1 month  Xrays of left hand and hips.  Schedule with ortho after     Potassium Content of Foods List   AMBULATORY CARE:   Potassium  is a mineral that is found in most foods. Potassium helps to balance fluids and minerals in your body. It also helps your body maintain a normal blood pressure. Potassium helps your muscles contract and your nerves function normally.  Why you may need to change the amount of potassium you eat:   You may need more potassium  if you have hypokalemia (low potassium levels) or high blood pressure. You may also need more potassium if you are taking diuretics. Diuretics and certain medicines cause your body to lose potassium.    You may need less potassium  in your diet if you have hyperkalemia (high potassium levels) or kidney disease.    Potassium content of fruit:  The amount of potassium in milligrams (mg) contained in each fruit or serving of fruit is listed beside the item.  High-potassium foods (more than 200 mg per serving):      1 medium banana (425)    ½ of a papaya (390)    ½ cup of prune juice (370)    ¼ cup of raisins (270)    1 medium bonnie (325) or kiwi (240)    1 small orange (240) or ½ cup of orange juice (235)    ½ cup of cubed cantaloupe (215) or diced honeydew melon (200)    1 medium pear (200)    Medium-potassium foods (50 to 200 mg per serving):      1 medium peach (185)    1 small apple or ½ cup of apple juice (150)    ½ cup of peaches canned in juice (120)    ½ cup of canned pineapple (100)    ½ cup of fresh, sliced strawberries (125)    ½ cup of watermelon (85)    Low-potassium foods (less than 50 mg per serving):      ½ cup of cranberries (45) or cranberry juice cocktail (20)    ½ cup of nectar of papaya, bonnie, or pear (35)    Potassium content of vegetables:   High-potassium foods (more than 200 mg per serving):      1 medium baked potato, with skin (925)    1 baked medium sweet potato, with  skin (450)    ½ cup of tomato or vegetable juice (275), or 1 medium raw tomato (290)    ½ cup of mushrooms (280)    ½ cup of fresh brussels sprouts (250)    ½ cup of cooked zucchini (220) or winter squash (250)    ¼ of a medium avocado (245)    ½ cup of broccoli (230)    Medium-potassium foods (50 to 200 mg per serving):      ½ cup of corn (195)    ½ cup of fresh or cooked carrots (180)    ½ cup of fresh cauliflower (150)    ½ cup of asparagus (155)    ½ cup of canned peas (90)     1 cup of lettuce, all types (100)    ½ cup of fresh green beans (90)    ½ cup of frozen green beans (85)    ½ cup of cucumber (80)    Potassium content of protein foods:   High-potassium foods (more than 200 mg per serving):      ½ cup of cooked burris beans (400) or lentils (365)    1 cup of soy milk (300)    3 ounces of baked or broiled salmon (319)    3 ounces of roasted turkey, dark meat (250)    ¼ cup of sunflower seeds (241)    3 ounces of cooked lean beef (224)    2 tablespoons of smooth peanut butter (210)    Medium-potassium foods (50 to 200 mg per serving):      1 ounce of salted peanuts, almonds, or cashews (200)    1 large egg (60 mg)    Potassium content of dairy foods:   High-potassium foods (more than 200 mg per serving):      6 ounces of yogurt (260 to 435)    1 cup of nonfat, low-fat, or whole milk (350 to 380)    Medium-potassium foods (50 to 200 mg per serving):      ½ cup of ricotta cheese (154)    ½ cup of vanilla ice cream (131)    ½ cup of low-fat (2%) cottage cheese (110)    Low-potassium foods (less than 50 mg per serving):      1 ounce of cheese (20 to 30)      Potassium content of grains:   1 slice of white bread (30)    ½ cup of white or brown rice (50)    ½ cup of spaghetti or macaroni (30)    1 flour or corn tortilla (50)    1 four-inch waffle (50)    Potassium content of other foods:   1 tablespoon of molasses (295)    1½ ounces of chocolate (165)    Some salt substitutes may contain a high amount of  potassium. Check the food label to find the amount of potassium it contains.    © Copyright [employer] 2023 Information is for End User's use only and may not be sold, redistributed or otherwise used for commercial purposes.  The above information is an  only. It is not intended as medical advice for individual conditions or treatments. Talk to your doctor, nurse or pharmacist before following any medical regimen to see if it is safe and effective for you.  Medicare Preventive Visit Patient Instructions  Thank you for completing your Welcome to Medicare Visit or Medicare Annual Wellness Visit today. Your next wellness visit will be due in one year (1/10/2025).  The screening/preventive services that you may require over the next 5-10 years are detailed below. Some tests may not apply to you based off risk factors and/or age. Screening tests ordered at today's visit but not completed yet may show as past due. Also, please note that scanned in results may not display below.  Preventive Screenings:  Service Recommendations Previous Testing/Comments   Colorectal Cancer Screening  * Colonoscopy    * Fecal Occult Blood Test (FOBT)/Fecal Immunochemical Test (FIT)  * Fecal DNA/Cologuard Test  * Flexible Sigmoidoscopy Age: 45-75 years old   Colonoscopy: every 10 years (may be performed more frequently if at higher risk)  OR  FOBT/FIT: every 1 year  OR  Cologuard: every 3 years  OR  Sigmoidoscopy: every 5 years  Screening may be recommended earlier than age 45 if at higher risk for colorectal cancer. Also, an individualized decision between you and your healthcare provider will decide whether screening between the ages of 76-85 would be appropriate. Colonoscopy: 10/08/2021  FOBT/FIT: Not on file  Cologuard: Not on file  Sigmoidoscopy: Not on file    Screening Current     Breast Cancer Screening Age: 40+ years old  Frequency: every 1-2 years  Not required if history of left and right mastectomy Mammogram:  06/19/2023    History Breast Cancer   Cervical Cancer Screening Between the ages of 21-29, pap smear recommended once every 3 years.   Between the ages of 30-65, can perform pap smear with HPV co-testing every 5 years.   Recommendations may differ for women with a history of total hysterectomy, cervical cancer, or abnormal pap smears in past. Pap Smear: Not on file    Screening Not Indicated   Hepatitis C Screening Once for adults born between 1945 and 1965  More frequently in patients at high risk for Hepatitis C Hep C Antibody: 12/14/2017    Screening Current   Diabetes Screening 1-2 times per year if you're at risk for diabetes or have pre-diabetes Fasting glucose: 92 mg/dL (12/6/2023)  A1C: No results in last 5 years (No results in last 5 years)  Screening Current   Cholesterol Screening Once every 5 years if you don't have a lipid disorder. May order more often based on risk factors. Lipid panel: 12/06/2023    Screening Not Indicated  History Lipid Disorder     Other Preventive Screenings Covered by Medicare:  Abdominal Aortic Aneurysm (AAA) Screening: covered once if your at risk. You're considered to be at risk if you have a family history of AAA.  Lung Cancer Screening: covers low dose CT scan once per year if you meet all of the following conditions: (1) Age 55-77; (2) No signs or symptoms of lung cancer; (3) Current smoker or have quit smoking within the last 15 years; (4) You have a tobacco smoking history of at least 20 pack years (packs per day multiplied by number of years you smoked); (5) You get a written order from a healthcare provider.  Glaucoma Screening: covered annually if you're considered high risk: (1) You have diabetes OR (2) Family history of glaucoma OR (3)  aged 50 and older OR (4)  American aged 65 and older  Osteoporosis Screening: covered every 2 years if you meet one of the following conditions: (1) You're estrogen deficient and at risk for osteoporosis based  off medical history and other findings; (2) Have a vertebral abnormality; (3) On glucocorticoid therapy for more than 3 months; (4) Have primary hyperparathyroidism; (5) On osteoporosis medications and need to assess response to drug therapy.   Last bone density test (DXA Scan): 10/25/2021.  HIV Screening: covered annually if you're between the age of 15-65. Also covered annually if you are younger than 15 and older than 65 with risk factors for HIV infection. For pregnant patients, it is covered up to 3 times per pregnancy.    Immunizations:  Immunization Recommendations   Influenza Vaccine Annual influenza vaccination during flu season is recommended for all persons aged >= 6 months who do not have contraindications   Pneumococcal Vaccine   * Pneumococcal conjugate vaccine = PCV13 (Prevnar 13), PCV15 (Vaxneuvance), PCV20 (Prevnar 20)  * Pneumococcal polysaccharide vaccine = PPSV23 (Pneumovax) Adults 19-65 yo with certain risk factors or if 65+ yo  If never received any pneumonia vaccine: recommend Prevnar 20 (PCV20)  Give PCV20 if previously received 1 dose of PCV13 or PPSV23   Hepatitis B Vaccine 3 dose series if at intermediate or high risk (ex: diabetes, end stage renal disease, liver disease)   Respiratory syncytial virus (RSV) Vaccine - COVERED BY MEDICARE PART D  * RSVPreF3 (Arexvy) CDC recommends that adults 60 years of age and older may receive a single dose of RSV vaccine using shared clinical decision-making (SCDM)   Tetanus (Td) Vaccine - COST NOT COVERED BY MEDICARE PART B Following completion of primary series, a booster dose should be given every 10 years to maintain immunity against tetanus. Td may also be given as tetanus wound prophylaxis.   Tdap Vaccine - COST NOT COVERED BY MEDICARE PART B Recommended at least once for all adults. For pregnant patients, recommended with each pregnancy.   Shingles Vaccine (Shingrix) - COST NOT COVERED BY MEDICARE PART B  2 shot series recommended in those 19  years and older who have or will have weakened immune systems or those 50 years and older     Health Maintenance Due:      Topic Date Due    Breast Cancer Screening: Mammogram  06/19/2024    Colorectal Cancer Screening  10/08/2024    Hepatitis C Screening  Completed     Immunizations Due:      Topic Date Due    Pneumococcal Vaccine: 65+ Years (2 - PPSV23 or PCV20) 01/11/2017    Influenza Vaccine (1) 09/01/2023    COVID-19 Vaccine (4 - 2023-24 season) 09/01/2023     Advance Directives   What are advance directives?  Advance directives are legal documents that state your wishes and plans for medical care. These plans are made ahead of time in case you lose your ability to make decisions for yourself. Advance directives can apply to any medical decision, such as the treatments you want, and if you want to donate organs.   What are the types of advance directives?  There are many types of advance directives, and each state has rules about how to use them. You may choose a combination of any of the following:  Living will:  This is a written record of the treatment you want. You can also choose which treatments you do not want, which to limit, and which to stop at a certain time. This includes surgery, medicine, IV fluid, and tube feedings.   Durable power of  for healthcare (DPAHC):  This is a written record that states who you want to make healthcare choices for you when you are unable to make them for yourself. This person, called a proxy, is usually a family member or a friend. You may choose more than 1 proxy.  Do not resuscitate (DNR) order:  A DNR order is used in case your heart stops beating or you stop breathing. It is a request not to have certain forms of treatment, such as CPR. A DNR order may be included in other types of advance directives.  Medical directive:  This covers the care that you want if you are in a coma, near death, or unable to make decisions for yourself. You can list the treatments  you want for each condition. Treatment may include pain medicine, surgery, blood transfusions, dialysis, IV or tube feedings, and a ventilator (breathing machine).  Values history:  This document has questions about your views, beliefs, and how you feel and think about life. This information can help others choose the care that you would choose.  Why are advance directives important?  An advance directive helps you control your care. Although spoken wishes may be used, it is better to have your wishes written down. Spoken wishes can be misunderstood, or not followed. Treatments may be given even if you do not want them. An advance directive may make it easier for your family to make difficult choices about your care.   Urinary Incontinence   Urinary incontinence (UI)  is when you lose control of your bladder. UI develops because your bladder cannot store or empty urine properly. The 3 most common types of UI are stress incontinence, urge incontinence, or both.  Medicines:   May be given to help strengthen your bladder control. Report any side effects of medication to your healthcare provider.  Do pelvic muscle exercises often:  Your pelvic muscles help you stop urinating. Squeeze these muscles tight for 5 seconds, then relax for 5 seconds. Gradually work up to squeezing for 10 seconds. Do 3 sets of 15 repetitions a day, or as directed. This will help strengthen your pelvic muscles and improve bladder control.  Train your bladder:  Go to the bathroom at set times, such as every 2 hours, even if you do not feel the urge to go. You can also try to hold your urine when you feel the urge to go. For example, hold your urine for 5 minutes when you feel the urge to go. As that becomes easier, hold your urine for 10 minutes.   Self-care:   Keep a UI record.  Write down how often you leak urine and how much you leak. Make a note of what you were doing when you leaked urine.  Drink liquids as directed. You may need to limit  the amount of liquid you drink to help control your urine leakage. Do not drink any liquid right before you go to bed. Limit or do not have drinks that contain caffeine or alcohol.   Prevent constipation.  Eat a variety of high-fiber foods. Good examples are high-fiber cereals, beans, vegetables, and whole-grain breads. Walking is the best way to trigger your intestines to have a bowel movement.  Exercise regularly and maintain a healthy weight.  Weight loss and exercise will decrease pressure on your bladder and help you control your leakage.   Use a catheter as directed  to help empty your bladder. A catheter is a tiny, plastic tube that is put into your bladder to drain your urine.   Go to behavior therapy as directed.  Behavior therapy may be used to help you learn to control your urge to urinate.     © Copyright [employer] 2018 Information is for End User's use only and may not be sold, redistributed or otherwise used for commercial purposes. All illustrations and images included in CareNotes® are the copyrighted property of [employer] or [employer]

## 2024-01-10 NOTE — PROGRESS NOTES
Assessment and Plan:     Problem List Items Addressed This Visit          Digestive    Crohn's disease of large bowel (HCC)     Follows with GI            Endocrine    Malignant neoplasm of ovary (HCC)       Cardiovascular and Mediastinum    Primary hypertension     Uncontrolled. Pt on amlodipine  Had intolerance to lisinopril, losartan, metoprolol  Tolerating indapamide 1.25mg will increase to 2.5mg daily  Add potassium 10meq daily - recheck potassium 1 month          Relevant Medications    indapamide (LOZOL) 2.5 mg tablet       Musculoskeletal and Integument    Osteoporosis     Due for new dexa scan. Currently on fosamax          Relevant Medications    alendronate (FOSAMAX) 70 mg tablet       Other    Hyperlipidemia     Stable on atrovastatin, continue         Relevant Medications    atorvastatin (LIPITOR) 40 mg tablet    Malignant neoplasm of central portion of left breast in female, estrogen receptor positive      Had mri yesterday. Follow up with oncology          Medicare annual wellness visit, subsequent - Primary    Encounter for immunization    Relevant Orders    influenza vaccine, high-dose, PF 0.7 mL (FLUZONE HIGH-DOSE) (Completed)    Hypokalemia     Add potassium 10meq daily and given potassium containing foods recheck 4-6 weeks          Relevant Medications    potassium chloride (MICRO-K) 10 MEQ CR capsule    Other Relevant Orders    Basic metabolic panel    Postmenopause    Relevant Orders    DXA bone density spine hip and pelvis    Pain of left thumb     Deformity left mp without known injury. Pain. Limited rom/strength. Xray left hand. Consider pt/vs ortho hand evaluation         Relevant Orders    XR hand 3+ vw left    Bilateral hip pain     Xray bilateral hips r/o arthritis, consider physical therapy for rom         Relevant Orders    XR hips bilateral 2 vw w pelvis if performed     Other Visit Diagnoses       Cellulitis, unspecified cellulitis site        Relevant Medications    mupirocin  (BACTROBAN) 2 % ointment            Depression Screening and Follow-up Plan: Patient was screened for depression during today's encounter. They screened negative with a PHQ-2 score of 0.    Urinary Incontinence Plan of Care: counseling topics discussed: practice Kegel (pelvic floor strengthening) exercises, limit alcohol, caffeine, spicy foods, and acidic foods, limiting fluid intake 3-4 hours before bed and preventing constipation.       Preventive health issues were discussed with patient, and age appropriate screening tests were ordered as noted in patient's After Visit Summary.  Personalized health advice and appropriate referrals for health education or preventive services given if needed, as noted in patient's After Visit Summary.     History of Present Illness:     Patient presents for a Medicare Wellness Visit    Pt seen for medicare wellness. Has multiple issues to discuss.  Pt has been checking at home and has been high.140-160 systolic. Diastolic ok. No symptoms when bp is high, no chest pain, no shortness of breath, no diaphoresis  Had mri breasts bilateral last night in follow up   Complains of intermittent chest pain, only at rest, does not last. Not with exertion.  Complains of chronic hip pain, right knee pain, thought it was her knee but noticed not able to move hips well.   Left hand thumb has limited range of motion and joint is larger - but denies specific injury to joint . Noticed about 1 month.   Intermittent right sided neck pain.        Patient Care Team:  Thao Jones DO as PCP - General (Family Medicine)  Arik Alfaro MD (Radiation Oncology)  Keith Alcantar MD (Hematology and Oncology)  MD Lawrence Mon MD as Surgeon (Surgical Oncology)  Arik Rojas MD (Gynecologic Oncology)  Maru Pierce RN as Nurse Navigator (Oncology)     Review of Systems:     Review of Systems   Constitutional: Negative.  Negative for fatigue and fever.   HENT:  Negative.     Eyes: Negative.    Respiratory: Negative.  Negative for cough.    Cardiovascular:  Positive for leg swelling.   Gastrointestinal: Negative.    Endocrine: Negative.    Genitourinary: Negative.    Musculoskeletal:  Positive for arthralgias, gait problem and myalgias.   Skin: Negative.    Allergic/Immunologic: Negative.    Psychiatric/Behavioral: Negative.          Problem List:     Patient Active Problem List   Diagnosis    Colon, diverticulosis    Crohn's disease (HCC)    Dense breasts    Dermatitis    Erythema chronica migrans, secondary    Hyperlipidemia    Lymphedema    Murmur    Osteopenia    Osteoporosis    History of ovarian cancer    Reactive airway disease    Shortness of breath on exertion    Lymphedema of left lower extremity    Genetic predisposition to breast cancer    History of ductal carcinoma in situ (DCIS) of breast    Encounter for follow-up surveillance of ovarian cancer    Lichen sclerosus et atrophicus    Primary hypertension    Malignant neoplasm of ovary (HCC)    Nausea    TIA (transient ischemic attack)    Malignant neoplasm of central portion of left breast in female, estrogen receptor positive     Use of anastrozole    History of breast cancer    Carpal tunnel syndrome of right wrist    Neuropathy    Monoallelic mutation of YOLANDA gene    Right calf pain    Primary osteoarthritis of right knee    Effusion of right knee    Primary osteoarthritis of left knee    Pes anserinus bursitis of right knee    Bilateral carpal tunnel syndrome    Urine frequency    Benign neoplasm of colon    Crohn's disease of large bowel (HCC)    Mitral valve disorder    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    Sebaceous cyst    Medicare annual wellness visit, subsequent    Encounter for immunization    Hypokalemia    Postmenopause    Pain of left thumb    Bilateral hip pain      Past Medical and Surgical History:     Past Medical History:   Diagnosis Date    Arthritis     BRCA1 negative      BRCA2 negative     Breast cancer (HCC) 09/11/2018    Right    Breast cancer (HCC) 08/13/2019    Left    Colon polyp     Crohn disease (HCC)     Dense breast     Genetic predisposition to breast cancer 05/31/2018    History of chemotherapy     for ovarian cancer    History of radiation therapy     History of transfusion 2009    Hyperlipidemia     Hypertension     Lymphedema     left leg    Lymphedema     Monoallelic mutation of YOLANDA gene     Breast Gyn Panel    Ovarian cancer (HCC) 04/21/2009     Past Surgical History:   Procedure Laterality Date    BREAST LUMPECTOMY Right 09/11/2018    BREAST LUMPECTOMY Left 8/13/2019    Procedure: BREAST LUMPECTOMY; BREAST NEEDLE LOCALIZATION (NEEDLE LOC AT 0800);  Surgeon: Lawrence Valdez MD;  Location: [facility];  Service: Surgical Oncology    BREAST LUMPECTOMY W/ NEEDLE LOCALIZATION Left 08/13/2019    COLONOSCOPY      EXPLORATORY LAPAROTOMY      HYSTERECTOMY      LYMPH NODE BIOPSY Left 8/13/2019    Procedure: SENTINEL LYMPH NODE BIOPSY; LYMPHATIC MAPPING WITH BLUE DYE AND RADIOACTIVE DYE (INJECT AT 0930 BY DR VALDEZ IN THE OR);  Surgeon: Lawrence Valdez MD;  Location: [facility];  Service: Surgical Oncology    MAMMO NEEDLE LOCALIZATION LEFT (ALL INC) Left 8/13/2019    MAMMO NEEDLE LOCALIZATION RIGHT (ALL INC) Right 9/11/2018    MRI BREAST BIOPSY LEFT (ALL INCLUSIVE) Left 7/18/2019    OMENTECTOMY      AK PERQ DEVICE PLACEMENT BREAST LOC 1ST LES W/GDNCE Right 9/11/2018    Procedure: BREAST LUMPECTOMY; BREAST NEEDLE LOCALIZATION (NEEDLE LOC AT 1200);  Surgeon: Lawrence Valdez MD;  [facility];  Service: Surgical Oncology    TOTAL ABDOMINAL HYSTERECTOMY W/ BILATERAL SALPINGOOPHORECTOMY      US GUIDED BREAST BIOPSY LEFT COMPLETE Left 6/17/2019    US GUIDED BREAST BIOPSY RIGHT COMPLETE Right 8/1/2018    WISDOM TOOTH EXTRACTION        Family History:     Family History   Problem Relation Age of Onset    Hypertension Mother     Heart disease Mother     Prostate cancer Father     Alzheimer's  disease Father     Hypertension Father     Ovarian cancer Sister     No Known Problems Sister     Other Sister         pacemaker    Hypertension Sister     Heart disease Sister     No Known Problems Sister     Down syndrome Sister     Alzheimer's disease Sister     Hypertension Sister     Breast cancer Paternal Grandmother     Breast cancer Maternal Aunt     No Known Problems Maternal Aunt     No Known Problems Paternal Aunt     Colon cancer Neg Hx       Social History:     Social History     Socioeconomic History    Marital status: Single     Spouse name: None    Number of children: None    Years of education: None    Highest education level: None   Occupational History    None   Tobacco Use    Smoking status: Former     Current packs/day: 0.00     Types: Cigarettes     Quit date: 2009     Years since quittin.7    Smokeless tobacco: Never   Vaping Use    Vaping status: Never Used   Substance and Sexual Activity    Alcohol use: Yes     Comment: rarely    Drug use: No    Sexual activity: None   Other Topics Concern    None   Social History Narrative    None     Social Determinants of Health     Financial Resource Strain: Low Risk  (1/10/2024)    Overall Financial Resource Strain (CARDIA)     Difficulty of Paying Living Expenses: Not hard at all   Food Insecurity: Not on file   Transportation Needs: No Transportation Needs (1/10/2024)    PRAPARE - Transportation     Lack of Transportation (Medical): No     Lack of Transportation (Non-Medical): No   Physical Activity: Not on file   Stress: Not on file   Social Connections: Not on file   Intimate Partner Violence: Not on file   Housing Stability: Not on file      Medications and Allergies:     Current Outpatient Medications   Medication Sig Dispense Refill    acetaminophen (TYLENOL) 500 mg tablet Take 1,000 mg by mouth as needed Pre-Medication prior to Remicade Infusion      albuterol (PROVENTIL HFA,VENTOLIN HFA) 90 mcg/act inhaler Inhale 2 puffs  as needed in  the morning for wheezing. 18 g 2    alendronate (FOSAMAX) 70 mg tablet Take 1 tablet (70 mg total) by mouth every 7 days 12 tablet 3    amLODIPine (NORVASC) 10 mg tablet Take 1 tablet (10 mg total) by mouth daily 90 tablet 3    anastrozole (ARIMIDEX) 1 mg tablet TAKE 1 TABLET (1 MG TOTAL) BY MOUTH DAILY 30 tablet 5    atorvastatin (LIPITOR) 40 mg tablet Take 1 tablet (40 mg total) by mouth daily at bedtime 90 tablet 3    Calcium Carbonate 1500 (600 Ca) MG TABS Take 1 tablet by mouth 2 (two) times a day      clobetasol (TEMOVATE) 0.05 % ointment Apply to the affected area nightly x 4-6 weeks, then 1-2x week thereafter 60 g 1    Diclofenac Sodium (VOLTAREN) 1 % Apply 2 g topically 4 (four) times a day PRN      diphenhydrAMINE (BENADRYL) 25 mg tablet Take 25 mg by mouth as needed Pre-Medication prior to Remicade Infusion      Homeopathic Products (Arnicare Bruise) GEL Apply topically      indapamide (LOZOL) 2.5 mg tablet Take 1 tablet (2.5 mg total) by mouth every morning 90 tablet 3    inFLIXimab (REMICADE) 100 mg Infuse into a venous catheter        mupirocin (BACTROBAN) 2 % ointment Apply topically 3 (three) times a day 22 g 0    ofloxacin (OCUFLOX) 0.3 % ophthalmic solution ADMINISTER 1 DROP TO BOTH EYES 4 (FOUR) TIMES A DAY 5 mL 0    potassium chloride (MICRO-K) 10 MEQ CR capsule Take 1 capsule (10 mEq total) by mouth daily 90 capsule 3    Turmeric 500 MG CAPS Take by mouth       No current facility-administered medications for this visit.     Allergies   Allergen Reactions    Lisinopril      cough    Losartan      Numbness on right side of body    Metoprolol Dizziness    Boniva [Ibandronic Acid] Headache      Immunizations:     Immunization History   Administered Date(s) Administered    COVID-19 PFIZER VACCINE 0.3 ML IM 04/17/2021, 05/12/2021, 01/16/2022    Influenza Quadrivalent, 6-35 Months IM 10/18/2016, 12/07/2017    Influenza, high dose seasonal 0.7 mL 01/10/2024    Influenza, recombinant,  quadrivalent,injectable, preservative free 11/24/2020    Pneumococcal Conjugate 13-Valent 11/11/2015, 11/16/2016    Tdap 01/01/2006, 11/16/2016      Health Maintenance:         Topic Date Due    Breast Cancer Screening: Mammogram  06/19/2024    Colorectal Cancer Screening  10/08/2024    Hepatitis C Screening  Completed         Topic Date Due    Pneumococcal Vaccine: 65+ Years (2 - PPSV23 or PCV20) 01/11/2017    COVID-19 Vaccine (4 - 2023-24 season) 09/01/2023      Medicare Screening Tests and Risk Assessments:     Ghada is here for her Subsequent Wellness visit. Last Medicare Wellness visit information reviewed, patient interviewed and updates made to the record today.      Health Risk Assessment:   Patient rates overall health as fair. Patient feels that their physical health rating is slightly worse. Patient is satisfied with their life. Eyesight was rated as slightly worse. Hearing was rated as slightly worse. Patient feels that their emotional and mental health rating is same. Patients states they are never, rarely angry. Patient states they are often unusually tired/fatigued. Pain experienced in the last 7 days has been some. Patient's pain rating has been 4/10. Patient states that she has experienced no weight loss or gain in last 6 months.     Depression Screening:   PHQ-2 Score: 0      Fall Risk Screening:   In the past year, patient has experienced: no history of falling in past year      Urinary Incontinence Screening:   Patient has leaked urine accidently in the last six months.     Home Safety:  Patient has trouble with stairs inside or outside of their home. Patient has working smoke alarms and has no working carbon monoxide detector. Home safety hazards include: household clutter.     Nutrition:   Current diet is Regular.     Medications:   Patient is currently taking over-the-counter supplements. OTC medications include: see medication list. Patient is able to manage medications.     Activities of  Daily Living (ADLs)/Instrumental Activities of Daily Living (IADLs):   Walk and transfer into and out of bed and chair?: Yes  Dress and groom yourself?: Yes    Bathe or shower yourself?: Yes    Feed yourself? Yes  Do your laundry/housekeeping?: Yes  Manage your money, pay your bills and track your expenses?: Yes  Make your own meals?: Yes    Do your own shopping?: Yes    Previous Hospitalizations:   Any hospitalizations or ED visits within the last 12 months?: No      Advance Care Planning:   Living will: No    Durable POA for healthcare: No    Advanced directive: No      PREVENTIVE SCREENINGS      Cardiovascular Screening:    General: Screening Not Indicated and History Lipid Disorder      Diabetes Screening:     General: Screening Current      Colorectal Cancer Screening:     General: Screening Current      Breast Cancer Screening:     General: History Breast Cancer      Cervical Cancer Screening:    General: Screening Not Indicated      Osteoporosis Screening:    General: Screening Not Indicated and History Osteoporosis      Lung Cancer Screening:     General: Screening Not Indicated      Hepatitis C Screening:    General: Screening Current    Screening, Brief Intervention, and Referral to Treatment (SBIRT)    Screening  Typical number of drinks in a day: 0  Typical number of drinks in a week: 0  Interpretation: Low risk drinking behavior.    AUDIT-C Screenin) How often did you have a drink containing alcohol in the past year? monthly or less  2) How many drinks did you have on a typical day when you were drinking in the past year? 1 to 2  3) How often did you have 6 or more drinks on one occasion in the past year? never    AUDIT-C Score: 1  Interpretation: Score 0-2 (female): Negative screen for alcohol misuse    Single Item Drug Screening:  How often have you used an illegal drug (including marijuana) or a prescription medication for non-medical reasons in the past year? never    Single Item Drug Screen  "Score: 0  Interpretation: Negative screen for possible drug use disorder    No results found.     Physical Exam:     /82   Pulse 75   Temp (!) 95.9 °F (35.5 °C) (Tympanic)   Ht 5' 1\" (1.549 m)   Wt 59 kg (130 lb)   SpO2 100%   BMI 24.56 kg/m²     Physical Exam  Musculoskeletal:         General: Swelling, tenderness and deformity present.      Right lower leg: Edema present.      Left lower leg: Edema present.      Comments: Left first mp joint with deformity, tenderness           Thao Jones DO  "

## 2024-01-10 NOTE — ASSESSMENT & PLAN NOTE
Uncontrolled. Pt on amlodipine  Had intolerance to lisinopril, losartan, metoprolol  Tolerating indapamide 1.25mg will increase to 2.5mg daily  Add potassium 10meq daily - recheck potassium 1 month

## 2024-01-29 ENCOUNTER — OFFICE VISIT (OUTPATIENT)
Dept: SURGICAL ONCOLOGY | Facility: CLINIC | Age: 69
End: 2024-01-29
Payer: MEDICARE

## 2024-01-29 VITALS
DIASTOLIC BLOOD PRESSURE: 82 MMHG | TEMPERATURE: 97.2 F | OXYGEN SATURATION: 100 % | WEIGHT: 123.46 LBS | HEART RATE: 78 BPM | HEIGHT: 61 IN | SYSTOLIC BLOOD PRESSURE: 144 MMHG | RESPIRATION RATE: 14 BRPM | BODY MASS INDEX: 23.31 KG/M2

## 2024-01-29 DIAGNOSIS — Z17.0 MALIGNANT NEOPLASM OF CENTRAL PORTION OF LEFT BREAST IN FEMALE, ESTROGEN RECEPTOR POSITIVE: ICD-10-CM

## 2024-01-29 DIAGNOSIS — Z79.811 USE OF ANASTROZOLE: ICD-10-CM

## 2024-01-29 DIAGNOSIS — Z86.000 HISTORY OF DUCTAL CARCINOMA IN SITU (DCIS) OF BREAST: Primary | ICD-10-CM

## 2024-01-29 DIAGNOSIS — Z15.01 MONOALLELIC MUTATION OF ATM GENE: ICD-10-CM

## 2024-01-29 DIAGNOSIS — Z15.09 MONOALLELIC MUTATION OF ATM GENE: ICD-10-CM

## 2024-01-29 DIAGNOSIS — Z15.89 MONOALLELIC MUTATION OF ATM GENE: ICD-10-CM

## 2024-01-29 DIAGNOSIS — C50.112 MALIGNANT NEOPLASM OF CENTRAL PORTION OF LEFT BREAST IN FEMALE, ESTROGEN RECEPTOR POSITIVE: ICD-10-CM

## 2024-01-29 PROCEDURE — 99214 OFFICE O/P EST MOD 30 MIN: CPT

## 2024-01-29 NOTE — PROGRESS NOTES
Surgical Oncology Follow Up       [facility] [place]  [address]  [place] [address]    Ghada Ferreira  1955  [id number]  [facility] [place]  [address]  [place] [address]    Chief Complaint   Patient presents with    office visit       Assessment/Plan:  1. History of ductal carcinoma in situ (DCIS) of breast  - 6 mo follow up    2. Malignant neoplasm of central portion of left breast in female, estrogen receptor positive     3. Use of anastrozole  - continue use per medical oncology    4. Monoallelic mutation of YOLANDA gene       Discussion/Summary: Patient is a 68-year-old female presenting today for six-month follow-up for bilateral breast cancer and a positive YOLANDA mutation. She was diagnosed with DCIS in May 2018. She underwent a right breast lumpectomy with Dr. Valdez and completed WBRT. In July 2019 she was diagnosed with invasive breast carcinoma of the left breast, ER 90%, [place]/HER2 negative. She had a left breast lumpectomy with sentinel node biopsy with Dr. Valdez and completed WBRT. She is currently on anastrozole. She also has a history of ovarian cancer diagnosed in 2009. We have been following her with annual MRI of the breasts and bilateral diagnostic mammograms. She had a breast MRI on 1/9/24 which was benign. Her mammogram is already scheduled for this year. There were no concerns on her cbe. I will see the patient back in 6 months or sooner should the need arise. She was instructed to call with any questions or concerns prior to this time. All questions were answered today.     History of Present Illness:     Oncology History   History of ovarian cancer    Initial Diagnosis    Ovarian cancer (HCC)     4/21/2009 Surgery    Exploratory laparotomy, total abdominal hysterectomy, bilateral salpingo-oophrectomy, lymph node dissection, omentectomy with staging      -  7/8/2009 Chemotherapy    Intraperitoneal along with intravenous chemotherapy on a early ovarian cancer protocol.     5/31/2018 Genetic Testing    Genetic testing results are POSITIVE for a pathogenic variant: YOLANDA c.5290delC      Genetic testing indicated that the patient carries a Variant of Uncertain Significance (VUS) : Rad51C c.252G>T      Hormone Therapy       History of ductal carcinoma in situ (DCIS) of breast   5/31/2018 Genetic Testing    Genetic testing results are POSITIVE for a pathogenic variant: YOLANDA c.5290delC      Genetic testing indicated that the patient carries a Variant of Uncertain Significance (VUS) : Rad51C c.252G>T     8/1/2018 Biopsy    Right breast biopsy  11 o'clock position 5 cmfn  DCIS  Grade 2  Confirmed by Martin Luna MD  ER 0  SD 0     8/1/2018 -  Cancer Staged    Staging form: Breast, AJCC 8th Edition  - Pathologic stage from 8/1/2018: Stage 0 (pTis (DCIS), pN0, cM0, ER-, SD-, HER2-) - Signed by LUIS Valladares on 1/29/2024  Stage prefix: Initial diagnosis  Neoadjuvant therapy: No  Method of lymph node assessment: Clinical  Nuclear grade: G2  Multigene prognostic tests performed: None  Laterality: Right  Tumor size (mm): 14       9/11/2018 Surgery    Right lumpectomy  DCIS  Grade 2  1.4 cm  Margins negative  Stage 0     10/16/2018 -  Hormone Therapy    Consult with Dr. Alcantar  No adjuvant systemic therapy recommended     10/29/2018 - 11/28/2018 Radiation    Course: C1    Plan ID Energy Fractions Dose per Fraction (cGy) Dose Correction (cGy) Total Dose Delivered (cGy) Elapsed Days   R Breast 6X 16 / 16 266 0 4,256 22   R Breast e 12E 5 / 5 200 0 1,000 7      Treatment dates:  C1: 10/29/2018 - 11/28/2018       Malignant neoplasm of central portion of left breast in female, estrogen receptor positive    8/1/2018 -  Cancer Staged    Staging form: Breast, AJCC 8th Edition  - Pathologic stage from 8/1/2018: Stage IA (pT1b, pN0(sn), cM0, G1, ER+, SD-, HER2-) - Signed by  LUIS Valladares on 1/29/2024  Stage prefix: Initial diagnosis  Neoadjuvant therapy: No  Method of lymph node assessment: [place] lymph node biopsy  Multigene prognostic tests performed: None  Histologic grading system: 3 grade system  Laterality: Left  Tumor size (mm): 3       7/18/2019 Biopsy    Left breast MRI-guided biopsy  3 o'clock, posterior depth  Invasive breast carcinoma of no special type  Grade 1  ER 90  [place] 0  HER2 1+     8/13/2019 Surgery    Left breast needle localized lumpectomy with sentinel lymph node biopsy  Invasive mammary carcinoma of no special type  Grade 1  3 mm  Margins negative  0/1 Lymph node  Anatomic/Prognostic Stage IA     10/8/2019 - 11/5/2019 Radiation      Treatment:  Course: C2  Plan ID Energy Fractions Dose per Fraction (cGy) Dose Correction (cGy) Total Dose Delivered (cGy) Elapsed Days   BH L Breast 6X 16 / 16 266 0 4,256 21   BH L Brst e 12E 5 / 5 200 0 1,000 6    C2: 10/8/2019 - 11/5/2019 11/2019 -  Hormone Therapy    Anastrozole          -Interval History: Patient is a 68-year-old female presenting today for six-month follow-up for bilateral breast cancer and a positive YOLANDA mutation. She is currently on anastrozole. She had a breast MRI on 1/9/24 which was benign. Her mammogram is already scheduled for this year. She notes GI issues r/t Crohn's and has a colonoscopy scheduled in the next few weeks. Patient denies changes on her breast exam. She denies persistent headaches, bone pain, back pain, shortness of breath.      Review of Systems:  Review of Systems   Constitutional:  Negative for activity change, appetite change, fatigue and unexpected weight change.   Respiratory:  Negative for cough and shortness of breath.    Cardiovascular:  Negative for chest pain.   Gastrointestinal:  Negative for abdominal pain, diarrhea, nausea and vomiting.   Endocrine: Negative for heat intolerance.   Musculoskeletal:  Negative for arthralgias, back pain and myalgias.    Skin:  Negative for rash.   Neurological:  Negative for weakness and headaches.   Hematological:  Negative for adenopathy.       Patient Active Problem List   Diagnosis    Colon, diverticulosis    Crohn's disease (HCC)    Dense breasts    Dermatitis    Erythema chronica migrans, secondary    Hyperlipidemia    Lymphedema    Murmur    Osteopenia    Osteoporosis    History of ovarian cancer    Reactive airway disease    Shortness of breath on exertion    Lymphedema of left lower extremity    Genetic predisposition to breast cancer    History of ductal carcinoma in situ (DCIS) of breast    Encounter for follow-up surveillance of ovarian cancer    Lichen sclerosus et atrophicus    Primary hypertension    Malignant neoplasm of ovary (HCC)    Nausea    TIA (transient ischemic attack)    Malignant neoplasm of central portion of left breast in female, estrogen receptor positive     Use of anastrozole    History of breast cancer    Carpal tunnel syndrome of right wrist    Neuropathy    Monoallelic mutation of YOLANDA gene    Right calf pain    Primary osteoarthritis of right knee    Effusion of right knee    Primary osteoarthritis of left knee    Pes anserinus bursitis of right knee    Bilateral carpal tunnel syndrome    Urine frequency    Benign neoplasm of colon    Crohn's disease of large bowel (HCC)    Mitral valve disorder    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    Sebaceous cyst    Medicare annual wellness visit, subsequent    Encounter for immunization    Hypokalemia    Postmenopause    Pain of left thumb    Bilateral hip pain     Past Medical History:   Diagnosis Date    Arthritis     BRCA1 negative     BRCA2 negative     Breast cancer (HCC) 09/11/2018    Right    Breast cancer (HCC) 08/13/2019    Left    Colon polyp     Crohn disease (HCC)     Dense breast     Genetic predisposition to breast cancer 05/31/2018    History of chemotherapy     for ovarian cancer    History of radiation therapy      History of transfusion 2009    Hyperlipidemia     Hypertension     Lymphedema     left leg    Lymphedema     Monoallelic mutation of YOLANDA gene     Breast Gyn Panel    Ovarian cancer (HCC) 04/21/2009     Past Surgical History:   Procedure Laterality Date    BREAST LUMPECTOMY Right 09/11/2018    BREAST LUMPECTOMY Left 8/13/2019    Procedure: BREAST LUMPECTOMY; BREAST NEEDLE LOCALIZATION (NEEDLE LOC AT 0800);  Surgeon: Lawrence Valdez MD;  [facility];  Service: Surgical Oncology    BREAST LUMPECTOMY W/ NEEDLE LOCALIZATION Left 08/13/2019    COLONOSCOPY      EXPLORATORY LAPAROTOMY      HYSTERECTOMY      LYMPH NODE BIOPSY Left 8/13/2019    Procedure: SENTINEL LYMPH NODE BIOPSY; LYMPHATIC MAPPING WITH BLUE DYE AND RADIOACTIVE DYE (INJECT AT 0930 BY DR VALDEZ IN THE OR);  Surgeon: Lawrence Valdez MD;  [facility];  Service: Surgical Oncology    MAMMO NEEDLE LOCALIZATION LEFT (ALL INC) Left 8/13/2019    MAMMO NEEDLE LOCALIZATION RIGHT (ALL INC) Right 9/11/2018    MRI BREAST BIOPSY LEFT (ALL INCLUSIVE) Left 7/18/2019    OMENTECTOMY      KY PERQ DEVICE PLACEMENT BREAST LOC 1ST LES W/GDNCE Right 9/11/2018    Procedure: BREAST LUMPECTOMY; BREAST NEEDLE LOCALIZATION (NEEDLE LOC AT 1200);  Surgeon: Lawrence Valdez MD;  [facility];  Service: Surgical Oncology    TOTAL ABDOMINAL HYSTERECTOMY W/ BILATERAL SALPINGOOPHORECTOMY      US GUIDED BREAST BIOPSY LEFT COMPLETE Left 6/17/2019    US GUIDED BREAST BIOPSY RIGHT COMPLETE Right 8/1/2018    WISDOM TOOTH EXTRACTION       Family History   Problem Relation Age of Onset    Hypertension Mother     Heart disease Mother     Prostate cancer Father     Alzheimer's disease Father     Hypertension Father     Ovarian cancer Sister     No Known Problems Sister     Other Sister         pacemaker    Hypertension Sister     Heart disease Sister     No Known Problems Sister     Down syndrome Sister     Alzheimer's disease Sister     Hypertension Sister     Breast cancer Paternal  Grandmother     Breast cancer Maternal Aunt     No Known Problems Maternal Aunt     No Known Problems Paternal Aunt     Colon cancer Neg Hx      Social History     Socioeconomic History    Marital status: Single     Spouse name: Not on file    Number of children: Not on file    Years of education: Not on file    Highest education level: Not on file   Occupational History    Not on file   Tobacco Use    Smoking status: Former     Current packs/day: 0.00     Types: Cigarettes     Quit date: 2009     Years since quittin.8    Smokeless tobacco: Never   Vaping Use    Vaping status: Never Used   Substance and Sexual Activity    Alcohol use: Yes     Comment: rarely    Drug use: No    Sexual activity: Not on file   Other Topics Concern    Not on file   Social History Narrative    Not on file     Social Determinants of Health     Financial Resource Strain: Low Risk  (1/10/2024)    Overall Financial Resource Strain (CARDIA)     Difficulty of Paying Living Expenses: Not hard at all   Food Insecurity: Not on file   Transportation Needs: No Transportation Needs (1/10/2024)    PRAPARE - Transportation     Lack of Transportation (Medical): No     Lack of Transportation (Non-Medical): No   Physical Activity: Not on file   Stress: Not on file   Social Connections: Not on file   Intimate Partner Violence: Not on file   Housing Stability: Not on file       Current Outpatient Medications:     acetaminophen (TYLENOL) 500 mg tablet, Take 1,000 mg by mouth as needed Pre-Medication prior to Remicade Infusion, Disp: , Rfl:     albuterol (PROVENTIL HFA,VENTOLIN HFA) 90 mcg/act inhaler, Inhale 2 puffs  as needed in the morning for wheezing., Disp: 18 g, Rfl: 2    alendronate (FOSAMAX) 70 mg tablet, Take 1 tablet (70 mg total) by mouth every 7 days, Disp: 12 tablet, Rfl: 3    amLODIPine (NORVASC) 10 mg tablet, Take 1 tablet (10 mg total) by mouth daily, Disp: 90 tablet, Rfl: 3    anastrozole (ARIMIDEX) 1 mg tablet, TAKE 1 TABLET (1 MG  TOTAL) BY MOUTH DAILY, Disp: 30 tablet, Rfl: 5    atorvastatin (LIPITOR) 40 mg tablet, Take 1 tablet (40 mg total) by mouth daily at bedtime, Disp: 90 tablet, Rfl: 3    Calcium Carbonate 1500 (600 Ca) MG TABS, Take 1 tablet by mouth 2 (two) times a day, Disp: , Rfl:     clobetasol (TEMOVATE) 0.05 % ointment, Apply to the affected area nightly x 4-6 weeks, then 1-2x week thereafter, Disp: 60 g, Rfl: 1    Diclofenac Sodium (VOLTAREN) 1 %, Apply 2 g topically 4 (four) times a day PRN, Disp: , Rfl:     diphenhydrAMINE (BENADRYL) 25 mg tablet, Take 25 mg by mouth as needed Pre-Medication prior to Remicade Infusion, Disp: , Rfl:     Homeopathic Products (Arnicare Bruise) GEL, Apply topically, Disp: , Rfl:     indapamide (LOZOL) 2.5 mg tablet, Take 1 tablet (2.5 mg total) by mouth every morning, Disp: 90 tablet, Rfl: 3    inFLIXimab (REMICADE) 100 mg, Infuse into a venous catheter  , Disp: , Rfl:     mupirocin (BACTROBAN) 2 % ointment, Apply topically 3 (three) times a day, Disp: 22 g, Rfl: 0    ofloxacin (OCUFLOX) 0.3 % ophthalmic solution, ADMINISTER 1 DROP TO BOTH EYES 4 (FOUR) TIMES A DAY, Disp: 5 mL, Rfl: 0    potassium chloride (MICRO-K) 10 MEQ CR capsule, Take 1 capsule (10 mEq total) by mouth daily, Disp: 90 capsule, Rfl: 3    Turmeric 500 MG CAPS, Take by mouth, Disp: , Rfl:   Allergies   Allergen Reactions    Lisinopril      cough    Losartan      Numbness on right side of body    Metoprolol Dizziness    Boniva [Ibandronic Acid] Headache     Vitals:    01/29/24 1053   BP: 144/82   Pulse: 78   Resp: 14   Temp: (!) 97.2 °F (36.2 °C)   SpO2: 100%       Physical Exam  Constitutional:       General: She is not in acute distress.     Appearance: Normal appearance.   Cardiovascular:      Rate and Rhythm: Normal rate and regular rhythm.      Pulses: Normal pulses.      Heart sounds: Normal heart sounds.   Pulmonary:      Effort: Pulmonary effort is normal.      Breath sounds: Normal breath sounds.   Chest:      Chest  wall: No mass.   Breasts:     Right: Skin change present. No swelling, bleeding, inverted nipple, mass, nipple discharge or tenderness.      Left: Skin change present. No swelling, bleeding, inverted nipple, mass, nipple discharge or tenderness.          Comments: B/l telangectasia s/p WBRT  Abdominal:      General: Abdomen is flat.      Palpations: Abdomen is soft.   Lymphadenopathy:      Upper Body:      Right upper body: No supraclavicular, axillary or pectoral adenopathy.      Left upper body: No supraclavicular, axillary or pectoral adenopathy.   Skin:     General: Skin is warm.   Neurological:      General: No focal deficit present.      Mental Status: She is alert and oriented to person, place, and time.   Psychiatric:         Mood and Affect: Mood normal.         Behavior: Behavior normal.           Results:    Imaging  MRI breast bilateral w and wo contrast w cad    Result Date: 1/10/2024  Narrative: DIAGNOSIS: History of ductal carcinoma in situ (DCIS) of breast; Monoallelic mutation of YOLANDA gene TECHNIQUE: MRI of both breasts was performed in axial planes utilizing a combination of localizer, axial T2 STIR, Axial T1 FSPGR in phase, and axial dynamic 3D fat suppressed gradient-echo T1 sequences (VIBRANT) before and after contrast administration at multiple time points. Subtraction images were generated. This exam was read in conjunction with Tune[employer] software. MEDICATIONS ADMINISTERED: Gadobutrol injection (SINGLE-DOSE) SOLN 6 mL, Total Given: 6 mL (1 dose) Intravenous contrast was administered at 2cc/sec, without documented contrast reaction. COMPARISONS: Prior breast imaging dated: 06/19/2023, 01/05/2023, and 06/17/2022 RELEVANT HISTORY: Family Breast Cancer History: History of breast cancer in Paternal Grandmother, Maternal Aunt. Family Medical History: Family medical history includes breast cancer in 2 relatives (maternal aunt, paternal grandmother) and ovarian cancer in sister. Personal History: Hormone  history includes other. Surgical history includes lumpectomy, hysterectomy, and oophorectomy. Medical history includes breast cancer, ovarian cancer, BRCA 1 negative, BRCA 2 negative, and history of chemotherapy. RISK ASSESSMENT: Tyrer-Cuzick risk assessment reporting was suppressed due to the patient's history and/or demographic factors.  TISSUE DENSITY: FGT: The breasts have heterogeneous fibroglandular tissue. BPE: The background parenchymal enhancement is minimal. INDICATION: Ghada Ferreira is a 68 y.o. female presenting for high risk Breast cancer screening, high risk high risk.  FINDINGS: Mild asymmetry, related to prior breast surgeries and expected postop scarring which appears stable. There is no suspicious axillary or internal mammary chain adenopathy. T2 sequence shows no significant cyst formation or fluid collections. Dynamic postcontrast imaging shows no dominant mass or region of suspicious asymmetric enhancement to suggest invasive malignancy.         Impression:  Stable postop changes bilaterally.  No findings suspicious for recurrent or new malignancy. ASSESSMENT/BI-RADS CATEGORY:  Overall: 2 - Benign RECOMMENDATION:      - Diagnostic mammogram in 1 year for both breasts.      - MRI for both breasts. Workstation ID: [id number]      I reviewed the above imaging data.      Advance Care Planning/Advance Directives:  Discussed disease status, cancer treatment plans and/or cancer treatment goals with the patient.

## 2024-02-01 ENCOUNTER — ANESTHESIA (OUTPATIENT)
Dept: ANESTHESIOLOGY | Facility: AMBULATORY SURGERY CENTER | Age: 69
End: 2024-02-01

## 2024-02-01 ENCOUNTER — ANESTHESIA EVENT (OUTPATIENT)
Dept: ANESTHESIOLOGY | Facility: AMBULATORY SURGERY CENTER | Age: 69
End: 2024-02-01

## 2024-02-06 ENCOUNTER — APPOINTMENT (OUTPATIENT)
Dept: LAB | Facility: HOSPITAL | Age: 69
End: 2024-02-06
Payer: MEDICARE

## 2024-02-06 DIAGNOSIS — K50.10 CROHN'S DISEASE OF LARGE INTESTINE WITHOUT COMPLICATION (HCC): ICD-10-CM

## 2024-02-06 LAB
ERYTHROCYTE [DISTWIDTH] IN BLOOD BY AUTOMATED COUNT: 13.5 % (ref 11.6–15.1)
HCT VFR BLD AUTO: 40.9 % (ref 34.8–46.1)
HGB BLD-MCNC: 13.3 G/DL (ref 11.5–15.4)
MCH RBC QN AUTO: 27.8 PG (ref 26.8–34.3)
MCHC RBC AUTO-ENTMCNC: 32.5 G/DL (ref 31.4–37.4)
MCV RBC AUTO: 85 FL (ref 82–98)
PLATELET # BLD AUTO: 298 THOUSANDS/UL (ref 149–390)
PMV BLD AUTO: 10.5 FL (ref 8.9–12.7)
RBC # BLD AUTO: 4.79 MILLION/UL (ref 3.81–5.12)
WBC # BLD AUTO: 6.25 THOUSAND/UL (ref 4.31–10.16)

## 2024-02-06 PROCEDURE — 85027 COMPLETE CBC AUTOMATED: CPT

## 2024-02-06 PROCEDURE — 36415 COLL VENOUS BLD VENIPUNCTURE: CPT

## 2024-02-08 ENCOUNTER — TELEPHONE (OUTPATIENT)
Dept: GASTROENTEROLOGY | Facility: CLINIC | Age: 69
End: 2024-02-08

## 2024-02-08 NOTE — TELEPHONE ENCOUNTER
Procedure confirmed  Colonoscopy     Via: Spoke with patient.      Instructions given: Given to Patient at Visit     Prep Given: Clenpiq    Call the office if there are any questions.

## 2024-02-09 ENCOUNTER — HOSPITAL ENCOUNTER (OUTPATIENT)
Dept: INFUSION CENTER | Facility: HOSPITAL | Age: 69
End: 2024-02-09
Attending: INTERNAL MEDICINE
Payer: MEDICARE

## 2024-02-09 VITALS
DIASTOLIC BLOOD PRESSURE: 79 MMHG | OXYGEN SATURATION: 100 % | HEIGHT: 61 IN | TEMPERATURE: 97.1 F | RESPIRATION RATE: 14 BRPM | WEIGHT: 128.97 LBS | SYSTOLIC BLOOD PRESSURE: 154 MMHG | BODY MASS INDEX: 24.35 KG/M2 | HEART RATE: 77 BPM

## 2024-02-09 DIAGNOSIS — K50.10 CROHN'S DISEASE OF LARGE INTESTINE WITHOUT COMPLICATION (HCC): Primary | ICD-10-CM

## 2024-02-09 PROCEDURE — 96415 CHEMO IV INFUSION ADDL HR: CPT

## 2024-02-09 PROCEDURE — 96413 CHEMO IV INFUSION 1 HR: CPT

## 2024-02-09 RX ORDER — SODIUM CHLORIDE 9 MG/ML
20 INJECTION, SOLUTION INTRAVENOUS ONCE
OUTPATIENT
Start: 2024-04-05

## 2024-02-09 RX ORDER — SODIUM CHLORIDE 9 MG/ML
20 INJECTION, SOLUTION INTRAVENOUS ONCE
Status: COMPLETED | OUTPATIENT
Start: 2024-02-09 | End: 2024-02-09

## 2024-02-09 RX ADMIN — INFLIXIMAB 280 MG: 100 INJECTION, POWDER, LYOPHILIZED, FOR SOLUTION INTRAVENOUS at 10:22

## 2024-02-09 RX ADMIN — SODIUM CHLORIDE 20 ML/HR: 9 INJECTION, SOLUTION INTRAVENOUS at 10:21

## 2024-02-09 NOTE — PROGRESS NOTES
Patient Remicade infusion completed without complication. Patient declined AVS at this time and confirmed next appointment. Patient discharged in stable condition to home via ambulation.

## 2024-02-15 ENCOUNTER — HOSPITAL ENCOUNTER (OUTPATIENT)
Dept: GASTROENTEROLOGY | Facility: AMBULATORY SURGERY CENTER | Age: 69
Discharge: HOME | End: 2024-02-15
Payer: MEDICARE

## 2024-02-15 ENCOUNTER — ANESTHESIA (OUTPATIENT)
Dept: GASTROENTEROLOGY | Facility: AMBULATORY SURGERY CENTER | Age: 69
End: 2024-02-15

## 2024-02-15 ENCOUNTER — ANESTHESIA EVENT (OUTPATIENT)
Dept: GASTROENTEROLOGY | Facility: AMBULATORY SURGERY CENTER | Age: 69
End: 2024-02-15

## 2024-02-15 VITALS
TEMPERATURE: 98 F | RESPIRATION RATE: 21 BRPM | BODY MASS INDEX: 24.17 KG/M2 | OXYGEN SATURATION: 99 % | WEIGHT: 128 LBS | SYSTOLIC BLOOD PRESSURE: 165 MMHG | HEIGHT: 61 IN | HEART RATE: 71 BPM | DIASTOLIC BLOOD PRESSURE: 70 MMHG

## 2024-02-15 DIAGNOSIS — K50.10 CROHN'S DISEASE OF LARGE INTESTINE WITHOUT COMPLICATION (HCC): ICD-10-CM

## 2024-02-15 PROCEDURE — 45380 COLONOSCOPY AND BIOPSY: CPT | Performed by: INTERNAL MEDICINE

## 2024-02-15 PROCEDURE — 88305 TISSUE EXAM BY PATHOLOGIST: CPT | Performed by: PATHOLOGY

## 2024-02-15 RX ORDER — PROPOFOL 10 MG/ML
INJECTION, EMULSION INTRAVENOUS CONTINUOUS PRN
Status: DISCONTINUED | OUTPATIENT
Start: 2024-02-15 | End: 2024-02-15

## 2024-02-15 RX ORDER — SODIUM CHLORIDE, SODIUM LACTATE, POTASSIUM CHLORIDE, CALCIUM CHLORIDE 600; 310; 30; 20 MG/100ML; MG/100ML; MG/100ML; MG/100ML
50 INJECTION, SOLUTION INTRAVENOUS CONTINUOUS
Status: DISCONTINUED | OUTPATIENT
Start: 2024-02-15 | End: 2024-02-15

## 2024-02-15 RX ORDER — LIDOCAINE HYDROCHLORIDE 10 MG/ML
INJECTION, SOLUTION EPIDURAL; INFILTRATION; INTRACAUDAL; PERINEURAL AS NEEDED
Status: DISCONTINUED | OUTPATIENT
Start: 2024-02-15 | End: 2024-02-15

## 2024-02-15 RX ORDER — PROPOFOL 10 MG/ML
INJECTION, EMULSION INTRAVENOUS AS NEEDED
Status: DISCONTINUED | OUTPATIENT
Start: 2024-02-15 | End: 2024-02-15

## 2024-02-15 RX ADMIN — SODIUM CHLORIDE, SODIUM LACTATE, POTASSIUM CHLORIDE, CALCIUM CHLORIDE 50 ML/HR: 600; 310; 30; 20 INJECTION, SOLUTION INTRAVENOUS at 10:50

## 2024-02-15 RX ADMIN — PROPOFOL 120 MCG/KG/MIN: 10 INJECTION, EMULSION INTRAVENOUS at 11:21

## 2024-02-15 RX ADMIN — PROPOFOL 90 MG: 10 INJECTION, EMULSION INTRAVENOUS at 11:21

## 2024-02-15 RX ADMIN — LIDOCAINE HYDROCHLORIDE 50 MG: 10 INJECTION, SOLUTION EPIDURAL; INFILTRATION; INTRACAUDAL; PERINEURAL at 11:21

## 2024-02-15 NOTE — H&P
History and Physical - [facility]  Ghada Ferreira 68 y.o. female MRN: [id number]    HPI: Ghada Ferreira is a 68 y.o. year old female who presents for colonoscopy secondary to a personal history of Crohn's disease    REVIEW OF SYSTEMS: Per the HPI, and otherwise unremarkable.    Historical Information   Past Medical History:   Diagnosis Date    Arthritis     BRCA1 negative     BRCA2 negative     Breast cancer (HCC) 09/11/2018    Right    Breast cancer (HCC) 08/13/2019    Left    Colon polyp     Crohn disease (HCC)     Dense breast     Genetic predisposition to breast cancer 05/31/2018    History of chemotherapy     for ovarian cancer    History of radiation therapy     History of transfusion 2009    Hyperlipidemia     Hypertension     Lymphedema     left leg    Lymphedema     Monoallelic mutation of YOLANDA gene     Breast Gyn Panel    Ovarian cancer (HCC) 04/21/2009     Past Surgical History:   Procedure Laterality Date    BREAST LUMPECTOMY Right 09/11/2018    BREAST LUMPECTOMY Left 8/13/2019    Procedure: BREAST LUMPECTOMY; BREAST NEEDLE LOCALIZATION (NEEDLE LOC AT 0800);  Surgeon: Lawrence Valdez MD;  [facility];  Service: Surgical Oncology    BREAST LUMPECTOMY W/ NEEDLE LOCALIZATION Left 08/13/2019    COLONOSCOPY      EXPLORATORY LAPAROTOMY      HYSTERECTOMY      LYMPH NODE BIOPSY Left 8/13/2019    Procedure: SENTINEL LYMPH NODE BIOPSY; LYMPHATIC MAPPING WITH BLUE DYE AND RADIOACTIVE DYE (INJECT AT 0930 BY DR VALDEZ IN THE OR);  Surgeon: Lawrence Valdez MD;  [facility];  Service: Surgical Oncology    MAMMO NEEDLE LOCALIZATION LEFT (ALL INC) Left 8/13/2019    MAMMO NEEDLE LOCALIZATION RIGHT (ALL INC) Right 9/11/2018    MRI BREAST BIOPSY LEFT (ALL INCLUSIVE) Left 7/18/2019    OMENTECTOMY      MI PERQ DEVICE PLACEMENT BREAST LOC 1ST LES W/GDNCE Right 9/11/2018    Procedure: BREAST LUMPECTOMY; BREAST NEEDLE LOCALIZATION (NEEDLE LOC AT 1200);  Surgeon: Lawrence Valdez MD;  [facility];  Service: Surgical Oncology    TOTAL ABDOMINAL HYSTERECTOMY W/ BILATERAL SALPINGOOPHORECTOMY      US GUIDED BREAST BIOPSY LEFT COMPLETE Left 2019    US GUIDED BREAST BIOPSY RIGHT COMPLETE Right 2018    WISDOM TOOTH EXTRACTION       Social History   Social History     Substance and Sexual Activity   Alcohol Use Yes    Comment: rarely     Social History     Substance and Sexual Activity   Drug Use No     Social History     Tobacco Use   Smoking Status Former    Current packs/day: 0.00    Types: Cigarettes    Quit date: 2009    Years since quittin.8   Smokeless Tobacco Never     Family History   Problem Relation Age of Onset    Hypertension Mother     Heart disease Mother     Prostate cancer Father     Alzheimer's disease Father     Hypertension Father     Ovarian cancer Sister     No Known Problems Sister     Other Sister         pacemaker    Hypertension Sister     Heart disease Sister     No Known Problems Sister     Down syndrome Sister     Alzheimer's disease Sister     Hypertension Sister     Breast cancer Paternal Grandmother     Breast cancer Maternal Aunt     No Known Problems Maternal Aunt     No Known Problems Paternal Aunt     Colon cancer Neg Hx        Meds/Allergies       Current Outpatient Medications:     amLODIPine (NORVASC) 10 mg tablet    anastrozole (ARIMIDEX) 1 mg tablet    atorvastatin (LIPITOR) 40 mg tablet    Calcium Carbonate 1500 (600 Ca) MG TABS    clobetasol (TEMOVATE) 0.05 % ointment    Diclofenac Sodium (VOLTAREN) 1 %    indapamide (LOZOL) 2.5 mg tablet    ofloxacin (OCUFLOX) 0.3 % ophthalmic solution    potassium chloride (MICRO-K) 10 MEQ CR capsule    acetaminophen (TYLENOL) 500 mg tablet    albuterol (PROVENTIL HFA,VENTOLIN HFA) 90 mcg/act inhaler    alendronate (FOSAMAX) 70 mg tablet    diphenhydrAMINE (BENADRYL) 25 mg tablet    Homeopathic Products (Arnicare Bruise) GEL    inFLIXimab (REMICADE) 100 mg    mupirocin (BACTROBAN) 2 % ointment    Turmeric 500 MG  "CAPS    Current Facility-Administered Medications:     lactated ringers infusion, 50 mL/hr, Intravenous, Continuous, Continue from Pre-op at 02/15/24 1114    Allergies   Allergen Reactions    Lisinopril      cough    Losartan      Numbness on right side of body    Metoprolol Dizziness    Boniva [Ibandronic Acid] Headache       Objective     BP (!) 193/84   Pulse 84   Temp 98 °F (36.7 °C) (Temporal)   Resp 18   Ht 5' 1\" (1.549 m)   Wt 58.1 kg (128 lb)   SpO2 98%   BMI 24.19 kg/m²     PHYSICAL EXAM    Gen: NAD AAOx3  Head: Normocephalic, Atraumatic  CV: S1S2 RRR no m/r/g  CHEST: Clear b/l no c/r/w  ABD: soft, +BS NT/ND  EXT: no edema    ASSESSMENT/PLAN:  This is a 68 y.o. year old female here for colonoscopy secondary to personal history of Crohn's disease, and she is stable and optimized for her procedure.        "

## 2024-02-15 NOTE — ANESTHESIA POSTPROCEDURE EVALUATION
Post-Op Assessment Note    CV Status:  Stable  Pain Score: 0    Pain management: adequate       Mental Status:  Alert and awake   Hydration Status:  Euvolemic   PONV Controlled:  Controlled   Airway Patency:  Patent     Post Op Vitals Reviewed: Yes    No anethesia notable event occurred.    Staff: CRNA               BP   137/83   Temp      Pulse   76   Resp   15   SpO2   99%

## 2024-02-15 NOTE — ANESTHESIA PREPROCEDURE EVALUATION
Procedure:  COLONOSCOPY    Relevant Problems   CARDIO   (+) Hyperlipidemia   (+) Murmur   (+) Primary hypertension   (+) Shortness of breath on exertion      GYN   (+) Malignant neoplasm of central portion of left breast in female, estrogen receptor positive    (+) Malignant neoplasm of ovary (HCC)      MUSCULOSKELETAL   (+) Primary osteoarthritis of left knee   (+) Primary osteoarthritis of right knee      NEURO/PSYCH   (+) TIA (transient ischemic attack)      PULMONARY   (+) Shortness of breath on exertion        Physical Exam    Airway    Mallampati score: II  TM Distance: >3 FB  Neck ROM: full     Dental   No notable dental hx     Cardiovascular      Pulmonary      Other Findings  post-pubertal.      Anesthesia Plan  ASA Score- 3     Anesthesia Type- IV sedation with anesthesia with ASA Monitors.         Additional Monitors:     Airway Plan:            Plan Factors-Exercise tolerance (METS): >4 METS.    Chart reviewed.    Patient summary reviewed.                  Induction- intravenous.    Postoperative Plan-     Informed Consent- Anesthetic plan and risks discussed with patient.  I personally reviewed this patient with the CRNA. Discussed and agreed on the Anesthesia Plan with the CRNA..

## 2024-02-19 PROCEDURE — 88305 TISSUE EXAM BY PATHOLOGIST: CPT | Performed by: PATHOLOGY

## 2024-02-21 PROBLEM — Z00.00 MEDICARE ANNUAL WELLNESS VISIT, SUBSEQUENT: Status: RESOLVED | Noted: 2024-01-10 | Resolved: 2024-02-21

## 2024-03-06 ENCOUNTER — TELEPHONE (OUTPATIENT)
Dept: DERMATOLOGY | Facility: CLINIC | Age: 69
End: 2024-03-06

## 2024-03-06 NOTE — TELEPHONE ENCOUNTER
Called and spoke with patient to advise her appt with Dr. Zayas on 5/22 was moved from [place] to [place]. There was a slight change in appt time from 1:50 to 1:20. Patient confirmed this appt change and was placed on waitlist for sooner appt.

## 2024-03-29 ENCOUNTER — TELEPHONE (OUTPATIENT)
Dept: HEMATOLOGY ONCOLOGY | Facility: CLINIC | Age: 69
End: 2024-03-29

## 2024-03-29 NOTE — TELEPHONE ENCOUNTER
Appointment Change  Cancel, Reschedule, Change to Virtual      Who are you speaking with? Patient   If it is not the patient, is the caller listed on the communication consent form? N/A   Which provider is the appointment scheduled with? Pushpa Odom PA-C   When was the original appointment scheduled?    Please list date and time 5/16/24 9:00 AM   At which location is the appointment scheduled to take place? [place]   Was the appointment rescheduled?     Was the appointment changed from an in person visit to a virtual visit?    If so, please list the details of the change. 5/23/24 2:30 PM   What is the reason for the appointment change? Provider requested change due to scheduling conflict.        Was STAR transport scheduled? No   Does STAR transport need to be scheduled for the new visit (if applicable) No   Does the patient need an infusion appointment rescheduled? No   Does the patient have an upcoming infusion appointment scheduled? If so, when? Yes, 4/5/24   Is the patient undergoing chemotherapy? No   For appointments cancelled with less than 24 hours:  Was the no-show policy reviewed? Yes

## 2024-04-05 ENCOUNTER — HOSPITAL ENCOUNTER (OUTPATIENT)
Dept: INFUSION CENTER | Facility: HOSPITAL | Age: 69
End: 2024-04-05
Attending: INTERNAL MEDICINE
Payer: MEDICARE

## 2024-04-05 VITALS
DIASTOLIC BLOOD PRESSURE: 67 MMHG | WEIGHT: 130.07 LBS | SYSTOLIC BLOOD PRESSURE: 140 MMHG | BODY MASS INDEX: 25.54 KG/M2 | RESPIRATION RATE: 14 BRPM | TEMPERATURE: 96.8 F | OXYGEN SATURATION: 100 % | HEART RATE: 66 BPM | HEIGHT: 60 IN

## 2024-04-05 DIAGNOSIS — K50.10 CROHN'S DISEASE OF LARGE INTESTINE WITHOUT COMPLICATION (HCC): Primary | ICD-10-CM

## 2024-04-05 RX ORDER — SODIUM CHLORIDE 9 MG/ML
20 INJECTION, SOLUTION INTRAVENOUS ONCE
OUTPATIENT
Start: 2024-05-31

## 2024-04-05 RX ORDER — SODIUM CHLORIDE 9 MG/ML
20 INJECTION, SOLUTION INTRAVENOUS ONCE
Status: COMPLETED | OUTPATIENT
Start: 2024-04-05 | End: 2024-04-05

## 2024-04-05 RX ADMIN — INFLIXIMAB 300 MG: 100 INJECTION, POWDER, LYOPHILIZED, FOR SOLUTION INTRAVENOUS at 09:53

## 2024-04-05 RX ADMIN — SODIUM CHLORIDE 20 ML/HR: 9 INJECTION, SOLUTION INTRAVENOUS at 09:53

## 2024-04-05 NOTE — PROGRESS NOTES
Pt tolerated remicade infusion without any adverse side effects. Iv removed. Pt ambulated with a steady gait off of unit. Declined AVS     Aware of next appt 05/31/24 @ 0900 am

## 2024-04-30 DIAGNOSIS — K50.10 CROHN'S DISEASE OF LARGE INTESTINE WITHOUT COMPLICATION (HCC): Primary | ICD-10-CM

## 2024-05-22 ENCOUNTER — OFFICE VISIT (OUTPATIENT)
Dept: DERMATOLOGY | Facility: CLINIC | Age: 69
End: 2024-05-22
Payer: MEDICARE

## 2024-05-22 VITALS — HEIGHT: 60 IN | BODY MASS INDEX: 25.64 KG/M2 | WEIGHT: 130.6 LBS | TEMPERATURE: 98.6 F

## 2024-05-22 DIAGNOSIS — D18.01 CHERRY ANGIOMA: ICD-10-CM

## 2024-05-22 DIAGNOSIS — L21.9 SEBORRHEIC DERMATITIS: ICD-10-CM

## 2024-05-22 DIAGNOSIS — L81.4 LENTIGINES: ICD-10-CM

## 2024-05-22 DIAGNOSIS — L30.4 INTERTRIGO: ICD-10-CM

## 2024-05-22 DIAGNOSIS — D22.9 MULTIPLE MELANOCYTIC NEVI: ICD-10-CM

## 2024-05-22 DIAGNOSIS — L82.1 SEBORRHEIC KERATOSES: Primary | ICD-10-CM

## 2024-05-22 PROCEDURE — 99204 OFFICE O/P NEW MOD 45 MIN: CPT | Performed by: STUDENT IN AN ORGANIZED HEALTH CARE EDUCATION/TRAINING PROGRAM

## 2024-05-22 RX ORDER — CLOBETASOL PROPIONATE 0.46 MG/ML
SOLUTION TOPICAL
Qty: 50 ML | Refills: 2 | Status: SHIPPED | OUTPATIENT
Start: 2024-05-22

## 2024-05-22 RX ORDER — TRIAMCINOLONE ACETONIDE 0.25 MG/G
CREAM TOPICAL 2 TIMES DAILY
Qty: 15 G | Refills: 1 | Status: SHIPPED | OUTPATIENT
Start: 2024-05-22

## 2024-05-22 RX ORDER — KETOCONAZOLE 20 MG/ML
SHAMPOO TOPICAL
Qty: 120 ML | Refills: 4 | Status: SHIPPED | OUTPATIENT
Start: 2024-05-22

## 2024-05-22 NOTE — PROGRESS NOTES
"[facility] Dermatology Clinic Note     Patient Name: Ghada Ferreira  Encounter Date: 05/22/2024     Have you been cared for by a [facility] Dermatologist in the last 3 years and, if so, which description applies to you?    NO.   I am considered a \"new\" patient and must complete all patient intake questions. I am FEMALE/of child-bearing potential.    REVIEW OF SYSTEMS:  Have you recently had or currently have any of the following? Recent fever or chills? No  Any non-healing wound? No  Are you pregnant or planning to become pregnant? No  Are you currently or planning to be nursing or breast feeding? No   PAST MEDICAL HISTORY:  Have you personally ever had or currently have any of the following?  If \"YES,\" then please provide more detail. Skin cancer (such as Melanoma, Basal Cell Carcinoma, Squamous Cell Carcinoma?  No  Tuberculosis, HIV/AIDS, Hepatitis B or C: No  Radiation Treatment YES, Breast cancer- bilateral   HISTORY OF IMMUNOSUPPRESSION:   Do you have a history of any of the following:  Systemic Immunosuppression such as Diabetes, Biologic or Immunotherapy, Chemotherapy, Organ Transplantation, Bone Marrow Transplantation?  YES, Chemotherapy- Ovarian cancer    Answering \"YES\" requires the addition of the dotphrase \"IMMUNOSUPPRESSED\" as the first diagnosis of the patient's visit.   FAMILY HISTORY:  Any \"first degree relatives\" (parent, brother, sister, or child) with the following?    Skin Cancer, Pancreatic or Other Cancer? YES, Father- Prostate cancer, Sister- skin cancer (unknown type)   PATIENT EXPERIENCE:    Do you want the Dermatologist to perform a COMPLETE skin exam today including a clinical examination under the \"bra and underwear\" areas?  Yes  If necessary, do we have your permission to call and leave a detailed message on your Preferred Phone number that includes your specific medical information?  Yes      Allergies   Allergen Reactions    Lisinopril      cough    Losartan      Numbness on right " side of body    Metoprolol Dizziness    Boniva [Ibandronic Acid] Headache      Current Outpatient Medications:     acetaminophen (TYLENOL) 500 mg tablet, Take 1,000 mg by mouth as needed Pre-Medication prior to Remicade Infusion, Disp: , Rfl:     albuterol (PROVENTIL HFA,VENTOLIN HFA) 90 mcg/act inhaler, Inhale 2 puffs  as needed in the morning for wheezing., Disp: 18 g, Rfl: 2    alendronate (FOSAMAX) 70 mg tablet, Take 1 tablet (70 mg total) by mouth every 7 days, Disp: 12 tablet, Rfl: 3    amLODIPine (NORVASC) 10 mg tablet, Take 1 tablet (10 mg total) by mouth daily, Disp: 90 tablet, Rfl: 3    anastrozole (ARIMIDEX) 1 mg tablet, TAKE 1 TABLET (1 MG TOTAL) BY MOUTH DAILY, Disp: 30 tablet, Rfl: 5    atorvastatin (LIPITOR) 40 mg tablet, Take 1 tablet (40 mg total) by mouth daily at bedtime, Disp: 90 tablet, Rfl: 3    Calcium Carbonate 1500 (600 Ca) MG TABS, Take 1 tablet by mouth 2 (two) times a day, Disp: , Rfl:     clobetasol (TEMOVATE) 0.05 % ointment, Apply to the affected area nightly x 4-6 weeks, then 1-2x week thereafter, Disp: 60 g, Rfl: 1    Diclofenac Sodium (VOLTAREN) 1 %, Apply 2 g topically 4 (four) times a day PRN, Disp: , Rfl:     diphenhydrAMINE (BENADRYL) 25 mg tablet, Take 25 mg by mouth as needed Pre-Medication prior to Remicade Infusion, Disp: , Rfl:     Homeopathic Products (Arnicare Bruise) GEL, Apply topically, Disp: , Rfl:     indapamide (LOZOL) 2.5 mg tablet, Take 1 tablet (2.5 mg total) by mouth every morning, Disp: 90 tablet, Rfl: 3    inFLIXimab (REMICADE) 100 mg, Infuse into a venous catheter  , Disp: , Rfl:     mupirocin (BACTROBAN) 2 % ointment, Apply topically 3 (three) times a day, Disp: 22 g, Rfl: 0    ofloxacin (OCUFLOX) 0.3 % ophthalmic solution, ADMINISTER 1 DROP TO BOTH EYES 4 (FOUR) TIMES A DAY, Disp: 5 mL, Rfl: 0    potassium chloride (MICRO-K) 10 MEQ CR capsule, Take 1 capsule (10 mEq total) by mouth daily, Disp: 90 capsule, Rfl: 3    Turmeric 500 MG CAPS, Take by mouth,  Disp: , Rfl:           Whom besides the patient is providing clinical information about today's encounter?   NO ADDITIONAL HISTORIAN (patient alone provided history)    Physical Exam and Assessment/Plan by Diagnosis:    SEBORRHEIC KERATOSES  - Relevant exam: Scattered over the trunk/extremities are waxy brown to black plaques and papules with stuck on appearance  - Exam and clinical history consistent with seborrheic keratoses  - Counseled that these are benign growths that do not require treatment  - Counseled that removal of lesions is considered cosmetic and so would incur a fee should patient elect to move forward.   - Patient to hold on treatments for now but will inform us should they desire additional treatments    MELANOCYTIC NEVI  -Relevant exam: Scattered over the trunk/extremities are homogenously pigmented brown macules and papules. ELM performed and without concerning findings.  - Exam and clinical history consistent with melanocytic nevi  - Educated on the ABCDE's of melanoma; handout provided  - Counseled to return to clinic prior to scheduled appointment should any of these lesions change or should any new lesions of concern arise  - Counseled on use of sun protection daily. Reviewed latest FDA sunscreen guidelines, including use of broad spectrum (UVA and UVB blocking) sunscreen or sun protective clothing with SPF 30-50 every 2-3 hours and reapplied after exposure to water; use of photoprotective clothing, including a broad brim hat and UPF rated clothing if outdoors for several hours; avoid use of tanning beds as these pose significant risk for melanoma and skin cancer.    LENTIGINES  OTHER SKIN CHANGES DUE TO CHRONIC EXPOSURE TO NONIONIZING RADIATION  - Relevant exam: Over sun exposed areas are brown macules. ELM performed and without concerning findings.  - Exam and clinical history consistent with lentigines.  - Educated that these are indicative of prior sun exposure.   - Counseled to return to  clinic prior to scheduled appointment should any of these lesions change or should any new lesions of concern arise.  - Recommended use of sunscreen as above and below.  - Counseled on use of sun protection daily. Reviewed latest FDA sunscreen guidelines, including use of broad spectrum (UVA and UVB blocking) sunscreen or sun protective clothing with SPF 30-50 every 2-3 hours and reapplied after exposure to water; use of photoprotective clothing, including a broad brim hat and UPF rated clothing if outdoors for several hours; avoid use of tanning beds as these pose significant risk for melanoma and skin cancer.    CHERRY ANGIOMAS  - Relevant exam: Scattered over the trunk/extremities are red papules  - Exam and clinical history consistent with cherry angiomas  - Educated that these are benign  - Educated that removal is considered aesthetic and would incur a fee.  - Patient does not wish to pursue removal at this time but will contact us should this change.    SEBORRHEIC DERMATITIS     Physical Exam:  Anatomic Location Affected &  Morphological Description:  Greasy adherent scale/scaling plaques on the scalp with significant background erythema  Pertinent Positives:  Pertinent Negatives:    Additional History of Present Condition:    Duration: years  Attempted treatments: None     Assessment and Plan:  History and physical exam most consistent with seborrheic dermatitis  The chronic nature of this condition and association with normal skin yeast were reviewed.   Educated that the goal of therapy is to control symptoms, but this is not typically something we cure and intermittent flares can be expected.  Based on a thorough discussion of this condition and the management approach to it (including a comprehensive discussion of the known risks, side effects and potential benefits of treatment), the patient (family) agrees to implement the following specific plan:           SCALP  Start OTC anti-dandruff shampoo (Head &  Shoulders, Selsun Blue, Neutrogena T-gel or T-Sal) 2x/week to damp scalp, let sit 10 minutes then rinse (may use normal shampoo/conditioner thereafter as desired) and alternate with ketoconazole shampoo (lather for 5 minutes prior to rinsing off; prescribed today)  Given scalp erythema on exam, recommend initiation of steroid solution (clobetasol) M-F to dry scalp as needed for redness, itching (do not wash off). Recommend taking 2 days off per week (Saturday/Sunday)      INTERTRIGO    Physical Exam:  Anatomic Location Affected:  left inguinal fold  Morphological Description:  erythema  Pertinent Positives:  Pertinent Negatives:    Additional History of Present Condition:  Reported on exam. Previously prescribed clobetasol ointment    Assessment and Plan:  Based on a thorough discussion of this condition and the management approach to it (including a comprehensive discussion of the known risks, side effects and potential benefits of treatment), the patient (family) agrees to implement the following specific plan:  Start Triamcinolone 0.025% cream: apply to affected area twice a day for up to 10 days. May repeat course after 1 week break.  Stop clobetasol    Assessment and Plan  Intertrigo describes a rash in the flexures or body folds, such as behind the ears, in the folds of the neck, under the arms (axillae), under a protruding abdomen, in the groin, between the buttocks, in the finger webs or toe spaces.  Although intertrigo may affect one skin fold, it is common for it to involve multiple sites.    Intertrigo can affect males and females of any age. It is particularly common in people that are overweight or obese (see metabolic syndrome). Other contributing factors are:  Genetic tendency to skin disease  Hyperhidrosis (excessive sweating)    Intertrigo can be acute (recent onset), relapsing (recurrent), or chronic (present for more than 6 weeks). The exact appearance and behaviour depends on the underlying cause  or causes.  The skin affected by intertrigo is inflamed, ie reddened and uncomfortable. It may become moist and macerated, leading to fissuring (cracks) and peeling.  Intertrigo is due to genetic and environmental factors.  Flexural skin has relatively high surface temperature  Moisture from insensible water loss and sweating cannot evaporate due to occlusion.  Friction from movement of adjacent skin results in chafing.    The microorganisms that are normally resident on flexural skin, the microbiome, include corynebacteria, other bacteria and yeasts. These multiply in warm moist environments and may cause disease.    We can classify intertrigo into infectious and inflammatory origin but there is often overlap.  Infections tend to be unilateral and asymmetrical.  Inflammatory disorders tend to be symmetrical affecting armpits, groins, under the breasts and the abdominal folds, except atopic dermatitis, which more often arises on the neck, and in elbow and knee creases.    Investigations may be necessary to determine the cause of intertrigo.  A swab for microscopy and culture of bacteria (microbiology)  A scraping for microscopy and culture of fungi (mycology)  A skin biopsy may be performed for histopathology if the skin condition is unusual or fails to respond to treatment.    What is the treatment for intertrigo?  Treatment depends on the underlying cause, if identified, and on which micro-organisms are present in the rash. Combinations are common.  Sweating may be reduced with a gentle antiperspirant.  Physical exertion should be followed by a bath and completely drying the skin folds using a hair dryer on cool setting, soft towel and/or corn starch powder.  Triple paste contains petrolatum, zinc oxide, and aluminum acetate solution to reduce friction, irritation and sweating.  Bacteria may be treated with topical antibiotics such as fusidic acid cream, mupirocin ointment, or oral antibiotics such as  flucloxacillin and erythromycin.  Yeasts and fungi may be treated with topical antifungals such as clotrimazole and terbinafine cream or oral antifungal agents such as itraconazole or terbinafine.  Inflammatory skin diseases are often treated with low potency topical steroid creams such as hydrocortisone. More potent steroids are usually avoided in the flexures because they may cause skin thinning resulting in stretch marks (striae) and even ulcers. Calcineurin inhibitors such as tacrolimus ointment or pimecrolimus cream may also prove effective.        OF NOTE: Patient has a history of breast and ovarian cancer prompting genetic testing. YOLANDA positive. Recent study links this mutation to melanoma risk (article below)    [email or web address]     Scribe Attestation      I,:  Valeria Young MA am acting as a scribe while in the presence of the attending physician.:       I,:  Jonathan Zayas MD personally performed the services described in this documentation    as scribed in my presence.:

## 2024-05-22 NOTE — PATIENT INSTRUCTIONS
SEBORRHEIC KERATOSES  - Relevant exam: Scattered over the trunk/extremities are waxy brown to black plaques and papules with stuck on appearance  - Exam and clinical history consistent with seborrheic keratoses  - Counseled that these are benign growths that do not require treatment  - Counseled that removal of lesions is considered cosmetic and so would incur a fee should patient elect to move forward.   - Patient to hold on treatments for now but will inform us should they desire additional treatments    MELANOCYTIC NEVI  -Relevant exam: Scattered over the trunk/extremities are homogenously pigmented brown macules and papules. ELM performed and without concerning findings.  - Exam and clinical history consistent with melanocytic nevi  - Educated on the ABCDE's of melanoma; handout provided  - Counseled to return to clinic prior to scheduled appointment should any of these lesions change or should any new lesions of concern arise  - Counseled on use of sun protection daily. Reviewed latest FDA sunscreen guidelines, including use of broad spectrum (UVA and UVB blocking) sunscreen or sun protective clothing with SPF 30-50 every 2-3 hours and reapplied after exposure to water; use of photoprotective clothing, including a broad brim hat and UPF rated clothing if outdoors for several hours; avoid use of tanning beds as these pose significant risk for melanoma and skin cancer.    LENTIGINES  OTHER SKIN CHANGES DUE TO CHRONIC EXPOSURE TO NONIONIZING RADIATION  - Relevant exam: Over sun exposed areas are brown macules. ELM performed and without concerning findings.  - Exam and clinical history consistent with lentigines.  - Educated that these are indicative of prior sun exposure.   - Counseled to return to clinic prior to scheduled appointment should any of these lesions change or should any new lesions of concern arise.  - Recommended use of sunscreen as above and below.  - Counseled on use of sun protection daily.  Reviewed latest FDA sunscreen guidelines, including use of broad spectrum (UVA and UVB blocking) sunscreen or sun protective clothing with SPF 30-50 every 2-3 hours and reapplied after exposure to water; use of photoprotective clothing, including a broad brim hat and UPF rated clothing if outdoors for several hours; avoid use of tanning beds as these pose significant risk for melanoma and skin cancer.    CHERRY ANGIOMAS  - Relevant exam: Scattered over the trunk/extremities are red papules  - Exam and clinical history consistent with cherry angiomas  - Educated that these are benign  - Educated that removal is considered aesthetic and would incur a fee.  - Patient does not wish to pursue removal at this time but will contact us should this change.    SEBORRHEIC DERMATITIS     Physical Exam:  Anatomic Location Affected &  Morphological Description:  Greasy adherent scale/scaling plaques on the scalp.      Assessment and Plan:  History and physical exam most consistent with seborrheic dermatitis  The chronic nature of this condition and association with normal skin yeast were reviewed.   Educated that the goal of therapy is to control symptoms, but this is not typically something we cure and intermittent flares can be expected.  Based on a thorough discussion of this condition and the management approach to it (including a comprehensive discussion of the known risks, side effects and potential benefits of treatment), the patient (family) agrees to implement the following specific plan:           SCALP  Start OTC anti-dandruff shampoo (Head & Shoulders, Selsun Blue, Neutrogena T-gel or T-Sal) 2x/week to damp scalp, let sit 10 minutes then rinse (may use normal shampoo/conditioner thereafter as desired) and alternate with ketoconazole shampoo (lather for 5 minutes prior to rinsing off; prescribed today)  Start clobetasol (Temovate) 0.05% external solution    INTERTRIGO    Assessment and Plan:  Based on a thorough  discussion of this condition and the management approach to it (including a comprehensive discussion of the known risks, side effects and potential benefits of treatment), the patient (family) agrees to implement the following specific plan:  Start Triamcinolone 0.025% cream: apply to affected area twice a day for 10 days    Assessment and Plan  Intertrigo describes a rash in the flexures or body folds, such as behind the ears, in the folds of the neck, under the arms (axillae), under a protruding abdomen, in the groin, between the buttocks, in the finger webs or toe spaces.  Although intertrigo may affect one skin fold, it is common for it to involve multiple sites.    Intertrigo can affect males and females of any age. It is particularly common in people that are overweight or obese (see metabolic syndrome). Other contributing factors are:  Genetic tendency to skin disease  Hyperhidrosis (excessive sweating)    Intertrigo can be acute (recent onset), relapsing (recurrent), or chronic (present for more than 6 weeks). The exact appearance and behaviour depends on the underlying cause or causes.  The skin affected by intertrigo is inflamed, ie reddened and uncomfortable. It may become moist and macerated, leading to fissuring (cracks) and peeling.  Intertrigo is due to genetic and environmental factors.  Flexural skin has relatively high surface temperature  Moisture from insensible water loss and sweating cannot evaporate due to occlusion.  Friction from movement of adjacent skin results in chafing.    The microorganisms that are normally resident on flexural skin, the microbiome, include corynebacteria, other bacteria and yeasts. These multiply in warm moist environments and may cause disease.    We can classify intertrigo into infectious and inflammatory origin but there is often overlap.  Infections tend to be unilateral and asymmetrical.  Inflammatory disorders tend to be symmetrical affecting armpits, groins,  under the breasts and the abdominal folds, except atopic dermatitis, which more often arises on the neck, and in elbow and knee creases.    Investigations may be necessary to determine the cause of intertrigo.  A swab for microscopy and culture of bacteria (microbiology)  A scraping for microscopy and culture of fungi (mycology)  A skin biopsy may be performed for histopathology if the skin condition is unusual or fails to respond to treatment.    What is the treatment for intertrigo?  Treatment depends on the underlying cause, if identified, and on which micro-organisms are present in the rash. Combinations are common.  Sweating may be reduced with a gentle antiperspirant.  Physical exertion should be followed by a bath and completely drying the skin folds using a hair dryer on cool setting, soft towel and/or corn starch powder.  Triple paste contains petrolatum, zinc oxide, and aluminum acetate solution to reduce friction, irritation and sweating.  Bacteria may be treated with topical antibiotics such as fusidic acid cream, mupirocin ointment, or oral antibiotics such as flucloxacillin and erythromycin.  Yeasts and fungi may be treated with topical antifungals such as clotrimazole and terbinafine cream or oral antifungal agents such as itraconazole or terbinafine.  Inflammatory skin diseases are often treated with low potency topical steroid creams such as hydrocortisone. More potent steroids are usually avoided in the flexures because they may cause skin thinning resulting in stretch marks (striae) and even ulcers. Calcineurin inhibitors such as tacrolimus ointment or pimecrolimus cream may also prove effective.

## 2024-05-23 ENCOUNTER — OFFICE VISIT (OUTPATIENT)
Age: 69
End: 2024-05-23
Payer: MEDICARE

## 2024-05-23 VITALS
SYSTOLIC BLOOD PRESSURE: 160 MMHG | BODY MASS INDEX: 25.25 KG/M2 | HEIGHT: 60 IN | WEIGHT: 128.6 LBS | HEART RATE: 83 BPM | TEMPERATURE: 98.3 F | OXYGEN SATURATION: 98 % | DIASTOLIC BLOOD PRESSURE: 88 MMHG

## 2024-05-23 DIAGNOSIS — L90.0 LICHEN SCLEROSUS ET ATROPHICUS: ICD-10-CM

## 2024-05-23 DIAGNOSIS — Z85.43 ENCOUNTER FOR FOLLOW-UP SURVEILLANCE OF OVARIAN CANCER: Primary | ICD-10-CM

## 2024-05-23 DIAGNOSIS — N39.46 URINARY INCONTINENCE, MIXED: ICD-10-CM

## 2024-05-23 DIAGNOSIS — Z86.000 HISTORY OF DUCTAL CARCINOMA IN SITU (DCIS) OF BREAST: ICD-10-CM

## 2024-05-23 DIAGNOSIS — I89.0 LYMPHEDEMA OF LEFT LOWER EXTREMITY: ICD-10-CM

## 2024-05-23 DIAGNOSIS — Z85.43 HISTORY OF OVARIAN CANCER: ICD-10-CM

## 2024-05-23 DIAGNOSIS — Z08 ENCOUNTER FOR FOLLOW-UP SURVEILLANCE OF OVARIAN CANCER: Primary | ICD-10-CM

## 2024-05-23 DIAGNOSIS — L21.9 SEBORRHEIC DERMATITIS: ICD-10-CM

## 2024-05-23 PROCEDURE — 99214 OFFICE O/P EST MOD 30 MIN: CPT | Performed by: PHYSICIAN ASSISTANT

## 2024-05-23 RX ORDER — CLOBETASOL PROPIONATE 0.5 MG/G
OINTMENT TOPICAL
Qty: 60 G | Refills: 1 | Status: SHIPPED | OUTPATIENT
Start: 2024-05-23

## 2024-05-23 NOTE — ASSESSMENT & PLAN NOTE
Mixed stress and urgency.   Progressive, and affecting QOL.   Check urine culture.   Refer to uro-gyn.

## 2024-05-23 NOTE — PROGRESS NOTES
Assessment/Plan:    Problem List Items Addressed This Visit          Urinary    Urinary incontinence, mixed     Mixed stress and urgency.   Progressive, and affecting QOL.   Check urine culture.   Refer to uro-gyn.         Relevant Orders    Ambulatory Referral to Urogynecology    Urine culture       Oncology    History of ovarian cancer     History of stage IA ovarian cancer s/p completion of adjuvant chemotherapy in July 2009. 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 She is clinically without evidence of ovarian cancer recurrence.      Return to the office in 1 year for continued surveillance.          History of ductal carcinoma in situ (DCIS) of breast     Follow-up with surg/med-onc.          Encounter for follow-up surveillance of ovarian cancer - Primary       Surgery/Wound/Pain    Lymphedema of left lower extremity     Chronic, stable.            Other    Lichen sclerosus et atrophicus     Well controlled with intermittent clobetasol use.         Relevant Medications    clobetasol (TEMOVATE) 0.05 % ointment     Other Visit Diagnoses       Seborrheic dermatitis        Relevant Medications    clobetasol (TEMOVATE) 0.05 % ointment              CHIEF COMPLAINT:   Ovarian cancer surveillance    Problem:  Cancer Staging   History of ductal carcinoma in situ (DCIS) of breast  Staging form: Breast, AJCC 8th Edition  - Pathologic stage from 8/1/2018: Stage 0 (pTis (DCIS), pN0, cM0, ER-, DC-, HER2: Not Assessed) - Signed by LUIS Valladares on 1/29/2024    History of ovarian cancer  Staging form: Ovary, AJCC 7th Edition  - Clinical: FIGO Stage IA (T1a, N0, M0) - Signed by Pushpa Odom PA-C on 4/19/2018    Malignant neoplasm of central portion of left breast in female, estrogen receptor positive (HCC)  Staging form: Breast, AJCC 8th Edition  - Pathologic stage from 8/1/2018: Stage IA (pT1b, pN0(sn), cM0, G1, ER+, DC-, HER2-) - Signed by Poonam Delacruz  LUIS Marie on 1/29/2024        Previous therapy:  Oncology History   History of ovarian cancer    Initial Diagnosis    Ovarian cancer (HCC)     4/21/2009 Surgery    Exploratory laparotomy, total abdominal hysterectomy, bilateral salpingo-oophrectomy, lymph node dissection, omentectomy with staging      - 7/8/2009 Chemotherapy    Intraperitoneal along with intravenous chemotherapy on a early ovarian cancer protocol.     5/31/2018 Genetic Testing    Genetic testing results are POSITIVE for a pathogenic variant: YOLANDA c.5290delC      Genetic testing indicated that the patient carries a Variant of Uncertain Significance (VUS) : Rad51C c.252G>T      Hormone Therapy       History of ductal carcinoma in situ (DCIS) of breast   5/31/2018 Genetic Testing    Genetic testing results are POSITIVE for a pathogenic variant: YOLANDA c.5290delC      Genetic testing indicated that the patient carries a Variant of Uncertain Significance (VUS) : Rad51C c.252G>T     8/1/2018 Biopsy    Right breast biopsy  11 o'clock position 5 cmfn  DCIS  Grade 2  Confirmed by Martin Luna MD  ER 0  DE 0     8/1/2018 -  Cancer Staged    Staging form: Breast, AJCC 8th Edition  - Pathologic stage from 8/1/2018: Stage 0 (pTis (DCIS), pN0, cM0, ER-, DE-, HER2: Not Assessed) - Signed by LUIS Valladares on 1/29/2024  Stage prefix: Initial diagnosis  Neoadjuvant therapy: No  Method of lymph node assessment: Clinical  Nuclear grade: G2  Multigene prognostic tests performed: None  Laterality: Right  Tumor size (mm): 14       9/11/2018 Surgery    Right lumpectomy  DCIS  Grade 2  1.4 cm  Margins negative  Stage 0     10/16/2018 -  Hormone Therapy    Consult with Dr. Alcantar  No adjuvant systemic therapy recommended     10/29/2018 - 11/28/2018 Radiation    Course: C1    Plan ID Energy Fractions Dose per Fraction (cGy) Dose Correction (cGy) Total Dose Delivered (cGy) Elapsed Days   R Breast 6X 16 / 16 266 0 4,256 22   R Breast e 12E 5 / 5 200 0 1,000  7      Treatment dates:  C1: 10/29/2018 - 11/28/2018       Malignant neoplasm of central portion of left breast in female, estrogen receptor positive (HCC)   8/1/2018 -  Cancer Staged    Staging form: Breast, AJCC 8th Edition  - Pathologic stage from 8/1/2018: Stage IA (pT1b, pN0(sn), cM0, G1, ER+, MN-, HER2-) - Signed by LUIS Valladares on 1/29/2024  Stage prefix: Initial diagnosis  Neoadjuvant therapy: No  Method of lymph node assessment: [place] lymph node biopsy  Multigene prognostic tests performed: None  Histologic grading system: 3 grade system  Laterality: Left  Tumor size (mm): 3       7/18/2019 Biopsy    Left breast MRI-guided biopsy  3 o'clock, posterior depth  Invasive breast carcinoma of no special type  Grade 1  ER 90  MN 0  HER2 1+     8/13/2019 Surgery    Left breast needle localized lumpectomy with sentinel lymph node biopsy  Invasive mammary carcinoma of no special type  Grade 1  3 mm  Margins negative  0/1 Lymph node  Anatomic/Prognostic Stage IA     10/8/2019 - 11/5/2019 Radiation      Treatment:  Course: C2  Plan ID Energy Fractions Dose per Fraction (cGy) Dose Correction (cGy) Total Dose Delivered (cGy) Elapsed Days   BH L Breast 6X 16 / 16 266 0 4,256 21   BH L Brst e 12E 5 / 5 200 0 1,000 6    C2: 10/8/2019 - 11/5/2019 11/2019 -  Hormone Therapy    Anastrozole           Patient ID: Ghada Ferreira is a 68 y.o. female  who presents for ovarian cancer surveillance. No vaginal bleeding, abdominal/pelvic pain. Her vulvar irritation is well controlled with intermittent clobetasol use. Normal bowel. She notes worsening incontinence, both stress and urgency. Her lower extremity lymphedema is stable. No interval change in medical history since last visit. Quality of life is good.        The following portions of the patient's history were reviewed and updated as appropriate: allergies, current medications, past medical history, past surgical history, and problem list.    Review  of Systems   Constitutional: Negative.    HENT: Negative.     Eyes: Negative.    Respiratory: Negative.     Cardiovascular:  Positive for leg swelling.   Gastrointestinal: Negative.    Genitourinary:         As per HPI.   Musculoskeletal:  Positive for arthralgias.   Skin: Negative.    Neurological: Negative.    Psychiatric/Behavioral: Negative.         Current Outpatient Medications   Medication Sig Dispense Refill    acetaminophen (TYLENOL) 500 mg tablet Take 1,000 mg by mouth as needed Pre-Medication prior to Remicade Infusion      albuterol (PROVENTIL HFA,VENTOLIN HFA) 90 mcg/act inhaler Inhale 2 puffs  as needed in the morning for wheezing. 18 g 2    alendronate (FOSAMAX) 70 mg tablet Take 1 tablet (70 mg total) by mouth every 7 days 12 tablet 3    amLODIPine (NORVASC) 10 mg tablet Take 1 tablet (10 mg total) by mouth daily 90 tablet 3    anastrozole (ARIMIDEX) 1 mg tablet TAKE 1 TABLET (1 MG TOTAL) BY MOUTH DAILY 30 tablet 5    atorvastatin (LIPITOR) 40 mg tablet Take 1 tablet (40 mg total) by mouth daily at bedtime 90 tablet 3    Calcium Carbonate 1500 (600 Ca) MG TABS Take 1 tablet by mouth 2 (two) times a day      clobetasol (TEMOVATE) 0.05 % external solution Apply nightly to scalp for redness/irritation. May use up to 5 times a week. DO NOT apply to face, groin, other areas of body. 50 mL 2    clobetasol (TEMOVATE) 0.05 % ointment Apply to the affected area nightly x 4-6 weeks, then 1-2x week thereafter 60 g 1    Diclofenac Sodium (VOLTAREN) 1 % Apply 2 g topically 4 (four) times a day PRN      diphenhydrAMINE (BENADRYL) 25 mg tablet Take 25 mg by mouth as needed Pre-Medication prior to Remicade Infusion      Homeopathic Products (Arnicare Bruise) GEL Apply topically      indapamide (LOZOL) 2.5 mg tablet Take 1 tablet (2.5 mg total) by mouth every morning 90 tablet 3    inFLIXimab (REMICADE) 100 mg Infuse into a venous catheter        ketoconazole (NIZORAL) 2 % shampoo Use 2-3x per week on scalp  (alternate with over the counter salicylic acid shampoo). Leave on for 5 minutes and then rinse off completely. 120 mL 4    mupirocin (BACTROBAN) 2 % ointment Apply topically 3 (three) times a day 22 g 0    ofloxacin (OCUFLOX) 0.3 % ophthalmic solution ADMINISTER 1 DROP TO BOTH EYES 4 (FOUR) TIMES A DAY 5 mL 0    potassium chloride (MICRO-K) 10 MEQ CR capsule Take 1 capsule (10 mEq total) by mouth daily 90 capsule 3    triamcinolone (KENALOG) 0.025 % cream Apply topically 2 (two) times a day for up to 10 days. May repeat course after 1 week break. 15 g 1    Turmeric 500 MG CAPS Take by mouth       No current facility-administered medications for this visit.           Objective:    Blood pressure 160/88, pulse 83, temperature 98.3 °F (36.8 °C), height 5' (1.524 m), weight 58.3 kg (128 lb 9.6 oz), SpO2 98%.  Body mass index is 25.12 kg/m².  Body surface area is 1.55 meters squared.    Physical Exam  Vitals reviewed. Exam conducted with a chaperone present.   Constitutional:       General: She is not in acute distress.     Appearance: Normal appearance. She is not ill-appearing.   HENT:      Head: Normocephalic and atraumatic.      Mouth/Throat:      Mouth: Mucous membranes are moist.   Eyes:      General:         Right eye: No discharge.         Left eye: No discharge.      Conjunctiva/sclera: Conjunctivae normal.   Pulmonary:      Effort: Pulmonary effort is normal.   Abdominal:      Palpations: Abdomen is soft. There is no mass.      Tenderness: There is no abdominal tenderness.      Hernia: No hernia is present.   Genitourinary:     Comments: The external female genitalia is normal. The bartholin's, uretheral and skenes glands are normal. The urethral meatus is normal (midline with no lesions). Anus without fissure or lesion. Speculum exam reveals a grossly normal vagina. No masses, lesions,discharge or bleeding. No significant cystocele or rectocele noted. Bimanual exam notes a surgical absent cervix, uterus and  adnexal structures. No masses or fullness. Bladder is without fullness, mass or tenderness.  Musculoskeletal:      Right lower leg: No edema.      Left lower leg: Edema (chronic) present.   Skin:     General: Skin is warm and dry.      Coloration: Skin is not jaundiced.      Findings: No rash.   Neurological:      General: No focal deficit present.      Mental Status: She is alert and oriented to person, place, and time.      Cranial Nerves: No cranial nerve deficit.      Sensory: No sensory deficit.      Motor: No weakness.      Gait: Gait normal.   Psychiatric:         Mood and Affect: Mood normal.         Behavior: Behavior normal.         Thought Content: Thought content normal.         Judgment: Judgment normal.

## 2024-05-23 NOTE — ASSESSMENT & PLAN NOTE
History of stage IA ovarian cancer s/p completion of adjuvant chemotherapy in July 2009. 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 She is clinically without evidence of ovarian cancer recurrence.      Return to the office in 1 year for continued surveillance.

## 2024-05-31 ENCOUNTER — HOSPITAL ENCOUNTER (OUTPATIENT)
Dept: INFUSION CENTER | Facility: HOSPITAL | Age: 69
End: 2024-05-31
Attending: INTERNAL MEDICINE
Payer: MEDICARE

## 2024-05-31 VITALS
TEMPERATURE: 98.1 F | HEIGHT: 60 IN | BODY MASS INDEX: 25.45 KG/M2 | SYSTOLIC BLOOD PRESSURE: 142 MMHG | HEART RATE: 63 BPM | WEIGHT: 129.63 LBS | RESPIRATION RATE: 16 BRPM | OXYGEN SATURATION: 98 % | DIASTOLIC BLOOD PRESSURE: 76 MMHG

## 2024-05-31 DIAGNOSIS — K50.10 CROHN'S DISEASE OF LARGE INTESTINE WITHOUT COMPLICATION (HCC): Primary | ICD-10-CM

## 2024-05-31 RX ORDER — SODIUM CHLORIDE 9 MG/ML
20 INJECTION, SOLUTION INTRAVENOUS ONCE
Status: COMPLETED | OUTPATIENT
Start: 2024-05-31 | End: 2024-05-31

## 2024-05-31 RX ORDER — SODIUM CHLORIDE 9 MG/ML
20 INJECTION, SOLUTION INTRAVENOUS ONCE
OUTPATIENT
Start: 2024-07-26

## 2024-05-31 RX ADMIN — SODIUM CHLORIDE 20 ML/HR: 9 INJECTION, SOLUTION INTRAVENOUS at 10:10

## 2024-05-31 RX ADMIN — INFLIXIMAB 300 MG: 100 INJECTION, POWDER, LYOPHILIZED, FOR SOLUTION INTRAVENOUS at 10:14

## 2024-05-31 NOTE — PROGRESS NOTES
Ghada Ferreira  tolerated treatment well with no complications.      Ghada Ferreira is aware of future appt on 07/26/2024 at 0900.     AVS printed and given to Ghada Ferreira:  No (Declined by Ghada Ferreira)

## 2024-06-20 ENCOUNTER — HOSPITAL ENCOUNTER (OUTPATIENT)
Dept: MAMMOGRAPHY | Facility: CLINIC | Age: 69
Discharge: HOME | End: 2024-06-20
Payer: MEDICARE

## 2024-06-20 VITALS — WEIGHT: 129 LBS | HEIGHT: 61 IN | BODY MASS INDEX: 24.35 KG/M2

## 2024-06-20 DIAGNOSIS — Z86.000 HISTORY OF DUCTAL CARCINOMA IN SITU (DCIS) OF BREAST: ICD-10-CM

## 2024-06-20 PROCEDURE — G0279 TOMOSYNTHESIS, MAMMO: HCPCS

## 2024-06-20 PROCEDURE — 77066 DX MAMMO INCL CAD BI: CPT

## 2024-07-24 ENCOUNTER — OFFICE VISIT (OUTPATIENT)
Dept: SURGICAL ONCOLOGY | Facility: CLINIC | Age: 69
End: 2024-07-24
Payer: MEDICARE

## 2024-07-24 VITALS
WEIGHT: 131 LBS | HEART RATE: 73 BPM | RESPIRATION RATE: 15 BRPM | OXYGEN SATURATION: 98 % | HEIGHT: 61 IN | BODY MASS INDEX: 24.73 KG/M2 | SYSTOLIC BLOOD PRESSURE: 112 MMHG | TEMPERATURE: 97.6 F | DIASTOLIC BLOOD PRESSURE: 70 MMHG

## 2024-07-24 DIAGNOSIS — C50.112 MALIGNANT NEOPLASM OF CENTRAL PORTION OF LEFT BREAST IN FEMALE, ESTROGEN RECEPTOR POSITIVE (HCC): Primary | ICD-10-CM

## 2024-07-24 DIAGNOSIS — Z86.000 HISTORY OF DUCTAL CARCINOMA IN SITU (DCIS) OF BREAST: ICD-10-CM

## 2024-07-24 DIAGNOSIS — Z79.811 USE OF ANASTROZOLE: ICD-10-CM

## 2024-07-24 DIAGNOSIS — Z15.09 MONOALLELIC MUTATION OF ATM GENE: ICD-10-CM

## 2024-07-24 DIAGNOSIS — Z15.89 MONOALLELIC MUTATION OF ATM GENE: ICD-10-CM

## 2024-07-24 DIAGNOSIS — Z17.0 MALIGNANT NEOPLASM OF CENTRAL PORTION OF LEFT BREAST IN FEMALE, ESTROGEN RECEPTOR POSITIVE (HCC): Primary | ICD-10-CM

## 2024-07-24 DIAGNOSIS — Z15.01 MONOALLELIC MUTATION OF ATM GENE: ICD-10-CM

## 2024-07-24 PROCEDURE — 99214 OFFICE O/P EST MOD 30 MIN: CPT

## 2024-07-24 NOTE — PROGRESS NOTES
Surgical Oncology Follow Up       [facility]COL[facility]Y [place]  [address]  [place] [address]    Ghada Ferreira  1955  [id number]  [facility] SURG[facility] ONCOLOG[facility] [place]  [address] [facility]INCESSC[facility] [address]    Chief Complaint   Patient presents with   • Follow-up       Assessment/Plan:  1. Malignant neoplasm of central portion of left breast in female, estrogen receptor positive (HCC)  - 6 mo f/u  - MRI breast bilateral w and wo contrast w cad; Future  - Mammo diagnostic bilateral w 3d & cad; Future    2. Monoallelic mutation of YOLANDA gene  - MRI breast bilateral w and wo contrast w cad; Future    3. Use of anastrozole  - continue use per medical oncology    4. History of ductal carcinoma in situ (DCIS) of breast    Discussion/Summary:  Patient is a 68-year-old female presenting today for six-month follow-up for bilateral breast cancer and a positive YOLANDA mutation. She was diagnosed with DCIS in May 2018. She underwent a right breast lumpectomy with Dr. Valdez and completed WBRT. In July 2019 she was diagnosed with invasive breast carcinoma of the left breast, ER 90%, LA/HER2 negative. She had a left breast lumpectomy with sentinel node biopsy with Dr. Valdez and completed WBRT. She is currently on anastrozole. She also has a history of ovarian cancer diagnosed in 2009. We have been following her with annual MRI of the breasts and bilateral diagnostic mammograms. She had a b/l dx mammo on 6/20/24 which was BIRADS 2 category 3 density. There were no concerns on her cbe. I will see the patient back in 6 months or sooner should the need arise. She was instructed to call with any questions or concerns prior to this time. All questions were answered today.      History of Present Illness:     Oncology History   History of ovarian cancer    Initial Diagnosis    Ovarian cancer (HCC)     4/21/2009 Surgery    Exploratory  laparotomy, total abdominal hysterectomy, bilateral salpingo-oophrectomy, lymph node dissection, omentectomy with staging      - 7/8/2009 Chemotherapy    Intraperitoneal along with intravenous chemotherapy on a early ovarian cancer protocol.     5/31/2018 Genetic Testing    Genetic testing results are POSITIVE for a pathogenic variant: YOLANDA c.5290delC      Genetic testing indicated that the patient carries a Variant of Uncertain Significance (VUS) : Rad51C c.252G>T      Hormone Therapy       History of ductal carcinoma in situ (DCIS) of breast   5/31/2018 Genetic Testing    Genetic testing results are POSITIVE for a pathogenic variant: YOLANDA c.5290delC      Genetic testing indicated that the patient carries a Variant of Uncertain Significance (VUS) : Rad51C c.252G>T     8/1/2018 Biopsy    Right breast biopsy  11 o'clock position 5 cmfn  DCIS  Grade 2  Confirmed by Martin Luna MD  ER 0  CT 0     8/1/2018 -  Cancer Staged    Staging form: Breast, AJCC 8th Edition  - Pathologic stage from 8/1/2018: Stage 0 (pTis (DCIS), pN0, cM0, ER-, CT-, HER2: Not Assessed) - Signed by LUIS Valladares on 1/29/2024  Stage prefix: Initial diagnosis  Neoadjuvant therapy: No  Method of lymph node assessment: Clinical  Nuclear grade: G2  Multigene prognostic tests performed: None  Laterality: Right  Tumor size (mm): 14       9/11/2018 Surgery    Right lumpectomy  DCIS  Grade 2  1.4 cm  Margins negative  Stage 0     10/16/2018 -  Hormone Therapy    Consult with Dr. Alcantar  No adjuvant systemic therapy recommended     10/29/2018 - 11/28/2018 Radiation    Course: C1    Plan ID Energy Fractions Dose per Fraction (cGy) Dose Correction (cGy) Total Dose Delivered (cGy) Elapsed Days   R Breast 6X 16 / 16 266 0 4,256 22   R Breast e 12E 5 / 5 200 0 1,000 7      Treatment dates:  C1: 10/29/2018 - 11/28/2018       Malignant neoplasm of central portion of left breast in female, estrogen receptor positive (HCC)   8/1/2018 -  Cancer  Staged    Staging form: Breast, AJCC 8th Edition  - Pathologic stage from 8/1/2018: Stage IA (pT1b, pN0(sn), cM0, G1, ER+, IN-, HER2-) - Signed by LUIS Valladares on 1/29/2024  Stage prefix: Initial diagnosis  Neoadjuvant therapy: No  Method of lymph node assessment: [place] lymph node biopsy  Multigene prognostic tests performed: None  Histologic grading system: 3 grade system  Laterality: Left  Tumor size (mm): 3       7/18/2019 Biopsy    Left breast MRI-guided biopsy  3 o'clock, posterior depth  Invasive breast carcinoma of no special type  Grade 1  ER 90  IN 0  HER2 1+     8/13/2019 Surgery    Left breast needle localized lumpectomy with sentinel lymph node biopsy  Invasive mammary carcinoma of no special type  Grade 1  3 mm  Margins negative  0/1 Lymph node  Anatomic/Prognostic Stage IA     10/8/2019 - 11/5/2019 Radiation      Treatment:  Course: C2  Plan ID Energy Fractions Dose per Fraction (cGy) Dose Correction (cGy) Total Dose Delivered (cGy) Elapsed Days   BH L Breast 6X 16 / 16 266 0 4,256 21   BH L Brst e 12E 5 / 5 200 0 1,000 6    C2: 10/8/2019 - 11/5/2019 11/2019 -  Hormone Therapy    Anastrozole          -Interval History: Patient is a 68-year-old female presenting today for six-month follow-up for bilateral breast cancer and a positive YOLANDA mutation. She has been on obs. She had a b/l dx mammo on 6/20/24 which was BIRADS 2 category 3 density. Patient denies changes on her breast exam. She denies persistent headaches, bone pain, back pain, shortness of breath, or abdominal pain.      Review of Systems:  Review of Systems   Constitutional:  Negative for activity change, appetite change, fatigue and unexpected weight change.   Respiratory:  Negative for cough and shortness of breath.    Cardiovascular:  Negative for chest pain.   Gastrointestinal:  Negative for abdominal pain, diarrhea, nausea and vomiting.   Endocrine: Negative for heat intolerance.   Musculoskeletal:  Negative for  arthralgias, back pain and myalgias.   Skin:  Negative for rash.   Neurological:  Negative for weakness and headaches.   Hematological:  Negative for adenopathy.       Patient Active Problem List   Diagnosis   • Colon, diverticulosis   • Crohn's disease (HCC)   • Dense breasts   • Dermatitis   • Erythema chronica migrans, secondary   • Hyperlipidemia   • Lymphedema   • Murmur   • Osteopenia   • Osteoporosis   • History of ovarian cancer   • Reactive airway disease   • Shortness of breath on exertion   • Lymphedema of left lower extremity   • Genetic predisposition to breast cancer   • History of ductal carcinoma in situ (DCIS) of breast   • Encounter for follow-up surveillance of ovarian cancer   • Lichen sclerosus et atrophicus   • Primary hypertension   • Malignant neoplasm of ovary (HCC)   • Nausea   • TIA (transient ischemic attack)   • Malignant neoplasm of central portion of left breast in female, estrogen receptor positive (HCC)   • Use of anastrozole   • History of breast cancer   • Carpal tunnel syndrome of right wrist   • Neuropathy   • Monoallelic mutation of YOLANDA gene   • Right calf pain   • Primary osteoarthritis of right knee   • Effusion of right knee   • Primary osteoarthritis of left knee   • Pes anserinus bursitis of right knee   • Bilateral carpal tunnel syndrome   • Urine frequency   • Benign neoplasm of colon   • Crohn's disease of large bowel (HCC)   • Mitral valve disorder   • 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   • Sebaceous cyst   • Encounter for immunization   • Hypokalemia   • Postmenopause   • Pain of left thumb   • Bilateral hip pain   • Urinary incontinence, mixed     Past Medical History:   Diagnosis Date   • Arthritis    • BRCA1 negative    • BRCA2 negative    • Breast cancer (HCC) 09/11/2018    Right   • Breast cancer (HCC) 08/13/2019    Left   • Colon polyp    • Crohn disease (HCC)    • Dense breast    • Genetic predisposition to breast cancer 05/31/2018   •  History of chemotherapy     for ovarian cancer   • History of radiation therapy    • History of transfusion 2009   • Hyperlipidemia    • Hypertension    • Lymphedema     left leg   • Lymphedema    • Monoallelic mutation of YOLANDA gene     Breast Gyn Panel   • Ovarian cancer (HCC) 04/21/2009     Past Surgical History:   Procedure Laterality Date   • BREAST LUMPECTOMY Right 09/11/2018   • BREAST LUMPECTOMY Left 8/13/2019    Procedure: BREAST LUMPECTOMY; BREAST NEEDLE LOCALIZATION (NEEDLE LOC AT 0800);  Surgeon: Lawrence Valdez MD;  [facility];  Service: Surgical Oncology   • BREAST LUMPECTOMY W/ NEEDLE LOCALIZATION Left 08/13/2019   • COLONOSCOPY     • EXPLORATORY LAPAROTOMY     • HYSTERECTOMY     • LYMPH NODE BIOPSY Left 8/13/2019    Procedure: SENTINEL LYMPH NODE BIOPSY; LYMPHATIC MAPPING WITH BLUE DYE AND RADIOACTIVE DYE (INJECT AT 0930 BY DR VALDEZ IN THE OR);  Surgeon: Lawrence Valdez MD;  [facility];  Service: Surgical Oncology   • MAMMO NEEDLE LOCALIZATION LEFT (ALL INC) Left 8/13/2019   • MAMMO NEEDLE LOCALIZATION RIGHT (ALL INC) Right 9/11/2018   • MRI BREAST BIOPSY LEFT (ALL INCLUSIVE) Left 7/18/2019   • OMENTECTOMY     • WV PERQ DEVICE PLACEMENT BREAST LOC 1ST LES W/GDNCE Right 9/11/2018    Procedure: BREAST LUMPECTOMY; BREAST NEEDLE LOCALIZATION (NEEDLE LOC AT 1200);  Surgeon: Lawrence Valdez MD;  [facility];  Service: Surgical Oncology   • TOTAL ABDOMINAL HYSTERECTOMY W/ BILATERAL SALPINGOOPHORECTOMY     • US GUIDED BREAST BIOPSY LEFT COMPLETE Left 6/17/2019   • US GUIDED BREAST BIOPSY RIGHT COMPLETE Right 8/1/2018   • WISDOM TOOTH EXTRACTION       Family History   Problem Relation Age of Onset   • Hypertension Mother    • Heart disease Mother    • Prostate cancer Father    • Alzheimer's disease Father    • Hypertension Father    • Ovarian cancer Sister    • No Known Problems Sister    • Other Sister         pacemaker   • Hypertension Sister    • Heart disease Sister    • No Known Problems  Sister    • Down syndrome Sister    • Alzheimer's disease Sister    • Hypertension Sister    • Breast cancer Paternal Grandmother    • Breast cancer Maternal Aunt    • No Known Problems Maternal Aunt    • No Known Problems Paternal Aunt    • Colon cancer Neg Hx      Social History     Socioeconomic History   • Marital status: Single     Spouse name: Not on file   • Number of children: Not on file   • Years of education: Not on file   • Highest education level: Not on file   Occupational History   • Not on file   Tobacco Use   • Smoking status: Former     Current packs/day: 0.00     Types: Cigarettes     Quit date: 4/1/2009     Years since quitting: 15.3   • Smokeless tobacco: Never   Vaping Use   • Vaping status: Never Used   Substance and Sexual Activity   • Alcohol use: Yes     Comment: rarely   • Drug use: No   • Sexual activity: Not on file   Other Topics Concern   • Not on file   Social History Narrative   • Not on file     Social Determinants of Health     Financial Resource Strain: Low Risk  (1/10/2024)    Overall Financial Resource Strain (CARDIA)    • Difficulty of Paying Living Expenses: Not hard at all   Food Insecurity: Not on file   Transportation Needs: No Transportation Needs (1/10/2024)    PRAPARE - Transportation    • Lack of Transportation (Medical): No    • Lack of Transportation (Non-Medical): No   Physical Activity: Not on file   Stress: Not on file   Social Connections: Not on file   Intimate Partner Violence: Not on file   Housing Stability: Not on file       Current Outpatient Medications:   •  acetaminophen (TYLENOL) 500 mg tablet, Take 1,000 mg by mouth as needed Pre-Medication prior to Remicade Infusion, Disp: , Rfl:   •  albuterol (PROVENTIL HFA,VENTOLIN HFA) 90 mcg/act inhaler, Inhale 2 puffs  as needed in the morning for wheezing., Disp: 18 g, Rfl: 2  •  alendronate (FOSAMAX) 70 mg tablet, Take 1 tablet (70 mg total) by mouth every 7 days, Disp: 12 tablet, Rfl: 3  •  amLODIPine  (NORVASC) 10 mg tablet, Take 1 tablet (10 mg total) by mouth daily, Disp: 90 tablet, Rfl: 3  •  anastrozole (ARIMIDEX) 1 mg tablet, TAKE 1 TABLET (1 MG TOTAL) BY MOUTH DAILY, Disp: 30 tablet, Rfl: 5  •  atorvastatin (LIPITOR) 40 mg tablet, Take 1 tablet (40 mg total) by mouth daily at bedtime, Disp: 90 tablet, Rfl: 3  •  Calcium Carbonate 1500 (600 Ca) MG TABS, Take 1 tablet by mouth 2 (two) times a day, Disp: , Rfl:   •  clobetasol (TEMOVATE) 0.05 % external solution, Apply nightly to scalp for redness/irritation. May use up to 5 times a week. DO NOT apply to face, groin, other areas of body., Disp: 50 mL, Rfl: 2  •  clobetasol (TEMOVATE) 0.05 % ointment, Apply to the affected area 1-2x week nightly, Disp: 60 g, Rfl: 1  •  Diclofenac Sodium (VOLTAREN) 1 %, Apply 2 g topically 4 (four) times a day PRN, Disp: , Rfl:   •  diphenhydrAMINE (BENADRYL) 25 mg tablet, Take 25 mg by mouth as needed Pre-Medication prior to Remicade Infusion, Disp: , Rfl:   •  Homeopathic Products (Arnicare Bruise) GEL, Apply topically, Disp: , Rfl:   •  indapamide (LOZOL) 2.5 mg tablet, Take 1 tablet (2.5 mg total) by mouth every morning, Disp: 90 tablet, Rfl: 3  •  inFLIXimab (REMICADE) 100 mg, Infuse into a venous catheter  , Disp: , Rfl:   •  ketoconazole (NIZORAL) 2 % shampoo, Use 2-3x per week on scalp (alternate with over the counter salicylic acid shampoo). Leave on for 5 minutes and then rinse off completely., Disp: 120 mL, Rfl: 4  •  mupirocin (BACTROBAN) 2 % ointment, Apply topically 3 (three) times a day, Disp: 22 g, Rfl: 0  •  ofloxacin (OCUFLOX) 0.3 % ophthalmic solution, ADMINISTER 1 DROP TO BOTH EYES 4 (FOUR) TIMES A DAY, Disp: 5 mL, Rfl: 0  •  potassium chloride (MICRO-K) 10 MEQ CR capsule, Take 1 capsule (10 mEq total) by mouth daily, Disp: 90 capsule, Rfl: 3  •  triamcinolone (KENALOG) 0.025 % cream, Apply topically 2 (two) times a day for up to 10 days. May repeat course after 1 week break., Disp: 15 g, Rfl: 1  •  Turmeric  500 MG CAPS, Take by mouth, Disp: , Rfl:   Allergies   Allergen Reactions   • Lisinopril      cough   • Losartan      Numbness on right side of body   • Metoprolol Dizziness   • Boniva [Ibandronic Acid] Headache     Vitals:    07/24/24 1039   BP: 112/70   Pulse: 73   Resp: 15   Temp: 97.6 °F (36.4 °C)   SpO2: 98%       Physical Exam  Constitutional:       General: She is not in acute distress.     Appearance: Normal appearance.   Cardiovascular:      Rate and Rhythm: Normal rate and regular rhythm.      Pulses: Normal pulses.      Heart sounds: Normal heart sounds.   Pulmonary:      Effort: Pulmonary effort is normal.      Breath sounds: Normal breath sounds.   Chest:      Chest wall: No mass.   Breasts:     Right: Tenderness present. No swelling, bleeding, inverted nipple, mass, nipple discharge or skin change.      Left: Tenderness present. No swelling, bleeding, inverted nipple, mass, nipple discharge or skin change.       Abdominal:      General: Abdomen is flat.      Palpations: Abdomen is soft.   Lymphadenopathy:      Upper Body:      Right upper body: No supraclavicular, axillary or pectoral adenopathy.      Left upper body: No supraclavicular, axillary or pectoral adenopathy.   Skin:     General: Skin is warm.   Neurological:      General: No focal deficit present.      Mental Status: She is alert and oriented to person, place, and time.   Psychiatric:         Mood and Affect: Mood normal.         Behavior: Behavior normal.           Results:    Imaging  No results found.    I reviewed the above imaging data.      Advance Care Planning/Advance Directives:  Discussed disease status, cancer treatment plans and/or cancer treatment goals with the patient.

## 2024-07-26 ENCOUNTER — HOSPITAL ENCOUNTER (OUTPATIENT)
Dept: INFUSION CENTER | Facility: HOSPITAL | Age: 69
End: 2024-07-26
Attending: INTERNAL MEDICINE
Payer: MEDICARE

## 2024-07-26 VITALS
DIASTOLIC BLOOD PRESSURE: 66 MMHG | RESPIRATION RATE: 18 BRPM | SYSTOLIC BLOOD PRESSURE: 149 MMHG | HEART RATE: 77 BPM | OXYGEN SATURATION: 96 % | TEMPERATURE: 98 F

## 2024-07-26 DIAGNOSIS — K50.10 CROHN'S DISEASE OF LARGE INTESTINE WITHOUT COMPLICATION (HCC): Primary | ICD-10-CM

## 2024-07-26 PROCEDURE — 96415 CHEMO IV INFUSION ADDL HR: CPT

## 2024-07-26 PROCEDURE — 96413 CHEMO IV INFUSION 1 HR: CPT

## 2024-07-26 RX ORDER — SODIUM CHLORIDE 9 MG/ML
20 INJECTION, SOLUTION INTRAVENOUS ONCE
OUTPATIENT
Start: 2024-09-20

## 2024-07-26 RX ORDER — SODIUM CHLORIDE 9 MG/ML
20 INJECTION, SOLUTION INTRAVENOUS ONCE
Status: COMPLETED | OUTPATIENT
Start: 2024-07-26 | End: 2024-07-26

## 2024-07-26 RX ADMIN — SODIUM CHLORIDE 20 ML/HR: 9 INJECTION, SOLUTION INTRAVENOUS at 09:57

## 2024-07-26 RX ADMIN — INFLIXIMAB 293 MG: 100 INJECTION, POWDER, LYOPHILIZED, FOR SOLUTION INTRAVENOUS at 09:57

## 2024-07-26 NOTE — PROGRESS NOTES
..Ghada Ferreira  tolerated treatment well with no complications.      Ghada Ferreira is aware of future appt on 7/20 at 9:00.     AVS printed and given to Ghada Ferreira:  Yes

## 2024-08-13 DIAGNOSIS — I10 ESSENTIAL HYPERTENSION: ICD-10-CM

## 2024-08-14 RX ORDER — AMLODIPINE BESYLATE 10 MG/1
10 TABLET ORAL DAILY
Qty: 90 TABLET | Refills: 1 | Status: SHIPPED | OUTPATIENT
Start: 2024-08-14

## 2024-08-28 DIAGNOSIS — H10.33 ACUTE BACTERIAL CONJUNCTIVITIS OF BOTH EYES: ICD-10-CM

## 2024-08-28 NOTE — TELEPHONE ENCOUNTER
Patient has used this medication as needed for years. She used it today and realized her bottle is .  Reason for call:   [x] Refill   [] Prior Auth  [] Other:     Office:   [x] PCP/Provider - Karen  [] Specialty/Provider -     Medication: Ofloxacin 0.3% eye drop    Dose/Frequency: 1 drop to both eyes qid    Quantity: 5    Pharmacy: [facility] - [address] [phone]     Does the patient have enough for 3 days?   [] Yes   [x] No - Send as HP to POD

## 2024-08-30 RX ORDER — OFLOXACIN 3 MG/ML
1 SOLUTION/ DROPS OPHTHALMIC 4 TIMES DAILY
Qty: 5 ML | Refills: 0 | Status: SHIPPED | OUTPATIENT
Start: 2024-08-30

## 2024-09-20 ENCOUNTER — HOSPITAL ENCOUNTER (OUTPATIENT)
Dept: INFUSION CENTER | Facility: HOSPITAL | Age: 69
End: 2024-09-20
Attending: INTERNAL MEDICINE
Payer: MEDICARE

## 2024-09-20 VITALS
RESPIRATION RATE: 18 BRPM | TEMPERATURE: 96.8 F | DIASTOLIC BLOOD PRESSURE: 90 MMHG | WEIGHT: 128.31 LBS | SYSTOLIC BLOOD PRESSURE: 166 MMHG | HEIGHT: 60 IN | BODY MASS INDEX: 25.19 KG/M2 | HEART RATE: 74 BPM | OXYGEN SATURATION: 98 %

## 2024-09-20 DIAGNOSIS — K50.10 CROHN'S DISEASE OF LARGE INTESTINE WITHOUT COMPLICATION (HCC): Primary | ICD-10-CM

## 2024-09-20 PROCEDURE — 96413 CHEMO IV INFUSION 1 HR: CPT

## 2024-09-20 PROCEDURE — 96415 CHEMO IV INFUSION ADDL HR: CPT

## 2024-09-20 RX ORDER — SODIUM CHLORIDE 9 MG/ML
20 INJECTION, SOLUTION INTRAVENOUS ONCE
OUTPATIENT
Start: 2024-11-15

## 2024-09-20 RX ORDER — SODIUM CHLORIDE 9 MG/ML
20 INJECTION, SOLUTION INTRAVENOUS ONCE
Status: COMPLETED | OUTPATIENT
Start: 2024-09-20 | End: 2024-09-20

## 2024-09-20 RX ADMIN — INFLIXIMAB 300 MG: 100 INJECTION, POWDER, LYOPHILIZED, FOR SOLUTION INTRAVENOUS at 10:28

## 2024-09-20 RX ADMIN — SODIUM CHLORIDE 20 ML/HR: 0.9 INJECTION, SOLUTION INTRAVENOUS at 09:13

## 2024-09-20 NOTE — TELEPHONE ENCOUNTER
Spoke with patient about new finding of YOLANDA genetic mutation  According to NCCN guidelines, annual breast MRI recommended  We will order this now, so that she has every 6 mo imaging between MRI and mammo  I will call the patient with the results and plan to see the patient back for her regularly scheduled appt in August  All of her questions were answered  Universal Safety Interventions

## 2024-09-20 NOTE — PLAN OF CARE
Problem: Knowledge Deficit  Goal: Patient/family/caregiver demonstrates understanding of disease process, treatment plan, medications, and discharge instructions  Description: Complete learning assessment and assess knowledge base.  Interventions:  - Provide teaching at level of understanding  - Provide teaching via preferred learning methods  9/20/2024 0906 by Xiomara Quinteros RN  Outcome: Progressing  9/20/2024 0905 by Xiomara Quinteros RN  Outcome: Progressing

## 2024-09-27 ENCOUNTER — TELEPHONE (OUTPATIENT)
Dept: GYNECOLOGIC ONCOLOGY | Facility: CLINIC | Age: 69
End: 2024-09-27

## 2024-09-27 NOTE — TELEPHONE ENCOUNTER
Called and spoke with patient about Referral to Uro gynecology.  Patient wants to hold off on the referral for now, but patient will reach out if she needs it in the future.

## 2024-10-07 ENCOUNTER — TELEPHONE (OUTPATIENT)
Dept: GYNECOLOGIC ONCOLOGY | Facility: CLINIC | Age: 69
End: 2024-10-07

## 2024-10-07 NOTE — TELEPHONE ENCOUNTER
Called and spoke with patient about appointment.  Patient confirm the reschedule to 5/15/25 at 9:40 in [place].

## 2024-10-15 DIAGNOSIS — K50.10 CROHN'S DISEASE OF LARGE INTESTINE WITHOUT COMPLICATION (HCC): Primary | ICD-10-CM

## 2024-11-07 ENCOUNTER — APPOINTMENT (OUTPATIENT)
Dept: LAB | Facility: HOSPITAL | Age: 69
End: 2024-11-07
Payer: MEDICARE

## 2024-11-07 ENCOUNTER — TELEPHONE (OUTPATIENT)
Age: 69
End: 2024-11-07

## 2024-11-07 DIAGNOSIS — Z11.59 ENCOUNTER FOR SCREENING FOR OTHER VIRAL DISEASES: ICD-10-CM

## 2024-11-07 DIAGNOSIS — K50.10 CROHN'S DISEASE OF LARGE INTESTINE WITHOUT COMPLICATION (HCC): ICD-10-CM

## 2024-11-07 LAB
ALBUMIN SERPL BCG-MCNC: 3.9 G/DL (ref 3.5–5)
ALP SERPL-CCNC: 59 U/L (ref 34–104)
ALT SERPL W P-5'-P-CCNC: 13 U/L (ref 7–52)
ANION GAP SERPL CALCULATED.3IONS-SCNC: 8 MMOL/L (ref 4–13)
AST SERPL W P-5'-P-CCNC: 17 U/L (ref 13–39)
BILIRUB SERPL-MCNC: 0.63 MG/DL (ref 0.2–1)
BUN SERPL-MCNC: 20 MG/DL (ref 5–25)
CALCIUM SERPL-MCNC: 9.2 MG/DL (ref 8.4–10.2)
CHLORIDE SERPL-SCNC: 95 MMOL/L (ref 96–108)
CO2 SERPL-SCNC: 34 MMOL/L (ref 21–32)
CREAT SERPL-MCNC: 0.78 MG/DL (ref 0.6–1.3)
ERYTHROCYTE [SEDIMENTATION RATE] IN BLOOD: 78 MM/HOUR (ref 0–29)
GFR SERPL CREATININE-BSD FRML MDRD: 78 ML/MIN/1.73SQ M
GLUCOSE SERPL-MCNC: 91 MG/DL (ref 65–140)
HBV SURFACE AG SER QL: NORMAL
POTASSIUM SERPL-SCNC: 3.1 MMOL/L (ref 3.5–5.3)
PROT SERPL-MCNC: 8 G/DL (ref 6.4–8.4)
SODIUM SERPL-SCNC: 137 MMOL/L (ref 135–147)

## 2024-11-07 PROCEDURE — 36415 COLL VENOUS BLD VENIPUNCTURE: CPT

## 2024-11-07 PROCEDURE — 80053 COMPREHEN METABOLIC PANEL: CPT

## 2024-11-07 PROCEDURE — 85652 RBC SED RATE AUTOMATED: CPT

## 2024-11-07 PROCEDURE — 86480 TB TEST CELL IMMUN MEASURE: CPT

## 2024-11-07 PROCEDURE — 87340 HEPATITIS B SURFACE AG IA: CPT

## 2024-11-07 NOTE — TELEPHONE ENCOUNTER
Pt is at the lab to have her blood work done. The expected date was for 12/18/24. Lab requesting the expected date be removed so the pt can have the labs done. Pt has an appointment tomorrow. Francesca was going to amend the order

## 2024-11-07 NOTE — TELEPHONE ENCOUNTER
Previous orders placed at 12/18/23 ov with Dr. Carey. I amended orders to have labs drawn today and orders were sent to physician for cosign.

## 2024-11-08 ENCOUNTER — OFFICE VISIT (OUTPATIENT)
Dept: GASTROENTEROLOGY | Facility: CLINIC | Age: 69
End: 2024-11-08
Payer: MEDICARE

## 2024-11-08 VITALS
SYSTOLIC BLOOD PRESSURE: 148 MMHG | DIASTOLIC BLOOD PRESSURE: 92 MMHG | BODY MASS INDEX: 25.13 KG/M2 | HEIGHT: 60 IN | WEIGHT: 128 LBS

## 2024-11-08 DIAGNOSIS — Z11.59 ENCOUNTER FOR SCREENING FOR OTHER VIRAL DISEASES: ICD-10-CM

## 2024-11-08 DIAGNOSIS — K50.10 CROHN'S DISEASE OF LARGE INTESTINE WITHOUT COMPLICATION (HCC): Primary | ICD-10-CM

## 2024-11-08 DIAGNOSIS — I10 PRIMARY HYPERTENSION: ICD-10-CM

## 2024-11-08 DIAGNOSIS — E87.6 HYPOKALEMIA: ICD-10-CM

## 2024-11-08 LAB
GAMMA INTERFERON BACKGROUND BLD IA-ACNC: <0 IU/ML
M TB IFN-G BLD-IMP: NEGATIVE
M TB IFN-G CD4+ BCKGRND COR BLD-ACNC: 0 IU/ML
M TB IFN-G CD4+ BCKGRND COR BLD-ACNC: 0 IU/ML
MITOGEN IGNF BCKGRD COR BLD-ACNC: 2.53 IU/ML

## 2024-11-08 PROCEDURE — G2211 COMPLEX E/M VISIT ADD ON: HCPCS | Performed by: INTERNAL MEDICINE

## 2024-11-08 PROCEDURE — 99213 OFFICE O/P EST LOW 20 MIN: CPT | Performed by: INTERNAL MEDICINE

## 2024-11-08 RX ORDER — POTASSIUM CHLORIDE 750 MG/1
10 CAPSULE, EXTENDED RELEASE ORAL DAILY
Qty: 90 CAPSULE | Refills: 0 | Status: SHIPPED | OUTPATIENT
Start: 2024-11-08

## 2024-11-08 RX ORDER — INDAPAMIDE 2.5 MG/1
2.5 TABLET ORAL EVERY MORNING
Qty: 90 TABLET | Refills: 0 | Status: SHIPPED | OUTPATIENT
Start: 2024-11-08

## 2024-11-08 NOTE — PATIENT INSTRUCTIONS
Continue Remicade infusions.  Continue potassium supplementation.  Try to eat high potassium foods.  Vaccinations as we discussed about.  Follow-up office visit 1 year.  Blood work prior to that office visit and I will put them in the computer

## 2024-11-08 NOTE — PROGRESS NOTES
[facility] - Outpatient Follow-up Note  Ghada Ferreira 69 y.o. female MRN: [id number]  Encounter: [id number]    ASSESSMENT AND PLAN:      1. Crohn's disease of large intestine without complication (HCC)  Crohn's disease history since 1991.  Has been on Remicade for 20 years doing well.  Blood work normal except for mildly increased sed rate and mild hypokalemia which both have been chronic.  Infliximab drug level therapeutic.  Infliximab drug antibodies negative.  Last colonoscopy 2/2024    -Continue Remicade infusions  -Continue potassium supplement and encouraged eating high potassium foods  -Blood work in 1 year  - CBC; Future  - Comprehensive metabolic panel; Future  - Hepatitis B surface antigen; Future  - Infliximab Concentration and Anti-Infliximab Antibody; Future  - Quantiferon TB Gold Plus Assay; Future  - Sedimentation rate, automated; Future  - C-reactive protein; Future  - Hepatitis B surface antigen; Future      Follow up appointment: 1 year     _______________________      Chief Complaint   Patient presents with    Follow-up     Annual visit       HPI:   Patient is a 69 y.o. female with a significant PMH of Crohn's disease presenting for follow up.  From a GI standpoint she is doing well.  She moves her bowels regularly.  She occasionally will have some diarrhea when she eats the wrong things.  She denies any rectal bleeding.  She denies any abdominal pain.  She denies any upper GI symptoms.  Her weight is stable.  She has some arthritis in her knees but denies any things that would be consistent with reactive arthritis.  She denies any skin rashes or ulcerations.  Denies any iritis.    Historical Information   Past Medical History:   Diagnosis Date    Arthritis     BRCA1 negative     BRCA2 negative     Breast cancer (HCC) 09/11/2018    Right    Breast cancer (HCC) 08/13/2019    Left    Cancer (HCC) 2009    Colon polyp     Crohn disease (HCC)     Dense breast      Diverticulitis of colon 1991    Crohn’s    Genetic predisposition to breast cancer 05/31/2018    History of chemotherapy     for ovarian cancer    History of radiation therapy     History of transfusion 2009    Hyperlipidemia     Hypertension     Lymphedema     left leg    Lymphedema     Monoallelic mutation of YOLANDA gene     Breast Gyn Panel    Ovarian cancer (HCC) 04/21/2009     Past Surgical History:   Procedure Laterality Date    BREAST LUMPECTOMY Right 09/11/2018    BREAST LUMPECTOMY Left 8/13/2019    Procedure: BREAST LUMPECTOMY; BREAST NEEDLE LOCALIZATION (NEEDLE LOC AT 0800);  Surgeon: Lawrence Valdez MD;  Location: [facility];  Service: Surgical Oncology    BREAST LUMPECTOMY W/ NEEDLE LOCALIZATION Left 08/13/2019    COLONOSCOPY      EXPLORATORY LAPAROTOMY      HYSTERECTOMY      LYMPH NODE BIOPSY Left 8/13/2019    Procedure: SENTINEL LYMPH NODE BIOPSY; LYMPHATIC MAPPING WITH BLUE DYE AND RADIOACTIVE DYE (INJECT AT 0930 BY DR VALDEZ IN THE OR);  Surgeon: Lawrence Valdez MD;  Location: [facility];  Service: Surgical Oncology    MAMMO NEEDLE LOCALIZATION LEFT (ALL INC) Left 8/13/2019    MAMMO NEEDLE LOCALIZATION RIGHT (ALL INC) Right 9/11/2018    MRI BREAST BIOPSY LEFT (ALL INCLUSIVE) Left 7/18/2019    OMENTECTOMY      LA PERQ DEVICE PLACEMENT BREAST LOC 1ST LES W/GDNCE Right 9/11/2018    Procedure: BREAST LUMPECTOMY; BREAST NEEDLE LOCALIZATION (NEEDLE LOC AT 1200);  Surgeon: Lawrence Valdez MD;  [facility];  Service: Surgical Oncology    TOTAL ABDOMINAL HYSTERECTOMY W/ BILATERAL SALPINGOOPHORECTOMY      US GUIDED BREAST BIOPSY LEFT COMPLETE Left 6/17/2019    US GUIDED BREAST BIOPSY RIGHT COMPLETE Right 8/1/2018    WISDOM TOOTH EXTRACTION       Social History     Substance and Sexual Activity   Alcohol Use Yes    Comment: rarely     Social History     Substance and Sexual Activity   Drug Use No     Social History     Tobacco Use   Smoking Status Former    Current packs/day: 0.00    Average packs/day: 1 pack/day for  30.0 years (30.0 ttl pk-yrs)    Types: Cigarettes    Quit date: 4/1/2009    Years since quitting: 15.6   Smokeless Tobacco Never     Family History   Problem Relation Age of Onset    Hypertension Mother     Heart disease Mother     Hearing loss Mother     Prostate cancer Father     Alzheimer's disease Father     Hypertension Father     Diabetes Father     Hearing loss Father     Ovarian cancer Sister     Hearing loss Sister     No Known Problems Sister     Other Sister         pacemaker    Hypertension Sister     Heart disease Sister     No Known Problems Sister     Down syndrome Sister     Alzheimer's disease Sister     Hypertension Sister     Arthritis Maternal Grandmother     Hearing loss Maternal Grandmother     Breast cancer Paternal Grandmother     Breast cancer Maternal Aunt     No Known Problems Maternal Aunt     No Known Problems Paternal Aunt     Colon cancer Neg Hx          Current Outpatient Medications:     acetaminophen (TYLENOL) 500 mg tablet    albuterol (PROVENTIL HFA,VENTOLIN HFA) 90 mcg/act inhaler    alendronate (FOSAMAX) 70 mg tablet    amLODIPine (NORVASC) 10 mg tablet    anastrozole (ARIMIDEX) 1 mg tablet    atorvastatin (LIPITOR) 40 mg tablet    Calcium Carbonate 1500 (600 Ca) MG TABS    clobetasol (TEMOVATE) 0.05 % external solution    clobetasol (TEMOVATE) 0.05 % ointment    Diclofenac Sodium (VOLTAREN) 1 %    diphenhydrAMINE (BENADRYL) 25 mg tablet    Homeopathic Products ([employer] Bruise) GEL    indapamide (LOZOL) 2.5 mg tablet    inFLIXimab (REMICADE) 100 mg    ketoconazole (NIZORAL) 2 % shampoo    mupirocin (BACTROBAN) 2 % ointment    ofloxacin (OCUFLOX) 0.3 % ophthalmic solution    potassium chloride (MICRO-K) 10 MEQ CR capsule    triamcinolone (KENALOG) 0.025 % cream    Turmeric 500 MG CAPS  Allergies   Allergen Reactions    Lisinopril      cough    Losartan      Numbness on right side of body    Metoprolol Dizziness    Boniva [Ibandronic Acid] Headache     Reviewed medications and  allergies and updated as indicated    PHYSICAL EXAM:    Blood pressure 148/92, height 5' (1.524 m), weight 58.1 kg (128 lb). Body mass index is 25 kg/m².  General Appearance: NAD, cooperative, alert  Eyes: Anicteric  Chest: Clear to auscultation  Heart: Regular rate and rhythm  GI:  Soft, non-tender, non-distended; normal bowel sounds; no masses, no organomegaly   Rectal: Deferred  Musculoskeletal: No edema.  Skin:  No jaundice    Lab Results:   Lab Results   Component Value Date    WBC 6.25 02/06/2024    WBC 7.20 12/06/2023    WBC 7.55 12/29/2022    HGB 13.3 02/06/2024    HGB 14.2 12/06/2023    HGB 13.5 12/29/2022    MCV 85 02/06/2024     02/06/2024     12/06/2023     12/29/2022     Lab Results   Component Value Date     12/14/2017    K 3.1 (L) 11/07/2024    CL 95 (L) 11/07/2024    CO2 34 (H) 11/07/2024    BUN 20 11/07/2024    CREATININE 0.78 11/07/2024    GLUCOSE 90 12/14/2017    GLUF 92 12/06/2023    CALCIUM 9.2 11/07/2024    AST 17 11/07/2024    AST 25 12/06/2023    AST 22 12/29/2022    ALT 13 11/07/2024    ALT 23 12/06/2023    ALT 30 12/29/2022    ALKPHOS 59 11/07/2024    ALKPHOS 67 12/06/2023    ALKPHOS 77 12/29/2022    PROT 7.3 12/14/2017    BILITOT 0.4 12/14/2017    BILITOT 0.7 10/06/2016    BILITOT 0.7 01/30/2014    EGFR 78 11/07/2024     Lab Results   Component Value Date    IRON 65 08/28/2018    TIBC 336 08/28/2018         Radiology Results:   No results found.

## 2024-11-12 ENCOUNTER — OFFICE VISIT (OUTPATIENT)
Age: 69
End: 2024-11-12
Payer: MEDICARE

## 2024-11-12 VITALS
HEIGHT: 60 IN | DIASTOLIC BLOOD PRESSURE: 82 MMHG | OXYGEN SATURATION: 99 % | HEART RATE: 80 BPM | BODY MASS INDEX: 25.32 KG/M2 | WEIGHT: 129 LBS | SYSTOLIC BLOOD PRESSURE: 160 MMHG | RESPIRATION RATE: 18 BRPM | TEMPERATURE: 97.8 F

## 2024-11-12 DIAGNOSIS — Z85.43 HISTORY OF OVARIAN CANCER: Primary | ICD-10-CM

## 2024-11-12 DIAGNOSIS — Z85.3 HISTORY OF BREAST CANCER: ICD-10-CM

## 2024-11-12 DIAGNOSIS — Z15.01 MONOALLELIC MUTATION OF ATM GENE: ICD-10-CM

## 2024-11-12 DIAGNOSIS — Z86.000 HISTORY OF DUCTAL CARCINOMA IN SITU (DCIS) OF BREAST: ICD-10-CM

## 2024-11-12 DIAGNOSIS — Z15.09 MONOALLELIC MUTATION OF ATM GENE: ICD-10-CM

## 2024-11-12 DIAGNOSIS — Z15.89 MONOALLELIC MUTATION OF ATM GENE: ICD-10-CM

## 2024-11-12 PROCEDURE — 99213 OFFICE O/P EST LOW 20 MIN: CPT | Performed by: INTERNAL MEDICINE

## 2024-11-12 PROCEDURE — G2211 COMPLEX E/M VISIT ADD ON: HCPCS | Performed by: INTERNAL MEDICINE

## 2024-11-12 NOTE — PROGRESS NOTES
Hematology / Oncology Outpatient Follow Up Note    Ghada Ferreira 69 y.o. female :1955 MRN:[id number]         Date:  2024    Assessment / Plan:  A 69-year-old postmenopausal woman  heterozygote YOLANDA mutation, who has history of stage I ovarian cancer in , status post hysterectomy and bilateral salpingo-oophorectomy resulting in EDER followed by adjuvant chemotherapy with carboplatin and paclitaxel x6.   She has no evidence recurrence of ovarian cancer on history today. She continues to follow with GYN Onc.     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  She underwent lumpectomy, resulting in EDER.      In 2019, she was found to have stage IA left breast cancer, grade 1, ER 90 % positive, FL negative, HER2 negative disease. She underwent lumpectomy and sentinel lymph node biopsy, resulting in EDER.  She is currently on adjuvant hormonal therapy with anastrozole since 2020 which she is tolerating well. She also continues to follow with Dr. Valdez's office.    Clinically, she has no evidence for recurrent disease. Most recent mammogram from 2024 shows stable posttreatment changes in bilateral breast without any evidence of cancer recurrence. MRI breasts scheduled for 2025. I recommended her to continue anastrozole 1 mg once a day.  She is in agreement with my recommendations.  I will see her again in a year for routine follow-up.        Subjective:      HPI:  A 69-year-old  postmenopausal woman who has history of stage I ovarian cancer in , status post hysterectomy and bilateral salpingo-oophorectomy resulting in EDER followed by adjuvant chemotherapy with carboplatin and paclitaxel x6.   She has no evidence recurrence of ovarian cancer to date.  She has heterozygote YOLANDA mutation.   She was diagnosed ductal with carcinoma in situ, grade 2, 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, in   She  underwent lumpectomy, resulting in EDER.  she received adjuvant radiation.    She was then diagnosed with stage I A left breast cancer, grade 1. This was ER 90% positive, MS negative, HER2 negative disease, diagnosed in August 2019. Her tumor size was 3 mm. she received adjuvant radiation. Since April 2020, she has been on adjuvant hormonal therapy with anastrozole.          Interval History:  She feels well.  She has diffuse bony pains that she attributes to arthritis/crohn's.  She denies hot flashes.  Her weight is stable.   No vaginal bleeding. She has no respiratory symptoms.  She has not felt any breast lumps. No rashes. All other review of systems are negative     Objective:      Primary Diagnosis:     1.   History of stage I ovarian cancer, diagnosed in 2009.  2.   Heterozygote YOLANDA mutation.  3.   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  Diagnosed in September 2018.  4. Left breast cancer, stage IA (pT1b, pN0, M0) grade 1, ER 90% positive, MS negative, HER2 negative disease.  Diagnosed in August 2019.      Cancer Staging:  Cancer Staging  Ductal carcinoma in situ (DCIS) of right breast  Staging form: Breast, AJCC 8th Edition  - Pathologic: Stage 0 (pTis (DCIS), pN0, cM0, ER: Negative, MS: Negative) - Unsigned     Ovarian cancer (HCC)  Staging form: Ovary, AJCC 7th Edition  - Clinical: FIGO Stage IA (T1a, N0, M0) - Signed by Pushpa Odom PA-C on 4/19/2018            Current Hematologic/ Oncologic Treatment:       Adjuvant hormonal therapy with anastrozole since April 2020.      Disease Status:      EDER status post lumpectomy.     Test Results:     Pathology:     Sept 2018: 1.4 x 1.2 x 1.3 cm of ductal carcinoma in situ, grade 2.  ER negative, MS negative disease.     In August 2019, 3 mm of invasive ductal carcinoma, grade 1. No evidence of lymphovascular invasion.  1 sentinel lymph node was negative for metastatic disease.  ER 90% positive, MS negative, HER2 negative  disease.  Stage IA (pT1b, pN0, M0)     Laboratory:     CBC and CMP are within normal limits.     Physical Exam:  General: Alert, oriented.  Not in any distress  Chest: Clear to auscultation without any additional sounds   Heart: RRR, No murmurs/gallops  Abdomen: soft, non tender, normal BS  No palpable cervical, axillary nodes palpated  Breast: Lumpectomy scar at outer upper quadrant of the right breast with no palpable abnormality.   lumpectomy scar at upper aspect of her left breast with no palpable abnormalities.          Imaging: mammogram reviewed.      Labs:   Lab Results   Component Value Date    WBC 6.25 02/06/2024    HGB 13.3 02/06/2024    HCT 40.9 02/06/2024    MCV 85 02/06/2024     02/06/2024     Lab Results   Component Value Date     12/14/2017    K 3.1 (L) 11/07/2024    CL 95 (L) 11/07/2024    CO2 34 (H) 11/07/2024    BUN 20 11/07/2024    CREATININE 0.78 11/07/2024    GLUCOSE 90 12/14/2017    GLUF 92 12/06/2023    CALCIUM 9.2 11/07/2024    AST 17 11/07/2024    ALT 13 11/07/2024    ALKPHOS 59 11/07/2024    PROT 7.3 12/14/2017    BILITOT 0.4 12/14/2017    EGFR 78 11/07/2024         Lab Results   Component Value Date    IRON 65 08/28/2018    TIBC 336 08/28/2018       Lab Results   Component Value Date    IKAGOLDU09 335 08/28/2018         Current Medications: Reviewed  Allergies: Reviewed  PMH/FH/SH:  Reviewed      Vital Sign:    Body surface area is 1.55 meters squared.    Wt Readings from Last 3 Encounters:   11/12/24 58.5 kg (129 lb)   11/08/24 58.1 kg (128 lb)   09/20/24 58.2 kg (128 lb 4.9 oz)        Temp Readings from Last 3 Encounters:   11/12/24 97.8 °F (36.6 °C) (Temporal)   09/20/24 (!) 96.8 °F (36 °C) (Temporal)   07/26/24 98 °F (36.7 °C) (Temporal)        BP Readings from Last 3 Encounters:   11/12/24 160/82   11/08/24 148/92   09/20/24 166/90         Pulse Readings from Last 3 Encounters:   11/12/24 80   09/20/24 74   07/26/24 77       Total minutes spent reviewing EMR, seeing  patient in clinic visit, documenting visit, placing treatment orders, and communicating with other medical providers: 20

## 2024-11-14 DIAGNOSIS — C50.112 MALIGNANT NEOPLASM OF CENTRAL PORTION OF LEFT BREAST IN FEMALE, ESTROGEN RECEPTOR POSITIVE (HCC): ICD-10-CM

## 2024-11-14 DIAGNOSIS — Z17.0 MALIGNANT NEOPLASM OF CENTRAL PORTION OF LEFT BREAST IN FEMALE, ESTROGEN RECEPTOR POSITIVE (HCC): ICD-10-CM

## 2024-11-15 ENCOUNTER — HOSPITAL ENCOUNTER (OUTPATIENT)
Dept: INFUSION CENTER | Facility: HOSPITAL | Age: 69
End: 2024-11-15
Attending: INTERNAL MEDICINE
Payer: MEDICARE

## 2024-11-15 VITALS
BODY MASS INDEX: 25.62 KG/M2 | SYSTOLIC BLOOD PRESSURE: 134 MMHG | DIASTOLIC BLOOD PRESSURE: 77 MMHG | TEMPERATURE: 96.9 F | OXYGEN SATURATION: 98 % | RESPIRATION RATE: 18 BRPM | HEART RATE: 75 BPM | HEIGHT: 60 IN | WEIGHT: 130.51 LBS

## 2024-11-15 DIAGNOSIS — K50.10 CROHN'S DISEASE OF LARGE INTESTINE WITHOUT COMPLICATION (HCC): Primary | ICD-10-CM

## 2024-11-15 PROCEDURE — 96415 CHEMO IV INFUSION ADDL HR: CPT

## 2024-11-15 PROCEDURE — 96413 CHEMO IV INFUSION 1 HR: CPT

## 2024-11-15 RX ORDER — SODIUM CHLORIDE 9 MG/ML
20 INJECTION, SOLUTION INTRAVENOUS ONCE
Status: COMPLETED | OUTPATIENT
Start: 2024-11-15 | End: 2024-11-15

## 2024-11-15 RX ORDER — SODIUM CHLORIDE 9 MG/ML
20 INJECTION, SOLUTION INTRAVENOUS ONCE
OUTPATIENT
Start: 2025-01-10

## 2024-11-15 RX ADMIN — SODIUM CHLORIDE 20 ML/HR: 9 INJECTION, SOLUTION INTRAVENOUS at 09:35

## 2024-11-15 RX ADMIN — INFLIXIMAB 300 MG: 100 INJECTION, POWDER, LYOPHILIZED, FOR SOLUTION INTRAVENOUS at 10:24

## 2024-11-15 NOTE — PROGRESS NOTES
Ghada Ferreira  tolerated treatment well with no complications.      Ghada Ferreira is aware of future appt on 01/10/2025 at 11:00 AM.     AVS printed and given to Ghada Ferreira:  No (Declined by Ghada Ferreira)

## 2024-11-18 RX ORDER — ANASTROZOLE 1 MG/1
1 TABLET ORAL DAILY
Qty: 30 TABLET | Refills: 5 | Status: SHIPPED | OUTPATIENT
Start: 2024-11-18

## 2024-12-26 ENCOUNTER — TELEPHONE (OUTPATIENT)
Dept: DERMATOLOGY | Facility: CLINIC | Age: 69
End: 2024-12-26

## 2024-12-26 NOTE — TELEPHONE ENCOUNTER
LMOM that 06/23/25 appt w/Dr Richter was cx & r/s, due to a change in her schedule, to please give the office a call to confirm or r/s.

## 2025-01-09 ENCOUNTER — RA CDI HCC (OUTPATIENT)
Dept: OTHER | Facility: HOSPITAL | Age: 70
End: 2025-01-09

## 2025-01-10 ENCOUNTER — HOSPITAL ENCOUNTER (OUTPATIENT)
Dept: INFUSION CENTER | Facility: HOSPITAL | Age: 70
End: 2025-01-10
Attending: INTERNAL MEDICINE
Payer: MEDICARE

## 2025-01-10 VITALS
SYSTOLIC BLOOD PRESSURE: 183 MMHG | TEMPERATURE: 98 F | BODY MASS INDEX: 25.75 KG/M2 | WEIGHT: 131.84 LBS | HEART RATE: 77 BPM | DIASTOLIC BLOOD PRESSURE: 79 MMHG | OXYGEN SATURATION: 99 % | RESPIRATION RATE: 18 BRPM

## 2025-01-10 DIAGNOSIS — K50.10 CROHN'S DISEASE OF LARGE INTESTINE WITHOUT COMPLICATION (HCC): Primary | ICD-10-CM

## 2025-01-10 PROCEDURE — 96413 CHEMO IV INFUSION 1 HR: CPT

## 2025-01-10 PROCEDURE — 96415 CHEMO IV INFUSION ADDL HR: CPT

## 2025-01-10 RX ORDER — SODIUM CHLORIDE 9 MG/ML
20 INJECTION, SOLUTION INTRAVENOUS ONCE
Status: COMPLETED | OUTPATIENT
Start: 2025-01-10 | End: 2025-01-10

## 2025-01-10 RX ORDER — SODIUM CHLORIDE 9 MG/ML
20 INJECTION, SOLUTION INTRAVENOUS ONCE
OUTPATIENT
Start: 2025-03-07

## 2025-01-10 RX ADMIN — SODIUM CHLORIDE 20 ML/HR: 9 INJECTION, SOLUTION INTRAVENOUS at 09:36

## 2025-01-10 RX ADMIN — INFLIXIMAB 300 MG: 100 INJECTION, POWDER, LYOPHILIZED, FOR SOLUTION INTRAVENOUS at 10:10

## 2025-01-10 NOTE — PROGRESS NOTES
Ghada Ferreira  tolerated treatment well with no complications.      Ghada Ferreira is aware of future appt on 3/7/25 at 0900.     AVS printed and given to Ghada Ferreira:  No (Declined by Ghada Ferreira)

## 2025-01-16 ENCOUNTER — OFFICE VISIT (OUTPATIENT)
Dept: FAMILY MEDICINE CLINIC | Facility: CLINIC | Age: 70
End: 2025-01-16
Payer: MEDICARE

## 2025-01-16 VITALS
OXYGEN SATURATION: 99 % | SYSTOLIC BLOOD PRESSURE: 150 MMHG | WEIGHT: 130.4 LBS | HEART RATE: 78 BPM | TEMPERATURE: 97 F | DIASTOLIC BLOOD PRESSURE: 80 MMHG | HEIGHT: 60 IN | BODY MASS INDEX: 25.6 KG/M2

## 2025-01-16 DIAGNOSIS — M54.50 CHRONIC RIGHT-SIDED LOW BACK PAIN WITHOUT SCIATICA: ICD-10-CM

## 2025-01-16 DIAGNOSIS — G89.29 CHRONIC RIGHT-SIDED LOW BACK PAIN WITHOUT SCIATICA: ICD-10-CM

## 2025-01-16 DIAGNOSIS — G89.29 CHRONIC PAIN OF RIGHT KNEE: ICD-10-CM

## 2025-01-16 DIAGNOSIS — Z85.43 HISTORY OF OVARIAN CANCER: ICD-10-CM

## 2025-01-16 DIAGNOSIS — N39.46 MIXED STRESS AND URGE URINARY INCONTINENCE: ICD-10-CM

## 2025-01-16 DIAGNOSIS — Z00.00 MEDICARE ANNUAL WELLNESS VISIT, SUBSEQUENT: Primary | ICD-10-CM

## 2025-01-16 DIAGNOSIS — Z78.0 POSTMENOPAUSE: ICD-10-CM

## 2025-01-16 DIAGNOSIS — Z23 ENCOUNTER FOR IMMUNIZATION: ICD-10-CM

## 2025-01-16 DIAGNOSIS — C50.112 MALIGNANT NEOPLASM OF CENTRAL PORTION OF LEFT BREAST IN FEMALE, ESTROGEN RECEPTOR POSITIVE (HCC): ICD-10-CM

## 2025-01-16 DIAGNOSIS — Z17.0 MALIGNANT NEOPLASM OF CENTRAL PORTION OF LEFT BREAST IN FEMALE, ESTROGEN RECEPTOR POSITIVE (HCC): ICD-10-CM

## 2025-01-16 DIAGNOSIS — I89.0 LYMPHEDEMA: ICD-10-CM

## 2025-01-16 DIAGNOSIS — C56.9 MALIGNANT NEOPLASM OF OVARY, UNSPECIFIED LATERALITY (HCC): ICD-10-CM

## 2025-01-16 DIAGNOSIS — Z23 NEED FOR COVID-19 VACCINE: ICD-10-CM

## 2025-01-16 DIAGNOSIS — E87.6 HYPOKALEMIA: ICD-10-CM

## 2025-01-16 DIAGNOSIS — E78.49 OTHER HYPERLIPIDEMIA: ICD-10-CM

## 2025-01-16 DIAGNOSIS — L03.90 CELLULITIS, UNSPECIFIED CELLULITIS SITE: ICD-10-CM

## 2025-01-16 DIAGNOSIS — M17.11 PRIMARY OSTEOARTHRITIS OF RIGHT KNEE: ICD-10-CM

## 2025-01-16 DIAGNOSIS — I89.0 LYMPHEDEMA OF LEFT LOWER EXTREMITY: ICD-10-CM

## 2025-01-16 DIAGNOSIS — I10 PRIMARY HYPERTENSION: ICD-10-CM

## 2025-01-16 DIAGNOSIS — M25.561 CHRONIC PAIN OF RIGHT KNEE: ICD-10-CM

## 2025-01-16 DIAGNOSIS — R53.82 CHRONIC FATIGUE: ICD-10-CM

## 2025-01-16 PROCEDURE — G0439 PPPS, SUBSEQ VISIT: HCPCS | Performed by: FAMILY MEDICINE

## 2025-01-16 PROCEDURE — G2211 COMPLEX E/M VISIT ADD ON: HCPCS | Performed by: FAMILY MEDICINE

## 2025-01-16 PROCEDURE — 90480 ADMN SARSCOV2 VAC 1/ONLY CMP: CPT | Performed by: FAMILY MEDICINE

## 2025-01-16 PROCEDURE — 99213 OFFICE O/P EST LOW 20 MIN: CPT | Performed by: FAMILY MEDICINE

## 2025-01-16 PROCEDURE — 90662 IIV NO PRSV INCREASED AG IM: CPT | Performed by: FAMILY MEDICINE

## 2025-01-16 PROCEDURE — G0008 ADMIN INFLUENZA VIRUS VAC: HCPCS | Performed by: FAMILY MEDICINE

## 2025-01-16 PROCEDURE — 91320 SARSCV2 VAC 30MCG TRS-SUC IM: CPT | Performed by: FAMILY MEDICINE

## 2025-01-16 RX ORDER — MUPIROCIN 20 MG/G
OINTMENT TOPICAL 3 TIMES DAILY
Qty: 22 G | Refills: 0 | Status: SHIPPED | OUTPATIENT
Start: 2025-01-16

## 2025-01-16 NOTE — PATIENT INSTRUCTIONS
Fasting blood work  Apply cortisone 10 for 1 week right arm- clobetasol for 1 week  Monitor bp less 140 top number  Schedule physical therapy     Medicare Preventive Visit Patient Instructions  Thank you for completing your Welcome to Medicare Visit or Medicare Annual Wellness Visit today. Your next wellness visit will be due in one year (1/27/2026).  The screening/preventive services that you may require over the next 5-10 years are detailed below. Some tests may not apply to you based off risk factors and/or age. Screening tests ordered at today's visit but not completed yet may show as past due. Also, please note that scanned in results may not display below.  Preventive Screenings:  Service Recommendations Previous Testing/Comments   Colorectal Cancer Screening  * Colonoscopy    * Fecal Occult Blood Test (FOBT)/Fecal Immunochemical Test (FIT)  * Fecal DNA/Cologuard Test  * Flexible Sigmoidoscopy Age: 45-75 years old   Colonoscopy: every 10 years (may be performed more frequently if at higher risk)  OR  FOBT/FIT: every 1 year  OR  Cologuard: every 3 years  OR  Sigmoidoscopy: every 5 years  Screening may be recommended earlier than age 45 if at higher risk for colorectal cancer. Also, an individualized decision between you and your healthcare provider will decide whether screening between the ages of 76-85 would be appropriate. Colonoscopy: 02/15/2024  FOBT/FIT: Not on file  Cologuard: Not on file  Sigmoidoscopy: Not on file    Screening Current     Breast Cancer Screening Age: 40+ years old  Frequency: every 1-2 years  Not required if history of left and right mastectomy Mammogram: 06/20/2024    History Breast Cancer   Cervical Cancer Screening Between the ages of 21-29, pap smear recommended once every 3 years.   Between the ages of 30-65, can perform pap smear with HPV co-testing every 5 years.   Recommendations may differ for women with a history of total hysterectomy, cervical cancer, or abnormal pap smears  in past. Pap Smear: Not on file    Screening Not Indicated   Hepatitis C Screening Once for adults born between 1945 and 1965  More frequently in patients at high risk for Hepatitis C Hep C Antibody: 12/14/2017    Screening Current   Diabetes Screening 1-2 times per year if you're at risk for diabetes or have pre-diabetes Fasting glucose: 92 mg/dL (12/6/2023)  A1C: No results in last 5 years (No results in last 5 years)  Screening Current   Cholesterol Screening Once every 5 years if you don't have a lipid disorder. May order more often based on risk factors. Lipid panel: 12/06/2023    Screening Not Indicated  History Lipid Disorder     Other Preventive Screenings Covered by Medicare:  Abdominal Aortic Aneurysm (AAA) Screening: covered once if your at risk. You're considered to be at risk if you have a family history of AAA.  Lung Cancer Screening: covers low dose CT scan once per year if you meet all of the following conditions: (1) Age 55-77; (2) No signs or symptoms of lung cancer; (3) Current smoker or have quit smoking within the last 15 years; (4) You have a tobacco smoking history of at least 20 pack years (packs per day multiplied by number of years you smoked); (5) You get a written order from a healthcare provider.  Glaucoma Screening: covered annually if you're considered high risk: (1) You have diabetes OR (2) Family history of glaucoma OR (3)  aged 50 and older OR (4)  American aged 65 and older  Osteoporosis Screening: covered every 2 years if you meet one of the following conditions: (1) You're estrogen deficient and at risk for osteoporosis based off medical history and other findings; (2) Have a vertebral abnormality; (3) On glucocorticoid therapy for more than 3 months; (4) Have primary hyperparathyroidism; (5) On osteoporosis medications and need to assess response to drug therapy.   Last bone density test (DXA Scan): 10/25/2021.  HIV Screening: covered annually if you're  between the age of 15-65. Also covered annually if you are younger than 15 and older than 65 with risk factors for HIV infection. For pregnant patients, it is covered up to 3 times per pregnancy.    Immunizations:  Immunization Recommendations   Influenza Vaccine Annual influenza vaccination during flu season is recommended for all persons aged >= 6 months who do not have contraindications   Pneumococcal Vaccine   * Pneumococcal conjugate vaccine = PCV13 (Prevnar 13), PCV15 (Vaxneuvance), PCV20 (Prevnar 20)  * Pneumococcal polysaccharide vaccine = PPSV23 (Pneumovax) Adults 19-63 yo with certain risk factors or if 65+ yo  If never received any pneumonia vaccine: recommend Prevnar 20 (PCV20)  Give PCV20 if previously received 1 dose of PCV13 or PPSV23   Hepatitis B Vaccine 3 dose series if at intermediate or high risk (ex: diabetes, end stage renal disease, liver disease)   Respiratory syncytial virus (RSV) Vaccine - COVERED BY MEDICARE PART D  * RSVPreF3 (Arexvy) CDC recommends that adults 60 years of age and older may receive a single dose of RSV vaccine using shared clinical decision-making (SCDM)   Tetanus (Td) Vaccine - COST NOT COVERED BY MEDICARE PART B Following completion of primary series, a booster dose should be given every 10 years to maintain immunity against tetanus. Td may also be given as tetanus wound prophylaxis.   Tdap Vaccine - COST NOT COVERED BY MEDICARE PART B Recommended at least once for all adults. For pregnant patients, recommended with each pregnancy.   Shingles Vaccine (Shingrix) - COST NOT COVERED BY MEDICARE PART B  2 shot series recommended in those 19 years and older who have or will have weakened immune systems or those 50 years and older     Health Maintenance Due:      Topic Date Due   • Breast Cancer Screening: Mammogram  06/20/2025   • Colorectal Cancer Screening  02/14/2026   • Hepatitis C Screening  Completed     Immunizations Due:      Topic Date Due   • Pneumococcal Vaccine:  65+ Years (2 of 2 - PPSV23 or PCV20) 01/11/2017     Advance Directives   What are advance directives?  Advance directives are legal documents that state your wishes and plans for medical care. These plans are made ahead of time in case you lose your ability to make decisions for yourself. Advance directives can apply to any medical decision, such as the treatments you want, and if you want to donate organs.   What are the types of advance directives?  There are many types of advance directives, and each state has rules about how to use them. You may choose a combination of any of the following:  Living will:  This is a written record of the treatment you want. You can also choose which treatments you do not want, which to limit, and which to stop at a certain time. This includes surgery, medicine, IV fluid, and tube feedings.   Durable power of  for healthcare (DPAHC):  This is a written record that states who you want to make healthcare choices for you when you are unable to make them for yourself. This person, called a proxy, is usually a family member or a friend. You may choose more than 1 proxy.  Do not resuscitate (DNR) order:  A DNR order is used in case your heart stops beating or you stop breathing. It is a request not to have certain forms of treatment, such as CPR. A DNR order may be included in other types of advance directives.  Medical directive:  This covers the care that you want if you are in a coma, near death, or unable to make decisions for yourself. You can list the treatments you want for each condition. Treatment may include pain medicine, surgery, blood transfusions, dialysis, IV or tube feedings, and a ventilator (breathing machine).  Values history:  This document has questions about your views, beliefs, and how you feel and think about life. This information can help others choose the care that you would choose.  Why are advance directives important?  An advance directive helps you  control your care. Although spoken wishes may be used, it is better to have your wishes written down. Spoken wishes can be misunderstood, or not followed. Treatments may be given even if you do not want them. An advance directive may make it easier for your family to make difficult choices about your care.   Urinary Incontinence   Urinary incontinence (UI)  is when you lose control of your bladder. UI develops because your bladder cannot store or empty urine properly. The 3 most common types of UI are stress incontinence, urge incontinence, or both.  Medicines:   May be given to help strengthen your bladder control. Report any side effects of medication to your healthcare provider.  Do pelvic muscle exercises often:  Your pelvic muscles help you stop urinating. Squeeze these muscles tight for 5 seconds, then relax for 5 seconds. Gradually work up to squeezing for 10 seconds. Do 3 sets of 15 repetitions a day, or as directed. This will help strengthen your pelvic muscles and improve bladder control.  Train your bladder:  Go to the bathroom at set times, such as every 2 hours, even if you do not feel the urge to go. You can also try to hold your urine when you feel the urge to go. For example, hold your urine for 5 minutes when you feel the urge to go. As that becomes easier, hold your urine for 10 minutes.   Self-care:   Keep a UI record.  Write down how often you leak urine and how much you leak. Make a note of what you were doing when you leaked urine.  Drink liquids as directed. You may need to limit the amount of liquid you drink to help control your urine leakage. Do not drink any liquid right before you go to bed. Limit or do not have drinks that contain caffeine or alcohol.   Prevent constipation.  Eat a variety of high-fiber foods. Good examples are high-fiber cereals, beans, vegetables, and whole-grain breads. Walking is the best way to trigger your intestines to have a bowel movement.  Exercise regularly  and maintain a healthy weight.  Weight loss and exercise will decrease pressure on your bladder and help you control your leakage.   Use a catheter as directed  to help empty your bladder. A catheter is a tiny, plastic tube that is put into your bladder to drain your urine.   Go to behavior therapy as directed.  Behavior therapy may be used to help you learn to control your urge to urinate.    Weight Management   Why it is important to manage your weight:  Being overweight increases your risk of health conditions such as heart disease, high blood pressure, type 2 diabetes, and certain types of cancer. It can also increase your risk for osteoarthritis, sleep apnea, and other respiratory problems. Aim for a slow, steady weight loss. Even a small amount of weight loss can lower your risk of health problems.  How to lose weight safely:  A safe and healthy way to lose weight is to eat fewer calories and get regular exercise. You can lose up about 1 pound a week by decreasing the number of calories you eat by 500 calories each day.   Healthy meal plan for weight management:  A healthy meal plan includes a variety of foods, contains fewer calories, and helps you stay healthy. A healthy meal plan includes the following:  Eat whole-grain foods more often.  A healthy meal plan should contain fiber. Fiber is the part of grains, fruits, and vegetables that is not broken down by your body. Whole-grain foods are healthy and provide extra fiber in your diet. Some examples of whole-grain foods are whole-wheat breads and pastas, oatmeal, brown rice, and bulgur.  Eat a variety of vegetables every day.  Include dark, leafy greens such as spinach, kale, claritza greens, and mustard greens. Eat yellow and orange vegetables such as carrots, sweet potatoes, and winter squash.   Eat a variety of fruits every day.  Choose fresh or canned fruit (canned in its own juice or light syrup) instead of juice. Fruit juice has very little or no  fiber.  Eat low-fat dairy foods.  Drink fat-free (skim) milk or 1% milk. Eat fat-free yogurt and low-fat cottage cheese. Try low-fat cheeses such as mozzarella and other reduced-fat cheeses.  Choose meat and other protein foods that are low in fat.  Choose beans or other legumes such as split peas or lentils. Choose fish, skinless poultry (chicken or turkey), or lean cuts of red meat (beef or pork). Before you cook meat or poultry, cut off any visible fat.   Use less fat and oil.  Try baking foods instead of frying them. Add less fat, such as margarine, sour cream, regular salad dressing and mayonnaise to foods. Eat fewer high-fat foods. Some examples of high-fat foods include french fries, doughnuts, ice cream, and cakes.  Eat fewer sweets.  Limit foods and drinks that are high in sugar. This includes candy, cookies, regular soda, and sweetened drinks.  Exercise:  Exercise at least 30 minutes per day on most days of the week. Some examples of exercise include walking, biking, dancing, and swimming. You can also fit in more physical activity by taking the stairs instead of the elevator or parking farther away from stores. Ask your healthcare provider about the best exercise plan for you.      © Copyright [employer] 2018 Information is for End User's use only and may not be sold, redistributed or otherwise used for commercial purposes. All illustrations and images included in CareNotes® are the copyrighted property of [employer] Inc. or [employer]      Patient Education     Urinary incontinence in females   The Basics   Written by the doctors and editors at [facility]   What is urinary incontinence? -- Urinary incontinence is the medical term for when a person leaks urine or loses bladder control.  Incontinence is a very common problem, but it is not a normal part of aging. If you have this problem, there are treatments that can help. There are also things that you can do on your own to stop or reduce urine  "leakage so you don't have to \"just live with it.\"  What are the symptoms of incontinence? -- There are different types of incontinence. Each causes different symptoms. The 3 most common types are:   Stress incontinence - With stress incontinence, you leak urine when you laugh, cough, sneeze, or do anything that \"stresses\" the belly. Stress incontinence is most common in females, especially those who have had a baby.   Urgency incontinence - With urgency incontinence, you feel a strong need to urinate all of a sudden. This is also known as \"urge incontinence.\" Often, the \"urge\" is so strong that you can't make it to the bathroom in time. \"Overactive bladder\" is another term for having a sudden, frequent urge to urinate. People with overactive bladder might or might not actually leak urine.   Mixed incontinence - With mixed incontinence, you have symptoms of both stress and urgency incontinence.  Is there anything I can do on my own to feel better? -- Yes. Here are some things that can help reduce urine leaks:   Reduce the amount of liquid that you drink, especially a few hours before bed.   Cut down on any foods or drinks that make your symptoms worse. Some people find that alcohol, caffeine, or spicy or acidic foods irritate the bladder.   Try to lose weight, if you are overweight. Your doctor or nurse can help you do this in a healthy way.   If you have diabetes, keep your blood sugar as close to your goal level as possible.   If you take medicines called diuretics, plan ahead. These medicines increase the need to urinate. Try to take them when you know you will be near a bathroom for a few hours. If you keep having problems with leakage because of diuretics, ask your doctor if you can take a lower dose or switch to a different medicine.  These techniques can also help improve bladder control:   Bladder retraining - During bladder retraining, you go to the bathroom at scheduled times. For instance, you might decide " "that you will go every hour. Make yourself go every hour, even if you don't feel like you need to. Try to wait the whole hour, even if you need to go sooner. Then, once you get used to going every hour, increase the amount of time you wait in between bathroom visits. Over time, you might be able to \"retrain\" your bladder to wait 3 or 4 hours between bathroom visits.   Pelvic floor muscle training - This involves learning exercises to strengthen and relax your pelvic muscles. These include the muscles that control the flow of urine and bowel movements. When done right, these exercises can help. But people often do them wrong. Ask your doctor or nurse how to do them right. Your doctor might suggest working with a physical therapist who has special training in these exercises.  Should I see my doctor or nurse? -- Yes. Your doctor or nurse can find out what might be causing your incontinence. They can also suggest ways to relieve the problem.  When you speak to your doctor or nurse, ask if any of the medicines you take could be causing your symptoms. Some medicines can cause incontinence or make it worse.  Some people choose to wear pads or special underwear. These can help if you accidentally leak urine once in a while. But they can also cause skin irritation if you use them a lot. If you have incontinence, ask your doctor or nurse how to treat it.  How is incontinence treated? -- The treatment options differ depending on what type of incontinence you have. Some of the options include:   Medicines to relax the bladder   Surgery to repair the tissues that support the bladder or to improve the flow of urine   Electrical stimulation of the nerves that relax the bladder  Urinary incontinence is more common in people who have been through menopause. (Menopause is when you stop having monthly periods). Some people have vaginal dryness after menopause. If this is the case for you, a treatment called vaginal estrogen might " help.  What will my life be like? -- Many people with incontinence can regain bladder control or at least reduce the amount of leakage they have. The most important thing is to tell your doctor or nurse. Then, work with them to find an approach that helps you.  All topics are updated as new evidence becomes available and our peer review process is complete.  This topic retrieved from [employer] on: Feb 26, 2024.  Topic 74153 Version 18.0  Release: 32.2.4 - C32.56  © 2024 UpToDate, Inc. and/or its affiliates. All rights reserved.  figure 1: Location of the bladder     This drawing shows the side view of a woman's body. The bladder is in front of the vagina. The urethra is the tube that carries urine from the bladder out of the body.  Graphic 519216 Version 1.0  Consumer Information Use and Disclaimer   Disclaimer: This generalized information is a limited summary of diagnosis, treatment, and/or medication information. It is not meant to be comprehensive and should be used as a tool to help the user understand and/or assess potential diagnostic and treatment options. It does NOT include all information about conditions, treatments, medications, side effects, or risks that may apply to a specific patient. It is not intended to be medical advice or a substitute for the medical advice, diagnosis, or treatment of a health care provider based on the health care provider's examination and assessment of a patient's specific and unique circumstances. Patients must speak with a health care provider for complete information about their health, medical questions, and treatment options, including any risks or benefits regarding use of medications. This information does not endorse any treatments or medications as safe, effective, or approved for treating a specific patient. UpToDate, Inc. and its affiliates disclaim any warranty or liability relating to this information or the use thereof.The use of this information is governed by  "the Terms of Use, available at https://www.woltersCombiMatrixuwer.com/en/know/clinical-effectiveness-terms. 2024© UpToDate, Inc. and its affiliates and/or licensors. All rights reserved.  Copyright   © 2024 UpToDate, Inc. and/or its affiliates. All rights reserved.    Patient Education     Pelvic floor muscle exercises   The Basics   Written by the doctors and editors at [facility]   What is the pelvic floor? -- The \"pelvic floor\" is the name for the muscles that support the organs in the pelvis. These organs include the bladder and rectum. In the female pelvis, they also include the uterus (figure 1).  What are pelvic floor muscle exercises? -- These are exercises that can make your pelvic floor muscles stronger. They involve learning ways to tighten and relax specific muscles.  Pelvic floor muscle exercises can help keep you from leaking urine, gas, or bowel movements. They can also help with a condition called \"pelvic organ prolapse.\" This is when the organs in the lower belly drop down and press against or bulge into the vagina.  One way to strengthen your pelvic floor muscles is to do exercises. These are also known as \"Kegel\" exercises.  How do I do pelvic floor muscle exercises? -- If you want to try pelvic floor muscle exercises, start by talking to your doctor or nurse. They can talk to you about whether these exercises can help you. They can also teach you how to do them correctly.  You will need to learn which muscles to tighten and relax. It is sometimes hard to figure out the right muscles.  Some ways you can practice:   People with female or male anatomy - Squeeze the muscles you would use to avoid passing gas.   People with female anatomy - Put a finger inside your vagina and squeeze the muscles around your finger. Or you can imagine that you are sitting on a marble and have to pick it up using your vagina.   People with male anatomy - Squeeze the muscles that control the flow of urine. These exercises might " "help reduce urine leaks in people who have had surgery to treat prostate cancer or an enlarged prostate.  No matter how you learn to do pelvic floor muscle exercises, know that the muscles involved are not in your belly, thighs, or buttocks.  After you learn which muscles to tighten and relax, you can do the exercises in any position (standing, sitting, or lying down).  Should I see a physical therapist? -- Your doctor or nurse might suggest working with a physical therapist who has special training in pelvic floor issues. They can check your muscle strength and teach you specific exercises.  How often should I do the exercises? -- A common approach is to try to do a set of the exercises 3 times a day.  For each set, do the following about 10 times:   Squeeze your pelvic muscles.   Hold the muscles tight for about 10 seconds.   Relax the muscles completely.  Keep up this routine for at least a few months. You will probably notice results, but it might take a few weeks to several months.  How do pelvic floor muscle exercises help? -- Pelvic floor muscle exercises can help:   Prevent urine leaks in people who have \"stress incontinence\" - This means that they leak urine when they cough, laugh, sneeze, or strain.   Control sudden urges to urinate - These happen to people with \"urinary urgency\" or \"urge incontinence.\"   Control the release of gas or bowel movements   Improve symptoms caused by pelvic organ prolapse - These can include pressure or bulging in the vagina. If you have these symptoms, see your doctor or nurse to find out the cause.  It might take a few months of doing the exercises regularly before you notice them working. If you have been doing pelvic floor muscle exercises for several months and they don't seem to be making a difference, tell your doctor or nurse. They might suggest seeing a physical therapist or trying other treatments for your condition.  All topics are updated as new evidence becomes " "available and our peer review process is complete.  This topic retrieved from [employer] on: Feb 26, 2024.  Topic 62005 Version 15.0  Release: 32.2.4 - C32.56  © 2024 UpToDate, Inc. and/or its affiliates. All rights reserved.  figure 1: Pelvic floor muscles     The \"pelvic floor\" is a group of muscles that support the organs in the pelvis. In females, these organs include the uterus, bladder, and rectum.  Graphic 806051 Version 2.0  Consumer Information Use and Disclaimer   Disclaimer: This generalized information is a limited summary of diagnosis, treatment, and/or medication information. It is not meant to be comprehensive and should be used as a tool to help the user understand and/or assess potential diagnostic and treatment options. It does NOT include all information about conditions, treatments, medications, side effects, or risks that may apply to a specific patient. It is not intended to be medical advice or a substitute for the medical advice, diagnosis, or treatment of a health care provider based on the health care provider's examination and assessment of a patient's specific and unique circumstances. Patients must speak with a health care provider for complete information about their health, medical questions, and treatment options, including any risks or benefits regarding use of medications. This information does not endorse any treatments or medications as safe, effective, or approved for treating a specific patient. [employer] and its affiliates disclaim any warranty or liability relating to this information or the use thereof.The use of this information is governed by the Terms of Use, available at https://www.wolterskluwer.com/en/know/clinical-effectiveness-terms. 2024© [employer], [employer] and its affiliates and/or licensors. All rights reserved.  Copyright   © 2024 [employer], [employer] and/or its affiliates. All rights reserved.    "

## 2025-01-16 NOTE — ASSESSMENT & PLAN NOTE
Influenza immuinzation today   Orders:    influenza vaccine, high-dose, PF 0.5 mL (Fluzone High Dose)

## 2025-01-16 NOTE — PROGRESS NOTES
Name: Ghada Ferreira      : 1955      MRN: [id number]  Encounter Provider: Thao Jones DO  Encounter Date: 2025   Encounter department: [facility]    Assessment & Plan  Medicare annual wellness visit, subsequent         Chronic pain of right knee  Schedule physical therapy  Orders:    Ambulatory Referral to Physical Therapy; Future    Lymphedema  Needs new solaris sleeve, measurements  Orders:    Compression sleeve    Primary hypertension  Uncontrolled, taking amlodipine. Pt will monitor bp, may need adjustment in bp medication.          Lymphedema of left lower extremity  Pt needs new solaris sleeve- needs measurements         Need for COVID-19 vaccine  Vaccine given today  Orders:    COVID-19 Pfizer mRNA vaccine 12 yr and older (Comirnaty pre-filled syringe)    Encounter for immunization  Influenza immuinzation today   Orders:    influenza vaccine, high-dose, PF 0.5 mL (Fluzone High Dose)    Chronic right-sided low back pain without sciatica  Physical therapy  Orders:    Ambulatory Referral to Physical Therapy; Future    Other hyperlipidemia  Due for lipid panel  Orders:    Lipid panel; Future    Hypokalemia  Due for cmp  Orders:    Comprehensive metabolic panel; Future    Chronic fatigue    Orders:    CBC and differential; Future    TSH, 3rd generation with Free T4 reflex; Future    Cellulitis, unspecified cellulitis site  Requesting renewal on mupirocin  Orders:    mupirocin (BACTROBAN) 2 % ointment; Apply topically 3 (three) times a day    Postmenopause  Schedule dexa scan  Orders:    DXA bone density spine hip and pelvis; Future    Malignant neoplasm of ovary, unspecified laterality (HCC)  Pt follows with oncology       Mixed stress and urge urinary incontinence         Malignant neoplasm of central portion of left breast in female, estrogen receptor positive (HCC)  Pt taking arimidex         History of ovarian cancer  Pt following with oncology         Primary  osteoarthritis of right knee  Schedule physical therapy           Depression Screening and Follow-up Plan: Patient was screened for depression during today's encounter. They screened negative with a PHQ-2 score of 0.      Preventive health issues were discussed with patient, and age appropriate screening tests were ordered as noted in patient's After Visit Summary. Personalized health advice and appropriate referrals for health education or preventive services given if needed, as noted in patient's After Visit Summary.    History of Present Illness     Pt is seen for a medicare wellness.  Right knee pain Was bad a few years ago but went to therapy and improved.   Has trouble straightening right knee. - stretched knee and helped with pain. Also with low back pain, pain in right groin. Trouble with stairs. Has to go up stairs one at a time.   Lymphedema right leg  Wears Solaris sleeve- for lyphedema        Patient Care Team:  Thao Jones DO as PCP - General (Family Medicine)  Arik Alfaro MD (Radiation Oncology)  Keith Alcantar MD (Hematology and Oncology)  MD Lawrence Mon MD as Surgeon (Surgical Oncology)  Arik Rojas MD (Gynecologic Oncology)  Maru Pierce RN as Nurse Navigator (Oncology)  LUSI Valladares as Nurse Practitioner (Surgical Oncology)  Enrico Sheets MD (Hematology and Oncology)    Review of Systems   Constitutional: Negative.  Negative for fatigue and fever.   HENT: Negative.     Eyes: Negative.    Respiratory: Negative.  Negative for cough.    Cardiovascular:  Positive for leg swelling.   Gastrointestinal: Negative.    Endocrine: Negative.    Genitourinary: Negative.    Musculoskeletal:  Positive for arthralgias, back pain and gait problem.   Skin: Negative.    Allergic/Immunologic: Negative.    Psychiatric/Behavioral: Negative.       Medical History Reviewed by provider this encounter:  Tobacco  Allergies  Meds  Problems  Med  Hx  Surg Hx  Fam Hx       Annual Wellness Visit Questionnaire   Ghada is here for her Subsequent Wellness visit. Last Medicare Wellness visit information reviewed, patient interviewed and updates made to the record today.      Health Risk Assessment:   Patient rates overall health as good. Patient feels that their physical health rating is slightly worse. Patient is satisfied with their life. Eyesight was rated as slightly better. Hearing was rated as slightly better. Patient feels that their emotional and mental health rating is same. Patients states they are sometimes angry. Patient states they are often unusually tired/fatigued. Pain experienced in the last 7 days has been some. Patient's pain rating has been 5/10. Patient states that she has experienced no weight loss or gain in last 6 months.     Depression Screening:   PHQ-2 Score: 0      Fall Risk Screening:   In the past year, patient has experienced: no history of falling in past year      Urinary Incontinence Screening:   Patient has leaked urine accidently in the last six months.     Home Safety:  Patient has trouble with stairs inside or outside of their home. Patient has working smoke alarms and has no working carbon monoxide detector. Home safety hazards include: none.     Nutrition:   Current diet is Regular.     Medications:   Patient is currently taking over-the-counter supplements. OTC medications include: see medication list. Patient is able to manage medications.     Activities of Daily Living (ADLs)/Instrumental Activities of Daily Living (IADLs):   Walk and transfer into and out of bed and chair?: Yes  Dress and groom yourself?: Yes    Bathe or shower yourself?: Yes    Feed yourself? Yes  Do your laundry/housekeeping?: Yes  Manage your money, pay your bills and track your expenses?: Yes  Make your own meals?: Yes    Do your own shopping?: Yes    Previous Hospitalizations:   Any hospitalizations or ED visits within the last 12 months?: No       PREVENTIVE SCREENINGS      Cardiovascular Screening:    General: Screening Not Indicated and History Lipid Disorder      Diabetes Screening:     General: Screening Current      Colorectal Cancer Screening:     General: Screening Current      Breast Cancer Screening:     General: History Breast Cancer      Cervical Cancer Screening:    General: Screening Not Indicated      Osteoporosis Screening:    General: Screening Not Indicated and History Osteoporosis      Lung Cancer Screening:     General: Screening Not Indicated      Hepatitis C Screening:    General: Screening Current    Screening, Brief Intervention, and Referral to Treatment (SBIRT)    Screening  Typical number of drinks in a day: 0  Typical number of drinks in a week: 0  Interpretation: Low risk drinking behavior.    AUDIT-C Screenin) How often did you have a drink containing alcohol in the past year? monthly or less  2) How many drinks did you have on a typical day when you were drinking in the past year? 1 to 2  3) How often did you have 6 or more drinks on one occasion in the past year? never    AUDIT-C Score: 1  Interpretation: Score 0-2 (female): Negative screen for alcohol misuse    Single Item Drug Screening:  How often have you used an illegal drug (including marijuana) or a prescription medication for non-medical reasons in the past year? never    Single Item Drug Screen Score: 0  Interpretation: Negative screen for possible drug use disorder    Social Drivers of Health     Financial Resource Strain: Low Risk  (1/10/2024)    Overall Financial Resource Strain (CARDIA)     Difficulty of Paying Living Expenses: Not hard at all   Food Insecurity: No Food Insecurity (2025)    Hunger Vital Sign     Worried About Running Out of Food in the Last Year: Never true     Ran Out of Food in the Last Year: Never true   Transportation Needs: No Transportation Needs (2025)    PRAPARE - Transportation     Lack of Transportation (Medical): No      Lack of Transportation (Non-Medical): No   Housing Stability: Low Risk  (1/16/2025)    Housing Stability Vital Sign     Unable to Pay for Housing in the Last Year: No     Number of Times Moved in the Last Year: 0     Homeless in the Last Year: No   Utilities: Not At Risk (1/16/2025)    [facility] Utilities     Threatened with loss of utilities: No     No results found.    Objective   /80   Pulse 78   Temp (!) 97 °F (36.1 °C) (Tympanic)   Ht 5' (1.524 m)   Wt 59.1 kg (130 lb 6.4 oz)   SpO2 99%   BMI 25.47 kg/m²     Physical Exam  Vitals and nursing note reviewed.   Constitutional:       Appearance: She is well-developed.   HENT:      Head: Normocephalic and atraumatic.      Right Ear: External ear normal.      Left Ear: External ear normal.      Nose: Nose normal.   Eyes:      Conjunctiva/sclera: Conjunctivae normal.      Pupils: Pupils are equal, round, and reactive to light.   Cardiovascular:      Rate and Rhythm: Normal rate and regular rhythm.      Heart sounds: Normal heart sounds.   Pulmonary:      Effort: Pulmonary effort is normal.      Breath sounds: Normal breath sounds.   Abdominal:      General: Bowel sounds are normal.      Palpations: Abdomen is soft.   Musculoskeletal:         General: Swelling and tenderness present. No signs of injury. Normal range of motion.      Cervical back: Normal range of motion and neck supple.   Skin:     General: Skin is warm and dry.   Neurological:      Mental Status: She is alert and oriented to person, place, and time.   Psychiatric:         Behavior: Behavior normal.         Thought Content: Thought content normal.         Judgment: Judgment normal.

## 2025-01-17 NOTE — PROGRESS NOTES
Daily Note     Today's date: 2021  Patient name: Shira Christine  : 1955  MRN: [id number]  Referring provider: Keshav Amezcua PA-C  Dx:   Encounter Diagnosis     ICD-10-CM    1  Pes anserinus bursitis of both knees  M70 51     M70 52    2  Primary osteoarthritis of right knee  M17 11    3  Pes anserinus bursitis of right knee  M70 51             Subjective: Pt reported that she has been performing her HEP as prescribed  Stated that     Objective: See treatment diary below    Assessment: Tolerated treatment well  Modified exercise program well through increased resistance and addition of STS, which she performed well without provocation of pain  Patient demonstrated fatigue post treatment, exhibited good technique with therapeutic exercises and would benefit from continued PT  Plan: Continue per plan of care  Precautions: history of breast and ovarian cancer  POC: 21    Manuals 4/1      3/18 3/22 3/25 3/29   STM, PROM stretch JZ      JZ JZ JZ JW   Pat mobs                                        Neuro Re-Ed 4/1      3/18 3/22 3/25 3/29   SLS          :20x3ea :20x3 ea   Tandem Foam             Cone taps         20x3ea :20x3 ea                                                         Ther Ex 4/1      3/18 3/22 3/25 3/29   Calf stretch :15x3      :15x5ea  :15x5 :15x5   Soleus stretch incline :15x3      :15x5ea  :15x5 :15x5   clamshell        O 3x10ea     3 way HS stretch seated, heel elevated on box :15x3ea      :15x3ea :15x2ea :30x2ea :30x 2 ea    STS 3x10            U/l heel raise                                                                         O 3x10     Ther Activity 4/1      3/18 3/22 3/25 3/29   Step ups 8" 3x10      8" 3x10ea  8" 3x10 8" 3x10   Lateral step down 8"       6" x10ea u/l HR      Leg press mid in plantar flex U/l 60/ 3x10 ea       50/ 3x10 U/l 50/ 3x10 U/l 50/ 3x10   Calf press B/l 70/ 3x15        B/l 50/ 3x15 B/l 50/ 3x15                Modalities 4/1      3/18 3/22 3/25 3/29   Leslye 75             CT pushing off bed/fair minus

## 2025-01-22 ENCOUNTER — HOSPITAL ENCOUNTER (OUTPATIENT)
Dept: MRI IMAGING | Facility: HOSPITAL | Age: 70
Discharge: HOME | End: 2025-01-22
Payer: MEDICARE

## 2025-01-22 DIAGNOSIS — Z15.89 MONOALLELIC MUTATION OF ATM GENE: ICD-10-CM

## 2025-01-22 DIAGNOSIS — Z15.01 MONOALLELIC MUTATION OF ATM GENE: ICD-10-CM

## 2025-01-22 DIAGNOSIS — C50.112 MALIGNANT NEOPLASM OF CENTRAL PORTION OF LEFT BREAST IN FEMALE, ESTROGEN RECEPTOR POSITIVE (HCC): ICD-10-CM

## 2025-01-22 DIAGNOSIS — Z15.09 MONOALLELIC MUTATION OF ATM GENE: ICD-10-CM

## 2025-01-22 DIAGNOSIS — Z17.0 MALIGNANT NEOPLASM OF CENTRAL PORTION OF LEFT BREAST IN FEMALE, ESTROGEN RECEPTOR POSITIVE (HCC): ICD-10-CM

## 2025-01-22 PROCEDURE — A9585 GADOBUTROL INJECTION: HCPCS

## 2025-01-22 PROCEDURE — C8937 CAD BREAST MRI: HCPCS

## 2025-01-22 PROCEDURE — C8908 MRI W/O FOL W/CONT, BREAST,: HCPCS

## 2025-01-22 RX ORDER — GADOBUTROL 604.72 MG/ML
5 INJECTION INTRAVENOUS
Status: COMPLETED | OUTPATIENT
Start: 2025-01-22 | End: 2025-01-22

## 2025-01-22 RX ADMIN — GADOBUTROL 5 ML: 604.72 INJECTION INTRAVENOUS at 12:34

## 2025-01-24 ENCOUNTER — RESULTS FOLLOW-UP (OUTPATIENT)
Dept: SURGICAL ONCOLOGY | Facility: CLINIC | Age: 70
End: 2025-01-24

## 2025-01-24 DIAGNOSIS — Z15.01 MONOALLELIC MUTATION OF ATM GENE: Primary | ICD-10-CM

## 2025-01-24 DIAGNOSIS — R92.8 ABNORMAL MRI, BREAST: ICD-10-CM

## 2025-01-24 DIAGNOSIS — Z15.09 MONOALLELIC MUTATION OF ATM GENE: Primary | ICD-10-CM

## 2025-01-24 DIAGNOSIS — Z15.89 MONOALLELIC MUTATION OF ATM GENE: Primary | ICD-10-CM

## 2025-01-24 NOTE — TELEPHONE ENCOUNTER
FYI:  Pt returning call to Poonam WYLIE to discuss test results. Pt can be best reached at [phone] today.

## 2025-01-24 NOTE — TELEPHONE ENCOUNTER
Spoke to patient regarding mri findings. B/l axillary lymphadenopathy seen on breast MRI. Patient mentions recent covid/flu vaccinations. Follow up ultrasounds recommended of axillas at this time. Orders placed and contacted [facility] for scheduling. Also placed order for left breast biopsy for non-mass enhancement. Contacted [facility] for scheduling.    FYI:  Pt returning call to Poonam WYLIE to discuss test results. Pt can be best reached at [phone] today.

## 2025-01-26 PROBLEM — R53.82 CHRONIC FATIGUE: Status: ACTIVE | Noted: 2025-01-26

## 2025-01-26 PROBLEM — M25.561 CHRONIC PAIN OF RIGHT KNEE: Status: ACTIVE | Noted: 2025-01-26

## 2025-01-26 PROBLEM — G89.29 CHRONIC RIGHT-SIDED LOW BACK PAIN WITHOUT SCIATICA: Status: ACTIVE | Noted: 2025-01-26

## 2025-01-26 PROBLEM — N39.46 MIXED STRESS AND URGE URINARY INCONTINENCE: Status: ACTIVE | Noted: 2025-01-26

## 2025-01-26 PROBLEM — G89.29 CHRONIC PAIN OF RIGHT KNEE: Status: ACTIVE | Noted: 2025-01-26

## 2025-01-26 PROBLEM — M54.50 CHRONIC RIGHT-SIDED LOW BACK PAIN WITHOUT SCIATICA: Status: ACTIVE | Noted: 2025-01-26

## 2025-01-26 PROBLEM — L03.90 CELLULITIS: Status: ACTIVE | Noted: 2025-01-26

## 2025-01-27 ENCOUNTER — HOSPITAL ENCOUNTER (OUTPATIENT)
Dept: ULTRASOUND IMAGING | Facility: CLINIC | Age: 70
Discharge: HOME | End: 2025-01-27
Payer: MEDICARE

## 2025-01-27 DIAGNOSIS — Z15.01 MONOALLELIC MUTATION OF ATM GENE: ICD-10-CM

## 2025-01-27 DIAGNOSIS — Z15.09 MONOALLELIC MUTATION OF ATM GENE: ICD-10-CM

## 2025-01-27 DIAGNOSIS — R92.8 ABNORMAL MRI, BREAST: ICD-10-CM

## 2025-01-27 DIAGNOSIS — Z15.89 MONOALLELIC MUTATION OF ATM GENE: ICD-10-CM

## 2025-01-27 PROCEDURE — 76642 ULTRASOUND BREAST LIMITED: CPT

## 2025-01-27 NOTE — ASSESSMENT & PLAN NOTE
Requesting renewal on mupirocin  Orders:    mupirocin (BACTROBAN) 2 % ointment; Apply topically 3 (three) times a day

## 2025-01-27 NOTE — ASSESSMENT & PLAN NOTE
Uncontrolled, taking amlodipine. Pt will monitor bp, may need adjustment in bp medication.

## 2025-01-27 NOTE — ASSESSMENT & PLAN NOTE
Vaccine given today  Orders:    COVID-19 Pfizer mRNA vaccine 12 yr and older (Comirnaty pre-filled syringe)

## 2025-01-28 NOTE — PRE-PROCEDURE INSTRUCTIONS
__left___ breast MRI guided biopsy scheduled for _2/21/2025_____ @ __[facility]____ ( _0715_____ arrival)         Questions and concerns were addressed at this time.  Patient verbalized understanding.  Allergies & medications reviewed with pt & verified in Epic.  pre-procedure instructions reviewed with pt.    Implants:  breast/surgical/medical  _x__ NO  ___ YES (type: _____ )       Blood thinners:   _x__ NO  ___ YES/medication: ___ (no need to stop per RBC protocol)          -INR ____ (if pt on Coumadin/Warfarin)

## 2025-02-03 DIAGNOSIS — I10 ESSENTIAL HYPERTENSION: ICD-10-CM

## 2025-02-03 RX ORDER — AMLODIPINE BESYLATE 10 MG/1
10 TABLET ORAL DAILY
Qty: 90 TABLET | Refills: 1 | Status: SHIPPED | OUTPATIENT
Start: 2025-02-03

## 2025-02-05 ENCOUNTER — EVALUATION (OUTPATIENT)
Dept: PHYSICAL THERAPY | Facility: CLINIC | Age: 70
End: 2025-02-05
Payer: MEDICARE

## 2025-02-05 DIAGNOSIS — G89.29 CHRONIC RIGHT-SIDED LOW BACK PAIN WITHOUT SCIATICA: ICD-10-CM

## 2025-02-05 DIAGNOSIS — M25.561 CHRONIC PAIN OF RIGHT KNEE: ICD-10-CM

## 2025-02-05 DIAGNOSIS — M54.50 CHRONIC RIGHT-SIDED LOW BACK PAIN WITHOUT SCIATICA: ICD-10-CM

## 2025-02-05 DIAGNOSIS — G89.29 CHRONIC PAIN OF RIGHT KNEE: ICD-10-CM

## 2025-02-05 PROCEDURE — 97112 NEUROMUSCULAR REEDUCATION: CPT

## 2025-02-05 PROCEDURE — 97161 PT EVAL LOW COMPLEX 20 MIN: CPT

## 2025-02-05 NOTE — PROGRESS NOTES
PT Evaluation     Today's date: 2025  Patient name: Ghada Ferreira  : 1955  MRN: [id number]  Referring provider: Thao Jones DO  Dx:   Encounter Diagnosis     ICD-10-CM    1. Chronic right-sided low back pain without sciatica  M54.50 Ambulatory Referral to Physical Therapy    G89.29       2. Chronic pain of right knee  M25.561 Ambulatory Referral to Physical Therapy    G89.29                      Assessment  Impairments: abnormal gait, abnormal muscle firing, abnormal or restricted ROM, activity intolerance, impaired physical strength, lacks appropriate home exercise program, pain with function, poor posture  and poor body mechanics    Assessment details: Ghada Ferreira is a 69 y.o. female who presents to hospital-based outpatient PT with chronic right-sided low back pain and chronic right knee pain. Patient presents with the following impairments: low back and right knee pain, limited right knee AROM, limited right knee strength, limited B/L HS/hip flexor flexibility, and gait/postural abnormalities. Due to these impairments, patient has difficulty performing the following: ADL's, ambulation, stair negotiation, lifting/carrying, transfers, self-care activities, prolonged standing, squatting, kneeling, household chores . Patient has been educated in home exercise program and plan of care. Patient would benefit from skilled physical therapy services to address the above functional limitations and progress towards prior level of function and independence with home exercise program.  Understanding of Dx/Px/POC: good     Prognosis: good    Goals  Short Term Goals [3 weeks; target date: 3/15/25]  1. Patient will be independent with initial HEP.  2. Patient will demonstrate an increase in right knee extension AROM to 0° and knee flexion AROM to 115°.   3. Patient will demonstrate an increase in right knee strength of 1/2 grade on MMT.    Long Term Goals [8 weeks; target date: 25]  1. Patient  will be independent with comprehensive HEP.  2. Patient will demonstrate an increase in rightknee AROM to WNL in order to promote self-care activities pain-free.  3. Patient will demonstrate an increase in right knee strength to 4/5 on MMT.   4. Patient will be able to perform a full, functional squat with proper mechanics.  5. Patient will be able to ascend/descend 15 stairs reciprocally with use of handrail.    6. Patient will be able to walk her dog for 10 minutes with low back/right knee pain of 3-4/10 at worst.     Plan  Patient would benefit from: skilled physical therapy  Planned modality interventions: cryotherapy, electrical stimulation/Russian stimulation, TENS, ultrasound, thermotherapy: hydrocollator packs, traction, high voltage pulsed current: spasm management and high voltage pulsed current: pain management    Planned therapy interventions: flexibility, functional ROM exercises, graded exercise, home exercise program, joint mobilization, manual therapy, neuromuscular re-education, patient education, strengthening, stretching, therapeutic exercise, therapeutic activities, balance/weight bearing training, gait training, abdominal trunk stabilization, self care, postural training, activity modification, ADL retraining, ADL training, balance, behavior modification, body mechanics training, breathing training, therapeutic training, transfer training, graded activity, graded motor, muscle pump exercises, Hazel taping and IADL retraining    Frequency: 2x week  Plan of Care beginning date: 2/5/2025  Plan of Care expiration date: 5/5/2025  Treatment plan discussed with: patient    Subjective Evaluation    History of Present Illness  Mechanism of injury: Pt reports chronic low back pain on the right side for a few years. Pt states her PCP has found that her lower back is tight. Pt states she has pain with holding a dog leash in her right hand and walking, even if dogs are not pulling. Pt states she had  scoliosis as a child and had no interventions to address it. Pt states that she has difficulty with lifting due to her back and right leg. Pt states she has also noticed that over time, her posture has become poor, whether or not she is walking a dog or just standing still with hands at rest.     Pt reports right knee pain for 3 years. Pt states that she has done PT for this in the past which helped. Pt states she has also had cortisone shots in the past. Pt states at the start of January, she feels that her legs are tight and began some self-guided stretching, which helped. Pt states she also has history of right patellar tendon issues. Pt states her legs get tired very easily. Pt states that she mentioned both of these issues while at her PCP office and was referred to PT. Pt denies any recent imaging for either the spine or knee.   Pain  Current pain ratin  At best pain ratin  At worst pain ratin  Location: Right knee (Low back C: 4, B: 4, W: 9)  Quality: dull ache (Constant)  Relieving factors: relaxation, change in position, support, rest and ice  Aggravating factors: walking, stair climbing and lifting (Getting in and out of the car)  Progression: no change    Social Support  Steps to enter house: yes  5  Stairs in house: yes   15  Lives with: alone (Has pets)    Employment status: working (Works with pets - dog walking)  Hand dominance: right  Exercise history: Walking with pets 2x/week 20 minutes;        Objective     Observations     Additional Observation Details  Lymphedema present throughout LLE    Active Range of Motion   Left Knee   Flexion: 108 degrees   Extension: 3 degrees     Right Knee   Flexion: 114 degrees with pain  Extension: 5 degrees with pain    Strength/Myotome Testing     Right Knee   Flexion: 4-  Extension: 3+  Quadriceps contraction: fair    Ambulation     Comments   Limited TKE bilaterally, forward flexed trunk posture; no AD at baseline    Red Flag Screening  (+) for age  >50  (-) for unexplained weight loss  (+) for history of cancer  (-) for saddle paresthesia  (-) for bladder/bowel dysfunction           1st follow up:     Neurological Testing  Light Touch Sensation -     Lumbar AROM     Flexion     Extension    L side glide    R side glide    L rotation    R rotation       Repeated motions Position Repetitions performed Symptomatic response during Symptomatic response after   Flexion        Extension         Comments:     Lower Extremity Strength Testing    Hip MMT  Left  Right   Flexion       Abduction     Adduction       Knee MMT  Left  Right   Flexion    4-/5   Extension  3+/5     Ankle MMT  Left  Right   Dorsiflexion       Plantarflexion           Daily Treatment Diary     Precautions: Chronic low back pain, LLE lymphedema, hx of cancer    POC Expires Reeval for Medicare to be completed  Unit Limit Auth Expiration Date PT/OT/STVisit Limit   5/5/25 By visit 10 N/A N/A N/A    Completed on visit N/A                   Auth Status DATE 2/5/25        NA Visit # 1         Remaining 9        MANUAL THERAPY                                                               THERAPEUTIC EXERCISE HEP         Bike          PROM R knee          Heel prop x 1'                                                                                                                                 NEUROMUSCULAR REEDUCATION           Quad sets  5x5s w/ towel roll under knee        LAQ  5x5s                                                                                                                                 THERAPEUTIC ACTIVITY                                                  GAIT TRAINING                                                  MODALITIES

## 2025-02-10 ENCOUNTER — TELEPHONE (OUTPATIENT)
Age: 70
End: 2025-02-10

## 2025-02-10 NOTE — TELEPHONE ENCOUNTER
Pt calling in regarding her appointment with Poonam on Wednesday, pt states she was under the impression that her appt was to be rescheduled until after the bx, please advise if pt's appt is to be r/s after bx as it was not noted in the result notes from Poonam. Pt wants to confirm before canceling.    Thank you.

## 2025-02-21 ENCOUNTER — HOSPITAL ENCOUNTER (OUTPATIENT)
Dept: RADIOLOGY | Facility: HOSPITAL | Age: 70
Discharge: HOME | End: 2025-02-21
Payer: MEDICARE

## 2025-02-21 VITALS — HEART RATE: 101 BPM | SYSTOLIC BLOOD PRESSURE: 149 MMHG | DIASTOLIC BLOOD PRESSURE: 65 MMHG

## 2025-02-21 DIAGNOSIS — Z15.09 MONOALLELIC MUTATION OF ATM GENE: ICD-10-CM

## 2025-02-21 DIAGNOSIS — Z15.89 MONOALLELIC MUTATION OF ATM GENE: ICD-10-CM

## 2025-02-21 DIAGNOSIS — Z15.01 MONOALLELIC MUTATION OF ATM GENE: ICD-10-CM

## 2025-02-21 DIAGNOSIS — R92.8 ABNORMAL MRI, BREAST: ICD-10-CM

## 2025-02-21 PROCEDURE — 19085 BX BREAST 1ST LESION MR IMAG: CPT

## 2025-02-21 PROCEDURE — A9585 GADOBUTROL INJECTION: HCPCS

## 2025-02-21 PROCEDURE — A4648 IMPLANTABLE TISSUE MARKER: HCPCS

## 2025-02-21 RX ORDER — GADOBUTROL 604.72 MG/ML
6 INJECTION INTRAVENOUS
Status: COMPLETED | OUTPATIENT
Start: 2025-02-21 | End: 2025-02-21

## 2025-02-21 RX ORDER — LIDOCAINE HYDROCHLORIDE AND EPINEPHRINE BITARTRATE 20; .01 MG/ML; MG/ML
20 INJECTION, SOLUTION SUBCUTANEOUS ONCE
Status: DISCONTINUED | OUTPATIENT
Start: 2025-02-21 | End: 2025-02-22 | Stop reason: HOSPADM

## 2025-02-21 RX ORDER — LIDOCAINE HYDROCHLORIDE 10 MG/ML
5 INJECTION, SOLUTION EPIDURAL; INFILTRATION; INTRACAUDAL; PERINEURAL ONCE
Status: DISCONTINUED | OUTPATIENT
Start: 2025-02-21 | End: 2025-02-22 | Stop reason: HOSPADM

## 2025-02-21 RX ADMIN — GADOBUTROL 6 ML: 604.72 INJECTION INTRAVENOUS at 08:17

## 2025-02-21 NOTE — PROGRESS NOTES
Procedure type:    _____ Ultrasound guided   _____ Stereotactic    __x____ MRI Guided    Breast:    ___x__ Left Breast biopsy _____ Right Breast biopsy    Time-out called @ 0800 and procedure confirmed with patient, myself Kimmy BROWER, radiologist Brittney Lee MD, Kelli Hinton RT(MR), and Kristie Vásquez  RT(MR).      No enhancement was shown in the images to have a target to biopsy.

## 2025-02-24 ENCOUNTER — EVALUATION (OUTPATIENT)
Dept: PHYSICAL THERAPY | Facility: CLINIC | Age: 70
End: 2025-02-24
Payer: MEDICARE

## 2025-02-24 DIAGNOSIS — G89.29 CHRONIC PAIN OF RIGHT KNEE: ICD-10-CM

## 2025-02-24 DIAGNOSIS — G89.29 CHRONIC RIGHT-SIDED LOW BACK PAIN WITHOUT SCIATICA: Primary | ICD-10-CM

## 2025-02-24 DIAGNOSIS — M54.50 CHRONIC RIGHT-SIDED LOW BACK PAIN WITHOUT SCIATICA: Primary | ICD-10-CM

## 2025-02-24 DIAGNOSIS — M25.561 CHRONIC PAIN OF RIGHT KNEE: ICD-10-CM

## 2025-02-24 PROCEDURE — 97112 NEUROMUSCULAR REEDUCATION: CPT

## 2025-02-24 PROCEDURE — 97110 THERAPEUTIC EXERCISES: CPT

## 2025-02-24 NOTE — PROGRESS NOTES
"Daily Note      Today's date: 2025  Patient name: Ghada Ferreira  : 1955  MRN: [id number]  Referring provider: Thao Jones DO  Dx:   Encounter Diagnosis     ICD-10-CM    1. Chronic right-sided low back pain without sciatica  M54.50     G89.29       2. Chronic pain of right knee  M25.561     G89.29           Subjective: Pt reports 6/10 pain in the low back on the right side that travels across to the left. Pt states that standing and walking aggravate the pain. Pt states that she has found that the pain is also aggravated when walking without holding the dog leash and finds herself walking in a flexed forward posture. Pt reports 6/10 pain in the right knee as well.        Objective: See treatment diary below    Assessment: Pt tolerated treatment well.  Performed lumbar assessment and pt presents with stiffness in all planes of motion. Pt presents with good lumbar flexion ROM, but notes limitation compared to her ability to bend forward several months ago. Pt presented with distal symptoms with forward bending and no obvious relief with lumbar extension. Pt appears to present with contributions from tight musculature including B/L hip flexors, B/L HS, B/L quads, and B/L gastrocs. Flexibility limitation was more prominent on the RLE. Updated HEP to include flexibility exercises for BLE. Pt would also benefit from postural correction and corrective exercise to address this.     Plan: Continue per plan of care.           Lumbar AROM     Flexion  Fingertip reach 3\" from toes   Extension 40%    L side glide 40%    R side glide 40% with pain returning to neutral on the L L/S   L rotation 50%   R rotation  75%     Repeated motions Position Repetitions performed Symptomatic response during Symptomatic response after Effect on ROM   Flexion Standing 10  Peripheralizing to both calves No worse Increased motion   Extension Standing 10 No effect No effect Increased motion     Comments:     Lower Extremity " Strength Testing    Hip MMT  Left  Right   Flexion  4/5 4/5    Abduction 4-/5 4-/5   Adduction 4-/5 4-/5       Knee MMT  Left  Right   Flexion  4/5  4-/5   Extension 4-/5 3+/5     Ankle MMT  Left  Right   Dorsiflexion  4-/5 4/5    Plantarflexion 4/5 4/5         Daily Treatment Diary     Precautions: Chronic low back pain, LLE lymphedema, hx of cancer    POC Expires Reeval for Medicare to be completed  Unit Limit Auth Expiration Date PT/OT/STVisit Limit   5/5/25 By visit 10 N/A N/A N/A    Completed on visit N/A                   Auth Status DATE 2/5/25 2/24/25       NA Visit # 1 2        Remaining 9 8       MANUAL THERAPY         STM/MFR R knee                                                      THERAPEUTIC EXERCISE HEP         Bike          PROM R knee          Heel prop x 1'  2'       Gastroc stretch   3x20s B/L        HS stretch   3x20s B/L        Hip flexor stretch   3x20s B/L                                                                                                  NEUROMUSCULAR REEDUCATION           Quad sets  5x5s w/ towel roll under knee 2x10 (5s)        LAQ  5x5s  2x10 (5s) B/L        Rows          Shoulder extensions                                                                                                              THERAPEUTIC ACTIVITY                                                  GAIT TRAINING                                                  MODALITIES                             Access Code: [email or web address]  URL: [email or web address]  Date: 02/24/2025  Prepared by: Diaz Moore    Exercises  - Supine Quad Set  - 1 x daily - 7 x weekly - 2 sets - 10 reps - 5 hold  - Supine Knee Extension Stretch on Towel Roll  - 1 x daily - 7 x weekly - 1 sets - 1 reps - 2 min  hold  - Seated Knee Extension  - 1 x daily - 7 x weekly - 2 sets - 10 reps - 5 hold  - Long Sitting Calf Stretch with Strap  - 1 x daily - 7 x weekly - 1 sets - 3 reps - 20 hold  - Hooklying Hamstring Stretch with Strap  -  1 x daily - 7 x weekly - 1 sets - 3 reps - 20 hold  - Supine Quadriceps Stretch with Strap on Table  - 1 x daily - 7 x weekly - 1 sets - 3 reps - 20 hold

## 2025-02-25 PROBLEM — Z00.00 MEDICARE ANNUAL WELLNESS VISIT, SUBSEQUENT: Status: RESOLVED | Noted: 2024-01-10 | Resolved: 2025-02-25

## 2025-02-26 ENCOUNTER — OFFICE VISIT (OUTPATIENT)
Dept: SURGICAL ONCOLOGY | Facility: CLINIC | Age: 70
End: 2025-02-26
Payer: MEDICARE

## 2025-02-26 VITALS
HEIGHT: 62 IN | OXYGEN SATURATION: 98 % | SYSTOLIC BLOOD PRESSURE: 150 MMHG | BODY MASS INDEX: 24.11 KG/M2 | DIASTOLIC BLOOD PRESSURE: 84 MMHG | HEART RATE: 81 BPM | TEMPERATURE: 98.6 F | WEIGHT: 131 LBS

## 2025-02-26 DIAGNOSIS — Z15.01 MONOALLELIC MUTATION OF ATM GENE: ICD-10-CM

## 2025-02-26 DIAGNOSIS — C50.112 MALIGNANT NEOPLASM OF CENTRAL PORTION OF LEFT BREAST IN FEMALE, ESTROGEN RECEPTOR POSITIVE (HCC): Primary | ICD-10-CM

## 2025-02-26 DIAGNOSIS — Z15.09 MONOALLELIC MUTATION OF ATM GENE: ICD-10-CM

## 2025-02-26 DIAGNOSIS — Z82.49 FAMILY HISTORY OF CARDIOVASCULAR DISEASE: ICD-10-CM

## 2025-02-26 DIAGNOSIS — Z92.3 S/P RADIATION THERAPY: ICD-10-CM

## 2025-02-26 DIAGNOSIS — Z12.31 BREAST CANCER SCREENING BY MAMMOGRAM: ICD-10-CM

## 2025-02-26 DIAGNOSIS — Z17.0 MALIGNANT NEOPLASM OF CENTRAL PORTION OF LEFT BREAST IN FEMALE, ESTROGEN RECEPTOR POSITIVE (HCC): Primary | ICD-10-CM

## 2025-02-26 DIAGNOSIS — Z86.000 HISTORY OF DUCTAL CARCINOMA IN SITU (DCIS) OF BREAST: ICD-10-CM

## 2025-02-26 DIAGNOSIS — Z91.89 AT HIGH RISK FOR BREAST CANCER: ICD-10-CM

## 2025-02-26 DIAGNOSIS — Z79.811 USE OF ANASTROZOLE: ICD-10-CM

## 2025-02-26 DIAGNOSIS — Z15.89 MONOALLELIC MUTATION OF ATM GENE: ICD-10-CM

## 2025-02-26 PROCEDURE — G2211 COMPLEX E/M VISIT ADD ON: HCPCS

## 2025-02-26 PROCEDURE — 99215 OFFICE O/P EST HI 40 MIN: CPT

## 2025-02-26 NOTE — PROGRESS NOTES
Name: Ghada Ferreira      : 1955      MRN: [id number]  Encounter Provider: LUIS Middleton  Encounter Date: 2025   Encounter department: [facility]  :  Assessment & Plan  Malignant neoplasm of central portion of left breast in female, estrogen receptor positive (HCC)  Ms. Ghada Ferreira is a 69 y.o. female YOLANDA PV carrier, presenting today for 1 year follow up of right and left breast cancer, diagnosed in  and 2019 respectively. In 2018, she underwent right lumpectomy on 2018 with Dr. Valdez. 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 She completed radiation therapy but no endocrine therapy was recommended. In , she underwent left lumpectomy with SLNB with Dr. Valdez. Surgical pathology demonstrated invasive carcinoma, ER+, NJ negative, HER2 negative. She completed left breast radiation and continues on extended endocrine therapy with Anastrozole. Her last bilateral diagnostic mammogram was on 2024, which demonstrated BI-RADS 2, category 3 density (Heterogeneously dense). Breast MRI was completed 2025, demonstrating BIRADS 4 d/t focal non mass enhancement at 12:00 in the left breast, recommend MRI guided biopsy. Additionally, adenopathy was noted bilaterally, but more prominent in the right, recommendation for f/u bilateral US with possible LN biopsy. On 2025, bilateral axillary US was completed with negative findings and no suspicious adenopathy. Pt reported recent COVID/flu vaccines. For the non-mass enhancement in the left breast, pt presented for MRI guided biopsy however the finding did not persist and biopsy was cancelled. Of note, Ms. Ferreira does have a personal hx of ovarian cancer, diagnosed in . She was treated with hysterectomy/BSO as well as chemotherapy. Additionally, I have referred her to PT for Strength ABC program given her tightness to axilla bilaterally and  dense breast tissue likely due to radiation therapy. For her tenderness bilaterally with palpation, I recommended warm compress / massages. There were no concerning findings upon clinical breast exam (CBE) today. Density appreciated bilaterally, suspected due to post treatment changes. I will see the patient back in 1 year or sooner should the need arise. She was instructed to call with any questions or concerns prior to this time. All questions were answered today. .  Orders:  •  Ambulatory referral to Physical Therapy; Future    Monoallelic mutation of YOLANDA gene  Genetic testing was completed in 2018, which demonstrated an YOLANDA PV as well as 1 VUS in RAD51C. Clinvar was referenced today for RAD51C classification status - [facility] lab classifies this as likely benign. I relayed this to Ms. Ferreira however we will continue to monitor for official reclassification. I briefly reviewed recommendations for YOLANDA PV carriers, including pancreatic cancer screening. Pt does have a hx of Crohn's disease, which she would prefer to discuss possible screening with her provider first prior to referral to advanced endoscopy. She has no family hx of pancreatic cancer. I have provided her with [employer] website as a resource for patients with hereditary cancer syndromes. I encouraged her to inform family members to test for their own risks as well as possible reproductive implications.       Use of anastrozole  Continue use per medical oncology       At high risk for breast cancer  Orders:  •  MRI breast bilateral w and wo contrast w cad; Future    Breast cancer screening by mammogram  Orders:  •  Mammo screening bilateral w 3d and cad; Future    S/P radiation therapy  Given her personal hx of chemotherapy, left sided radiation, and family hx of cardiovascular disease, I have recommended consult with Cardio Oncology. Referral placed.   Orders:  •  Ambulatory referral to Cardiology; Future  •  Ambulatory referral to Physical Therapy;  Future    Family history of cardiovascular disease  Orders:  •  Ambulatory referral to Cardiology; Future    History of ductal carcinoma in situ (DCIS) of breast       History of Present Illness   Ghada Ferreira is a 69 y.o. year old female who presents for 1 year follow up of right and left breast cancer, diagnosed in 2018 and 2019 respectively. She continues on Anastrozole, which she reports she will stay on due to proven benefit to extended therapy. Her last bilateral diagnostic mammogram was on 06/20/2024, which demonstrated BI-RADS 2, category 3 density (Heterogeneously dense). Breast MRI was completed 01/22/2025, demonstrating BIRADS 4 d/t focal non mass enhancement at 12:00 in the left breast, recommend MRI guided biopsy. Additionally, adenopathy was noted bilaterally, but more prominent in the right, recommendation for f/u bilateral US with possible LN biopsy. On 01/27/2025, bilateral axillary US was completed with negative findings and no suspicious adenopathy. Pt reported recent COVID/flu vaccines. For the non-mass enhancement in the left breast, pt presented for MRI guided biopsy however the finding did not persist and biopsy was cancelled. She reports PMH of Crohn's disease, in which she would like to consult her GI provider regarding pancreatic cancer screening in light of YOLANDA PV carrier status. Ms. Ferreira reports breast tenderness bilaterally with pressure, specifically when her cats lay on her. She has no pain at rest. She notes tightness to axilla bilaterally with limited ROM in her arms. She completes physical therapy for her leg however has never seen PT for her hx of breast cancer. She does not have any s/s of upper extremity lymphedema. She denies breast changes, persistent cough, SOB, headaches, as well as any new or persistent back, bone, or abdominal pain.      Oncology History   Cancer Staging   History of ductal carcinoma in situ (DCIS) of breast  Staging form: Breast, AJCC 8th Edition  -  Pathologic stage from 8/1/2018: Stage 0 (pTis (DCIS), pN0, cM0, ER-, ME-, HER2: Not Assessed) - Signed by LUIS Valladares on 1/29/2024  Stage prefix: Initial diagnosis  Neoadjuvant therapy: No  Method of lymph node assessment: Clinical  Nuclear grade: G2  Multigene prognostic tests performed: None  Laterality: Right  Tumor size (mm): 14    History of ovarian cancer  Staging form: Ovary, AJCC 7th Edition  - Clinical: FIGO Stage IA (T1a, N0, M0) - Signed by Pushpa Odom PA-C on 4/19/2018  Histologic grade (G): G3    Malignant neoplasm of central portion of left breast in female, estrogen receptor positive (HCC)  Staging form: Breast, AJCC 8th Edition  - Pathologic stage from 8/1/2018: Stage IA (pT1b, pN0(sn), cM0, G1, ER+, ME-, HER2-) - Signed by LUIS Valladares on 1/29/2024  Stage prefix: Initial diagnosis  Neoadjuvant therapy: No  Method of lymph node assessment: [place] lymph node biopsy  Multigene prognostic tests performed: None  Histologic grading system: 3 grade system  Laterality: Left  Tumor size (mm): 3  Oncology History   History of ovarian cancer    Initial Diagnosis    Ovarian cancer (HCC)     4/21/2009 Surgery    Exploratory laparotomy, total abdominal hysterectomy, bilateral salpingo-oophrectomy, lymph node dissection, omentectomy with staging      - 7/8/2009 Chemotherapy    Intraperitoneal along with intravenous chemotherapy on a early ovarian cancer protocol.     5/31/2018 Genetic Testing    Genetic testing results are POSITIVE for a pathogenic variant: YOLANDA c.5290delC      Genetic testing indicated that the patient carries a Variant of Uncertain Significance (VUS) : Rad51C c.252G>T      Hormone Therapy       History of ductal carcinoma in situ (DCIS) of breast   5/31/2018 Genetic Testing    Genetic testing results are POSITIVE for a pathogenic variant: YOLANDA c.5290delC      Genetic testing indicated that the patient carries a Variant of Uncertain Significance (VUS) :  Rad51C c.252G>T     8/1/2018 Biopsy    Right breast biopsy  11 o'clock position 5 cmfn  DCIS  Grade 2  Confirmed by Martin Luna MD  ER 0  VT 0     8/1/2018 -  Cancer Staged    Staging form: Breast, AJCC 8th Edition  - Pathologic stage from 8/1/2018: Stage 0 (pTis (DCIS), pN0, cM0, ER-, VT-, HER2: Not Assessed) - Signed by LUIS Valladares on 1/29/2024  Stage prefix: Initial diagnosis  Neoadjuvant therapy: No  Method of lymph node assessment: Clinical  Nuclear grade: G2  Multigene prognostic tests performed: None  Laterality: Right  Tumor size (mm): 14       9/11/2018 Surgery    Right lumpectomy  DCIS  Grade 2  1.4 cm  Margins negative  Stage 0     10/16/2018 -  Hormone Therapy    Consult with Dr. Alcantar  No adjuvant systemic therapy recommended     10/29/2018 - 11/28/2018 Radiation    Course: C1    Plan ID Energy Fractions Dose per Fraction (cGy) Dose Correction (cGy) Total Dose Delivered (cGy) Elapsed Days   R Breast 6X 16 / 16 266 0 4,256 22   R Breast e 12E 5 / 5 200 0 1,000 7      Treatment dates:  C1: 10/29/2018 - 11/28/2018       Malignant neoplasm of central portion of left breast in female, estrogen receptor positive (HCC)   8/1/2018 -  Cancer Staged    Staging form: Breast, AJCC 8th Edition  - Pathologic stage from 8/1/2018: Stage IA (pT1b, pN0(sn), cM0, G1, ER+, VT-, HER2-) - Signed by LUIS Valladares on 1/29/2024  Stage prefix: Initial diagnosis  Neoadjuvant therapy: No  Method of lymph node assessment: [place] lymph node biopsy  Multigene prognostic tests performed: None  Histologic grading system: 3 grade system  Laterality: Left  Tumor size (mm): 3       7/18/2019 Biopsy    Left breast MRI-guided biopsy  3 o'clock, posterior depth  Invasive breast carcinoma of no special type  Grade 1  ER 90  VT 0  HER2 1+     8/13/2019 Surgery    Left breast needle localized lumpectomy with sentinel lymph node biopsy  Invasive mammary carcinoma of no special type  Grade 1  3 mm  Margins  "negative  0/1 Lymph node  Anatomic/Prognostic Stage IA     10/8/2019 - 11/5/2019 Radiation      Treatment:  Course: C2  Plan ID Energy Fractions Dose per Fraction (cGy) Dose Correction (cGy) Total Dose Delivered (cGy) Elapsed Days   BH L Breast 6X 16 / 16 266 0 4,256 21   BH L Brst e 12E 5 / 5 200 0 1,000 6    C2: 10/8/2019 - 11/5/2019 11/2019 -  Hormone Therapy    Anastrozole        Review of Systems   Constitutional:  Negative for activity change, appetite change, fatigue, fever and unexpected weight change.   Respiratory:  Negative for cough and shortness of breath.    Cardiovascular:  Positive for leg swelling (chronic lymphedema LLE).   Gastrointestinal:  Negative for abdominal pain.   Musculoskeletal:  Positive for arthralgias (Limited ROM with overhead movement / axillary tightness). Negative for back pain.   Skin: Negative.    Neurological: Negative.    Psychiatric/Behavioral:  Negative for confusion.     A complete review of systems is negative other than that noted above in the HPI.       Objective   /84   Pulse 81   Temp 98.6 °F (37 °C)   Ht 5' 1.52\" (1.563 m)   Wt 59.4 kg (131 lb)   SpO2 98%   BMI 24.33 kg/m²       Physical Exam  Vitals and nursing note reviewed.   Constitutional:       Appearance: Normal appearance. She is normal weight.   Eyes:      General: No scleral icterus.     Conjunctiva/sclera: Conjunctivae normal.   Cardiovascular:      Rate and Rhythm: Normal rate and regular rhythm.   Pulmonary:      Effort: Pulmonary effort is normal.      Breath sounds: Normal breath sounds.   Chest:      Chest wall: No mass.   Breasts:     Right: No swelling, bleeding, inverted nipple, mass, nipple discharge, skin change (surgical scar not visible) or tenderness.      Left: Skin change (surgical scar) present. No swelling, bleeding, inverted nipple, mass, nipple discharge or tenderness.       Musculoskeletal:         General: No swelling.      Right lower leg: No edema.      Left lower " leg: Edema present.      Comments: Slightly limited ROM in BUE, secondary to breast cancer treatment   Lymphadenopathy:      Upper Body:      Right upper body: No supraclavicular or axillary adenopathy.      Left upper body: No supraclavicular or axillary adenopathy.   Skin:     General: Skin is warm and dry.   Neurological:      General: No focal deficit present.      Mental Status: She is alert and oriented to person, place, and time. Mental status is at baseline.   Psychiatric:         Mood and Affect: Mood normal.         Behavior: Behavior normal.         Thought Content: Thought content normal.         Judgment: Judgment normal.        I have spent a total time of 66 minutes in caring for this patient on the day of the visit/encounter including Diagnostic results, Risks and benefits of tx options, Instructions for management, Patient and family education, Impressions, Counseling / Coordination of care, Documenting in the medical record, Reviewing/placing orders in the medical record (including tests, medications, and/or procedures), and Obtaining or reviewing history  .

## 2025-02-26 NOTE — ASSESSMENT & PLAN NOTE
Ms. Ghada Ferreira is a 69 y.o. female YOLANDA PV carrier, presenting today for 1 year follow up of right and left breast cancer, diagnosed in 2018 and 2019 respectively. In 2018, she underwent right lumpectomy on 09/11/2018 with Dr. Valdez. 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 She completed radiation therapy but no endocrine therapy was recommended. In 2019, she underwent left lumpectomy with SLNB with Dr. Valdez. Surgical pathology demonstrated invasive carcinoma, ER+, PA negative, HER2 negative. She completed left breast radiation and continues on extended endocrine therapy with Anastrozole. Her last bilateral diagnostic mammogram was on 06/20/2024, which demonstrated BI-RADS 2, category 3 density (Heterogeneously dense). Breast MRI was completed 01/22/2025, demonstrating BIRADS 4 d/t focal non mass enhancement at 12:00 in the left breast, recommend MRI guided biopsy. Additionally, adenopathy was noted bilaterally, but more prominent in the right, recommendation for f/u bilateral US with possible LN biopsy. On 01/27/2025, bilateral axillary US was completed with negative findings and no suspicious adenopathy. Pt reported recent COVID/flu vaccines. For the non-mass enhancement in the left breast, pt presented for MRI guided biopsy however the finding did not persist and biopsy was cancelled. Of note, Ms. Ferreira does have a personal hx of ovarian cancer, diagnosed in 2009. She was treated with hysterectomy/BSO as well as chemotherapy. Additionally, I have referred her to PT for Strength ABC program given her tightness to axilla bilaterally and dense breast tissue likely due to radiation therapy. For her tenderness bilaterally with palpation, I recommended warm compress / massages. There were no concerning findings upon clinical breast exam (CBE) today. Density appreciated bilaterally, suspected due to post treatment changes. I will see the patient back in 1 year or sooner  should the need arise. She was instructed to call with any questions or concerns prior to this time. All questions were answered today. .  Orders:  •  Ambulatory referral to Physical Therapy; Future

## 2025-02-26 NOTE — ASSESSMENT & PLAN NOTE
Genetic testing was completed in 2018, which demonstrated an YOLANDA PV as well as 1 VUS in RAD51C. Clinvar was referenced today for RAD51C classification status - [facility] lab classifies this as likely benign. I relayed this to Ms. Ferreira however we will continue to monitor for official reclassification. I briefly reviewed recommendations for YOLANDA PV carriers, including pancreatic cancer screening. Pt does have a hx of Crohn's disease, which she would prefer to discuss possible screening with her provider first prior to referral to advanced endoscopy. She has no family hx of pancreatic cancer. I have provided her with [employer] website as a resource for patients with hereditary cancer syndromes. I encouraged her to inform family members to test for their own risks as well as possible reproductive implications.

## 2025-02-28 ENCOUNTER — TELEPHONE (OUTPATIENT)
Dept: CARDIOLOGY CLINIC | Facility: CLINIC | Age: 70
End: 2025-02-28

## 2025-02-28 ENCOUNTER — OFFICE VISIT (OUTPATIENT)
Dept: PHYSICAL THERAPY | Facility: CLINIC | Age: 70
End: 2025-02-28
Payer: MEDICARE

## 2025-02-28 DIAGNOSIS — G89.29 CHRONIC PAIN OF RIGHT KNEE: ICD-10-CM

## 2025-02-28 DIAGNOSIS — M25.561 CHRONIC PAIN OF RIGHT KNEE: ICD-10-CM

## 2025-02-28 DIAGNOSIS — M54.50 CHRONIC RIGHT-SIDED LOW BACK PAIN WITHOUT SCIATICA: Primary | ICD-10-CM

## 2025-02-28 DIAGNOSIS — G89.29 CHRONIC RIGHT-SIDED LOW BACK PAIN WITHOUT SCIATICA: Primary | ICD-10-CM

## 2025-02-28 PROCEDURE — 97110 THERAPEUTIC EXERCISES: CPT

## 2025-02-28 PROCEDURE — 97112 NEUROMUSCULAR REEDUCATION: CPT

## 2025-02-28 NOTE — PROGRESS NOTES
Daily Note      Today's date: 2025  Patient name: Ghada Ferreira  : 1955  MRN: [id number]  Referring provider: Thao Jones DO  Dx:   Encounter Diagnosis     ICD-10-CM    1. Chronic right-sided low back pain without sciatica  M54.50     G89.29       2. Chronic pain of right knee  M25.561     G89.29           Subjective: Pt reports that she is feeling good overall.        Objective: See treatment diary below    Assessment: Pt tolerated treatment well.  Pt continues to note more tension in the distal HS and limitation in right full knee extension actively. Pt noted no discomfort with left knee extension during quad sets, despite lymphedema in LLE. Pt introduced to rows and shoulder extensions and required verbal and tactile cuing to avoid compensation from upper traps. Consider progressing HEP next visit.     Plan: Continue per plan of care.           Daily Treatment Diary     Precautions: Chronic low back pain, LLE lymphedema, hx of cancer    POC Expires Reeval for Medicare to be completed  Unit Limit Auth Expiration Date PT/OT/STVisit Limit   25 By visit 10 N/A N/A N/A    Completed on visit N/A                   Auth Status DATE 25      NA Visit # 1 2 3       Remaining 9 8 7      MANUAL THERAPY         STM/MFR R knee                                                      THERAPEUTIC EXERCISE HEP         Bike    5' L1       PROM R knee          Heel prop x 1'  2' 2'      Gastroc stretch   3x20s B/L  5x15s B/L       HS stretch   3x20s B/L  5x15s B/L       Hip flexor stretch   3x20s B/L  5x15s B/L                                                                                                 NEUROMUSCULAR REEDUCATION           Quad sets  5x5s w/ towel roll under knee 2x10 (5s)  2x10 (5s)       LAQ  5x5s  2x10 (5s) B/L  2x10 (5s) B/L      Rows    2x10 OTB      Shoulder extensions    2x10 OTB                                                                                                           THERAPEUTIC ACTIVITY                                                  GAIT TRAINING                                                  MODALITIES                             Access Code: [email or web address]  URL: [email or web address]  Date: 02/24/2025  Prepared by: Diaz Moore    Exercises  - Supine Quad Set  - 1 x daily - 7 x weekly - 2 sets - 10 reps - 5 hold  - Supine Knee Extension Stretch on Towel Roll  - 1 x daily - 7 x weekly - 1 sets - 1 reps - 2 min  hold  - Seated Knee Extension  - 1 x daily - 7 x weekly - 2 sets - 10 reps - 5 hold  - Long Sitting Calf Stretch with Strap  - 1 x daily - 7 x weekly - 1 sets - 3 reps - 20 hold  - Hooklying Hamstring Stretch with Strap  - 1 x daily - 7 x weekly - 1 sets - 3 reps - 20 hold  - Supine Quadriceps Stretch with Strap on Table  - 1 x daily - 7 x weekly - 1 sets - 3 reps - 20 hold

## 2025-02-28 NOTE — TELEPHONE ENCOUNTER
LMOM to assist in scheduling an appointment for this patient to establish care in cardio-oncology. Please use referral when patient calls back.

## 2025-03-03 ENCOUNTER — OFFICE VISIT (OUTPATIENT)
Dept: PHYSICAL THERAPY | Facility: CLINIC | Age: 70
End: 2025-03-03
Payer: MEDICARE

## 2025-03-03 DIAGNOSIS — G89.29 CHRONIC RIGHT-SIDED LOW BACK PAIN WITHOUT SCIATICA: Primary | ICD-10-CM

## 2025-03-03 DIAGNOSIS — M54.50 CHRONIC RIGHT-SIDED LOW BACK PAIN WITHOUT SCIATICA: Primary | ICD-10-CM

## 2025-03-03 DIAGNOSIS — M25.561 CHRONIC PAIN OF RIGHT KNEE: ICD-10-CM

## 2025-03-03 DIAGNOSIS — G89.29 CHRONIC PAIN OF RIGHT KNEE: ICD-10-CM

## 2025-03-03 PROCEDURE — 97110 THERAPEUTIC EXERCISES: CPT

## 2025-03-03 PROCEDURE — 97112 NEUROMUSCULAR REEDUCATION: CPT

## 2025-03-03 NOTE — PROGRESS NOTES
Daily Note      Today's date: 3/3/2025  Patient name: Ghada Ferreira  : 1955  MRN: [id number]  Referring provider: Thao Jones DO  Dx:   Encounter Diagnosis     ICD-10-CM    1. Chronic right-sided low back pain without sciatica  M54.50     G89.29       2. Chronic pain of right knee  M25.561     G89.29           Subjective: Pt reports that after Friday's session, she went up a few stairs at home and had less difficulty. Pt states that her back also felt better after last session. Pt states since yesterday, her right knee feels more achy.      Objective: See treatment diary below    Assessment: Pt tolerated treatment well.  Reviewed HEP instructions for gastroc stretch as pt had a question about positioning. Educated pt that she may be long sitting or supine during gastroc stretch, but should be hooklying for HS stretch.  Pt noted stretching in right quad and iliopsoas with hip flexor stretching this visit. Pt introduced to leg press and instructed to avoid extension thrust at the knee. Pt demonstrates good postural correction with periscapular strengthening, but does present with thoracic kyphosis that appears to be structural.     Plan: Continue per plan of care.         Daily Treatment Diary     Precautions: Chronic low back pain, LLE lymphedema, hx of cancer    POC Expires Reeval for Medicare to be completed  Unit Limit Auth Expiration Date PT/OT/STVisit Limit   25 By visit 10 N/A N/A N/A    Completed on visit N/A                   Auth Status DATE 2/5/25 2/24/25 2/28/25 3/3/25     NA Visit # 1 2 3 4      Remaining 9 8 7 6     MANUAL THERAPY         STM/MFR R knee         Joint mobs R knee                                              THERAPEUTIC EXERCISE HEP         Bike    5' L1  5' L1      PROM R knee          Heel prop x 1'  2' 2'      Gastroc stretch   3x20s B/L  5x15s B/L  5x15s B/L      HS stretch   3x20s B/L  5x15s B/L  5x15s B/L      Hip flexor stretch   3x20s B/L  5x15s B/L  5x15s B/L       Leg press     3x10 50#                                                                                     NEUROMUSCULAR REEDUCATION           Quad sets  5x5s w/ towel roll under knee 2x10 (5s)  2x10 (5s)  2x10 (5s) B/L      LAQ  5x5s  2x10 (5s) B/L  2x10 (5s) B/L 2x10 (5s) B/L      SLR     2x6 B/L      Rows    2x10 OTB 2x10 OTB     Shoulder extensions    2x10 OTB 2x10 OTB                                                                                                          THERAPEUTIC ACTIVITY                                                  GAIT TRAINING                                                  MODALITIES                             Access Code: [email or web address]  URL: [email or web address]  Date: 02/24/2025  Prepared by: Diaz Moore    Exercises  - Supine Quad Set  - 1 x daily - 7 x weekly - 2 sets - 10 reps - 5 hold  - Supine Knee Extension Stretch on Towel Roll  - 1 x daily - 7 x weekly - 1 sets - 1 reps - 2 min  hold  - Seated Knee Extension  - 1 x daily - 7 x weekly - 2 sets - 10 reps - 5 hold  - Long Sitting Calf Stretch with Strap  - 1 x daily - 7 x weekly - 1 sets - 3 reps - 20 hold  - Hooklying Hamstring Stretch with Strap  - 1 x daily - 7 x weekly - 1 sets - 3 reps - 20 hold  - Supine Quadriceps Stretch with Strap on Table  - 1 x daily - 7 x weekly - 1 sets - 3 reps - 20 hold

## 2025-03-04 ENCOUNTER — TELEPHONE (OUTPATIENT)
Dept: CARDIOLOGY CLINIC | Facility: CLINIC | Age: 70
End: 2025-03-04

## 2025-03-04 NOTE — TELEPHONE ENCOUNTER
Spoke with patient, she is driving and will call back this afternoon to schedule a consult. Please use referral when scheduling.

## 2025-03-05 ENCOUNTER — APPOINTMENT (OUTPATIENT)
Dept: PHYSICAL THERAPY | Facility: CLINIC | Age: 70
End: 2025-03-05
Payer: MEDICARE

## 2025-03-05 ENCOUNTER — OFFICE VISIT (OUTPATIENT)
Dept: PHYSICAL THERAPY | Facility: CLINIC | Age: 70
End: 2025-03-05
Payer: MEDICARE

## 2025-03-05 DIAGNOSIS — G89.29 CHRONIC RIGHT-SIDED LOW BACK PAIN WITHOUT SCIATICA: Primary | ICD-10-CM

## 2025-03-05 DIAGNOSIS — M54.50 CHRONIC RIGHT-SIDED LOW BACK PAIN WITHOUT SCIATICA: Primary | ICD-10-CM

## 2025-03-05 DIAGNOSIS — M25.561 CHRONIC PAIN OF RIGHT KNEE: ICD-10-CM

## 2025-03-05 DIAGNOSIS — G89.29 CHRONIC PAIN OF RIGHT KNEE: ICD-10-CM

## 2025-03-05 PROCEDURE — 97110 THERAPEUTIC EXERCISES: CPT

## 2025-03-05 PROCEDURE — 97112 NEUROMUSCULAR REEDUCATION: CPT

## 2025-03-05 NOTE — PROGRESS NOTES
Daily Note      Today's date: 3/5/2025  Patient name: Ghada Ferreira  : 1955  MRN: [id number]  Referring provider: Thao Jones DO  Dx:   Encounter Diagnosis     ICD-10-CM    1. Chronic right-sided low back pain without sciatica  M54.50     G89.29       2. Chronic pain of right knee  M25.561     G89.29           Subjective: Pt reports that she has soreness in the back of both thighs yesterday, but today it is gone.        Objective: See treatment diary below    Assessment: Pt tolerated treatment well.  Pt continues to note more difficulty with L SLR compared to R due to lymphedema and increased weight in the left leg. Pt introduced to hip abduction and extension while standing and provided with verbal cuing to decrease trunk compensation. Pt noted more difficulty with L hip abduction due to limited stability while standing on RLE and mild increase in knee pain.  Pt advised not to add hip abduction or extension to HEP at this time in order to assess response to this in the clinic.     Plan: Continue per plan of care.         Daily Treatment Diary     Precautions: Chronic low back pain, LLE lymphedema, hx of cancer    POC Expires Reeval for Medicare to be completed  Unit Limit Auth Expiration Date PT/OT/STVisit Limit   25 By visit 10 N/A N/A N/A    Completed on visit N/A                   Auth Status DATE 2/5/25 2/24/25 2/28/25 3/3/25 3/5/25    NA Visit # 1 2 3 4 5     Remaining 9 8 7 6 5    MANUAL THERAPY         STM/MFR R knee         Joint mobs R knee                                              THERAPEUTIC EXERCISE HEP         Bike    5' L1  5' L1  5' L1     PROM R knee          Heel prop x 1'  2' 2'      Gastroc stretch   3x20s B/L  5x15s B/L  5x15s B/L  3x20s B/L     HS stretch   3x20s B/L  5x15s B/L  5x15s B/L  3x20s B/L     Hip flexor stretch   3x20s B/L  5x15s B/L  5x15s B/L  3x20s B/L     Leg press     3x10 50#     Sit-to-stand                                                                                 NEUROMUSCULAR REEDUCATION           Quad sets  5x5s w/ towel roll under knee 2x10 (5s)  2x10 (5s)  2x10 (5s) B/L  2x10 (5s) B/L    LAQ  5x5s  2x10 (5s) B/L  2x10 (5s) B/L 2x10 (5s) B/L  2x10 (5s) B/L     SLR     2x6 B/L  2x8 B/L     Rows    2x10 OTB 2x10 OTB 2x10 OTB    Shoulder extensions    2x10 OTB 2x10 OTB  2x10 OTB    Standing hip abd/ext      10x ea B/L                                                                                               THERAPEUTIC ACTIVITY                                                  GAIT TRAINING                                                  MODALITIES                             Access Code: [email or web address]  URL: [email or web address]  Date: 02/24/2025  Prepared by: Diaz Moore    Exercises  - Supine Quad Set  - 1 x daily - 7 x weekly - 2 sets - 10 reps - 5 hold  - Supine Knee Extension Stretch on Towel Roll  - 1 x daily - 7 x weekly - 1 sets - 1 reps - 2 min  hold  - Seated Knee Extension  - 1 x daily - 7 x weekly - 2 sets - 10 reps - 5 hold  - Long Sitting Calf Stretch with Strap  - 1 x daily - 7 x weekly - 1 sets - 3 reps - 20 hold  - Hooklying Hamstring Stretch with Strap  - 1 x daily - 7 x weekly - 1 sets - 3 reps - 20 hold  - Supine Quadriceps Stretch with Strap on Table  - 1 x daily - 7 x weekly - 1 sets - 3 reps - 20 hold

## 2025-03-07 ENCOUNTER — HOSPITAL ENCOUNTER (OUTPATIENT)
Dept: INFUSION CENTER | Facility: HOSPITAL | Age: 70
End: 2025-03-07
Attending: INTERNAL MEDICINE
Payer: MEDICARE

## 2025-03-07 VITALS
RESPIRATION RATE: 16 BRPM | DIASTOLIC BLOOD PRESSURE: 65 MMHG | OXYGEN SATURATION: 99 % | WEIGHT: 134.48 LBS | TEMPERATURE: 97.4 F | BODY MASS INDEX: 24.98 KG/M2 | HEART RATE: 92 BPM | SYSTOLIC BLOOD PRESSURE: 142 MMHG

## 2025-03-07 DIAGNOSIS — K50.10 CROHN'S DISEASE OF LARGE INTESTINE WITHOUT COMPLICATION (HCC): Primary | ICD-10-CM

## 2025-03-07 PROCEDURE — 96413 CHEMO IV INFUSION 1 HR: CPT

## 2025-03-07 PROCEDURE — 96415 CHEMO IV INFUSION ADDL HR: CPT

## 2025-03-07 RX ORDER — SODIUM CHLORIDE 9 MG/ML
20 INJECTION, SOLUTION INTRAVENOUS ONCE
Status: COMPLETED | OUTPATIENT
Start: 2025-03-07 | End: 2025-03-07

## 2025-03-07 RX ORDER — SODIUM CHLORIDE 9 MG/ML
20 INJECTION, SOLUTION INTRAVENOUS ONCE
OUTPATIENT
Start: 2025-05-02

## 2025-03-07 RX ADMIN — INFLIXIMAB 300 MG: 100 INJECTION, POWDER, LYOPHILIZED, FOR SOLUTION INTRAVENOUS at 10:03

## 2025-03-07 RX ADMIN — SODIUM CHLORIDE 20 ML/HR: 9 INJECTION, SOLUTION INTRAVENOUS at 09:24

## 2025-03-07 NOTE — PROGRESS NOTES
Ghada Ferreira  tolerated treatment well with no complications.      Ghada Ferreira is aware of future appt on 5/2 at 0900.     AVS printed and given to Ghada Ferreira:    No (Declined by Ghada Ferreira)

## 2025-03-07 NOTE — PROGRESS NOTES
"Pt arrived for infusion. VSS. Pt states she \"took tylenol and benedryl at home\"   PIV placed. WCM.  "

## 2025-03-10 ENCOUNTER — OFFICE VISIT (OUTPATIENT)
Dept: PHYSICAL THERAPY | Facility: CLINIC | Age: 70
End: 2025-03-10
Payer: MEDICARE

## 2025-03-10 DIAGNOSIS — M54.50 CHRONIC RIGHT-SIDED LOW BACK PAIN WITHOUT SCIATICA: Primary | ICD-10-CM

## 2025-03-10 DIAGNOSIS — G89.29 CHRONIC RIGHT-SIDED LOW BACK PAIN WITHOUT SCIATICA: Primary | ICD-10-CM

## 2025-03-10 DIAGNOSIS — G89.29 CHRONIC PAIN OF RIGHT KNEE: ICD-10-CM

## 2025-03-10 DIAGNOSIS — M25.561 CHRONIC PAIN OF RIGHT KNEE: ICD-10-CM

## 2025-03-10 PROCEDURE — 97112 NEUROMUSCULAR REEDUCATION: CPT

## 2025-03-10 PROCEDURE — 97110 THERAPEUTIC EXERCISES: CPT

## 2025-03-10 NOTE — PROGRESS NOTES
Daily Note      Today's date: 3/10/2025  Patient name: Ghada Ferreira  : 1955  MRN: [id number]  Referring provider: Thao Jones DO  Dx:   Encounter Diagnosis     ICD-10-CM    1. Chronic right-sided low back pain without sciatica  M54.50     G89.29       2. Chronic pain of right knee  M25.561     G89.29           Subjective: Pt reports that her right knee feels a little more sore today. Pt states that she had many dogs and cats to visit this past weekend. Pt states that she has been more conscious of her posture and finds she is able to turn her head more when correcting it.        Objective: See treatment diary below    Assessment: Pt tolerated treatment well.  Pt progressed in resistance with active warm-up and noted more fatigue at 3 min, 30 sec compared to previously. Pt demonstrates improved HS flexibility bilaterally.  Pt introduced to SAQ and noted fatigue toward the end of the exercise. Pt requests to learn more self-management techniques for low back. Plan to incorporate more exercises addressing lumbar next visit.     Plan: Continue per plan of care.         Daily Treatment Diary     Precautions: Chronic low back pain, LLE lymphedema, hx of cancer    POC Expires Reeval for Medicare to be completed  Unit Limit Auth Expiration Date PT/OT/STVisit Limit   25 By visit 10 N/A N/A N/A    Completed on visit N/A                   Auth Status DATE 3/3/25 3/5/25 3/10/25      NA Visit # 4 5 6       Remaining 6 5 4      MANUAL THERAPY         STM/MFR R knee         Joint mobs R knee                                              THERAPEUTIC EXERCISE HEP         Bike  5' L1  5' L1  5' L2      PROM R knee          Heel prop x         Gastroc stretch  5x15s B/L  3x20s B/L  Standing 3x20s B/L       HS stretch  5x15s B/L  3x20s B/L  3x30s B/L       Hip flexor stretch  5x15s B/L  3x20s B/L  3x30s B/L       Leg press  3x10 50#  3x10 50#      Sit-to-stand          Prone on elbows    -->                                                                   NEUROMUSCULAR REEDUCATION           Quad sets  2x10 (5s) B/L  2x10 (5s) B/L 10x5s B/L       SAQ    2x10 (5s) B/L       LAQ  2x10 (5s) B/L  2x10 (5s) B/L  2x10 (5s) B/L       SLR  2x6 B/L  2x8 B/L        Bridging    -->      Rows  2x10 OTB 2x10 OTB       Shoulder extensions  2x10 OTB  2x10 OTB       Standing hip abd/ext   10x ea B/L                                                                                                  THERAPEUTIC ACTIVITY                                                  GAIT TRAINING                                                  MODALITIES                             Access Code: [email or web address]  URL: [email or web address]  Date: 02/24/2025  Prepared by: Diaz Moore    Exercises  - Supine Quad Set  - 1 x daily - 7 x weekly - 2 sets - 10 reps - 5 hold  - Supine Knee Extension Stretch on Towel Roll  - 1 x daily - 7 x weekly - 1 sets - 1 reps - 2 min  hold  - Seated Knee Extension  - 1 x daily - 7 x weekly - 2 sets - 10 reps - 5 hold  - Long Sitting Calf Stretch with Strap  - 1 x daily - 7 x weekly - 1 sets - 3 reps - 20 hold  - Hooklying Hamstring Stretch with Strap  - 1 x daily - 7 x weekly - 1 sets - 3 reps - 20 hold  - Supine Quadriceps Stretch with Strap on Table  - 1 x daily - 7 x weekly - 1 sets - 3 reps - 20 hold

## 2025-03-12 ENCOUNTER — OFFICE VISIT (OUTPATIENT)
Dept: PHYSICAL THERAPY | Facility: CLINIC | Age: 70
End: 2025-03-12
Payer: MEDICARE

## 2025-03-12 DIAGNOSIS — M25.561 CHRONIC PAIN OF RIGHT KNEE: ICD-10-CM

## 2025-03-12 DIAGNOSIS — G89.29 CHRONIC PAIN OF RIGHT KNEE: ICD-10-CM

## 2025-03-12 DIAGNOSIS — G89.29 CHRONIC RIGHT-SIDED LOW BACK PAIN WITHOUT SCIATICA: Primary | ICD-10-CM

## 2025-03-12 DIAGNOSIS — M54.50 CHRONIC RIGHT-SIDED LOW BACK PAIN WITHOUT SCIATICA: Primary | ICD-10-CM

## 2025-03-12 PROCEDURE — 97110 THERAPEUTIC EXERCISES: CPT

## 2025-03-12 PROCEDURE — 97112 NEUROMUSCULAR REEDUCATION: CPT

## 2025-03-12 NOTE — PROGRESS NOTES
Daily Note      Today's date: 3/12/2025  Patient name: Ghada Ferreira  : 1955  MRN: [id number]  Referring provider: Thao Jones DO  Dx:   Encounter Diagnosis     ICD-10-CM    1. Chronic right-sided low back pain without sciatica  M54.50     G89.29       2. Chronic pain of right knee  M25.561     G89.29           Subjective: Pt reports increased medial/lateral right knee pain yesterday, which she attributes to one of her exercises last session (describes standing gastrocs stretch). Pt states that it is feeling better now.        Objective: See treatment diary below    Assessment: Pt tolerated treatment well.  Pt introduced to prone on elbows and noted no change in symptoms, although had no baseline symptoms today. Pt attempted press ups, but noted limitation in spinal motion and arm strength. This was modified by performing JESSICA and pt noted improved mobility afterward. Pt was provided with theraband for periscapular strengthening, as she has noticed this has helped her posture and back symptoms.     Plan: Continue per plan of care.         Daily Treatment Diary     Precautions: Chronic low back pain, LLE lymphedema, hx of cancer    POC Expires Reeval for Medicare to be completed  Unit Limit Auth Expiration Date PT/OT/STVisit Limit   25 By visit 10 N/A N/A N/A    Completed on visit N/A                   Auth Status DATE 3/3/25 3/5/25 3/10/25 3/12/25     NA Visit # 4 5 6 7      Remaining 6 5 4 3     MANUAL THERAPY         STM/MFR R knee         Joint mobs R knee                                              THERAPEUTIC EXERCISE HEP         Bike  5' L1  5' L1  5' L2 5' L2      PROM R knee          Heel prop x         Gastroc stretch  5x15s B/L  3x20s B/L  Standing 3x20s B/L  Slantboard 3x30s B/L      HS stretch  5x15s B/L  3x20s B/L  3x30s B/L       Hip flexor stretch  5x15s B/L  3x20s B/L  3x30s B/L       Leg press  3x10 50#  3x10 50#      Sit-to-stand          Prone on elbows    --> 3'      REIL      Attempted *arm strength limitation     JESSICA     10x *better                                             NEUROMUSCULAR REEDUCATION           Quad sets  2x10 (5s) B/L  2x10 (5s) B/L 10x5s B/L       SAQ    2x10 (5s) B/L       LAQ  2x10 (5s) B/L  2x10 (5s) B/L  2x10 (5s) B/L  2x10 (5s) B/L      SLR  2x6 B/L  2x8 B/L        Bridging    -->      Rows  2x10 OTB 2x10 OTB  2x10 OTB     Shoulder extensions  2x10 OTB  2x10 OTB  2x10 OTB     Standing hip abd/ext   10x ea B/L                                                                                                  THERAPEUTIC ACTIVITY                                                  GAIT TRAINING                                                  MODALITIES                             Access Code: [email or web address]  URL: [email or web address]  Date: 02/24/2025  Prepared by: Diaz Moore    Exercises  - Supine Quad Set  - 1 x daily - 7 x weekly - 2 sets - 10 reps - 5 hold  - Supine Knee Extension Stretch on Towel Roll  - 1 x daily - 7 x weekly - 1 sets - 1 reps - 2 min  hold  - Seated Knee Extension  - 1 x daily - 7 x weekly - 2 sets - 10 reps - 5 hold  - Long Sitting Calf Stretch with Strap  - 1 x daily - 7 x weekly - 1 sets - 3 reps - 20 hold  - Hooklying Hamstring Stretch with Strap  - 1 x daily - 7 x weekly - 1 sets - 3 reps - 20 hold  - Supine Quadriceps Stretch with Strap on Table  - 1 x daily - 7 x weekly - 1 sets - 3 reps - 20 hold

## 2025-03-17 ENCOUNTER — OFFICE VISIT (OUTPATIENT)
Dept: PHYSICAL THERAPY | Facility: CLINIC | Age: 70
End: 2025-03-17
Payer: MEDICARE

## 2025-03-17 DIAGNOSIS — G89.29 CHRONIC RIGHT-SIDED LOW BACK PAIN WITHOUT SCIATICA: Primary | ICD-10-CM

## 2025-03-17 DIAGNOSIS — G89.29 CHRONIC PAIN OF RIGHT KNEE: ICD-10-CM

## 2025-03-17 DIAGNOSIS — M54.50 CHRONIC RIGHT-SIDED LOW BACK PAIN WITHOUT SCIATICA: Primary | ICD-10-CM

## 2025-03-17 DIAGNOSIS — M25.561 CHRONIC PAIN OF RIGHT KNEE: ICD-10-CM

## 2025-03-17 PROCEDURE — 97110 THERAPEUTIC EXERCISES: CPT

## 2025-03-17 PROCEDURE — 97112 NEUROMUSCULAR REEDUCATION: CPT

## 2025-03-17 NOTE — PROGRESS NOTES
Daily Note      Today's date: 3/17/2025  Patient name: Ghada Ferreira  : 1955  MRN: [id number]  Referring provider: Thao Jones DO  Dx:   Encounter Diagnosis     ICD-10-CM    1. Chronic right-sided low back pain without sciatica  M54.50     G89.29       2. Chronic pain of right knee  M25.561     G89.29           Subjective: Pt reports that the inner side of her right knee hurts a little today. Pt states that several years ago, she received a cortisone injection in this spot, which did help.        Objective: See treatment diary below    Assessment: Pt tolerated treatment well.  Pt noted cramping around the left midfoot when first attaining hooklying position, which improved with duration. Pt noted mild increase in medial right knee pain while performing standing hip abduction. Pt noted no increase in knee pain with standing hip extension. Pt requested an updated HEP print out, which will be provided next session.     Plan: Continue per plan of care.         Daily Treatment Diary     Precautions: Chronic low back pain, LLE lymphedema, hx of cancer    POC Expires Reeval for Medicare to be completed  Unit Limit Auth Expiration Date PT/OT/STVisit Limit   25 By visit 10 N/A N/A N/A    Completed on visit N/A                   Auth Status DATE 3/3/25 3/5/25 3/10/25 3/12/25 3/17/25    NA Visit # 4 5 6 7 8     Remaining 6 5 4 3 2    MANUAL THERAPY         STM/MFR R knee         Joint mobs R knee                                              THERAPEUTIC EXERCISE HEP         Bike  5' L1  5' L1  5' L2 5' L2  5' L2    PROM R knee          Heel prop x         Gastroc stretch  5x15s B/L  3x20s B/L  Standing 3x20s B/L  Slantboard 3x30s B/L  W/ SOS 3x30s B/L     HS stretch  5x15s B/L  3x20s B/L  3x30s B/L   3x30s B/L     Hip flexor stretch  5x15s B/L  3x20s B/L  3x30s B/L       Leg press  3x10 50#  3x10 50#  3x10 50# seat 4    Sit-to-stand          Prone on elbows    --> 3'      REIL     Attempted *arm strength  limitation D/C    JESSICA     10x *better 10x                                             NEUROMUSCULAR REEDUCATION           Quad sets  2x10 (5s) B/L  2x10 (5s) B/L 10x5s B/L       SAQ    2x10 (5s) B/L       LAQ  2x10 (5s) B/L  2x10 (5s) B/L  2x10 (5s) B/L  2x10 (5s) B/L  2x10 (5s) B/L     SLR  2x6 B/L  2x8 B/L    2x10 B/L     Bridging    -->  2x10     Rows  2x10 OTB 2x10 OTB  2x10 OTB     Shoulder extensions  2x10 OTB  2x10 OTB  2x10 OTB     Standing hip abd/ext   10x ea B/L   2x10 ea B/L 2x10 ea B/L                                                                                              THERAPEUTIC ACTIVITY                                                  GAIT TRAINING                                                  MODALITIES                             Access Code: [email or web address]  URL: [email or web address]  Date: 02/24/2025  Prepared by: Diaz Moore    Exercises  - Supine Quad Set  - 1 x daily - 7 x weekly - 2 sets - 10 reps - 5 hold  - Supine Knee Extension Stretch on Towel Roll  - 1 x daily - 7 x weekly - 1 sets - 1 reps - 2 min  hold  - Seated Knee Extension  - 1 x daily - 7 x weekly - 2 sets - 10 reps - 5 hold  - Long Sitting Calf Stretch with Strap  - 1 x daily - 7 x weekly - 1 sets - 3 reps - 20 hold  - Hooklying Hamstring Stretch with Strap  - 1 x daily - 7 x weekly - 1 sets - 3 reps - 20 hold  - Supine Quadriceps Stretch with Strap on Table  - 1 x daily - 7 x weekly - 1 sets - 3 reps - 20 hold

## 2025-03-19 ENCOUNTER — OFFICE VISIT (OUTPATIENT)
Dept: PHYSICAL THERAPY | Facility: CLINIC | Age: 70
End: 2025-03-19
Payer: MEDICARE

## 2025-03-19 DIAGNOSIS — G89.29 CHRONIC RIGHT-SIDED LOW BACK PAIN WITHOUT SCIATICA: Primary | ICD-10-CM

## 2025-03-19 DIAGNOSIS — M25.561 CHRONIC PAIN OF RIGHT KNEE: ICD-10-CM

## 2025-03-19 DIAGNOSIS — M54.50 CHRONIC RIGHT-SIDED LOW BACK PAIN WITHOUT SCIATICA: Primary | ICD-10-CM

## 2025-03-19 DIAGNOSIS — G89.29 CHRONIC PAIN OF RIGHT KNEE: ICD-10-CM

## 2025-03-19 PROCEDURE — 97112 NEUROMUSCULAR REEDUCATION: CPT

## 2025-03-19 PROCEDURE — 97110 THERAPEUTIC EXERCISES: CPT

## 2025-03-19 NOTE — PROGRESS NOTES
Daily Note      Today's date: 3/19/2025  Patient name: Ghada Ferreira  : 1955  MRN: [id number]  Referring provider: Thao Jones DO  Dx:   Encounter Diagnosis     ICD-10-CM    1. Chronic right-sided low back pain without sciatica  M54.50     G89.29       2. Chronic pain of right knee  M25.561     G89.29           Subjective: Pt reports increased right knee pain yesterday, which she is unsure if it is related to exercises or not. Pt states that she had a busy day caring for pets and only had one hour of a break from 7AM to 7PM. Pt states she feels like she overdid it.        Objective: See treatment diary below    Assessment: Pt tolerated treatment well.  Pt presented with increased limitation in right knee extension this visit. Pt performed quad sets with towel roll under right knee. Pt was provided with updated HEP with focus on mobility and quad activation daily, and periscapular and hip strengthening every other day. Educated pt on activity modification when needed, such as when right knee joint pain is flared up. Pt verbalized good understanding.     Plan: Continue per plan of care.         Daily Treatment Diary     Precautions: Chronic low back pain, LLE lymphedema, hx of cancer    POC Expires Reeval for Medicare to be completed  Unit Limit Auth Expiration Date PT/OT/STVisit Limit   25 By visit 10 N/A N/A N/A    Completed on visit N/A                   Auth Status DATE 3/12/25 3/17/25 3/19/25      NA Visit # 7 8 9       Remaining 3 2 1      MANUAL THERAPY         STM/MFR R knee         Joint mobs R knee                                              THERAPEUTIC EXERCISE HEP         Bike  5' L2  5' L2 5' L2      PROM R knee          Heel prop x         Gastroc stretch  Slantboard 3x30s B/L  W/ SOS 3x30s B/L  W/ SOS 3x30s B/L       HS stretch   3x30s B/L  3x30s B/L       Hip flexor stretch          Leg press   3x10 50# seat 4       Sit-to-stand          Prone on elbows  3'         REIL   Attempted *arm strength limitation D/C       JESSICA  10x *better 10x  10x                                              NEUROMUSCULAR REEDUCATION           Quad sets          SAQ          LAQ  2x10 (5s) B/L  2x10 (5s) B/L  2x10 (5s) B/L      SLR   2x10 B/L  2x10 B/L       Bridging   2x10  2x10       Rows  2x10 OTB        Shoulder extensions  2x10 OTB        Standing hip abd/ext  2x10 ea B/L 2x10 ea B/L 2x10 ea B/L                                                                                                 THERAPEUTIC ACTIVITY                                                  GAIT TRAINING                                                  MODALITIES                             Access Code: [email or web address]  URL: [email or web address]  Date: 03/19/2025  Prepared by: Diaz Moore    Exercises  - Standing Lumbar Extension with Counter  - 6 x daily - 7 x weekly - 1 sets - 10 reps  - Long Sitting Calf Stretch with Strap  - 1 x daily - 7 x weekly - 1 sets - 3 reps - 20 hold  - Hooklying Hamstring Stretch with Strap  - 1 x daily - 7 x weekly - 1 sets - 3 reps - 20 hold  - Supine Quad Set  - 1 x daily - 7 x weekly - 2 sets - 10 reps - 5 hold  - Supine Quadriceps Stretch with Strap on Table  - 1 x daily - 7 x weekly - 1 sets - 3 reps - 20 hold  - Active Straight Leg Raise with Quad Set  - 1 x daily - 7 x weekly - 2 sets - 10 reps  - Seated Knee Extension  - 1 x daily - 7 x weekly - 2 sets - 10 reps - 5 hold  - Supine Bridge  - 1 x daily - 4 x weekly - 2 sets - 10 reps - 5 hold  - Standing Hip Abduction with Counter Support  - 1 x daily - 4 x weekly - 2 sets - 10 reps  - Standing Hip Extension with Counter Support  - 1 x daily - 4 x weekly - 2 sets - 10 reps  - Standing Row with Anchored Resistance  - 1 x daily - 4 x weekly - 2 sets - 10 reps - 5 hold  - Shoulder Extension with Resistance  - 1 x daily - 4 x weekly - 2 sets - 10 reps - 5 hold

## 2025-03-24 ENCOUNTER — EVALUATION (OUTPATIENT)
Dept: PHYSICAL THERAPY | Facility: CLINIC | Age: 70
End: 2025-03-24
Payer: MEDICARE

## 2025-03-24 DIAGNOSIS — G89.29 CHRONIC PAIN OF RIGHT KNEE: ICD-10-CM

## 2025-03-24 DIAGNOSIS — M25.561 CHRONIC PAIN OF RIGHT KNEE: ICD-10-CM

## 2025-03-24 DIAGNOSIS — M54.50 CHRONIC RIGHT-SIDED LOW BACK PAIN WITHOUT SCIATICA: Primary | ICD-10-CM

## 2025-03-24 DIAGNOSIS — G89.29 CHRONIC RIGHT-SIDED LOW BACK PAIN WITHOUT SCIATICA: Primary | ICD-10-CM

## 2025-03-24 PROCEDURE — 97110 THERAPEUTIC EXERCISES: CPT

## 2025-03-24 PROCEDURE — 97112 NEUROMUSCULAR REEDUCATION: CPT

## 2025-03-24 NOTE — PROGRESS NOTES
PT Re-Evaluation     Today's date: 3/24/2025  Patient name: Ghada Ferreira  : 1955  MRN: [id number]  Referring provider: Thao Jones DO  Dx:   Encounter Diagnosis     ICD-10-CM    1. Chronic right-sided low back pain without sciatica  M54.50     G89.29       2. Chronic pain of right knee  M25.561     G89.29                      Assessment  Impairments: abnormal gait, abnormal muscle firing, abnormal or restricted ROM, activity intolerance, impaired physical strength, lacks appropriate home exercise program, pain with function, poor posture  and poor body mechanics    Assessment details: Ghada Ferreira has been seen for 10 visits in hospital-based outpatient PT with chronic right-sided low back pain and chronic right knee pain. Patient demonstrates good progress toward short-term and long-term physical therapy goals. Patient presents with the following improvements: decreased low back pain, improved knee and lumbar ROM, improved LE strength, and improved gait mechanics. Patient continues to present with the following impairments: low back and right knee pain, limited right knee AROM, limited right LE strength, limited B/L HS/hip flexor flexibility, and gait/postural abnormalities. Due to these impairments, patient continues to have difficulty performing the following: ADL's, ambulation, stair negotiation, lifting/carrying, transfers, self-care activities, prolonged standing, squatting, kneeling, and household chores. Patient has been educated in updated plan of care. Patient would benefit from continued skilled physical therapy services to address the above functional limitations and progress towards prior level of function and independence with home exercise program.  Understanding of Dx/Px/POC: good     Prognosis: good    Goals  Short Term Goals [3 weeks; target date: 3/15/25]  1. Patient will be independent with initial HEP. - GOAL MET  2. Patient will demonstrate an increase in right knee  extension AROM to 0° and knee flexion AROM to 115°. - GOAL MET  3. Patient will demonstrate an increase in right knee strength of 1/2 grade on MMT. - in progress    Long Term Goals [8 weeks; target date: 5/5/25]  1. Patient will be independent with comprehensive HEP. - in progress  2. Patient will demonstrate an increase in right knee AROM to WNL in order to promote self-care activities pain-free. - in progress  3. Patient will demonstrate an increase in right knee strength to 4/5 on MMT. - in progress  4. Patient will be able to perform a full, functional squat with proper mechanics. - in progress  5. Patient will be able to ascend/descend 15 stairs reciprocally with use of handrail.  - in progress  6. Patient will be able to walk her dog for 10 minutes with low back/right knee pain of 3-4/10 at worst. - in progress     Plan  Patient would benefit from: skilled physical therapy  Planned modality interventions: cryotherapy, electrical stimulation/Russian stimulation, TENS, ultrasound, thermotherapy: hydrocollator packs, traction, high voltage pulsed current: spasm management and high voltage pulsed current: pain management    Planned therapy interventions: flexibility, functional ROM exercises, graded exercise, home exercise program, joint mobilization, manual therapy, neuromuscular re-education, patient education, strengthening, stretching, therapeutic exercise, therapeutic activities, balance/weight bearing training, gait training, abdominal trunk stabilization, self care, postural training, activity modification, ADL retraining, ADL training, balance, behavior modification, body mechanics training, breathing training, therapeutic training, transfer training, graded activity, graded motor, muscle pump exercises, Hazel taping and IADL retraining    Frequency: 2x week  Plan of Care beginning date: 3/24/2025  Plan of Care expiration date: 5/5/2025  Treatment plan discussed with: patient    Subjective  Evaluation    History of Present Illness  Mechanism of injury: Pt reports chronic low back pain on the right side for a few years. Pt states her PCP has found that her lower back is tight. Pt states she has pain with holding a dog leash in her right hand and walking, even if dogs are not pulling. Pt states she had scoliosis as a child and had no interventions to address it. Pt states that she has difficulty with lifting due to her back and right leg. Pt states she has also noticed that over time, her posture has become poor, whether or not she is walking a dog or just standing still with hands at rest.     Pt reports right knee pain for 3 years. Pt states that she has done PT for this in the past which helped. Pt states she has also had cortisone shots in the past. Pt states at the start of January, she feels that her legs are tight and began some self-guided stretching, which helped. Pt states she also has history of right patellar tendon issues. Pt states her legs get tired very easily. Pt states that she mentioned both of these issues while at her PCP office and was referred to PT. Pt denies any recent imaging for either the spine or knee.     (3/24/25) Pt reports >50% progress since starting PT for the right knee. Pt states she still has good days and bad days. Pt states that flexibility and mobility have improved and she has less pain while going up and down the stairs. Pt states in order to return to 100%, she needs to be able to kneel. Pt states that she also must be able to get up from the floor without difficulty. Pt states that she also has pain while driving sometimes, although it has improved. Pt states walking uphill and downhill is also difficult at times. Pt reports >60% progress in the low back as well. Pt states it has limited her while walking. Pt states she has been more mindful about her posture while standing has this has decreased shoulder pain. Pt states that her increased knee pain today is  likely due to the rainy and cool weather.   Pain  Current pain ratin  At best pain rating: 3  At worst pain ratin  Location: Right knee (Low back C: 0, B: 0, W: 4)  Quality: dull ache (Constant)  Relieving factors: relaxation, change in position, support, rest and ice  Aggravating factors: walking, stair climbing and lifting (Getting in and out of the car)  Progression: no change    Social Support  Steps to enter house: yes  5  Stairs in house: yes   15  Lives with: alone (Has pets)    Employment status: working (Works with pets - dog walking)  Hand dominance: right  Exercise history: Walking with pets 2x/week 20 minutes;        Objective     Observations     Additional Observation Details  Lymphedema present throughout LLE    Active Range of Motion   Left Knee   Flexion: 116 degrees   Extension: 3 degrees     Right Knee   Flexion: 125 degrees   Extension: 3 degrees     Strength/Myotome Testing     Left Knee   Flexion: 4  Extension: 4 (Left quad cramping during MMT)  Quadriceps contraction: good    Right Knee   Flexion: 4  Extension: 4-  Quadriceps contraction: fair    Ambulation     Comments   Limited TKE bilaterally, forward flexed trunk posture; no AD at baseline      Red Flag Screening  (+) for age >50  (-) for unexplained weight loss  (+) for history of cancer  (-) for saddle paresthesia  (-) for bladder/bowel dysfunction       Lumbar AROM     Flexion  Fingertip reach to floor   Extension 60%    L side glide 70%   R side glide 70% pain returning to start on L lumbar   L rotation 65%   R rotation  75%     Repeated motions Position Repetitions performed Symptomatic response during Symptomatic response after Effect on ROM   Flexion Standing 10  No effect No effect Increased motion   Extension Standing 10 No effect No effect Increased motion     Comments:     Lower Extremity Strength Testing    Hip MMT  Left  Right   Flexion  4/5 4+/5    Abduction 4/5 4/5   Adduction 4/5 4/5       Knee MMT  Left  Right    Flexion  4/5  4-/5   Extension 4-/5 3+/5     Ankle MMT  Left  Right   Dorsiflexion  4/5 4/5    Plantarflexion 4/5 4/5           Daily Treatment Diary     Precautions: Chronic low back pain, LLE lymphedema, hx of cancer    POC Expires Reeval for Medicare to be completed  Unit Limit Auth Expiration Date PT/OT/STVisit Limit   5/5/25 By visit 10 N/A N/A N/A    Completed on visit N/A                   Auth Status DATE 3/12/25 3/17/25 3/19/25 3/24/25 (RE-EVAL)     NA Visit # 7 8 9 10      Remaining 3 2 1 10     MANUAL THERAPY         STM/MFR R knee         Joint mobs R knee                                              THERAPEUTIC EXERCISE HEP         Bike  5' L2  5' L2 5' L2 5' L2     PROM R knee          Heel prop x         Gastroc stretch  Slantboard 3x30s B/L  W/ SOS 3x30s B/L  W/ SOS 3x30s B/L       HS stretch   3x30s B/L  3x30s B/L       Hip flexor stretch          Leg press   3x10 50# seat 4  10x 50#  2x10 60#   Seat 4      Sit-to-stand          Prone on elbows  3'         REIL  Attempted *arm strength limitation D/C       JESSICA  10x *better 10x  10x 10x                                             NEUROMUSCULAR REEDUCATION           Quad sets          SAQ          LAQ  2x10 (5s) B/L  2x10 (5s) B/L  2x10 (5s) B/L 2x10 (5s) B/L      SLR   2x10 B/L  2x10 B/L       Bridging   2x10  2x10       Rows  2x10 OTB        Shoulder extensions  2x10 OTB        Standing hip abd/ext  2x10 ea B/L 2x10 ea B/L 2x10 ea B/L                                                                                                 THERAPEUTIC ACTIVITY                                                  GAIT TRAINING                                                  MODALITIES                             Access Code: [email or web address]  URL: [email or web address]  Date: 03/19/2025  Prepared by: Diaz Moore    Exercises  - Standing Lumbar Extension with Counter  - 6 x daily - 7 x weekly - 1 sets - 10 reps  - Long Sitting Calf Stretch with Strap  - 1  x daily - 7 x weekly - 1 sets - 3 reps - 20 hold  - Hooklying Hamstring Stretch with Strap  - 1 x daily - 7 x weekly - 1 sets - 3 reps - 20 hold  - Supine Quad Set  - 1 x daily - 7 x weekly - 2 sets - 10 reps - 5 hold  - Supine Quadriceps Stretch with Strap on Table  - 1 x daily - 7 x weekly - 1 sets - 3 reps - 20 hold  - Active Straight Leg Raise with Quad Set  - 1 x daily - 7 x weekly - 2 sets - 10 reps  - Seated Knee Extension  - 1 x daily - 7 x weekly - 2 sets - 10 reps - 5 hold  - Supine Bridge  - 1 x daily - 4 x weekly - 2 sets - 10 reps - 5 hold  - Standing Hip Abduction with Counter Support  - 1 x daily - 4 x weekly - 2 sets - 10 reps  - Standing Hip Extension with Counter Support  - 1 x daily - 4 x weekly - 2 sets - 10 reps  - Standing Row with Anchored Resistance  - 1 x daily - 4 x weekly - 2 sets - 10 reps - 5 hold  - Shoulder Extension with Resistance  - 1 x daily - 4 x weekly - 2 sets - 10 reps - 5 hold

## 2025-03-26 ENCOUNTER — OFFICE VISIT (OUTPATIENT)
Dept: PHYSICAL THERAPY | Facility: CLINIC | Age: 70
End: 2025-03-26
Payer: MEDICARE

## 2025-03-26 DIAGNOSIS — G89.29 CHRONIC RIGHT-SIDED LOW BACK PAIN WITHOUT SCIATICA: Primary | ICD-10-CM

## 2025-03-26 DIAGNOSIS — G89.29 CHRONIC PAIN OF RIGHT KNEE: ICD-10-CM

## 2025-03-26 DIAGNOSIS — M25.561 CHRONIC PAIN OF RIGHT KNEE: ICD-10-CM

## 2025-03-26 DIAGNOSIS — M54.50 CHRONIC RIGHT-SIDED LOW BACK PAIN WITHOUT SCIATICA: Primary | ICD-10-CM

## 2025-03-26 PROCEDURE — 97112 NEUROMUSCULAR REEDUCATION: CPT

## 2025-03-26 PROCEDURE — 97110 THERAPEUTIC EXERCISES: CPT

## 2025-03-26 NOTE — PROGRESS NOTES
Daily Note      Today's date: 3/26/2025  Patient name: Ghada Ferreira  : 1955  MRN: [id number]  Referring provider: Thao Jones DO  Dx:   Encounter Diagnosis     ICD-10-CM    1. Chronic right-sided low back pain without sciatica  M54.50     G89.29       2. Chronic pain of right knee  M25.561     G89.29           Subjective: Pt reports that her right knee is a little flared up today. Pt states that she had a busy start of the week and did not get to do her exercises. Pt states that she realized she has difficulty lifting/carrying things, such as cat litter (28 lb bag) and bringing things in from the car. Pt states that she also goes up ~5 stairs while carrying things to enter the house.        Objective: See treatment diary below    Assessment: Pt tolerated treatment well.  Pt noted more difficulty and fatigue with SLR, especially with LLE today. Pt was able to progress resistance with leg press and maintained good form. Pt stopped hip extension in standing with one set due to increase in L LBP, despite limiting hip ROM. Plan to progress stability exercises in standing.    Plan: Continue per plan of care.          Daily Treatment Diary     Precautions: Chronic low back pain, LLE lymphedema, hx of cancer    POC Expires Reeval for Medicare to be completed  Unit Limit Auth Expiration Date PT/OT/STVisit Limit   25 By visit 10 N/A N/A N/A    Completed on visit N/A                   Auth Status DATE 3/12/25 3/17/25 3/19/25 3/24/25 (RE-EVAL) 3/26/25    NA Visit # 7 8 9 10 11     Remaining 3 2 1 10 9    MANUAL THERAPY         STM/MFR R knee         Joint mobs R knee                                              THERAPEUTIC EXERCISE HEP         Bike  5' L2  5' L2 5' L2 5' L2 5' L2    PROM R knee          Heel prop x         Gastroc stretch  Slantboard 3x30s B/L  W/ SOS 3x30s B/L  W/ SOS 3x30s B/L   3x30s w/ SOS B/L     HS stretch   3x30s B/L  3x30s B/L   3x30s B/L     Hip flexor stretch          Leg press    3x10 50# seat 4  10x 50#  2x10 60#   Seat 4  3x10 60#  1x10 70#    Sit-to-stand          Prone on elbows  3'         REIL  Attempted *arm strength limitation D/C       JESSICA  10x *better 10x  10x 10x 10x                                            NEUROMUSCULAR REEDUCATION           Quad sets          SAQ          LAQ  2x10 (5s) B/L  2x10 (5s) B/L  2x10 (5s) B/L 2x10 (5s) B/L  2x10 (5s) B/L    SLR   2x10 B/L  2x10 B/L   2x10 B/L     Bridging     2x10  2x10       Rows  2x10 OTB        Shoulder extensions  2x10 OTB        Standing hip abd/ext  2x10 ea B/L 2x10 ea B/L 2x10 ea B/L   Abd 2x10 B/L  Ext 10x B/L *L LBP    TA w/ standing march                                                                                          THERAPEUTIC ACTIVITY          Farmer's carries          STS w/ med ball                              GAIT TRAINING                                                  MODALITIES                             Access Code: [email or web address]  URL: [email or web address]  Date: 03/19/2025  Prepared by: Diaz Moore    Exercises  - Standing Lumbar Extension with Counter  - 6 x daily - 7 x weekly - 1 sets - 10 reps  - Long Sitting Calf Stretch with Strap  - 1 x daily - 7 x weekly - 1 sets - 3 reps - 20 hold  - Hooklying Hamstring Stretch with Strap  - 1 x daily - 7 x weekly - 1 sets - 3 reps - 20 hold  - Supine Quad Set  - 1 x daily - 7 x weekly - 2 sets - 10 reps - 5 hold  - Supine Quadriceps Stretch with Strap on Table  - 1 x daily - 7 x weekly - 1 sets - 3 reps - 20 hold  - Active Straight Leg Raise with Quad Set  - 1 x daily - 7 x weekly - 2 sets - 10 reps  - Seated Knee Extension  - 1 x daily - 7 x weekly - 2 sets - 10 reps - 5 hold  - Supine Bridge  - 1 x daily - 4 x weekly - 2 sets - 10 reps - 5 hold  - Standing Hip Abduction with Counter Support  - 1 x daily - 4 x weekly - 2 sets - 10 reps  - Standing Hip Extension with Counter Support  - 1 x daily - 4 x weekly - 2 sets - 10 reps  - Standing Row  with Anchored Resistance  - 1 x daily - 4 x weekly - 2 sets - 10 reps - 5 hold  - Shoulder Extension with Resistance  - 1 x daily - 4 x weekly - 2 sets - 10 reps - 5 hold

## 2025-03-31 ENCOUNTER — OFFICE VISIT (OUTPATIENT)
Dept: PHYSICAL THERAPY | Facility: CLINIC | Age: 70
End: 2025-03-31
Payer: MEDICARE

## 2025-03-31 DIAGNOSIS — M54.50 CHRONIC RIGHT-SIDED LOW BACK PAIN WITHOUT SCIATICA: Primary | ICD-10-CM

## 2025-03-31 DIAGNOSIS — G89.29 CHRONIC PAIN OF RIGHT KNEE: ICD-10-CM

## 2025-03-31 DIAGNOSIS — G89.29 CHRONIC RIGHT-SIDED LOW BACK PAIN WITHOUT SCIATICA: Primary | ICD-10-CM

## 2025-03-31 DIAGNOSIS — E78.49 OTHER HYPERLIPIDEMIA: ICD-10-CM

## 2025-03-31 DIAGNOSIS — M25.561 CHRONIC PAIN OF RIGHT KNEE: ICD-10-CM

## 2025-03-31 PROCEDURE — 97110 THERAPEUTIC EXERCISES: CPT

## 2025-03-31 PROCEDURE — 97112 NEUROMUSCULAR REEDUCATION: CPT

## 2025-03-31 RX ORDER — ATORVASTATIN CALCIUM 40 MG/1
40 TABLET, FILM COATED ORAL
Qty: 90 TABLET | Refills: 0 | Status: SHIPPED | OUTPATIENT
Start: 2025-03-31

## 2025-03-31 NOTE — TELEPHONE ENCOUNTER
Reason for call:   [x] Refill   [] Prior Auth  [] Other:     Office:   [x] PCP/Provider -   [] Specialty/Provider -     Medication: atorvastatin (LIPITOR) 40 mg tablet Take 1 tablet (40 mg total) by mouth daily at bedtime,       Pharmacy: [facility] - [address]   Does the patient have enough for 3 days?   [] Yes   [x] No - Send as HP to POD    Mail Away Pharmacy   Does the patient have enough for 10 days?   [] Yes   [] No - Send as HP to POD

## 2025-03-31 NOTE — PROGRESS NOTES
Daily Note      Today's date: 3/31/2025  Patient name: Ghada Ferreira  : 1955  MRN: [id number]  Referring provider: Thao Jones DO  Dx:   Encounter Diagnosis     ICD-10-CM    1. Chronic right-sided low back pain without sciatica  M54.50     G89.29       2. Chronic pain of right knee  M25.561     G89.29           Subjective: Pt reports that her right knee was achy over the weekend, however had noticed improvements in mobility. Pt states her legs do not feel as stiff if she is walking and feels that she is better able to recover her balance when she's walking on uneven ground. Pt states that over the weekend, her challenge was with carrying a 28 lb bag of cat litter up some stairs.        Objective: See treatment diary below    Assessment: Pt tolerated treatment well.  Pt was progressed to LAQ with resistance and SLR with shorter rest breaks and noted more fatigue with this. Pt was introduced to step ups and step downs and was able to perform step ups without assistance with fatigue. Pt required use of unilateral UE for balance with step downs.     Plan: Continue per plan of care.         Daily Treatment Diary     Precautions: Chronic low back pain, LLE lymphedema, hx of cancer    POC Expires Reeval for Medicare to be completed  Unit Limit Auth Expiration Date PT/OT/STVisit Limit   25 By visit 10 N/A N/A N/A    Completed on visit N/A                   Auth Status DATE 3/24/25 (RE-EVAL) 3/26/25 3/31/25      NA Visit # 10 11 12       Remaining 10 9 8      MANUAL THERAPY         STM/MFR R knee         Joint mobs R knee                                              THERAPEUTIC EXERCISE HEP         Bike  5' L2 5' L2 5' L2       PROM R knee          Heel prop x         Gastroc stretch   3x30s w/ SOS B/L  3x30s w/ SOS B/L      HS stretch   3x30s B/L  3x30s B/L       Hip flexor stretch          Leg press  10x 50#  2x10 60#   Seat 4  3x10 60#  1x10 70# 10x 60#  10x 70#  10x 80#      Sit-to-stand         "  Prone on elbows          REIL          JESSICA  10x 10x 10x      Step ups    2x10 6\" no UE      Step downs     2x10 6\" 1UE                          NEUROMUSCULAR REEDUCATION           Quad sets    10x5s B/L       SAQ          LAQ  2x10 (5s) B/L  2x10 (5s) B/L 2x10 (5s) B/L      SLR   2x10 B/L  2x10 B/L       Bridging          Rows          Shoulder extensions          Standing hip abd/ext   Abd 2x10 B/L  Ext 10x B/L *L LBP Abd 2x10 B/L       TA w/ standing march                                                                                          THERAPEUTIC ACTIVITY          Farmer's carries          STS w/ med ball                              GAIT TRAINING                                                  MODALITIES                             Access Code: [email or web address]  URL: [email or web address]  Date: 03/19/2025  Prepared by: Diaz Moore    Exercises  - Standing Lumbar Extension with Counter  - 6 x daily - 7 x weekly - 1 sets - 10 reps  - Long Sitting Calf Stretch with Strap  - 1 x daily - 7 x weekly - 1 sets - 3 reps - 20 hold  - Hooklying Hamstring Stretch with Strap  - 1 x daily - 7 x weekly - 1 sets - 3 reps - 20 hold  - Supine Quad Set  - 1 x daily - 7 x weekly - 2 sets - 10 reps - 5 hold  - Supine Quadriceps Stretch with Strap on Table  - 1 x daily - 7 x weekly - 1 sets - 3 reps - 20 hold  - Active Straight Leg Raise with Quad Set  - 1 x daily - 7 x weekly - 2 sets - 10 reps  - Seated Knee Extension  - 1 x daily - 7 x weekly - 2 sets - 10 reps - 5 hold  - Supine Bridge  - 1 x daily - 4 x weekly - 2 sets - 10 reps - 5 hold  - Standing Hip Abduction with Counter Support  - 1 x daily - 4 x weekly - 2 sets - 10 reps  - Standing Hip Extension with Counter Support  - 1 x daily - 4 x weekly - 2 sets - 10 reps  - Standing Row with Anchored Resistance  - 1 x daily - 4 x weekly - 2 sets - 10 reps - 5 hold  - Shoulder Extension with Resistance  - 1 x daily - 4 x weekly - 2 sets - 10 reps - 5 hold         "

## 2025-04-01 DIAGNOSIS — K50.10 CROHN'S DISEASE OF LARGE INTESTINE WITHOUT COMPLICATION (HCC): Primary | ICD-10-CM

## 2025-04-02 ENCOUNTER — OFFICE VISIT (OUTPATIENT)
Dept: PHYSICAL THERAPY | Facility: CLINIC | Age: 70
End: 2025-04-02
Payer: MEDICARE

## 2025-04-02 DIAGNOSIS — M25.561 CHRONIC PAIN OF RIGHT KNEE: ICD-10-CM

## 2025-04-02 DIAGNOSIS — G89.29 CHRONIC RIGHT-SIDED LOW BACK PAIN WITHOUT SCIATICA: Primary | ICD-10-CM

## 2025-04-02 DIAGNOSIS — I10 PRIMARY HYPERTENSION: ICD-10-CM

## 2025-04-02 DIAGNOSIS — M54.50 CHRONIC RIGHT-SIDED LOW BACK PAIN WITHOUT SCIATICA: Primary | ICD-10-CM

## 2025-04-02 DIAGNOSIS — G89.29 CHRONIC PAIN OF RIGHT KNEE: ICD-10-CM

## 2025-04-02 PROCEDURE — 97112 NEUROMUSCULAR REEDUCATION: CPT

## 2025-04-02 PROCEDURE — 97110 THERAPEUTIC EXERCISES: CPT

## 2025-04-02 RX ORDER — INDAPAMIDE 2.5 MG/1
2.5 TABLET ORAL EVERY MORNING
Qty: 90 TABLET | Refills: 1 | Status: SHIPPED | OUTPATIENT
Start: 2025-04-02

## 2025-04-02 NOTE — PROGRESS NOTES
Daily Note      Today's date: 2025  Patient name: Ghada Ferreira  : 1955  MRN: [id number]  Referring provider: Thao Jones DO  Dx:   Encounter Diagnosis     ICD-10-CM    1. Chronic right-sided low back pain without sciatica  M54.50     G89.29       2. Chronic pain of right knee  M25.561     G89.29           Subjective: Pt reports that [place] 25 minutes the other day. Pt states that she was able to  speed and walk at a similar pace compared to before her back and knee pain. Pt states however, afterward, she had increased right-sided low back pain.        Objective: See treatment diary below    Assessment: Pt tolerated treatment well.  Reviewed frequency and formatting of HEP and pt was educated that she may perform a small group of exercises throughout the day, rather than one block of all exercises, due to her busy lifestyle. Pt verbalized understanding. Pt was introduced to [employer] and demonstrated good form. Pt was also educated that increasing speed with walking will increase intensity, and right low back musculature may have compensated. Pt was also advised to remain aware of posture during walking.     Plan: Continue per plan of care.         Daily Treatment Diary     Precautions: Chronic low back pain, LLE lymphedema, hx of cancer    POC Expires Reeval for Medicare to be completed  Unit Limit Auth Expiration Date PT/OT/STVisit Limit   25 By visit 10 N/A N/A N/A    Completed on visit N/A                   Auth Status DATE 3/24/25 (RE-EVAL) 3/26/25 3/31/25 4/2/25     NA Visit # 10 11 12 13      Remaining 10 9 8 7     MANUAL THERAPY         STM/MFR R knee         Joint mobs R knee                                              THERAPEUTIC EXERCISE HEP         Bike  5' L2 5' L2 5' L2  5' L2     PROM R knee          Heel prop x         Gastroc stretch   3x30s w/ SOS B/L  3x30s w/ SOS B/L      HS stretch   3x30s B/L  3x30s B/L   "3x30s B/L      Hip flexor stretch          Leg press  10x 50#  2x10 60#   Seat 4  3x10 60#  1x10 70# 10x 60#  10x 70#  10x 80#      Sit-to-stand          Prone on elbows          REIL          JESSICA  10x 10x 10x 10x     Step ups    2x10 6\" no UE 2x10 6\" no UE      Step downs     2x10 6\" 1UE 2x10 6\" 1UE                         NEUROMUSCULAR REEDUCATION           Quad sets    10x5s B/L  10x5s B/L      SAQ          LAQ  2x10 (5s) B/L  2x10 (5s) B/L 2x10 (5s) B/L 2x10 B/L      SLR   2x10 B/L  2x10 B/L  2x10 B/L      Bridging          Rows     2x10 OTB     Shoulder extensions     2x10 OTB     Standing hip abd/ext   Abd 2x10 B/L  Ext 10x B/L *L LBP Abd 2x10 B/L       TA w/ standing march          [place] press      20x B/L double OTB                                                                           THERAPEUTIC ACTIVITY          Farmer's carries          STS w/ med ball                              GAIT TRAINING                                                  MODALITIES                             Access Code: [email or web address]  URL: [email or web address]  Date: 03/19/2025  Prepared by: Diaz Moore    Exercises  - Standing Lumbar Extension with Counter  - 6 x daily - 7 x weekly - 1 sets - 10 reps  - Long Sitting Calf Stretch with Strap  - 1 x daily - 7 x weekly - 1 sets - 3 reps - 20 hold  - Hooklying Hamstring Stretch with Strap  - 1 x daily - 7 x weekly - 1 sets - 3 reps - 20 hold  - Supine Quad Set  - 1 x daily - 7 x weekly - 2 sets - 10 reps - 5 hold  - Supine Quadriceps Stretch with Strap on Table  - 1 x daily - 7 x weekly - 1 sets - 3 reps - 20 hold  - Active Straight Leg Raise with Quad Set  - 1 x daily - 7 x weekly - 2 sets - 10 reps  - Seated Knee Extension  - 1 x daily - 7 x weekly - 2 sets - 10 reps - 5 hold  - Supine Bridge  - 1 x daily - 4 x weekly - 2 sets - 10 reps - 5 hold  - Standing Hip Abduction with Counter Support  - 1 x daily - 4 x weekly - 2 sets - 10 reps  - Standing Hip Extension with " Counter Support  - 1 x daily - 4 x weekly - 2 sets - 10 reps  - Standing Row with Anchored Resistance  - 1 x daily - 4 x weekly - 2 sets - 10 reps - 5 hold  - Shoulder Extension with Resistance  - 1 x daily - 4 x weekly - 2 sets - 10 reps - 5 hold

## 2025-04-08 ENCOUNTER — OFFICE VISIT (OUTPATIENT)
Dept: PHYSICAL THERAPY | Facility: CLINIC | Age: 70
End: 2025-04-08
Payer: MEDICARE

## 2025-04-08 DIAGNOSIS — M25.561 CHRONIC PAIN OF RIGHT KNEE: ICD-10-CM

## 2025-04-08 DIAGNOSIS — G89.29 CHRONIC RIGHT-SIDED LOW BACK PAIN WITHOUT SCIATICA: Primary | ICD-10-CM

## 2025-04-08 DIAGNOSIS — M54.50 CHRONIC RIGHT-SIDED LOW BACK PAIN WITHOUT SCIATICA: Primary | ICD-10-CM

## 2025-04-08 DIAGNOSIS — G89.29 CHRONIC PAIN OF RIGHT KNEE: ICD-10-CM

## 2025-04-08 PROCEDURE — 97110 THERAPEUTIC EXERCISES: CPT

## 2025-04-08 PROCEDURE — 97112 NEUROMUSCULAR REEDUCATION: CPT

## 2025-04-08 NOTE — PROGRESS NOTES
Daily Note      Today's date: 2025  Patient name: Ghada Ferreira  : 1955  MRN: [id number]  Referring provider: Thao Jones DO  Dx:   Encounter Diagnosis     ICD-10-CM    1. Chronic right-sided low back pain without sciatica  M54.50     G89.29       2. Chronic pain of right knee  M25.561     G89.29           Subjective: Pt reports that today, [place] and she felt right-sided hip and knee pain from walking on uneven terrain. Pt states that yesterday, her right knee felt the worst it has felt in a while and attributes this to the cold, rainy weather. Pt states that because today is jeff but cold, her knee pain is not as bad.        Objective: See treatment diary below    Assessment: Pt tolerated treatment well.  Pt noted more difficulty with initiating recumbent bike this visit, but by the end of warmup, noted same distance performed compared to previous sessions. Pt demonstrates decreased use of contralateral push off during step ups.     Plan: Continue per plan of care.         Daily Treatment Diary     Precautions: Chronic low back pain, LLE lymphedema, hx of cancer    POC Expires Reeval for Medicare to be completed  Unit Limit Auth Expiration Date PT/OT/STVisit Limit   25 By visit 10 N/A N/A N/A    Completed on visit N/A                   Auth Status DATE 3/24/25 (RE-EVAL) 3/26/25 3/31/25 4/2/25 4/8/25    NA Visit # 10 11 12 13 14     Remaining 10 9 8 7 6    MANUAL THERAPY         STM/MFR R knee         Joint mobs R knee                                              THERAPEUTIC EXERCISE HEP         Bike  5' L2 5' L2 5' L2  5' L2 5' L1     PROM R knee          Heel prop x         Gastroc stretch   3x30s w/ SOS B/L  3x30s w/ SOS B/L  3x30s B/L     HS stretch   3x30s B/L  3x30s B/L  3x30s B/L  3x30s B/L     Hip flexor stretch          Leg press  10x 50#  2x10 60#   Seat 4  3x10 60#  1x10 70# 10x 60#  10x 70#  10x 80#      Sit-to-stand          Prone on  "elbows          REIL          JESSICA  10x 10x 10x 10x 10x    Step ups    2x10 6\" no UE 2x10 6\" no UE  10x ea 4\"   10x ea 6\"     Step downs     2x10 6\" 1UE 2x10 6\" 1UE 10x ea 4\"   10x ea 6\"                         NEUROMUSCULAR REEDUCATION           Quad sets    10x5s B/L  10x5s B/L  10x5s B/L     SAQ      2x10 B/L     LAQ  2x10 (5s) B/L  2x10 (5s) B/L 2x10 (5s) B/L 2x10 B/L  2x10 B/L     SLR   2x10 B/L  2x10 B/L  2x10 B/L  2x10 B/L     Bridging          Rows     2x10 OTB     Shoulder extensions     2x10 OTB     Standing hip abd/ext   Abd 2x10 B/L  Ext 10x B/L *L LBP Abd 2x10 B/L       TA w/ standing march          Pallof press      20x B/L double OTB                                                                           THERAPEUTIC ACTIVITY          Farmer's carries          STS w/ med ball                              GAIT TRAINING                                                  MODALITIES                             Access Code: [email or web address]  URL: [email or web address]  Date: 03/19/2025  Prepared by: Diaz Moore    Exercises  - Standing Lumbar Extension with Counter  - 6 x daily - 7 x weekly - 1 sets - 10 reps  - Long Sitting Calf Stretch with Strap  - 1 x daily - 7 x weekly - 1 sets - 3 reps - 20 hold  - Hooklying Hamstring Stretch with Strap  - 1 x daily - 7 x weekly - 1 sets - 3 reps - 20 hold  - Supine Quad Set  - 1 x daily - 7 x weekly - 2 sets - 10 reps - 5 hold  - Supine Quadriceps Stretch with Strap on Table  - 1 x daily - 7 x weekly - 1 sets - 3 reps - 20 hold  - Active Straight Leg Raise with Quad Set  - 1 x daily - 7 x weekly - 2 sets - 10 reps  - Seated Knee Extension  - 1 x daily - 7 x weekly - 2 sets - 10 reps - 5 hold  - Supine Bridge  - 1 x daily - 4 x weekly - 2 sets - 10 reps - 5 hold  - Standing Hip Abduction with Counter Support  - 1 x daily - 4 x weekly - 2 sets - 10 reps  - Standing Hip Extension with Counter Support  - 1 x daily - 4 x weekly - 2 sets - 10 reps  - Standing Row " with Anchored Resistance  - 1 x daily - 4 x weekly - 2 sets - 10 reps - 5 hold  - Shoulder Extension with Resistance  - 1 x daily - 4 x weekly - 2 sets - 10 reps - 5 hold

## 2025-04-09 ENCOUNTER — OFFICE VISIT (OUTPATIENT)
Dept: PHYSICAL THERAPY | Facility: CLINIC | Age: 70
End: 2025-04-09
Payer: MEDICARE

## 2025-04-09 DIAGNOSIS — M54.50 CHRONIC RIGHT-SIDED LOW BACK PAIN WITHOUT SCIATICA: Primary | ICD-10-CM

## 2025-04-09 DIAGNOSIS — G89.29 CHRONIC RIGHT-SIDED LOW BACK PAIN WITHOUT SCIATICA: Primary | ICD-10-CM

## 2025-04-09 DIAGNOSIS — M25.561 CHRONIC PAIN OF RIGHT KNEE: ICD-10-CM

## 2025-04-09 DIAGNOSIS — G89.29 CHRONIC PAIN OF RIGHT KNEE: ICD-10-CM

## 2025-04-09 PROCEDURE — 97112 NEUROMUSCULAR REEDUCATION: CPT

## 2025-04-09 PROCEDURE — 97110 THERAPEUTIC EXERCISES: CPT

## 2025-04-09 NOTE — PROGRESS NOTES
"Daily Note     Today's date: 2025  Patient name: Ghada Ferreira  : 1955  MRN: [id number]  Referring provider: Thao Jones DO  Dx:   Encounter Diagnosis     ICD-10-CM    1. Chronic right-sided low back pain without sciatica  M54.50     G89.29       2. Chronic pain of right knee  M25.561     G89.29                      Subjective: Pt reports she is doing alright, she has been feeling better since the weekend. Pt believes the bad weather affected her knees significantly.       Objective: See treatment diary below      Assessment: Tolerated treatment well. Pt able to perform all exercises without issue, continue to progress to tolerance. Pt required occasional cueing for correct form and hold times. Pt able to return to more strengthening exercises today without issue. Pt would benefit from continued focus on stretching and strengthening exercises. Patient demonstrated fatigue post treatment, exhibited good technique with therapeutic exercises, and would benefit from continued PT      Plan: Continue per plan of care.      Daily Treatment Diary     Precautions: Chronic low back pain, LLE lymphedema, hx of cancer    POC Expires Reeval for Medicare to be completed  Unit Limit Auth Expiration Date PT/OT/STVisit Limit   25 By visit 10 N/A N/A N/A    Completed on visit N/A                   Auth Status DATE 3/24/25 (RE-EVAL) 3/26/25 3/31/25 4/2/25 4/8/25 4/9   NA Visit # 10 11 12 13 14 15    Remaining 10 9 8 7 6 5   MANUAL THERAPY         STM/MFR R knee         Joint mobs R knee                                              THERAPEUTIC EXERCISE HEP         Bike  5' L2 5' L2 5' L2  5' L2 5' L1  5' L1   PROM R knee          Heel prop x         Gastroc stretch   3x30s w/ SOS B/L  3x30s w/ SOS B/L  3x30s B/L  Slant brd 3x20\"   HS stretch   3x30s B/L  3x30s B/L  3x30s B/L  3x30s B/L  3x20\" ea   Hip flexor stretch          Leg press  10x 50#  2x10 60#   Seat 4  3x10 60#  1x10 70# 10x 60#  10x 70#  10x 80#    " "  Sit-to-stand          Prone on elbows          REIL          JESSICA  10x 10x 10x 10x 10x x10   Step ups    2x10 6\" no UE 2x10 6\" no UE  10x ea 4\"   10x ea 6\"  2x10 ea 6\"   Step downs     2x10 6\" 1UE 2x10 6\" 1UE 10x ea 4\"   10x ea 6\"  4\" 2x10                        NEUROMUSCULAR REEDUCATION           Quad sets    10x5s B/L  10x5s B/L  10x5s B/L  2x10 B/L   SAQ      2x10 B/L  2x10 B/L   LAQ  2x10 (5s) B/L  2x10 (5s) B/L 2x10 (5s) B/L 2x10 B/L  2x10 B/L  2x10 B/L   SLR   2x10 B/L  2x10 B/L  2x10 B/L  2x10 B/L  2x10 B/L   Bridging          Rows     2x10 OTB  2x10 OTB   Shoulder extensions     2x10 OTB  2x10 OTB   Standing hip abd/ext   Abd 2x10 B/L  Ext 10x B/L *L LBP Abd 2x10 B/L    Abd 2x10 B/L   TA w/ standing march          Pallof press      20x B/L double OTB                                                                           THERAPEUTIC ACTIVITY          Farmer's carries          STS w/ med ball                              GAIT TRAINING                                                  MODALITIES                             Access Code: [email or web address]  URL: [email or web address]  Date: 03/19/2025  Prepared by: Diaz Moore    Exercises  - Standing Lumbar Extension with Counter  - 6 x daily - 7 x weekly - 1 sets - 10 reps  - Long Sitting Calf Stretch with Strap  - 1 x daily - 7 x weekly - 1 sets - 3 reps - 20 hold  - Hooklying Hamstring Stretch with Strap  - 1 x daily - 7 x weekly - 1 sets - 3 reps - 20 hold  - Supine Quad Set  - 1 x daily - 7 x weekly - 2 sets - 10 reps - 5 hold  - Supine Quadriceps Stretch with Strap on Table  - 1 x daily - 7 x weekly - 1 sets - 3 reps - 20 hold  - Active Straight Leg Raise with Quad Set  - 1 x daily - 7 x weekly - 2 sets - 10 reps  - Seated Knee Extension  - 1 x daily - 7 x weekly - 2 sets - 10 reps - 5 hold  - Supine Bridge  - 1 x daily - 4 x weekly - 2 sets - 10 reps - 5 hold  - Standing Hip Abduction with Counter Support  - 1 x daily - 4 x weekly - 2 sets - 10 " reps  - Standing Hip Extension with Counter Support  - 1 x daily - 4 x weekly - 2 sets - 10 reps  - Standing Row with Anchored Resistance  - 1 x daily - 4 x weekly - 2 sets - 10 reps - 5 hold  - Shoulder Extension with Resistance  - 1 x daily - 4 x weekly - 2 sets - 10 reps - 5 hold

## 2025-04-14 NOTE — PROGRESS NOTES
Cardio-Oncology / Heart Failure Cardiology Clinic Note    Ghada Ferreira 69 y.o. female   MRN: [id number]  Encounter: [id number]        Assessment / Plan:    # Cardio-Oncology Pertinent History  Ovarian cancer  Dx 2009.  S/p hysterectomy/BSO  S/p chemotherapy - carboplatin and paclitaxel   Breast cancer  Dx 2018 and 2019.  Bilateral.   S/p surgery   S/p XRT - both sides  Current - anastrozole    # Cardio-Oncology Survivorship Recommendations  Discussed that many cancer survivors are at increased risk of cardiovascular disease due to cancer treatments as well as comorbidities.  Discussed importance of optimizing cardiovascular risk factors and post-treatment cardiac surveillance when appropriate.    Left sided breast XRT    Annual history and physical  BP management - SEE BELOW  Lipids - SEE BELOW  Diabetes screening with PCP  Exercise  -   walks dogs  Healthy diet - discussed   Routine dental care -  discussed  Post-treatment surveillance:  yearly history/exam with EKG    # HTN       (Allergies to lisinopril, losartan, metoprolol)  Norvasc 10  Indapamide  BP elevated today 160/90  add coreg 6.25 BID.   Monitor home BP.    # HLD  Lipitor 40  Last     # Palpitations  Rare.  Can occur when upset or when exerting herself.  Ultimately, consider Holter monitor    # MCKINNON  Exam - euvolemic  Check BNP / echo    #  ? Mitral valve prolapse  Was told in the past she had mitral valve prolapse and later was told she did not.  No echo in our system to review. Given MCKINNON, check echo    # Fam hx cardiovascular disease  Grandfather had MI age 62.  Sister had pacemaker.   Optimize all CV risk factors (HTN, HLD)  Healthy diet  Exercise    # ? hx TIA  In PCP note in 2019 - possible TIA  If true, should be on ASA 81 - please discuss with primary care  On statin    # Crohn's disease  On remicade      Today's Plan Summary:  See above assessment/plan for full details of today's plan.  Briefly,     BP above goal, add Coreg  Check  echo  Check BNP                Reason For Visit / Chief Complaint:  Referred by Arianne Vang NP for a new patient visit for prior radiation therapy for breast cancer in setting of family history of cardiovascular disease.    HPI:   Ghada Ferreira is a 69 y.o.  female with history as noted in the problem list and further detailed in the above assessment and plan.    Referred by Arianne Vang NP for a new patient visit for prior radiation therapy for breast cancer in setting of family history of cardiovascular disease.    As above, the patient has a history of breast cancer and ovarian cancer.  The patient also has hypertension, hyperlipidemia, and family history of cardiovascular disease.  The patient also has Crohn's disease on Remicade.    Today, the patient reports  -    occasional palpitation - when upset or when exerting herself.   No syncope.    No chest pain.   No SOB at rest.  Mild MCKINNON.   No orthopnea or PND.   Has lymphedema in left leg.      Works as  and .    .   No children.    Quit smoking in 2009.   No ETOH.   No drugs.          Cardiac Imaging personally reviewed:  EKG 4-15-25  Sinus rhythm  Nonspecific ST abnormality  (very mild / borderline)         Holter or event monitor    Echo    MARISOL    Cardiac MRI    Stress testing    [facility]    CP[facility]              Patient Active Problem List    Diagnosis Date Noted   • Chronic pain of right knee 01/26/2025   • Mixed stress and urge urinary incontinence 01/26/2025   • Cellulitis 01/26/2025   • Chronic fatigue 01/26/2025   • Chronic right-sided low back pain without sciatica 01/26/2025   • Urinary incontinence, mixed 05/23/2024   • Need for COVID-19 vaccine 01/10/2024   • Hypokalemia 01/10/2024   • Postmenopause 01/10/2024   • Pain of left thumb 01/10/2024   • Bilateral hip pain 01/10/2024   • Sebaceous cyst 09/04/2023   • Urine frequency 09/20/2021   • Bilateral carpal tunnel syndrome 09/01/2021   • Pes  anserinus bursitis of right knee 02/16/2021   • Primary osteoarthritis of left knee 02/10/2021   • Primary osteoarthritis of right knee 01/27/2021   • Effusion of right knee 01/27/2021   • Right calf pain 01/21/2021   • 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   • Carpal tunnel syndrome of right wrist 05/19/2020   • Neuropathy 05/19/2020   • History of breast cancer 05/14/2020   • Use of anastrozole 12/24/2019   • Malignant neoplasm of central portion of left breast in female, estrogen receptor positive (HCC) 07/30/2019   • TIA (transient ischemic attack) 07/23/2019   • Nausea 06/24/2019   • Primary hypertension 05/13/2019   • Encounter for follow-up surveillance of ovarian cancer 05/09/2019   • Lichen sclerosus et atrophicus 05/09/2019   • 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/08/2018   • Genetic predisposition to breast cancer 05/31/2018   • Lymphedema of left lower extremity 04/19/2018   • Shortness of breath on exertion 12/07/2017   • History of ovarian cancer 04/13/2017   • Reactive airway disease 10/18/2016   • Dense breasts 07/06/2016   • Osteoporosis 06/29/2016   • Erythema chronica migrans, secondary 05/12/2014   • Dermatitis 01/28/2014   • Hyperlipidemia 04/26/2013   • Colon, diverticulosis 01/22/2013   • Lymphedema 12/06/2012   • Murmur 12/03/2012   • Osteopenia 12/03/2012   • Benign neoplasm of colon 02/09/2011   • Crohn's disease of large bowel (HCC) 02/09/2011   • 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 02/09/2011   • Mitral valve disorder 01/19/2011   • Crohn's disease (HCC) 11/16/2010   • Malignant neoplasm of ovary (HCC) 05/13/2009       Past Medical History:   Diagnosis Date   • Arthritis    • BRCA1 negative    • BRCA2 negative    • Breast cancer (HCC) 09/11/2018    Right   • Breast cancer (HCC) 08/13/2019    Left   • Cancer (HCC) 2009   • Colon polyp    • Crohn disease (HCC)    • Dense breast    • Diverticulitis of colon 1991    Crohn’s   • Genetic  predisposition to breast cancer 05/31/2018   • History of chemotherapy     for ovarian cancer   • History of radiation therapy    • History of transfusion 2009   • Hyperlipidemia    • Hypertension    • Lymphedema     left leg   • Lymphedema    • Monoallelic mutation of YOLANDA gene     Breast Gyn Panel   • Ovarian cancer (HCC) 04/21/2009       Review of Systems   Constitutional:  Negative for chills and fever.   HENT:  Negative for nosebleeds.    Gastrointestinal:  Negative for abdominal distention and blood in stool.   Neurological:  Negative for dizziness and syncope.   Psychiatric/Behavioral:  Negative for confusion.    14-point ROS completed and negative except as stated above and/or in the HPI.    Allergies   Allergen Reactions   • Lisinopril      cough   • Losartan      Numbness on right side of body   • Metoprolol Dizziness   • Boniva [Ibandronate] Headache       Current Outpatient Medications   Medication Instructions   • acetaminophen (TYLENOL) 1,000 mg, As needed   • albuterol (PROVENTIL HFA,VENTOLIN HFA) 90 mcg/act inhaler 2 puffs, Inhalation, As needed   • alendronate (FOSAMAX) 70 mg, Oral, Every 7 days   • amLODIPine (NORVASC) 10 mg, Oral, Daily   • anastrozole (ARIMIDEX) 1 mg, Oral, Daily   • atorvastatin (LIPITOR) 40 mg, Oral, Daily at bedtime   • Calcium Carbonate 1500 (600 Ca) MG TABS 1 tablet, 2 times daily   • carvedilol (COREG) 6.25 mg, Oral, 2 times daily with meals   • clobetasol (TEMOVATE) 0.05 % external solution Apply nightly to scalp for redness/irritation. May use up to 5 times a week. DO NOT apply to face, groin, other areas of body.   • Diclofenac Sodium (VOLTAREN) 2 g, 4 times daily   • diphenhydrAMINE (BENADRYL) 25 mg, As needed   • Homeopathic Products (Arnicare Bruise) GEL Apply topically   • indapamide (LOZOL) 2.5 mg, Oral, Every morning   • inFLIXimab (REMICADE) 100 mg Infuse into a venous catheter     • ketoconazole (NIZORAL) 2 % shampoo Use 2-3x per week on scalp (alternate with over  the counter salicylic acid shampoo). Leave on for 5 minutes and then rinse off completely.   • mupirocin (BACTROBAN) 2 % ointment Topical, 3 times daily   • potassium chloride (MICRO-K) 10 MEQ CR capsule 10 mEq, Oral, Daily   • triamcinolone (KENALOG) 0.025 % cream Topical, 2 times daily, for up to 10 days. May repeat course after 1 week break.   • Turmeric 500 MG CAPS Take by mouth       Social History     Socioeconomic History   • Marital status: Single     Spouse name: Not on file   • Number of children: Not on file   • Years of education: Not on file   • Highest education level: Not on file   Occupational History   • Not on file   Tobacco Use   • Smoking status: Former     Current packs/day: 0.00     Average packs/day: 1 pack/day for 30.0 years (30.0 ttl pk-yrs)     Types: Cigarettes     Quit date: 2009     Years since quittin.0   • Smokeless tobacco: Never   Vaping Use   • Vaping status: Never Used   Substance and Sexual Activity   • Alcohol use: Not Currently   • Drug use: No   • Sexual activity: Not Currently   Other Topics Concern   • Not on file   Social History Narrative   • Not on file     Social Drivers of Health     Financial Resource Strain: Low Risk  (1/10/2024)    Overall Financial Resource Strain (CARDIA)    • Difficulty of Paying Living Expenses: Not hard at all   Food Insecurity: No Food Insecurity (2025)    Hunger Vital Sign    • Worried About Running Out of Food in the Last Year: Never true    • Ran Out of Food in the Last Year: Never true   Transportation Needs: No Transportation Needs (2025)    PRAPARE - Transportation    • Lack of Transportation (Medical): No    • Lack of Transportation (Non-Medical): No   Physical Activity: Not on file   Stress: Not on file   Social Connections: Not on file   Intimate Partner Violence: Not on file   Housing Stability: Low Risk  (2025)    Housing Stability Vital Sign    • Unable to Pay for Housing in the Last Year: No    • Number of  "Times Moved in the Last Year: 0    • Homeless in the Last Year: No       Family History   Problem Relation Age of Onset   • Hypertension Mother    • Heart disease Mother         age 82   • Hearing loss Mother    • Prostate cancer Father    • Alzheimer's disease Father    • Hypertension Father    • Diabetes Father    • Hearing loss Father    • Ovarian cancer Sister    • Hearing loss Sister    • No Known Problems Sister    • Hypertension Sister    • Heart disease Sister    • Other (Other) Sister         pacemaker   • No Known Problems Sister    • Down syndrome Sister    • Alzheimer's disease Sister    • Hypertension Sister    • Arthritis Maternal Grandmother    • Hearing loss Maternal Grandmother    • Heart attack Maternal Grandfather         age 62   • Breast cancer Paternal Grandmother    • Breast cancer Maternal Aunt    • No Known Problems Maternal Aunt    • No Known Problems Paternal Aunt    • Colon cancer Neg Hx        Physical Exam:  Blood pressure 160/90, pulse 71, height 5' 1\" (1.549 m), weight 58.7 kg (129 lb 8 oz), SpO2 96%.  Body mass index is 24.47 kg/m².  Wt Readings from Last 3 Encounters:   04/15/25 58.7 kg (129 lb 8 oz)   03/07/25 61 kg (134 lb 7.7 oz)   02/26/25 59.4 kg (131 lb)     Physical Exam  Vitals reviewed.   Constitutional:       General: She is not in acute distress.     Appearance: She is not toxic-appearing.   HENT:      Head: Normocephalic and atraumatic.   Eyes:      General: No scleral icterus.     Conjunctiva/sclera: Conjunctivae normal.   Neck:      Vascular: No carotid bruit.      Comments: No JVP elevation  Cardiovascular:      Rate and Rhythm: Normal rate and regular rhythm.      Heart sounds: No murmur heard.     No friction rub. No gallop.   Pulmonary:      Breath sounds: Normal breath sounds. No wheezing, rhonchi or rales.   Abdominal:      General: There is no distension.      Palpations: Abdomen is soft.      Tenderness: There is no abdominal tenderness. There is no guarding. " "  Musculoskeletal:      Comments: Lymphedema of left lower extremity since ovarian cancer  No edema of right LE   Skin:     Coloration: Skin is not jaundiced or pale.      Findings: No erythema.   Neurological:      Mental Status: She is alert. Mental status is at baseline.   Psychiatric:         Mood and Affect: Mood normal.         Behavior: Behavior normal.         Labs & Results:  Lab Results   Component Value Date    SODIUM 137 11/07/2024    K 3.1 (L) 11/07/2024    CL 95 (L) 11/07/2024    CO2 34 (H) 11/07/2024    BUN 20 11/07/2024    CREATININE 0.78 11/07/2024    GLUC 91 11/07/2024    CALCIUM 9.2 11/07/2024     No results found for: \"NTBNP\"         Thank you for the opportunity to participate in the care of this patient.    Nelson Morris MD, [facility]  Staff Cardiologist  Director of Cardio-Oncology  [facility]  "

## 2025-04-15 ENCOUNTER — OFFICE VISIT (OUTPATIENT)
Dept: CARDIOLOGY CLINIC | Facility: CLINIC | Age: 70
End: 2025-04-15
Payer: MEDICARE

## 2025-04-15 VITALS
OXYGEN SATURATION: 96 % | BODY MASS INDEX: 24.45 KG/M2 | WEIGHT: 129.5 LBS | HEART RATE: 71 BPM | DIASTOLIC BLOOD PRESSURE: 90 MMHG | SYSTOLIC BLOOD PRESSURE: 160 MMHG | HEIGHT: 61 IN

## 2025-04-15 DIAGNOSIS — R06.09 DOE (DYSPNEA ON EXERTION): ICD-10-CM

## 2025-04-15 DIAGNOSIS — E78.2 MIXED HYPERLIPIDEMIA: ICD-10-CM

## 2025-04-15 DIAGNOSIS — I05.9 MITRAL VALVE DISORDER: ICD-10-CM

## 2025-04-15 DIAGNOSIS — Z82.49 FAMILY HISTORY OF CARDIOVASCULAR DISEASE: ICD-10-CM

## 2025-04-15 DIAGNOSIS — R06.02 SOB (SHORTNESS OF BREATH): ICD-10-CM

## 2025-04-15 DIAGNOSIS — R00.2 PALPITATIONS: ICD-10-CM

## 2025-04-15 DIAGNOSIS — Z92.3 S/P RADIATION THERAPY: ICD-10-CM

## 2025-04-15 DIAGNOSIS — I10 PRIMARY HYPERTENSION: Primary | ICD-10-CM

## 2025-04-15 DIAGNOSIS — Z85.3 HISTORY OF BREAST CANCER: ICD-10-CM

## 2025-04-15 PROCEDURE — 93000 ELECTROCARDIOGRAM COMPLETE: CPT | Performed by: INTERNAL MEDICINE

## 2025-04-15 PROCEDURE — 99204 OFFICE O/P NEW MOD 45 MIN: CPT | Performed by: INTERNAL MEDICINE

## 2025-04-15 RX ORDER — CARVEDILOL 6.25 MG/1
6.25 TABLET ORAL 2 TIMES DAILY WITH MEALS
Qty: 60 TABLET | Refills: 11 | Status: SHIPPED | OUTPATIENT
Start: 2025-04-15

## 2025-04-15 NOTE — PATIENT INSTRUCTIONS
Your blood pressure is above goal, recommend starting carvedilol twice daily  Check echocardiogram  Check blood test called BNP

## 2025-04-15 NOTE — LETTER
April 15, 2025     LUIS Middleton  [address] [facility] [address]    Patient: Ghada Ferreira   YOB: 1955   Date of Visit: 4/15/2025       Dear LUIS Troncoso, DO:    Thank you for referring Ghada Ferreira to me for evaluation. Below are my notes for this consultation.    If you have questions, please do not hesitate to call me. I look forward to following your patient along with you.         Sincerely,        Nelson Morris MD        CC: DO Nelson Glass MD  4/15/2025  9:55 AM  Sign when Signing Visit  Cardio-Oncology / Heart Failure Cardiology Clinic Note    Ghada Ferreira 69 y.o. female   MRN: [id number]  Encounter: [id number]        Assessment / Plan:    # Cardio-Oncology Pertinent History  Ovarian cancer  Dx 2009.  S/p hysterectomy/BSO  S/p chemotherapy - carboplatin and paclitaxel   Breast cancer  Dx 2018 and 2019.  Bilateral.   S/p surgery   S/p XRT - both sides  Current - anastrozole    # Cardio-Oncology Survivorship Recommendations  Discussed that many cancer survivors are at increased risk of cardiovascular disease due to cancer treatments as well as comorbidities.  Discussed importance of optimizing cardiovascular risk factors and post-treatment cardiac surveillance when appropriate.    Left sided breast XRT    Annual history and physical  BP management - SEE BELOW  Lipids - SEE BELOW  Diabetes screening with PCP  Exercise  -   walks dogs  Healthy diet - discussed   Routine dental care -  discussed  Post-treatment surveillance:  yearly history/exam with EKG    # HTN       (Allergies to lisinopril, losartan, metoprolol)  Norvasc 10  Indapamide  BP elevated today 160/90  add coreg 6.25 BID.   Monitor home BP.    # HLD  Lipitor 40  Last     # Palpiations  Rare.  Can occur when upset or when exerting herself.  Ultimately, consider Holter monitor    # MCKINNON  Exam - euvolemic  Check BNP /  echo    #  ? Mitral valve prolapse  Was told in the past she had mitral valve prolapse and later was told she did not.  No echo in our system to review. Given MCKINNON, check echo    # Fam hx cardiovascular disease  Grandfather had MI age 62.  Sister had pacemaker.   Optimize all CV risk factors (HTN, HLD)  Healthy diet  Exercise    # ? hx TIA  In PCP note in 2019 - possible TIA  If true, should be on ASA 81 - please discuss with primary care  On statin    # Crohn's disease  On remicade      Today's Plan Summary:  See above assessment/plan for full details of today's plan.  Briefly,     BP above goal, add Coreg  Check echo  Check BNP                Reason For Visit / Chief Complaint:  Referred by Arianne Vang NP for a new patient visit for prior radiation therapy for breast cancer in setting of family history of cardiovascular disease.    HPI:   Ghada Ferreira is a 69 y.o.  female with history as noted in the problem list and further detailed in the above assessment and plan.    Referred by Arianne Vang NP for a new patient visit for prior radiation therapy for breast cancer in setting of family history of cardiovascular disease.    As above, the patient has a history of breast cancer and ovarian cancer.  The patient also has hypertension, hyperlipidemia, and family history of cardiovascular disease.  The patient also has Crohn's disease on Remicade.    Today, the patient reports  -    occasional palpitation - when upset or when exerting herself.   No syncope.    No chest pain.   No SOB at rest.  Mild MCKINNON.   No orthopnea or PND.   Has lymphedema in left leg.      Works as  and .    .   No children.    Quit smoking in 2009.   No ETOH.   No drugs.          Cardiac Imaging personally reviewed:  EKG 4-15-25  Sinus rhythm  Nonspecific ST abnormality  (very mild / borderline)         Holter or event monitor    Echo    MARISOL    Cardiac MRI    Stress testing    [facility]     CPET              Patient Active Problem List    Diagnosis Date Noted   • Chronic pain of right knee 01/26/2025   • Mixed stress and urge urinary incontinence 01/26/2025   • Cellulitis 01/26/2025   • Chronic fatigue 01/26/2025   • Chronic right-sided low back pain without sciatica 01/26/2025   • Urinary incontinence, mixed 05/23/2024   • Need for COVID-19 vaccine 01/10/2024   • Hypokalemia 01/10/2024   • Postmenopause 01/10/2024   • Pain of left thumb 01/10/2024   • Bilateral hip pain 01/10/2024   • Sebaceous cyst 09/04/2023   • Urine frequency 09/20/2021   • Bilateral carpal tunnel syndrome 09/01/2021   • Pes anserinus bursitis of right knee 02/16/2021   • Primary osteoarthritis of left knee 02/10/2021   • Primary osteoarthritis of right knee 01/27/2021   • Effusion of right knee 01/27/2021   • Right calf pain 01/21/2021   • 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   • Carpal tunnel syndrome of right wrist 05/19/2020   • Neuropathy 05/19/2020   • History of breast cancer 05/14/2020   • Use of anastrozole 12/24/2019   • Malignant neoplasm of central portion of left breast in female, estrogen receptor positive (HCC) 07/30/2019   • TIA (transient ischemic attack) 07/23/2019   • Nausea 06/24/2019   • Primary hypertension 05/13/2019   • Encounter for follow-up surveillance of ovarian cancer 05/09/2019   • Lichen sclerosus et atrophicus 05/09/2019   • 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/08/2018   • Genetic predisposition to breast cancer 05/31/2018   • Lymphedema of left lower extremity 04/19/2018   • Shortness of breath on exertion 12/07/2017   • History of ovarian cancer 04/13/2017   • Reactive airway disease 10/18/2016   • Dense breasts 07/06/2016   • Osteoporosis 06/29/2016   • Erythema chronica migrans, secondary 05/12/2014   • Dermatitis 01/28/2014   • Hyperlipidemia 04/26/2013   • Colon, diverticulosis 01/22/2013   • Lymphedema 12/06/2012   • Murmur 12/03/2012   • Osteopenia 12/03/2012   •  Benign neoplasm of colon 02/09/2011   • Crohn's disease of large bowel (HCC) 02/09/2011   • 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 02/09/2011   • Mitral valve disorder 01/19/2011   • Crohn's disease (HCC) 11/16/2010   • Malignant neoplasm of ovary (HCC) 05/13/2009       Past Medical History:   Diagnosis Date   • Arthritis    • BRCA1 negative    • BRCA2 negative    • Breast cancer (HCC) 09/11/2018    Right   • Breast cancer (HCC) 08/13/2019    Left   • Cancer (HCC) 2009   • Colon polyp    • Crohn disease (HCC)    • Dense breast    • Diverticulitis of colon 1991    Crohn’s   • Genetic predisposition to breast cancer 05/31/2018   • History of chemotherapy     for ovarian cancer   • History of radiation therapy    • History of transfusion 2009   • Hyperlipidemia    • Hypertension    • Lymphedema     left leg   • Lymphedema    • Monoallelic mutation of YOLANDA gene     Breast Gyn Panel   • Ovarian cancer (HCC) 04/21/2009       Review of Systems   Constitutional:  Negative for chills and fever.   HENT:  Negative for nosebleeds.    Gastrointestinal:  Negative for abdominal distention and blood in stool.   Neurological:  Negative for dizziness and syncope.   Psychiatric/Behavioral:  Negative for confusion.    14-point ROS completed and negative except as stated above and/or in the HPI.    Allergies   Allergen Reactions   • Lisinopril      cough   • Losartan      Numbness on right side of body   • Metoprolol Dizziness   • Boniva [Ibandronate] Headache       Current Outpatient Medications   Medication Instructions   • acetaminophen (TYLENOL) 1,000 mg, As needed   • albuterol (PROVENTIL HFA,VENTOLIN HFA) 90 mcg/act inhaler 2 puffs, Inhalation, As needed   • alendronate (FOSAMAX) 70 mg, Oral, Every 7 days   • amLODIPine (NORVASC) 10 mg, Oral, Daily   • anastrozole (ARIMIDEX) 1 mg, Oral, Daily   • atorvastatin (LIPITOR) 40 mg, Oral, Daily at bedtime   • Calcium Carbonate 1500 (600 Ca) MG TABS 1 tablet, 2 times daily    • carvedilol (COREG) 6.25 mg, Oral, 2 times daily with meals   • clobetasol (TEMOVATE) 0.05 % external solution Apply nightly to scalp for redness/irritation. May use up to 5 times a week. DO NOT apply to face, groin, other areas of body.   • Diclofenac Sodium (VOLTAREN) 2 g, 4 times daily   • diphenhydrAMINE (BENADRYL) 25 mg, As needed   • Homeopathic Products (Arnicare Bruise) GEL Apply topically   • indapamide (LOZOL) 2.5 mg, Oral, Every morning   • inFLIXimab (REMICADE) 100 mg Infuse into a venous catheter     • ketoconazole (NIZORAL) 2 % shampoo Use 2-3x per week on scalp (alternate with over the counter salicylic acid shampoo). Leave on for 5 minutes and then rinse off completely.   • mupirocin (BACTROBAN) 2 % ointment Topical, 3 times daily   • potassium chloride (MICRO-K) 10 MEQ CR capsule 10 mEq, Oral, Daily   • triamcinolone (KENALOG) 0.025 % cream Topical, 2 times daily, for up to 10 days. May repeat course after 1 week break.   • Turmeric 500 MG CAPS Take by mouth       Social History     Socioeconomic History   • Marital status: Single     Spouse name: Not on file   • Number of children: Not on file   • Years of education: Not on file   • Highest education level: Not on file   Occupational History   • Not on file   Tobacco Use   • Smoking status: Former     Current packs/day: 0.00     Average packs/day: 1 pack/day for 30.0 years (30.0 ttl pk-yrs)     Types: Cigarettes     Quit date: 2009     Years since quittin.0   • Smokeless tobacco: Never   Vaping Use   • Vaping status: Never Used   Substance and Sexual Activity   • Alcohol use: Not Currently   • Drug use: No   • Sexual activity: Not Currently   Other Topics Concern   • Not on file   Social History Narrative   • Not on file     Social Drivers of Health     Financial Resource Strain: Low Risk  (1/10/2024)    Overall Financial Resource Strain (CARDIA)    • Difficulty of Paying Living Expenses: Not hard at all   Food Insecurity: No Food  "Insecurity (1/16/2025)    Hunger Vital Sign    • Worried About Running Out of Food in the Last Year: Never true    • Ran Out of Food in the Last Year: Never true   Transportation Needs: No Transportation Needs (1/16/2025)    PRAPARE - Transportation    • Lack of Transportation (Medical): No    • Lack of Transportation (Non-Medical): No   Physical Activity: Not on file   Stress: Not on file   Social Connections: Not on file   Intimate Partner Violence: Not on file   Housing Stability: Low Risk  (1/16/2025)    Housing Stability Vital Sign    • Unable to Pay for Housing in the Last Year: No    • Number of Times Moved in the Last Year: 0    • Homeless in the Last Year: No       Family History   Problem Relation Age of Onset   • Hypertension Mother    • Heart disease Mother         age 82   • Hearing loss Mother    • Prostate cancer Father    • Alzheimer's disease Father    • Hypertension Father    • Diabetes Father    • Hearing loss Father    • Ovarian cancer Sister    • Hearing loss Sister    • No Known Problems Sister    • Hypertension Sister    • Heart disease Sister    • Other (Other) Sister         pacemaker   • No Known Problems Sister    • Down syndrome Sister    • Alzheimer's disease Sister    • Hypertension Sister    • Arthritis Maternal Grandmother    • Hearing loss Maternal Grandmother    • Heart attack Maternal Grandfather         age 62   • Breast cancer Paternal Grandmother    • Breast cancer Maternal Aunt    • No Known Problems Maternal Aunt    • No Known Problems Paternal Aunt    • Colon cancer Neg Hx        Physical Exam:  Blood pressure 160/90, pulse 71, height 5' 1\" (1.549 m), weight 58.7 kg (129 lb 8 oz), SpO2 96%.  Body mass index is 24.47 kg/m².  Wt Readings from Last 3 Encounters:   04/15/25 58.7 kg (129 lb 8 oz)   03/07/25 61 kg (134 lb 7.7 oz)   02/26/25 59.4 kg (131 lb)     Physical Exam  Vitals reviewed.   Constitutional:       General: She is not in acute distress.     Appearance: She is not " "toxic-appearing.   HENT:      Head: Normocephalic and atraumatic.   Eyes:      General: No scleral icterus.     Conjunctiva/sclera: Conjunctivae normal.   Neck:      Vascular: No carotid bruit.      Comments: No JVP elevation  Cardiovascular:      Rate and Rhythm: Normal rate and regular rhythm.      Heart sounds: No murmur heard.     No friction rub. No gallop.   Pulmonary:      Breath sounds: Normal breath sounds. No wheezing, rhonchi or rales.   Abdominal:      General: There is no distension.      Palpations: Abdomen is soft.      Tenderness: There is no abdominal tenderness. There is no guarding.   Musculoskeletal:      Comments: Lymphedema of left lower extremity since ovarian cancer  No edema of right LE   Skin:     Coloration: Skin is not jaundiced or pale.      Findings: No erythema.   Neurological:      Mental Status: She is alert. Mental status is at baseline.   Psychiatric:         Mood and Affect: Mood normal.         Behavior: Behavior normal.         Labs & Results:  Lab Results   Component Value Date    SODIUM 137 11/07/2024    K 3.1 (L) 11/07/2024    CL 95 (L) 11/07/2024    CO2 34 (H) 11/07/2024    BUN 20 11/07/2024    CREATININE 0.78 11/07/2024    GLUC 91 11/07/2024    CALCIUM 9.2 11/07/2024     No results found for: \"NTBNP\"         Thank you for the opportunity to participate in the care of this patient.    Nelson Morris MD, [facility]  Staff Cardiologist  Director of Cardio-Oncology  [facility]  "

## 2025-04-16 ENCOUNTER — OFFICE VISIT (OUTPATIENT)
Dept: PHYSICAL THERAPY | Facility: CLINIC | Age: 70
End: 2025-04-16
Payer: MEDICARE

## 2025-04-16 DIAGNOSIS — G89.29 CHRONIC PAIN OF RIGHT KNEE: ICD-10-CM

## 2025-04-16 DIAGNOSIS — M54.50 CHRONIC RIGHT-SIDED LOW BACK PAIN WITHOUT SCIATICA: Primary | ICD-10-CM

## 2025-04-16 DIAGNOSIS — M25.561 CHRONIC PAIN OF RIGHT KNEE: ICD-10-CM

## 2025-04-16 DIAGNOSIS — G89.29 CHRONIC RIGHT-SIDED LOW BACK PAIN WITHOUT SCIATICA: Primary | ICD-10-CM

## 2025-04-16 PROCEDURE — 97110 THERAPEUTIC EXERCISES: CPT

## 2025-04-16 PROCEDURE — 97112 NEUROMUSCULAR REEDUCATION: CPT

## 2025-04-16 NOTE — PROGRESS NOTES
"Daily Note      Today's date: 2025  Patient name: Ghada Ferreira  : 1955  MRN: [id number]  Referring provider: Thao Jones DO  Dx:   Encounter Diagnosis     ICD-10-CM    1. Chronic right-sided low back pain without sciatica  M54.50     G89.29       2. Chronic pain of right knee  M25.561     G89.29           Subjective: Pt reports that she had increased knee pain yesterday. Pt states that she went for a walk on the trail and the dog she walks is now used to her walking quicker. Pt states that on days her back or knee is flared up, she is not able to walk as quickly.        Objective: See treatment diary below    Assessment: Pt tolerated treatment well.  Pt introduced to mini squats and required moderate cuing for form, but was able to correct with demonstration. Pt noted right knee pain with eccentric heel tap during step down with RLE stance. This was then modified to stepping down and resting at floor level and then stepping back with the RLE and pt noted much less pain with this. Plan to progress to step up with weight and functional lifting.    Plan: Continue per plan of care.         Daily Treatment Diary     Precautions: Chronic low back pain, LLE lymphedema, hx of cancer    POC Expires Reeval for Medicare to be completed  Unit Limit Auth Expiration Date PT/OT/STVisit Limit   25 By visit 10 N/A N/A N/A    Completed on visit 3/24                   Auth Status DATE 25     NA Visit # 13 14 15 16      Remaining 7 6 5 4     MANUAL THERAPY         STM/MFR R knee         Joint mobs R knee                                              THERAPEUTIC EXERCISE HEP         Bike  5' L2 5' L1  5' L1 5' L1      PROM R knee          Heel prop x         Gastroc stretch   3x30s B/L  Slant brd 3x20\" 3x30s B/L      HS stretch  3x30s B/L  3x30s B/L  3x20\" ea 3x30s B/L      Hip flexor stretch          Leg press     10x 60#  10x 70#  10x 80# 70#+    Sit-to-stand          Prone on elbows      " "    REIL          JESSICA  10x 10x x10 10x     Step ups  2x10 6\" no UE  10x ea 4\"   10x ea 6\"  2x10 ea 6\"      Step downs   2x10 6\" 1UE 10x ea 4\"   10x ea 6\"  4\" 2x10  10x ea w/ heel tap 4\"     10x step down 4\"      Mini squats     2x10 at counter                NEUROMUSCULAR REEDUCATION           Quad sets  10x5s B/L  10x5s B/L  2x10 B/L      SAQ   2x10 B/L  2x10 B/L      LAQ  2x10 B/L  2x10 B/L  2x10 B/L 2x10 B/L      SLR  2x10 B/L  2x10 B/L  2x10 B/L 2x10 B/L      Bridging          Rows  2x10 OTB  2x10 OTB 2x10 10#     Shoulder extensions  2x10 OTB  2x10 OTB 2x10 10#     Standing hip abd/ext    Abd 2x10 B/L Abd 15x B/L   Ext 10x B/L      TA w/ standing march          Pallof press   20x B/L double OTB                                                                              THERAPEUTIC ACTIVITY          Farmer's carries          STS w/ med ball                              GAIT TRAINING                                                  MODALITIES                             Access Code: [email or web address]  URL: [email or web address]  Date: 03/19/2025  Prepared by: Diaz Moore    Exercises  - Standing Lumbar Extension with Counter  - 6 x daily - 7 x weekly - 1 sets - 10 reps  - Long Sitting Calf Stretch with Strap  - 1 x daily - 7 x weekly - 1 sets - 3 reps - 20 hold  - Hooklying Hamstring Stretch with Strap  - 1 x daily - 7 x weekly - 1 sets - 3 reps - 20 hold  - Supine Quad Set  - 1 x daily - 7 x weekly - 2 sets - 10 reps - 5 hold  - Supine Quadriceps Stretch with Strap on Table  - 1 x daily - 7 x weekly - 1 sets - 3 reps - 20 hold  - Active Straight Leg Raise with Quad Set  - 1 x daily - 7 x weekly - 2 sets - 10 reps  - Seated Knee Extension  - 1 x daily - 7 x weekly - 2 sets - 10 reps - 5 hold  - Supine Bridge  - 1 x daily - 4 x weekly - 2 sets - 10 reps - 5 hold  - Standing Hip Abduction with Counter Support  - 1 x daily - 4 x weekly - 2 sets - 10 reps  - Standing Hip Extension with Counter Support  - 1 x " daily - 4 x weekly - 2 sets - 10 reps  - Standing Row with Anchored Resistance  - 1 x daily - 4 x weekly - 2 sets - 10 reps - 5 hold  - Shoulder Extension with Resistance  - 1 x daily - 4 x weekly - 2 sets - 10 reps - 5 hold

## 2025-04-18 ENCOUNTER — OFFICE VISIT (OUTPATIENT)
Dept: PHYSICAL THERAPY | Facility: CLINIC | Age: 70
End: 2025-04-18
Payer: MEDICARE

## 2025-04-18 DIAGNOSIS — G89.29 CHRONIC RIGHT-SIDED LOW BACK PAIN WITHOUT SCIATICA: Primary | ICD-10-CM

## 2025-04-18 DIAGNOSIS — G89.29 CHRONIC PAIN OF RIGHT KNEE: ICD-10-CM

## 2025-04-18 DIAGNOSIS — M54.50 CHRONIC RIGHT-SIDED LOW BACK PAIN WITHOUT SCIATICA: Primary | ICD-10-CM

## 2025-04-18 DIAGNOSIS — M25.561 CHRONIC PAIN OF RIGHT KNEE: ICD-10-CM

## 2025-04-18 PROCEDURE — 97110 THERAPEUTIC EXERCISES: CPT

## 2025-04-18 PROCEDURE — 97112 NEUROMUSCULAR REEDUCATION: CPT

## 2025-04-18 NOTE — PROGRESS NOTES
"Daily Note      Today's date: 2025  Patient name: Ghada Ferreira  : 1955  MRN: [id number]  Referring provider: Thao Jones DO  Dx:   Encounter Diagnosis     ICD-10-CM    1. Chronic right-sided low back pain without sciatica  M54.50     G89.29       2. Chronic pain of right knee  M25.561     G89.29           Subjective: Pt reports that both knees hurt after last session, which she attributes to step downs with controlled heel tap last session. Pt states that her back did not hurt while trail walking with the dog yesterday.        Objective: See treatment diary below    Assessment: Pt tolerated treatment well.  Pt held from step downs this visit due to increased knee joint symptoms following last session. Pt introduced to step ups while holding weight and noted no increase in pain. Pt introduced to carries while holding 10 lb total and pt noted slight \"twinge\" in the right knee during this. Pt was able to progress resistance with leg press without increase in pain.     Plan: Continue per plan of care.         Daily Treatment Diary     Precautions: Chronic low back pain, LLE lymphedema, hx of cancer    POC Expires Reeval for Medicare to be completed  Unit Limit Auth Expiration Date PT/OT/STVisit Limit   25 By visit 10 N/A N/A N/A    Completed on visit 3/24                   Auth Status DATE 25    NA Visit # 13 14 15 16 17     Remaining 7 6 5 4 3    MANUAL THERAPY         STM/MFR R knee         Joint mobs R knee                                              THERAPEUTIC EXERCISE HEP         Bike  5' L2 5' L1  5' L1 5' L1  5' L1    PROM R knee          Heel prop x         Gastroc stretch   3x30s B/L  Slant brd 3x20\" 3x30s B/L  3x30s B/L     HS stretch  3x30s B/L  3x30s B/L  3x20\" ea 3x30s B/L  3x30s B/L     Hip flexor stretch          Leg press     10x 60#  10x 70#  10x 80# 70#+    Sit-to-stand          Prone on elbows          REIL          JESSICA  10x 10x x10 10x     Step " "ups  2x10 6\" no UE  10x ea 4\"   10x ea 6\"  2x10 ea 6\"  2x10 ea 6\" step holding 5# DB    Step downs   2x10 6\" 1UE 10x ea 4\"   10x ea 6\"  4\" 2x10  10x ea w/ heel tap 4\"     10x step down 4\"  Hold     Mini squats     2x10 at counter                NEUROMUSCULAR REEDUCATION           Quad sets  10x5s B/L  10x5s B/L  2x10 B/L      SAQ   2x10 B/L  2x10 B/L      LAQ  2x10 B/L  2x10 B/L  2x10 B/L 2x10 B/L  2x10 B/L     SLR  2x10 B/L  2x10 B/L  2x10 B/L 2x10 B/L  2x10 B/L     Bridging          Rows  2x10 OTB  2x10 OTB 2x10 10#     Shoulder extensions  2x10 OTB  2x10 OTB 2x10 10#     Standing hip abd/ext    Abd 2x10 B/L Abd 15x B/L   Ext 10x B/L      TA w/ standing march          Pallof press   20x B/L double OTB                                                                              THERAPEUTIC ACTIVITY          Farmer's carries      3 laps x 50 ft while holding 5# DB x 2     STS w/ med ball                              GAIT TRAINING                                                  MODALITIES                             Access Code: [email or web address]  URL: [email or web address]  Date: 03/19/2025  Prepared by: Diaz Moore    Exercises  - Standing Lumbar Extension with Counter  - 6 x daily - 7 x weekly - 1 sets - 10 reps  - Long Sitting Calf Stretch with Strap  - 1 x daily - 7 x weekly - 1 sets - 3 reps - 20 hold  - Hooklying Hamstring Stretch with Strap  - 1 x daily - 7 x weekly - 1 sets - 3 reps - 20 hold  - Supine Quad Set  - 1 x daily - 7 x weekly - 2 sets - 10 reps - 5 hold  - Supine Quadriceps Stretch with Strap on Table  - 1 x daily - 7 x weekly - 1 sets - 3 reps - 20 hold  - Active Straight Leg Raise with Quad Set  - 1 x daily - 7 x weekly - 2 sets - 10 reps  - Seated Knee Extension  - 1 x daily - 7 x weekly - 2 sets - 10 reps - 5 hold  - Supine Bridge  - 1 x daily - 4 x weekly - 2 sets - 10 reps - 5 hold  - Standing Hip Abduction with Counter Support  - 1 x daily - 4 x weekly - 2 sets - 10 reps  - Standing " Hip Extension with Counter Support  - 1 x daily - 4 x weekly - 2 sets - 10 reps  - Standing Row with Anchored Resistance  - 1 x daily - 4 x weekly - 2 sets - 10 reps - 5 hold  - Shoulder Extension with Resistance  - 1 x daily - 4 x weekly - 2 sets - 10 reps - 5 hold

## 2025-04-23 ENCOUNTER — OFFICE VISIT (OUTPATIENT)
Dept: PHYSICAL THERAPY | Facility: CLINIC | Age: 70
End: 2025-04-23
Attending: FAMILY MEDICINE
Payer: MEDICARE

## 2025-04-23 ENCOUNTER — RESULTS FOLLOW-UP (OUTPATIENT)
Age: 70
End: 2025-04-23

## 2025-04-23 ENCOUNTER — APPOINTMENT (OUTPATIENT)
Dept: LAB | Facility: HOSPITAL | Age: 70
End: 2025-04-23
Payer: MEDICARE

## 2025-04-23 ENCOUNTER — HOSPITAL ENCOUNTER (OUTPATIENT)
Dept: NON INVASIVE DIAGNOSTICS | Age: 70
Discharge: HOME | End: 2025-04-23
Attending: INTERNAL MEDICINE
Payer: MEDICARE

## 2025-04-23 VITALS
WEIGHT: 129 LBS | BODY MASS INDEX: 24.35 KG/M2 | SYSTOLIC BLOOD PRESSURE: 140 MMHG | DIASTOLIC BLOOD PRESSURE: 70 MMHG | HEART RATE: 62 BPM | HEIGHT: 61 IN

## 2025-04-23 DIAGNOSIS — M54.50 CHRONIC RIGHT-SIDED LOW BACK PAIN WITHOUT SCIATICA: Primary | ICD-10-CM

## 2025-04-23 DIAGNOSIS — M25.561 CHRONIC PAIN OF RIGHT KNEE: ICD-10-CM

## 2025-04-23 DIAGNOSIS — G89.29 CHRONIC RIGHT-SIDED LOW BACK PAIN WITHOUT SCIATICA: Primary | ICD-10-CM

## 2025-04-23 DIAGNOSIS — R06.02 SOB (SHORTNESS OF BREATH): ICD-10-CM

## 2025-04-23 DIAGNOSIS — R06.09 DOE (DYSPNEA ON EXERTION): ICD-10-CM

## 2025-04-23 DIAGNOSIS — G89.29 CHRONIC PAIN OF RIGHT KNEE: ICD-10-CM

## 2025-04-23 DIAGNOSIS — Z92.3 S/P RADIATION THERAPY: ICD-10-CM

## 2025-04-23 LAB
AORTIC ROOT: 2.5 CM
ASCENDING AORTA: 2.6 CM
BNP SERPL-MCNC: 114 PG/ML (ref 0–100)
BSA FOR ECHO PROCEDURE: 1.57 M2
DOP CALC LVOT AREA: 2.83 CM2
DOP CALC LVOT DIAMETER: 1.9 CM
E WAVE DECELERATION TIME: 191 MS
E/A RATIO: 0.97
FRACTIONAL SHORTENING: 39 (ref 28–44)
GLOBAL LONGITUIDAL STRAIN: -20 %
INTERVENTRICULAR SEPTUM IN DIASTOLE (PARASTERNAL SHORT AXIS VIEW): 0.7 CM
INTERVENTRICULAR SEPTUM: 0.7 CM (ref 0.6–1.1)
LAAS-AP2: 17.7 CM2
LAAS-AP4: 17.2 CM2
LEFT ATRIUM SIZE: 3.2 CM
LEFT ATRIUM VOLUME (MOD BIPLANE): 49 ML
LEFT ATRIUM VOLUME INDEX (MOD BIPLANE): 31.2 ML/M2
LEFT INTERNAL DIMENSION IN SYSTOLE: 2.2 CM (ref 2.1–4)
LEFT VENTRICLE DIASTOLIC VOLUME (MOD BIPLANE): 68 ML
LEFT VENTRICLE DIASTOLIC VOLUME INDEX (MOD BIPLANE): 43.3 ML/M2
LEFT VENTRICLE SYSTOLIC VOLUME (MOD BIPLANE): 21 ML
LEFT VENTRICLE SYSTOLIC VOLUME INDEX (MOD BIPLANE): 13.4 ML/M2
LEFT VENTRICULAR INTERNAL DIMENSION IN DIASTOLE: 3.6 CM (ref 3.5–6)
LEFT VENTRICULAR POSTERIOR WALL IN END DIASTOLE: 1.1 CM
LEFT VENTRICULAR STROKE VOLUME: 39 ML
LV EF BIPLANE MOD: 70 %
LV EF US.2D.A4C+ESTIMATED: 67 %
LVSV (TEICH): 39 ML
MV E'TISSUE VEL-SEP: 9 CM/S
MV PEAK A VEL: 0.93 M/S
MV PEAK E VEL: 90 CM/S
MV STENOSIS PRESSURE HALF TIME: 55 MS
MV VALVE AREA P 1/2 METHOD: 4
RA PRESSURE ESTIMATED: 8 MMHG
RIGHT ATRIUM AREA SYSTOLE A4C: 14.8 CM2
RIGHT VENTRICLE ID DIMENSION: 3.6 CM
RV PSP: 33 MMHG
SL CV LEFT ATRIUM LENGTH A2C: 4.9 CM
SL CV LV EF: 69
SL CV PED ECHO LEFT VENTRICLE DIASTOLIC VOLUME (MOD BIPLANE) 2D: 55 ML
SL CV PED ECHO LEFT VENTRICLE SYSTOLIC VOLUME (MOD BIPLANE) 2D: 16 ML
SL CV TR MAX PG ANTEGRADE: 22 MMHG
TR MAX PG: 25 MMHG
TR PEAK VELOCITY: 2.5 M/S
TRICUSPID ANNULAR PLANE SYSTOLIC EXCURSION: 1.8 CM
TRICUSPID VALVE PEAK REGURGITATION VELOCITY: 2.5 M/S
TV MEAN GRADIENT: 16 MMHG
TV MV D: 1.96 M/S
TV VTI: 79.84 CM

## 2025-04-23 PROCEDURE — 93306 TTE W/DOPPLER COMPLETE: CPT

## 2025-04-23 PROCEDURE — 36415 COLL VENOUS BLD VENIPUNCTURE: CPT

## 2025-04-23 PROCEDURE — 97112 NEUROMUSCULAR REEDUCATION: CPT

## 2025-04-23 PROCEDURE — 97110 THERAPEUTIC EXERCISES: CPT

## 2025-04-23 PROCEDURE — 93356 MYOCRD STRAIN IMG SPCKL TRCK: CPT

## 2025-04-23 PROCEDURE — 93306 TTE W/DOPPLER COMPLETE: CPT | Performed by: INTERNAL MEDICINE

## 2025-04-23 PROCEDURE — 83880 ASSAY OF NATRIURETIC PEPTIDE: CPT

## 2025-04-23 PROCEDURE — 93356 MYOCRD STRAIN IMG SPCKL TRCK: CPT | Performed by: INTERNAL MEDICINE

## 2025-04-23 NOTE — PROGRESS NOTES
Daily Note      Today's date: 2025  Patient name: Ghada Ferreira  : 1955  MRN: [id number]  Referring provider: Thao Jones DO  Dx:   Encounter Diagnosis     ICD-10-CM    1. Chronic right-sided low back pain without sciatica  M54.50     G89.29       2. Chronic pain of right knee  M25.561     G89.29           Subjective: Pt reports that her right knee still hurts over the past week, but it is improving. Pt states that over the past weekend, she has also had hip pain on both sides and this has not bothered her in a long time. Pt states that she does not recall a specific activity over the weekend that could've exacerbated hip pain.        Objective: See treatment diary below    Assessment: Pt tolerated treatment well.  Pt noted more fatigue with RLE strengthening activities, such as SLR and LAQ. Pt was able to increase starting resistance with leg press without knee pain, but noted slight increase in bilateral hip pain. Pt would benefit from continued PT in order to progress LE/lumbar strength and mobility and in order to facilitate independence with ADLs and prevent risk of falls.     Plan: Continue per plan of care.         Daily Treatment Diary     Precautions: Chronic low back pain, LLE lymphedema, hx of cancer    POC Expires Reeval for Medicare to be completed  Unit Limit Auth Expiration Date PT/OT/STVisit Limit   25 By visit 10 N/A N/A N/A    Completed on visit 3/24                   Auth Status DATE        NA Visit # 17 18        Remaining 3 2       MANUAL THERAPY         STM/MFR R knee         Joint mobs R knee                                              THERAPEUTIC EXERCISE HEP         Bike  5' L1 5' L1       PROM R knee          Heel prop x         Gastroc stretch  3x30s B/L  3x30s B/L        HS stretch  3x30s B/L  3x30s B/L        Hip flexor stretch          Leg press  70#+ 2x10 70#  10x 80#       Sit-to-stand          Prone on elbows          SOLO LOPEZ         "  Step ups  2x10 ea 6\" step holding 5# DB 2x10 ea 6\" step holding 10# DB       Step downs   Hold         Mini squats                    NEUROMUSCULAR REEDUCATION           Quad sets          SAQ          LAQ  2x10 B/L  2x10 B/L        SLR  2x10 B/L  2x10 B/L        Bridging          Rows          Shoulder extensions          Standing hip abd/ext          TA w/ standing march          Pallof press                                                                                 THERAPEUTIC ACTIVITY          Farmer's carries  3 laps x 50 ft while holding 5# DB x 2         STS w/ med ball                              GAIT TRAINING                                                  MODALITIES                             Access Code: [email or web address]  URL: [email or web address]  Date: 03/19/2025  Prepared by: Diaz Moore    Exercises  - Standing Lumbar Extension with Counter  - 6 x daily - 7 x weekly - 1 sets - 10 reps  - Long Sitting Calf Stretch with Strap  - 1 x daily - 7 x weekly - 1 sets - 3 reps - 20 hold  - Hooklying Hamstring Stretch with Strap  - 1 x daily - 7 x weekly - 1 sets - 3 reps - 20 hold  - Supine Quad Set  - 1 x daily - 7 x weekly - 2 sets - 10 reps - 5 hold  - Supine Quadriceps Stretch with Strap on Table  - 1 x daily - 7 x weekly - 1 sets - 3 reps - 20 hold  - Active Straight Leg Raise with Quad Set  - 1 x daily - 7 x weekly - 2 sets - 10 reps  - Seated Knee Extension  - 1 x daily - 7 x weekly - 2 sets - 10 reps - 5 hold  - Supine Bridge  - 1 x daily - 4 x weekly - 2 sets - 10 reps - 5 hold  - Standing Hip Abduction with Counter Support  - 1 x daily - 4 x weekly - 2 sets - 10 reps  - Standing Hip Extension with Counter Support  - 1 x daily - 4 x weekly - 2 sets - 10 reps  - Standing Row with Anchored Resistance  - 1 x daily - 4 x weekly - 2 sets - 10 reps - 5 hold  - Shoulder Extension with Resistance  - 1 x daily - 4 x weekly - 2 sets - 10 reps - 5 hold                           "

## 2025-04-23 NOTE — RESULT ENCOUNTER NOTE
Echo - 04/23/25     EF 69%. GLS normal at -20.9%. Diastolic function is grade I (abnormal) relaxation.  LA is mildly dilated.  Mitral Valve: There is mild calcification. 1+ MR.  Very stubtle prolapse, does not meet criteria for MV prolapse.  Tricuspid Valve: There is 1+ TR.   RVSP is 33.00 mmHg.  The IVC is at upper limits of normal.  Respirophasic change is blunted.

## 2025-04-25 ENCOUNTER — OFFICE VISIT (OUTPATIENT)
Dept: PHYSICAL THERAPY | Facility: CLINIC | Age: 70
End: 2025-04-25
Payer: MEDICARE

## 2025-04-25 DIAGNOSIS — M54.50 CHRONIC RIGHT-SIDED LOW BACK PAIN WITHOUT SCIATICA: Primary | ICD-10-CM

## 2025-04-25 DIAGNOSIS — E87.6 HYPOKALEMIA: ICD-10-CM

## 2025-04-25 DIAGNOSIS — G89.29 CHRONIC RIGHT-SIDED LOW BACK PAIN WITHOUT SCIATICA: Primary | ICD-10-CM

## 2025-04-25 DIAGNOSIS — G89.29 CHRONIC PAIN OF RIGHT KNEE: ICD-10-CM

## 2025-04-25 DIAGNOSIS — M25.561 CHRONIC PAIN OF RIGHT KNEE: ICD-10-CM

## 2025-04-25 PROCEDURE — 97112 NEUROMUSCULAR REEDUCATION: CPT

## 2025-04-25 PROCEDURE — 97110 THERAPEUTIC EXERCISES: CPT

## 2025-04-25 RX ORDER — POTASSIUM CHLORIDE 750 MG/1
10 CAPSULE, EXTENDED RELEASE ORAL DAILY
Qty: 90 CAPSULE | Refills: 1 | Status: SHIPPED | OUTPATIENT
Start: 2025-04-25

## 2025-04-25 NOTE — PROGRESS NOTES
Daily Note      Today's date: 2025  Patient name: Ghada Ferreira  : 1955  MRN: [id number]  Referring provider: Thao Jones DO  Dx:   Encounter Diagnosis     ICD-10-CM    1. Chronic right-sided low back pain without sciatica  M54.50     G89.29       2. Chronic pain of right knee  M25.561     G89.29           Subjective: Pt reports that she anticipate her left knee won't do as well today because it feels tight. Pt states that she has noticed a pattern that when she gets home late, she tends to fall asleep in a comfy chair and without using her Solaris sleeve. Pt states afterward, she wakes up and has more swelling and more tightness in the leg. Pt states that during times she fell asleep in bed and used her sleeve, she did not have as much difficulty with the left leg due to swelling being less severe.        Objective: See treatment diary below    Assessment: Pt tolerated treatment well.  Pt demonstrates improving awareness of exacerbating factors in her daily lifestyle that may be contributing to swelling, stiffness, and pain. Educated pt to self-monitor the correlation between proper sleep, positioning, and lymphedema management and pt verbalized agreement. Pt noted less pain with step ups and step downs this visit, but noted low back soreness from carrying a 10 lb cuff weight this visit. Pt concluded the session with lumbar extensions and noted some relief of back soreness. Pt verbalized interest in transitioning to a gym program once PT concludes, anticipated in the next month. Pt was also educated on the differences between physical therapy-guided exercise compared to personal training. Discussed differentiating muscle soreness/fatigue vs pain.     Plan: Continue per plan of care.         Daily Treatment Diary     Precautions: Chronic low back pain, LLE lymphedema, hx of cancer    POC Expires Reeval for Medicare to be completed  Unit Limit Auth Expiration Date PT/OT/STVisit Limit   25  "By visit 10 N/A N/A N/A    Completed on visit 3/24                   Auth Status DATE 4/18 4/23 4/25      NA Visit # 17 18 19       Remaining 3 2 1      MANUAL THERAPY         STM/MFR R knee         Joint mobs R knee                                              THERAPEUTIC EXERCISE HEP         Bike  5' L1 5' L1 5' L1      PROM R knee          Heel prop x         Gastroc stretch  3x30s B/L  3x30s B/L  3x30s B/L       HS stretch  3x30s B/L  3x30s B/L  3x30s B/L       Hip flexor stretch          Leg press  70#+ 2x10 70#  10x 80# 3x10 90# seat 4      Sit-to-stand          Prone on elbows          REIL          JESSICA    10x end of session      Step ups  2x10 ea 6\" step holding 5# DB 2x10 ea 6\" step holding 10# DB 2x10 6\" step holding 10# cuff weight       Step downs   Hold         Mini squats                    NEUROMUSCULAR REEDUCATION           Quad sets          SAQ          LAQ  2x10 B/L  2x10 B/L        SLR  2x10 B/L  2x10 B/L        Bridging          Rows          Shoulder extensions          Standing hip abd/ext    2x10 ea       TA w/ standing march          Pallof press                                                                                 THERAPEUTIC ACTIVITY          Farmer's carries  3 laps x 50 ft while holding 5# DB x 2         STS w/ med ball                              GAIT TRAINING                                                  MODALITIES                             Access Code: [email or web address]  URL: [email or web address]  Date: 03/19/2025  Prepared by: Diaz Moore    Exercises  - Standing Lumbar Extension with Counter  - 6 x daily - 7 x weekly - 1 sets - 10 reps  - Long Sitting Calf Stretch with Strap  - 1 x daily - 7 x weekly - 1 sets - 3 reps - 20 hold  - Hooklying Hamstring Stretch with Strap  - 1 x daily - 7 x weekly - 1 sets - 3 reps - 20 hold  - Supine Quad Set  - 1 x daily - 7 x weekly - 2 sets - 10 reps - 5 hold  - Supine Quadriceps Stretch with Strap on Table  - 1 x daily - 7 " x weekly - 1 sets - 3 reps - 20 hold  - Active Straight Leg Raise with Quad Set  - 1 x daily - 7 x weekly - 2 sets - 10 reps  - Seated Knee Extension  - 1 x daily - 7 x weekly - 2 sets - 10 reps - 5 hold  - Supine Bridge  - 1 x daily - 4 x weekly - 2 sets - 10 reps - 5 hold  - Standing Hip Abduction with Counter Support  - 1 x daily - 4 x weekly - 2 sets - 10 reps  - Standing Hip Extension with Counter Support  - 1 x daily - 4 x weekly - 2 sets - 10 reps  - Standing Row with Anchored Resistance  - 1 x daily - 4 x weekly - 2 sets - 10 reps - 5 hold  - Shoulder Extension with Resistance  - 1 x daily - 4 x weekly - 2 sets - 10 reps - 5 hold

## 2025-04-30 ENCOUNTER — EVALUATION (OUTPATIENT)
Dept: PHYSICAL THERAPY | Facility: CLINIC | Age: 70
End: 2025-04-30
Attending: FAMILY MEDICINE
Payer: MEDICARE

## 2025-04-30 DIAGNOSIS — G89.29 CHRONIC RIGHT-SIDED LOW BACK PAIN WITHOUT SCIATICA: Primary | ICD-10-CM

## 2025-04-30 DIAGNOSIS — G89.29 CHRONIC PAIN OF RIGHT KNEE: ICD-10-CM

## 2025-04-30 DIAGNOSIS — M25.561 CHRONIC PAIN OF RIGHT KNEE: ICD-10-CM

## 2025-04-30 DIAGNOSIS — M54.50 CHRONIC RIGHT-SIDED LOW BACK PAIN WITHOUT SCIATICA: Primary | ICD-10-CM

## 2025-04-30 PROCEDURE — 97110 THERAPEUTIC EXERCISES: CPT

## 2025-04-30 NOTE — PROGRESS NOTES
PT Re-Evaluation     Today's date: 2025  Patient name: Ghada Ferreira  : 1955  MRN: [id number]  Referring provider: Thao Jones DO  Dx:   Encounter Diagnosis     ICD-10-CM    1. Chronic right-sided low back pain without sciatica  M54.50     G89.29       2. Chronic pain of right knee  M25.561     G89.29                      Assessment  Impairments: abnormal gait, abnormal muscle firing, abnormal or restricted ROM, activity intolerance, impaired physical strength, lacks appropriate home exercise program, pain with function, poor posture  and poor body mechanics    Assessment details: Ghada Ferreira has been seen for 20 visits in hospital-based outpatient PT with chronic right-sided low back pain and chronic right knee pain. Patient demonstrates good progress toward short-term and long-term physical therapy goals, however presented with increased low back pain during the past several days. Patient presents with the following improvements: decreased right knee pain, improved knee and lumbar ROM, improved LE strength, and improved gait mechanics. Patient continues to present with the following impairments: low back and right knee pain, limited right knee AROM, limited right LE strength, limited B/L HS/hip flexor flexibility, and gait/postural abnormalities. Due to these impairments, patient continues to have difficulty performing the following: ADL's, ambulation, stair negotiation, lifting/carrying, transfers, self-care activities, prolonged standing, squatting, kneeling, and household chores. Patient has been educated in updated plan of care. Patient would benefit from continued skilled physical therapy services to address the above functional limitations and progress towards prior level of function and independence with home exercise program.  Understanding of Dx/Px/POC: good     Prognosis: good    Goals  Short Term Goals [3 weeks; target date: 3/15/25]  1. Patient will be independent with  initial HEP. - GOAL MET  2. Patient will demonstrate an increase in right knee extension AROM to 0° and knee flexion AROM to 115°. - GOAL MET  3. Patient will demonstrate an increase in right knee strength of 1/2 grade on MMT. - in progress    Long Term Goals [8 weeks; target date: 5/5/25]  1. Patient will be independent with comprehensive HEP. - in progress  2. Patient will demonstrate an increase in right knee AROM to WNL in order to promote self-care activities pain-free. - GOAL MET  3. Patient will demonstrate an increase in right knee strength to 4/5 on MMT. - in progress  4. Patient will be able to perform a full, functional squat with proper mechanics. - in progress  5. Patient will be able to ascend/descend 15 stairs reciprocally with use of handrail.  - in progress  6. Patient will be able to walk her dog for 10 minutes with low back/right knee pain of 3-4/10 at worst. - in progress     Plan  Patient would benefit from: skilled physical therapy  Planned modality interventions: cryotherapy, electrical stimulation/Russian stimulation, TENS, ultrasound, thermotherapy: hydrocollator packs, traction, high voltage pulsed current: spasm management and high voltage pulsed current: pain management    Planned therapy interventions: flexibility, functional ROM exercises, graded exercise, home exercise program, joint mobilization, manual therapy, neuromuscular re-education, patient education, strengthening, stretching, therapeutic exercise, therapeutic activities, balance/weight bearing training, gait training, abdominal trunk stabilization, self care, postural training, activity modification, ADL retraining, ADL training, balance, behavior modification, body mechanics training, breathing training, therapeutic training, transfer training, graded activity, graded motor, muscle pump exercises, Hazel taping and IADL retraining    Frequency: 1-2x week  Plan of Care beginning date: 4/30/2025  Plan of Care expiration  date: 6/2/2025  Treatment plan discussed with: patient      Subjective Evaluation    History of Present Illness  Mechanism of injury: Pt reports chronic low back pain on the right side for a few years. Pt states her PCP has found that her lower back is tight. Pt states she has pain with holding a dog leash in her right hand and walking, even if dogs are not pulling. Pt states she had scoliosis as a child and had no interventions to address it. Pt states that she has difficulty with lifting due to her back and right leg. Pt states she has also noticed that over time, her posture has become poor, whether or not she is walking a dog or just standing still with hands at rest.     Pt reports right knee pain for 3 years. Pt states that she has done PT for this in the past which helped. Pt states she has also had cortisone shots in the past. Pt states at the start of January, she feels that her legs are tight and began some self-guided stretching, which helped. Pt states she also has history of right patellar tendon issues. Pt states her legs get tired very easily. Pt states that she mentioned both of these issues while at her PCP office and was referred to PT. Pt denies any recent imaging for either the spine or knee.     (3/24/25) Pt reports >50% progress since starting PT for the right knee. Pt states she still has good days and bad days. Pt states that flexibility and mobility have improved and she has less pain while going up and down the stairs. Pt states in order to return to 100%, she needs to be able to kneel. Pt states that she also must be able to get up from the floor without difficulty. Pt states that she also has pain while driving sometimes, although it has improved. Pt states walking uphill and downhill is also difficult at times. Pt reports >60% progress in the low back as well. Pt states it has limited her while walking. Pt states she has been more mindful about her posture while standing has this has  decreased shoulder pain. Pt states that her increased knee pain today is likely due to the rainy and cool weather.     (25) Pt reports continued overall progress in the right knee and low back with PT over the past month. Pt states that over the past few days, however, she has increased pain in her back. Pt states that she thinks starting step downs in PT has also contributed to increased right knee pain on the inside (medial). Pt states that walking up steep inclines are still difficult with the right knee, but have improved overall. Pt states that she has also found that correcting her knee posture (straightening) in standing also helps straighten her back.   Pain  Current pain rating: 3  At best pain rating: 3  At worst pain ratin  Location: Right knee (Low back C: 0, B: 0, W: 6)  Quality: dull ache (Constant)  Relieving factors: relaxation, change in position, support, rest and ice  Aggravating factors: walking, stair climbing and lifting (Getting in and out of the car)  Progression: no change    Social Support  Steps to enter house: yes  5  Stairs in house: yes   15  Lives with: alone (Has pets)    Employment status: working (Works with pets - dog walking)  Hand dominance: right  Exercise history: Walking with pets 2x/week 20 minutes;        Objective     Observations     Additional Observation Details  Lymphedema present throughout LLE    Active Range of Motion   Left Knee   Flexion: 115 degrees   Extension: 0 degrees     Right Knee   Flexion: 127 degrees   Extension: 1 degrees     Strength/Myotome Testing     Left Knee   Flexion: 4  Extension: 4 (Left quad cramping during MMT)  Quadriceps contraction: good    Right Knee   Flexion: 4  Extension: 4-  Quadriceps contraction: fair    Ambulation     Comments   Limited TKE bilaterally, forward flexed trunk posture; no AD at baseline      Red Flag Screening  (+) for age >50  (-) for unexplained weight loss  (+) for history of cancer  (-) for saddle  "paresthesia  (-) for bladder/bowel dysfunction       Lumbar AROM     Flexion  Fingertip reach to floor   Extension 75%    L side glide 70%   R side glide 70% pain returning to start on L lumbar   L rotation 65% w/ pinching in L lumbar   R rotation  75%     Repeated motions Position Repetitions performed Symptomatic response during Symptomatic response after Effect on ROM   Flexion Standing 10  No effect No effect Increased motion   Extension Standing 10 No effect No effect Increased motion     Comments:     Lower Extremity Strength Testing    Hip MMT  Left  Right   Flexion  4+/5 4+/5    Abduction 4+/5 4+/5   Adduction 4/5 4/5       Knee MMT  Left  Right   Flexion  4/5  4-/5   Extension 4-/5 4-/5     Ankle MMT  Left  Right   Dorsiflexion  4/5 4+/5    Plantarflexion 4+/5 4+/5           Daily Treatment Diary     Precautions: Chronic low back pain, LLE lymphedema, hx of cancer    POC Expires Reeval for Medicare to be completed  Unit Limit Auth Expiration Date PT/OT/STVisit Limit   6/2/25 By visit 10 N/A N/A N/A    Completed on visit 4/30                   Auth Status DATE 4/18 4/23 4/25 4/30 (RE-EVAL)     NA Visit # 17 18 19 20      Remaining 3 2 1 10     MANUAL THERAPY         STM/MFR R knee         Joint mobs R knee                                              THERAPEUTIC EXERCISE HEP         Bike  5' L1 5' L1 5' L1 5' L1-2     PROM R knee          Heel prop x         Gastroc stretch  3x30s B/L  3x30s B/L  3x30s B/L       HS stretch  3x30s B/L  3x30s B/L  3x30s B/L       Hip flexor stretch          Leg press  70#+ 2x10 70#  10x 80# 3x10 90# seat 4      Sit-to-stand          Prone on elbows          REIL          JESSICA    10x end of session      Step ups  2x10 ea 6\" step holding 5# DB 2x10 ea 6\" step holding 10# DB 2x10 6\" step holding 10# cuff weight       Step downs   Hold         Mini squats                    NEUROMUSCULAR REEDUCATION           TA activation      10x5s B/L      Hip add iso     2x10 (5s)      Supine " clams     2x10 (3s)      Quad sets          SAQ          LAQ  2x10 B/L  2x10 B/L        SLR  2x10 B/L  2x10 B/L        Bridging     W/ TA act 2x10 (3s)      Rows          Shoulder extensions          Standing hip abd/ext    2x10 ea       TA w/ standing march          Pallof press                                                                                 THERAPEUTIC ACTIVITY          Farmer's carries  3 laps x 50 ft while holding 5# DB x 2         STS w/ med ball                              GAIT TRAINING                                                  MODALITIES                             Access Code: [email or web address]  URL: [email or web address]  Date: 03/19/2025  Prepared by: Diaz Moore    Exercises  - Standing Lumbar Extension with Counter  - 6 x daily - 7 x weekly - 1 sets - 10 reps  - Long Sitting Calf Stretch with Strap  - 1 x daily - 7 x weekly - 1 sets - 3 reps - 20 hold  - Hooklying Hamstring Stretch with Strap  - 1 x daily - 7 x weekly - 1 sets - 3 reps - 20 hold  - Supine Quad Set  - 1 x daily - 7 x weekly - 2 sets - 10 reps - 5 hold  - Supine Quadriceps Stretch with Strap on Table  - 1 x daily - 7 x weekly - 1 sets - 3 reps - 20 hold  - Active Straight Leg Raise with Quad Set  - 1 x daily - 7 x weekly - 2 sets - 10 reps  - Seated Knee Extension  - 1 x daily - 7 x weekly - 2 sets - 10 reps - 5 hold  - Supine Bridge  - 1 x daily - 4 x weekly - 2 sets - 10 reps - 5 hold  - Standing Hip Abduction with Counter Support  - 1 x daily - 4 x weekly - 2 sets - 10 reps  - Standing Hip Extension with Counter Support  - 1 x daily - 4 x weekly - 2 sets - 10 reps  - Standing Row with Anchored Resistance  - 1 x daily - 4 x weekly - 2 sets - 10 reps - 5 hold  - Shoulder Extension with Resistance  - 1 x daily - 4 x weekly - 2 sets - 10 reps - 5 hold

## 2025-05-02 ENCOUNTER — HOSPITAL ENCOUNTER (OUTPATIENT)
Dept: INFUSION CENTER | Facility: HOSPITAL | Age: 70
End: 2025-05-02
Attending: INTERNAL MEDICINE
Payer: MEDICARE

## 2025-05-02 VITALS
SYSTOLIC BLOOD PRESSURE: 131 MMHG | HEART RATE: 65 BPM | RESPIRATION RATE: 14 BRPM | TEMPERATURE: 97.4 F | WEIGHT: 130.29 LBS | BODY MASS INDEX: 24.62 KG/M2 | OXYGEN SATURATION: 98 % | DIASTOLIC BLOOD PRESSURE: 60 MMHG

## 2025-05-02 DIAGNOSIS — K50.10 CROHN'S DISEASE OF LARGE INTESTINE WITHOUT COMPLICATION (HCC): Primary | ICD-10-CM

## 2025-05-02 PROCEDURE — 96415 CHEMO IV INFUSION ADDL HR: CPT

## 2025-05-02 PROCEDURE — 96413 CHEMO IV INFUSION 1 HR: CPT

## 2025-05-02 RX ORDER — SODIUM CHLORIDE 9 MG/ML
20 INJECTION, SOLUTION INTRAVENOUS ONCE
Status: COMPLETED | OUTPATIENT
Start: 2025-05-02 | End: 2025-05-02

## 2025-05-02 RX ORDER — SODIUM CHLORIDE 9 MG/ML
20 INJECTION, SOLUTION INTRAVENOUS ONCE
OUTPATIENT
Start: 2025-06-27

## 2025-05-02 RX ADMIN — SODIUM CHLORIDE 20 ML/HR: 0.9 INJECTION, SOLUTION INTRAVENOUS at 10:07

## 2025-05-02 RX ADMIN — INFLIXIMAB 300 MG: 100 INJECTION, POWDER, LYOPHILIZED, FOR SOLUTION INTRAVENOUS at 10:09

## 2025-05-02 NOTE — PROGRESS NOTES
Ghada Ferreira  tolerated treatment well with no complications.      Ghada Ferreira is aware of future appt on 6/27 at 0900.     AVS printed and given to Ghada Ferreira:    No (Declined by Ghada Ferreira)

## 2025-05-08 ENCOUNTER — APPOINTMENT (OUTPATIENT)
Dept: PHYSICAL THERAPY | Facility: CLINIC | Age: 70
End: 2025-05-08
Attending: FAMILY MEDICINE
Payer: MEDICARE

## 2025-05-14 ENCOUNTER — OFFICE VISIT (OUTPATIENT)
Dept: PHYSICAL THERAPY | Facility: CLINIC | Age: 70
End: 2025-05-14
Attending: FAMILY MEDICINE
Payer: MEDICARE

## 2025-05-14 DIAGNOSIS — G89.29 CHRONIC PAIN OF RIGHT KNEE: ICD-10-CM

## 2025-05-14 DIAGNOSIS — M25.561 CHRONIC PAIN OF RIGHT KNEE: ICD-10-CM

## 2025-05-14 DIAGNOSIS — M54.50 CHRONIC RIGHT-SIDED LOW BACK PAIN WITHOUT SCIATICA: Primary | ICD-10-CM

## 2025-05-14 DIAGNOSIS — G89.29 CHRONIC RIGHT-SIDED LOW BACK PAIN WITHOUT SCIATICA: Primary | ICD-10-CM

## 2025-05-14 PROCEDURE — 97112 NEUROMUSCULAR REEDUCATION: CPT

## 2025-05-14 PROCEDURE — 97110 THERAPEUTIC EXERCISES: CPT

## 2025-05-14 NOTE — PROGRESS NOTES
Name: Ghada Ferreira      : 1955      MRN: [id number]  Encounter Provider: Pushpa Odom PA-C  Encounter Date: 5/15/2025   Encounter department: [facility] GYNECOLOGY ONCOLOGY [facility]  :  Assessment & Plan  Encounter for follow-up surveillance of ovarian cancer         History of ovarian cancer  History of stage IA ovarian cancer s/p completion of adjuvant chemotherapy in 2009. 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 She is clinically without evidence of ovarian cancer recurrence.     Return to the office in 1 year for continued ovarian cancer surveillance.        History of ductal carcinoma in situ (DCIS) of breast  Continued f/u with med-onc and surg-onc.       Vulvar irritation  Stable, continue clobetasol ointment 1-2 times per week.  Orders:  •  clobetasol (TEMOVATE) 0.05 % ointment; Apply topically 2 (two) times a day Apply to the affected area 1-2 times per week    Lymphedema of left lower extremity  Chronic/stable.   Continue compression stockings.                History of Present Illness     Reason for Visit / CC:  Survivorship / Surveillance Visit    Ghada Ferreira is a 69 y.o. female   who has no new complaints today. No vaginal bleeding, abdominal/pelvic pain. Normal bowel and bladder function. She notes her left lower extremity swelling is stable. She continues with compression stocking. Her vulvar irritation is well managed with once weekly clobetasol. No interval change in medical history since last visit. Quality of life is good.           Oncology History   Cancer Staging   History of ductal carcinoma in situ (DCIS) of breast  Staging form: Breast, AJCC 8th Edition  - Pathologic stage from 2018: Stage 0 (pTis (DCIS), pN0, cM0, ER-, ME-, HER2: Not Assessed) - Signed by LUIS Valladares on 2024  Stage prefix: Initial diagnosis  Neoadjuvant therapy: No  Method of lymph  node assessment: Clinical  Nuclear grade: G2  Multigene prognostic tests performed: None  Laterality: Right  Tumor size (mm): 14    History of ovarian cancer  Staging form: Ovary, AJCC 7th Edition  - Clinical: FIGO Stage IA (T1a, N0, M0) - Signed by Pushpa Odom PA-C on 4/19/2018  Histologic grade (G): G3    Malignant neoplasm of central portion of left breast in female, estrogen receptor positive (HCC)  Staging form: Breast, AJCC 8th Edition  - Pathologic stage from 8/1/2018: Stage IA (pT1b, pN0(sn), cM0, G1, ER+, ME-, HER2-) - Signed by LUIS Valladares on 1/29/2024  Stage prefix: Initial diagnosis  Neoadjuvant therapy: No  Method of lymph node assessment: [place] lymph node biopsy  Multigene prognostic tests performed: None  Histologic grading system: 3 grade system  Laterality: Left  Tumor size (mm): 3  Oncology History   History of ovarian cancer    Initial Diagnosis    Ovarian cancer (HCC)     4/21/2009 Surgery    Exploratory laparotomy, total abdominal hysterectomy, bilateral salpingo-oophrectomy, lymph node dissection, omentectomy with staging      - 7/8/2009 Chemotherapy    Intraperitoneal along with intravenous chemotherapy on a early ovarian cancer protocol.     5/31/2018 Genetic Testing    Genetic testing results are POSITIVE for a pathogenic variant: YOLANDA c.5290delC      Genetic testing indicated that the patient carries a Variant of Uncertain Significance (VUS) : Rad51C c.252G>T      Hormone Therapy       History of ductal carcinoma in situ (DCIS) of breast   5/31/2018 Genetic Testing    Genetic testing results are POSITIVE for a pathogenic variant: YOLANDA c.5290delC      Genetic testing indicated that the patient carries a Variant of Uncertain Significance (VUS) : Rad51C c.252G>T     8/1/2018 Biopsy    Right breast biopsy  11 o'clock position 5 cmfn  DCIS  Grade 2  Confirmed by Martin Luna MD  ER 0  ME 0     8/1/2018 -  Cancer Staged    Staging form: Breast, AJCC 8th Edition  -  Pathologic stage from 8/1/2018: Stage 0 (pTis (DCIS), pN0, cM0, ER-, AL-, HER2: Not Assessed) - Signed by LUIS Valladares on 1/29/2024  Stage prefix: Initial diagnosis  Neoadjuvant therapy: No  Method of lymph node assessment: Clinical  Nuclear grade: G2  Multigene prognostic tests performed: None  Laterality: Right  Tumor size (mm): 14       9/11/2018 Surgery    Right lumpectomy  DCIS  Grade 2  1.4 cm  Margins negative  Stage 0     10/16/2018 -  Hormone Therapy    Consult with Dr. Alcantar  No adjuvant systemic therapy recommended     10/29/2018 - 11/28/2018 Radiation    Course: C1    Plan ID Energy Fractions Dose per Fraction (cGy) Dose Correction (cGy) Total Dose Delivered (cGy) Elapsed Days   R Breast 6X 16 / 16 266 0 4,256 22   R Breast e 12E 5 / 5 200 0 1,000 7      Treatment dates:  C1: 10/29/2018 - 11/28/2018       Malignant neoplasm of central portion of left breast in female, estrogen receptor positive (HCC)   8/1/2018 -  Cancer Staged    Staging form: Breast, AJCC 8th Edition  - Pathologic stage from 8/1/2018: Stage IA (pT1b, pN0(sn), cM0, G1, ER+, AL-, HER2-) - Signed by LUIS Valladares on 1/29/2024  Stage prefix: Initial diagnosis  Neoadjuvant therapy: No  Method of lymph node assessment: [place] lymph node biopsy  Multigene prognostic tests performed: None  Histologic grading system: 3 grade system  Laterality: Left  Tumor size (mm): 3       7/18/2019 Biopsy    Left breast MRI-guided biopsy  3 o'clock, posterior depth  Invasive breast carcinoma of no special type  Grade 1  ER 90  AL 0  HER2 1+     8/13/2019 Surgery    Left breast needle localized lumpectomy with sentinel lymph node biopsy  Invasive mammary carcinoma of no special type  Grade 1  3 mm  Margins negative  0/1 Lymph node  Anatomic/Prognostic Stage IA     10/8/2019 - 11/5/2019 Radiation      Treatment:  Course: C2  Plan ID Energy Fractions Dose per Fraction (cGy) Dose Correction (cGy) Total Dose Delivered (cGy)  "Elapsed Days   ISMA SOUZA Breast 6X 16 / 16 266 0 4,256 21   BH LIBBY Brst e 12E 5 / 5 200 0 1,000 6    C2: 10/8/2019 - 11/5/2019 11/2019 -  Hormone Therapy    Anastrozole        Review of Systems   Constitutional: Negative.    HENT: Negative.     Eyes: Negative.    Respiratory: Negative.     Cardiovascular:  Positive for leg swelling (left lower extremity, chronic).   Gastrointestinal: Negative.    Genitourinary: Negative.    Musculoskeletal: Negative.    Skin: Negative.    Neurological: Negative.    Psychiatric/Behavioral: Negative.      A complete review of systems is negative other than that noted above in the HPI.  Medical History Reviewed by provider this encounter:  Tobacco  Allergies  Meds  Problems  Med Hx  Surg Hx  Fam Hx     .  Medications Ordered Prior to Encounter[1]      Objective   /68 (BP Location: Left arm, Patient Position: Sitting, Cuff Size: Adult)   Pulse 58   Temp 98.1 °F (36.7 °C)   Resp 18   Ht 5' 1\" (1.549 m)   Wt 59.6 kg (131 lb 6.4 oz)   SpO2 97%   BMI 24.83 kg/m²     Body mass index is 24.83 kg/m².  Pain Screening:  Pain Score: 0-No pain    Physical Exam  Vitals reviewed. Exam conducted with a chaperone present.   Constitutional:       General: She is not in acute distress.     Appearance: Normal appearance. She is not ill-appearing.   HENT:      Head: Normocephalic and atraumatic.      Mouth/Throat:      Mouth: Mucous membranes are moist.     Eyes:      General:         Right eye: No discharge.         Left eye: No discharge.      Conjunctiva/sclera: Conjunctivae normal.     Pulmonary:      Effort: Pulmonary effort is normal.   Abdominal:      Palpations: Abdomen is soft. There is no mass.      Tenderness: There is no abdominal tenderness.      Hernia: No hernia is present.   Genitourinary:     Comments: The external female genitalia is normal. The bartholin's, uretheral and skenes glands are normal. The urethral meatus is normal (midline with no lesions). Anus without " fissure or lesion. Speculum exam reveals a grossly normal vagina. Atrophic changes present. No masses, lesions,discharge or bleeding. No significant cystocele or rectocele noted. Bimanual exam notes a surgical absent cervix, uterus and adnexal structures. No masses or fullness. Bladder is without fullness, mass or tenderness.    Musculoskeletal:      Right lower leg: No edema.      Left lower leg: Edema present.     Skin:     General: Skin is warm and dry.      Coloration: Skin is not jaundiced.      Findings: No rash.     Neurological:      General: No focal deficit present.      Mental Status: She is alert and oriented to person, place, and time.      Cranial Nerves: No cranial nerve deficit.      Sensory: No sensory deficit.      Motor: No weakness.      Gait: Gait normal.     Psychiatric:         Mood and Affect: Mood normal.         Behavior: Behavior normal.         Thought Content: Thought content normal.         Judgment: Judgment normal.                             [1]  Current Outpatient Medications on File Prior to Visit   Medication Sig Dispense Refill   • acetaminophen (TYLENOL) 500 mg tablet Take 1,000 mg by mouth as needed Pre-Medication prior to Remicade Infusion     • albuterol (PROVENTIL HFA,VENTOLIN HFA) 90 mcg/act inhaler Inhale 2 puffs  as needed in the morning for wheezing. 18 g 2   • alendronate (FOSAMAX) 70 mg tablet Take 1 tablet (70 mg total) by mouth every 7 days 12 tablet 3   • amLODIPine (NORVASC) 10 mg tablet Take 1 tablet (10 mg total) by mouth daily 90 tablet 1   • anastrozole (ARIMIDEX) 1 mg tablet TAKE 1 TABLET (1 MG TOTAL) BY MOUTH DAILY 30 tablet 5   • atorvastatin (LIPITOR) 40 mg tablet Take 1 tablet (40 mg total) by mouth daily at bedtime 90 tablet 0   • Calcium Carbonate 1500 (600 Ca) MG TABS Take 1 tablet by mouth in the morning and 1 tablet in the evening.     • carvedilol (COREG) 6.25 mg tablet Take 1 tablet (6.25 mg total) by mouth 2 (two) times a day with meals 60 tablet  11   • Diclofenac Sodium (VOLTAREN) 1 % Apply 2 g topically in the morning and 2 g at noon and 2 g in the evening and 2 g before bedtime. PRN.     • diphenhydrAMINE (BENADRYL) 25 mg tablet Take 25 mg by mouth as needed Pre-Medication prior to Remicade Infusion     • Homeopathic Products (Arnicare Bruise) GEL Apply topically     • indapamide (LOZOL) 2.5 mg tablet TAKE 1 TABLET (2.5 MG TOTAL) BY MOUTH EVERY MORNING 90 tablet 1   • inFLIXimab (REMICADE) 100 mg Inject into a catheter in a vein     • mupirocin (BACTROBAN) 2 % ointment Apply topically 3 (three) times a day 22 g 0   • potassium chloride (MICRO-K) 10 MEQ CR capsule TAKE 1 CAPSULE (10 MEQ TOTAL) BY MOUTH DAILY 90 capsule 1   • ketoconazole (NIZORAL) 2 % shampoo Use 2-3x per week on scalp (alternate with over the counter salicylic acid shampoo). Leave on for 5 minutes and then rinse off completely. (Patient not taking: Reported on 2/26/2025) 120 mL 4   • triamcinolone (KENALOG) 0.025 % cream Apply topically 2 (two) times a day for up to 10 days. May repeat course after 1 week break. (Patient not taking: Reported on 4/15/2025) 15 g 1   • Turmeric 500 MG CAPS Take by mouth (Patient not taking: Reported on 4/15/2025)     • [DISCONTINUED] clobetasol (TEMOVATE) 0.05 % external solution Apply nightly to scalp for redness/irritation. May use up to 5 times a week. DO NOT apply to face, groin, other areas of body. (Patient not taking: Reported on 5/15/2025) 50 mL 2     No current facility-administered medications on file prior to visit.

## 2025-05-14 NOTE — PROGRESS NOTES
Daily Note      Today's date: 2025  Patient name: Ghada Ferreira  : 1955  MRN: [id number]  Referring provider: Thao Jones DO  Dx:   Encounter Diagnosis     ICD-10-CM    1. Chronic right-sided low back pain without sciatica  M54.50     G89.29       2. Chronic pain of right knee  M25.561     G89.29           Subjective: Pt reports 5/10 right knee pain. Pt states that she mowed the lawn this past Monday and thinks this was a mistake because after 30 minutes of mowing, she felt more knee pain the day after. Pt states that despite that knee/back pain still fluctuate based on her activity, she feels she is closer to managing the pain on her own. Pt is also concerned that her upper body is weak, which makes it harder to do things such as carrying bags up the steps. Pt has also reflected that joining a gym is likely not realistic given her lifestyle, but finds a way to do exercises while performing pet visits.        Objective: See treatment diary below    Assessment: Pt tolerated treatment well. Reviewed POC and D/C planning as pt demonstrates improved understanding of activity modification and self-management. Reviewed form with periscapular strengthening to decrease upper trap compensation. Pt notes discomfort when correcting posture in the upper traps, but this is likely due to chronically altered length-tension relationship with forward head and rounded shoulder posture.     Plan: Continue per plan of care.  Anticipated D/C at the end of this month.         Daily Treatment Diary     Precautions: Chronic low back pain, LLE lymphedema, hx of cancer    POC Expires Reeval for Medicare to be completed  Unit Limit Auth Expiration Date PT/OT/STVisit Limit   25 By visit 10 N/A N/A N/A    Completed on visit                    Auth Status DATE  (RE-EVAL)     NA Visit # 17 18 19 20 21     Remaining 3 2 1 10 9    MANUAL THERAPY         STM/MFR R knee         Joint mobs R knee    "                                           THERAPEUTIC EXERCISE HEP         Bike  5' L1 5' L1 5' L1 5' L1-2 5' L1     PROM R knee          Heel prop x         Gastroc stretch  3x30s B/L  3x30s B/L  3x30s B/L   3x30s B/L     HS stretch  3x30s B/L  3x30s B/L  3x30s B/L   3x30s B/L     Hip flexor stretch          Leg press  70#+ 2x10 70#  10x 80# 3x10 90# seat 4      Sit-to-stand          Prone on elbows          REIL          JESSICA    10x end of session  Reviewed    Step ups  2x10 ea 6\" step holding 5# DB 2x10 ea 6\" step holding 10# DB 2x10 6\" step holding 10# cuff weight   2x10 6\" step holding 5# DB    Lateral step ups      2x10 6\" step     Step downs   Hold         Mini squats                    NEUROMUSCULAR REEDUCATION           TA activation      10x5s B/L  Reviewed    Hip add iso     2x10 (5s)      Supine clams     2x10 (3s)      Quad sets          SAQ          LAQ  2x10 B/L  2x10 B/L    2x10 B/L     SLR  2x10 B/L  2x10 B/L        Bridging     W/ TA act 2x10 (3s)  W/ TA act 2x10 (3s)     Rows      2x10 GTB    Shoulder extensions      2x10 GTB    Standing hip abd/ext    2x10 ea       TA w/ standing march          Pallof press                                                                                 THERAPEUTIC ACTIVITY          Farmer's carries  3 laps x 50 ft while holding 5# DB x 2         STS w/ med ball                              GAIT TRAINING                                                  MODALITIES                             Access Code: [email or web address]  URL: [email or web address]  Date: 03/19/2025  Prepared by: Diaz Moore    Exercises  - Standing Lumbar Extension with Counter  - 6 x daily - 7 x weekly - 1 sets - 10 reps  - Long Sitting Calf Stretch with Strap  - 1 x daily - 7 x weekly - 1 sets - 3 reps - 20 hold  - Hooklying Hamstring Stretch with Strap  - 1 x daily - 7 x weekly - 1 sets - 3 reps - 20 hold  - Supine Quad Set  - 1 x daily - 7 x weekly - 2 sets - 10 reps - 5 hold  - Supine " Quadriceps Stretch with Strap on Table  - 1 x daily - 7 x weekly - 1 sets - 3 reps - 20 hold  - Active Straight Leg Raise with Quad Set  - 1 x daily - 7 x weekly - 2 sets - 10 reps  - Seated Knee Extension  - 1 x daily - 7 x weekly - 2 sets - 10 reps - 5 hold  - Supine Bridge  - 1 x daily - 4 x weekly - 2 sets - 10 reps - 5 hold  - Standing Hip Abduction with Counter Support  - 1 x daily - 4 x weekly - 2 sets - 10 reps  - Standing Hip Extension with Counter Support  - 1 x daily - 4 x weekly - 2 sets - 10 reps  - Standing Row with Anchored Resistance  - 1 x daily - 4 x weekly - 2 sets - 10 reps - 5 hold  - Shoulder Extension with Resistance  - 1 x daily - 4 x weekly - 2 sets - 10 reps - 5 hold

## 2025-05-14 NOTE — ASSESSMENT & PLAN NOTE
History of stage IA ovarian cancer s/p completion of adjuvant chemotherapy in July 2009. 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 She is clinically without evidence of ovarian cancer recurrence.     Return to the office in 1 year for continued ovarian cancer surveillance.

## 2025-05-15 ENCOUNTER — OFFICE VISIT (OUTPATIENT)
Age: 70
End: 2025-05-15
Payer: MEDICARE

## 2025-05-15 VITALS
SYSTOLIC BLOOD PRESSURE: 122 MMHG | DIASTOLIC BLOOD PRESSURE: 68 MMHG | HEART RATE: 58 BPM | TEMPERATURE: 98.1 F | OXYGEN SATURATION: 97 % | HEIGHT: 61 IN | RESPIRATION RATE: 18 BRPM | WEIGHT: 131.4 LBS | BODY MASS INDEX: 24.81 KG/M2

## 2025-05-15 DIAGNOSIS — N90.89 VULVAR IRRITATION: ICD-10-CM

## 2025-05-15 DIAGNOSIS — I89.0 LYMPHEDEMA OF LEFT LOWER EXTREMITY: ICD-10-CM

## 2025-05-15 DIAGNOSIS — Z08 ENCOUNTER FOR FOLLOW-UP SURVEILLANCE OF OVARIAN CANCER: Primary | ICD-10-CM

## 2025-05-15 DIAGNOSIS — Z85.43 HISTORY OF OVARIAN CANCER: ICD-10-CM

## 2025-05-15 DIAGNOSIS — Z86.000 HISTORY OF DUCTAL CARCINOMA IN SITU (DCIS) OF BREAST: ICD-10-CM

## 2025-05-15 DIAGNOSIS — Z85.43 ENCOUNTER FOR FOLLOW-UP SURVEILLANCE OF OVARIAN CANCER: Primary | ICD-10-CM

## 2025-05-15 PROCEDURE — 99214 OFFICE O/P EST MOD 30 MIN: CPT | Performed by: PHYSICIAN ASSISTANT

## 2025-05-15 PROCEDURE — 99459 PELVIC EXAMINATION: CPT | Performed by: PHYSICIAN ASSISTANT

## 2025-05-15 RX ORDER — CLOBETASOL PROPIONATE 0.5 MG/G
OINTMENT TOPICAL 2 TIMES DAILY
Qty: 30 G | Refills: 2 | Status: SHIPPED | OUTPATIENT
Start: 2025-05-15

## 2025-05-21 ENCOUNTER — OFFICE VISIT (OUTPATIENT)
Dept: PHYSICAL THERAPY | Facility: CLINIC | Age: 70
End: 2025-05-21
Attending: FAMILY MEDICINE
Payer: MEDICARE

## 2025-05-21 DIAGNOSIS — M25.561 CHRONIC PAIN OF RIGHT KNEE: ICD-10-CM

## 2025-05-21 DIAGNOSIS — G89.29 CHRONIC PAIN OF RIGHT KNEE: ICD-10-CM

## 2025-05-21 DIAGNOSIS — G89.29 CHRONIC RIGHT-SIDED LOW BACK PAIN WITHOUT SCIATICA: Primary | ICD-10-CM

## 2025-05-21 DIAGNOSIS — M54.50 CHRONIC RIGHT-SIDED LOW BACK PAIN WITHOUT SCIATICA: Primary | ICD-10-CM

## 2025-05-21 PROCEDURE — 97110 THERAPEUTIC EXERCISES: CPT | Performed by: PHYSICAL THERAPIST

## 2025-05-21 PROCEDURE — 97112 NEUROMUSCULAR REEDUCATION: CPT | Performed by: PHYSICAL THERAPIST

## 2025-05-21 NOTE — PROGRESS NOTES
"Daily Note      Today's date: 2025  Patient name: Ghada Ferreira  : 1955  MRN: [id number]  Referring provider: Thao Jones DO  Dx:   Encounter Diagnosis     ICD-10-CM    1. Chronic right-sided low back pain without sciatica  M54.50     G89.29       2. Chronic pain of right knee  M25.561     G89.29             Subjective: Pt reports 5/10 right knee pain reporting \"little more sore today.\" Patient reporting limited with dog walking earlier in the week due to back pain, reporting pain 7/10.      Objective: See treatment diary below    Assessment: Pt tolerated treatment well. Added lateral walking today with good tolerance, patient reporting \"I can really feel this one in my hips, but it feels good.\" PT recommending addition of lateral walking as tolerated. Appropriate challenge and fatigue without adverse response to any TE performed today.     Plan: Continue per plan of care.  Anticipated D/C at the end of this month.         Daily Treatment Diary     Precautions: Chronic low back pain, LLE lymphedema, hx of cancer    POC Expires Reeval for Medicare to be completed  Unit Limit Auth Expiration Date PT/OT/STVisit Limit   25 By visit 10 N/A N/A N/A    Completed on visit                    Auth Status DATE  (RE-EVAL)    NA Visit # 17 18 19 20 21 22    Remaining 3 2 1 10 9 8   MANUAL THERAPY         STM/MFR R knee         Joint mobs R knee                                              THERAPEUTIC EXERCISE HEP         Bike  5' L1 5' L1 5' L1 5' L1-2 5' L1     PROM R knee          Heel prop x         Gastroc stretch  3x30s B/L  3x30s B/L  3x30s B/L   3x30s B/L  3x30s B/L   HS stretch  3x30s B/L  3x30s B/L  3x30s B/L   3x30s B/L  3x30s B/L   Hip flexor stretch          Leg press  70#+ 2x10 70#  10x 80# 3x10 90# seat 4      Sit-to-stand          Prone on elbows          REIL          JESSICA    10x end of session  Reviewed    Step ups  2x10 ea 6\" step holding 5# DB 2x10 ea " "6\" step holding 10# DB 2x10 6\" step holding 10# cuff weight   2x10 6\" step holding 5# DB NP today    Lateral step ups      2x10 6\" step  2x10 6\" step   Step downs   Hold         Mini squats          Lateral walking       3 laps at mirror                                 NEUROMUSCULAR REEDUCATION           TA activation      10x5s B/L  Reviewed    Hip add iso     2x10 (5s)      Supine clams     2x10 (3s)      Quad sets          SAQ          LAQ  2x10 B/L  2x10 B/L    2x10 B/L  2x10 B/L   SLR  2x10 B/L  2x10 B/L        Bridging     W/ TA act 2x10 (3s)  W/ TA act 2x10 (3s)  W/ TA act 2x10 (3s)    Rows      2x10 GTB 2x10 GTB   Shoulder extensions      2x10 GTB 2x10 GTB   Standing hip abd/ext    2x10 ea       TA w/ standing march          Pallof press                                                                                 THERAPEUTIC ACTIVITY          Farmer's carries  3 laps x 50 ft while holding 5# DB x 2         STS w/ med ball                              GAIT TRAINING                                                  MODALITIES                             Access Code: [email or web address]  URL: [email or web address]  Date: 03/19/2025  Prepared by: Diaz Moore    Exercises  - Standing Lumbar Extension with Counter  - 6 x daily - 7 x weekly - 1 sets - 10 reps  - Long Sitting Calf Stretch with Strap  - 1 x daily - 7 x weekly - 1 sets - 3 reps - 20 hold  - Hooklying Hamstring Stretch with Strap  - 1 x daily - 7 x weekly - 1 sets - 3 reps - 20 hold  - Supine Quad Set  - 1 x daily - 7 x weekly - 2 sets - 10 reps - 5 hold  - Supine Quadriceps Stretch with Strap on Table  - 1 x daily - 7 x weekly - 1 sets - 3 reps - 20 hold  - Active Straight Leg Raise with Quad Set  - 1 x daily - 7 x weekly - 2 sets - 10 reps  - Seated Knee Extension  - 1 x daily - 7 x weekly - 2 sets - 10 reps - 5 hold  - Supine Bridge  - 1 x daily - 4 x weekly - 2 sets - 10 reps - 5 hold  - Standing Hip Abduction with Counter Support  - 1 x " daily - 4 x weekly - 2 sets - 10 reps  - Standing Hip Extension with Counter Support  - 1 x daily - 4 x weekly - 2 sets - 10 reps  - Standing Row with Anchored Resistance  - 1 x daily - 4 x weekly - 2 sets - 10 reps - 5 hold  - Shoulder Extension with Resistance  - 1 x daily - 4 x weekly - 2 sets - 10 reps - 5 hold

## 2025-05-28 ENCOUNTER — OFFICE VISIT (OUTPATIENT)
Dept: PHYSICAL THERAPY | Facility: CLINIC | Age: 70
End: 2025-05-28
Attending: FAMILY MEDICINE
Payer: MEDICARE

## 2025-05-28 DIAGNOSIS — M54.50 CHRONIC RIGHT-SIDED LOW BACK PAIN WITHOUT SCIATICA: Primary | ICD-10-CM

## 2025-05-28 DIAGNOSIS — G89.29 CHRONIC PAIN OF RIGHT KNEE: ICD-10-CM

## 2025-05-28 DIAGNOSIS — G89.29 CHRONIC RIGHT-SIDED LOW BACK PAIN WITHOUT SCIATICA: Primary | ICD-10-CM

## 2025-05-28 DIAGNOSIS — M25.561 CHRONIC PAIN OF RIGHT KNEE: ICD-10-CM

## 2025-05-28 PROCEDURE — 97110 THERAPEUTIC EXERCISES: CPT

## 2025-05-28 PROCEDURE — 97112 NEUROMUSCULAR REEDUCATION: CPT

## 2025-05-28 NOTE — PROGRESS NOTES
"Daily Note      Today's date: 2025  Patient name: Ghada Ferreira  : 1955  MRN: [id number]  Referring provider: Thao Jones DO  Dx:   Encounter Diagnosis     ICD-10-CM    1. Chronic right-sided low back pain without sciatica  M54.50     G89.29       2. Chronic pain of right knee  M25.561     G89.29           Subjective: Pt reports that she is doing well overall and plans on continuing her home exercises once concluding PT.        Objective: See treatment diary below    Assessment: Pt tolerated treatment well. Pt was educated in comprehensive HEP and was introduced to additional UE strengthening exercises for functional tasks at home and while taking care of pets. All questions were answered and pt confirmed that she has the appropriate equipment in order to perform updated HEP.     Plan: Discharge from PT.         Daily Treatment Diary     Precautions: Chronic low back pain, LLE lymphedema, hx of cancer    POC Expires Reeval for Medicare to be completed  Unit Limit Auth Expiration Date PT/OT/STVisit Limit   25 By visit 10 N/A N/A N/A    Completed on visit                    Auth Status DATE  (RE-EVAL)      NA Visit # 20 21 22 23      Remaining 10 9 8 7     MANUAL THERAPY         STM/MFR R knee         Joint mobs R knee                                              THERAPEUTIC EXERCISE HEP         Bike  5' L1-2 5' L1   5' L1      PROM R knee          Heel prop x         Gastroc stretch   3x30s B/L  3x30s B/L 1x30s B/L      HS stretch   3x30s B/L  3x30s B/L 1x30s B/L      Hip flexor stretch     1x30s B/L      Leg press          Sit-to-stand          Prone on elbows          SOLO LOPEZ   Reviewed       Step ups   2x10 6\" step holding 5# DB NP today  10x 6\" step     Lateral step ups   2x10 6\" step  2x10 6\" step 10x 6\" step      Step downs           Mini squats          Lateral walking    3 laps at mirror      Biceps curls     10x 2#     Lateral raises     10x 2#   "   Overhead press     10x 2#     NEUROMUSCULAR REEDUCATION           TA activation   10x5s B/L  Reviewed  Reviewed     Hip add iso  2x10 (5s)         Supine clams  2x10 (3s)         Quad sets          SAQ          LAQ   2x10 B/L  2x10 B/L 10x B/L      SLR          Bridging  W/ TA act 2x10 (3s)  W/ TA act 2x10 (3s)  W/ TA act 2x10 (3s)  10x3s     Rows   2x10 GTB 2x10 GTB 2x10 GTB     Shoulder extensions   2x10 GTB 2x10 GTB 2x10 GTB     Standing hip abd/ext          TA w/ standing march          Pallof press      20x B/L GTB                                                                           THERAPEUTIC ACTIVITY          Farmer's carries          STS w/ med ball                              GAIT TRAINING                                                  MODALITIES                             Access Code: [email or web address]  URL: [email or web address]  Date: 03/19/2025  Prepared by: Diaz Moore    Exercises  - Standing Lumbar Extension with Counter  - 6 x daily - 7 x weekly - 1 sets - 10 reps  - Long Sitting Calf Stretch with Strap  - 1 x daily - 7 x weekly - 1 sets - 3 reps - 20 hold  - Hooklying Hamstring Stretch with Strap  - 1 x daily - 7 x weekly - 1 sets - 3 reps - 20 hold  - Supine Quad Set  - 1 x daily - 7 x weekly - 2 sets - 10 reps - 5 hold  - Supine Quadriceps Stretch with Strap on Table  - 1 x daily - 7 x weekly - 1 sets - 3 reps - 20 hold  - Active Straight Leg Raise with Quad Set  - 1 x daily - 7 x weekly - 2 sets - 10 reps  - Seated Knee Extension  - 1 x daily - 7 x weekly - 2 sets - 10 reps - 5 hold  - Supine Bridge  - 1 x daily - 4 x weekly - 2 sets - 10 reps - 5 hold  - Standing Hip Abduction with Counter Support  - 1 x daily - 4 x weekly - 2 sets - 10 reps  - Standing Hip Extension with Counter Support  - 1 x daily - 4 x weekly - 2 sets - 10 reps  - Standing Row with Anchored Resistance  - 1 x daily - 4 x weekly - 2 sets - 10 reps - 5 hold  - Shoulder Extension with Resistance  - 1 x daily -  4 x weekly - 2 sets - 10 reps - 5 hold    Access Code: [email or web address]  URL: [email or web address]  Date: 05/28/2025  Prepared by: Diaz Moore    Exercises  - Standing Lumbar Extension with Counter  - 1 x daily - 7 x weekly - 1 sets - 10 reps - 2 hold  - Long Sitting Calf Stretch with Strap  - 1 x daily - 3-4 x weekly - 1 sets - 3 reps - 30 hold  - Hooklying Hamstring Stretch with Strap  - 1 x daily - 3-4 x weekly - 1 sets - 3 reps - 30 hold  - Supine Bridge  - 1 x daily - 3-4 x weekly - 2 sets - 10 reps - 3 hold  - Active Straight Leg Raise with Quad Set  - 1 x daily - 3-4 x weekly - 2 sets - 10 reps  - Seated Knee Extension  - 1 x daily - 3-4 x weekly - 2 sets - 10 reps - 5 hold  - Step Up  - 1 x daily - 3-4 x weekly - 2 sets - 10 reps  - Lateral Step Up  - 1 x daily - 3-4 x weekly - 2 sets - 10 reps  - Standing Row with Anchored Resistance  - 1 x daily - 3-4 x weekly - 2-3 sets - 10 reps - 3 hold  - Shoulder Extension with Resistance  - 1 x daily - 3-4 x weekly - 2-3 sets - 10 reps - 3 hold  - Standing Anti-Rotation Press with Anchored Resistance  - 1 x daily - 3-4 x weekly - 2 sets - 10 reps  - Standing Bicep Curls Supinated with Dumbbells  - 1 x daily - 3-4 x weekly - 3 sets - 10 reps  - Standing Shoulder Abduction with Dumbbells  - 1 x daily - 3-4 x weekly - 3 sets - 10 reps  - Shoulder Overhead Press in Flexion with Dumbbells  - 1 x daily - 3-4 x weekly - 3 sets - 10 reps

## 2025-06-06 ENCOUNTER — NURSE TRIAGE (OUTPATIENT)
Age: 70
End: 2025-06-06

## 2025-06-06 DIAGNOSIS — H10.9 BACTERIAL CONJUNCTIVITIS OF BOTH EYES: Primary | ICD-10-CM

## 2025-06-06 DIAGNOSIS — B96.89 BACTERIAL CONJUNCTIVITIS OF BOTH EYES: Primary | ICD-10-CM

## 2025-06-06 RX ORDER — POLYMYXIN B SULFATE AND TRIMETHOPRIM 1; 10000 MG/ML; [USP'U]/ML
1 SOLUTION OPHTHALMIC EVERY 6 HOURS
Qty: 10 ML | Refills: 0 | Status: SHIPPED | OUTPATIENT
Start: 2025-06-06

## 2025-06-06 NOTE — TELEPHONE ENCOUNTER
"  REASON FOR CONVERSATION: Conjunctivitis    SYMPTOMS: red, itchy, swollen, crusty, teary,    OTHER HEALTH INFORMATION: patient also bit by tick two weeks ago, bite area just started healing    PROTOCOL DISPOSITION: Home Care with Provider Message (overriding See Today in Office), Home Care    CARE ADVICE PROVIDED: keep left eye clean, OTC mediation if available.    PRACTICE FOLLOW-UP: Patient would like follow up call         Reason for Disposition   MILD eye pain and present < 24 hours   Patient wants to be seen    Answer Assessment - Initial Assessment Questions  1. ONSET: \"When did the pain start?\" (e.g., minutes, hours, days)      3 days  2. TIMING: \"Does the pain come and go, or has it been constant since it started?\" (e.g., constant, intermittent, fleeting)      Always itchy  3. SEVERITY: \"How bad is the pain?\"  (Scale 1-10; mild, moderate or severe)      moderate  4. LOCATION: \"Where does it hurt?\"  (e.g., eyelid, eye, cheekbone)      Left eye  5. CAUSE: \"What do you think is causing the pain?\"      Possible pink eye  6. VISION: \"Do you have blurred vision or changes in your vision?\"       yes  7. EYE DISCHARGE: \"Is there any discharge (pus) from the eye(s)?\"  If Yes, ask: \"What color is it?\"       Some crusty drainage  8. FEVER: \"Do you have a fever?\" If Yes, ask: \"What is it, how was it measured, and when did it start?\"       Did not say  9. OTHER SYMPTOMS: \"Do you have any other symptoms?\" (e.g., headache, nasal discharge, facial rash)      Itchy, teary, swollen    Protocols used: Eye Pain and Other Symptoms-Adult-[place]    "

## 2025-06-06 NOTE — TELEPHONE ENCOUNTER
Regarding: Pink eye  ----- Message from Jonh GUEVARA sent at 6/6/2025 12:10 PM EDT -----  Patient states she may possibly have pink eye in her left eye. States symptoms of mild blurred vision, crust around the eye, symptoms started 3 days ago. Patient states did use OTC eye drops, but did not work.

## 2025-06-23 ENCOUNTER — HOSPITAL ENCOUNTER (OUTPATIENT)
Dept: MAMMOGRAPHY | Facility: IMAGING CENTER | Age: 70
Discharge: HOME | End: 2025-06-23
Payer: MEDICARE

## 2025-06-23 VITALS — BODY MASS INDEX: 23.6 KG/M2 | WEIGHT: 125 LBS | HEIGHT: 61 IN

## 2025-06-23 DIAGNOSIS — Z12.31 BREAST CANCER SCREENING BY MAMMOGRAM: ICD-10-CM

## 2025-06-23 PROCEDURE — 77063 BREAST TOMOSYNTHESIS BI: CPT

## 2025-06-23 PROCEDURE — 77067 SCR MAMMO BI INCL CAD: CPT

## 2025-06-27 ENCOUNTER — HOSPITAL ENCOUNTER (OUTPATIENT)
Dept: INFUSION CENTER | Facility: HOSPITAL | Age: 70
End: 2025-06-27
Attending: INTERNAL MEDICINE
Payer: MEDICARE

## 2025-06-27 VITALS
TEMPERATURE: 97.6 F | SYSTOLIC BLOOD PRESSURE: 123 MMHG | OXYGEN SATURATION: 98 % | DIASTOLIC BLOOD PRESSURE: 58 MMHG | RESPIRATION RATE: 16 BRPM | BODY MASS INDEX: 24.41 KG/M2 | WEIGHT: 129.19 LBS | HEART RATE: 60 BPM

## 2025-06-27 DIAGNOSIS — K50.10 CROHN'S DISEASE OF LARGE INTESTINE WITHOUT COMPLICATION (HCC): Primary | ICD-10-CM

## 2025-06-27 PROCEDURE — 96413 CHEMO IV INFUSION 1 HR: CPT

## 2025-06-27 PROCEDURE — 96415 CHEMO IV INFUSION ADDL HR: CPT

## 2025-06-27 RX ORDER — SODIUM CHLORIDE 9 MG/ML
20 INJECTION, SOLUTION INTRAVENOUS ONCE
Status: COMPLETED | OUTPATIENT
Start: 2025-06-27 | End: 2025-06-27

## 2025-06-27 RX ORDER — SODIUM CHLORIDE 9 MG/ML
20 INJECTION, SOLUTION INTRAVENOUS ONCE
OUTPATIENT
Start: 2025-08-22

## 2025-06-27 RX ADMIN — SODIUM CHLORIDE 20 ML/HR: 0.9 INJECTION, SOLUTION INTRAVENOUS at 10:12

## 2025-06-27 RX ADMIN — INFLIXIMAB 284 MG: 100 INJECTION, POWDER, LYOPHILIZED, FOR SOLUTION INTRAVENOUS at 10:12

## 2025-06-27 NOTE — PROGRESS NOTES
..Ghada Ferreira  tolerated treatment well with no complications.      Ghada Ferreira is aware of future appt on 8/22 at 10:00.     AVS printed and given to Ghada Ferreira:  Yes

## 2025-07-10 ENCOUNTER — OFFICE VISIT (OUTPATIENT)
Dept: DERMATOLOGY | Facility: CLINIC | Age: 70
End: 2025-07-10
Payer: MEDICARE

## 2025-07-10 VITALS — BODY MASS INDEX: 24.37 KG/M2 | WEIGHT: 129 LBS

## 2025-07-10 DIAGNOSIS — L82.1 SEBORRHEIC KERATOSES: Primary | ICD-10-CM

## 2025-07-10 DIAGNOSIS — D18.01 CHERRY ANGIOMA: ICD-10-CM

## 2025-07-10 DIAGNOSIS — D22.9 MULTIPLE MELANOCYTIC NEVI: ICD-10-CM

## 2025-07-10 DIAGNOSIS — L81.4 LENTIGINES: ICD-10-CM

## 2025-07-10 PROCEDURE — 99213 OFFICE O/P EST LOW 20 MIN: CPT | Performed by: DERMATOLOGY

## 2025-07-10 NOTE — PATIENT INSTRUCTIONS
SEBORRHEIC KERATOSES  - Relevant exam: Scattered over the trunk/extremities are waxy brown to black plaques and papules with stuck on appearance and consistent dermoscopy  - Exam and clinical history consistent with seborrheic keratoses  - Counseled that these are benign growths that do not require treatment    MELANOCYTIC NEVI  -Relevant exam: Scattered over the trunk/extremities are homogenously pigmented brown macules and papules. ELM performed and without concerning findings. No outliers unless otherwise noted in today's note  - Exam and clinical history consistent with melanocytic nevi  - Counseled to return to clinic prior to scheduled appointment should any of these lesions change or should any new lesions of concern arise  - Counseled on use of sun protection daily. Reviewed latest FDA sunscreen guidelines, including use of broad spectrum (UVA and UVB blocking) sunscreen or sun protective clothing with SPF 30-50 every 2-3 hours and reapplied after exposure to water    LENTIGINES  OTHER SKIN CHANGES DUE TO CHRONIC EXPOSURE TO NONIONIZING RADIATION  - Relevant exam: Over sun exposed areas are brown macules. ELM performed and without concerning findings.  - Exam and clinical history consistent with lentigines.  - Counseled to return to clinic prior to scheduled appointment should any of these lesions change or should any new lesions of concern arise.  - Recommended use of sunscreen as above and below.    CHERRY ANGIOMAS  - Relevant exam: Scattered over the trunk/extremities are red papules  - Exam and clinical history consistent with cherry angiomas  - Educated that these are benign

## 2025-07-10 NOTE — PROGRESS NOTES
"[facility] Note     Patient Name: Ghada Ferreira  Encounter Date: 7/10/25       Have you been cared for by a [facility] Dermatologist in the last 3 years and, if so, which description applies to you? Yes. I have been here within the last 3 years, and my medical history has NOT changed since that time. I am of child-bearing potential.     REVIEW OF SYSTEMS:  Have you recently had or currently have any of the following? No changes in my recent health.   PAST MEDICAL HISTORY:  Have you personally ever had or currently have any of the following?  If \"YES,\" then please provide more detail. No changes in my medical history.   HISTORY OF IMMUNOSUPPRESSION: Do you have a history of any of the following:  Systemic Immunosuppression such as Diabetes, Biologic or Immunotherapy, Chemotherapy, Organ Transplantation, Bone Marrow Transplantation or Prednisone?  YES, Chemotherapy- Ovarian cance      Answering \"YES\" requires the addition of the dotphrase \"IMMUNOSUPPRESSED\" as the first diagnosis of the patient's visit.   FAMILY HISTORY:  Any \"first degree relatives\" (parent, brother, sister, or child) with the following?    No changes in my family's known health.   PATIENT EXPERIENCE:    Do you want the Dermatologist to perform a COMPLETE skin exam today including a clinical examination under the \"bra and underwear\" areas?  Yes  If necessary, do we have your permission to call and leave a detailed message on your Preferred Phone number that includes your specific medical information?  Yes      Allergies[1] Current Medications[2]              Whom besides the patient is providing clinical information about today's encounter?   NO ADDITIONAL HISTORIAN (patient alone provided history)    Physical Exam and Assessment/Plan by Diagnosis:  Patient has a history of breast and ovarian cancer prompting genetic testing. YOLANDA positive. Recent study links this mutation to melanoma risk     SEBORRHEIC KERATOSES  - Relevant " exam: Scattered over the trunk/extremities, patient sites of concern on head and neck are waxy brown to black plaques and papules with stuck on appearance and consistent dermoscopy  - Exam and clinical history consistent with seborrheic keratoses  - Counseled that these are benign growths that do not require treatment    MELANOCYTIC NEVI  -Relevant exam: Scattered over the trunk/extremities are homogenously pigmented brown macules and papules. ELM performed and without concerning findings. No outliers unless otherwise noted in today's note  - Exam and clinical history consistent with melanocytic nevi  - Counseled to return to clinic prior to scheduled appointment should any of these lesions change or should any new lesions of concern arise  - Counseled on use of sun protection daily. Reviewed latest FDA sunscreen guidelines, including use of broad spectrum (UVA and UVB blocking) sunscreen or sun protective clothing with SPF 30-50 every 2-3 hours and reapplied after exposure to water    LENTIGINES  OTHER SKIN CHANGES DUE TO CHRONIC EXPOSURE TO NONIONIZING RADIATION  - Relevant exam: Over sun exposed areas are brown macules. ELM performed and without concerning findings.  - Exam and clinical history consistent with lentigines.  - Counseled to return to clinic prior to scheduled appointment should any of these lesions change or should any new lesions of concern arise.  - Recommended use of sunscreen as above and below.    CHERRY ANGIOMAS  - Relevant exam: Scattered over the trunk/extremities are red papules  - Exam and clinical history consistent with cherry angiomas  - Educated that these are benign         Scribe Attestation      I,:  Yumiko Burger MA am acting as a scribe while in the presence of the attending physician.:       I,:  Brenda Richter MD personally performed the services described in this documentation    as scribed in my presence.:                  [1]   Allergies  Allergen Reactions     Lisinopril      cough    Losartan      Numbness on right side of body    Metoprolol Dizziness    Boniva [Ibandronate] Headache   [2]   Current Outpatient Medications:     acetaminophen (TYLENOL) 500 mg tablet, Take 1,000 mg by mouth as needed Pre-Medication prior to Remicade Infusion, Disp: , Rfl:     albuterol (PROVENTIL HFA,VENTOLIN HFA) 90 mcg/act inhaler, Inhale 2 puffs  as needed in the morning for wheezing., Disp: 18 g, Rfl: 2    alendronate (FOSAMAX) 70 mg tablet, Take 1 tablet (70 mg total) by mouth every 7 days, Disp: 12 tablet, Rfl: 3    amLODIPine (NORVASC) 10 mg tablet, Take 1 tablet (10 mg total) by mouth daily, Disp: 90 tablet, Rfl: 1    anastrozole (ARIMIDEX) 1 mg tablet, TAKE 1 TABLET (1 MG TOTAL) BY MOUTH DAILY, Disp: 30 tablet, Rfl: 5    atorvastatin (LIPITOR) 40 mg tablet, Take 1 tablet (40 mg total) by mouth daily at bedtime, Disp: 90 tablet, Rfl: 0    Calcium Carbonate 1500 (600 Ca) MG TABS, Take 1 tablet by mouth in the morning and 1 tablet in the evening., Disp: , Rfl:     carvedilol (COREG) 6.25 mg tablet, Take 1 tablet (6.25 mg total) by mouth 2 (two) times a day with meals, Disp: 60 tablet, Rfl: 11    clobetasol (TEMOVATE) 0.05 % ointment, Apply topically 2 (two) times a day Apply to the affected area 1-2 times per week, Disp: 30 g, Rfl: 2    Diclofenac Sodium (VOLTAREN) 1 %, Apply 2 g topically in the morning and 2 g at noon and 2 g in the evening and 2 g before bedtime. PRN., Disp: , Rfl:     diphenhydrAMINE (BENADRYL) 25 mg tablet, Take 25 mg by mouth as needed Pre-Medication prior to Remicade Infusion, Disp: , Rfl:     Homeopathic Products (Arnicare Bruise) GEL, Apply topically, Disp: , Rfl:     indapamide (LOZOL) 2.5 mg tablet, TAKE 1 TABLET (2.5 MG TOTAL) BY MOUTH EVERY MORNING, Disp: 90 tablet, Rfl: 1    inFLIXimab (REMICADE) 100 mg, Inject into a catheter in a vein, Disp: , Rfl:     mupirocin (BACTROBAN) 2 % ointment, Apply topically 3 (three) times a day, Disp: 22 g, Rfl: 0     polymyxin b-trimethoprim (POLYTRIM) ophthalmic solution, Administer 1 drop to both eyes every 6 (six) hours, Disp: 10 mL, Rfl: 0    potassium chloride (MICRO-K) 10 MEQ CR capsule, TAKE 1 CAPSULE (10 MEQ TOTAL) BY MOUTH DAILY, Disp: 90 capsule, Rfl: 1    ketoconazole (NIZORAL) 2 % shampoo, Use 2-3x per week on scalp (alternate with over the counter salicylic acid shampoo). Leave on for 5 minutes and then rinse off completely. (Patient not taking: Reported on 2/26/2025), Disp: 120 mL, Rfl: 4    triamcinolone (KENALOG) 0.025 % cream, Apply topically 2 (two) times a day for up to 10 days. May repeat course after 1 week break. (Patient not taking: Reported on 4/15/2025), Disp: 15 g, Rfl: 1    Turmeric 500 MG CAPS, Take by mouth (Patient not taking: Reported on 4/15/2025), Disp: , Rfl:

## 2025-07-14 DIAGNOSIS — E78.49 OTHER HYPERLIPIDEMIA: ICD-10-CM

## 2025-07-15 RX ORDER — ATORVASTATIN CALCIUM 40 MG/1
40 TABLET, FILM COATED ORAL
Qty: 30 TABLET | Refills: 0 | Status: SHIPPED | OUTPATIENT
Start: 2025-07-15

## 2025-07-23 ENCOUNTER — HOSPITAL ENCOUNTER (OUTPATIENT)
Dept: BONE DENSITY | Facility: IMAGING CENTER | Age: 70
Discharge: HOME | End: 2025-07-23
Payer: MEDICARE

## 2025-07-23 VITALS — HEIGHT: 60 IN | WEIGHT: 128.6 LBS | BODY MASS INDEX: 25.25 KG/M2

## 2025-07-23 DIAGNOSIS — Z78.0 POSTMENOPAUSE: ICD-10-CM

## 2025-07-23 PROCEDURE — 77080 DXA BONE DENSITY AXIAL: CPT

## 2025-07-25 DIAGNOSIS — Z17.0 MALIGNANT NEOPLASM OF CENTRAL PORTION OF LEFT BREAST IN FEMALE, ESTROGEN RECEPTOR POSITIVE (HCC): ICD-10-CM

## 2025-07-25 DIAGNOSIS — C50.112 MALIGNANT NEOPLASM OF CENTRAL PORTION OF LEFT BREAST IN FEMALE, ESTROGEN RECEPTOR POSITIVE (HCC): ICD-10-CM

## 2025-07-25 NOTE — TELEPHONE ENCOUNTER
Patient called to request a refill for their anastrozole advised a refill was requested on 7/25 and is pending approval. Patient verbalized understanding and is in agreement.     Does the patient have enough for 3 days?   [] Yes   [x] No - Send as HP to POD -she is out of medication

## 2025-07-26 RX ORDER — ANASTROZOLE 1 MG/1
1 TABLET ORAL DAILY
Qty: 30 TABLET | Refills: 4 | Status: SHIPPED | OUTPATIENT
Start: 2025-07-26

## 2025-07-27 DIAGNOSIS — I10 ESSENTIAL HYPERTENSION: ICD-10-CM

## 2025-07-29 RX ORDER — AMLODIPINE BESYLATE 10 MG/1
10 TABLET ORAL DAILY
Qty: 90 TABLET | Refills: 1 | Status: SHIPPED | OUTPATIENT
Start: 2025-07-29

## 2025-08-01 ENCOUNTER — APPOINTMENT (OUTPATIENT)
Dept: LAB | Facility: HOSPITAL | Age: 70
End: 2025-08-01
Payer: MEDICARE

## 2025-08-01 DIAGNOSIS — E87.6 HYPOKALEMIA: ICD-10-CM

## 2025-08-01 DIAGNOSIS — E78.49 OTHER HYPERLIPIDEMIA: ICD-10-CM

## 2025-08-01 DIAGNOSIS — R53.82 CHRONIC FATIGUE: ICD-10-CM

## 2025-08-01 LAB
ALBUMIN SERPL BCG-MCNC: 3.9 G/DL (ref 3.5–5)
ALP SERPL-CCNC: 57 U/L (ref 34–104)
ALT SERPL W P-5'-P-CCNC: 14 U/L (ref 7–52)
ANION GAP SERPL CALCULATED.3IONS-SCNC: 7 MMOL/L (ref 4–13)
AST SERPL W P-5'-P-CCNC: 18 U/L (ref 13–39)
BASOPHILS # BLD AUTO: 0.04 THOUSANDS/ÂΜL (ref 0–0.1)
BASOPHILS NFR BLD AUTO: 1 % (ref 0–1)
BILIRUB SERPL-MCNC: 0.67 MG/DL (ref 0.2–1)
BUN SERPL-MCNC: 23 MG/DL (ref 5–25)
CALCIUM SERPL-MCNC: 9.9 MG/DL (ref 8.4–10.2)
CHLORIDE SERPL-SCNC: 99 MMOL/L (ref 96–108)
CHOLEST SERPL-MCNC: 208 MG/DL (ref ?–200)
CO2 SERPL-SCNC: 33 MMOL/L (ref 21–32)
CREAT SERPL-MCNC: 0.81 MG/DL (ref 0.6–1.3)
EOSINOPHIL # BLD AUTO: 0.2 THOUSAND/ÂΜL (ref 0–0.61)
EOSINOPHIL NFR BLD AUTO: 3 % (ref 0–6)
ERYTHROCYTE [DISTWIDTH] IN BLOOD BY AUTOMATED COUNT: 13.6 % (ref 11.6–15.1)
GFR SERPL CREATININE-BSD FRML MDRD: 75 ML/MIN/1.73SQ M
GLUCOSE P FAST SERPL-MCNC: 96 MG/DL (ref 65–99)
HCT VFR BLD AUTO: 40.5 % (ref 34.8–46.1)
HDLC SERPL-MCNC: 78 MG/DL
HGB BLD-MCNC: 13.6 G/DL (ref 11.5–15.4)
IMM GRANULOCYTES # BLD AUTO: 0.02 THOUSAND/UL (ref 0–0.2)
IMM GRANULOCYTES NFR BLD AUTO: 0 % (ref 0–2)
LDLC SERPL CALC-MCNC: 112 MG/DL (ref 0–100)
LYMPHOCYTES # BLD AUTO: 1.9 THOUSANDS/ÂΜL (ref 0.6–4.47)
LYMPHOCYTES NFR BLD AUTO: 30 % (ref 14–44)
MCH RBC QN AUTO: 28.9 PG (ref 26.8–34.3)
MCHC RBC AUTO-ENTMCNC: 33.6 G/DL (ref 31.4–37.4)
MCV RBC AUTO: 86 FL (ref 82–98)
MONOCYTES # BLD AUTO: 0.61 THOUSAND/ÂΜL (ref 0.17–1.22)
MONOCYTES NFR BLD AUTO: 10 % (ref 4–12)
NEUTROPHILS # BLD AUTO: 3.66 THOUSANDS/ÂΜL (ref 1.85–7.62)
NEUTS SEG NFR BLD AUTO: 56 % (ref 43–75)
NONHDLC SERPL-MCNC: 130 MG/DL
NRBC BLD AUTO-RTO: 0 /100 WBCS
PLATELET # BLD AUTO: 298 THOUSANDS/UL (ref 149–390)
PMV BLD AUTO: 9.6 FL (ref 8.9–12.7)
POTASSIUM SERPL-SCNC: 4.3 MMOL/L (ref 3.5–5.3)
PROT SERPL-MCNC: 7.8 G/DL (ref 6.4–8.4)
RBC # BLD AUTO: 4.71 MILLION/UL (ref 3.81–5.12)
SODIUM SERPL-SCNC: 139 MMOL/L (ref 135–147)
TRIGL SERPL-MCNC: 90 MG/DL (ref ?–150)
TSH SERPL DL<=0.05 MIU/L-ACNC: 2.65 UIU/ML (ref 0.45–4.5)
WBC # BLD AUTO: 6.43 THOUSAND/UL (ref 4.31–10.16)

## 2025-08-01 PROCEDURE — 84443 ASSAY THYROID STIM HORMONE: CPT

## 2025-08-01 PROCEDURE — 85025 COMPLETE CBC W/AUTO DIFF WBC: CPT

## 2025-08-01 PROCEDURE — 80053 COMPREHEN METABOLIC PANEL: CPT

## 2025-08-01 PROCEDURE — 36415 COLL VENOUS BLD VENIPUNCTURE: CPT

## 2025-08-01 PROCEDURE — 80061 LIPID PANEL: CPT

## 2025-08-05 ENCOUNTER — OFFICE VISIT (OUTPATIENT)
Dept: CARDIOLOGY CLINIC | Facility: CLINIC | Age: 70
End: 2025-08-05
Payer: MEDICARE

## 2025-08-05 VITALS
HEART RATE: 64 BPM | BODY MASS INDEX: 25.32 KG/M2 | OXYGEN SATURATION: 99 % | DIASTOLIC BLOOD PRESSURE: 76 MMHG | HEIGHT: 60 IN | WEIGHT: 129 LBS | SYSTOLIC BLOOD PRESSURE: 140 MMHG

## 2025-08-05 DIAGNOSIS — I50.32 CHRONIC DIASTOLIC HEART FAILURE (HCC): Primary | ICD-10-CM

## 2025-08-05 DIAGNOSIS — I10 PRIMARY HYPERTENSION: ICD-10-CM

## 2025-08-05 DIAGNOSIS — Z82.49 FAMILY HISTORY OF CARDIOVASCULAR DISEASE: ICD-10-CM

## 2025-08-05 DIAGNOSIS — R00.2 PALPITATIONS: ICD-10-CM

## 2025-08-05 DIAGNOSIS — Z92.3 S/P RADIATION THERAPY: ICD-10-CM

## 2025-08-05 DIAGNOSIS — I05.9 MITRAL VALVE DISORDER: ICD-10-CM

## 2025-08-05 DIAGNOSIS — R06.09 DOE (DYSPNEA ON EXERTION): ICD-10-CM

## 2025-08-05 DIAGNOSIS — E78.2 MIXED HYPERLIPIDEMIA: ICD-10-CM

## 2025-08-05 PROCEDURE — G2211 COMPLEX E/M VISIT ADD ON: HCPCS | Performed by: INTERNAL MEDICINE

## 2025-08-05 PROCEDURE — 99214 OFFICE O/P EST MOD 30 MIN: CPT | Performed by: INTERNAL MEDICINE

## 2025-08-05 RX ORDER — FUROSEMIDE 20 MG/1
20 TABLET ORAL DAILY PRN
Qty: 30 TABLET | Refills: 5 | Status: SHIPPED | OUTPATIENT
Start: 2025-08-05

## (undated) DEVICE — SUT VICRYL 3-0 SH 27 IN J416H

## (undated) DEVICE — CHLORAPREP HI-LITE 26ML ORANGE

## (undated) DEVICE — LIGHT HANDLE COVER SLEEVE DISP BLUE STELLAR

## (undated) DEVICE — PENCIL ELECTROSURG E-Z CLEAN -0035H

## (undated) DEVICE — DRAPE EQUIPMENT RF WAND

## (undated) DEVICE — TUBING SUCTION 5MM X 12 FT

## (undated) DEVICE — MEDI-VAC YANK SUCT HNDL W/TPRD BULBOUS TIP: Brand: CARDINAL HEALTH

## (undated) DEVICE — VIAL DECANTER

## (undated) DEVICE — DRAPE PROBE NEO-PROBE/ULTRASOUND

## (undated) DEVICE — BETHLEHEM UNIVERSAL MINOR GEN: Brand: CARDINAL HEALTH

## (undated) DEVICE — SUT MONOCRYL 4-0 PS-2 18 IN Y496G

## (undated) DEVICE — NEEDLE 25G X 1 1/2

## (undated) DEVICE — GLOVE SRG BIOGEL 7

## (undated) DEVICE — PAD GROUNDING ADULT

## (undated) DEVICE — INTENDED FOR TISSUE SEPARATION, AND OTHER PROCEDURES THAT REQUIRE A SHARP SURGICAL BLADE TO PUNCTURE OR CUT.: Brand: BARD-PARKER SAFETY BLADES SIZE 15, STERILE

## (undated) DEVICE — SMOKE EVACUATION TUBING WITH 8 IN INTEGRAL WAND AND SPONGE GUARD: Brand: BUFFALO FILTER

## (undated) DEVICE — SYRINGE 1ML TB 26G X 3/8 SAFETY

## (undated) DEVICE — MARGIN MARKER SET

## (undated) DEVICE — 3M™ STERI-STRIP™ REINFORCED ADHESIVE SKIN CLOSURES, R1547, 1/2 IN X 4 IN (12 MM X 100 MM), 6 STRIPS/ENVELOPE: Brand: 3M™ STERI-STRIP™

## (undated) DEVICE — ADHESIVE SKN CLSR HISTOACRYL FLEX 0.5ML LF